# Patient Record
Sex: MALE | Race: WHITE | NOT HISPANIC OR LATINO | Employment: OTHER | URBAN - METROPOLITAN AREA
[De-identification: names, ages, dates, MRNs, and addresses within clinical notes are randomized per-mention and may not be internally consistent; named-entity substitution may affect disease eponyms.]

---

## 2021-05-24 ENCOUNTER — HOSPITAL ENCOUNTER (EMERGENCY)
Facility: HOSPITAL | Age: 76
Discharge: ANOTHER HOSPITAL | End: 2021-05-25
Attending: EMERGENCY MEDICINE
Payer: MEDICARE

## 2021-05-24 DIAGNOSIS — K59.00 CONSTIPATION: ICD-10-CM

## 2021-05-24 DIAGNOSIS — R33.9 URINARY RETENTION: Primary | ICD-10-CM

## 2021-05-24 DIAGNOSIS — R31.0 GROSS HEMATURIA: ICD-10-CM

## 2021-05-24 DIAGNOSIS — N17.9 AKI (ACUTE KIDNEY INJURY): ICD-10-CM

## 2021-05-24 LAB
ALBUMIN SERPL BCP-MCNC: 3 G/DL (ref 3.5–5.2)
ALP SERPL-CCNC: 81 U/L (ref 55–135)
ALT SERPL W/O P-5'-P-CCNC: 13 U/L (ref 10–44)
ANION GAP SERPL CALC-SCNC: 13 MMOL/L (ref 8–16)
AST SERPL-CCNC: 18 U/L (ref 10–40)
BACTERIA #/AREA URNS HPF: ABNORMAL /HPF
BASOPHILS # BLD AUTO: 0.1 K/UL (ref 0–0.2)
BASOPHILS NFR BLD: 1.2 % (ref 0–1.9)
BILIRUB SERPL-MCNC: 0.5 MG/DL (ref 0.1–1)
BILIRUB UR QL STRIP: NEGATIVE
BUN SERPL-MCNC: 47 MG/DL (ref 8–23)
CALCIUM SERPL-MCNC: 9.2 MG/DL (ref 8.7–10.5)
CAOX CRY URNS QL MICRO: ABNORMAL
CHLORIDE SERPL-SCNC: 111 MMOL/L (ref 95–110)
CLARITY UR: CLEAR
CO2 SERPL-SCNC: 21 MMOL/L (ref 23–29)
COLOR UR: YELLOW
CREAT SERPL-MCNC: 2.8 MG/DL (ref 0.5–1.4)
DIFFERENTIAL METHOD: ABNORMAL
EOSINOPHIL # BLD AUTO: 0.3 K/UL (ref 0–0.5)
EOSINOPHIL NFR BLD: 3 % (ref 0–8)
ERYTHROCYTE [DISTWIDTH] IN BLOOD BY AUTOMATED COUNT: 12.9 % (ref 11.5–14.5)
EST. GFR  (AFRICAN AMERICAN): 25 ML/MIN/1.73 M^2
EST. GFR  (NON AFRICAN AMERICAN): 21 ML/MIN/1.73 M^2
GLUCOSE SERPL-MCNC: 77 MG/DL (ref 70–110)
GLUCOSE UR QL STRIP: NEGATIVE
HCT VFR BLD AUTO: 42.8 % (ref 40–54)
HGB BLD-MCNC: 14 G/DL (ref 14–18)
HGB UR QL STRIP: ABNORMAL
IMM GRANULOCYTES # BLD AUTO: 0.03 K/UL (ref 0–0.04)
IMM GRANULOCYTES NFR BLD AUTO: 0.3 % (ref 0–0.5)
KETONES UR QL STRIP: NEGATIVE
LEUKOCYTE ESTERASE UR QL STRIP: NEGATIVE
LIPASE SERPL-CCNC: 44 U/L (ref 4–60)
LYMPHOCYTES # BLD AUTO: 1.4 K/UL (ref 1–4.8)
LYMPHOCYTES NFR BLD: 16.4 % (ref 18–48)
MCH RBC QN AUTO: 30.4 PG (ref 27–31)
MCHC RBC AUTO-ENTMCNC: 32.7 G/DL (ref 32–36)
MCV RBC AUTO: 93 FL (ref 82–98)
MICROSCOPIC COMMENT: ABNORMAL
MONOCYTES # BLD AUTO: 0.6 K/UL (ref 0.3–1)
MONOCYTES NFR BLD: 7.1 % (ref 4–15)
NEUTROPHILS # BLD AUTO: 6.2 K/UL (ref 1.8–7.7)
NEUTROPHILS NFR BLD: 72 % (ref 38–73)
NITRITE UR QL STRIP: NEGATIVE
NRBC BLD-RTO: 0 /100 WBC
PH UR STRIP: 5 [PH] (ref 5–8)
PLATELET # BLD AUTO: 207 K/UL (ref 150–450)
PMV BLD AUTO: 10.3 FL (ref 9.2–12.9)
POTASSIUM SERPL-SCNC: 4.6 MMOL/L (ref 3.5–5.1)
PROT SERPL-MCNC: 7 G/DL (ref 6–8.4)
PROT UR QL STRIP: NEGATIVE
RBC # BLD AUTO: 4.61 M/UL (ref 4.6–6.2)
RBC #/AREA URNS HPF: 18 /HPF (ref 0–4)
SODIUM SERPL-SCNC: 145 MMOL/L (ref 136–145)
SP GR UR STRIP: 1.01 (ref 1–1.03)
URN SPEC COLLECT METH UR: ABNORMAL
UROBILINOGEN UR STRIP-ACNC: NEGATIVE EU/DL
WBC # BLD AUTO: 8.59 K/UL (ref 3.9–12.7)
WBC #/AREA URNS HPF: 4 /HPF (ref 0–5)

## 2021-05-24 PROCEDURE — 80053 COMPREHEN METABOLIC PANEL: CPT | Performed by: EMERGENCY MEDICINE

## 2021-05-24 PROCEDURE — 81000 URINALYSIS NONAUTO W/SCOPE: CPT | Performed by: EMERGENCY MEDICINE

## 2021-05-24 PROCEDURE — U0002 COVID-19 LAB TEST NON-CDC: HCPCS | Performed by: EMERGENCY MEDICINE

## 2021-05-24 PROCEDURE — 85025 COMPLETE CBC W/AUTO DIFF WBC: CPT | Performed by: EMERGENCY MEDICINE

## 2021-05-24 PROCEDURE — 36000 PLACE NEEDLE IN VEIN: CPT

## 2021-05-24 PROCEDURE — 51702 INSERT TEMP BLADDER CATH: CPT

## 2021-05-24 PROCEDURE — 83690 ASSAY OF LIPASE: CPT | Performed by: EMERGENCY MEDICINE

## 2021-05-24 PROCEDURE — 99285 EMERGENCY DEPT VISIT HI MDM: CPT | Mod: 25

## 2021-05-24 PROCEDURE — 25000003 PHARM REV CODE 250: Performed by: EMERGENCY MEDICINE

## 2021-05-24 RX ORDER — PSEUDOEPHEDRINE/ACETAMINOPHEN 30MG-500MG
100 TABLET ORAL
Status: COMPLETED | OUTPATIENT
Start: 2021-05-24 | End: 2021-05-24

## 2021-05-24 RX ORDER — SYRING-NEEDL,DISP,INSUL,0.3 ML 29 G X1/2"
296 SYRINGE, EMPTY DISPOSABLE MISCELLANEOUS
Status: COMPLETED | OUTPATIENT
Start: 2021-05-24 | End: 2021-05-24

## 2021-05-24 RX ADMIN — SODIUM CHLORIDE 500 ML: 0.9 INJECTION, SOLUTION INTRAVENOUS at 03:05

## 2021-05-24 RX ADMIN — Medication 100 ML: at 03:05

## 2021-05-24 RX ADMIN — MAGNESIUM CITRATE 296 ML: 1.75 LIQUID ORAL at 03:05

## 2021-05-25 ENCOUNTER — ANESTHESIA EVENT (OUTPATIENT)
Dept: SURGERY | Facility: HOSPITAL | Age: 76
DRG: 661 | End: 2021-05-25
Payer: MEDICARE

## 2021-05-25 ENCOUNTER — HOSPITAL ENCOUNTER (INPATIENT)
Facility: HOSPITAL | Age: 76
LOS: 1 days | Discharge: HOME | DRG: 661 | End: 2021-05-27
Attending: EMERGENCY MEDICINE | Admitting: UROLOGY
Payer: MEDICARE

## 2021-05-25 ENCOUNTER — ANESTHESIA (OUTPATIENT)
Dept: SURGERY | Facility: HOSPITAL | Age: 76
DRG: 661 | End: 2021-05-25
Payer: MEDICARE

## 2021-05-25 VITALS
HEIGHT: 68 IN | HEART RATE: 91 BPM | BODY MASS INDEX: 26.52 KG/M2 | RESPIRATION RATE: 19 BRPM | TEMPERATURE: 98 F | OXYGEN SATURATION: 97 % | DIASTOLIC BLOOD PRESSURE: 74 MMHG | SYSTOLIC BLOOD PRESSURE: 119 MMHG | WEIGHT: 175 LBS

## 2021-05-25 DIAGNOSIS — N20.1 LEFT URETERAL STONE: Primary | ICD-10-CM

## 2021-05-25 DIAGNOSIS — N20.1 BILATERAL URETERAL CALCULI: ICD-10-CM

## 2021-05-25 DIAGNOSIS — N20.1 RIGHT URETERAL STONE: ICD-10-CM

## 2021-05-25 DIAGNOSIS — N20.1 URETERAL STONE: ICD-10-CM

## 2021-05-25 DIAGNOSIS — N13.30 BILATERAL HYDRONEPHROSIS: ICD-10-CM

## 2021-05-25 DIAGNOSIS — N17.9 AKI (ACUTE KIDNEY INJURY): ICD-10-CM

## 2021-05-25 PROBLEM — Z87.442 HISTORY OF KIDNEY STONES: Status: ACTIVE | Noted: 2021-05-25

## 2021-05-25 PROBLEM — Z86.73 HISTORY OF STROKE: Status: ACTIVE | Noted: 2021-05-25

## 2021-05-25 PROBLEM — Z74.01 BEDBOUND: Status: ACTIVE | Noted: 2021-05-25

## 2021-05-25 PROBLEM — R31.0 GROSS HEMATURIA: Status: ACTIVE | Noted: 2021-05-25

## 2021-05-25 PROBLEM — R33.9 URINARY RETENTION: Status: ACTIVE | Noted: 2021-05-25

## 2021-05-25 PROBLEM — E11.9 DIABETES: Status: ACTIVE | Noted: 2021-05-25

## 2021-05-25 PROBLEM — N13.2 URETERAL STONE WITH HYDRONEPHROSIS: Status: ACTIVE | Noted: 2021-05-25

## 2021-05-25 PROBLEM — I69.30 HISTORY OF STROKE WITH RESIDUAL DEFICIT: Status: ACTIVE | Noted: 2021-05-25

## 2021-05-25 PROBLEM — Z79.82 LONG TERM (CURRENT) USE OF ASPIRIN: Status: ACTIVE | Noted: 2021-05-25

## 2021-05-25 LAB
BASOPHILS # BLD AUTO: 0.1 K/UL (ref 0–0.2)
BASOPHILS NFR BLD: 0.7 % (ref 0–1.9)
BUN SERPL-MCNC: 49 MG/DL (ref 6–30)
CHLORIDE SERPL-SCNC: 113 MMOL/L (ref 95–110)
CREAT SERPL-MCNC: 2.9 MG/DL (ref 0.5–1.4)
CTP QC/QA: YES
DIFFERENTIAL METHOD: ABNORMAL
EOSINOPHIL # BLD AUTO: 0.1 K/UL (ref 0–0.5)
EOSINOPHIL NFR BLD: 0.4 % (ref 0–8)
ERYTHROCYTE [DISTWIDTH] IN BLOOD BY AUTOMATED COUNT: 13 % (ref 11.5–14.5)
GLUCOSE SERPL-MCNC: 86 MG/DL (ref 70–110)
HCT VFR BLD AUTO: 43.7 % (ref 40–54)
HCT VFR BLD CALC: 42 %PCV (ref 36–54)
HCV AB SERPL QL IA: NEGATIVE
HGB BLD-MCNC: 13.5 G/DL (ref 14–18)
IMM GRANULOCYTES # BLD AUTO: 0.07 K/UL (ref 0–0.04)
IMM GRANULOCYTES NFR BLD AUTO: 0.5 % (ref 0–0.5)
LYMPHOCYTES # BLD AUTO: 0.9 K/UL (ref 1–4.8)
LYMPHOCYTES NFR BLD: 6.2 % (ref 18–48)
MCH RBC QN AUTO: 29.3 PG (ref 27–31)
MCHC RBC AUTO-ENTMCNC: 30.9 G/DL (ref 32–36)
MCV RBC AUTO: 95 FL (ref 82–98)
MONOCYTES # BLD AUTO: 0.8 K/UL (ref 0.3–1)
MONOCYTES NFR BLD: 5 % (ref 4–15)
NEUTROPHILS # BLD AUTO: 13.3 K/UL (ref 1.8–7.7)
NEUTROPHILS NFR BLD: 87.2 % (ref 38–73)
NRBC BLD-RTO: 0 /100 WBC
PLATELET # BLD AUTO: 180 K/UL (ref 150–450)
PMV BLD AUTO: 10.7 FL (ref 9.2–12.9)
POC IONIZED CALCIUM: 1.17 MMOL/L (ref 1.06–1.42)
POC TCO2 (MEASURED): 22 MMOL/L (ref 23–29)
POCT GLUCOSE: 110 MG/DL (ref 70–110)
POCT GLUCOSE: 121 MG/DL (ref 70–110)
POCT GLUCOSE: 92 MG/DL (ref 70–110)
POTASSIUM BLD-SCNC: 4.8 MMOL/L (ref 3.5–5.1)
RBC # BLD AUTO: 4.61 M/UL (ref 4.6–6.2)
SAMPLE: ABNORMAL
SARS-COV-2 RDRP RESP QL NAA+PROBE: NEGATIVE
SODIUM BLD-SCNC: 144 MMOL/L (ref 136–145)
WBC # BLD AUTO: 15.24 K/UL (ref 3.9–12.7)

## 2021-05-25 PROCEDURE — 99285 EMERGENCY DEPT VISIT HI MDM: CPT | Mod: 25

## 2021-05-25 PROCEDURE — 37000008 HC ANESTHESIA 1ST 15 MINUTES: Performed by: UROLOGY

## 2021-05-25 PROCEDURE — C2617 STENT, NON-COR, TEM W/O DEL: HCPCS | Performed by: UROLOGY

## 2021-05-25 PROCEDURE — D9220A PRA ANESTHESIA: Mod: CRNA,,, | Performed by: NURSE ANESTHETIST, CERTIFIED REGISTERED

## 2021-05-25 PROCEDURE — D9220A PRA ANESTHESIA: Mod: ANES,,, | Performed by: ANESTHESIOLOGY

## 2021-05-25 PROCEDURE — 52318 REMOVE BLADDER STONE: CPT | Mod: ,,, | Performed by: UROLOGY

## 2021-05-25 PROCEDURE — 36000706: Performed by: UROLOGY

## 2021-05-25 PROCEDURE — 36000707: Performed by: UROLOGY

## 2021-05-25 PROCEDURE — 63600175 PHARM REV CODE 636 W HCPCS: Performed by: STUDENT IN AN ORGANIZED HEALTH CARE EDUCATION/TRAINING PROGRAM

## 2021-05-25 PROCEDURE — D9220A PRA ANESTHESIA: ICD-10-PCS | Mod: ANES,,, | Performed by: ANESTHESIOLOGY

## 2021-05-25 PROCEDURE — 96361 HYDRATE IV INFUSION ADD-ON: CPT

## 2021-05-25 PROCEDURE — 99284 EMERGENCY DEPT VISIT MOD MDM: CPT | Mod: ,,, | Performed by: EMERGENCY MEDICINE

## 2021-05-25 PROCEDURE — G0378 HOSPITAL OBSERVATION PER HR: HCPCS

## 2021-05-25 PROCEDURE — 25000003 PHARM REV CODE 250: Performed by: NURSE ANESTHETIST, CERTIFIED REGISTERED

## 2021-05-25 PROCEDURE — 71000016 HC POSTOP RECOV ADDL HR: Performed by: UROLOGY

## 2021-05-25 PROCEDURE — 82962 GLUCOSE BLOOD TEST: CPT | Performed by: UROLOGY

## 2021-05-25 PROCEDURE — 99284 PR EMERGENCY DEPT VISIT,LEVEL IV: ICD-10-PCS | Mod: ,,, | Performed by: EMERGENCY MEDICINE

## 2021-05-25 PROCEDURE — 37000009 HC ANESTHESIA EA ADD 15 MINS: Performed by: UROLOGY

## 2021-05-25 PROCEDURE — D9220A PRA ANESTHESIA: ICD-10-PCS | Mod: CRNA,,, | Performed by: NURSE ANESTHETIST, CERTIFIED REGISTERED

## 2021-05-25 PROCEDURE — 71000015 HC POSTOP RECOV 1ST HR: Performed by: UROLOGY

## 2021-05-25 PROCEDURE — 52332 PR CYSTOSCOPY,INSERT URETERAL STENT: ICD-10-PCS | Mod: 50,51,, | Performed by: UROLOGY

## 2021-05-25 PROCEDURE — 25000003 PHARM REV CODE 250: Performed by: STUDENT IN AN ORGANIZED HEALTH CARE EDUCATION/TRAINING PROGRAM

## 2021-05-25 PROCEDURE — 96360 HYDRATION IV INFUSION INIT: CPT

## 2021-05-25 PROCEDURE — 80047 BASIC METABLC PNL IONIZED CA: CPT

## 2021-05-25 PROCEDURE — 71000044 HC DOSC ROUTINE RECOVERY FIRST HOUR: Performed by: UROLOGY

## 2021-05-25 PROCEDURE — 85025 COMPLETE CBC W/AUTO DIFF WBC: CPT | Performed by: EMERGENCY MEDICINE

## 2021-05-25 PROCEDURE — 52318 PR LITHOLOPAXY; BY ANY MEANS, COMPLICATED/LARGE >2.5CM: ICD-10-PCS | Mod: ,,, | Performed by: UROLOGY

## 2021-05-25 PROCEDURE — 86803 HEPATITIS C AB TEST: CPT | Performed by: EMERGENCY MEDICINE

## 2021-05-25 PROCEDURE — 52332 CYSTOSCOPY AND TREATMENT: CPT | Mod: 50,51,, | Performed by: UROLOGY

## 2021-05-25 PROCEDURE — 63600175 PHARM REV CODE 636 W HCPCS: Performed by: NURSE ANESTHETIST, CERTIFIED REGISTERED

## 2021-05-25 PROCEDURE — C1758 CATHETER, URETERAL: HCPCS | Performed by: UROLOGY

## 2021-05-25 DEVICE — STENT URETERAL UNIV 6FR 24CM
Type: IMPLANTABLE DEVICE | Site: URETER | Status: NON-FUNCTIONAL
Removed: 2021-08-26

## 2021-05-25 RX ORDER — PROPOFOL 10 MG/ML
VIAL (ML) INTRAVENOUS CONTINUOUS PRN
Status: DISCONTINUED | OUTPATIENT
Start: 2021-05-25 | End: 2021-05-25

## 2021-05-25 RX ORDER — SODIUM CHLORIDE 0.9 % (FLUSH) 0.9 %
10 SYRINGE (ML) INJECTION
Status: DISCONTINUED | OUTPATIENT
Start: 2021-05-25 | End: 2021-05-27 | Stop reason: HOSPADM

## 2021-05-25 RX ORDER — SODIUM CHLORIDE, SODIUM LACTATE, POTASSIUM CHLORIDE, CALCIUM CHLORIDE 600; 310; 30; 20 MG/100ML; MG/100ML; MG/100ML; MG/100ML
INJECTION, SOLUTION INTRAVENOUS CONTINUOUS
Status: DISCONTINUED | OUTPATIENT
Start: 2021-05-25 | End: 2021-05-27

## 2021-05-25 RX ORDER — IBUPROFEN 200 MG
24 TABLET ORAL
Status: DISCONTINUED | OUTPATIENT
Start: 2021-05-25 | End: 2021-05-27 | Stop reason: HOSPADM

## 2021-05-25 RX ORDER — LIDOCAINE HYDROCHLORIDE 20 MG/ML
INJECTION, SOLUTION EPIDURAL; INFILTRATION; INTRACAUDAL; PERINEURAL
Status: DISCONTINUED | OUTPATIENT
Start: 2021-05-25 | End: 2021-05-25

## 2021-05-25 RX ORDER — FOLIC ACID 1 MG/1
1 TABLET ORAL DAILY
Status: DISCONTINUED | OUTPATIENT
Start: 2021-05-25 | End: 2021-05-27 | Stop reason: HOSPADM

## 2021-05-25 RX ORDER — PHENYLEPHRINE HCL IN 0.9% NACL 1 MG/10 ML
SYRINGE (ML) INTRAVENOUS
Status: DISCONTINUED | OUTPATIENT
Start: 2021-05-25 | End: 2021-05-25

## 2021-05-25 RX ORDER — PROPOFOL 10 MG/ML
VIAL (ML) INTRAVENOUS
Status: DISCONTINUED | OUTPATIENT
Start: 2021-05-25 | End: 2021-05-25

## 2021-05-25 RX ORDER — LOSARTAN POTASSIUM AND HYDROCHLOROTHIAZIDE 12.5; 1 MG/1; MG/1
1 TABLET ORAL DAILY
Status: DISCONTINUED | OUTPATIENT
Start: 2021-05-26 | End: 2021-05-27 | Stop reason: HOSPADM

## 2021-05-25 RX ORDER — GABAPENTIN 100 MG/1
100 CAPSULE ORAL 3 TIMES DAILY
Status: DISCONTINUED | OUTPATIENT
Start: 2021-05-25 | End: 2021-05-27 | Stop reason: HOSPADM

## 2021-05-25 RX ORDER — INSULIN ASPART 100 [IU]/ML
1-10 INJECTION, SOLUTION INTRAVENOUS; SUBCUTANEOUS
Status: DISCONTINUED | OUTPATIENT
Start: 2021-05-25 | End: 2021-05-27 | Stop reason: HOSPADM

## 2021-05-25 RX ORDER — ESCITALOPRAM OXALATE 10 MG/1
10 TABLET ORAL DAILY
Status: DISCONTINUED | OUTPATIENT
Start: 2021-05-25 | End: 2021-05-27 | Stop reason: HOSPADM

## 2021-05-25 RX ORDER — ONDANSETRON 2 MG/ML
4 INJECTION INTRAMUSCULAR; INTRAVENOUS ONCE AS NEEDED
Status: DISCONTINUED | OUTPATIENT
Start: 2021-05-25 | End: 2021-05-25

## 2021-05-25 RX ORDER — ATORVASTATIN CALCIUM 20 MG/1
40 TABLET, FILM COATED ORAL NIGHTLY
Status: DISCONTINUED | OUTPATIENT
Start: 2021-05-25 | End: 2021-05-27 | Stop reason: HOSPADM

## 2021-05-25 RX ORDER — FENTANYL CITRATE 50 UG/ML
25 INJECTION, SOLUTION INTRAMUSCULAR; INTRAVENOUS EVERY 5 MIN PRN
Status: DISCONTINUED | OUTPATIENT
Start: 2021-05-25 | End: 2021-05-25

## 2021-05-25 RX ORDER — GLUCAGON 1 MG
1 KIT INJECTION
Status: DISCONTINUED | OUTPATIENT
Start: 2021-05-25 | End: 2021-05-27 | Stop reason: HOSPADM

## 2021-05-25 RX ORDER — CEFAZOLIN SODIUM 1 G/3ML
INJECTION, POWDER, FOR SOLUTION INTRAMUSCULAR; INTRAVENOUS
Status: DISCONTINUED | OUTPATIENT
Start: 2021-05-25 | End: 2021-05-25

## 2021-05-25 RX ORDER — IBUPROFEN 200 MG
16 TABLET ORAL
Status: DISCONTINUED | OUTPATIENT
Start: 2021-05-25 | End: 2021-05-27 | Stop reason: HOSPADM

## 2021-05-25 RX ORDER — ZOLPIDEM TARTRATE 5 MG/1
5 TABLET ORAL NIGHTLY PRN
Status: DISCONTINUED | OUTPATIENT
Start: 2021-05-25 | End: 2021-05-27 | Stop reason: HOSPADM

## 2021-05-25 RX ADMIN — ESCITALOPRAM OXALATE 10 MG: 10 TABLET ORAL at 03:05

## 2021-05-25 RX ADMIN — Medication 150 MCG/KG/MIN: at 08:05

## 2021-05-25 RX ADMIN — LIDOCAINE HYDROCHLORIDE 100 MG: 20 INJECTION, SOLUTION EPIDURAL; INFILTRATION; INTRACAUDAL at 08:05

## 2021-05-25 RX ADMIN — SODIUM CHLORIDE, SODIUM LACTATE, POTASSIUM CHLORIDE, AND CALCIUM CHLORIDE: .6; .31; .03; .02 INJECTION, SOLUTION INTRAVENOUS at 02:05

## 2021-05-25 RX ADMIN — Medication 100 MCG: at 09:05

## 2021-05-25 RX ADMIN — SODIUM CHLORIDE, SODIUM LACTATE, POTASSIUM CHLORIDE, AND CALCIUM CHLORIDE: .6; .31; .03; .02 INJECTION, SOLUTION INTRAVENOUS at 10:05

## 2021-05-25 RX ADMIN — ATORVASTATIN CALCIUM 40 MG: 20 TABLET, FILM COATED ORAL at 08:05

## 2021-05-25 RX ADMIN — FOLIC ACID 1 MG: 1 TABLET ORAL at 03:05

## 2021-05-25 RX ADMIN — GABAPENTIN 100 MG: 100 CAPSULE ORAL at 03:05

## 2021-05-25 RX ADMIN — GABAPENTIN 100 MG: 100 CAPSULE ORAL at 08:05

## 2021-05-25 RX ADMIN — SODIUM CHLORIDE, SODIUM LACTATE, POTASSIUM CHLORIDE, AND CALCIUM CHLORIDE: .6; .31; .03; .02 INJECTION, SOLUTION INTRAVENOUS at 01:05

## 2021-05-25 RX ADMIN — CEFAZOLIN 2 G: 330 INJECTION, POWDER, FOR SOLUTION INTRAMUSCULAR; INTRAVENOUS at 08:05

## 2021-05-25 RX ADMIN — PROPOFOL 30 MG: 10 INJECTION, EMULSION INTRAVENOUS at 08:05

## 2021-05-26 ENCOUNTER — TELEPHONE (OUTPATIENT)
Dept: UROLOGY | Facility: CLINIC | Age: 76
End: 2021-05-26

## 2021-05-26 PROBLEM — N13.30 BILATERAL HYDRONEPHROSIS: Status: ACTIVE | Noted: 2021-05-26

## 2021-05-26 LAB
ANION GAP SERPL CALC-SCNC: 9 MMOL/L (ref 8–16)
BASOPHILS # BLD AUTO: 0.03 K/UL (ref 0–0.2)
BASOPHILS # BLD AUTO: 0.04 K/UL (ref 0–0.2)
BASOPHILS # BLD AUTO: 0.08 K/UL (ref 0–0.2)
BASOPHILS NFR BLD: 0.4 % (ref 0–1.9)
BASOPHILS NFR BLD: 0.4 % (ref 0–1.9)
BASOPHILS NFR BLD: 0.8 % (ref 0–1.9)
BUN SERPL-MCNC: 32 MG/DL (ref 8–23)
CALCIUM SERPL-MCNC: 8.3 MG/DL (ref 8.7–10.5)
CHLORIDE SERPL-SCNC: 108 MMOL/L (ref 95–110)
CO2 SERPL-SCNC: 21 MMOL/L (ref 23–29)
CREAT SERPL-MCNC: 1.3 MG/DL (ref 0.5–1.4)
DIFFERENTIAL METHOD: ABNORMAL
EOSINOPHIL # BLD AUTO: 0.1 K/UL (ref 0–0.5)
EOSINOPHIL # BLD AUTO: 0.2 K/UL (ref 0–0.5)
EOSINOPHIL # BLD AUTO: 0.4 K/UL (ref 0–0.5)
EOSINOPHIL NFR BLD: 1.4 % (ref 0–8)
EOSINOPHIL NFR BLD: 2 % (ref 0–8)
EOSINOPHIL NFR BLD: 4.2 % (ref 0–8)
ERYTHROCYTE [DISTWIDTH] IN BLOOD BY AUTOMATED COUNT: 12.8 % (ref 11.5–14.5)
ERYTHROCYTE [DISTWIDTH] IN BLOOD BY AUTOMATED COUNT: 12.8 % (ref 11.5–14.5)
ERYTHROCYTE [DISTWIDTH] IN BLOOD BY AUTOMATED COUNT: 12.9 % (ref 11.5–14.5)
EST. GFR  (AFRICAN AMERICAN): >60 ML/MIN/1.73 M^2
EST. GFR  (NON AFRICAN AMERICAN): 54.1 ML/MIN/1.73 M^2
ESTIMATED AVG GLUCOSE: 91 MG/DL (ref 68–131)
GLUCOSE SERPL-MCNC: 99 MG/DL (ref 70–110)
HBA1C MFR BLD: 4.8 % (ref 4–5.6)
HCT VFR BLD AUTO: 23.6 % (ref 40–54)
HCT VFR BLD AUTO: 28.7 % (ref 40–54)
HCT VFR BLD AUTO: 31.1 % (ref 40–54)
HGB BLD-MCNC: 7.3 G/DL (ref 14–18)
HGB BLD-MCNC: 9.1 G/DL (ref 14–18)
HGB BLD-MCNC: 9.7 G/DL (ref 14–18)
IMM GRANULOCYTES # BLD AUTO: 0.03 K/UL (ref 0–0.04)
IMM GRANULOCYTES # BLD AUTO: 0.06 K/UL (ref 0–0.04)
IMM GRANULOCYTES # BLD AUTO: 0.09 K/UL (ref 0–0.04)
IMM GRANULOCYTES NFR BLD AUTO: 0.4 % (ref 0–0.5)
IMM GRANULOCYTES NFR BLD AUTO: 0.6 % (ref 0–0.5)
IMM GRANULOCYTES NFR BLD AUTO: 0.9 % (ref 0–0.5)
LYMPHOCYTES # BLD AUTO: 1 K/UL (ref 1–4.8)
LYMPHOCYTES # BLD AUTO: 1.4 K/UL (ref 1–4.8)
LYMPHOCYTES # BLD AUTO: 1.8 K/UL (ref 1–4.8)
LYMPHOCYTES NFR BLD: 13.3 % (ref 18–48)
LYMPHOCYTES NFR BLD: 13.9 % (ref 18–48)
LYMPHOCYTES NFR BLD: 18.4 % (ref 18–48)
MCH RBC QN AUTO: 29.3 PG (ref 27–31)
MCH RBC QN AUTO: 30 PG (ref 27–31)
MCH RBC QN AUTO: 30.1 PG (ref 27–31)
MCHC RBC AUTO-ENTMCNC: 30.9 G/DL (ref 32–36)
MCHC RBC AUTO-ENTMCNC: 31.2 G/DL (ref 32–36)
MCHC RBC AUTO-ENTMCNC: 31.7 G/DL (ref 32–36)
MCV RBC AUTO: 95 FL (ref 82–98)
MCV RBC AUTO: 95 FL (ref 82–98)
MCV RBC AUTO: 97 FL (ref 82–98)
MONOCYTES # BLD AUTO: 0.5 K/UL (ref 0.3–1)
MONOCYTES # BLD AUTO: 0.5 K/UL (ref 0.3–1)
MONOCYTES # BLD AUTO: 0.6 K/UL (ref 0.3–1)
MONOCYTES NFR BLD: 5.3 % (ref 4–15)
MONOCYTES NFR BLD: 6.4 % (ref 4–15)
MONOCYTES NFR BLD: 6.4 % (ref 4–15)
NEUTROPHILS # BLD AUTO: 6 K/UL (ref 1.8–7.7)
NEUTROPHILS # BLD AUTO: 6.7 K/UL (ref 1.8–7.7)
NEUTROPHILS # BLD AUTO: 7.5 K/UL (ref 1.8–7.7)
NEUTROPHILS NFR BLD: 70.7 % (ref 38–73)
NEUTROPHILS NFR BLD: 76.4 % (ref 38–73)
NEUTROPHILS NFR BLD: 78.1 % (ref 38–73)
NRBC BLD-RTO: 0 /100 WBC
PLATELET # BLD AUTO: 147 K/UL (ref 150–450)
PLATELET # BLD AUTO: 191 K/UL (ref 150–450)
PLATELET # BLD AUTO: 210 K/UL (ref 150–450)
PMV BLD AUTO: 10.4 FL (ref 9.2–12.9)
PMV BLD AUTO: 10.5 FL (ref 9.2–12.9)
PMV BLD AUTO: 10.5 FL (ref 9.2–12.9)
POCT GLUCOSE: 104 MG/DL (ref 70–110)
POCT GLUCOSE: 113 MG/DL (ref 70–110)
POCT GLUCOSE: 116 MG/DL (ref 70–110)
POCT GLUCOSE: 96 MG/DL (ref 70–110)
POTASSIUM SERPL-SCNC: 4.7 MMOL/L (ref 3.5–5.1)
RBC # BLD AUTO: 2.49 M/UL (ref 4.6–6.2)
RBC # BLD AUTO: 3.03 M/UL (ref 4.6–6.2)
RBC # BLD AUTO: 3.22 M/UL (ref 4.6–6.2)
SODIUM SERPL-SCNC: 138 MMOL/L (ref 136–145)
WBC # BLD AUTO: 7.68 K/UL (ref 3.9–12.7)
WBC # BLD AUTO: 9.54 K/UL (ref 3.9–12.7)
WBC # BLD AUTO: 9.81 K/UL (ref 3.9–12.7)

## 2021-05-26 PROCEDURE — 96361 HYDRATE IV INFUSION ADD-ON: CPT

## 2021-05-26 PROCEDURE — 83036 HEMOGLOBIN GLYCOSYLATED A1C: CPT | Performed by: UROLOGY

## 2021-05-26 PROCEDURE — 85025 COMPLETE CBC W/AUTO DIFF WBC: CPT | Mod: 91 | Performed by: STUDENT IN AN ORGANIZED HEALTH CARE EDUCATION/TRAINING PROGRAM

## 2021-05-26 PROCEDURE — 25000003 PHARM REV CODE 250: Performed by: STUDENT IN AN ORGANIZED HEALTH CARE EDUCATION/TRAINING PROGRAM

## 2021-05-26 PROCEDURE — 63600175 PHARM REV CODE 636 W HCPCS: Performed by: STUDENT IN AN ORGANIZED HEALTH CARE EDUCATION/TRAINING PROGRAM

## 2021-05-26 PROCEDURE — 20600001 HC STEP DOWN PRIVATE ROOM

## 2021-05-26 PROCEDURE — 80048 BASIC METABOLIC PNL TOTAL CA: CPT | Performed by: STUDENT IN AN ORGANIZED HEALTH CARE EDUCATION/TRAINING PROGRAM

## 2021-05-26 PROCEDURE — 36415 COLL VENOUS BLD VENIPUNCTURE: CPT | Performed by: STUDENT IN AN ORGANIZED HEALTH CARE EDUCATION/TRAINING PROGRAM

## 2021-05-26 RX ORDER — TAMSULOSIN HYDROCHLORIDE 0.4 MG/1
0.4 CAPSULE ORAL DAILY
Qty: 30 CAPSULE | Refills: 11 | Status: SHIPPED | OUTPATIENT
Start: 2021-05-26 | End: 2021-06-02 | Stop reason: SDUPTHER

## 2021-05-26 RX ADMIN — FOLIC ACID 1 MG: 1 TABLET ORAL at 08:05

## 2021-05-26 RX ADMIN — ESCITALOPRAM OXALATE 10 MG: 10 TABLET ORAL at 08:05

## 2021-05-26 RX ADMIN — GABAPENTIN 100 MG: 100 CAPSULE ORAL at 08:05

## 2021-05-26 RX ADMIN — ATORVASTATIN CALCIUM 40 MG: 20 TABLET, FILM COATED ORAL at 08:05

## 2021-05-26 RX ADMIN — SODIUM CHLORIDE, SODIUM LACTATE, POTASSIUM CHLORIDE, AND CALCIUM CHLORIDE: .6; .31; .03; .02 INJECTION, SOLUTION INTRAVENOUS at 07:05

## 2021-05-26 RX ADMIN — LOSARTAN POTASSIUM AND HYDROCHLOROTHIAZIDE 1 TABLET: 12.5; 1 TABLET ORAL at 08:05

## 2021-05-26 RX ADMIN — SODIUM CHLORIDE, SODIUM LACTATE, POTASSIUM CHLORIDE, AND CALCIUM CHLORIDE: .6; .31; .03; .02 INJECTION, SOLUTION INTRAVENOUS at 03:05

## 2021-05-26 RX ADMIN — ZOLPIDEM TARTRATE 5 MG: 5 TABLET ORAL at 08:05

## 2021-05-26 RX ADMIN — GABAPENTIN 100 MG: 100 CAPSULE ORAL at 03:05

## 2021-05-27 VITALS
SYSTOLIC BLOOD PRESSURE: 119 MMHG | RESPIRATION RATE: 16 BRPM | DIASTOLIC BLOOD PRESSURE: 56 MMHG | TEMPERATURE: 99 F | OXYGEN SATURATION: 97 % | HEART RATE: 54 BPM

## 2021-05-27 LAB
ANION GAP SERPL CALC-SCNC: 8 MMOL/L (ref 8–16)
BASOPHILS # BLD AUTO: 0.08 K/UL (ref 0–0.2)
BASOPHILS NFR BLD: 1 % (ref 0–1.9)
BUN SERPL-MCNC: 26 MG/DL (ref 8–23)
CALCIUM SERPL-MCNC: 8.3 MG/DL (ref 8.7–10.5)
CHLORIDE SERPL-SCNC: 107 MMOL/L (ref 95–110)
CO2 SERPL-SCNC: 24 MMOL/L (ref 23–29)
CREAT SERPL-MCNC: 1.3 MG/DL (ref 0.5–1.4)
DIFFERENTIAL METHOD: ABNORMAL
EOSINOPHIL # BLD AUTO: 0.5 K/UL (ref 0–0.5)
EOSINOPHIL NFR BLD: 6.8 % (ref 0–8)
ERYTHROCYTE [DISTWIDTH] IN BLOOD BY AUTOMATED COUNT: 12.8 % (ref 11.5–14.5)
EST. GFR  (AFRICAN AMERICAN): >60 ML/MIN/1.73 M^2
EST. GFR  (NON AFRICAN AMERICAN): 54.1 ML/MIN/1.73 M^2
GLUCOSE SERPL-MCNC: 87 MG/DL (ref 70–110)
HCT VFR BLD AUTO: 26.1 % (ref 40–54)
HGB BLD-MCNC: 8.3 G/DL (ref 14–18)
IMM GRANULOCYTES # BLD AUTO: 0.04 K/UL (ref 0–0.04)
IMM GRANULOCYTES NFR BLD AUTO: 0.5 % (ref 0–0.5)
LYMPHOCYTES # BLD AUTO: 2.2 K/UL (ref 1–4.8)
LYMPHOCYTES NFR BLD: 27.8 % (ref 18–48)
MCH RBC QN AUTO: 29.5 PG (ref 27–31)
MCHC RBC AUTO-ENTMCNC: 31.8 G/DL (ref 32–36)
MCV RBC AUTO: 93 FL (ref 82–98)
MONOCYTES # BLD AUTO: 0.5 K/UL (ref 0.3–1)
MONOCYTES NFR BLD: 6.2 % (ref 4–15)
NEUTROPHILS # BLD AUTO: 4.6 K/UL (ref 1.8–7.7)
NEUTROPHILS NFR BLD: 57.7 % (ref 38–73)
NRBC BLD-RTO: 0 /100 WBC
PLATELET # BLD AUTO: 190 K/UL (ref 150–450)
PMV BLD AUTO: 10.5 FL (ref 9.2–12.9)
POTASSIUM SERPL-SCNC: 4.1 MMOL/L (ref 3.5–5.1)
RBC # BLD AUTO: 2.81 M/UL (ref 4.6–6.2)
SODIUM SERPL-SCNC: 139 MMOL/L (ref 136–145)
WBC # BLD AUTO: 7.91 K/UL (ref 3.9–12.7)

## 2021-05-27 PROCEDURE — 25000003 PHARM REV CODE 250: Performed by: STUDENT IN AN ORGANIZED HEALTH CARE EDUCATION/TRAINING PROGRAM

## 2021-05-27 PROCEDURE — 63600175 PHARM REV CODE 636 W HCPCS: Performed by: STUDENT IN AN ORGANIZED HEALTH CARE EDUCATION/TRAINING PROGRAM

## 2021-05-27 PROCEDURE — 36415 COLL VENOUS BLD VENIPUNCTURE: CPT | Performed by: STUDENT IN AN ORGANIZED HEALTH CARE EDUCATION/TRAINING PROGRAM

## 2021-05-27 PROCEDURE — 96361 HYDRATE IV INFUSION ADD-ON: CPT

## 2021-05-27 PROCEDURE — 85025 COMPLETE CBC W/AUTO DIFF WBC: CPT | Performed by: STUDENT IN AN ORGANIZED HEALTH CARE EDUCATION/TRAINING PROGRAM

## 2021-05-27 PROCEDURE — 80048 BASIC METABOLIC PNL TOTAL CA: CPT | Performed by: STUDENT IN AN ORGANIZED HEALTH CARE EDUCATION/TRAINING PROGRAM

## 2021-05-27 RX ORDER — TAMSULOSIN HYDROCHLORIDE 0.4 MG/1
0.4 CAPSULE ORAL DAILY
Status: DISCONTINUED | OUTPATIENT
Start: 2021-05-27 | End: 2021-05-27 | Stop reason: HOSPADM

## 2021-05-27 RX ADMIN — FOLIC ACID 1 MG: 1 TABLET ORAL at 09:05

## 2021-05-27 RX ADMIN — SODIUM CHLORIDE, SODIUM LACTATE, POTASSIUM CHLORIDE, AND CALCIUM CHLORIDE: .6; .31; .03; .02 INJECTION, SOLUTION INTRAVENOUS at 01:05

## 2021-05-27 RX ADMIN — TAMSULOSIN HYDROCHLORIDE 0.4 MG: 0.4 CAPSULE ORAL at 09:05

## 2021-05-27 RX ADMIN — GABAPENTIN 100 MG: 100 CAPSULE ORAL at 09:05

## 2021-05-27 RX ADMIN — ESCITALOPRAM OXALATE 10 MG: 10 TABLET ORAL at 09:05

## 2021-05-27 RX ADMIN — LOSARTAN POTASSIUM AND HYDROCHLOROTHIAZIDE 1 TABLET: 12.5; 1 TABLET ORAL at 09:05

## 2021-06-02 ENCOUNTER — OFFICE VISIT (OUTPATIENT)
Dept: UROLOGY | Facility: CLINIC | Age: 76
End: 2021-06-02
Payer: MEDICARE

## 2021-06-02 VITALS
HEIGHT: 68 IN | DIASTOLIC BLOOD PRESSURE: 68 MMHG | HEART RATE: 104 BPM | BODY MASS INDEX: 26.52 KG/M2 | WEIGHT: 175 LBS | SYSTOLIC BLOOD PRESSURE: 115 MMHG

## 2021-06-02 DIAGNOSIS — R82.998 CELLS AND CASTS IN URINE: ICD-10-CM

## 2021-06-02 DIAGNOSIS — N20.0 NEPHROLITHIASIS: Primary | ICD-10-CM

## 2021-06-02 DIAGNOSIS — N13.8 BPH WITH URINARY OBSTRUCTION: ICD-10-CM

## 2021-06-02 DIAGNOSIS — N21.0 BLADDER STONES: ICD-10-CM

## 2021-06-02 DIAGNOSIS — N40.1 BPH WITH URINARY OBSTRUCTION: ICD-10-CM

## 2021-06-02 PROBLEM — Z87.442 HISTORY OF KIDNEY STONES: Status: RESOLVED | Noted: 2021-05-25 | Resolved: 2021-06-02

## 2021-06-02 PROBLEM — N13.2 URETERAL STONE WITH HYDRONEPHROSIS: Status: RESOLVED | Noted: 2021-05-25 | Resolved: 2021-06-02

## 2021-06-02 PROBLEM — I69.30 HISTORY OF STROKE WITH RESIDUAL DEFICIT: Status: RESOLVED | Noted: 2021-05-25 | Resolved: 2021-06-02

## 2021-06-02 PROBLEM — R31.0 GROSS HEMATURIA: Status: RESOLVED | Noted: 2021-05-25 | Resolved: 2021-06-02

## 2021-06-02 PROBLEM — N20.1 LEFT URETERAL STONE: Status: RESOLVED | Noted: 2021-05-25 | Resolved: 2021-06-02

## 2021-06-02 PROBLEM — Z74.01 BEDBOUND: Status: RESOLVED | Noted: 2021-05-25 | Resolved: 2021-06-02

## 2021-06-02 PROCEDURE — 3008F BODY MASS INDEX DOCD: CPT | Mod: CPTII,S$GLB,, | Performed by: UROLOGY

## 2021-06-02 PROCEDURE — 3288F PR FALLS RISK ASSESSMENT DOCUMENTED: ICD-10-PCS | Mod: CPTII,S$GLB,, | Performed by: UROLOGY

## 2021-06-02 PROCEDURE — 1101F PT FALLS ASSESS-DOCD LE1/YR: CPT | Mod: CPTII,S$GLB,, | Performed by: UROLOGY

## 2021-06-02 PROCEDURE — 1125F PR PAIN SEVERITY QUANTIFIED, PAIN PRESENT: ICD-10-PCS | Mod: S$GLB,,, | Performed by: UROLOGY

## 2021-06-02 PROCEDURE — 3008F PR BODY MASS INDEX (BMI) DOCUMENTED: ICD-10-PCS | Mod: CPTII,S$GLB,, | Performed by: UROLOGY

## 2021-06-02 PROCEDURE — 51798 US URINE CAPACITY MEASURE: CPT | Mod: S$GLB,,, | Performed by: UROLOGY

## 2021-06-02 PROCEDURE — 1111F DSCHRG MED/CURRENT MED MERGE: CPT | Mod: CPTII,S$GLB,, | Performed by: UROLOGY

## 2021-06-02 PROCEDURE — 1101F PR PT FALLS ASSESS DOC 0-1 FALLS W/OUT INJ PAST YR: ICD-10-PCS | Mod: CPTII,S$GLB,, | Performed by: UROLOGY

## 2021-06-02 PROCEDURE — 1159F PR MEDICATION LIST DOCUMENTED IN MEDICAL RECORD: ICD-10-PCS | Mod: S$GLB,,, | Performed by: UROLOGY

## 2021-06-02 PROCEDURE — 99214 OFFICE O/P EST MOD 30 MIN: CPT | Mod: S$GLB,,, | Performed by: UROLOGY

## 2021-06-02 PROCEDURE — 1125F AMNT PAIN NOTED PAIN PRSNT: CPT | Mod: S$GLB,,, | Performed by: UROLOGY

## 2021-06-02 PROCEDURE — 99999 PR PBB SHADOW E&M-EST. PATIENT-LVL III: CPT | Mod: PBBFAC,,, | Performed by: UROLOGY

## 2021-06-02 PROCEDURE — 99999 PR PBB SHADOW E&M-EST. PATIENT-LVL III: ICD-10-PCS | Mod: PBBFAC,,, | Performed by: UROLOGY

## 2021-06-02 PROCEDURE — 1111F PR DISCHARGE MEDS RECONCILED W/ CURRENT OUTPATIENT MED LIST: ICD-10-PCS | Mod: CPTII,S$GLB,, | Performed by: UROLOGY

## 2021-06-02 PROCEDURE — 1159F MED LIST DOCD IN RCRD: CPT | Mod: S$GLB,,, | Performed by: UROLOGY

## 2021-06-02 PROCEDURE — 3288F FALL RISK ASSESSMENT DOCD: CPT | Mod: CPTII,S$GLB,, | Performed by: UROLOGY

## 2021-06-02 PROCEDURE — 99214 PR OFFICE/OUTPT VISIT, EST, LEVL IV, 30-39 MIN: ICD-10-PCS | Mod: S$GLB,,, | Performed by: UROLOGY

## 2021-06-02 PROCEDURE — 51798 PR MEAS,POST-VOID RES,US,NON-IMAGING: ICD-10-PCS | Mod: S$GLB,,, | Performed by: UROLOGY

## 2021-06-02 RX ORDER — TAMSULOSIN HYDROCHLORIDE 0.4 MG/1
0.8 CAPSULE ORAL DAILY
Qty: 60 CAPSULE | Refills: 11 | Status: SHIPPED | OUTPATIENT
Start: 2021-06-02 | End: 2022-04-22

## 2021-06-08 ENCOUNTER — TELEPHONE (OUTPATIENT)
Dept: UROLOGY | Facility: CLINIC | Age: 76
End: 2021-06-08

## 2021-06-08 DIAGNOSIS — N20.0 NEPHROLITHIASIS: Primary | ICD-10-CM

## 2021-06-09 ENCOUNTER — TELEPHONE (OUTPATIENT)
Dept: UROLOGY | Facility: CLINIC | Age: 76
End: 2021-06-09

## 2021-06-09 DIAGNOSIS — N20.0 NEPHROLITHIASIS: Primary | ICD-10-CM

## 2021-06-28 ENCOUNTER — TELEPHONE (OUTPATIENT)
Dept: UROLOGY | Facility: CLINIC | Age: 76
End: 2021-06-28

## 2021-06-29 ENCOUNTER — TELEPHONE (OUTPATIENT)
Dept: UROLOGY | Facility: CLINIC | Age: 76
End: 2021-06-29

## 2021-06-29 ENCOUNTER — ANESTHESIA EVENT (OUTPATIENT)
Dept: SURGERY | Facility: HOSPITAL | Age: 76
End: 2021-06-29
Payer: MEDICARE

## 2021-06-29 ENCOUNTER — HOSPITAL ENCOUNTER (OUTPATIENT)
Facility: HOSPITAL | Age: 76
Discharge: HOME | End: 2021-06-29
Attending: UROLOGY | Admitting: UROLOGY
Payer: MEDICARE

## 2021-06-29 ENCOUNTER — ANESTHESIA (OUTPATIENT)
Dept: SURGERY | Facility: HOSPITAL | Age: 76
End: 2021-06-29
Payer: MEDICARE

## 2021-06-29 VITALS
RESPIRATION RATE: 18 BRPM | HEART RATE: 95 BPM | SYSTOLIC BLOOD PRESSURE: 127 MMHG | TEMPERATURE: 98 F | DIASTOLIC BLOOD PRESSURE: 74 MMHG

## 2021-06-29 DIAGNOSIS — N20.1 URETERAL STONE: ICD-10-CM

## 2021-06-29 DIAGNOSIS — N20.0 NEPHROLITHIASIS: Primary | ICD-10-CM

## 2021-06-29 LAB
POCT GLUCOSE: 88 MG/DL (ref 70–110)
POCT GLUCOSE: <20 MG/DL (ref 70–110)
SARS-COV-2 RDRP RESP QL NAA+PROBE: NEGATIVE

## 2021-06-29 PROCEDURE — 82962 GLUCOSE BLOOD TEST: CPT | Performed by: UROLOGY

## 2021-06-29 PROCEDURE — 87186 SC STD MICRODIL/AGAR DIL: CPT | Performed by: STUDENT IN AN ORGANIZED HEALTH CARE EDUCATION/TRAINING PROGRAM

## 2021-06-29 PROCEDURE — 87088 URINE BACTERIA CULTURE: CPT | Performed by: STUDENT IN AN ORGANIZED HEALTH CARE EDUCATION/TRAINING PROGRAM

## 2021-06-29 PROCEDURE — 87077 CULTURE AEROBIC IDENTIFY: CPT | Mod: 59 | Performed by: STUDENT IN AN ORGANIZED HEALTH CARE EDUCATION/TRAINING PROGRAM

## 2021-06-29 PROCEDURE — U0002 COVID-19 LAB TEST NON-CDC: HCPCS | Performed by: UROLOGY

## 2021-06-29 PROCEDURE — 87086 URINE CULTURE/COLONY COUNT: CPT | Performed by: STUDENT IN AN ORGANIZED HEALTH CARE EDUCATION/TRAINING PROGRAM

## 2021-06-29 RX ORDER — CEFAZOLIN SODIUM 1 G/3ML
2 INJECTION, POWDER, FOR SOLUTION INTRAMUSCULAR; INTRAVENOUS
Status: DISCONTINUED | OUTPATIENT
Start: 2021-06-29 | End: 2021-06-29 | Stop reason: HOSPADM

## 2021-06-29 RX ORDER — FENTANYL CITRATE 50 UG/ML
25 INJECTION, SOLUTION INTRAMUSCULAR; INTRAVENOUS EVERY 5 MIN PRN
Status: DISCONTINUED | OUTPATIENT
Start: 2021-06-29 | End: 2021-06-29 | Stop reason: HOSPADM

## 2021-06-29 RX ORDER — ONDANSETRON 2 MG/ML
4 INJECTION INTRAMUSCULAR; INTRAVENOUS DAILY PRN
Status: DISCONTINUED | OUTPATIENT
Start: 2021-06-29 | End: 2021-06-29 | Stop reason: HOSPADM

## 2021-07-04 LAB
BACTERIA UR CULT: ABNORMAL
BACTERIA UR CULT: ABNORMAL

## 2021-07-05 ENCOUNTER — TELEPHONE (OUTPATIENT)
Dept: UROLOGY | Facility: HOSPITAL | Age: 76
End: 2021-07-05

## 2021-07-05 DIAGNOSIS — N20.0 NEPHROLITHIASIS: Primary | ICD-10-CM

## 2021-07-05 DIAGNOSIS — N21.0 BLADDER STONES: ICD-10-CM

## 2021-07-05 RX ORDER — SULFAMETHOXAZOLE AND TRIMETHOPRIM 800; 160 MG/1; MG/1
1 TABLET ORAL 2 TIMES DAILY
Qty: 28 TABLET | Refills: 0 | Status: ON HOLD | OUTPATIENT
Start: 2021-07-05 | End: 2021-07-20 | Stop reason: SDUPTHER

## 2021-07-14 ENCOUNTER — OFFICE VISIT (OUTPATIENT)
Dept: UROLOGY | Facility: CLINIC | Age: 76
End: 2021-07-14
Payer: MEDICARE

## 2021-07-14 VITALS — DIASTOLIC BLOOD PRESSURE: 70 MMHG | HEART RATE: 68 BPM | SYSTOLIC BLOOD PRESSURE: 100 MMHG

## 2021-07-14 DIAGNOSIS — N13.8 BPH WITH URINARY OBSTRUCTION: ICD-10-CM

## 2021-07-14 DIAGNOSIS — N20.0 NEPHROLITHIASIS: Primary | ICD-10-CM

## 2021-07-14 DIAGNOSIS — N21.0 BLADDER STONES: ICD-10-CM

## 2021-07-14 DIAGNOSIS — N40.1 BPH WITH URINARY OBSTRUCTION: ICD-10-CM

## 2021-07-14 PROCEDURE — 87086 URINE CULTURE/COLONY COUNT: CPT | Performed by: NURSE PRACTITIONER

## 2021-07-14 PROCEDURE — 3288F PR FALLS RISK ASSESSMENT DOCUMENTED: ICD-10-PCS | Mod: CPTII,S$GLB,, | Performed by: NURSE PRACTITIONER

## 2021-07-14 PROCEDURE — 1157F ADVNC CARE PLAN IN RCRD: CPT | Mod: S$GLB,,, | Performed by: NURSE PRACTITIONER

## 2021-07-14 PROCEDURE — 99999 PR PBB SHADOW E&M-EST. PATIENT-LVL III: ICD-10-PCS | Mod: PBBFAC,,, | Performed by: NURSE PRACTITIONER

## 2021-07-14 PROCEDURE — 51701 PR INSERTION OF NON-INDWELLING BLADDER CATHETERIZATION FOR RESIDUAL UR: ICD-10-PCS | Mod: S$GLB,,, | Performed by: NURSE PRACTITIONER

## 2021-07-14 PROCEDURE — 1101F PT FALLS ASSESS-DOCD LE1/YR: CPT | Mod: CPTII,S$GLB,, | Performed by: NURSE PRACTITIONER

## 2021-07-14 PROCEDURE — 1159F MED LIST DOCD IN RCRD: CPT | Mod: S$GLB,,, | Performed by: NURSE PRACTITIONER

## 2021-07-14 PROCEDURE — 3288F FALL RISK ASSESSMENT DOCD: CPT | Mod: CPTII,S$GLB,, | Performed by: NURSE PRACTITIONER

## 2021-07-14 PROCEDURE — 1126F PR PAIN SEVERITY QUANTIFIED, NO PAIN PRESENT: ICD-10-PCS | Mod: S$GLB,,, | Performed by: NURSE PRACTITIONER

## 2021-07-14 PROCEDURE — 99212 PR OFFICE/OUTPT VISIT, EST, LEVL II, 10-19 MIN: ICD-10-PCS | Mod: 25,S$GLB,, | Performed by: NURSE PRACTITIONER

## 2021-07-14 PROCEDURE — 1126F AMNT PAIN NOTED NONE PRSNT: CPT | Mod: S$GLB,,, | Performed by: NURSE PRACTITIONER

## 2021-07-14 PROCEDURE — 1101F PR PT FALLS ASSESS DOC 0-1 FALLS W/OUT INJ PAST YR: ICD-10-PCS | Mod: CPTII,S$GLB,, | Performed by: NURSE PRACTITIONER

## 2021-07-14 PROCEDURE — 51701 INSERT BLADDER CATHETER: CPT | Mod: S$GLB,,, | Performed by: NURSE PRACTITIONER

## 2021-07-14 PROCEDURE — 99212 OFFICE O/P EST SF 10 MIN: CPT | Mod: 25,S$GLB,, | Performed by: NURSE PRACTITIONER

## 2021-07-14 PROCEDURE — 99999 PR PBB SHADOW E&M-EST. PATIENT-LVL III: CPT | Mod: PBBFAC,,, | Performed by: NURSE PRACTITIONER

## 2021-07-14 PROCEDURE — 1157F PR ADVANCE CARE PLAN OR EQUIV PRESENT IN MEDICAL RECORD: ICD-10-PCS | Mod: S$GLB,,, | Performed by: NURSE PRACTITIONER

## 2021-07-14 PROCEDURE — 1159F PR MEDICATION LIST DOCUMENTED IN MEDICAL RECORD: ICD-10-PCS | Mod: S$GLB,,, | Performed by: NURSE PRACTITIONER

## 2021-07-15 LAB
BACTERIA UR CULT: NORMAL
BACTERIA UR CULT: NORMAL

## 2021-07-16 RX ORDER — MIRTAZAPINE 30 MG/1
30 TABLET, FILM COATED ORAL NIGHTLY
COMMUNITY
Start: 2021-05-27

## 2021-07-16 RX ORDER — GABAPENTIN 300 MG/1
300 CAPSULE ORAL 3 TIMES DAILY
COMMUNITY
Start: 2021-04-07

## 2021-07-16 RX ORDER — CYPROHEPTADINE HYDROCHLORIDE 4 MG/1
4 TABLET ORAL 3 TIMES DAILY
COMMUNITY
Start: 2021-04-07

## 2021-07-16 RX ORDER — ESCITALOPRAM OXALATE 20 MG/1
20 TABLET ORAL DAILY
COMMUNITY
Start: 2021-04-07 | End: 2021-08-24

## 2021-07-16 RX ORDER — HYDROXYZINE PAMOATE 25 MG/1
25 CAPSULE ORAL 3 TIMES DAILY
COMMUNITY
Start: 2021-01-31 | End: 2021-07-16 | Stop reason: CLARIF

## 2021-07-19 ENCOUNTER — TELEPHONE (OUTPATIENT)
Dept: UROLOGY | Facility: CLINIC | Age: 76
End: 2021-07-19

## 2021-07-20 ENCOUNTER — HOSPITAL ENCOUNTER (OUTPATIENT)
Facility: HOSPITAL | Age: 76
Discharge: HOME | End: 2021-07-20
Attending: UROLOGY | Admitting: UROLOGY
Payer: MEDICARE

## 2021-07-20 VITALS
OXYGEN SATURATION: 100 % | DIASTOLIC BLOOD PRESSURE: 64 MMHG | HEART RATE: 85 BPM | WEIGHT: 175 LBS | TEMPERATURE: 98 F | RESPIRATION RATE: 16 BRPM | BODY MASS INDEX: 26.61 KG/M2 | SYSTOLIC BLOOD PRESSURE: 126 MMHG

## 2021-07-20 DIAGNOSIS — N20.0 NEPHROLITHIASIS: ICD-10-CM

## 2021-07-20 DIAGNOSIS — N20.1 URETERAL STONE: Primary | ICD-10-CM

## 2021-07-20 DIAGNOSIS — N20.0 KIDNEY STONE: ICD-10-CM

## 2021-07-20 LAB — SARS-COV-2 RDRP RESP QL NAA+PROBE: NEGATIVE

## 2021-07-20 PROCEDURE — 25000003 PHARM REV CODE 250: Performed by: ANESTHESIOLOGY

## 2021-07-20 PROCEDURE — U0002 COVID-19 LAB TEST NON-CDC: HCPCS | Performed by: UROLOGY

## 2021-07-20 PROCEDURE — 87088 URINE BACTERIA CULTURE: CPT | Performed by: STUDENT IN AN ORGANIZED HEALTH CARE EDUCATION/TRAINING PROGRAM

## 2021-07-20 PROCEDURE — 87077 CULTURE AEROBIC IDENTIFY: CPT | Performed by: STUDENT IN AN ORGANIZED HEALTH CARE EDUCATION/TRAINING PROGRAM

## 2021-07-20 PROCEDURE — 87086 URINE CULTURE/COLONY COUNT: CPT | Performed by: STUDENT IN AN ORGANIZED HEALTH CARE EDUCATION/TRAINING PROGRAM

## 2021-07-20 PROCEDURE — S5010 5% DEXTROSE AND 0.45% SALINE: HCPCS | Performed by: ANESTHESIOLOGY

## 2021-07-20 PROCEDURE — 87186 SC STD MICRODIL/AGAR DIL: CPT | Performed by: STUDENT IN AN ORGANIZED HEALTH CARE EDUCATION/TRAINING PROGRAM

## 2021-07-20 PROCEDURE — 82962 GLUCOSE BLOOD TEST: CPT | Performed by: UROLOGY

## 2021-07-20 PROCEDURE — 25000003 PHARM REV CODE 250: Performed by: STUDENT IN AN ORGANIZED HEALTH CARE EDUCATION/TRAINING PROGRAM

## 2021-07-20 RX ORDER — SODIUM CHLORIDE 9 MG/ML
INJECTION, SOLUTION INTRAVENOUS CONTINUOUS
Status: DISCONTINUED | OUTPATIENT
Start: 2021-07-20 | End: 2021-07-20 | Stop reason: HOSPADM

## 2021-07-20 RX ORDER — LIDOCAINE HYDROCHLORIDE 10 MG/ML
1 INJECTION, SOLUTION EPIDURAL; INFILTRATION; INTRACAUDAL; PERINEURAL ONCE
Status: DISCONTINUED | OUTPATIENT
Start: 2021-07-20 | End: 2021-07-20 | Stop reason: HOSPADM

## 2021-07-20 RX ORDER — SULFAMETHOXAZOLE AND TRIMETHOPRIM 800; 160 MG/1; MG/1
1 TABLET ORAL 2 TIMES DAILY
Qty: 42 TABLET | Refills: 0 | Status: SHIPPED | OUTPATIENT
Start: 2021-07-20 | End: 2021-08-10

## 2021-07-20 RX ORDER — DEXTROSE MONOHYDRATE AND SODIUM CHLORIDE 5; .45 G/100ML; G/100ML
INJECTION, SOLUTION INTRAVENOUS CONTINUOUS
Status: DISCONTINUED | OUTPATIENT
Start: 2021-07-20 | End: 2021-07-20 | Stop reason: HOSPADM

## 2021-07-20 RX ADMIN — DEXTROSE AND SODIUM CHLORIDE: 5; .45 INJECTION, SOLUTION INTRAVENOUS at 12:07

## 2021-07-20 RX ADMIN — SODIUM CHLORIDE: 0.9 INJECTION, SOLUTION INTRAVENOUS at 11:07

## 2021-07-21 LAB — POCT GLUCOSE: 77 MG/DL (ref 70–110)

## 2021-07-23 ENCOUNTER — TELEPHONE (OUTPATIENT)
Dept: UROLOGY | Facility: CLINIC | Age: 76
End: 2021-07-23

## 2021-07-23 LAB — BACTERIA UR CULT: ABNORMAL

## 2021-07-26 ENCOUNTER — OFFICE VISIT (OUTPATIENT)
Dept: UROLOGY | Facility: CLINIC | Age: 76
End: 2021-07-26
Payer: MEDICARE

## 2021-07-26 DIAGNOSIS — N39.0 URINARY TRACT INFECTION WITHOUT HEMATURIA, SITE UNSPECIFIED: Primary | ICD-10-CM

## 2021-07-26 PROCEDURE — 99499 NO LOS: ICD-10-PCS | Mod: S$GLB,,, | Performed by: UROLOGY

## 2021-07-26 PROCEDURE — 87186 SC STD MICRODIL/AGAR DIL: CPT | Performed by: STUDENT IN AN ORGANIZED HEALTH CARE EDUCATION/TRAINING PROGRAM

## 2021-07-26 PROCEDURE — 99499 UNLISTED E&M SERVICE: CPT | Mod: S$GLB,,, | Performed by: UROLOGY

## 2021-07-26 PROCEDURE — 87088 URINE BACTERIA CULTURE: CPT | Performed by: STUDENT IN AN ORGANIZED HEALTH CARE EDUCATION/TRAINING PROGRAM

## 2021-07-26 PROCEDURE — 87077 CULTURE AEROBIC IDENTIFY: CPT | Performed by: STUDENT IN AN ORGANIZED HEALTH CARE EDUCATION/TRAINING PROGRAM

## 2021-07-26 PROCEDURE — 87086 URINE CULTURE/COLONY COUNT: CPT | Performed by: STUDENT IN AN ORGANIZED HEALTH CARE EDUCATION/TRAINING PROGRAM

## 2021-07-28 LAB — BACTERIA UR CULT: ABNORMAL

## 2021-08-05 ENCOUNTER — OFFICE VISIT (OUTPATIENT)
Dept: INFECTIOUS DISEASES | Facility: CLINIC | Age: 76
End: 2021-08-05
Payer: MEDICARE

## 2021-08-05 ENCOUNTER — HOSPITAL ENCOUNTER (OUTPATIENT)
Facility: HOSPITAL | Age: 76
Discharge: HOME | End: 2021-08-06
Attending: EMERGENCY MEDICINE | Admitting: UROLOGY
Payer: MEDICARE

## 2021-08-05 VITALS
TEMPERATURE: 98 F | SYSTOLIC BLOOD PRESSURE: 89 MMHG | HEART RATE: 51 BPM | BODY MASS INDEX: 26.61 KG/M2 | HEIGHT: 68 IN | DIASTOLIC BLOOD PRESSURE: 45 MMHG

## 2021-08-05 DIAGNOSIS — T83.592A INFECTION ASSOCIATED WITH INDWELLING URETERAL STENT, INITIAL ENCOUNTER: ICD-10-CM

## 2021-08-05 DIAGNOSIS — N20.0 NEPHROLITHIASIS: Primary | ICD-10-CM

## 2021-08-05 DIAGNOSIS — N21.0 BLADDER STONES: ICD-10-CM

## 2021-08-05 DIAGNOSIS — N30.01 ACUTE CYSTITIS WITH HEMATURIA: ICD-10-CM

## 2021-08-05 DIAGNOSIS — N39.0 URINARY TRACT INFECTION WITHOUT HEMATURIA, SITE UNSPECIFIED: ICD-10-CM

## 2021-08-05 DIAGNOSIS — A41.9 SEPSIS: ICD-10-CM

## 2021-08-05 DIAGNOSIS — N20.0 KIDNEY STONE: Primary | ICD-10-CM

## 2021-08-05 LAB
ALBUMIN SERPL BCP-MCNC: 2.5 G/DL (ref 3.5–5.2)
ALP SERPL-CCNC: 91 U/L (ref 55–135)
ALT SERPL W/O P-5'-P-CCNC: 5 U/L (ref 10–44)
ANION GAP SERPL CALC-SCNC: 7 MMOL/L (ref 8–16)
AST SERPL-CCNC: 12 U/L (ref 10–40)
BACTERIA #/AREA URNS AUTO: ABNORMAL /HPF
BASOPHILS # BLD AUTO: 0.05 K/UL (ref 0–0.2)
BASOPHILS NFR BLD: 0.9 % (ref 0–1.9)
BILIRUB SERPL-MCNC: 0.3 MG/DL (ref 0.1–1)
BILIRUB UR QL STRIP: NEGATIVE
BUN SERPL-MCNC: 19 MG/DL (ref 8–23)
CALCIUM SERPL-MCNC: 8.8 MG/DL (ref 8.7–10.5)
CHLORIDE SERPL-SCNC: 110 MMOL/L (ref 95–110)
CLARITY UR REFRACT.AUTO: ABNORMAL
CO2 SERPL-SCNC: 23 MMOL/L (ref 23–29)
COLOR UR AUTO: ABNORMAL
CREAT SERPL-MCNC: 1.6 MG/DL (ref 0.5–1.4)
CTP QC/QA: YES
DIFFERENTIAL METHOD: ABNORMAL
EOSINOPHIL # BLD AUTO: 0.1 K/UL (ref 0–0.5)
EOSINOPHIL NFR BLD: 1 % (ref 0–8)
ERYTHROCYTE [DISTWIDTH] IN BLOOD BY AUTOMATED COUNT: 19.3 % (ref 11.5–14.5)
EST. GFR  (AFRICAN AMERICAN): 48.7 ML/MIN/1.73 M^2
EST. GFR  (NON AFRICAN AMERICAN): 42.1 ML/MIN/1.73 M^2
GLUCOSE SERPL-MCNC: 79 MG/DL (ref 70–110)
GLUCOSE UR QL STRIP: NEGATIVE
HCT VFR BLD AUTO: 37.1 % (ref 40–54)
HGB BLD-MCNC: 10.8 G/DL (ref 14–18)
HGB UR QL STRIP: ABNORMAL
HYALINE CASTS UR QL AUTO: 0 /LPF
IMM GRANULOCYTES # BLD AUTO: 0.03 K/UL (ref 0–0.04)
IMM GRANULOCYTES NFR BLD AUTO: 0.5 % (ref 0–0.5)
KETONES UR QL STRIP: NEGATIVE
LACTATE SERPL-SCNC: 3.1 MMOL/L (ref 0.5–2.2)
LACTATE SERPL-SCNC: 3.3 MMOL/L (ref 0.5–2.2)
LEUKOCYTE ESTERASE UR QL STRIP: ABNORMAL
LYMPHOCYTES # BLD AUTO: 1.3 K/UL (ref 1–4.8)
LYMPHOCYTES NFR BLD: 22.3 % (ref 18–48)
MCH RBC QN AUTO: 26.9 PG (ref 27–31)
MCHC RBC AUTO-ENTMCNC: 29.1 G/DL (ref 32–36)
MCV RBC AUTO: 92 FL (ref 82–98)
MICROSCOPIC COMMENT: ABNORMAL
MONOCYTES # BLD AUTO: 0.3 K/UL (ref 0.3–1)
MONOCYTES NFR BLD: 4.6 % (ref 4–15)
NEUTROPHILS # BLD AUTO: 4.1 K/UL (ref 1.8–7.7)
NEUTROPHILS NFR BLD: 70.7 % (ref 38–73)
NITRITE UR QL STRIP: POSITIVE
NRBC BLD-RTO: 0 /100 WBC
PH UR STRIP: 6 [PH] (ref 5–8)
PLATELET # BLD AUTO: 217 K/UL (ref 150–450)
PMV BLD AUTO: 10.5 FL (ref 9.2–12.9)
POTASSIUM SERPL-SCNC: 4.2 MMOL/L (ref 3.5–5.1)
PROT SERPL-MCNC: 5.7 G/DL (ref 6–8.4)
PROT UR QL STRIP: ABNORMAL
RBC # BLD AUTO: 4.02 M/UL (ref 4.6–6.2)
RBC #/AREA URNS AUTO: >100 /HPF (ref 0–4)
SARS-COV-2 RDRP RESP QL NAA+PROBE: NEGATIVE
SODIUM SERPL-SCNC: 140 MMOL/L (ref 136–145)
SP GR UR STRIP: 1.01 (ref 1–1.03)
URN SPEC COLLECT METH UR: ABNORMAL
WBC # BLD AUTO: 5.84 K/UL (ref 3.9–12.7)
WBC #/AREA URNS AUTO: >100 /HPF (ref 0–5)
WBC CLUMPS UR QL AUTO: ABNORMAL

## 2021-08-05 PROCEDURE — 3078F DIAST BP <80 MM HG: CPT | Mod: CPTII,S$GLB,, | Performed by: PHYSICIAN ASSISTANT

## 2021-08-05 PROCEDURE — 83605 ASSAY OF LACTIC ACID: CPT | Mod: 91 | Performed by: PHYSICIAN ASSISTANT

## 2021-08-05 PROCEDURE — 99999 PR PBB SHADOW E&M-EST. PATIENT-LVL IV: ICD-10-PCS | Mod: PBBFAC,,, | Performed by: PHYSICIAN ASSISTANT

## 2021-08-05 PROCEDURE — G0378 HOSPITAL OBSERVATION PER HR: HCPCS

## 2021-08-05 PROCEDURE — 93010 ELECTROCARDIOGRAM REPORT: CPT | Mod: ,,, | Performed by: INTERNAL MEDICINE

## 2021-08-05 PROCEDURE — 76937 US GUIDE VASCULAR ACCESS: CPT

## 2021-08-05 PROCEDURE — 1126F PR PAIN SEVERITY QUANTIFIED, NO PAIN PRESENT: ICD-10-PCS | Mod: CPTII,S$GLB,, | Performed by: PHYSICIAN ASSISTANT

## 2021-08-05 PROCEDURE — 3074F SYST BP LT 130 MM HG: CPT | Mod: CPTII,S$GLB,, | Performed by: PHYSICIAN ASSISTANT

## 2021-08-05 PROCEDURE — 80053 COMPREHEN METABOLIC PANEL: CPT | Performed by: PHYSICIAN ASSISTANT

## 2021-08-05 PROCEDURE — 99285 PR EMERGENCY DEPT VISIT,LEVEL V: ICD-10-PCS | Mod: CS,,, | Performed by: PHYSICIAN ASSISTANT

## 2021-08-05 PROCEDURE — 1101F PT FALLS ASSESS-DOCD LE1/YR: CPT | Mod: CPTII,S$GLB,, | Performed by: PHYSICIAN ASSISTANT

## 2021-08-05 PROCEDURE — 3288F PR FALLS RISK ASSESSMENT DOCUMENTED: ICD-10-PCS | Mod: CPTII,S$GLB,, | Performed by: PHYSICIAN ASSISTANT

## 2021-08-05 PROCEDURE — 93005 ELECTROCARDIOGRAM TRACING: CPT

## 2021-08-05 PROCEDURE — 87040 BLOOD CULTURE FOR BACTERIA: CPT | Mod: 59 | Performed by: PHYSICIAN ASSISTANT

## 2021-08-05 PROCEDURE — 99285 EMERGENCY DEPT VISIT HI MDM: CPT | Mod: CS,,, | Performed by: PHYSICIAN ASSISTANT

## 2021-08-05 PROCEDURE — C1751 CATH, INF, PER/CENT/MIDLINE: HCPCS

## 2021-08-05 PROCEDURE — 3288F FALL RISK ASSESSMENT DOCD: CPT | Mod: CPTII,S$GLB,, | Performed by: PHYSICIAN ASSISTANT

## 2021-08-05 PROCEDURE — 87086 URINE CULTURE/COLONY COUNT: CPT | Performed by: PHYSICIAN ASSISTANT

## 2021-08-05 PROCEDURE — 99999 PR PBB SHADOW E&M-EST. PATIENT-LVL IV: CPT | Mod: PBBFAC,,, | Performed by: PHYSICIAN ASSISTANT

## 2021-08-05 PROCEDURE — 36410 VNPNXR 3YR/> PHY/QHP DX/THER: CPT

## 2021-08-05 PROCEDURE — 96365 THER/PROPH/DIAG IV INF INIT: CPT

## 2021-08-05 PROCEDURE — 3008F PR BODY MASS INDEX (BMI) DOCUMENTED: ICD-10-PCS | Mod: CPTII,S$GLB,, | Performed by: PHYSICIAN ASSISTANT

## 2021-08-05 PROCEDURE — 99204 OFFICE O/P NEW MOD 45 MIN: CPT | Mod: S$GLB,,, | Performed by: PHYSICIAN ASSISTANT

## 2021-08-05 PROCEDURE — 81001 URINALYSIS AUTO W/SCOPE: CPT | Performed by: PHYSICIAN ASSISTANT

## 2021-08-05 PROCEDURE — 1101F PR PT FALLS ASSESS DOC 0-1 FALLS W/OUT INJ PAST YR: ICD-10-PCS | Mod: CPTII,S$GLB,, | Performed by: PHYSICIAN ASSISTANT

## 2021-08-05 PROCEDURE — 1160F RVW MEDS BY RX/DR IN RCRD: CPT | Mod: CPTII,S$GLB,, | Performed by: PHYSICIAN ASSISTANT

## 2021-08-05 PROCEDURE — 3044F HG A1C LEVEL LT 7.0%: CPT | Mod: CPTII,S$GLB,, | Performed by: PHYSICIAN ASSISTANT

## 2021-08-05 PROCEDURE — U0002 COVID-19 LAB TEST NON-CDC: HCPCS | Performed by: STUDENT IN AN ORGANIZED HEALTH CARE EDUCATION/TRAINING PROGRAM

## 2021-08-05 PROCEDURE — 63600175 PHARM REV CODE 636 W HCPCS: Performed by: PHYSICIAN ASSISTANT

## 2021-08-05 PROCEDURE — 96361 HYDRATE IV INFUSION ADD-ON: CPT

## 2021-08-05 PROCEDURE — 3078F PR MOST RECENT DIASTOLIC BLOOD PRESSURE < 80 MM HG: ICD-10-PCS | Mod: CPTII,S$GLB,, | Performed by: PHYSICIAN ASSISTANT

## 2021-08-05 PROCEDURE — 25000003 PHARM REV CODE 250: Performed by: PHYSICIAN ASSISTANT

## 2021-08-05 PROCEDURE — 1157F PR ADVANCE CARE PLAN OR EQUIV PRESENT IN MEDICAL RECORD: ICD-10-PCS | Mod: CPTII,S$GLB,, | Performed by: PHYSICIAN ASSISTANT

## 2021-08-05 PROCEDURE — 3074F PR MOST RECENT SYSTOLIC BLOOD PRESSURE < 130 MM HG: ICD-10-PCS | Mod: CPTII,S$GLB,, | Performed by: PHYSICIAN ASSISTANT

## 2021-08-05 PROCEDURE — 99285 EMERGENCY DEPT VISIT HI MDM: CPT | Mod: 25

## 2021-08-05 PROCEDURE — 93010 EKG 12-LEAD: ICD-10-PCS | Mod: ,,, | Performed by: INTERNAL MEDICINE

## 2021-08-05 PROCEDURE — 3044F PR MOST RECENT HEMOGLOBIN A1C LEVEL <7.0%: ICD-10-PCS | Mod: CPTII,S$GLB,, | Performed by: PHYSICIAN ASSISTANT

## 2021-08-05 PROCEDURE — 85025 COMPLETE CBC W/AUTO DIFF WBC: CPT | Performed by: PHYSICIAN ASSISTANT

## 2021-08-05 PROCEDURE — 99204 PR OFFICE/OUTPT VISIT, NEW, LEVL IV, 45-59 MIN: ICD-10-PCS | Mod: S$GLB,,, | Performed by: PHYSICIAN ASSISTANT

## 2021-08-05 PROCEDURE — 25000003 PHARM REV CODE 250: Performed by: STUDENT IN AN ORGANIZED HEALTH CARE EDUCATION/TRAINING PROGRAM

## 2021-08-05 PROCEDURE — 1159F PR MEDICATION LIST DOCUMENTED IN MEDICAL RECORD: ICD-10-PCS | Mod: CPTII,S$GLB,, | Performed by: PHYSICIAN ASSISTANT

## 2021-08-05 PROCEDURE — 1159F MED LIST DOCD IN RCRD: CPT | Mod: CPTII,S$GLB,, | Performed by: PHYSICIAN ASSISTANT

## 2021-08-05 PROCEDURE — 1160F PR REVIEW ALL MEDS BY PRESCRIBER/CLIN PHARMACIST DOCUMENTED: ICD-10-PCS | Mod: CPTII,S$GLB,, | Performed by: PHYSICIAN ASSISTANT

## 2021-08-05 PROCEDURE — 3008F BODY MASS INDEX DOCD: CPT | Mod: CPTII,S$GLB,, | Performed by: PHYSICIAN ASSISTANT

## 2021-08-05 PROCEDURE — 1157F ADVNC CARE PLAN IN RCRD: CPT | Mod: CPTII,S$GLB,, | Performed by: PHYSICIAN ASSISTANT

## 2021-08-05 PROCEDURE — 1126F AMNT PAIN NOTED NONE PRSNT: CPT | Mod: CPTII,S$GLB,, | Performed by: PHYSICIAN ASSISTANT

## 2021-08-05 RX ORDER — TALC
6 POWDER (GRAM) TOPICAL NIGHTLY PRN
Status: DISCONTINUED | OUTPATIENT
Start: 2021-08-05 | End: 2021-08-06 | Stop reason: HOSPADM

## 2021-08-05 RX ORDER — GABAPENTIN 300 MG/1
300 CAPSULE ORAL 3 TIMES DAILY
Status: DISCONTINUED | OUTPATIENT
Start: 2021-08-05 | End: 2021-08-06 | Stop reason: HOSPADM

## 2021-08-05 RX ORDER — MIRTAZAPINE 15 MG/1
30 TABLET, FILM COATED ORAL NIGHTLY
Status: DISCONTINUED | OUTPATIENT
Start: 2021-08-05 | End: 2021-08-06 | Stop reason: HOSPADM

## 2021-08-05 RX ORDER — ACETAMINOPHEN 325 MG/1
650 TABLET ORAL EVERY 6 HOURS PRN
Status: DISCONTINUED | OUTPATIENT
Start: 2021-08-05 | End: 2021-08-06 | Stop reason: HOSPADM

## 2021-08-05 RX ORDER — SODIUM CHLORIDE 9 MG/ML
INJECTION, SOLUTION INTRAVENOUS CONTINUOUS
Status: DISCONTINUED | OUTPATIENT
Start: 2021-08-05 | End: 2021-08-06 | Stop reason: HOSPADM

## 2021-08-05 RX ORDER — TAMSULOSIN HYDROCHLORIDE 0.4 MG/1
0.4 CAPSULE ORAL NIGHTLY
Status: DISCONTINUED | OUTPATIENT
Start: 2021-08-05 | End: 2021-08-06 | Stop reason: HOSPADM

## 2021-08-05 RX ORDER — DOCUSATE SODIUM 100 MG/1
100 CAPSULE, LIQUID FILLED ORAL DAILY
Status: DISCONTINUED | OUTPATIENT
Start: 2021-08-06 | End: 2021-08-06 | Stop reason: HOSPADM

## 2021-08-05 RX ORDER — SODIUM CHLORIDE 0.9 % (FLUSH) 0.9 %
10 SYRINGE (ML) INJECTION
Status: DISCONTINUED | OUTPATIENT
Start: 2021-08-05 | End: 2021-08-06 | Stop reason: HOSPADM

## 2021-08-05 RX ORDER — ONDANSETRON 2 MG/ML
4 INJECTION INTRAMUSCULAR; INTRAVENOUS EVERY 6 HOURS PRN
Status: DISCONTINUED | OUTPATIENT
Start: 2021-08-05 | End: 2021-08-06 | Stop reason: HOSPADM

## 2021-08-05 RX ADMIN — ERTAPENEM 1 G: 1 INJECTION INTRAMUSCULAR; INTRAVENOUS at 12:08

## 2021-08-05 RX ADMIN — SODIUM CHLORIDE: 0.9 INJECTION, SOLUTION INTRAVENOUS at 08:08

## 2021-08-05 RX ADMIN — GABAPENTIN 300 MG: 300 CAPSULE ORAL at 09:08

## 2021-08-05 RX ADMIN — TAMSULOSIN HYDROCHLORIDE 0.4 MG: 0.4 CAPSULE ORAL at 09:08

## 2021-08-05 RX ADMIN — MIRTAZAPINE 30 MG: 15 TABLET, FILM COATED ORAL at 09:08

## 2021-08-06 VITALS
RESPIRATION RATE: 16 BRPM | WEIGHT: 180 LBS | SYSTOLIC BLOOD PRESSURE: 126 MMHG | HEART RATE: 69 BPM | TEMPERATURE: 97 F | OXYGEN SATURATION: 95 % | DIASTOLIC BLOOD PRESSURE: 58 MMHG | HEIGHT: 68 IN | BODY MASS INDEX: 27.28 KG/M2

## 2021-08-06 LAB
ANION GAP SERPL CALC-SCNC: 10 MMOL/L (ref 8–16)
BACTERIA UR CULT: NORMAL
BASOPHILS # BLD AUTO: 0.06 K/UL (ref 0–0.2)
BASOPHILS NFR BLD: 1 % (ref 0–1.9)
BUN SERPL-MCNC: 21 MG/DL (ref 8–23)
CALCIUM SERPL-MCNC: 9.2 MG/DL (ref 8.7–10.5)
CHLORIDE SERPL-SCNC: 113 MMOL/L (ref 95–110)
CO2 SERPL-SCNC: 19 MMOL/L (ref 23–29)
CREAT SERPL-MCNC: 1.6 MG/DL (ref 0.5–1.4)
DIFFERENTIAL METHOD: ABNORMAL
EOSINOPHIL # BLD AUTO: 0.2 K/UL (ref 0–0.5)
EOSINOPHIL NFR BLD: 3 % (ref 0–8)
ERYTHROCYTE [DISTWIDTH] IN BLOOD BY AUTOMATED COUNT: 18.3 % (ref 11.5–14.5)
EST. GFR  (AFRICAN AMERICAN): 48.7 ML/MIN/1.73 M^2
EST. GFR  (NON AFRICAN AMERICAN): 42.1 ML/MIN/1.73 M^2
GLUCOSE SERPL-MCNC: 61 MG/DL (ref 70–110)
HCT VFR BLD AUTO: 31 % (ref 40–54)
HGB BLD-MCNC: 9.4 G/DL (ref 14–18)
IMM GRANULOCYTES # BLD AUTO: 0.01 K/UL (ref 0–0.04)
IMM GRANULOCYTES NFR BLD AUTO: 0.2 % (ref 0–0.5)
LACTATE SERPL-SCNC: 1.1 MMOL/L (ref 0.5–2.2)
LYMPHOCYTES # BLD AUTO: 2.1 K/UL (ref 1–4.8)
LYMPHOCYTES NFR BLD: 34.3 % (ref 18–48)
MCH RBC QN AUTO: 27.2 PG (ref 27–31)
MCHC RBC AUTO-ENTMCNC: 30.3 G/DL (ref 32–36)
MCV RBC AUTO: 90 FL (ref 82–98)
MONOCYTES # BLD AUTO: 0.6 K/UL (ref 0.3–1)
MONOCYTES NFR BLD: 9.1 % (ref 4–15)
NEUTROPHILS # BLD AUTO: 3.2 K/UL (ref 1.8–7.7)
NEUTROPHILS NFR BLD: 52.4 % (ref 38–73)
NRBC BLD-RTO: 0 /100 WBC
PLATELET # BLD AUTO: 221 K/UL (ref 150–450)
PMV BLD AUTO: 10 FL (ref 9.2–12.9)
POTASSIUM SERPL-SCNC: 4.5 MMOL/L (ref 3.5–5.1)
RBC # BLD AUTO: 3.46 M/UL (ref 4.6–6.2)
SODIUM SERPL-SCNC: 142 MMOL/L (ref 136–145)
WBC # BLD AUTO: 6.03 K/UL (ref 3.9–12.7)

## 2021-08-06 PROCEDURE — 36415 COLL VENOUS BLD VENIPUNCTURE: CPT | Performed by: STUDENT IN AN ORGANIZED HEALTH CARE EDUCATION/TRAINING PROGRAM

## 2021-08-06 PROCEDURE — 63600175 PHARM REV CODE 636 W HCPCS: Performed by: PHYSICIAN ASSISTANT

## 2021-08-06 PROCEDURE — 96361 HYDRATE IV INFUSION ADD-ON: CPT

## 2021-08-06 PROCEDURE — G0378 HOSPITAL OBSERVATION PER HR: HCPCS

## 2021-08-06 PROCEDURE — 99214 PR OFFICE/OUTPT VISIT, EST, LEVL IV, 30-39 MIN: ICD-10-PCS | Mod: ,,, | Performed by: INTERNAL MEDICINE

## 2021-08-06 PROCEDURE — 83605 ASSAY OF LACTIC ACID: CPT | Performed by: STUDENT IN AN ORGANIZED HEALTH CARE EDUCATION/TRAINING PROGRAM

## 2021-08-06 PROCEDURE — 85025 COMPLETE CBC W/AUTO DIFF WBC: CPT | Performed by: STUDENT IN AN ORGANIZED HEALTH CARE EDUCATION/TRAINING PROGRAM

## 2021-08-06 PROCEDURE — 25000003 PHARM REV CODE 250: Performed by: PHYSICIAN ASSISTANT

## 2021-08-06 PROCEDURE — 99214 OFFICE O/P EST MOD 30 MIN: CPT | Mod: ,,, | Performed by: INTERNAL MEDICINE

## 2021-08-06 PROCEDURE — 25000003 PHARM REV CODE 250: Performed by: STUDENT IN AN ORGANIZED HEALTH CARE EDUCATION/TRAINING PROGRAM

## 2021-08-06 PROCEDURE — 96376 TX/PRO/DX INJ SAME DRUG ADON: CPT

## 2021-08-06 PROCEDURE — 80048 BASIC METABOLIC PNL TOTAL CA: CPT | Performed by: STUDENT IN AN ORGANIZED HEALTH CARE EDUCATION/TRAINING PROGRAM

## 2021-08-06 RX ADMIN — GABAPENTIN 300 MG: 300 CAPSULE ORAL at 09:08

## 2021-08-06 RX ADMIN — SODIUM CHLORIDE: 0.9 INJECTION, SOLUTION INTRAVENOUS at 12:08

## 2021-08-06 RX ADMIN — ERTAPENEM 1 G: 1 INJECTION INTRAMUSCULAR; INTRAVENOUS at 12:08

## 2021-08-06 RX ADMIN — DOCUSATE SODIUM 100 MG: 100 CAPSULE, LIQUID FILLED ORAL at 09:08

## 2021-08-06 RX ADMIN — GABAPENTIN 300 MG: 300 CAPSULE ORAL at 02:08

## 2021-08-07 PROCEDURE — G0180 PR HOME HEALTH MD CERTIFICATION: ICD-10-PCS | Mod: ,,, | Performed by: UROLOGY

## 2021-08-07 PROCEDURE — G0180 MD CERTIFICATION HHA PATIENT: HCPCS | Mod: ,,, | Performed by: UROLOGY

## 2021-08-10 ENCOUNTER — TELEPHONE (OUTPATIENT)
Dept: UROLOGY | Facility: CLINIC | Age: 76
End: 2021-08-10

## 2021-08-10 DIAGNOSIS — N20.0 NEPHROLITHIASIS: Primary | ICD-10-CM

## 2021-08-10 LAB
BACTERIA BLD CULT: NORMAL
BACTERIA BLD CULT: NORMAL

## 2021-08-11 ENCOUNTER — TELEPHONE (OUTPATIENT)
Dept: UROLOGY | Facility: CLINIC | Age: 76
End: 2021-08-11

## 2021-08-16 ENCOUNTER — OFFICE VISIT (OUTPATIENT)
Dept: UROLOGY | Facility: CLINIC | Age: 76
End: 2021-08-16
Payer: MEDICARE

## 2021-08-16 DIAGNOSIS — N20.1 URETERAL STONE: ICD-10-CM

## 2021-08-16 PROCEDURE — 99499 UNLISTED E&M SERVICE: CPT | Mod: S$GLB,,, | Performed by: UROLOGY

## 2021-08-16 PROCEDURE — 99499 NO LOS: ICD-10-PCS | Mod: S$GLB,,, | Performed by: UROLOGY

## 2021-08-16 PROCEDURE — 99999 PR PBB SHADOW E&M-EST. PATIENT-LVL II: ICD-10-PCS | Mod: PBBFAC,,,

## 2021-08-16 PROCEDURE — 99999 PR PBB SHADOW E&M-EST. PATIENT-LVL II: CPT | Mod: PBBFAC,,,

## 2021-08-18 ENCOUNTER — TELEPHONE (OUTPATIENT)
Dept: INFECTIOUS DISEASES | Facility: CLINIC | Age: 76
End: 2021-08-18

## 2021-08-19 ENCOUNTER — TELEPHONE (OUTPATIENT)
Dept: INFECTIOUS DISEASES | Facility: CLINIC | Age: 76
End: 2021-08-19

## 2021-08-20 ENCOUNTER — TELEPHONE (OUTPATIENT)
Dept: UROLOGY | Facility: CLINIC | Age: 76
End: 2021-08-20

## 2021-08-23 ENCOUNTER — TELEPHONE (OUTPATIENT)
Dept: UROLOGY | Facility: CLINIC | Age: 76
End: 2021-08-23

## 2021-08-24 ENCOUNTER — HOSPITAL ENCOUNTER (INPATIENT)
Facility: HOSPITAL | Age: 76
LOS: 2 days | Discharge: HOME HEALTH | DRG: 243 | End: 2021-08-26
Attending: EMERGENCY MEDICINE | Admitting: INTERNAL MEDICINE
Payer: MEDICARE

## 2021-08-24 ENCOUNTER — ANESTHESIA (OUTPATIENT)
Dept: SURGERY | Facility: HOSPITAL | Age: 76
DRG: 243 | End: 2021-08-24
Payer: MEDICARE

## 2021-08-24 ENCOUNTER — ANESTHESIA EVENT (OUTPATIENT)
Dept: MEDSURG UNIT | Facility: HOSPITAL | Age: 76
DRG: 243 | End: 2021-08-24
Payer: MEDICARE

## 2021-08-24 ENCOUNTER — ANESTHESIA (OUTPATIENT)
Dept: MEDSURG UNIT | Facility: HOSPITAL | Age: 76
DRG: 243 | End: 2021-08-24
Payer: MEDICARE

## 2021-08-24 ENCOUNTER — ANESTHESIA EVENT (OUTPATIENT)
Dept: SURGERY | Facility: HOSPITAL | Age: 76
DRG: 243 | End: 2021-08-24
Payer: MEDICARE

## 2021-08-24 DIAGNOSIS — R07.9 CHEST PAIN: ICD-10-CM

## 2021-08-24 DIAGNOSIS — N20.0 KIDNEY STONE: ICD-10-CM

## 2021-08-24 DIAGNOSIS — R00.1 BRADYCARDIA: ICD-10-CM

## 2021-08-24 DIAGNOSIS — N39.0 URINARY TRACT INFECTION WITHOUT HEMATURIA, SITE UNSPECIFIED: ICD-10-CM

## 2021-08-24 DIAGNOSIS — N20.1 URETERAL STONE: Primary | ICD-10-CM

## 2021-08-24 DIAGNOSIS — I49.9 ARRHYTHMIA: ICD-10-CM

## 2021-08-24 DIAGNOSIS — I45.9 HEART BLOCK: ICD-10-CM

## 2021-08-24 PROBLEM — E78.5 HLD (HYPERLIPIDEMIA): Status: ACTIVE | Noted: 2021-08-24

## 2021-08-24 PROBLEM — F32.9 MDD (MAJOR DEPRESSIVE DISORDER): Status: ACTIVE | Noted: 2021-08-24

## 2021-08-24 PROBLEM — I44.1 SECOND DEGREE AV BLOCK: Status: ACTIVE | Noted: 2021-08-24

## 2021-08-24 PROBLEM — I10 ESSENTIAL HYPERTENSION: Status: ACTIVE | Noted: 2021-08-24

## 2021-08-24 PROBLEM — N21.0 BLADDER STONES: Status: RESOLVED | Noted: 2021-06-02 | Resolved: 2021-08-24

## 2021-08-24 LAB
ABO + RH BLD: NORMAL
ALBUMIN SERPL BCP-MCNC: 2.5 G/DL (ref 3.5–5.2)
ALP SERPL-CCNC: 104 U/L (ref 55–135)
ALT SERPL W/O P-5'-P-CCNC: 5 U/L (ref 10–44)
ANION GAP SERPL CALC-SCNC: 8 MMOL/L (ref 8–16)
APTT BLDCRRT: 27.1 SEC (ref 21–32)
ASCENDING AORTA: 3.54 CM
AST SERPL-CCNC: 9 U/L (ref 10–40)
AV INDEX (PROSTH): 0.62
AV MEAN GRADIENT: 7 MMHG
AV PEAK GRADIENT: 14 MMHG
AV VALVE AREA: 2.33 CM2
AV VELOCITY RATIO: 0.6
BASOPHILS # BLD AUTO: 0.06 K/UL (ref 0–0.2)
BASOPHILS NFR BLD: 1 % (ref 0–1.9)
BILIRUB SERPL-MCNC: 0.4 MG/DL (ref 0.1–1)
BLD GP AB SCN CELLS X3 SERPL QL: NORMAL
BNP SERPL-MCNC: 51 PG/ML (ref 0–99)
BSA FOR ECHO PROCEDURE: 2.15 M2
BUN SERPL-MCNC: 13 MG/DL (ref 6–30)
BUN SERPL-MCNC: 14 MG/DL (ref 8–23)
CALCIUM SERPL-MCNC: 8.8 MG/DL (ref 8.7–10.5)
CHLORIDE SERPL-SCNC: 114 MMOL/L (ref 95–110)
CHLORIDE SERPL-SCNC: 115 MMOL/L (ref 95–110)
CO2 SERPL-SCNC: 24 MMOL/L (ref 23–29)
CREAT SERPL-MCNC: 1.2 MG/DL (ref 0.5–1.4)
CREAT SERPL-MCNC: 1.2 MG/DL (ref 0.5–1.4)
CV ECHO LV RWT: 0.38 CM
DIFFERENTIAL METHOD: ABNORMAL
DOP CALC AO PEAK VEL: 1.84 M/S
DOP CALC AO VTI: 43.7 CM
DOP CALC LVOT AREA: 3.7 CM2
DOP CALC LVOT DIAMETER: 2.18 CM
DOP CALC LVOT PEAK VEL: 1.1 M/S
DOP CALC LVOT STROKE VOLUME: 101.81 CM3
DOP CALCLVOT PEAK VEL VTI: 27.29 CM
E WAVE DECELERATION TIME: 141.99 MSEC
E/A RATIO: 1.74
E/E' RATIO: 8.15 M/S
ECHO LV POSTERIOR WALL: 0.99 CM (ref 0.6–1.1)
EJECTION FRACTION: 65 %
EOSINOPHIL # BLD AUTO: 0.2 K/UL (ref 0–0.5)
EOSINOPHIL NFR BLD: 3 % (ref 0–8)
ERYTHROCYTE [DISTWIDTH] IN BLOOD BY AUTOMATED COUNT: 18.2 % (ref 11.5–14.5)
EST. GFR  (AFRICAN AMERICAN): >60 ML/MIN/1.73 M^2
EST. GFR  (NON AFRICAN AMERICAN): 59.6 ML/MIN/1.73 M^2
ESTIMATED AVG GLUCOSE: 71 MG/DL (ref 68–131)
FRACTIONAL SHORTENING: 36 % (ref 28–44)
GLUCOSE SERPL-MCNC: 79 MG/DL (ref 70–110)
GLUCOSE SERPL-MCNC: 79 MG/DL (ref 70–110)
HBA1C MFR BLD: 4.1 % (ref 4–5.6)
HCT VFR BLD AUTO: 32.9 % (ref 40–54)
HCT VFR BLD CALC: 29 %PCV (ref 36–54)
HGB BLD-MCNC: 9.8 G/DL (ref 14–18)
IMM GRANULOCYTES # BLD AUTO: 0.02 K/UL (ref 0–0.04)
IMM GRANULOCYTES NFR BLD AUTO: 0.3 % (ref 0–0.5)
INR PPP: 1 (ref 0.8–1.2)
INTERVENTRICULAR SEPTUM: 1.27 CM (ref 0.6–1.1)
IVRT: 142.72 MSEC
LA MAJOR: 5.42 CM
LA MINOR: 5.37 CM
LA WIDTH: 4.23 CM
LEFT ATRIUM SIZE: 3.55 CM
LEFT ATRIUM VOLUME INDEX MOD: 20.8 ML/M2
LEFT ATRIUM VOLUME INDEX: 32.3 ML/M2
LEFT ATRIUM VOLUME MOD: 44.26 CM3
LEFT ATRIUM VOLUME: 68.86 CM3
LEFT INTERNAL DIMENSION IN SYSTOLE: 3.34 CM (ref 2.1–4)
LEFT VENTRICLE DIASTOLIC VOLUME INDEX: 62.11 ML/M2
LEFT VENTRICLE DIASTOLIC VOLUME: 132.29 ML
LEFT VENTRICLE MASS INDEX: 109 G/M2
LEFT VENTRICLE SYSTOLIC VOLUME INDEX: 21.4 ML/M2
LEFT VENTRICLE SYSTOLIC VOLUME: 45.58 ML
LEFT VENTRICULAR INTERNAL DIMENSION IN DIASTOLE: 5.25 CM (ref 3.5–6)
LEFT VENTRICULAR MASS: 232.61 G
LV LATERAL E/E' RATIO: 8.15 M/S
LV SEPTAL E/E' RATIO: 8.15 M/S
LYMPHOCYTES # BLD AUTO: 1.9 K/UL (ref 1–4.8)
LYMPHOCYTES NFR BLD: 30.9 % (ref 18–48)
MAGNESIUM SERPL-MCNC: 1.7 MG/DL (ref 1.6–2.6)
MCH RBC QN AUTO: 28 PG (ref 27–31)
MCHC RBC AUTO-ENTMCNC: 29.8 G/DL (ref 32–36)
MCV RBC AUTO: 94 FL (ref 82–98)
MONOCYTES # BLD AUTO: 0.4 K/UL (ref 0.3–1)
MONOCYTES NFR BLD: 6.4 % (ref 4–15)
MV A" WAVE DURATION": 17.13 MSEC
MV PEAK A VEL: 0.61 M/S
MV PEAK E VEL: 1.06 M/S
MV STENOSIS PRESSURE HALF TIME: 41.18 MS
MV VALVE AREA P 1/2 METHOD: 5.34 CM2
NEUTROPHILS # BLD AUTO: 3.6 K/UL (ref 1.8–7.7)
NEUTROPHILS NFR BLD: 58.4 % (ref 38–73)
NRBC BLD-RTO: 0 /100 WBC
PHOSPHATE SERPL-MCNC: 2.3 MG/DL (ref 2.7–4.5)
PISA TR MAX VEL: 3.1 M/S
PLATELET # BLD AUTO: 199 K/UL (ref 150–450)
PMV BLD AUTO: 10.6 FL (ref 9.2–12.9)
POC IONIZED CALCIUM: 1.24 MMOL/L (ref 1.06–1.42)
POC TCO2 (MEASURED): 25 MMOL/L (ref 23–29)
POCT GLUCOSE: 116 MG/DL (ref 70–110)
POCT GLUCOSE: 136 MG/DL (ref 70–110)
POCT GLUCOSE: 77 MG/DL (ref 70–110)
POTASSIUM BLD-SCNC: 3.5 MMOL/L (ref 3.5–5.1)
POTASSIUM SERPL-SCNC: 3.4 MMOL/L (ref 3.5–5.1)
PROT SERPL-MCNC: 5.9 G/DL (ref 6–8.4)
PROTHROMBIN TIME: 11.3 SEC (ref 9–12.5)
PULM VEIN S/D RATIO: 0.87
PV PEAK D VEL: 0.69 M/S
PV PEAK S VEL: 0.6 M/S
RA MAJOR: 4.76 CM
RA PRESSURE: 3 MMHG
RA WIDTH: 3.32 CM
RBC # BLD AUTO: 3.5 M/UL (ref 4.6–6.2)
RIGHT VENTRICULAR END-DIASTOLIC DIMENSION: 2.73 CM
RV TISSUE DOPPLER FREE WALL SYSTOLIC VELOCITY 1 (APICAL 4 CHAMBER VIEW): 13.17 CM/S
SAMPLE: ABNORMAL
SINUS: 3.12 CM
SODIUM BLD-SCNC: 151 MMOL/L (ref 136–145)
SODIUM SERPL-SCNC: 147 MMOL/L (ref 136–145)
STJ: 3.37 CM
TDI LATERAL: 0.13 M/S
TDI SEPTAL: 0.13 M/S
TDI: 0.13 M/S
TR MAX PG: 38 MMHG
TRICUSPID ANNULAR PLANE SYSTOLIC EXCURSION: 2.9 CM
TROPONIN I SERPL DL<=0.01 NG/ML-MCNC: 0.01 NG/ML (ref 0–0.03)
TSH SERPL DL<=0.005 MIU/L-ACNC: 2.04 UIU/ML (ref 0.4–4)
TV REST PULMONARY ARTERY PRESSURE: 41 MMHG
WBC # BLD AUTO: 6.08 K/UL (ref 3.9–12.7)

## 2021-08-24 PROCEDURE — 86900 BLOOD TYPING SEROLOGIC ABO: CPT | Performed by: EMERGENCY MEDICINE

## 2021-08-24 PROCEDURE — 85610 PROTHROMBIN TIME: CPT | Performed by: EMERGENCY MEDICINE

## 2021-08-24 PROCEDURE — 83880 ASSAY OF NATRIURETIC PEPTIDE: CPT | Performed by: STUDENT IN AN ORGANIZED HEALTH CARE EDUCATION/TRAINING PROGRAM

## 2021-08-24 PROCEDURE — D9220A PRA ANESTHESIA: Mod: CRNA,,, | Performed by: STUDENT IN AN ORGANIZED HEALTH CARE EDUCATION/TRAINING PROGRAM

## 2021-08-24 PROCEDURE — 99285 PR EMERGENCY DEPT VISIT,LEVEL V: ICD-10-PCS | Mod: GC,,, | Performed by: EMERGENCY MEDICINE

## 2021-08-24 PROCEDURE — 63600175 PHARM REV CODE 636 W HCPCS: Performed by: STUDENT IN AN ORGANIZED HEALTH CARE EDUCATION/TRAINING PROGRAM

## 2021-08-24 PROCEDURE — 93005 ELECTROCARDIOGRAM TRACING: CPT

## 2021-08-24 PROCEDURE — 93010 EKG 12-LEAD: ICD-10-PCS | Mod: ,,, | Performed by: INTERNAL MEDICINE

## 2021-08-24 PROCEDURE — 99223 1ST HOSP IP/OBS HIGH 75: CPT | Mod: ,,, | Performed by: INTERNAL MEDICINE

## 2021-08-24 PROCEDURE — 25000003 PHARM REV CODE 250: Performed by: STUDENT IN AN ORGANIZED HEALTH CARE EDUCATION/TRAINING PROGRAM

## 2021-08-24 PROCEDURE — 37000009 HC ANESTHESIA EA ADD 15 MINS: Performed by: INTERNAL MEDICINE

## 2021-08-24 PROCEDURE — 83735 ASSAY OF MAGNESIUM: CPT | Performed by: STUDENT IN AN ORGANIZED HEALTH CARE EDUCATION/TRAINING PROGRAM

## 2021-08-24 PROCEDURE — 93010 ELECTROCARDIOGRAM REPORT: CPT | Mod: ,,, | Performed by: INTERNAL MEDICINE

## 2021-08-24 PROCEDURE — 99285 EMERGENCY DEPT VISIT HI MDM: CPT | Mod: 25

## 2021-08-24 PROCEDURE — 80047 BASIC METABLC PNL IONIZED CA: CPT | Mod: 91

## 2021-08-24 PROCEDURE — 63600175 PHARM REV CODE 636 W HCPCS: Performed by: INTERNAL MEDICINE

## 2021-08-24 PROCEDURE — C1898 LEAD, PMKR, OTHER THAN TRANS: HCPCS | Performed by: INTERNAL MEDICINE

## 2021-08-24 PROCEDURE — D9220A PRA ANESTHESIA: ICD-10-PCS | Mod: ANES,,, | Performed by: ANESTHESIOLOGY

## 2021-08-24 PROCEDURE — 33208 INSRT HEART PM ATRIAL & VENT: CPT | Performed by: INTERNAL MEDICINE

## 2021-08-24 PROCEDURE — 84100 ASSAY OF PHOSPHORUS: CPT | Performed by: STUDENT IN AN ORGANIZED HEALTH CARE EDUCATION/TRAINING PROGRAM

## 2021-08-24 PROCEDURE — 33208 PR INSER HART PACER XVENOUS ATR/VENTR: ICD-10-PCS | Mod: KX,,, | Performed by: INTERNAL MEDICINE

## 2021-08-24 PROCEDURE — D9220A PRA ANESTHESIA: ICD-10-PCS | Mod: CRNA,,, | Performed by: STUDENT IN AN ORGANIZED HEALTH CARE EDUCATION/TRAINING PROGRAM

## 2021-08-24 PROCEDURE — D9220A PRA ANESTHESIA: Mod: ANES,,, | Performed by: ANESTHESIOLOGY

## 2021-08-24 PROCEDURE — 33208 INSRT HEART PM ATRIAL & VENT: CPT | Mod: KX,,, | Performed by: INTERNAL MEDICINE

## 2021-08-24 PROCEDURE — 84443 ASSAY THYROID STIM HORMONE: CPT | Performed by: STUDENT IN AN ORGANIZED HEALTH CARE EDUCATION/TRAINING PROGRAM

## 2021-08-24 PROCEDURE — 80053 COMPREHEN METABOLIC PANEL: CPT | Performed by: STUDENT IN AN ORGANIZED HEALTH CARE EDUCATION/TRAINING PROGRAM

## 2021-08-24 PROCEDURE — 85730 THROMBOPLASTIN TIME PARTIAL: CPT | Performed by: EMERGENCY MEDICINE

## 2021-08-24 PROCEDURE — 99223 PR INITIAL HOSPITAL CARE,LEVL III: ICD-10-PCS | Mod: ,,, | Performed by: INTERNAL MEDICINE

## 2021-08-24 PROCEDURE — 20600001 HC STEP DOWN PRIVATE ROOM

## 2021-08-24 PROCEDURE — 37000008 HC ANESTHESIA 1ST 15 MINUTES: Performed by: INTERNAL MEDICINE

## 2021-08-24 PROCEDURE — 83036 HEMOGLOBIN GLYCOSYLATED A1C: CPT | Performed by: STUDENT IN AN ORGANIZED HEALTH CARE EDUCATION/TRAINING PROGRAM

## 2021-08-24 PROCEDURE — C1894 INTRO/SHEATH, NON-LASER: HCPCS | Performed by: INTERNAL MEDICINE

## 2021-08-24 PROCEDURE — 96360 HYDRATION IV INFUSION INIT: CPT

## 2021-08-24 PROCEDURE — 99285 EMERGENCY DEPT VISIT HI MDM: CPT | Mod: GC,,, | Performed by: EMERGENCY MEDICINE

## 2021-08-24 PROCEDURE — 82962 GLUCOSE BLOOD TEST: CPT | Performed by: INTERNAL MEDICINE

## 2021-08-24 PROCEDURE — 85025 COMPLETE CBC W/AUTO DIFF WBC: CPT | Performed by: STUDENT IN AN ORGANIZED HEALTH CARE EDUCATION/TRAINING PROGRAM

## 2021-08-24 PROCEDURE — 82962 GLUCOSE BLOOD TEST: CPT

## 2021-08-24 PROCEDURE — 84484 ASSAY OF TROPONIN QUANT: CPT | Performed by: STUDENT IN AN ORGANIZED HEALTH CARE EDUCATION/TRAINING PROGRAM

## 2021-08-24 PROCEDURE — 25000003 PHARM REV CODE 250: Performed by: INTERNAL MEDICINE

## 2021-08-24 PROCEDURE — C1785 PMKR, DUAL, RATE-RESP: HCPCS | Performed by: INTERNAL MEDICINE

## 2021-08-24 PROCEDURE — 94761 N-INVAS EAR/PLS OXIMETRY MLT: CPT

## 2021-08-24 DEVICE — IMPLANTABLE DEVICE
Type: IMPLANTABLE DEVICE | Site: HEART | Status: FUNCTIONAL
Brand: SOLIA

## 2021-08-24 DEVICE — IMPLANTABLE DEVICE
Type: IMPLANTABLE DEVICE | Site: CHEST  WALL | Status: FUNCTIONAL
Brand: EDORA 8 DR-T

## 2021-08-24 RX ORDER — HEPARIN SODIUM 5000 [USP'U]/ML
5000 INJECTION, SOLUTION INTRAVENOUS; SUBCUTANEOUS EVERY 8 HOURS
Status: DISCONTINUED | OUTPATIENT
Start: 2021-08-24 | End: 2021-08-24

## 2021-08-24 RX ORDER — VANCOMYCIN HCL IN 5 % DEXTROSE 1G/250ML
1000 PLASTIC BAG, INJECTION (ML) INTRAVENOUS ONCE
Status: DISCONTINUED | OUTPATIENT
Start: 2021-08-24 | End: 2021-08-26 | Stop reason: HOSPADM

## 2021-08-24 RX ORDER — TALC
6 POWDER (GRAM) TOPICAL NIGHTLY PRN
Status: DISCONTINUED | OUTPATIENT
Start: 2021-08-24 | End: 2021-08-26 | Stop reason: HOSPADM

## 2021-08-24 RX ORDER — MIRTAZAPINE 15 MG/1
30 TABLET, FILM COATED ORAL NIGHTLY
Status: DISCONTINUED | OUTPATIENT
Start: 2021-08-24 | End: 2021-08-26 | Stop reason: HOSPADM

## 2021-08-24 RX ORDER — IBUPROFEN 200 MG
24 TABLET ORAL
Status: DISCONTINUED | OUTPATIENT
Start: 2021-08-24 | End: 2021-08-26 | Stop reason: HOSPADM

## 2021-08-24 RX ORDER — TAMSULOSIN HYDROCHLORIDE 0.4 MG/1
0.8 CAPSULE ORAL DAILY
Status: DISCONTINUED | OUTPATIENT
Start: 2021-08-24 | End: 2021-08-26 | Stop reason: HOSPADM

## 2021-08-24 RX ORDER — LIDOCAINE HYDROCHLORIDE 20 MG/ML
INJECTION, SOLUTION INFILTRATION; PERINEURAL
Status: DISCONTINUED | OUTPATIENT
Start: 2021-08-24 | End: 2021-08-26 | Stop reason: HOSPADM

## 2021-08-24 RX ORDER — FENTANYL CITRATE 50 UG/ML
INJECTION, SOLUTION INTRAMUSCULAR; INTRAVENOUS
Status: DISCONTINUED | OUTPATIENT
Start: 2021-08-24 | End: 2021-08-24

## 2021-08-24 RX ORDER — CEFAZOLIN SODIUM 1 G/3ML
2 INJECTION, POWDER, FOR SOLUTION INTRAMUSCULAR; INTRAVENOUS
Status: DISCONTINUED | OUTPATIENT
Start: 2021-08-24 | End: 2021-08-26 | Stop reason: HOSPADM

## 2021-08-24 RX ORDER — GABAPENTIN 300 MG/1
300 CAPSULE ORAL 3 TIMES DAILY
Status: DISCONTINUED | OUTPATIENT
Start: 2021-08-24 | End: 2021-08-26 | Stop reason: HOSPADM

## 2021-08-24 RX ORDER — VANCOMYCIN HCL IN 5 % DEXTROSE 1G/250ML
1000 PLASTIC BAG, INJECTION (ML) INTRAVENOUS
Status: COMPLETED | OUTPATIENT
Start: 2021-08-25 | End: 2021-08-25

## 2021-08-24 RX ORDER — IBUPROFEN 200 MG
16 TABLET ORAL
Status: DISCONTINUED | OUTPATIENT
Start: 2021-08-24 | End: 2021-08-26 | Stop reason: HOSPADM

## 2021-08-24 RX ORDER — ATORVASTATIN CALCIUM 20 MG/1
40 TABLET, FILM COATED ORAL DAILY
Status: DISCONTINUED | OUTPATIENT
Start: 2021-08-24 | End: 2021-08-26 | Stop reason: HOSPADM

## 2021-08-24 RX ORDER — ESCITALOPRAM OXALATE 20 MG/1
20 TABLET ORAL DAILY
Status: DISCONTINUED | OUTPATIENT
Start: 2021-08-24 | End: 2021-08-26 | Stop reason: HOSPADM

## 2021-08-24 RX ORDER — VANCOMYCIN HYDROCHLORIDE 1 G/20ML
INJECTION, POWDER, LYOPHILIZED, FOR SOLUTION INTRAVENOUS
Status: DISCONTINUED | OUTPATIENT
Start: 2021-08-24 | End: 2021-08-26 | Stop reason: HOSPADM

## 2021-08-24 RX ORDER — SODIUM CHLORIDE 0.9 G/100ML
IRRIGANT IRRIGATION
Status: DISCONTINUED | OUTPATIENT
Start: 2021-08-24 | End: 2021-08-26 | Stop reason: HOSPADM

## 2021-08-24 RX ORDER — GLUCAGON 1 MG
1 KIT INJECTION
Status: DISCONTINUED | OUTPATIENT
Start: 2021-08-24 | End: 2021-08-26 | Stop reason: HOSPADM

## 2021-08-24 RX ORDER — PROPOFOL 10 MG/ML
VIAL (ML) INTRAVENOUS CONTINUOUS PRN
Status: DISCONTINUED | OUTPATIENT
Start: 2021-08-24 | End: 2021-08-24

## 2021-08-24 RX ORDER — FENTANYL CITRATE 50 UG/ML
25 INJECTION, SOLUTION INTRAMUSCULAR; INTRAVENOUS EVERY 5 MIN PRN
Status: DISCONTINUED | OUTPATIENT
Start: 2021-08-24 | End: 2021-08-26 | Stop reason: HOSPADM

## 2021-08-24 RX ORDER — MIDAZOLAM HYDROCHLORIDE 1 MG/ML
INJECTION, SOLUTION INTRAMUSCULAR; INTRAVENOUS
Status: DISCONTINUED | OUTPATIENT
Start: 2021-08-24 | End: 2021-08-24

## 2021-08-24 RX ORDER — SODIUM CHLORIDE 0.9 % (FLUSH) 0.9 %
10 SYRINGE (ML) INJECTION
Status: DISCONTINUED | OUTPATIENT
Start: 2021-08-24 | End: 2021-08-26 | Stop reason: HOSPADM

## 2021-08-24 RX ORDER — BUPIVACAINE HYDROCHLORIDE 2.5 MG/ML
INJECTION, SOLUTION EPIDURAL; INFILTRATION; INTRACAUDAL
Status: DISCONTINUED | OUTPATIENT
Start: 2021-08-24 | End: 2021-08-26 | Stop reason: HOSPADM

## 2021-08-24 RX ORDER — SODIUM CHLORIDE 0.9 % (FLUSH) 0.9 %
3 SYRINGE (ML) INJECTION EVERY 4 HOURS PRN
Status: DISCONTINUED | OUTPATIENT
Start: 2021-08-24 | End: 2021-08-26 | Stop reason: HOSPADM

## 2021-08-24 RX ORDER — AMOXICILLIN 250 MG
1 CAPSULE ORAL 2 TIMES DAILY
Status: DISCONTINUED | OUTPATIENT
Start: 2021-08-24 | End: 2021-08-26 | Stop reason: HOSPADM

## 2021-08-24 RX ORDER — INSULIN ASPART 100 [IU]/ML
0-5 INJECTION, SOLUTION INTRAVENOUS; SUBCUTANEOUS
Status: DISCONTINUED | OUTPATIENT
Start: 2021-08-24 | End: 2021-08-26 | Stop reason: HOSPADM

## 2021-08-24 RX ORDER — ACETAMINOPHEN 325 MG/1
650 TABLET ORAL EVERY 4 HOURS PRN
Status: DISCONTINUED | OUTPATIENT
Start: 2021-08-24 | End: 2021-08-26 | Stop reason: HOSPADM

## 2021-08-24 RX ADMIN — GABAPENTIN 300 MG: 300 CAPSULE ORAL at 03:08

## 2021-08-24 RX ADMIN — GABAPENTIN 300 MG: 300 CAPSULE ORAL at 10:08

## 2021-08-24 RX ADMIN — PROPOFOL 30 MCG/KG/MIN: 10 INJECTION, EMULSION INTRAVENOUS at 04:08

## 2021-08-24 RX ADMIN — SODIUM CHLORIDE: 9 INJECTION, SOLUTION INTRAVENOUS at 04:08

## 2021-08-24 RX ADMIN — MIRTAZAPINE 30 MG: 15 TABLET, FILM COATED ORAL at 10:08

## 2021-08-24 RX ADMIN — FENTANYL CITRATE 25 MCG: 50 INJECTION INTRAMUSCULAR; INTRAVENOUS at 04:08

## 2021-08-24 RX ADMIN — TAMSULOSIN HYDROCHLORIDE 0.8 MG: 0.4 CAPSULE ORAL at 03:08

## 2021-08-24 RX ADMIN — SODIUM CHLORIDE, SODIUM LACTATE, POTASSIUM CHLORIDE, AND CALCIUM CHLORIDE 500 ML: .6; .31; .03; .02 INJECTION, SOLUTION INTRAVENOUS at 09:08

## 2021-08-24 RX ADMIN — ESCITALOPRAM 20 MG: 5 TABLET, FILM COATED ORAL at 03:08

## 2021-08-24 RX ADMIN — MIDAZOLAM 1 MG: 1 INJECTION INTRAMUSCULAR; INTRAVENOUS at 04:08

## 2021-08-24 RX ADMIN — VANCOMYCIN HYDROCHLORIDE 1000 MG: 1 INJECTION, POWDER, LYOPHILIZED, FOR SOLUTION INTRAVENOUS at 05:08

## 2021-08-25 ENCOUNTER — ANESTHESIA EVENT (OUTPATIENT)
Dept: SURGERY | Facility: HOSPITAL | Age: 76
DRG: 243 | End: 2021-08-25
Payer: MEDICARE

## 2021-08-25 LAB
ALBUMIN SERPL BCP-MCNC: 2 G/DL (ref 3.5–5.2)
ALP SERPL-CCNC: 92 U/L (ref 55–135)
ALT SERPL W/O P-5'-P-CCNC: <5 U/L (ref 10–44)
ANION GAP SERPL CALC-SCNC: 7 MMOL/L (ref 8–16)
AST SERPL-CCNC: 10 U/L (ref 10–40)
BASOPHILS # BLD AUTO: 0.05 K/UL (ref 0–0.2)
BASOPHILS NFR BLD: 0.9 % (ref 0–1.9)
BILIRUB SERPL-MCNC: 0.3 MG/DL (ref 0.1–1)
BUN SERPL-MCNC: 15 MG/DL (ref 8–23)
CALCIUM SERPL-MCNC: 8.4 MG/DL (ref 8.7–10.5)
CHLORIDE SERPL-SCNC: 116 MMOL/L (ref 95–110)
CO2 SERPL-SCNC: 24 MMOL/L (ref 23–29)
CREAT SERPL-MCNC: 1.1 MG/DL (ref 0.5–1.4)
DIFFERENTIAL METHOD: ABNORMAL
EOSINOPHIL # BLD AUTO: 0.3 K/UL (ref 0–0.5)
EOSINOPHIL NFR BLD: 5 % (ref 0–8)
ERYTHROCYTE [DISTWIDTH] IN BLOOD BY AUTOMATED COUNT: 18.1 % (ref 11.5–14.5)
EST. GFR  (AFRICAN AMERICAN): >60 ML/MIN/1.73 M^2
EST. GFR  (NON AFRICAN AMERICAN): >60 ML/MIN/1.73 M^2
GLUCOSE SERPL-MCNC: 121 MG/DL (ref 70–110)
HCT VFR BLD AUTO: 28.4 % (ref 40–54)
HGB BLD-MCNC: 8.3 G/DL (ref 14–18)
IMM GRANULOCYTES # BLD AUTO: 0.01 K/UL (ref 0–0.04)
IMM GRANULOCYTES NFR BLD AUTO: 0.2 % (ref 0–0.5)
LYMPHOCYTES # BLD AUTO: 1.8 K/UL (ref 1–4.8)
LYMPHOCYTES NFR BLD: 32.2 % (ref 18–48)
MAGNESIUM SERPL-MCNC: 1.7 MG/DL (ref 1.6–2.6)
MCH RBC QN AUTO: 27.6 PG (ref 27–31)
MCHC RBC AUTO-ENTMCNC: 29.2 G/DL (ref 32–36)
MCV RBC AUTO: 94 FL (ref 82–98)
MONOCYTES # BLD AUTO: 0.5 K/UL (ref 0.3–1)
MONOCYTES NFR BLD: 8.3 % (ref 4–15)
NEUTROPHILS # BLD AUTO: 2.9 K/UL (ref 1.8–7.7)
NEUTROPHILS NFR BLD: 53.4 % (ref 38–73)
NRBC BLD-RTO: 0 /100 WBC
PHOSPHATE SERPL-MCNC: 2.7 MG/DL (ref 2.7–4.5)
PLATELET # BLD AUTO: 176 K/UL (ref 150–450)
PMV BLD AUTO: 11.3 FL (ref 9.2–12.9)
POCT GLUCOSE: 102 MG/DL (ref 70–110)
POTASSIUM SERPL-SCNC: 3.5 MMOL/L (ref 3.5–5.1)
PROT SERPL-MCNC: 4.8 G/DL (ref 6–8.4)
RBC # BLD AUTO: 3.01 M/UL (ref 4.6–6.2)
SODIUM SERPL-SCNC: 147 MMOL/L (ref 136–145)
WBC # BLD AUTO: 5.44 K/UL (ref 3.9–12.7)

## 2021-08-25 PROCEDURE — 99222 PR INITIAL HOSPITAL CARE,LEVL II: ICD-10-PCS | Mod: AI,,, | Performed by: INTERNAL MEDICINE

## 2021-08-25 PROCEDURE — 25000003 PHARM REV CODE 250: Performed by: STUDENT IN AN ORGANIZED HEALTH CARE EDUCATION/TRAINING PROGRAM

## 2021-08-25 PROCEDURE — 97530 THERAPEUTIC ACTIVITIES: CPT

## 2021-08-25 PROCEDURE — 85025 COMPLETE CBC W/AUTO DIFF WBC: CPT | Performed by: STUDENT IN AN ORGANIZED HEALTH CARE EDUCATION/TRAINING PROGRAM

## 2021-08-25 PROCEDURE — 20600001 HC STEP DOWN PRIVATE ROOM

## 2021-08-25 PROCEDURE — 99222 1ST HOSP IP/OBS MODERATE 55: CPT | Mod: AI,,, | Performed by: INTERNAL MEDICINE

## 2021-08-25 PROCEDURE — 99223 PR INITIAL HOSPITAL CARE,LEVL III: ICD-10-PCS | Mod: ,,, | Performed by: PHYSICIAN ASSISTANT

## 2021-08-25 PROCEDURE — 25000003 PHARM REV CODE 250: Performed by: INTERNAL MEDICINE

## 2021-08-25 PROCEDURE — 36415 COLL VENOUS BLD VENIPUNCTURE: CPT | Performed by: STUDENT IN AN ORGANIZED HEALTH CARE EDUCATION/TRAINING PROGRAM

## 2021-08-25 PROCEDURE — 80053 COMPREHEN METABOLIC PANEL: CPT | Performed by: STUDENT IN AN ORGANIZED HEALTH CARE EDUCATION/TRAINING PROGRAM

## 2021-08-25 PROCEDURE — 97110 THERAPEUTIC EXERCISES: CPT

## 2021-08-25 PROCEDURE — 63600175 PHARM REV CODE 636 W HCPCS: Performed by: INTERNAL MEDICINE

## 2021-08-25 PROCEDURE — 97166 OT EVAL MOD COMPLEX 45 MIN: CPT

## 2021-08-25 PROCEDURE — 63600175 PHARM REV CODE 636 W HCPCS: Performed by: STUDENT IN AN ORGANIZED HEALTH CARE EDUCATION/TRAINING PROGRAM

## 2021-08-25 PROCEDURE — 97161 PT EVAL LOW COMPLEX 20 MIN: CPT

## 2021-08-25 PROCEDURE — 99223 1ST HOSP IP/OBS HIGH 75: CPT | Mod: ,,, | Performed by: PHYSICIAN ASSISTANT

## 2021-08-25 PROCEDURE — 83735 ASSAY OF MAGNESIUM: CPT | Performed by: STUDENT IN AN ORGANIZED HEALTH CARE EDUCATION/TRAINING PROGRAM

## 2021-08-25 PROCEDURE — 84100 ASSAY OF PHOSPHORUS: CPT | Performed by: STUDENT IN AN ORGANIZED HEALTH CARE EDUCATION/TRAINING PROGRAM

## 2021-08-25 RX ORDER — CEPHALEXIN 500 MG/1
500 CAPSULE ORAL EVERY 8 HOURS
Status: DISCONTINUED | OUTPATIENT
Start: 2021-08-27 | End: 2021-08-26 | Stop reason: HOSPADM

## 2021-08-25 RX ADMIN — MIRTAZAPINE 30 MG: 15 TABLET, FILM COATED ORAL at 09:08

## 2021-08-25 RX ADMIN — GABAPENTIN 300 MG: 300 CAPSULE ORAL at 08:08

## 2021-08-25 RX ADMIN — POTASSIUM BICARBONATE 40 MEQ: 391 TABLET, EFFERVESCENT ORAL at 11:08

## 2021-08-25 RX ADMIN — DOCUSATE SODIUM 50 MG AND SENNOSIDES 8.6 MG 1 TABLET: 8.6; 5 TABLET, FILM COATED ORAL at 09:08

## 2021-08-25 RX ADMIN — VANCOMYCIN HYDROCHLORIDE 1000 MG: 1 INJECTION, POWDER, LYOPHILIZED, FOR SOLUTION INTRAVENOUS at 05:08

## 2021-08-25 RX ADMIN — ERTAPENEM 1 G: 1 INJECTION INTRAMUSCULAR; INTRAVENOUS at 07:08

## 2021-08-25 RX ADMIN — GABAPENTIN 300 MG: 300 CAPSULE ORAL at 10:08

## 2021-08-25 RX ADMIN — ESCITALOPRAM 20 MG: 5 TABLET, FILM COATED ORAL at 08:08

## 2021-08-25 RX ADMIN — GABAPENTIN 300 MG: 300 CAPSULE ORAL at 04:08

## 2021-08-25 RX ADMIN — ATORVASTATIN CALCIUM 40 MG: 20 TABLET, FILM COATED ORAL at 08:08

## 2021-08-25 RX ADMIN — TAMSULOSIN HYDROCHLORIDE 0.8 MG: 0.4 CAPSULE ORAL at 08:08

## 2021-08-26 ENCOUNTER — ANESTHESIA (OUTPATIENT)
Dept: SURGERY | Facility: HOSPITAL | Age: 76
DRG: 243 | End: 2021-08-26
Payer: MEDICARE

## 2021-08-26 ENCOUNTER — TELEPHONE (OUTPATIENT)
Dept: CARDIOLOGY | Facility: HOSPITAL | Age: 76
End: 2021-08-26

## 2021-08-26 VITALS
SYSTOLIC BLOOD PRESSURE: 121 MMHG | OXYGEN SATURATION: 93 % | DIASTOLIC BLOOD PRESSURE: 59 MMHG | HEIGHT: 72 IN | TEMPERATURE: 98 F | BODY MASS INDEX: 27.09 KG/M2 | WEIGHT: 200 LBS | RESPIRATION RATE: 12 BRPM | HEART RATE: 66 BPM

## 2021-08-26 DIAGNOSIS — I45.9 HEART BLOCK: Primary | ICD-10-CM

## 2021-08-26 PROBLEM — R00.1 BRADYCARDIA: Status: ACTIVE | Noted: 2021-08-26

## 2021-08-26 LAB
ALBUMIN SERPL BCP-MCNC: 1.9 G/DL (ref 3.5–5.2)
ALP SERPL-CCNC: 81 U/L (ref 55–135)
ALT SERPL W/O P-5'-P-CCNC: <5 U/L (ref 10–44)
ANION GAP SERPL CALC-SCNC: 8 MMOL/L (ref 8–16)
AST SERPL-CCNC: 15 U/L (ref 10–40)
BASOPHILS # BLD AUTO: 0.05 K/UL (ref 0–0.2)
BASOPHILS NFR BLD: 1 % (ref 0–1.9)
BILIRUB SERPL-MCNC: 0.3 MG/DL (ref 0.1–1)
BUN SERPL-MCNC: 13 MG/DL (ref 8–23)
CALCIUM SERPL-MCNC: 7.9 MG/DL (ref 8.7–10.5)
CHLORIDE SERPL-SCNC: 112 MMOL/L (ref 95–110)
CO2 SERPL-SCNC: 25 MMOL/L (ref 23–29)
CREAT SERPL-MCNC: 1 MG/DL (ref 0.5–1.4)
DIFFERENTIAL METHOD: ABNORMAL
EOSINOPHIL # BLD AUTO: 0.3 K/UL (ref 0–0.5)
EOSINOPHIL NFR BLD: 6 % (ref 0–8)
ERYTHROCYTE [DISTWIDTH] IN BLOOD BY AUTOMATED COUNT: 18.2 % (ref 11.5–14.5)
EST. GFR  (AFRICAN AMERICAN): >60 ML/MIN/1.73 M^2
EST. GFR  (NON AFRICAN AMERICAN): >60 ML/MIN/1.73 M^2
GLUCOSE SERPL-MCNC: 103 MG/DL (ref 70–110)
HCT VFR BLD AUTO: 27.6 % (ref 40–54)
HGB BLD-MCNC: 8.3 G/DL (ref 14–18)
IMM GRANULOCYTES # BLD AUTO: 0.01 K/UL (ref 0–0.04)
IMM GRANULOCYTES NFR BLD AUTO: 0.2 % (ref 0–0.5)
LYMPHOCYTES # BLD AUTO: 1.9 K/UL (ref 1–4.8)
LYMPHOCYTES NFR BLD: 39.1 % (ref 18–48)
MAGNESIUM SERPL-MCNC: 1.7 MG/DL (ref 1.6–2.6)
MCH RBC QN AUTO: 27.9 PG (ref 27–31)
MCHC RBC AUTO-ENTMCNC: 30.1 G/DL (ref 32–36)
MCV RBC AUTO: 93 FL (ref 82–98)
MONOCYTES # BLD AUTO: 0.4 K/UL (ref 0.3–1)
MONOCYTES NFR BLD: 8.3 % (ref 4–15)
NEUTROPHILS # BLD AUTO: 2.3 K/UL (ref 1.8–7.7)
NEUTROPHILS NFR BLD: 45.4 % (ref 38–73)
NRBC BLD-RTO: 0 /100 WBC
PHOSPHATE SERPL-MCNC: 3 MG/DL (ref 2.7–4.5)
PLATELET # BLD AUTO: 173 K/UL (ref 150–450)
PMV BLD AUTO: 11.5 FL (ref 9.2–12.9)
POCT GLUCOSE: 90 MG/DL (ref 70–110)
POTASSIUM SERPL-SCNC: 4.4 MMOL/L (ref 3.5–5.1)
PROT SERPL-MCNC: 4.9 G/DL (ref 6–8.4)
RBC # BLD AUTO: 2.98 M/UL (ref 4.6–6.2)
SODIUM SERPL-SCNC: 145 MMOL/L (ref 136–145)
WBC # BLD AUTO: 4.96 K/UL (ref 3.9–12.7)

## 2021-08-26 PROCEDURE — C1769 GUIDE WIRE: HCPCS | Performed by: UROLOGY

## 2021-08-26 PROCEDURE — 63600175 PHARM REV CODE 636 W HCPCS: Performed by: STUDENT IN AN ORGANIZED HEALTH CARE EDUCATION/TRAINING PROGRAM

## 2021-08-26 PROCEDURE — 27201423 OPTIME MED/SURG SUP & DEVICES STERILE SUPPLY: Performed by: UROLOGY

## 2021-08-26 PROCEDURE — 99238 PR HOSPITAL DISCHARGE DAY,<30 MIN: ICD-10-PCS | Mod: ,,, | Performed by: INTERNAL MEDICINE

## 2021-08-26 PROCEDURE — 52356 CYSTO/URETERO W/LITHOTRIPSY: CPT | Mod: LT,,, | Performed by: UROLOGY

## 2021-08-26 PROCEDURE — 80053 COMPREHEN METABOLIC PANEL: CPT | Performed by: STUDENT IN AN ORGANIZED HEALTH CARE EDUCATION/TRAINING PROGRAM

## 2021-08-26 PROCEDURE — 37000008 HC ANESTHESIA 1ST 15 MINUTES: Performed by: UROLOGY

## 2021-08-26 PROCEDURE — 36000707: Performed by: UROLOGY

## 2021-08-26 PROCEDURE — 71000015 HC POSTOP RECOV 1ST HR: Performed by: UROLOGY

## 2021-08-26 PROCEDURE — D9220A PRA ANESTHESIA: ICD-10-PCS | Mod: CRNA,,, | Performed by: NURSE ANESTHETIST, CERTIFIED REGISTERED

## 2021-08-26 PROCEDURE — C2617 STENT, NON-COR, TEM W/O DEL: HCPCS | Performed by: UROLOGY

## 2021-08-26 PROCEDURE — C1894 INTRO/SHEATH, NON-LASER: HCPCS | Performed by: UROLOGY

## 2021-08-26 PROCEDURE — 52332 CYSTOSCOPY AND TREATMENT: CPT | Mod: 59,RT,, | Performed by: UROLOGY

## 2021-08-26 PROCEDURE — 71000044 HC DOSC ROUTINE RECOVERY FIRST HOUR: Performed by: UROLOGY

## 2021-08-26 PROCEDURE — 63600175 PHARM REV CODE 636 W HCPCS: Performed by: NURSE ANESTHETIST, CERTIFIED REGISTERED

## 2021-08-26 PROCEDURE — 25000003 PHARM REV CODE 250: Performed by: STUDENT IN AN ORGANIZED HEALTH CARE EDUCATION/TRAINING PROGRAM

## 2021-08-26 PROCEDURE — 82962 GLUCOSE BLOOD TEST: CPT | Performed by: UROLOGY

## 2021-08-26 PROCEDURE — 37000009 HC ANESTHESIA EA ADD 15 MINS: Performed by: UROLOGY

## 2021-08-26 PROCEDURE — 99024 POSTOP FOLLOW-UP VISIT: CPT | Mod: ,,, | Performed by: INTERNAL MEDICINE

## 2021-08-26 PROCEDURE — 52332 PR CYSTOSCOPY,INSERT URETERAL STENT: ICD-10-PCS | Mod: 59,RT,, | Performed by: UROLOGY

## 2021-08-26 PROCEDURE — 84100 ASSAY OF PHOSPHORUS: CPT | Performed by: STUDENT IN AN ORGANIZED HEALTH CARE EDUCATION/TRAINING PROGRAM

## 2021-08-26 PROCEDURE — 36415 COLL VENOUS BLD VENIPUNCTURE: CPT | Performed by: STUDENT IN AN ORGANIZED HEALTH CARE EDUCATION/TRAINING PROGRAM

## 2021-08-26 PROCEDURE — 99238 HOSP IP/OBS DSCHRG MGMT 30/<: CPT | Mod: ,,, | Performed by: INTERNAL MEDICINE

## 2021-08-26 PROCEDURE — 52352 CYSTOURETERO W/STONE REMOVE: CPT | Mod: 59,RT,, | Performed by: UROLOGY

## 2021-08-26 PROCEDURE — 36000706: Performed by: UROLOGY

## 2021-08-26 PROCEDURE — 25000003 PHARM REV CODE 250: Performed by: NURSE ANESTHETIST, CERTIFIED REGISTERED

## 2021-08-26 PROCEDURE — 52352 PR CYSTO/URETERO/PYELOSCOPY, CALCULUS TX: ICD-10-PCS | Mod: 59,RT,, | Performed by: UROLOGY

## 2021-08-26 PROCEDURE — D9220A PRA ANESTHESIA: Mod: CRNA,,, | Performed by: NURSE ANESTHETIST, CERTIFIED REGISTERED

## 2021-08-26 PROCEDURE — 52356 PR CYSTO/URETERO W/LITHOTRIPSY: ICD-10-PCS | Mod: LT,,, | Performed by: UROLOGY

## 2021-08-26 PROCEDURE — 83735 ASSAY OF MAGNESIUM: CPT | Performed by: STUDENT IN AN ORGANIZED HEALTH CARE EDUCATION/TRAINING PROGRAM

## 2021-08-26 PROCEDURE — 99024 PR POST-OP FOLLOW-UP VISIT: ICD-10-PCS | Mod: ,,, | Performed by: INTERNAL MEDICINE

## 2021-08-26 PROCEDURE — D9220A PRA ANESTHESIA: ICD-10-PCS | Mod: ANES,,, | Performed by: ANESTHESIOLOGY

## 2021-08-26 PROCEDURE — 25500020 PHARM REV CODE 255: Performed by: UROLOGY

## 2021-08-26 PROCEDURE — D9220A PRA ANESTHESIA: Mod: ANES,,, | Performed by: ANESTHESIOLOGY

## 2021-08-26 PROCEDURE — 82365 CALCULUS SPECTROSCOPY: CPT | Performed by: UROLOGY

## 2021-08-26 PROCEDURE — 85025 COMPLETE CBC W/AUTO DIFF WBC: CPT | Performed by: STUDENT IN AN ORGANIZED HEALTH CARE EDUCATION/TRAINING PROGRAM

## 2021-08-26 DEVICE — STENT URETERAL UNIV 6FR 24CM: Type: IMPLANTABLE DEVICE | Site: URETER | Status: FUNCTIONAL

## 2021-08-26 DEVICE — STENT URETERAL UNIV 6FR 26CM: Type: IMPLANTABLE DEVICE | Site: URETER | Status: FUNCTIONAL

## 2021-08-26 RX ORDER — FENTANYL CITRATE 50 UG/ML
25 INJECTION, SOLUTION INTRAMUSCULAR; INTRAVENOUS EVERY 5 MIN PRN
Status: DISCONTINUED | OUTPATIENT
Start: 2021-08-26 | End: 2021-08-26 | Stop reason: HOSPADM

## 2021-08-26 RX ORDER — MIDAZOLAM HYDROCHLORIDE 1 MG/ML
INJECTION, SOLUTION INTRAMUSCULAR; INTRAVENOUS
Status: DISCONTINUED | OUTPATIENT
Start: 2021-08-26 | End: 2021-08-27

## 2021-08-26 RX ORDER — MAGNESIUM SULFATE HEPTAHYDRATE 40 MG/ML
2 INJECTION, SOLUTION INTRAVENOUS ONCE
Status: COMPLETED | OUTPATIENT
Start: 2021-08-26 | End: 2021-08-26

## 2021-08-26 RX ORDER — SUCCINYLCHOLINE CHLORIDE 20 MG/ML
INJECTION INTRAMUSCULAR; INTRAVENOUS
Status: DISCONTINUED | OUTPATIENT
Start: 2021-08-26 | End: 2021-08-27

## 2021-08-26 RX ORDER — DEXAMETHASONE SODIUM PHOSPHATE 4 MG/ML
INJECTION, SOLUTION INTRA-ARTICULAR; INTRALESIONAL; INTRAMUSCULAR; INTRAVENOUS; SOFT TISSUE
Status: DISCONTINUED | OUTPATIENT
Start: 2021-08-26 | End: 2021-08-27

## 2021-08-26 RX ORDER — VASOPRESSIN 20 [USP'U]/ML
INJECTION, SOLUTION INTRAMUSCULAR; SUBCUTANEOUS
Status: DISCONTINUED | OUTPATIENT
Start: 2021-08-26 | End: 2021-08-27

## 2021-08-26 RX ORDER — ONDANSETRON 2 MG/ML
INJECTION INTRAMUSCULAR; INTRAVENOUS
Status: DISCONTINUED | OUTPATIENT
Start: 2021-08-26 | End: 2021-08-27

## 2021-08-26 RX ORDER — ROCURONIUM BROMIDE 10 MG/ML
INJECTION, SOLUTION INTRAVENOUS
Status: DISCONTINUED | OUTPATIENT
Start: 2021-08-26 | End: 2021-08-27

## 2021-08-26 RX ORDER — CEPHALEXIN 500 MG/1
500 CAPSULE ORAL EVERY 8 HOURS
Qty: 15 CAPSULE | Refills: 0 | Status: SHIPPED | OUTPATIENT
Start: 2021-08-27 | End: 2021-09-01

## 2021-08-26 RX ORDER — PHENYLEPHRINE HCL IN 0.9% NACL 1 MG/10 ML
SYRINGE (ML) INTRAVENOUS
Status: DISCONTINUED | OUTPATIENT
Start: 2021-08-26 | End: 2021-08-27

## 2021-08-26 RX ORDER — LIDOCAINE HYDROCHLORIDE 20 MG/ML
INJECTION, SOLUTION EPIDURAL; INFILTRATION; INTRACAUDAL; PERINEURAL
Status: DISCONTINUED | OUTPATIENT
Start: 2021-08-26 | End: 2021-08-27

## 2021-08-26 RX ORDER — FAMOTIDINE 10 MG/ML
INJECTION INTRAVENOUS
Status: DISCONTINUED | OUTPATIENT
Start: 2021-08-26 | End: 2021-08-27

## 2021-08-26 RX ORDER — ATROPA BELLADONNA AND OPIUM 16.2; 3 MG/1; MG/1
SUPPOSITORY RECTAL
Status: DISCONTINUED
Start: 2021-08-26 | End: 2021-08-26 | Stop reason: HOSPADM

## 2021-08-26 RX ORDER — PROPOFOL 10 MG/ML
VIAL (ML) INTRAVENOUS
Status: DISCONTINUED | OUTPATIENT
Start: 2021-08-26 | End: 2021-08-27

## 2021-08-26 RX ORDER — SODIUM CHLORIDE 0.9 % (FLUSH) 0.9 %
3 SYRINGE (ML) INJECTION
Status: DISCONTINUED | OUTPATIENT
Start: 2021-08-26 | End: 2021-08-26 | Stop reason: HOSPADM

## 2021-08-26 RX ORDER — ONDANSETRON 2 MG/ML
4 INJECTION INTRAMUSCULAR; INTRAVENOUS DAILY PRN
Status: DISCONTINUED | OUTPATIENT
Start: 2021-08-26 | End: 2021-08-26 | Stop reason: HOSPADM

## 2021-08-26 RX ORDER — NEOSTIGMINE METHYLSULFATE 0.5 MG/ML
INJECTION, SOLUTION INTRAVENOUS
Status: DISCONTINUED | OUTPATIENT
Start: 2021-08-26 | End: 2021-08-27

## 2021-08-26 RX ADMIN — LIDOCAINE HYDROCHLORIDE 90 MG: 20 INJECTION, SOLUTION EPIDURAL; INFILTRATION; INTRACAUDAL at 10:08

## 2021-08-26 RX ADMIN — Medication 100 MCG: at 11:08

## 2021-08-26 RX ADMIN — GABAPENTIN 300 MG: 300 CAPSULE ORAL at 08:08

## 2021-08-26 RX ADMIN — Medication 200 MCG: at 11:08

## 2021-08-26 RX ADMIN — GABAPENTIN 300 MG: 300 CAPSULE ORAL at 03:08

## 2021-08-26 RX ADMIN — ATORVASTATIN CALCIUM 40 MG: 20 TABLET, FILM COATED ORAL at 08:08

## 2021-08-26 RX ADMIN — ERTAPENEM 1 G: 1 INJECTION INTRAMUSCULAR; INTRAVENOUS at 06:08

## 2021-08-26 RX ADMIN — VASOPRESSIN 0.5 UNITS: 20 INJECTION INTRAVENOUS at 12:08

## 2021-08-26 RX ADMIN — Medication 400 MCG: at 11:08

## 2021-08-26 RX ADMIN — PROPOFOL 120 MG: 10 INJECTION, EMULSION INTRAVENOUS at 11:08

## 2021-08-26 RX ADMIN — PROPOFOL 30 MG: 10 INJECTION, EMULSION INTRAVENOUS at 11:08

## 2021-08-26 RX ADMIN — MIDAZOLAM 0.5 MG: 1 INJECTION INTRAMUSCULAR; INTRAVENOUS at 10:08

## 2021-08-26 RX ADMIN — NEOSTIGMINE METHYLSULFATE 5 MG: 0.5 INJECTION INTRAVENOUS at 12:08

## 2021-08-26 RX ADMIN — ONDANSETRON 4 MG: 2 INJECTION INTRAMUSCULAR; INTRAVENOUS at 10:08

## 2021-08-26 RX ADMIN — ROCURONIUM BROMIDE 30 MG: 10 INJECTION INTRAVENOUS at 11:08

## 2021-08-26 RX ADMIN — SODIUM CHLORIDE: 0.9 INJECTION, SOLUTION INTRAVENOUS at 10:08

## 2021-08-26 RX ADMIN — ROCURONIUM BROMIDE 5 MG: 10 INJECTION INTRAVENOUS at 10:08

## 2021-08-26 RX ADMIN — FENTANYL CITRATE 25 MCG: 50 INJECTION INTRAMUSCULAR; INTRAVENOUS at 10:08

## 2021-08-26 RX ADMIN — GLYCOPYRROLATE 0.6 MG: 0.2 INJECTION, SOLUTION INTRAMUSCULAR; INTRAVITREAL at 12:08

## 2021-08-26 RX ADMIN — TAMSULOSIN HYDROCHLORIDE 0.8 MG: 0.4 CAPSULE ORAL at 08:08

## 2021-08-26 RX ADMIN — FAMOTIDINE 20 MG: 10 INJECTION, SOLUTION INTRAVENOUS at 10:08

## 2021-08-26 RX ADMIN — MAGNESIUM SULFATE 2 G: 2 INJECTION INTRAVENOUS at 08:08

## 2021-08-26 RX ADMIN — FENTANYL CITRATE 25 MCG: 50 INJECTION INTRAMUSCULAR; INTRAVENOUS at 11:08

## 2021-08-26 RX ADMIN — SUCCINYLCHOLINE CHLORIDE 120 MG: 20 INJECTION, SOLUTION INTRAMUSCULAR; INTRAVENOUS; PARENTERAL at 10:08

## 2021-08-26 RX ADMIN — Medication 200 MCG: at 12:08

## 2021-08-26 RX ADMIN — DEXAMETHASONE SODIUM PHOSPHATE 4 MG: 4 INJECTION, SOLUTION INTRAMUSCULAR; INTRAVENOUS at 11:08

## 2021-08-26 RX ADMIN — DOCUSATE SODIUM 50 MG AND SENNOSIDES 8.6 MG 1 TABLET: 8.6; 5 TABLET, FILM COATED ORAL at 08:08

## 2021-08-26 RX ADMIN — VASOPRESSIN 1 UNITS: 20 INJECTION INTRAVENOUS at 12:08

## 2021-08-26 RX ADMIN — ESCITALOPRAM 20 MG: 5 TABLET, FILM COATED ORAL at 08:08

## 2021-08-27 ENCOUNTER — PATIENT OUTREACH (OUTPATIENT)
Dept: ADMINISTRATIVE | Facility: CLINIC | Age: 76
End: 2021-08-27

## 2021-08-27 LAB — POCT GLUCOSE: 78 MG/DL (ref 70–110)

## 2021-08-30 LAB
COMPN STONE: NORMAL
SPECIMEN SOURCE: NORMAL
STONE ANALYSIS IR-IMP: NORMAL

## 2021-08-31 ENCOUNTER — EXTERNAL HOME HEALTH (OUTPATIENT)
Dept: HOME HEALTH SERVICES | Facility: HOSPITAL | Age: 76
End: 2021-08-31
Payer: MEDICARE

## 2021-09-13 ENCOUNTER — HOSPITAL ENCOUNTER (OUTPATIENT)
Dept: RADIOLOGY | Facility: HOSPITAL | Age: 76
Discharge: HOME | End: 2021-09-13
Attending: UROLOGY
Payer: MEDICARE

## 2021-09-13 ENCOUNTER — CLINICAL SUPPORT (OUTPATIENT)
Dept: CARDIOLOGY | Facility: HOSPITAL | Age: 76
End: 2021-09-13
Attending: INTERNAL MEDICINE
Payer: MEDICARE

## 2021-09-13 ENCOUNTER — OFFICE VISIT (OUTPATIENT)
Dept: UROLOGY | Facility: CLINIC | Age: 76
End: 2021-09-13
Payer: MEDICARE

## 2021-09-13 VITALS
HEART RATE: 86 BPM | HEIGHT: 67 IN | BODY MASS INDEX: 31.15 KG/M2 | WEIGHT: 198.44 LBS | DIASTOLIC BLOOD PRESSURE: 68 MMHG | SYSTOLIC BLOOD PRESSURE: 112 MMHG

## 2021-09-13 DIAGNOSIS — N20.0 NEPHROLITHIASIS: ICD-10-CM

## 2021-09-13 DIAGNOSIS — N20.0 NEPHROLITHIASIS: Primary | ICD-10-CM

## 2021-09-13 DIAGNOSIS — I45.9 HEART BLOCK: ICD-10-CM

## 2021-09-13 PROBLEM — N39.0 UTI (URINARY TRACT INFECTION): Status: RESOLVED | Noted: 2021-08-05 | Resolved: 2021-09-13

## 2021-09-13 PROBLEM — N20.1 URETERAL STONE: Status: RESOLVED | Noted: 2021-06-29 | Resolved: 2021-09-13

## 2021-09-13 PROBLEM — N30.01 ACUTE CYSTITIS WITH HEMATURIA: Status: RESOLVED | Noted: 2021-08-05 | Resolved: 2021-09-13

## 2021-09-13 PROCEDURE — 1159F PR MEDICATION LIST DOCUMENTED IN MEDICAL RECORD: ICD-10-PCS | Mod: CPTII,S$GLB,, | Performed by: UROLOGY

## 2021-09-13 PROCEDURE — 93280 CARDIAC DEVICE CHECK - IN CLINIC & HOSPITAL: ICD-10-PCS | Mod: 26,,, | Performed by: INTERNAL MEDICINE

## 2021-09-13 PROCEDURE — 1160F PR REVIEW ALL MEDS BY PRESCRIBER/CLIN PHARMACIST DOCUMENTED: ICD-10-PCS | Mod: CPTII,S$GLB,, | Performed by: UROLOGY

## 2021-09-13 PROCEDURE — 3008F PR BODY MASS INDEX (BMI) DOCUMENTED: ICD-10-PCS | Mod: CPTII,S$GLB,, | Performed by: UROLOGY

## 2021-09-13 PROCEDURE — 74018 XR ABDOMEN AP 1 VIEW: ICD-10-PCS | Mod: 26,,, | Performed by: RADIOLOGY

## 2021-09-13 PROCEDURE — 99999 PR PBB SHADOW E&M-EST. PATIENT-LVL IV: CPT | Mod: PBBFAC,,, | Performed by: UROLOGY

## 2021-09-13 PROCEDURE — 3078F DIAST BP <80 MM HG: CPT | Mod: CPTII,S$GLB,, | Performed by: UROLOGY

## 2021-09-13 PROCEDURE — 1157F ADVNC CARE PLAN IN RCRD: CPT | Mod: CPTII,S$GLB,, | Performed by: UROLOGY

## 2021-09-13 PROCEDURE — 3044F HG A1C LEVEL LT 7.0%: CPT | Mod: CPTII,S$GLB,, | Performed by: UROLOGY

## 2021-09-13 PROCEDURE — 1126F PR PAIN SEVERITY QUANTIFIED, NO PAIN PRESENT: ICD-10-PCS | Mod: CPTII,S$GLB,, | Performed by: UROLOGY

## 2021-09-13 PROCEDURE — 3078F PR MOST RECENT DIASTOLIC BLOOD PRESSURE < 80 MM HG: ICD-10-PCS | Mod: CPTII,S$GLB,, | Performed by: UROLOGY

## 2021-09-13 PROCEDURE — 3074F PR MOST RECENT SYSTOLIC BLOOD PRESSURE < 130 MM HG: ICD-10-PCS | Mod: CPTII,S$GLB,, | Performed by: UROLOGY

## 2021-09-13 PROCEDURE — 1126F AMNT PAIN NOTED NONE PRSNT: CPT | Mod: CPTII,S$GLB,, | Performed by: UROLOGY

## 2021-09-13 PROCEDURE — 3044F PR MOST RECENT HEMOGLOBIN A1C LEVEL <7.0%: ICD-10-PCS | Mod: CPTII,S$GLB,, | Performed by: UROLOGY

## 2021-09-13 PROCEDURE — 99214 OFFICE O/P EST MOD 30 MIN: CPT | Mod: S$GLB,,, | Performed by: UROLOGY

## 2021-09-13 PROCEDURE — 74018 RADEX ABDOMEN 1 VIEW: CPT | Mod: TC

## 2021-09-13 PROCEDURE — 1157F PR ADVANCE CARE PLAN OR EQUIV PRESENT IN MEDICAL RECORD: ICD-10-PCS | Mod: CPTII,S$GLB,, | Performed by: UROLOGY

## 2021-09-13 PROCEDURE — 99214 PR OFFICE/OUTPT VISIT, EST, LEVL IV, 30-39 MIN: ICD-10-PCS | Mod: S$GLB,,, | Performed by: UROLOGY

## 2021-09-13 PROCEDURE — 1111F PR DISCHARGE MEDS RECONCILED W/ CURRENT OUTPATIENT MED LIST: ICD-10-PCS | Mod: CPTII,S$GLB,, | Performed by: UROLOGY

## 2021-09-13 PROCEDURE — 99999 PR PBB SHADOW E&M-EST. PATIENT-LVL IV: ICD-10-PCS | Mod: PBBFAC,,, | Performed by: UROLOGY

## 2021-09-13 PROCEDURE — 93280 PM DEVICE PROGR EVAL DUAL: CPT

## 2021-09-13 PROCEDURE — 1159F MED LIST DOCD IN RCRD: CPT | Mod: CPTII,S$GLB,, | Performed by: UROLOGY

## 2021-09-13 PROCEDURE — 93280 PM DEVICE PROGR EVAL DUAL: CPT | Mod: 26,,, | Performed by: INTERNAL MEDICINE

## 2021-09-13 PROCEDURE — 3074F SYST BP LT 130 MM HG: CPT | Mod: CPTII,S$GLB,, | Performed by: UROLOGY

## 2021-09-13 PROCEDURE — 3008F BODY MASS INDEX DOCD: CPT | Mod: CPTII,S$GLB,, | Performed by: UROLOGY

## 2021-09-13 PROCEDURE — 1111F DSCHRG MED/CURRENT MED MERGE: CPT | Mod: CPTII,S$GLB,, | Performed by: UROLOGY

## 2021-09-13 PROCEDURE — 74018 RADEX ABDOMEN 1 VIEW: CPT | Mod: 26,,, | Performed by: RADIOLOGY

## 2021-09-13 PROCEDURE — 1160F RVW MEDS BY RX/DR IN RCRD: CPT | Mod: CPTII,S$GLB,, | Performed by: UROLOGY

## 2021-09-17 ENCOUNTER — OFFICE VISIT (OUTPATIENT)
Dept: NEPHROLOGY | Facility: CLINIC | Age: 76
End: 2021-09-17
Payer: MEDICARE

## 2021-09-17 VITALS
SYSTOLIC BLOOD PRESSURE: 112 MMHG | OXYGEN SATURATION: 98 % | HEART RATE: 101 BPM | BODY MASS INDEX: 28.37 KG/M2 | WEIGHT: 180.75 LBS | HEIGHT: 67 IN | DIASTOLIC BLOOD PRESSURE: 62 MMHG

## 2021-09-17 DIAGNOSIS — N20.0 NEPHROLITHIASIS: Primary | ICD-10-CM

## 2021-09-17 DIAGNOSIS — N40.1 BPH WITH URINARY OBSTRUCTION: ICD-10-CM

## 2021-09-17 DIAGNOSIS — E55.9 VITAMIN D DEFICIENCY, UNSPECIFIED: ICD-10-CM

## 2021-09-17 DIAGNOSIS — N13.8 BPH WITH URINARY OBSTRUCTION: ICD-10-CM

## 2021-09-17 DIAGNOSIS — N13.30 BILATERAL HYDRONEPHROSIS: ICD-10-CM

## 2021-09-17 DIAGNOSIS — R33.9 URINARY RETENTION: ICD-10-CM

## 2021-09-17 PROCEDURE — 3288F FALL RISK ASSESSMENT DOCD: CPT | Mod: CPTII,S$GLB,, | Performed by: INTERNAL MEDICINE

## 2021-09-17 PROCEDURE — 1111F DSCHRG MED/CURRENT MED MERGE: CPT | Mod: CPTII,S$GLB,, | Performed by: INTERNAL MEDICINE

## 2021-09-17 PROCEDURE — 3066F NEPHROPATHY DOC TX: CPT | Mod: CPTII,S$GLB,, | Performed by: INTERNAL MEDICINE

## 2021-09-17 PROCEDURE — 3008F PR BODY MASS INDEX (BMI) DOCUMENTED: ICD-10-PCS | Mod: CPTII,S$GLB,, | Performed by: INTERNAL MEDICINE

## 2021-09-17 PROCEDURE — 1160F PR REVIEW ALL MEDS BY PRESCRIBER/CLIN PHARMACIST DOCUMENTED: ICD-10-PCS | Mod: CPTII,S$GLB,, | Performed by: INTERNAL MEDICINE

## 2021-09-17 PROCEDURE — 99204 PR OFFICE/OUTPT VISIT, NEW, LEVL IV, 45-59 MIN: ICD-10-PCS | Mod: S$GLB,,, | Performed by: INTERNAL MEDICINE

## 2021-09-17 PROCEDURE — 1160F RVW MEDS BY RX/DR IN RCRD: CPT | Mod: CPTII,S$GLB,, | Performed by: INTERNAL MEDICINE

## 2021-09-17 PROCEDURE — 3044F HG A1C LEVEL LT 7.0%: CPT | Mod: CPTII,S$GLB,, | Performed by: INTERNAL MEDICINE

## 2021-09-17 PROCEDURE — 3078F PR MOST RECENT DIASTOLIC BLOOD PRESSURE < 80 MM HG: ICD-10-PCS | Mod: CPTII,S$GLB,, | Performed by: INTERNAL MEDICINE

## 2021-09-17 PROCEDURE — 99204 OFFICE O/P NEW MOD 45 MIN: CPT | Mod: S$GLB,,, | Performed by: INTERNAL MEDICINE

## 2021-09-17 PROCEDURE — 1159F MED LIST DOCD IN RCRD: CPT | Mod: CPTII,S$GLB,, | Performed by: INTERNAL MEDICINE

## 2021-09-17 PROCEDURE — 1101F PR PT FALLS ASSESS DOC 0-1 FALLS W/OUT INJ PAST YR: ICD-10-PCS | Mod: CPTII,S$GLB,, | Performed by: INTERNAL MEDICINE

## 2021-09-17 PROCEDURE — 1111F PR DISCHARGE MEDS RECONCILED W/ CURRENT OUTPATIENT MED LIST: ICD-10-PCS | Mod: CPTII,S$GLB,, | Performed by: INTERNAL MEDICINE

## 2021-09-17 PROCEDURE — 3008F BODY MASS INDEX DOCD: CPT | Mod: CPTII,S$GLB,, | Performed by: INTERNAL MEDICINE

## 2021-09-17 PROCEDURE — 99999 PR PBB SHADOW E&M-EST. PATIENT-LVL IV: CPT | Mod: PBBFAC,,, | Performed by: INTERNAL MEDICINE

## 2021-09-17 PROCEDURE — 1159F PR MEDICATION LIST DOCUMENTED IN MEDICAL RECORD: ICD-10-PCS | Mod: CPTII,S$GLB,, | Performed by: INTERNAL MEDICINE

## 2021-09-17 PROCEDURE — 1125F PR PAIN SEVERITY QUANTIFIED, PAIN PRESENT: ICD-10-PCS | Mod: CPTII,S$GLB,, | Performed by: INTERNAL MEDICINE

## 2021-09-17 PROCEDURE — 3074F SYST BP LT 130 MM HG: CPT | Mod: CPTII,S$GLB,, | Performed by: INTERNAL MEDICINE

## 2021-09-17 PROCEDURE — 1157F PR ADVANCE CARE PLAN OR EQUIV PRESENT IN MEDICAL RECORD: ICD-10-PCS | Mod: CPTII,S$GLB,, | Performed by: INTERNAL MEDICINE

## 2021-09-17 PROCEDURE — 3078F DIAST BP <80 MM HG: CPT | Mod: CPTII,S$GLB,, | Performed by: INTERNAL MEDICINE

## 2021-09-17 PROCEDURE — 3288F PR FALLS RISK ASSESSMENT DOCUMENTED: ICD-10-PCS | Mod: CPTII,S$GLB,, | Performed by: INTERNAL MEDICINE

## 2021-09-17 PROCEDURE — 3066F PR DOCUMENTATION OF TREATMENT FOR NEPHROPATHY: ICD-10-PCS | Mod: CPTII,S$GLB,, | Performed by: INTERNAL MEDICINE

## 2021-09-17 PROCEDURE — 99999 PR PBB SHADOW E&M-EST. PATIENT-LVL IV: ICD-10-PCS | Mod: PBBFAC,,, | Performed by: INTERNAL MEDICINE

## 2021-09-17 PROCEDURE — 1101F PT FALLS ASSESS-DOCD LE1/YR: CPT | Mod: CPTII,S$GLB,, | Performed by: INTERNAL MEDICINE

## 2021-09-17 PROCEDURE — 1125F AMNT PAIN NOTED PAIN PRSNT: CPT | Mod: CPTII,S$GLB,, | Performed by: INTERNAL MEDICINE

## 2021-09-17 PROCEDURE — 1157F ADVNC CARE PLAN IN RCRD: CPT | Mod: CPTII,S$GLB,, | Performed by: INTERNAL MEDICINE

## 2021-09-17 PROCEDURE — 3074F PR MOST RECENT SYSTOLIC BLOOD PRESSURE < 130 MM HG: ICD-10-PCS | Mod: CPTII,S$GLB,, | Performed by: INTERNAL MEDICINE

## 2021-09-17 PROCEDURE — 3044F PR MOST RECENT HEMOGLOBIN A1C LEVEL <7.0%: ICD-10-PCS | Mod: CPTII,S$GLB,, | Performed by: INTERNAL MEDICINE

## 2021-09-24 ENCOUNTER — LAB VISIT (OUTPATIENT)
Dept: LAB | Facility: HOSPITAL | Age: 76
End: 2021-09-24
Attending: INTERNAL MEDICINE
Payer: MEDICARE

## 2021-09-24 DIAGNOSIS — N40.1 BPH WITH URINARY OBSTRUCTION: ICD-10-CM

## 2021-09-24 DIAGNOSIS — N20.0 NEPHROLITHIASIS: ICD-10-CM

## 2021-09-24 DIAGNOSIS — R33.9 URINARY RETENTION: ICD-10-CM

## 2021-09-24 DIAGNOSIS — N13.30 BILATERAL HYDRONEPHROSIS: ICD-10-CM

## 2021-09-24 DIAGNOSIS — N13.8 BPH WITH URINARY OBSTRUCTION: ICD-10-CM

## 2021-09-24 PROCEDURE — 84105 ASSAY OF URINE PHOSPHORUS: CPT | Performed by: INTERNAL MEDICINE

## 2021-09-24 PROCEDURE — 84392 ASSAY OF URINE SULFATE: CPT | Performed by: INTERNAL MEDICINE

## 2021-09-28 LAB
AMMONIA 24H UR-SRATE: NORMAL
CA H2 PHOS DIHYD 24H SATFR UR: NORMAL
CALCIUM 24H UR-MRATE: NORMAL MG/(24.H)
CAOX 24H ENGDIFF UR: NORMAL
CHLORIDE 24H UR-SRATE: NORMAL MMOL/(24.H)
CITRATE 24H UR-MRATE: NORMAL MG/(24.H)
CLINICAL BIOCHEMIST REVIEW: NORMAL
COLLECT DURATION TIME UR: NORMAL H
CREAT 24H UR-MRATE: NORMAL G/(24.H)
HYDROXYAPATITE 24H ENGDIFF UR: NORMAL
MAGNESIUM 24H UR-MRATE: NORMAL MG/(24.H)
NA URATE 24H SATFR UR: NORMAL
OSMOLALITY 24H UR: NORMAL MOSM/KG
OXALATE 24H UR-MRATE: NORMAL MG/(24.H)
OXALATE 24H UR-SRATE: NORMAL
PH 24H UR: NORMAL [PH]
PHOSPHATE 24H UR-MRATE: NORMAL MG/(24.H)
POTASSIUM 24H UR-SRATE: NORMAL MMOL/(24.H)
PROTEIN CATABOLIC RATE, URINE: NORMAL
SODIUM 24H UR-SRATE: NORMAL MMOL/(24.H)
SPECIMEN VOL 24H UR: NORMAL L
SULFATE 24H UR-SRATE: NORMAL
URATE 24H SATFR UR: NORMAL
URATE 24H UR-MRATE: NORMAL G/(24.H)
UUN 24H UR-MRATE: NORMAL G/(24.H)

## 2021-09-30 ENCOUNTER — DOCUMENT SCAN (OUTPATIENT)
Dept: HOME HEALTH SERVICES | Facility: HOSPITAL | Age: 76
End: 2021-09-30
Payer: MEDICARE

## 2021-10-06 PROCEDURE — G0179 PR HOME HEALTH MD RECERTIFICATION: ICD-10-PCS | Mod: ,,, | Performed by: UROLOGY

## 2021-10-06 PROCEDURE — G0179 MD RECERTIFICATION HHA PT: HCPCS | Mod: ,,, | Performed by: UROLOGY

## 2021-10-26 ENCOUNTER — EXTERNAL HOME HEALTH (OUTPATIENT)
Dept: HOME HEALTH SERVICES | Facility: HOSPITAL | Age: 76
End: 2021-10-26
Payer: MEDICARE

## 2021-11-19 ENCOUNTER — HOSPITAL ENCOUNTER (OUTPATIENT)
Facility: HOSPITAL | Age: 76
Discharge: HOME HEALTH | End: 2021-11-23
Attending: EMERGENCY MEDICINE | Admitting: INTERNAL MEDICINE
Payer: MEDICARE

## 2021-11-19 DIAGNOSIS — K92.2 GASTROINTESTINAL HEMORRHAGE, UNSPECIFIED GASTROINTESTINAL HEMORRHAGE TYPE: ICD-10-CM

## 2021-11-19 DIAGNOSIS — R07.9 CHEST PAIN: ICD-10-CM

## 2021-11-19 DIAGNOSIS — N13.30 BILATERAL HYDRONEPHROSIS: ICD-10-CM

## 2021-11-19 DIAGNOSIS — K92.2 GIB (GASTROINTESTINAL BLEEDING): ICD-10-CM

## 2021-11-19 DIAGNOSIS — E16.2 HYPOGLYCEMIA: ICD-10-CM

## 2021-11-19 DIAGNOSIS — N12 PYELONEPHRITIS: ICD-10-CM

## 2021-11-19 DIAGNOSIS — K62.5 RECTAL BLEEDING: ICD-10-CM

## 2021-11-19 DIAGNOSIS — K52.9 PROCTOCOLITIS: Primary | ICD-10-CM

## 2021-11-19 LAB
ABO + RH BLD: NORMAL
ALBUMIN SERPL BCP-MCNC: 2.3 G/DL (ref 3.5–5.2)
ALP SERPL-CCNC: 105 U/L (ref 55–135)
ALT SERPL W/O P-5'-P-CCNC: 24 U/L (ref 10–44)
ANION GAP SERPL CALC-SCNC: 10 MMOL/L (ref 8–16)
APTT BLDCRRT: 25.4 SEC (ref 21–32)
AST SERPL-CCNC: 42 U/L (ref 10–40)
BACTERIA #/AREA URNS AUTO: ABNORMAL /HPF
BASOPHILS # BLD AUTO: 0.01 K/UL (ref 0–0.2)
BASOPHILS # BLD AUTO: 0.02 K/UL (ref 0–0.2)
BASOPHILS NFR BLD: 0.1 % (ref 0–1.9)
BASOPHILS NFR BLD: 0.2 % (ref 0–1.9)
BILIRUB SERPL-MCNC: 0.8 MG/DL (ref 0.1–1)
BILIRUB UR QL STRIP: NEGATIVE
BLD GP AB SCN CELLS X3 SERPL QL: NORMAL
BUN SERPL-MCNC: 22 MG/DL (ref 8–23)
BUN SERPL-MCNC: 24 MG/DL (ref 6–30)
CALCIUM SERPL-MCNC: 8.9 MG/DL (ref 8.7–10.5)
CHLORIDE SERPL-SCNC: 105 MMOL/L (ref 95–110)
CHLORIDE SERPL-SCNC: 105 MMOL/L (ref 95–110)
CLARITY UR REFRACT.AUTO: ABNORMAL
CO2 SERPL-SCNC: 23 MMOL/L (ref 23–29)
COLOR UR AUTO: YELLOW
CREAT SERPL-MCNC: 1 MG/DL (ref 0.5–1.4)
CREAT SERPL-MCNC: 1 MG/DL (ref 0.5–1.4)
CTP QC/QA: YES
DIFFERENTIAL METHOD: ABNORMAL
DIFFERENTIAL METHOD: ABNORMAL
EOSINOPHIL # BLD AUTO: 0 K/UL (ref 0–0.5)
EOSINOPHIL # BLD AUTO: 0 K/UL (ref 0–0.5)
EOSINOPHIL NFR BLD: 0 % (ref 0–8)
EOSINOPHIL NFR BLD: 0.3 % (ref 0–8)
ERYTHROCYTE [DISTWIDTH] IN BLOOD BY AUTOMATED COUNT: 14.3 % (ref 11.5–14.5)
ERYTHROCYTE [DISTWIDTH] IN BLOOD BY AUTOMATED COUNT: 14.3 % (ref 11.5–14.5)
EST. GFR  (AFRICAN AMERICAN): >60 ML/MIN/1.73 M^2
EST. GFR  (NON AFRICAN AMERICAN): >60 ML/MIN/1.73 M^2
GLUCOSE SERPL-MCNC: 65 MG/DL (ref 70–110)
GLUCOSE SERPL-MCNC: 69 MG/DL (ref 70–110)
GLUCOSE UR QL STRIP: ABNORMAL
HCT VFR BLD AUTO: 32.5 % (ref 40–54)
HCT VFR BLD AUTO: 33 % (ref 40–54)
HCT VFR BLD CALC: 34 %PCV (ref 36–54)
HGB BLD-MCNC: 10.2 G/DL (ref 14–18)
HGB BLD-MCNC: 10.3 G/DL (ref 14–18)
HGB UR QL STRIP: ABNORMAL
HYALINE CASTS UR QL AUTO: 0 /LPF
IMM GRANULOCYTES # BLD AUTO: 0.03 K/UL (ref 0–0.04)
IMM GRANULOCYTES # BLD AUTO: 0.06 K/UL (ref 0–0.04)
IMM GRANULOCYTES NFR BLD AUTO: 0.3 % (ref 0–0.5)
IMM GRANULOCYTES NFR BLD AUTO: 0.5 % (ref 0–0.5)
INR PPP: 1.1 (ref 0.8–1.2)
KETONES UR QL STRIP: NEGATIVE
LACTATE SERPL-SCNC: 0.9 MMOL/L (ref 0.5–2.2)
LEUKOCYTE ESTERASE UR QL STRIP: ABNORMAL
LYMPHOCYTES # BLD AUTO: 0.9 K/UL (ref 1–4.8)
LYMPHOCYTES # BLD AUTO: 1.5 K/UL (ref 1–4.8)
LYMPHOCYTES NFR BLD: 14.4 % (ref 18–48)
LYMPHOCYTES NFR BLD: 7.1 % (ref 18–48)
MAGNESIUM SERPL-MCNC: 1.7 MG/DL (ref 1.6–2.6)
MCH RBC QN AUTO: 28.2 PG (ref 27–31)
MCH RBC QN AUTO: 28.8 PG (ref 27–31)
MCHC RBC AUTO-ENTMCNC: 30.9 G/DL (ref 32–36)
MCHC RBC AUTO-ENTMCNC: 31.7 G/DL (ref 32–36)
MCV RBC AUTO: 91 FL (ref 82–98)
MCV RBC AUTO: 91 FL (ref 82–98)
MICROSCOPIC COMMENT: ABNORMAL
MONOCYTES # BLD AUTO: 0.6 K/UL (ref 0.3–1)
MONOCYTES # BLD AUTO: 0.6 K/UL (ref 0.3–1)
MONOCYTES NFR BLD: 5.1 % (ref 4–15)
MONOCYTES NFR BLD: 6.1 % (ref 4–15)
NEUTROPHILS # BLD AUTO: 10.9 K/UL (ref 1.8–7.7)
NEUTROPHILS # BLD AUTO: 8 K/UL (ref 1.8–7.7)
NEUTROPHILS NFR BLD: 78.8 % (ref 38–73)
NEUTROPHILS NFR BLD: 87.1 % (ref 38–73)
NITRITE UR QL STRIP: NEGATIVE
NRBC BLD-RTO: 0 /100 WBC
NRBC BLD-RTO: 0 /100 WBC
PH UR STRIP: 5 [PH] (ref 5–8)
PLATELET # BLD AUTO: 208 K/UL (ref 150–450)
PLATELET # BLD AUTO: 225 K/UL (ref 150–450)
PMV BLD AUTO: 9.6 FL (ref 9.2–12.9)
PMV BLD AUTO: 9.8 FL (ref 9.2–12.9)
POC IONIZED CALCIUM: 1.14 MMOL/L (ref 1.06–1.42)
POC TCO2 (MEASURED): 25 MMOL/L (ref 23–29)
POCT GLUCOSE: 118 MG/DL (ref 70–110)
POCT GLUCOSE: 136 MG/DL (ref 70–110)
POCT GLUCOSE: 75 MG/DL (ref 70–110)
POTASSIUM BLD-SCNC: 3.5 MMOL/L (ref 3.5–5.1)
POTASSIUM SERPL-SCNC: 3.7 MMOL/L (ref 3.5–5.1)
PROT SERPL-MCNC: 6.3 G/DL (ref 6–8.4)
PROT UR QL STRIP: ABNORMAL
PROTHROMBIN TIME: 11.9 SEC (ref 9–12.5)
RBC # BLD AUTO: 3.58 M/UL (ref 4.6–6.2)
RBC # BLD AUTO: 3.62 M/UL (ref 4.6–6.2)
RBC #/AREA URNS AUTO: 3 /HPF (ref 0–4)
SAMPLE: ABNORMAL
SARS-COV-2 RDRP RESP QL NAA+PROBE: NEGATIVE
SODIUM BLD-SCNC: 141 MMOL/L (ref 136–145)
SODIUM SERPL-SCNC: 138 MMOL/L (ref 136–145)
SP GR UR STRIP: >=1.03 (ref 1–1.03)
SQUAMOUS #/AREA URNS AUTO: 0 /HPF
URN SPEC COLLECT METH UR: ABNORMAL
WBC # BLD AUTO: 10.16 K/UL (ref 3.9–12.7)
WBC # BLD AUTO: 12.48 K/UL (ref 3.9–12.7)
WBC #/AREA URNS AUTO: >100 /HPF (ref 0–5)

## 2021-11-19 PROCEDURE — 63600175 PHARM REV CODE 636 W HCPCS: Performed by: PHYSICIAN ASSISTANT

## 2021-11-19 PROCEDURE — 96374 THER/PROPH/DIAG INJ IV PUSH: CPT | Mod: 59

## 2021-11-19 PROCEDURE — 87186 SC STD MICRODIL/AGAR DIL: CPT | Performed by: PHYSICIAN ASSISTANT

## 2021-11-19 PROCEDURE — U0002 COVID-19 LAB TEST NON-CDC: HCPCS | Performed by: PHYSICIAN ASSISTANT

## 2021-11-19 PROCEDURE — 85025 COMPLETE CBC W/AUTO DIFF WBC: CPT | Mod: 91 | Performed by: PHYSICIAN ASSISTANT

## 2021-11-19 PROCEDURE — 87077 CULTURE AEROBIC IDENTIFY: CPT | Performed by: PHYSICIAN ASSISTANT

## 2021-11-19 PROCEDURE — 87088 URINE BACTERIA CULTURE: CPT | Performed by: PHYSICIAN ASSISTANT

## 2021-11-19 PROCEDURE — 96375 TX/PRO/DX INJ NEW DRUG ADDON: CPT | Mod: 59

## 2021-11-19 PROCEDURE — 25000003 PHARM REV CODE 250: Performed by: PHYSICIAN ASSISTANT

## 2021-11-19 PROCEDURE — 85610 PROTHROMBIN TIME: CPT | Performed by: PHYSICIAN ASSISTANT

## 2021-11-19 PROCEDURE — 99226 PR SUBSEQUENT OBSERVATION CARE,LEVEL III: ICD-10-PCS | Mod: ,,, | Performed by: PHYSICIAN ASSISTANT

## 2021-11-19 PROCEDURE — 96376 TX/PRO/DX INJ SAME DRUG ADON: CPT | Mod: 59

## 2021-11-19 PROCEDURE — 80053 COMPREHEN METABOLIC PANEL: CPT | Performed by: PHYSICIAN ASSISTANT

## 2021-11-19 PROCEDURE — G0378 HOSPITAL OBSERVATION PER HR: HCPCS

## 2021-11-19 PROCEDURE — 86900 BLOOD TYPING SEROLOGIC ABO: CPT | Performed by: PHYSICIAN ASSISTANT

## 2021-11-19 PROCEDURE — 85025 COMPLETE CBC W/AUTO DIFF WBC: CPT | Performed by: PHYSICIAN ASSISTANT

## 2021-11-19 PROCEDURE — 99285 PR EMERGENCY DEPT VISIT,LEVEL V: ICD-10-PCS | Mod: CS,,, | Performed by: EMERGENCY MEDICINE

## 2021-11-19 PROCEDURE — 36415 COLL VENOUS BLD VENIPUNCTURE: CPT | Performed by: PHYSICIAN ASSISTANT

## 2021-11-19 PROCEDURE — 83735 ASSAY OF MAGNESIUM: CPT | Performed by: PHYSICIAN ASSISTANT

## 2021-11-19 PROCEDURE — 87086 URINE CULTURE/COLONY COUNT: CPT | Performed by: PHYSICIAN ASSISTANT

## 2021-11-19 PROCEDURE — 25500020 PHARM REV CODE 255: Performed by: EMERGENCY MEDICINE

## 2021-11-19 PROCEDURE — 81001 URINALYSIS AUTO W/SCOPE: CPT | Performed by: PHYSICIAN ASSISTANT

## 2021-11-19 PROCEDURE — 87184 SC STD DISK METHOD PER PLATE: CPT | Performed by: PHYSICIAN ASSISTANT

## 2021-11-19 PROCEDURE — 82962 GLUCOSE BLOOD TEST: CPT

## 2021-11-19 PROCEDURE — 99226 PR SUBSEQUENT OBSERVATION CARE,LEVEL III: CPT | Mod: ,,, | Performed by: PHYSICIAN ASSISTANT

## 2021-11-19 PROCEDURE — 96361 HYDRATE IV INFUSION ADD-ON: CPT

## 2021-11-19 PROCEDURE — 80047 BASIC METABLC PNL IONIZED CA: CPT

## 2021-11-19 PROCEDURE — C9113 INJ PANTOPRAZOLE SODIUM, VIA: HCPCS | Performed by: PHYSICIAN ASSISTANT

## 2021-11-19 PROCEDURE — 83605 ASSAY OF LACTIC ACID: CPT | Performed by: PHYSICIAN ASSISTANT

## 2021-11-19 PROCEDURE — 99285 EMERGENCY DEPT VISIT HI MDM: CPT | Mod: CS,,, | Performed by: EMERGENCY MEDICINE

## 2021-11-19 PROCEDURE — 85730 THROMBOPLASTIN TIME PARTIAL: CPT | Performed by: PHYSICIAN ASSISTANT

## 2021-11-19 PROCEDURE — 99285 EMERGENCY DEPT VISIT HI MDM: CPT | Mod: 25

## 2021-11-19 PROCEDURE — 96375 TX/PRO/DX INJ NEW DRUG ADDON: CPT

## 2021-11-19 RX ORDER — GLUCAGON 1 MG
1 KIT INJECTION
Status: DISCONTINUED | OUTPATIENT
Start: 2021-11-19 | End: 2021-11-23 | Stop reason: HOSPADM

## 2021-11-19 RX ORDER — ACETAMINOPHEN 325 MG/1
650 TABLET ORAL EVERY 4 HOURS PRN
Status: DISCONTINUED | OUTPATIENT
Start: 2021-11-19 | End: 2021-11-23 | Stop reason: HOSPADM

## 2021-11-19 RX ORDER — IBUPROFEN 200 MG
24 TABLET ORAL
Status: DISCONTINUED | OUTPATIENT
Start: 2021-11-19 | End: 2021-11-23 | Stop reason: HOSPADM

## 2021-11-19 RX ORDER — NALOXONE HCL 0.4 MG/ML
0.02 VIAL (ML) INJECTION
Status: DISCONTINUED | OUTPATIENT
Start: 2021-11-19 | End: 2021-11-23 | Stop reason: HOSPADM

## 2021-11-19 RX ORDER — DEXTROSE 50 % IN WATER (D50W) INTRAVENOUS SYRINGE
25
Status: COMPLETED | OUTPATIENT
Start: 2021-11-19 | End: 2021-11-19

## 2021-11-19 RX ORDER — TAMSULOSIN HYDROCHLORIDE 0.4 MG/1
0.8 CAPSULE ORAL DAILY
Status: DISCONTINUED | OUTPATIENT
Start: 2021-11-20 | End: 2021-11-23 | Stop reason: HOSPADM

## 2021-11-19 RX ORDER — CYPROHEPTADINE HYDROCHLORIDE 4 MG/1
4 TABLET ORAL 3 TIMES DAILY
Status: DISCONTINUED | OUTPATIENT
Start: 2021-11-19 | End: 2021-11-23 | Stop reason: HOSPADM

## 2021-11-19 RX ORDER — CIPROFLOXACIN 2 MG/ML
400 INJECTION, SOLUTION INTRAVENOUS
Status: DISCONTINUED | OUTPATIENT
Start: 2021-11-19 | End: 2021-11-19

## 2021-11-19 RX ORDER — IBUPROFEN 200 MG
16 TABLET ORAL
Status: DISCONTINUED | OUTPATIENT
Start: 2021-11-19 | End: 2021-11-23 | Stop reason: HOSPADM

## 2021-11-19 RX ORDER — ACETAMINOPHEN 500 MG
1000 TABLET ORAL EVERY 8 HOURS PRN
Status: DISCONTINUED | OUTPATIENT
Start: 2021-11-19 | End: 2021-11-23 | Stop reason: HOSPADM

## 2021-11-19 RX ORDER — ESCITALOPRAM OXALATE 20 MG/1
20 TABLET ORAL DAILY
Status: DISCONTINUED | OUTPATIENT
Start: 2021-11-20 | End: 2021-11-23 | Stop reason: HOSPADM

## 2021-11-19 RX ORDER — IPRATROPIUM BROMIDE AND ALBUTEROL SULFATE 2.5; .5 MG/3ML; MG/3ML
3 SOLUTION RESPIRATORY (INHALATION) EVERY 4 HOURS PRN
Status: DISCONTINUED | OUTPATIENT
Start: 2021-11-19 | End: 2021-11-23 | Stop reason: HOSPADM

## 2021-11-19 RX ORDER — METRONIDAZOLE 500 MG/100ML
500 INJECTION, SOLUTION INTRAVENOUS
Status: DISCONTINUED | OUTPATIENT
Start: 2021-11-19 | End: 2021-11-19

## 2021-11-19 RX ORDER — FOLIC ACID 1 MG/1
1 TABLET ORAL DAILY
Status: DISCONTINUED | OUTPATIENT
Start: 2021-11-20 | End: 2021-11-23 | Stop reason: HOSPADM

## 2021-11-19 RX ORDER — MAG HYDROX/ALUMINUM HYD/SIMETH 200-200-20
30 SUSPENSION, ORAL (FINAL DOSE FORM) ORAL 4 TIMES DAILY PRN
Status: DISCONTINUED | OUTPATIENT
Start: 2021-11-19 | End: 2021-11-23 | Stop reason: HOSPADM

## 2021-11-19 RX ORDER — PROCHLORPERAZINE EDISYLATE 5 MG/ML
5 INJECTION INTRAMUSCULAR; INTRAVENOUS EVERY 6 HOURS PRN
Status: DISCONTINUED | OUTPATIENT
Start: 2021-11-19 | End: 2021-11-23 | Stop reason: HOSPADM

## 2021-11-19 RX ORDER — TALC
6 POWDER (GRAM) TOPICAL NIGHTLY PRN
Status: DISCONTINUED | OUTPATIENT
Start: 2021-11-19 | End: 2021-11-23 | Stop reason: HOSPADM

## 2021-11-19 RX ORDER — SIMETHICONE 80 MG
1 TABLET,CHEWABLE ORAL 4 TIMES DAILY PRN
Status: DISCONTINUED | OUTPATIENT
Start: 2021-11-19 | End: 2021-11-23 | Stop reason: HOSPADM

## 2021-11-19 RX ORDER — GABAPENTIN 300 MG/1
300 CAPSULE ORAL 3 TIMES DAILY
Status: DISCONTINUED | OUTPATIENT
Start: 2021-11-19 | End: 2021-11-23 | Stop reason: HOSPADM

## 2021-11-19 RX ORDER — INSULIN ASPART 100 [IU]/ML
0-5 INJECTION, SOLUTION INTRAVENOUS; SUBCUTANEOUS
Status: DISCONTINUED | OUTPATIENT
Start: 2021-11-19 | End: 2021-11-23 | Stop reason: HOSPADM

## 2021-11-19 RX ORDER — PANTOPRAZOLE SODIUM 40 MG/10ML
40 INJECTION, POWDER, LYOPHILIZED, FOR SOLUTION INTRAVENOUS 2 TIMES DAILY
Status: DISCONTINUED | OUTPATIENT
Start: 2021-11-19 | End: 2021-11-23 | Stop reason: HOSPADM

## 2021-11-19 RX ORDER — SODIUM CHLORIDE 0.9 % (FLUSH) 0.9 %
5 SYRINGE (ML) INJECTION
Status: DISCONTINUED | OUTPATIENT
Start: 2021-11-19 | End: 2021-11-23 | Stop reason: HOSPADM

## 2021-11-19 RX ORDER — AMOXICILLIN 250 MG
1 CAPSULE ORAL DAILY PRN
Status: DISCONTINUED | OUTPATIENT
Start: 2021-11-19 | End: 2021-11-23 | Stop reason: HOSPADM

## 2021-11-19 RX ORDER — ATORVASTATIN CALCIUM 40 MG/1
40 TABLET, FILM COATED ORAL DAILY
Status: DISCONTINUED | OUTPATIENT
Start: 2021-11-20 | End: 2021-11-23 | Stop reason: HOSPADM

## 2021-11-19 RX ORDER — BISACODYL 10 MG
10 SUPPOSITORY, RECTAL RECTAL DAILY PRN
Status: DISCONTINUED | OUTPATIENT
Start: 2021-11-19 | End: 2021-11-19

## 2021-11-19 RX ORDER — MIRTAZAPINE 30 MG/1
30 TABLET, FILM COATED ORAL NIGHTLY
Status: DISCONTINUED | OUTPATIENT
Start: 2021-11-19 | End: 2021-11-23 | Stop reason: HOSPADM

## 2021-11-19 RX ORDER — ONDANSETRON 8 MG/1
8 TABLET, ORALLY DISINTEGRATING ORAL EVERY 8 HOURS PRN
Status: DISCONTINUED | OUTPATIENT
Start: 2021-11-19 | End: 2021-11-23 | Stop reason: HOSPADM

## 2021-11-19 RX ADMIN — PIPERACILLIN SODIUM AND TAZOBACTAM SODIUM 4.5 G: 4; .5 INJECTION, POWDER, FOR SOLUTION INTRAVENOUS at 11:11

## 2021-11-19 RX ADMIN — IOHEXOL 100 ML: 350 INJECTION, SOLUTION INTRAVENOUS at 01:11

## 2021-11-19 RX ADMIN — CYPROHEPTADINE HYDROCHLORIDE 4 MG: 4 TABLET ORAL at 08:11

## 2021-11-19 RX ADMIN — SODIUM CHLORIDE 500 ML: 0.9 INJECTION, SOLUTION INTRAVENOUS at 12:11

## 2021-11-19 RX ADMIN — MIRTAZAPINE 30 MG: 30 TABLET, FILM COATED ORAL at 08:11

## 2021-11-19 RX ADMIN — PIPERACILLIN SODIUM AND TAZOBACTAM SODIUM 4.5 G: 4; .5 INJECTION, POWDER, FOR SOLUTION INTRAVENOUS at 04:11

## 2021-11-19 RX ADMIN — GABAPENTIN 300 MG: 300 CAPSULE ORAL at 08:11

## 2021-11-19 RX ADMIN — DEXTROSE MONOHYDRATE 25 G: 25 INJECTION, SOLUTION INTRAVENOUS at 12:11

## 2021-11-19 RX ADMIN — PANTOPRAZOLE SODIUM 40 MG: 40 INJECTION, POWDER, FOR SOLUTION INTRAVENOUS at 08:11

## 2021-11-20 PROBLEM — K51.311: Status: ACTIVE | Noted: 2021-11-20

## 2021-11-20 LAB
ANION GAP SERPL CALC-SCNC: 7 MMOL/L (ref 8–16)
BASOPHILS # BLD AUTO: 0.02 K/UL (ref 0–0.2)
BASOPHILS # BLD AUTO: 0.02 K/UL (ref 0–0.2)
BASOPHILS NFR BLD: 0.2 % (ref 0–1.9)
BASOPHILS NFR BLD: 0.2 % (ref 0–1.9)
BUN SERPL-MCNC: 15 MG/DL (ref 8–23)
CALCIUM SERPL-MCNC: 8.2 MG/DL (ref 8.7–10.5)
CHLORIDE SERPL-SCNC: 108 MMOL/L (ref 95–110)
CO2 SERPL-SCNC: 26 MMOL/L (ref 23–29)
CREAT SERPL-MCNC: 0.9 MG/DL (ref 0.5–1.4)
DIFFERENTIAL METHOD: ABNORMAL
DIFFERENTIAL METHOD: ABNORMAL
EOSINOPHIL # BLD AUTO: 0.1 K/UL (ref 0–0.5)
EOSINOPHIL # BLD AUTO: 0.1 K/UL (ref 0–0.5)
EOSINOPHIL NFR BLD: 0.8 % (ref 0–8)
EOSINOPHIL NFR BLD: 1.2 % (ref 0–8)
ERYTHROCYTE [DISTWIDTH] IN BLOOD BY AUTOMATED COUNT: 14.1 % (ref 11.5–14.5)
ERYTHROCYTE [DISTWIDTH] IN BLOOD BY AUTOMATED COUNT: 14.3 % (ref 11.5–14.5)
EST. GFR  (AFRICAN AMERICAN): >60 ML/MIN/1.73 M^2
EST. GFR  (NON AFRICAN AMERICAN): >60 ML/MIN/1.73 M^2
ESTIMATED AVG GLUCOSE: 88 MG/DL (ref 68–131)
GLUCOSE SERPL-MCNC: 67 MG/DL (ref 70–110)
HBA1C MFR BLD: 4.7 % (ref 4–5.6)
HCT VFR BLD AUTO: 30.8 % (ref 40–54)
HCT VFR BLD AUTO: 31 % (ref 40–54)
HGB BLD-MCNC: 9.2 G/DL (ref 14–18)
HGB BLD-MCNC: 9.4 G/DL (ref 14–18)
IMM GRANULOCYTES # BLD AUTO: 0.03 K/UL (ref 0–0.04)
IMM GRANULOCYTES # BLD AUTO: 0.03 K/UL (ref 0–0.04)
IMM GRANULOCYTES NFR BLD AUTO: 0.3 % (ref 0–0.5)
IMM GRANULOCYTES NFR BLD AUTO: 0.4 % (ref 0–0.5)
LYMPHOCYTES # BLD AUTO: 0.9 K/UL (ref 1–4.8)
LYMPHOCYTES # BLD AUTO: 1.4 K/UL (ref 1–4.8)
LYMPHOCYTES NFR BLD: 10.5 % (ref 18–48)
LYMPHOCYTES NFR BLD: 16.2 % (ref 18–48)
MAGNESIUM SERPL-MCNC: 1.6 MG/DL (ref 1.6–2.6)
MCH RBC QN AUTO: 27.5 PG (ref 27–31)
MCH RBC QN AUTO: 28.1 PG (ref 27–31)
MCHC RBC AUTO-ENTMCNC: 29.9 G/DL (ref 32–36)
MCHC RBC AUTO-ENTMCNC: 30.3 G/DL (ref 32–36)
MCV RBC AUTO: 92 FL (ref 82–98)
MCV RBC AUTO: 93 FL (ref 82–98)
MONOCYTES # BLD AUTO: 0.6 K/UL (ref 0.3–1)
MONOCYTES # BLD AUTO: 0.7 K/UL (ref 0.3–1)
MONOCYTES NFR BLD: 6.5 % (ref 4–15)
MONOCYTES NFR BLD: 8.2 % (ref 4–15)
NEUTROPHILS # BLD AUTO: 6.2 K/UL (ref 1.8–7.7)
NEUTROPHILS # BLD AUTO: 7.3 K/UL (ref 1.8–7.7)
NEUTROPHILS NFR BLD: 74.2 % (ref 38–73)
NEUTROPHILS NFR BLD: 81.3 % (ref 38–73)
NRBC BLD-RTO: 0 /100 WBC
NRBC BLD-RTO: 0 /100 WBC
PHOSPHATE SERPL-MCNC: 3.2 MG/DL (ref 2.7–4.5)
PLATELET # BLD AUTO: 189 K/UL (ref 150–450)
PLATELET # BLD AUTO: 205 K/UL (ref 150–450)
PMV BLD AUTO: 9.8 FL (ref 9.2–12.9)
PMV BLD AUTO: 9.9 FL (ref 9.2–12.9)
POCT GLUCOSE: 78 MG/DL (ref 70–110)
POCT GLUCOSE: 80 MG/DL (ref 70–110)
POCT GLUCOSE: 99 MG/DL (ref 70–110)
POTASSIUM SERPL-SCNC: 2.9 MMOL/L (ref 3.5–5.1)
RBC # BLD AUTO: 3.34 M/UL (ref 4.6–6.2)
RBC # BLD AUTO: 3.35 M/UL (ref 4.6–6.2)
SODIUM SERPL-SCNC: 141 MMOL/L (ref 136–145)
WBC # BLD AUTO: 8.31 K/UL (ref 3.9–12.7)
WBC # BLD AUTO: 8.97 K/UL (ref 3.9–12.7)

## 2021-11-20 PROCEDURE — 87425 ROTAVIRUS AG IA: CPT | Performed by: PHYSICIAN ASSISTANT

## 2021-11-20 PROCEDURE — 63600175 PHARM REV CODE 636 W HCPCS: Performed by: PHYSICIAN ASSISTANT

## 2021-11-20 PROCEDURE — 96374 THER/PROPH/DIAG INJ IV PUSH: CPT | Mod: 59

## 2021-11-20 PROCEDURE — 87329 GIARDIA AG IA: CPT | Performed by: PHYSICIAN ASSISTANT

## 2021-11-20 PROCEDURE — 96375 TX/PRO/DX INJ NEW DRUG ADDON: CPT

## 2021-11-20 PROCEDURE — 94761 N-INVAS EAR/PLS OXIMETRY MLT: CPT

## 2021-11-20 PROCEDURE — 99205 PR OFFICE/OUTPT VISIT, NEW, LEVL V, 60-74 MIN: ICD-10-PCS | Mod: GC,,, | Performed by: STUDENT IN AN ORGANIZED HEALTH CARE EDUCATION/TRAINING PROGRAM

## 2021-11-20 PROCEDURE — 80048 BASIC METABOLIC PNL TOTAL CA: CPT | Performed by: PHYSICIAN ASSISTANT

## 2021-11-20 PROCEDURE — 99900035 HC TECH TIME PER 15 MIN (STAT)

## 2021-11-20 PROCEDURE — G0378 HOSPITAL OBSERVATION PER HR: HCPCS

## 2021-11-20 PROCEDURE — 85025 COMPLETE CBC W/AUTO DIFF WBC: CPT | Mod: 91 | Performed by: PHYSICIAN ASSISTANT

## 2021-11-20 PROCEDURE — 83735 ASSAY OF MAGNESIUM: CPT | Performed by: PHYSICIAN ASSISTANT

## 2021-11-20 PROCEDURE — 87338 HPYLORI STOOL AG IA: CPT | Performed by: PHYSICIAN ASSISTANT

## 2021-11-20 PROCEDURE — 84100 ASSAY OF PHOSPHORUS: CPT | Performed by: PHYSICIAN ASSISTANT

## 2021-11-20 PROCEDURE — 99226 PR SUBSEQUENT OBSERVATION CARE,LEVEL III: CPT | Mod: ,,, | Performed by: PHYSICIAN ASSISTANT

## 2021-11-20 PROCEDURE — 82705 FATS/LIPIDS FECES QUAL: CPT | Performed by: PHYSICIAN ASSISTANT

## 2021-11-20 PROCEDURE — 82272 OCCULT BLD FECES 1-3 TESTS: CPT | Performed by: PHYSICIAN ASSISTANT

## 2021-11-20 PROCEDURE — 87209 SMEAR COMPLEX STAIN: CPT | Performed by: PHYSICIAN ASSISTANT

## 2021-11-20 PROCEDURE — 25000003 PHARM REV CODE 250: Performed by: PHYSICIAN ASSISTANT

## 2021-11-20 PROCEDURE — 82656 EL-1 FECAL QUAL/SEMIQ: CPT | Performed by: PHYSICIAN ASSISTANT

## 2021-11-20 PROCEDURE — 89055 LEUKOCYTE ASSESSMENT FECAL: CPT | Performed by: PHYSICIAN ASSISTANT

## 2021-11-20 PROCEDURE — 99205 OFFICE O/P NEW HI 60 MIN: CPT | Mod: GC,,, | Performed by: STUDENT IN AN ORGANIZED HEALTH CARE EDUCATION/TRAINING PROGRAM

## 2021-11-20 PROCEDURE — 87177 OVA AND PARASITES SMEARS: CPT | Performed by: PHYSICIAN ASSISTANT

## 2021-11-20 PROCEDURE — 83036 HEMOGLOBIN GLYCOSYLATED A1C: CPT | Performed by: PHYSICIAN ASSISTANT

## 2021-11-20 PROCEDURE — C9113 INJ PANTOPRAZOLE SODIUM, VIA: HCPCS | Performed by: PHYSICIAN ASSISTANT

## 2021-11-20 PROCEDURE — 83993 ASSAY FOR CALPROTECTIN FECAL: CPT | Performed by: PHYSICIAN ASSISTANT

## 2021-11-20 PROCEDURE — 99226 PR SUBSEQUENT OBSERVATION CARE,LEVEL III: ICD-10-PCS | Mod: ,,, | Performed by: PHYSICIAN ASSISTANT

## 2021-11-20 PROCEDURE — 36415 COLL VENOUS BLD VENIPUNCTURE: CPT | Performed by: PHYSICIAN ASSISTANT

## 2021-11-20 RX ORDER — POTASSIUM CHLORIDE 20 MEQ/1
40 TABLET, EXTENDED RELEASE ORAL EVERY 4 HOURS
Status: COMPLETED | OUTPATIENT
Start: 2021-11-20 | End: 2021-11-20

## 2021-11-20 RX ADMIN — GABAPENTIN 300 MG: 300 CAPSULE ORAL at 09:11

## 2021-11-20 RX ADMIN — GABAPENTIN 300 MG: 300 CAPSULE ORAL at 08:11

## 2021-11-20 RX ADMIN — POTASSIUM CHLORIDE 40 MEQ: 1500 TABLET, EXTENDED RELEASE ORAL at 03:11

## 2021-11-20 RX ADMIN — ATORVASTATIN CALCIUM 40 MG: 40 TABLET, FILM COATED ORAL at 09:11

## 2021-11-20 RX ADMIN — PANTOPRAZOLE SODIUM 40 MG: 40 INJECTION, POWDER, FOR SOLUTION INTRAVENOUS at 09:11

## 2021-11-20 RX ADMIN — ESCITALOPRAM OXALATE 20 MG: 20 TABLET ORAL at 09:11

## 2021-11-20 RX ADMIN — PIPERACILLIN SODIUM AND TAZOBACTAM SODIUM 4.5 G: 4; .5 INJECTION, POWDER, FOR SOLUTION INTRAVENOUS at 06:11

## 2021-11-20 RX ADMIN — CYPROHEPTADINE HYDROCHLORIDE 4 MG: 4 TABLET ORAL at 03:11

## 2021-11-20 RX ADMIN — POTASSIUM CHLORIDE 40 MEQ: 1500 TABLET, EXTENDED RELEASE ORAL at 06:11

## 2021-11-20 RX ADMIN — FOLIC ACID 1 MG: 1 TABLET ORAL at 09:11

## 2021-11-20 RX ADMIN — MIRTAZAPINE 30 MG: 30 TABLET, FILM COATED ORAL at 08:11

## 2021-11-20 RX ADMIN — CYPROHEPTADINE HYDROCHLORIDE 4 MG: 4 TABLET ORAL at 08:11

## 2021-11-20 RX ADMIN — PIPERACILLIN SODIUM AND TAZOBACTAM SODIUM 4.5 G: 4; .5 INJECTION, POWDER, FOR SOLUTION INTRAVENOUS at 03:11

## 2021-11-20 RX ADMIN — TAMSULOSIN HYDROCHLORIDE 0.8 MG: 0.4 CAPSULE ORAL at 09:11

## 2021-11-20 RX ADMIN — CYPROHEPTADINE HYDROCHLORIDE 4 MG: 4 TABLET ORAL at 09:11

## 2021-11-20 RX ADMIN — GABAPENTIN 300 MG: 300 CAPSULE ORAL at 03:11

## 2021-11-20 RX ADMIN — PANTOPRAZOLE SODIUM 40 MG: 40 INJECTION, POWDER, FOR SOLUTION INTRAVENOUS at 08:11

## 2021-11-21 LAB
ANION GAP SERPL CALC-SCNC: 7 MMOL/L (ref 8–16)
BASOPHILS # BLD AUTO: 0.03 K/UL (ref 0–0.2)
BASOPHILS # BLD AUTO: 0.04 K/UL (ref 0–0.2)
BASOPHILS NFR BLD: 0.4 % (ref 0–1.9)
BASOPHILS NFR BLD: 0.6 % (ref 0–1.9)
BUN SERPL-MCNC: 10 MG/DL (ref 8–23)
C DIFF GDH STL QL: NEGATIVE
C DIFF TOX A+B STL QL IA: NEGATIVE
CALCIUM SERPL-MCNC: 8.1 MG/DL (ref 8.7–10.5)
CHLORIDE SERPL-SCNC: 114 MMOL/L (ref 95–110)
CO2 SERPL-SCNC: 23 MMOL/L (ref 23–29)
CREAT SERPL-MCNC: 0.8 MG/DL (ref 0.5–1.4)
DIFFERENTIAL METHOD: ABNORMAL
DIFFERENTIAL METHOD: ABNORMAL
EOSINOPHIL # BLD AUTO: 0.1 K/UL (ref 0–0.5)
EOSINOPHIL # BLD AUTO: 0.1 K/UL (ref 0–0.5)
EOSINOPHIL NFR BLD: 1.6 % (ref 0–8)
EOSINOPHIL NFR BLD: 1.8 % (ref 0–8)
ERYTHROCYTE [DISTWIDTH] IN BLOOD BY AUTOMATED COUNT: 14.2 % (ref 11.5–14.5)
ERYTHROCYTE [DISTWIDTH] IN BLOOD BY AUTOMATED COUNT: 14.3 % (ref 11.5–14.5)
EST. GFR  (AFRICAN AMERICAN): >60 ML/MIN/1.73 M^2
EST. GFR  (NON AFRICAN AMERICAN): >60 ML/MIN/1.73 M^2
GLUCOSE SERPL-MCNC: 76 MG/DL (ref 70–110)
HCT VFR BLD AUTO: 33.1 % (ref 40–54)
HCT VFR BLD AUTO: 37.3 % (ref 40–54)
HGB BLD-MCNC: 10 G/DL (ref 14–18)
HGB BLD-MCNC: 10.7 G/DL (ref 14–18)
IMM GRANULOCYTES # BLD AUTO: 0.02 K/UL (ref 0–0.04)
IMM GRANULOCYTES # BLD AUTO: 0.03 K/UL (ref 0–0.04)
IMM GRANULOCYTES NFR BLD AUTO: 0.3 % (ref 0–0.5)
IMM GRANULOCYTES NFR BLD AUTO: 0.4 % (ref 0–0.5)
LYMPHOCYTES # BLD AUTO: 1.3 K/UL (ref 1–4.8)
LYMPHOCYTES # BLD AUTO: 1.7 K/UL (ref 1–4.8)
LYMPHOCYTES NFR BLD: 16.7 % (ref 18–48)
LYMPHOCYTES NFR BLD: 23 % (ref 18–48)
MAGNESIUM SERPL-MCNC: 1.6 MG/DL (ref 1.6–2.6)
MCH RBC QN AUTO: 27.7 PG (ref 27–31)
MCH RBC QN AUTO: 28.6 PG (ref 27–31)
MCHC RBC AUTO-ENTMCNC: 28.7 G/DL (ref 32–36)
MCHC RBC AUTO-ENTMCNC: 30.2 G/DL (ref 32–36)
MCV RBC AUTO: 100 FL (ref 82–98)
MCV RBC AUTO: 92 FL (ref 82–98)
MONOCYTES # BLD AUTO: 0.5 K/UL (ref 0.3–1)
MONOCYTES # BLD AUTO: 0.6 K/UL (ref 0.3–1)
MONOCYTES NFR BLD: 6 % (ref 4–15)
MONOCYTES NFR BLD: 7.8 % (ref 4–15)
NEUTROPHILS # BLD AUTO: 4.8 K/UL (ref 1.8–7.7)
NEUTROPHILS # BLD AUTO: 5.8 K/UL (ref 1.8–7.7)
NEUTROPHILS NFR BLD: 66.5 % (ref 38–73)
NEUTROPHILS NFR BLD: 74.9 % (ref 38–73)
NRBC BLD-RTO: 0 /100 WBC
NRBC BLD-RTO: 0 /100 WBC
OB PNL STL: POSITIVE
PHOSPHATE SERPL-MCNC: 2.8 MG/DL (ref 2.7–4.5)
PLATELET # BLD AUTO: 196 K/UL (ref 150–450)
PLATELET # BLD AUTO: 198 K/UL (ref 150–450)
PMV BLD AUTO: 9.7 FL (ref 9.2–12.9)
PMV BLD AUTO: 9.8 FL (ref 9.2–12.9)
POCT GLUCOSE: 103 MG/DL (ref 70–110)
POCT GLUCOSE: 115 MG/DL (ref 70–110)
POCT GLUCOSE: 122 MG/DL (ref 70–110)
POCT GLUCOSE: 71 MG/DL (ref 70–110)
POTASSIUM SERPL-SCNC: 3.5 MMOL/L (ref 3.5–5.1)
RBC # BLD AUTO: 3.61 M/UL (ref 4.6–6.2)
RBC # BLD AUTO: 3.74 M/UL (ref 4.6–6.2)
SODIUM SERPL-SCNC: 144 MMOL/L (ref 136–145)
WBC # BLD AUTO: 7.17 K/UL (ref 3.9–12.7)
WBC # BLD AUTO: 7.67 K/UL (ref 3.9–12.7)
WBC #/AREA STL HPF: ABNORMAL /[HPF]

## 2021-11-21 PROCEDURE — 84100 ASSAY OF PHOSPHORUS: CPT | Performed by: PHYSICIAN ASSISTANT

## 2021-11-21 PROCEDURE — 96375 TX/PRO/DX INJ NEW DRUG ADDON: CPT

## 2021-11-21 PROCEDURE — 25000003 PHARM REV CODE 250: Performed by: STUDENT IN AN ORGANIZED HEALTH CARE EDUCATION/TRAINING PROGRAM

## 2021-11-21 PROCEDURE — 99226 PR SUBSEQUENT OBSERVATION CARE,LEVEL III: CPT | Mod: ,,, | Performed by: PHYSICIAN ASSISTANT

## 2021-11-21 PROCEDURE — 87427 SHIGA-LIKE TOXIN AG IA: CPT | Performed by: PHYSICIAN ASSISTANT

## 2021-11-21 PROCEDURE — 87045 FECES CULTURE AEROBIC BACT: CPT | Performed by: PHYSICIAN ASSISTANT

## 2021-11-21 PROCEDURE — 83735 ASSAY OF MAGNESIUM: CPT | Performed by: PHYSICIAN ASSISTANT

## 2021-11-21 PROCEDURE — G0378 HOSPITAL OBSERVATION PER HR: HCPCS

## 2021-11-21 PROCEDURE — 80048 BASIC METABOLIC PNL TOTAL CA: CPT | Performed by: PHYSICIAN ASSISTANT

## 2021-11-21 PROCEDURE — 96376 TX/PRO/DX INJ SAME DRUG ADON: CPT

## 2021-11-21 PROCEDURE — 25000003 PHARM REV CODE 250: Performed by: PHYSICIAN ASSISTANT

## 2021-11-21 PROCEDURE — 63600175 PHARM REV CODE 636 W HCPCS: Performed by: PHYSICIAN ASSISTANT

## 2021-11-21 PROCEDURE — 99226 PR SUBSEQUENT OBSERVATION CARE,LEVEL III: ICD-10-PCS | Mod: ,,, | Performed by: PHYSICIAN ASSISTANT

## 2021-11-21 PROCEDURE — 36415 COLL VENOUS BLD VENIPUNCTURE: CPT | Performed by: PHYSICIAN ASSISTANT

## 2021-11-21 PROCEDURE — 87324 CLOSTRIDIUM AG IA: CPT | Performed by: PHYSICIAN ASSISTANT

## 2021-11-21 PROCEDURE — 87449 NOS EACH ORGANISM AG IA: CPT | Performed by: PHYSICIAN ASSISTANT

## 2021-11-21 PROCEDURE — 99214 PR OFFICE/OUTPT VISIT, EST, LEVL IV, 30-39 MIN: ICD-10-PCS | Mod: ,,, | Performed by: PHYSICIAN ASSISTANT

## 2021-11-21 PROCEDURE — C9113 INJ PANTOPRAZOLE SODIUM, VIA: HCPCS | Performed by: PHYSICIAN ASSISTANT

## 2021-11-21 PROCEDURE — 87046 STOOL CULTR AEROBIC BACT EA: CPT | Performed by: PHYSICIAN ASSISTANT

## 2021-11-21 PROCEDURE — 85025 COMPLETE CBC W/AUTO DIFF WBC: CPT | Mod: 91 | Performed by: PHYSICIAN ASSISTANT

## 2021-11-21 PROCEDURE — 99214 OFFICE O/P EST MOD 30 MIN: CPT | Mod: ,,, | Performed by: PHYSICIAN ASSISTANT

## 2021-11-21 RX ORDER — POLYETHYLENE GLYCOL 3350, SODIUM SULFATE ANHYDROUS, SODIUM BICARBONATE, SODIUM CHLORIDE, POTASSIUM CHLORIDE 236; 22.74; 6.74; 5.86; 2.97 G/4L; G/4L; G/4L; G/4L; G/4L
4000 POWDER, FOR SOLUTION ORAL ONCE
Status: COMPLETED | OUTPATIENT
Start: 2021-11-21 | End: 2021-11-21

## 2021-11-21 RX ADMIN — PIPERACILLIN SODIUM AND TAZOBACTAM SODIUM 4.5 G: 4; .5 INJECTION, POWDER, FOR SOLUTION INTRAVENOUS at 12:11

## 2021-11-21 RX ADMIN — TAMSULOSIN HYDROCHLORIDE 0.8 MG: 0.4 CAPSULE ORAL at 09:11

## 2021-11-21 RX ADMIN — CYPROHEPTADINE HYDROCHLORIDE 4 MG: 4 TABLET ORAL at 09:11

## 2021-11-21 RX ADMIN — GABAPENTIN 300 MG: 300 CAPSULE ORAL at 09:11

## 2021-11-21 RX ADMIN — MIRTAZAPINE 30 MG: 30 TABLET, FILM COATED ORAL at 09:11

## 2021-11-21 RX ADMIN — ATORVASTATIN CALCIUM 40 MG: 40 TABLET, FILM COATED ORAL at 09:11

## 2021-11-21 RX ADMIN — FOLIC ACID 1 MG: 1 TABLET ORAL at 09:11

## 2021-11-21 RX ADMIN — ERTAPENEM 1 G: 1 INJECTION INTRAMUSCULAR; INTRAVENOUS at 03:11

## 2021-11-21 RX ADMIN — PANTOPRAZOLE SODIUM 40 MG: 40 INJECTION, POWDER, FOR SOLUTION INTRAVENOUS at 09:11

## 2021-11-21 RX ADMIN — PIPERACILLIN SODIUM AND TAZOBACTAM SODIUM 4.5 G: 4; .5 INJECTION, POWDER, FOR SOLUTION INTRAVENOUS at 06:11

## 2021-11-21 RX ADMIN — ESCITALOPRAM OXALATE 20 MG: 20 TABLET ORAL at 09:11

## 2021-11-21 RX ADMIN — POLYETHYLENE GLYCOL 3350, SODIUM SULFATE ANHYDROUS, SODIUM BICARBONATE, SODIUM CHLORIDE, POTASSIUM CHLORIDE 4000 ML: 236; 22.74; 6.74; 5.86; 2.97 POWDER, FOR SOLUTION ORAL at 06:11

## 2021-11-21 RX ADMIN — CYPROHEPTADINE HYDROCHLORIDE 4 MG: 4 TABLET ORAL at 04:11

## 2021-11-21 RX ADMIN — GABAPENTIN 300 MG: 300 CAPSULE ORAL at 03:11

## 2021-11-22 ENCOUNTER — ANESTHESIA (OUTPATIENT)
Dept: ENDOSCOPY | Facility: HOSPITAL | Age: 76
End: 2021-11-22
Payer: MEDICARE

## 2021-11-22 ENCOUNTER — ANESTHESIA EVENT (OUTPATIENT)
Dept: ENDOSCOPY | Facility: HOSPITAL | Age: 76
End: 2021-11-22
Payer: MEDICARE

## 2021-11-22 LAB
ANION GAP SERPL CALC-SCNC: 9 MMOL/L (ref 8–16)
BASOPHILS # BLD AUTO: 0.03 K/UL (ref 0–0.2)
BASOPHILS # BLD AUTO: 0.04 K/UL (ref 0–0.2)
BASOPHILS NFR BLD: 0.5 % (ref 0–1.9)
BASOPHILS NFR BLD: 0.6 % (ref 0–1.9)
BUN SERPL-MCNC: 7 MG/DL (ref 8–23)
CALCIUM SERPL-MCNC: 7.5 MG/DL (ref 8.7–10.5)
CHLORIDE SERPL-SCNC: 111 MMOL/L (ref 95–110)
CO2 SERPL-SCNC: 24 MMOL/L (ref 23–29)
CREAT SERPL-MCNC: 0.8 MG/DL (ref 0.5–1.4)
CRYPTOSP AG STL QL IA: NEGATIVE
DIFFERENTIAL METHOD: ABNORMAL
DIFFERENTIAL METHOD: ABNORMAL
E COLI SXT1 STL QL IA: NEGATIVE
E COLI SXT2 STL QL IA: NEGATIVE
EOSINOPHIL # BLD AUTO: 0.1 K/UL (ref 0–0.5)
EOSINOPHIL # BLD AUTO: 0.1 K/UL (ref 0–0.5)
EOSINOPHIL NFR BLD: 1.4 % (ref 0–8)
EOSINOPHIL NFR BLD: 1.5 % (ref 0–8)
ERYTHROCYTE [DISTWIDTH] IN BLOOD BY AUTOMATED COUNT: 14.2 % (ref 11.5–14.5)
ERYTHROCYTE [DISTWIDTH] IN BLOOD BY AUTOMATED COUNT: 14.4 % (ref 11.5–14.5)
EST. GFR  (AFRICAN AMERICAN): >60 ML/MIN/1.73 M^2
EST. GFR  (NON AFRICAN AMERICAN): >60 ML/MIN/1.73 M^2
G LAMBLIA AG STL QL IA: NEGATIVE
GLUCOSE SERPL-MCNC: 70 MG/DL (ref 70–110)
HCT VFR BLD AUTO: 30.7 % (ref 40–54)
HCT VFR BLD AUTO: 34.4 % (ref 40–54)
HGB BLD-MCNC: 10.4 G/DL (ref 14–18)
HGB BLD-MCNC: 9.6 G/DL (ref 14–18)
IMM GRANULOCYTES # BLD AUTO: 0.02 K/UL (ref 0–0.04)
IMM GRANULOCYTES # BLD AUTO: 0.03 K/UL (ref 0–0.04)
IMM GRANULOCYTES NFR BLD AUTO: 0.3 % (ref 0–0.5)
IMM GRANULOCYTES NFR BLD AUTO: 0.5 % (ref 0–0.5)
LYMPHOCYTES # BLD AUTO: 1.2 K/UL (ref 1–4.8)
LYMPHOCYTES # BLD AUTO: 1.4 K/UL (ref 1–4.8)
LYMPHOCYTES NFR BLD: 19.3 % (ref 18–48)
LYMPHOCYTES NFR BLD: 19.3 % (ref 18–48)
MAGNESIUM SERPL-MCNC: 1.5 MG/DL (ref 1.6–2.6)
MCH RBC QN AUTO: 27.6 PG (ref 27–31)
MCH RBC QN AUTO: 27.7 PG (ref 27–31)
MCHC RBC AUTO-ENTMCNC: 30.2 G/DL (ref 32–36)
MCHC RBC AUTO-ENTMCNC: 31.3 G/DL (ref 32–36)
MCV RBC AUTO: 88 FL (ref 82–98)
MCV RBC AUTO: 92 FL (ref 82–98)
MONOCYTES # BLD AUTO: 0.5 K/UL (ref 0.3–1)
MONOCYTES # BLD AUTO: 0.5 K/UL (ref 0.3–1)
MONOCYTES NFR BLD: 6.4 % (ref 4–15)
MONOCYTES NFR BLD: 7.6 % (ref 4–15)
NEUTROPHILS # BLD AUTO: 4.4 K/UL (ref 1.8–7.7)
NEUTROPHILS # BLD AUTO: 5 K/UL (ref 1.8–7.7)
NEUTROPHILS NFR BLD: 70.6 % (ref 38–73)
NEUTROPHILS NFR BLD: 72 % (ref 38–73)
NRBC BLD-RTO: 0 /100 WBC
NRBC BLD-RTO: 0 /100 WBC
PHOSPHATE SERPL-MCNC: 2.5 MG/DL (ref 2.7–4.5)
PLATELET # BLD AUTO: 193 K/UL (ref 150–450)
PLATELET # BLD AUTO: 197 K/UL (ref 150–450)
PMV BLD AUTO: 9.4 FL (ref 9.2–12.9)
PMV BLD AUTO: 9.6 FL (ref 9.2–12.9)
POCT GLUCOSE: 108 MG/DL (ref 70–110)
POCT GLUCOSE: 79 MG/DL (ref 70–110)
POCT GLUCOSE: 98 MG/DL (ref 70–110)
POTASSIUM SERPL-SCNC: 3.1 MMOL/L (ref 3.5–5.1)
RBC # BLD AUTO: 3.48 M/UL (ref 4.6–6.2)
RBC # BLD AUTO: 3.75 M/UL (ref 4.6–6.2)
RV AG STL QL IA.RAPID: NEGATIVE
SODIUM SERPL-SCNC: 144 MMOL/L (ref 136–145)
WBC # BLD AUTO: 6.18 K/UL (ref 3.9–12.7)
WBC # BLD AUTO: 6.99 K/UL (ref 3.9–12.7)

## 2021-11-22 PROCEDURE — 25000003 PHARM REV CODE 250: Performed by: PHYSICIAN ASSISTANT

## 2021-11-22 PROCEDURE — 45378 DIAGNOSTIC COLONOSCOPY: CPT | Performed by: STUDENT IN AN ORGANIZED HEALTH CARE EDUCATION/TRAINING PROGRAM

## 2021-11-22 PROCEDURE — D9220A PRA ANESTHESIA: Mod: CRNA,,, | Performed by: STUDENT IN AN ORGANIZED HEALTH CARE EDUCATION/TRAINING PROGRAM

## 2021-11-22 PROCEDURE — 76937 US GUIDE VASCULAR ACCESS: CPT

## 2021-11-22 PROCEDURE — D9220A PRA ANESTHESIA: ICD-10-PCS | Mod: ANES,,, | Performed by: ANESTHESIOLOGY

## 2021-11-22 PROCEDURE — 63600175 PHARM REV CODE 636 W HCPCS: Performed by: PHYSICIAN ASSISTANT

## 2021-11-22 PROCEDURE — 96375 TX/PRO/DX INJ NEW DRUG ADDON: CPT | Mod: 59

## 2021-11-22 PROCEDURE — G0378 HOSPITAL OBSERVATION PER HR: HCPCS

## 2021-11-22 PROCEDURE — 37000008 HC ANESTHESIA 1ST 15 MINUTES: Performed by: STUDENT IN AN ORGANIZED HEALTH CARE EDUCATION/TRAINING PROGRAM

## 2021-11-22 PROCEDURE — 45378 DIAGNOSTIC COLONOSCOPY: CPT | Mod: ,,, | Performed by: STUDENT IN AN ORGANIZED HEALTH CARE EDUCATION/TRAINING PROGRAM

## 2021-11-22 PROCEDURE — 85025 COMPLETE CBC W/AUTO DIFF WBC: CPT | Performed by: PHYSICIAN ASSISTANT

## 2021-11-22 PROCEDURE — 99226 PR SUBSEQUENT OBSERVATION CARE,LEVEL III: ICD-10-PCS | Mod: ,,, | Performed by: PHYSICIAN ASSISTANT

## 2021-11-22 PROCEDURE — 84100 ASSAY OF PHOSPHORUS: CPT | Performed by: PHYSICIAN ASSISTANT

## 2021-11-22 PROCEDURE — 45378 PR COLONOSCOPY,DIAGNOSTIC: ICD-10-PCS | Mod: ,,, | Performed by: STUDENT IN AN ORGANIZED HEALTH CARE EDUCATION/TRAINING PROGRAM

## 2021-11-22 PROCEDURE — 63600175 PHARM REV CODE 636 W HCPCS: Performed by: STUDENT IN AN ORGANIZED HEALTH CARE EDUCATION/TRAINING PROGRAM

## 2021-11-22 PROCEDURE — 94761 N-INVAS EAR/PLS OXIMETRY MLT: CPT

## 2021-11-22 PROCEDURE — 37000009 HC ANESTHESIA EA ADD 15 MINS: Performed by: STUDENT IN AN ORGANIZED HEALTH CARE EDUCATION/TRAINING PROGRAM

## 2021-11-22 PROCEDURE — C9113 INJ PANTOPRAZOLE SODIUM, VIA: HCPCS | Performed by: PHYSICIAN ASSISTANT

## 2021-11-22 PROCEDURE — 25000003 PHARM REV CODE 250: Performed by: STUDENT IN AN ORGANIZED HEALTH CARE EDUCATION/TRAINING PROGRAM

## 2021-11-22 PROCEDURE — 80048 BASIC METABOLIC PNL TOTAL CA: CPT | Performed by: PHYSICIAN ASSISTANT

## 2021-11-22 PROCEDURE — C1751 CATH, INF, PER/CENT/MIDLINE: HCPCS

## 2021-11-22 PROCEDURE — 83735 ASSAY OF MAGNESIUM: CPT | Performed by: PHYSICIAN ASSISTANT

## 2021-11-22 PROCEDURE — D9220A PRA ANESTHESIA: ICD-10-PCS | Mod: CRNA,,, | Performed by: STUDENT IN AN ORGANIZED HEALTH CARE EDUCATION/TRAINING PROGRAM

## 2021-11-22 PROCEDURE — 36415 COLL VENOUS BLD VENIPUNCTURE: CPT | Performed by: PHYSICIAN ASSISTANT

## 2021-11-22 PROCEDURE — 99900035 HC TECH TIME PER 15 MIN (STAT)

## 2021-11-22 PROCEDURE — 36410 VNPNXR 3YR/> PHY/QHP DX/THER: CPT

## 2021-11-22 PROCEDURE — D9220A PRA ANESTHESIA: Mod: ANES,,, | Performed by: ANESTHESIOLOGY

## 2021-11-22 PROCEDURE — 99226 PR SUBSEQUENT OBSERVATION CARE,LEVEL III: CPT | Mod: ,,, | Performed by: PHYSICIAN ASSISTANT

## 2021-11-22 PROCEDURE — 96376 TX/PRO/DX INJ SAME DRUG ADON: CPT | Mod: 59

## 2021-11-22 RX ORDER — LIDOCAINE HYDROCHLORIDE 20 MG/ML
INJECTION INTRAVENOUS
Status: DISCONTINUED | OUTPATIENT
Start: 2021-11-22 | End: 2021-11-22

## 2021-11-22 RX ORDER — PROPOFOL 10 MG/ML
VIAL (ML) INTRAVENOUS
Status: DISCONTINUED | OUTPATIENT
Start: 2021-11-22 | End: 2021-11-22

## 2021-11-22 RX ORDER — POTASSIUM CHLORIDE 20 MEQ/1
40 TABLET, EXTENDED RELEASE ORAL ONCE
Status: COMPLETED | OUTPATIENT
Start: 2021-11-22 | End: 2021-11-22

## 2021-11-22 RX ORDER — ONDANSETRON 2 MG/ML
4 INJECTION INTRAMUSCULAR; INTRAVENOUS DAILY PRN
Status: DISCONTINUED | OUTPATIENT
Start: 2021-11-22 | End: 2021-11-22 | Stop reason: HOSPADM

## 2021-11-22 RX ORDER — PROPOFOL 10 MG/ML
VIAL (ML) INTRAVENOUS CONTINUOUS PRN
Status: DISCONTINUED | OUTPATIENT
Start: 2021-11-22 | End: 2021-11-22

## 2021-11-22 RX ORDER — PHENYLEPHRINE HYDROCHLORIDE 10 MG/ML
INJECTION INTRAVENOUS
Status: DISCONTINUED | OUTPATIENT
Start: 2021-11-22 | End: 2021-11-22

## 2021-11-22 RX ORDER — MAGNESIUM SULFATE HEPTAHYDRATE 40 MG/ML
2 INJECTION, SOLUTION INTRAVENOUS ONCE
Status: COMPLETED | OUTPATIENT
Start: 2021-11-22 | End: 2021-11-22

## 2021-11-22 RX ORDER — FENTANYL CITRATE 50 UG/ML
25 INJECTION, SOLUTION INTRAMUSCULAR; INTRAVENOUS EVERY 5 MIN PRN
Status: DISCONTINUED | OUTPATIENT
Start: 2021-11-22 | End: 2021-11-22 | Stop reason: HOSPADM

## 2021-11-22 RX ADMIN — CYPROHEPTADINE HYDROCHLORIDE 4 MG: 4 TABLET ORAL at 10:11

## 2021-11-22 RX ADMIN — FOLIC ACID 1 MG: 1 TABLET ORAL at 10:11

## 2021-11-22 RX ADMIN — POTASSIUM CHLORIDE 40 MEQ: 1500 TABLET, EXTENDED RELEASE ORAL at 10:11

## 2021-11-22 RX ADMIN — GABAPENTIN 300 MG: 300 CAPSULE ORAL at 08:11

## 2021-11-22 RX ADMIN — GABAPENTIN 300 MG: 300 CAPSULE ORAL at 03:11

## 2021-11-22 RX ADMIN — CYPROHEPTADINE HYDROCHLORIDE 4 MG: 4 TABLET ORAL at 08:11

## 2021-11-22 RX ADMIN — ESCITALOPRAM OXALATE 20 MG: 20 TABLET ORAL at 10:11

## 2021-11-22 RX ADMIN — PHENYLEPHRINE HYDROCHLORIDE 200 MCG: 10 INJECTION INTRAVENOUS at 01:11

## 2021-11-22 RX ADMIN — GABAPENTIN 300 MG: 300 CAPSULE ORAL at 10:11

## 2021-11-22 RX ADMIN — PROPOFOL 100 MG: 10 INJECTION, EMULSION INTRAVENOUS at 12:11

## 2021-11-22 RX ADMIN — ATORVASTATIN CALCIUM 40 MG: 40 TABLET, FILM COATED ORAL at 10:11

## 2021-11-22 RX ADMIN — PHENYLEPHRINE HYDROCHLORIDE 200 MCG: 10 INJECTION INTRAVENOUS at 12:11

## 2021-11-22 RX ADMIN — PANTOPRAZOLE SODIUM 40 MG: 40 INJECTION, POWDER, FOR SOLUTION INTRAVENOUS at 08:11

## 2021-11-22 RX ADMIN — ERTAPENEM 1 G: 1 INJECTION INTRAMUSCULAR; INTRAVENOUS at 03:11

## 2021-11-22 RX ADMIN — TAMSULOSIN HYDROCHLORIDE 0.8 MG: 0.4 CAPSULE ORAL at 10:11

## 2021-11-22 RX ADMIN — SODIUM CHLORIDE: 0.9 INJECTION, SOLUTION INTRAVENOUS at 12:11

## 2021-11-22 RX ADMIN — MAGNESIUM SULFATE 2 G: 2 INJECTION INTRAVENOUS at 10:11

## 2021-11-22 RX ADMIN — CYPROHEPTADINE HYDROCHLORIDE 4 MG: 4 TABLET ORAL at 03:11

## 2021-11-22 RX ADMIN — LIDOCAINE HYDROCHLORIDE 100 MG: 20 INJECTION, SOLUTION INTRAVENOUS at 12:11

## 2021-11-22 RX ADMIN — Medication 125 MCG/KG/MIN: at 12:11

## 2021-11-22 RX ADMIN — MIRTAZAPINE 30 MG: 30 TABLET, FILM COATED ORAL at 08:11

## 2021-11-22 RX ADMIN — PANTOPRAZOLE SODIUM 40 MG: 40 INJECTION, POWDER, FOR SOLUTION INTRAVENOUS at 09:11

## 2021-11-23 VITALS
DIASTOLIC BLOOD PRESSURE: 56 MMHG | SYSTOLIC BLOOD PRESSURE: 117 MMHG | HEIGHT: 67 IN | OXYGEN SATURATION: 98 % | TEMPERATURE: 98 F | BODY MASS INDEX: 28.25 KG/M2 | HEART RATE: 76 BPM | RESPIRATION RATE: 29 BRPM | WEIGHT: 180 LBS

## 2021-11-23 PROBLEM — N12 PYELONEPHRITIS: Status: RESOLVED | Noted: 2021-11-19 | Resolved: 2021-11-23

## 2021-11-23 LAB
ANION GAP SERPL CALC-SCNC: 5 MMOL/L (ref 8–16)
BACTERIA UR CULT: ABNORMAL
BASOPHILS # BLD AUTO: 0.02 K/UL (ref 0–0.2)
BASOPHILS # BLD AUTO: 0.04 K/UL (ref 0–0.2)
BASOPHILS NFR BLD: 0.3 % (ref 0–1.9)
BASOPHILS NFR BLD: 0.5 % (ref 0–1.9)
BUN SERPL-MCNC: 8 MG/DL (ref 8–23)
CALCIUM SERPL-MCNC: 7.5 MG/DL (ref 8.7–10.5)
CHLORIDE SERPL-SCNC: 112 MMOL/L (ref 95–110)
CO2 SERPL-SCNC: 25 MMOL/L (ref 23–29)
CREAT SERPL-MCNC: 0.8 MG/DL (ref 0.5–1.4)
DIFFERENTIAL METHOD: ABNORMAL
DIFFERENTIAL METHOD: ABNORMAL
ELASTASE 1, FECAL: <40 MCG/G
EOSINOPHIL # BLD AUTO: 0.1 K/UL (ref 0–0.5)
EOSINOPHIL # BLD AUTO: 0.1 K/UL (ref 0–0.5)
EOSINOPHIL NFR BLD: 1.3 % (ref 0–8)
EOSINOPHIL NFR BLD: 1.4 % (ref 0–8)
ERYTHROCYTE [DISTWIDTH] IN BLOOD BY AUTOMATED COUNT: 14.3 % (ref 11.5–14.5)
ERYTHROCYTE [DISTWIDTH] IN BLOOD BY AUTOMATED COUNT: 14.3 % (ref 11.5–14.5)
EST. GFR  (AFRICAN AMERICAN): >60 ML/MIN/1.73 M^2
EST. GFR  (NON AFRICAN AMERICAN): >60 ML/MIN/1.73 M^2
GLUCOSE SERPL-MCNC: 109 MG/DL (ref 70–110)
HCT VFR BLD AUTO: 28.9 % (ref 40–54)
HCT VFR BLD AUTO: 30.1 % (ref 40–54)
HGB BLD-MCNC: 8.9 G/DL (ref 14–18)
HGB BLD-MCNC: 9.4 G/DL (ref 14–18)
IMM GRANULOCYTES # BLD AUTO: 0.03 K/UL (ref 0–0.04)
IMM GRANULOCYTES # BLD AUTO: 0.04 K/UL (ref 0–0.04)
IMM GRANULOCYTES NFR BLD AUTO: 0.4 % (ref 0–0.5)
IMM GRANULOCYTES NFR BLD AUTO: 0.5 % (ref 0–0.5)
LYMPHOCYTES # BLD AUTO: 1.5 K/UL (ref 1–4.8)
LYMPHOCYTES # BLD AUTO: 1.6 K/UL (ref 1–4.8)
LYMPHOCYTES NFR BLD: 18.1 % (ref 18–48)
LYMPHOCYTES NFR BLD: 18.5 % (ref 18–48)
MAGNESIUM SERPL-MCNC: 1.8 MG/DL (ref 1.6–2.6)
MCH RBC QN AUTO: 27.4 PG (ref 27–31)
MCH RBC QN AUTO: 28.3 PG (ref 27–31)
MCHC RBC AUTO-ENTMCNC: 30.8 G/DL (ref 32–36)
MCHC RBC AUTO-ENTMCNC: 31.2 G/DL (ref 32–36)
MCV RBC AUTO: 89 FL (ref 82–98)
MCV RBC AUTO: 91 FL (ref 82–98)
MONOCYTES # BLD AUTO: 0.5 K/UL (ref 0.3–1)
MONOCYTES # BLD AUTO: 0.5 K/UL (ref 0.3–1)
MONOCYTES NFR BLD: 6.1 % (ref 4–15)
MONOCYTES NFR BLD: 6.1 % (ref 4–15)
NEUTROPHILS # BLD AUTO: 5.8 K/UL (ref 1.8–7.7)
NEUTROPHILS # BLD AUTO: 6.5 K/UL (ref 1.8–7.7)
NEUTROPHILS NFR BLD: 73.3 % (ref 38–73)
NEUTROPHILS NFR BLD: 73.5 % (ref 38–73)
NRBC BLD-RTO: 0 /100 WBC
NRBC BLD-RTO: 0 /100 WBC
PHOSPHATE SERPL-MCNC: 2.1 MG/DL (ref 2.7–4.5)
PLATELET # BLD AUTO: 177 K/UL (ref 150–450)
PLATELET # BLD AUTO: 183 K/UL (ref 150–450)
PMV BLD AUTO: 9.7 FL (ref 9.2–12.9)
PMV BLD AUTO: 9.7 FL (ref 9.2–12.9)
POCT GLUCOSE: 104 MG/DL (ref 70–110)
POCT GLUCOSE: 113 MG/DL (ref 70–110)
POCT GLUCOSE: 128 MG/DL (ref 70–110)
POCT GLUCOSE: 74 MG/DL (ref 70–110)
POTASSIUM SERPL-SCNC: 3.4 MMOL/L (ref 3.5–5.1)
RBC # BLD AUTO: 3.25 M/UL (ref 4.6–6.2)
RBC # BLD AUTO: 3.32 M/UL (ref 4.6–6.2)
SODIUM SERPL-SCNC: 142 MMOL/L (ref 136–145)
WBC # BLD AUTO: 7.85 K/UL (ref 3.9–12.7)
WBC # BLD AUTO: 8.8 K/UL (ref 3.9–12.7)

## 2021-11-23 PROCEDURE — 36415 COLL VENOUS BLD VENIPUNCTURE: CPT | Performed by: PHYSICIAN ASSISTANT

## 2021-11-23 PROCEDURE — 96376 TX/PRO/DX INJ SAME DRUG ADON: CPT

## 2021-11-23 PROCEDURE — 25000003 PHARM REV CODE 250: Performed by: PHYSICIAN ASSISTANT

## 2021-11-23 PROCEDURE — C9113 INJ PANTOPRAZOLE SODIUM, VIA: HCPCS | Performed by: PHYSICIAN ASSISTANT

## 2021-11-23 PROCEDURE — 99217 PR OBSERVATION CARE DISCHARGE: ICD-10-PCS | Mod: ,,, | Performed by: PHYSICIAN ASSISTANT

## 2021-11-23 PROCEDURE — G0378 HOSPITAL OBSERVATION PER HR: HCPCS

## 2021-11-23 PROCEDURE — 99214 PR OFFICE/OUTPT VISIT, EST, LEVL IV, 30-39 MIN: ICD-10-PCS | Mod: ,,, | Performed by: INTERNAL MEDICINE

## 2021-11-23 PROCEDURE — 83735 ASSAY OF MAGNESIUM: CPT | Performed by: PHYSICIAN ASSISTANT

## 2021-11-23 PROCEDURE — 84100 ASSAY OF PHOSPHORUS: CPT | Performed by: PHYSICIAN ASSISTANT

## 2021-11-23 PROCEDURE — 99217 PR OBSERVATION CARE DISCHARGE: CPT | Mod: ,,, | Performed by: PHYSICIAN ASSISTANT

## 2021-11-23 PROCEDURE — 63600175 PHARM REV CODE 636 W HCPCS: Performed by: PHYSICIAN ASSISTANT

## 2021-11-23 PROCEDURE — 85025 COMPLETE CBC W/AUTO DIFF WBC: CPT | Performed by: PHYSICIAN ASSISTANT

## 2021-11-23 PROCEDURE — 99214 OFFICE O/P EST MOD 30 MIN: CPT | Mod: ,,, | Performed by: INTERNAL MEDICINE

## 2021-11-23 PROCEDURE — 80048 BASIC METABOLIC PNL TOTAL CA: CPT | Performed by: PHYSICIAN ASSISTANT

## 2021-11-23 RX ORDER — POTASSIUM CHLORIDE 20 MEQ/1
40 TABLET, EXTENDED RELEASE ORAL ONCE
Status: COMPLETED | OUTPATIENT
Start: 2021-11-23 | End: 2021-11-23

## 2021-11-23 RX ORDER — SODIUM,POTASSIUM PHOSPHATES 280-250MG
1 POWDER IN PACKET (EA) ORAL
Status: DISCONTINUED | OUTPATIENT
Start: 2021-11-23 | End: 2021-11-23 | Stop reason: HOSPADM

## 2021-11-23 RX ADMIN — ATORVASTATIN CALCIUM 40 MG: 40 TABLET, FILM COATED ORAL at 08:11

## 2021-11-23 RX ADMIN — PANTOPRAZOLE SODIUM 40 MG: 40 INJECTION, POWDER, FOR SOLUTION INTRAVENOUS at 09:11

## 2021-11-23 RX ADMIN — POTASSIUM CHLORIDE 40 MEQ: 1500 TABLET, EXTENDED RELEASE ORAL at 11:11

## 2021-11-23 RX ADMIN — GABAPENTIN 300 MG: 300 CAPSULE ORAL at 08:11

## 2021-11-23 RX ADMIN — PANTOPRAZOLE SODIUM 40 MG: 40 INJECTION, POWDER, FOR SOLUTION INTRAVENOUS at 08:11

## 2021-11-23 RX ADMIN — GABAPENTIN 300 MG: 300 CAPSULE ORAL at 04:11

## 2021-11-23 RX ADMIN — MIRTAZAPINE 30 MG: 30 TABLET, FILM COATED ORAL at 08:11

## 2021-11-23 RX ADMIN — ESCITALOPRAM OXALATE 20 MG: 20 TABLET ORAL at 08:11

## 2021-11-23 RX ADMIN — POTASSIUM & SODIUM PHOSPHATES POWDER PACK 280-160-250 MG 1 PACKET: 280-160-250 PACK at 08:11

## 2021-11-23 RX ADMIN — CYPROHEPTADINE HYDROCHLORIDE 4 MG: 4 TABLET ORAL at 08:11

## 2021-11-23 RX ADMIN — ERTAPENEM 1 G: 1 INJECTION INTRAMUSCULAR; INTRAVENOUS at 01:11

## 2021-11-23 RX ADMIN — FOLIC ACID 1 MG: 1 TABLET ORAL at 08:11

## 2021-11-23 RX ADMIN — POTASSIUM & SODIUM PHOSPHATES POWDER PACK 280-160-250 MG 1 PACKET: 280-160-250 PACK at 04:11

## 2021-11-23 RX ADMIN — TAMSULOSIN HYDROCHLORIDE 0.8 MG: 0.4 CAPSULE ORAL at 08:11

## 2021-11-23 RX ADMIN — POTASSIUM & SODIUM PHOSPHATES POWDER PACK 280-160-250 MG 1 PACKET: 280-160-250 PACK at 11:11

## 2021-11-24 ENCOUNTER — TELEPHONE (OUTPATIENT)
Dept: INFECTIOUS DISEASES | Facility: CLINIC | Age: 76
End: 2021-11-24
Payer: MEDICARE

## 2021-11-24 LAB
BACTERIA STL CULT: NORMAL
FAT STL QL: NORMAL
NEUTRAL FAT STL QL: NORMAL
O+P STL MICRO: NORMAL

## 2021-11-25 LAB — POCT GLUCOSE: 163 MG/DL (ref 70–110)

## 2021-11-29 LAB
H PYLORI AG STL QL IA: NORMAL
SPECIMEN SOURCE: NORMAL

## 2021-11-30 LAB — CALPROTECTIN STL-MCNT: 292.1 MCG/G

## 2021-12-01 ENCOUNTER — OFFICE VISIT (OUTPATIENT)
Dept: ELECTROPHYSIOLOGY | Facility: CLINIC | Age: 76
End: 2021-12-01
Payer: MEDICARE

## 2021-12-01 ENCOUNTER — HOSPITAL ENCOUNTER (OUTPATIENT)
Dept: CARDIOLOGY | Facility: CLINIC | Age: 76
Discharge: HOME | End: 2021-12-01
Payer: MEDICARE

## 2021-12-01 ENCOUNTER — CLINICAL SUPPORT (OUTPATIENT)
Dept: CARDIOLOGY | Facility: HOSPITAL | Age: 76
End: 2021-12-01
Attending: INTERNAL MEDICINE
Payer: MEDICARE

## 2021-12-01 VITALS
HEART RATE: 79 BPM | SYSTOLIC BLOOD PRESSURE: 114 MMHG | BODY MASS INDEX: 28.02 KG/M2 | DIASTOLIC BLOOD PRESSURE: 68 MMHG | HEIGHT: 67 IN | WEIGHT: 178.56 LBS

## 2021-12-01 DIAGNOSIS — R00.1 BRADYCARDIA: ICD-10-CM

## 2021-12-01 DIAGNOSIS — I45.9 HEART BLOCK: Primary | ICD-10-CM

## 2021-12-01 DIAGNOSIS — I45.9 HEART BLOCK: ICD-10-CM

## 2021-12-01 DIAGNOSIS — I10 ESSENTIAL HYPERTENSION: ICD-10-CM

## 2021-12-01 PROCEDURE — 3066F PR DOCUMENTATION OF TREATMENT FOR NEPHROPATHY: ICD-10-PCS | Mod: CPTII,S$GLB,, | Performed by: INTERNAL MEDICINE

## 2021-12-01 PROCEDURE — 99999 PR PBB SHADOW E&M-EST. PATIENT-LVL III: CPT | Mod: PBBFAC,,, | Performed by: INTERNAL MEDICINE

## 2021-12-01 PROCEDURE — 93010 ELECTROCARDIOGRAM REPORT: CPT | Mod: S$GLB,,, | Performed by: INTERNAL MEDICINE

## 2021-12-01 PROCEDURE — 1157F ADVNC CARE PLAN IN RCRD: CPT | Mod: CPTII,S$GLB,, | Performed by: INTERNAL MEDICINE

## 2021-12-01 PROCEDURE — 1157F PR ADVANCE CARE PLAN OR EQUIV PRESENT IN MEDICAL RECORD: ICD-10-PCS | Mod: CPTII,S$GLB,, | Performed by: INTERNAL MEDICINE

## 2021-12-01 PROCEDURE — 3066F NEPHROPATHY DOC TX: CPT | Mod: CPTII,S$GLB,, | Performed by: INTERNAL MEDICINE

## 2021-12-01 PROCEDURE — 93280 PM DEVICE PROGR EVAL DUAL: CPT

## 2021-12-01 PROCEDURE — 99215 PR OFFICE/OUTPT VISIT, EST, LEVL V, 40-54 MIN: ICD-10-PCS | Mod: S$GLB,,, | Performed by: INTERNAL MEDICINE

## 2021-12-01 PROCEDURE — 93005 ELECTROCARDIOGRAM TRACING: CPT | Mod: S$GLB,,, | Performed by: INTERNAL MEDICINE

## 2021-12-01 PROCEDURE — 93010 RHYTHM STRIP: ICD-10-PCS | Mod: S$GLB,,, | Performed by: INTERNAL MEDICINE

## 2021-12-01 PROCEDURE — 99215 OFFICE O/P EST HI 40 MIN: CPT | Mod: S$GLB,,, | Performed by: INTERNAL MEDICINE

## 2021-12-01 PROCEDURE — 99999 PR PBB SHADOW E&M-EST. PATIENT-LVL III: ICD-10-PCS | Mod: PBBFAC,,, | Performed by: INTERNAL MEDICINE

## 2021-12-01 PROCEDURE — 93280 PM DEVICE PROGR EVAL DUAL: CPT | Mod: 26,,, | Performed by: INTERNAL MEDICINE

## 2021-12-01 PROCEDURE — 93280 CARDIAC DEVICE CHECK - IN CLINIC & HOSPITAL: ICD-10-PCS | Mod: 26,,, | Performed by: INTERNAL MEDICINE

## 2021-12-01 PROCEDURE — 93005 RHYTHM STRIP: ICD-10-PCS | Mod: S$GLB,,, | Performed by: INTERNAL MEDICINE

## 2021-12-01 RX ORDER — HYDROXYZINE PAMOATE 25 MG/1
25 CAPSULE ORAL 3 TIMES DAILY
COMMUNITY
Start: 2021-10-30 | End: 2023-03-19

## 2021-12-06 ENCOUNTER — HOSPITAL ENCOUNTER (OUTPATIENT)
Dept: RADIOLOGY | Facility: HOSPITAL | Age: 76
Discharge: HOME | End: 2021-12-06
Attending: UROLOGY
Payer: MEDICARE

## 2021-12-06 DIAGNOSIS — N20.0 NEPHROLITHIASIS: ICD-10-CM

## 2021-12-06 PROCEDURE — 76770 US KIDNEY: ICD-10-PCS | Mod: 26,,, | Performed by: RADIOLOGY

## 2021-12-06 PROCEDURE — 76770 US EXAM ABDO BACK WALL COMP: CPT | Mod: 26,,, | Performed by: RADIOLOGY

## 2021-12-06 PROCEDURE — 76770 US EXAM ABDO BACK WALL COMP: CPT | Mod: TC

## 2021-12-07 ENCOUNTER — CLINICAL SUPPORT (OUTPATIENT)
Dept: CARDIOLOGY | Facility: HOSPITAL | Age: 76
End: 2021-12-07
Payer: MEDICARE

## 2021-12-07 DIAGNOSIS — Z95.0 PRESENCE OF CARDIAC PACEMAKER: ICD-10-CM

## 2021-12-07 PROCEDURE — 93294 REM INTERROG EVL PM/LDLS PM: CPT | Mod: ,,, | Performed by: INTERNAL MEDICINE

## 2021-12-07 PROCEDURE — 93296 REM INTERROG EVL PM/IDS: CPT | Performed by: INTERNAL MEDICINE

## 2021-12-07 PROCEDURE — 93294 CARDIAC DEVICE CHECK - REMOTE: ICD-10-PCS | Mod: ,,, | Performed by: INTERNAL MEDICINE

## 2021-12-17 ENCOUNTER — OFFICE VISIT (OUTPATIENT)
Dept: UROLOGY | Facility: CLINIC | Age: 76
End: 2021-12-17
Payer: MEDICARE

## 2021-12-17 VITALS
BODY MASS INDEX: 28.02 KG/M2 | HEART RATE: 81 BPM | HEIGHT: 67 IN | DIASTOLIC BLOOD PRESSURE: 62 MMHG | WEIGHT: 178.56 LBS | SYSTOLIC BLOOD PRESSURE: 97 MMHG

## 2021-12-17 DIAGNOSIS — N20.0 NEPHROLITHIASIS: Primary | ICD-10-CM

## 2021-12-17 DIAGNOSIS — N13.8 BPH WITH URINARY OBSTRUCTION: ICD-10-CM

## 2021-12-17 DIAGNOSIS — N40.1 BPH WITH URINARY OBSTRUCTION: ICD-10-CM

## 2021-12-17 PROBLEM — N12 PYELONEPHRITIS: Status: RESOLVED | Noted: 2021-11-19 | Resolved: 2021-12-17

## 2021-12-17 PROBLEM — R33.9 URINARY RETENTION: Status: RESOLVED | Noted: 2021-05-25 | Resolved: 2021-12-17

## 2021-12-17 PROCEDURE — 99999 PR PBB SHADOW E&M-EST. PATIENT-LVL III: CPT | Mod: PBBFAC,,,

## 2021-12-17 PROCEDURE — 99214 OFFICE O/P EST MOD 30 MIN: CPT | Mod: S$GLB,,, | Performed by: UROLOGY

## 2021-12-17 PROCEDURE — 99214 PR OFFICE/OUTPT VISIT, EST, LEVL IV, 30-39 MIN: ICD-10-PCS | Mod: S$GLB,,, | Performed by: UROLOGY

## 2021-12-17 PROCEDURE — 99999 PR PBB SHADOW E&M-EST. PATIENT-LVL III: ICD-10-PCS | Mod: PBBFAC,,,

## 2021-12-20 ENCOUNTER — EXTERNAL HOME HEALTH (OUTPATIENT)
Dept: HOME HEALTH SERVICES | Facility: HOSPITAL | Age: 76
End: 2021-12-20
Payer: MEDICARE

## 2022-01-27 ENCOUNTER — TELEPHONE (OUTPATIENT)
Dept: NEUROLOGY | Facility: CLINIC | Age: 77
End: 2022-01-27
Payer: MEDICARE

## 2022-01-27 NOTE — TELEPHONE ENCOUNTER
----- Message from Jenny Kwan MA sent at 1/27/2022  9:52 AM CST -----  Good morning,    We received a referral from  for this patient to be seen in neurology. The referring diagnosis is TBI/Dementia. I have scanned the referral and records into  for review. Please review and contact to schedule.    Thank you   [facility]  Ext [phone]

## 2022-02-10 ENCOUNTER — TELEPHONE (OUTPATIENT)
Dept: NEUROLOGY | Facility: CLINIC | Age: 77
End: 2022-02-10
Payer: MEDICARE

## 2022-02-10 NOTE — TELEPHONE ENCOUNTER
----- Message from Camilla Gutierrez sent at 2/10/2022 10:43 AM CST -----  Regarding: Rescheduling appointment  Contact: Wen [phone]  Calling to reschedule appointment. Please call

## 2022-03-07 ENCOUNTER — CLINICAL SUPPORT (OUTPATIENT)
Dept: CARDIOLOGY | Facility: HOSPITAL | Age: 77
End: 2022-03-07
Attending: PSYCHIATRY & NEUROLOGY
Payer: MEDICARE

## 2022-03-07 DIAGNOSIS — Z95.0 PRESENCE OF CARDIAC PACEMAKER: ICD-10-CM

## 2022-03-07 PROCEDURE — 93296 REM INTERROG EVL PM/IDS: CPT | Performed by: INTERNAL MEDICINE

## 2022-03-14 ENCOUNTER — OFFICE VISIT (OUTPATIENT)
Dept: NEUROLOGY | Facility: CLINIC | Age: 77
End: 2022-03-14
Payer: MEDICARE

## 2022-03-14 ENCOUNTER — LAB VISIT (OUTPATIENT)
Dept: LAB | Facility: HOSPITAL | Age: 77
End: 2022-03-14
Attending: PSYCHIATRY & NEUROLOGY
Payer: MEDICARE

## 2022-03-14 VITALS — HEART RATE: 99 BPM | SYSTOLIC BLOOD PRESSURE: 115 MMHG | DIASTOLIC BLOOD PRESSURE: 73 MMHG

## 2022-03-14 DIAGNOSIS — R41.89 COGNITIVE IMPAIRMENT: Primary | ICD-10-CM

## 2022-03-14 DIAGNOSIS — R41.89 COGNITIVE IMPAIRMENT: ICD-10-CM

## 2022-03-14 PROBLEM — J06.9 ACUTE UPPER RESPIRATORY INFECTION: Status: ACTIVE | Noted: 2022-03-14

## 2022-03-14 PROBLEM — R03.0 ELEVATED BLOOD PRESSURE READING WITHOUT DIAGNOSIS OF HYPERTENSION: Status: ACTIVE | Noted: 2022-03-14

## 2022-03-14 PROBLEM — I45.9 HEART BLOCK: Status: RESOLVED | Noted: 2021-08-24 | Resolved: 2022-03-14

## 2022-03-14 LAB
ALBUMIN SERPL BCP-MCNC: 3.1 G/DL (ref 3.5–5.2)
ALP SERPL-CCNC: 89 U/L (ref 55–135)
ALT SERPL W/O P-5'-P-CCNC: 7 U/L (ref 10–44)
ANION GAP SERPL CALC-SCNC: 12 MMOL/L (ref 8–16)
AST SERPL-CCNC: 13 U/L (ref 10–40)
BASOPHILS # BLD AUTO: 0.05 K/UL (ref 0–0.2)
BASOPHILS NFR BLD: 0.7 % (ref 0–1.9)
BILIRUB SERPL-MCNC: 0.5 MG/DL (ref 0.1–1)
BUN SERPL-MCNC: 23 MG/DL (ref 8–23)
CALCIUM SERPL-MCNC: 10.1 MG/DL (ref 8.7–10.5)
CHLORIDE SERPL-SCNC: 102 MMOL/L (ref 95–110)
CO2 SERPL-SCNC: 27 MMOL/L (ref 23–29)
CREAT SERPL-MCNC: 1.1 MG/DL (ref 0.5–1.4)
DIFFERENTIAL METHOD: ABNORMAL
EOSINOPHIL # BLD AUTO: 0.1 K/UL (ref 0–0.5)
EOSINOPHIL NFR BLD: 0.9 % (ref 0–8)
ERYTHROCYTE [DISTWIDTH] IN BLOOD BY AUTOMATED COUNT: 15.9 % (ref 11.5–14.5)
EST. GFR  (AFRICAN AMERICAN): >60 ML/MIN/1.73 M^2
EST. GFR  (NON AFRICAN AMERICAN): >60 ML/MIN/1.73 M^2
FOLATE SERPL-MCNC: >40 NG/ML (ref 4–24)
GLUCOSE SERPL-MCNC: 102 MG/DL (ref 70–110)
HCT VFR BLD AUTO: 43.3 % (ref 40–54)
HCYS SERPL-SCNC: 14 UMOL/L (ref 4–16.5)
HGB BLD-MCNC: 13.4 G/DL (ref 14–18)
IMM GRANULOCYTES # BLD AUTO: 0.02 K/UL (ref 0–0.04)
IMM GRANULOCYTES NFR BLD AUTO: 0.3 % (ref 0–0.5)
LYMPHOCYTES # BLD AUTO: 1.7 K/UL (ref 1–4.8)
LYMPHOCYTES NFR BLD: 22.5 % (ref 18–48)
MCH RBC QN AUTO: 28.7 PG (ref 27–31)
MCHC RBC AUTO-ENTMCNC: 30.9 G/DL (ref 32–36)
MCV RBC AUTO: 93 FL (ref 82–98)
MONOCYTES # BLD AUTO: 0.5 K/UL (ref 0.3–1)
MONOCYTES NFR BLD: 7.1 % (ref 4–15)
NEUTROPHILS # BLD AUTO: 5.2 K/UL (ref 1.8–7.7)
NEUTROPHILS NFR BLD: 68.5 % (ref 38–73)
NRBC BLD-RTO: 0 /100 WBC
PLATELET # BLD AUTO: 185 K/UL (ref 150–450)
PMV BLD AUTO: 11.6 FL (ref 9.2–12.9)
POTASSIUM SERPL-SCNC: 4.2 MMOL/L (ref 3.5–5.1)
PROT SERPL-MCNC: 6.9 G/DL (ref 6–8.4)
RBC # BLD AUTO: 4.67 M/UL (ref 4.6–6.2)
SODIUM SERPL-SCNC: 141 MMOL/L (ref 136–145)
T4 FREE SERPL-MCNC: 0.98 NG/DL (ref 0.71–1.51)
TSH SERPL DL<=0.005 MIU/L-ACNC: 2.4 UIU/ML (ref 0.4–4)
VIT B12 SERPL-MCNC: 619 PG/ML (ref 210–950)
WBC # BLD AUTO: 7.57 K/UL (ref 3.9–12.7)

## 2022-03-14 PROCEDURE — 1160F PR REVIEW ALL MEDS BY PRESCRIBER/CLIN PHARMACIST DOCUMENTED: ICD-10-PCS | Mod: CPTII,S$GLB,, | Performed by: PSYCHIATRY & NEUROLOGY

## 2022-03-14 PROCEDURE — 80053 COMPREHEN METABOLIC PANEL: CPT | Performed by: PSYCHIATRY & NEUROLOGY

## 2022-03-14 PROCEDURE — 87389 HIV-1 AG W/HIV-1&-2 AB AG IA: CPT | Performed by: PSYCHIATRY & NEUROLOGY

## 2022-03-14 PROCEDURE — 85025 COMPLETE CBC W/AUTO DIFF WBC: CPT | Performed by: PSYCHIATRY & NEUROLOGY

## 2022-03-14 PROCEDURE — 83090 ASSAY OF HOMOCYSTEINE: CPT | Performed by: PSYCHIATRY & NEUROLOGY

## 2022-03-14 PROCEDURE — 1126F AMNT PAIN NOTED NONE PRSNT: CPT | Mod: CPTII,S$GLB,, | Performed by: PSYCHIATRY & NEUROLOGY

## 2022-03-14 PROCEDURE — 83921 ORGANIC ACID SINGLE QUANT: CPT | Performed by: PSYCHIATRY & NEUROLOGY

## 2022-03-14 PROCEDURE — 1126F PR PAIN SEVERITY QUANTIFIED, NO PAIN PRESENT: ICD-10-PCS | Mod: CPTII,S$GLB,, | Performed by: PSYCHIATRY & NEUROLOGY

## 2022-03-14 PROCEDURE — 82746 ASSAY OF FOLIC ACID SERUM: CPT | Performed by: PSYCHIATRY & NEUROLOGY

## 2022-03-14 PROCEDURE — 1159F PR MEDICATION LIST DOCUMENTED IN MEDICAL RECORD: ICD-10-PCS | Mod: CPTII,S$GLB,, | Performed by: PSYCHIATRY & NEUROLOGY

## 2022-03-14 PROCEDURE — 84443 ASSAY THYROID STIM HORMONE: CPT | Performed by: PSYCHIATRY & NEUROLOGY

## 2022-03-14 PROCEDURE — 99205 OFFICE O/P NEW HI 60 MIN: CPT | Mod: S$GLB,,, | Performed by: PSYCHIATRY & NEUROLOGY

## 2022-03-14 PROCEDURE — 84425 ASSAY OF VITAMIN B-1: CPT | Performed by: PSYCHIATRY & NEUROLOGY

## 2022-03-14 PROCEDURE — 3074F SYST BP LT 130 MM HG: CPT | Mod: CPTII,S$GLB,, | Performed by: PSYCHIATRY & NEUROLOGY

## 2022-03-14 PROCEDURE — 1157F PR ADVANCE CARE PLAN OR EQUIV PRESENT IN MEDICAL RECORD: ICD-10-PCS | Mod: CPTII,S$GLB,, | Performed by: PSYCHIATRY & NEUROLOGY

## 2022-03-14 PROCEDURE — 3078F DIAST BP <80 MM HG: CPT | Mod: CPTII,S$GLB,, | Performed by: PSYCHIATRY & NEUROLOGY

## 2022-03-14 PROCEDURE — 86780 TREPONEMA PALLIDUM: CPT | Performed by: PSYCHIATRY & NEUROLOGY

## 2022-03-14 PROCEDURE — 1160F RVW MEDS BY RX/DR IN RCRD: CPT | Mod: CPTII,S$GLB,, | Performed by: PSYCHIATRY & NEUROLOGY

## 2022-03-14 PROCEDURE — 99417 PROLNG OP E/M EACH 15 MIN: CPT | Mod: S$GLB,,, | Performed by: PSYCHIATRY & NEUROLOGY

## 2022-03-14 PROCEDURE — 3078F PR MOST RECENT DIASTOLIC BLOOD PRESSURE < 80 MM HG: ICD-10-PCS | Mod: CPTII,S$GLB,, | Performed by: PSYCHIATRY & NEUROLOGY

## 2022-03-14 PROCEDURE — 99417 PR PROLONGED SVC, OUTPT, W/WO DIRECT PT CONTACT,  EA ADDTL 15 MIN: ICD-10-PCS | Mod: S$GLB,,, | Performed by: PSYCHIATRY & NEUROLOGY

## 2022-03-14 PROCEDURE — 82607 VITAMIN B-12: CPT | Performed by: PSYCHIATRY & NEUROLOGY

## 2022-03-14 PROCEDURE — 3288F PR FALLS RISK ASSESSMENT DOCUMENTED: ICD-10-PCS | Mod: CPTII,S$GLB,, | Performed by: PSYCHIATRY & NEUROLOGY

## 2022-03-14 PROCEDURE — 1101F PR PT FALLS ASSESS DOC 0-1 FALLS W/OUT INJ PAST YR: ICD-10-PCS | Mod: CPTII,S$GLB,, | Performed by: PSYCHIATRY & NEUROLOGY

## 2022-03-14 PROCEDURE — 3074F PR MOST RECENT SYSTOLIC BLOOD PRESSURE < 130 MM HG: ICD-10-PCS | Mod: CPTII,S$GLB,, | Performed by: PSYCHIATRY & NEUROLOGY

## 2022-03-14 PROCEDURE — 36415 COLL VENOUS BLD VENIPUNCTURE: CPT | Performed by: PSYCHIATRY & NEUROLOGY

## 2022-03-14 PROCEDURE — 99999 PR PBB SHADOW E&M-EST. PATIENT-LVL III: ICD-10-PCS | Mod: PBBFAC,,, | Performed by: PSYCHIATRY & NEUROLOGY

## 2022-03-14 PROCEDURE — 99213 OFFICE O/P EST LOW 20 MIN: CPT | Mod: PBBFAC | Performed by: PSYCHIATRY & NEUROLOGY

## 2022-03-14 PROCEDURE — 1159F MED LIST DOCD IN RCRD: CPT | Mod: CPTII,S$GLB,, | Performed by: PSYCHIATRY & NEUROLOGY

## 2022-03-14 PROCEDURE — 84439 ASSAY OF FREE THYROXINE: CPT | Performed by: PSYCHIATRY & NEUROLOGY

## 2022-03-14 PROCEDURE — 3288F FALL RISK ASSESSMENT DOCD: CPT | Mod: CPTII,S$GLB,, | Performed by: PSYCHIATRY & NEUROLOGY

## 2022-03-14 PROCEDURE — 1157F ADVNC CARE PLAN IN RCRD: CPT | Mod: CPTII,S$GLB,, | Performed by: PSYCHIATRY & NEUROLOGY

## 2022-03-14 PROCEDURE — 1101F PT FALLS ASSESS-DOCD LE1/YR: CPT | Mod: CPTII,S$GLB,, | Performed by: PSYCHIATRY & NEUROLOGY

## 2022-03-14 PROCEDURE — 99999 PR PBB SHADOW E&M-EST. PATIENT-LVL III: CPT | Mod: PBBFAC,,, | Performed by: PSYCHIATRY & NEUROLOGY

## 2022-03-14 PROCEDURE — 99205 PR OFFICE/OUTPT VISIT, NEW, LEVL V, 60-74 MIN: ICD-10-PCS | Mod: S$GLB,,, | Performed by: PSYCHIATRY & NEUROLOGY

## 2022-03-14 NOTE — PROGRESS NOTES
[facility] [place]    Name: Praneeth Jewell  : 1945  MRN: [id number]    Date: 3/14/2022      Initial Evaluation    Assessment:     Mr. Jewell is a 76 y.o. right-handed male with a history of R PARDEEP stroke, HTN, HLD, DM2, heart block s/p pacemaker, recurrent nephrolithiasis with hydronephrosis s/p b/l ureteral stents and lithotripsy, MDD, folate deficiency who presents to the [facility] [place] due to cognitive deficits.  Patient was initially seen at the [facility] [place] on 2022.  Patient had onset of cognitive symptoms in  after having a right PARDEEP stroke.  Since stroke, patient has experienced a number of cognitive and behavioral changes including trouble with memory, executive functioning, apathy, and depression.  Additionally, he has been unable to move his LLE since his stroke and requires a wheelchair.  Around May 2021 the patient reportedly experienced a greater degree of cognitive impairment that has worsened over time and waxes and wanes in severity.  He has also exhibited increased cognitive slowing, executive dysfunction, irritability, combativeness, and apathy.  Additionally, he has had worsening of urge incontinence.  On evaluation today, MMSE was 27/30.  Laboratory studies over the past 6 months or unremarkable for potential causes of persistent cognitive impairment.   CT head from 2016 was notable for volume loss involving the frontal lobes and an infarct in the right PARDEEP distribution involving the superior frontal gyrus, medial frontal gyrus, and cingulate extending from the anterior aspect of the right frontal lobe to the SMA.  The most likely cause of cognitive impairment is from the patient's stroke in .  The stroke was in the right PARDEEP distribution and impacted the superior frontal gyrus, medial frontal gyrus, and cingulate extending from the anterior aspect of the frontal lobe to the central sulcus.  The involvement of the aPFC and dmPFC would  be expected to impact a number of cognitive functions, including executive functioning, planning, problem solving, judgment, attention, and working memory, all of which were described by family as being impaired.   Unfortunately, the effectiveness of medications to help with behavior will likely be limited in this case.    Recommendations:     Workup   MRI brain without contrast, dementia protocol.  Pacemaker in place.  CXR and cardiac device check ordered.   Labs: CBC, CMP, TSH, FT4, B1, B9, B12, MMA, HCYS, treponemal ab, HIV - ordered    Treatment  · Recommend avoiding cholinesterase inhibitors and NMDA antagonist.  Evidence supporting the use of these medications in vascular cognitive impairment is minimal.  · Recommend continuing Lexapro 20 mg daily and Remeron 30 mg qHS. These medications can be helpful with behavioral symptoms that occur with dementia.   · Strongly recommend discontinuing Hydroxyzine and Cyproheptidine.  Anticholinergics often contribute to cognitive impairment, particularly in elderly patients.  · Strongly recommend PCP wean and stop Restoril.  Benzodiazepines often contribute to cognitive impairment.  Recommend replacing Restoril with Suvorexant 10 mg qHS for insomnia (or other orexin antagonist).  If necessary, can increase Suvorexant by 5 mg every few days to a max dose of 20 mg qHS.  Suvorexant is extremely effective, well-tolerated in those over 65 with minimal adverse reactions in clinical trials, and non addictive.  In clinical trials it was shown to improve total sleep time, sleep latency, waking after sleep onset, and quality of sleep, and the only adverse reaction that clearly  from placebo was morning grogginess (Suvorexant 7%, placebo 3%).  Per the American Academy of Sleep Medicine (AASM) guidelines (Luciano et al, J Clin Sleep Med, 2017;13(2):307-349), Suvorexant is the best choice for the treatment of sleep maintenance insomnia in those over 65, particularly in the  setting of cognitive impairment.  Benzodiazepines (e.g., Triazolam, Temazepam), Z drugs (e.g., Zolpidem, Eszopiclone), and anticholinergic medications (e.g., Doxepin) to treat insomnia are listed on the AGS Beers Criteria (AGS Beers Criteria Update Expert Panel, J Am Geriatr Soc, 2019;67(4):674-694) and not recommended in those over 65, particularly in those with cognitive impairment.  These medications increase the risk for falls and worsening cognition.  Zaleplon and Ramelteon are only indicated for sleep-onset insomnia, which is not the problem in Mr. Jewell's case.  · In general, recommend avoiding benzodiazepines, anticholinergics, and opioids as these may worsen cognition.    Safety-related   We recommend that a medication management system be put in place to avoid errors in taking medication. Helpful services include [employer] (pillpack.com; free service) and [employer] ([employer]; paid subscription service).   We recommend that Mr. Jewell not drive.    Patient requires 24 hour care due to cognitive and physical impairment.    Health maintenance    Continue to follow-up with primary care doctor to monitor and treat vascular risk factors.   Recommend performing moderate-intensity cardiovascular exercise as able, ideally 30 minutes per day, 5 days per week.  Given physical limitations, recommend exercise under supervision.   Recommend staying socially and cognitively active.   Recommend eating a healthy and balanced diet (Mediterranean or DASH diet).    Follow-up: Follow up in about 4 weeks (around 4/11/2022). I provided Mr. Jewell and his family with our contact information should they have any questions or concerns.      Hao Borden MD   Behavioral Neurology & Neuropsychiatry  [facility]    Subjective:      Chief complaint:  Memory Deficits    Consultation requested by: Dr. Richard Galloway    History of present illness:  Mr. Jewell is a 76 y.o. right handed male with a  history of R PARDEEP stroke, HTN, HLD, DM2, heart block s/p pacemaker, recurrent nephrolithiasis with hydronephrosis s/p b/l ureteral stents and lithotripsy, MDD, folate deficiency who presents today to the [facility] due to concerns regarding cognitive deficits and behavioral changes. Mr. Jewell is accompanied by his family who participates in providing history. Additional information is obtained by reviewing available medical records.  Patient began experiencing trouble with cognition in 2015 after a right PARDEEP infarct.  Since that time, he has experienced trouble with cognition and been unable walk due to LLE weakness.  Since around May 2021, the patient has had progressively worsening cognitive deficits and behavioral changes.  His family says that he has trouble recalling recent events and conversations, and often repeats questions and statements.  He requires reminders to remember to take medication and often loses items around his home.  His cognitive processing speed has slowed substantially and he has more difficulty with attention, concentration, and problem solving.  He has experienced a number of behavioral changes since May 2021 including increased irritability, combativeness, apathy, substantially decreased interest in engaging with others, and decreased appetite.  His family says that the patient will throw pillows at them, has thrown the TV remote control, and has broke the railing on his bed.  Additionally, the patient has had more urinary urgency and incontinence over the past year.  Of note, the patient was hospitalized in May 2021 due to bilateral nephrolithiasis with hydronephrosis and underwent bilateral ureteral stent placement and cystolitholapaxy.  Patient had recurrent nephrolithiasis and plan was for patient undergo uteroscopy and lithotripsy in June 2021, however, the patient had recurrent UTIs and was not able to undergo procedure until August 2021.  Patient presented for  uteroscopy and lithotripsy on 08/24/21 but was found to have second-degree AV block.  Patient was admitted to Cardiology and pacemaker was placed.  Patient was finally able to undergo lithotripsy and replacement of bilateral ureteral stents on 08/26/2021.    Review of systems for cognition, behavior, motor, sensory, and sleep:  Memory   Difficulty recalling recent events: Yes    Difficulty recalling recent conversations: Yes    Repeats questions and statements: Yes    Forgets medication: Yes - requires reminders   Loses items: Yes - e.g., loses hat, TV remote control    Executive function   Slowed cognitive processing speed: Yes   Difficulty with attention and concentration: Yes    Difficulty with judgment and/or problem solving: Yes    Difficulty with planning and/or organization: Yes    Language   Difficulty with fluency and aggie: No   Word-finding difficulties: Yes - occasional   Word substitutions: No    Visuospatial ability   Difficulty navigating: No   Becomes lost in familiar places: No   Difficulty recognizing objects or faces: No    Behavior   Disinhibition: No   Irritability: No   Apathy: Yes - spends much of his time in bed, less social at baseline   Hygiene: No   Appetite: Yes - low, losing weight around May 2021, eats very small portions, supplements with boost   Depression: Yes   Anxiety: No   Hallucinations: No   Delusions: No    Motor   Difficulty walking: Yes   Imbalance: Yes   Falls: No   Weakness: Yes   Trouble with fine motor movements: Yes   Tremor: Yes - intermittent tremors   Involuntary muscle movement: No   Difficulty swallowing: No    Sensory   Paresthesia or pain: Yes - left foot   Trouble with vision: No   Trouble with hearing: No    Sleep   Insomnia: No   Snoring: No   Apnea: No   Dream-enactment: No    Functional status:   iADLs:   Household chores: dependent   Meal preparation: dependent   Medication management: dependent   Shopping:  dependent   Managing money: dependent   Transportation: dependent   Telephoning/communication: dependent    ADLs:   ADLs predominantly limited by physical impairment, not cognitive impairment   Transferring: dependent   Feeding: independent   Hygiene: dependent   Toileting: dependent   Bathing: dependent   Dressing: dependent    Other functional status and safety issues:   He does not drive.     Past Medical History:   Diagnosis Date    Arthritis     Cerebral infarction     Diabetes mellitus     type 2    Folate deficiency anemia     Heart block     History of kidney stones 5/25/2021    History of stroke with residual deficit 5/25/2021    Hyperlipidemia     Hyperlipidemia     Hypertension     Major depressive disorder     Pyelonephritis 11/19/2021    TIA (transient ischemic attack)     Urinary retention 5/25/2021     Past Surgical History:   Procedure Laterality Date    A-V CARDIAC PACEMAKER INSERTION N/A 8/24/2021    Procedure: INSERTION, CARDIAC PACEMAKER, DUAL CHAMBER;  Surgeon: Neo Winn MD;  Location: [facility] EP LAB;  Service: Cardiology;  Laterality: N/A;  CHB, DUAL PPM, ANES, BIO, DM, ED 2    COLONOSCOPY N/A 11/22/2021    Procedure: COLONOSCOPY;  Surgeon: Cesar Dorado MD;  Location: [facility] ENDO (2ND FLR);  Service: Endoscopy;  Laterality: N/A;    CYSTOSCOPIC LITHOLAPAXY  5/25/2021    Procedure: CYSTOLITHOLAPAXY;  Surgeon: Chris Schilling MD;  Location: [facility] OR [address] [place] [address];  Service: Urology;;    CYSTOSCOPY  8/26/2021    Procedure: CYSTOSCOPY;  Surgeon: Camilo Kelley Jr., MD;  Location: [facility] OR [address] [place] [address];  Service: Urology;;    CYSTOSCOPY W/ URETERAL STENT PLACEMENT Bilateral 5/25/2021    Procedure: CYSTOSCOPY, WITH BILATERAL URETERAL STENT INSERTION;  Surgeon: Chris Schilling MD;  [facility];  Service: Urology;  Laterality: Bilateral;    ELBOW ARTHROPLASTY Right     FLUOROSCOPY  5/25/2021    Procedure: FLUOROSCOPY;  Surgeon: Chris Schilling MD;  Location:  [facility] OR 1ST FLR;  Service: Urology;;    LASER LITHOTRIPSY Left 8/26/2021    Procedure: LITHOTRIPSY, USING LASER;  Surgeon: Camilo Kelley Jr., MD;  [facility] OR [facility] FLR;  Service: Urology;  Laterality: Left;    PYELOSCOPY Bilateral 8/26/2021    Procedure: PYELOSCOPY;  Surgeon: Camilo Kelley Jr., MD;  [facility];  Service: Urology;  Laterality: Bilateral;    REMOVAL OF BLOOD CLOT  5/25/2021    Procedure: REMOVAL, BLOOD CLOT;  Surgeon: Chris Schilling MD;  [facility];  Service: Urology;;    REPLACEMENT OF STENT Bilateral 8/26/2021    Procedure: REPLACEMENT, STENT;  Surgeon: Camilo Kelley Jr., MD;  [facility];  Service: Urology;  Laterality: Bilateral;    URETEROSCOPIC REMOVAL OF URETERIC CALCULUS Bilateral 8/26/2021    Procedure: REMOVAL, CALCULUS, URETER, URETEROSCOPIC;  Surgeon: Camilo Kelley Jr., MD;  [facility];  Service: Urology;  Laterality: Bilateral;    URETEROSCOPY Bilateral 8/26/2021    Procedure: URETEROSCOPY;  Surgeon: Camilo Kelley Jr., MD;  Location: [facility] OR [address] [place] [address];  Service: Urology;  Laterality: Bilateral;     Family History   Problem Relation Age of Onset    Stroke Mother     Mental illness Father      Social History     Socioeconomic History    Marital status: Single   Tobacco Use    Smoking status: Former Smoker    Smokeless tobacco: Never Used   Substance and Sexual Activity    Alcohol use: Yes     Comment: 1/ pint maey daily    Drug use: No     Current Outpatient Medications:     atorvastatin (LIPITOR) 40 MG tablet, Take 40 mg by mouth once daily., Disp: , Rfl:     cyproheptadine (PERIACTIN) 4 mg tablet, Take 4 mg by mouth 3 (three) times daily., Disp: , Rfl:     docusate sodium (COLACE ORAL), Take by mouth., Disp: , Rfl:     escitalopram oxalate (LEXAPRO) 10 MG tablet, Take 20 mg by mouth once daily. , Disp: , Rfl:     folic acid (FOLVITE) 1 MG tablet, Take 1 mg by mouth once daily., Disp: , Rfl:      "gabapentin (NEURONTIN) 300 MG capsule, Take 300 mg by mouth 3 (three) times daily., Disp: , Rfl:     hydrOXYzine pamoate (VISTARIL) 25 MG Cap, Take 25 mg by mouth 3 (three) times daily., Disp: , Rfl:     metformin (GLUCOPHAGE) 500 MG tablet, Take 500 mg by mouth daily with breakfast., Disp: , Rfl:     mirtazapine (REMERON) 30 MG tablet, Take 30 mg by mouth nightly., Disp: , Rfl:     tamsulosin (FLOMAX) 0.4 mg Cap, Take 2 capsules (0.8 mg total) by mouth once daily., Disp: 60 capsule, Rfl: 11    temazepam (RESTORIL) 22.5 MG capsule, Take 30 mg by mouth nightly as needed for Insomnia., Disp: , Rfl:     Allergies:  Patient has no known allergies.    Objective:     Vital signs:  Blood pressure 115/73, pulse 99.    Neurological examination:  Mental status: Mr. Jewell was noted to be awake, alert, and oriented to person, place, and time. Attention and concentration were intact. Knowledge of current events was limited.  He spoke minimally during the evaluation.  Language: Mr. Jewell's speech was nonspontaneous but was intact to sentences of varying length and complexity. No agrammatism, paraphasic errors, or neologisms were noted. Comprehension and repetition were intact. He named 6/6 objects on a confrontation naming task. He was able to identify fragmented letters ("E N K") without issue.  Cranial nerves: Pupils were equal, round, and reactive to light bilaterally. Ocular pursuit was choppy. Extraocular movements were full with slow saccade initiation but normal velocity and amplitude horizontally and vertically. Square-wave jerks were not present. There was no eyelid dysfunction. Facial sensations were intact to light touch bilaterally. Facial strength was full bilaterally.  Facial expression was blunted. Hearing was grossly intact. The tongue protruded in the midline with intact movement bilaterally. There were no tongue fasciculations. The soft palate elevated symmetrically. Trapezius muscle strength was full " "bilaterally. Speech was normal with no slurring.  Motor examination: Bulk was decreased in the lower extremities. There was no pronator drift noted. Strength was 5/5 in the BUE and RLE, 0/5 in LLE (did not attempt to move LLE). No fasciculation or myoclonus was noted. No tremor, chorea, or alien limb was observed.  Sensory examination: Sensations were intact to light touch throughout, vibratory sense was decreased below ankle.   Reflexes: Reflexes are unremarkable except for left patellar and Achilles which were 3+. There was no Katzs or grasp reflex noted.  Coordination: Finger to nose was normal with no dysmetria bilaterally.  Station/Gait: Unable to stand 2/2 LLE weakness. In wheelchair.     Mini Mental State Examination (MMSE):  Task Response Score   Orientation to Time        Year Correct       Season Correct       Month Correct       Day Correct       Date Correct Total: 5/5   Orientation to Place        Location Correct       State Correct       Parish Correct       City Correct       Floor Correct Total: 5/5   Immediate Recall        Sabianism Correct       Keren Correct       Red Correct Total: 3/3   Attention        D Correct       L Correct       R Correct       O Correct       W Correct Total: 5/5   Naming        Watch Correct       Pencil Correct Total: 2/2   Repetition        "No ifs, ands, or buts." Correct Total: 1/1   Delayed Recall        Sabianism Incorrect       Keren Correct       Red Incorrect Total: 1/3   3-Step Command        Takes paper in right hand Correct       Folds paper in half Correct       Puts paper on floor Correct Total: 3/3   Reading        Closes eyes Correct Total: 1/1   Writing        Writes sentence Correct Total: 1/1   Copying        Draws polygons Incorrect Total: 0/1          Total Score: 27/30     Neuroimaging:  Results for orders placed or performed during the hospital encounter of 09/01/16   CT Head Without Contrast    Narrative    CT head without.    Findings: The brain " is normally formed and significant for periventricular and subcortical hypoattenuation consistent with microvascular ischemic change.  There is a remote right frontal infarct.  There are remote infarcts involving the right lentiform nucleus. The ventricular system is enlarged consistent with involutional change.  The visualized extracranial structures are unremarkable.    Impression     Advanced involutional change given patient's age.  ______________________________________     Electronically signed by: JOSE CRAFT MD  Date:     09/01/16  Time:    16:30      Laboratory studies:  No visits with results within 6 Week(s) from this visit.   Latest known visit with results is:   No results displayed because visit has over 200 results.        I performed a total of 143 minutes on Mr. Jewell's care on the day of their visit excluding time spent related to any billed procedures. This time includes time spent with the patient as well as time spent documenting in the medical record, reviewing patient's records and tests, obtaining history, placing orders, communicating with other healthcare professionals, counseling the patient, family, or caregiver, and/or care coordination for the diagnoses above.    This note was dictated using [employer] Fluency speech recognition software. Please excuse any spelling or grammatical errors. Word recognition mistakes are occasionally missed on review.

## 2022-03-15 LAB — HIV 1+2 AB+HIV1 P24 AG SERPL QL IA: NEGATIVE

## 2022-03-16 LAB — TREPONEMA PALLIDUM IGG+IGM AB [PRESENCE] IN SERUM OR PLASMA BY IMMUNOASSAY: NONREACTIVE

## 2022-03-18 LAB
METHYLMALONATE SERPL-SCNC: 1.15 UMOL/L
VIT B1 BLD-MCNC: 50 UG/L (ref 38–122)

## 2022-03-21 ENCOUNTER — TELEPHONE (OUTPATIENT)
Dept: NEUROLOGY | Facility: CLINIC | Age: 77
End: 2022-03-21
Payer: MEDICARE

## 2022-03-21 NOTE — TELEPHONE ENCOUNTER
----- Message from Sarah Roth sent at 3/21/2022 10:27 AM CDT -----  Contact: Sil (caregiver) @ [phone]  Pt was seen on 3-14-22.  Pts caregiver says she would like to bring pt to [facility] for his MRI.  Pls call with an appt.

## 2022-03-21 NOTE — TELEPHONE ENCOUNTER
Call returned to Sil. States pt's pacer is MRI compatible and all info is indicated in Epic, as pacer was placed by [facility] provider. Record review confirms this. Appt scheduled at [facility] on 3/23 @ 8am. Call placed to MRI , Veronique, states MRI is scheduled correctly even though pt has pacer.

## 2022-03-23 ENCOUNTER — HOSPITAL ENCOUNTER (OUTPATIENT)
Dept: RADIOLOGY | Facility: HOSPITAL | Age: 77
Discharge: HOME | End: 2022-03-23
Attending: PSYCHIATRY & NEUROLOGY
Payer: MEDICARE

## 2022-03-23 DIAGNOSIS — R41.89 COGNITIVE IMPAIRMENT: ICD-10-CM

## 2022-04-06 ENCOUNTER — DOCUMENTATION ONLY (OUTPATIENT)
Dept: CARDIOLOGY | Facility: HOSPITAL | Age: 77
End: 2022-04-06
Payer: MEDICARE

## 2022-04-06 NOTE — PROGRESS NOTES
[employer] ProMRI Cardiology checklist for device programming pre MRI:    The patient's medical record was reviewed for the following:    A ProMRI® pacemaker or ICD system has been implanted pectorally    Leads have been implanted for at least six weeks    No additional active or abandoned cardiac implants like leads or wires, lead extenders or adapters  are present    Other active or passive implants are permitted if MR-conditional; metal implantable devices larger than 5 cm must be 4 cm or greater distance from the ProMRI® lead    Device threshold does not exceed 2.0 V at 0.4 ms pulse width    Device is functioning normally      The battery status is neither ARIANE nor EOS    The patients ProMRI® system will be programmed by the industry represeentative to a mode suitable for MRI.    Pacing parameters should be programmed as such: (select one and delete those not applicable)    DOO: (for dual chamber and CRT devices) program base rate + 10 bpm above the patients intrinsic sinus rate or +10 bpm above programmed base rate if dual pacing is noted

## 2022-04-06 NOTE — PROGRESS NOTES
Received a call/message from Juju in the MRI Department in relation to this patient needing to be scheduled for a MRI and has a Biotron[employer] Pacemaker.  Patient's Device is MRI compatible/conditional.   Okay to have MRI.    To meet protocol the Pacemaker/ICD must have been implanted no less than 6 weeks prior to scheduled date of Scan.  ICD/PPM and leads must be from same .  MRI informed ordering MD must input a Cardiac Device Check in clinic and hospital order, patient must have an xray within 6 months or less prior to MRI reprogramming.  Chest xray to be reviewed by Radiologist.

## 2022-04-22 ENCOUNTER — HOSPITAL ENCOUNTER (OUTPATIENT)
Dept: RADIOLOGY | Facility: HOSPITAL | Age: 77
Discharge: HOME | End: 2022-04-22
Attending: PSYCHIATRY & NEUROLOGY
Payer: MEDICARE

## 2022-04-22 ENCOUNTER — DOCUMENTATION ONLY (OUTPATIENT)
Dept: CARDIOLOGY | Facility: HOSPITAL | Age: 77
End: 2022-04-22

## 2022-04-22 ENCOUNTER — DOCUMENTATION ONLY (OUTPATIENT)
Dept: CARDIOLOGY | Facility: HOSPITAL | Age: 77
End: 2022-04-22
Payer: MEDICARE

## 2022-04-22 VITALS — HEART RATE: 100 BPM | RESPIRATION RATE: 16 BRPM

## 2022-04-22 DIAGNOSIS — R41.89 COGNITIVE IMPAIRMENT: ICD-10-CM

## 2022-04-22 PROCEDURE — 70551 MRI BRAIN WITHOUT CONTRAST: ICD-10-PCS | Mod: 26,,, | Performed by: RADIOLOGY

## 2022-04-22 PROCEDURE — 70551 MRI BRAIN STEM W/O DYE: CPT | Mod: 26,,, | Performed by: RADIOLOGY

## 2022-04-22 PROCEDURE — 70551 MRI BRAIN STEM W/O DYE: CPT | Mod: TC

## 2022-04-22 PROCEDURE — 71046 X-RAY EXAM CHEST 2 VIEWS: CPT | Mod: 26,,, | Performed by: RADIOLOGY

## 2022-04-22 PROCEDURE — 71046 XR CHEST PA AND LATERAL: ICD-10-PCS | Mod: 26,,, | Performed by: RADIOLOGY

## 2022-04-22 PROCEDURE — 71046 X-RAY EXAM CHEST 2 VIEWS: CPT | Mod: TC,FY

## 2022-04-22 RX ORDER — TAMSULOSIN HYDROCHLORIDE 0.4 MG/1
CAPSULE ORAL
Qty: 60 CAPSULE | Refills: 11 | Status: SHIPPED | OUTPATIENT
Start: 2022-04-22 | End: 2023-05-18

## 2022-04-22 NOTE — TELEPHONE ENCOUNTER
Please see the attached refill request.    You saw him in residents' clinic and didn't refill his flomax.

## 2022-04-22 NOTE — NURSING
Patient has arrived for MRI pacemaker.  Pacemaker rep. At patients bedside programming patient for scan.

## 2022-05-02 ENCOUNTER — TELEPHONE (OUTPATIENT)
Dept: NEUROLOGY | Facility: CLINIC | Age: 77
End: 2022-05-02
Payer: MEDICARE

## 2022-05-06 ENCOUNTER — OFFICE VISIT (OUTPATIENT)
Dept: NEUROLOGY | Facility: CLINIC | Age: 77
End: 2022-05-06
Payer: MEDICARE

## 2022-05-06 DIAGNOSIS — F01.518 VASCULAR DEMENTIA WITH BEHAVIOR DISTURBANCE: Primary | ICD-10-CM

## 2022-05-06 DIAGNOSIS — I69.314 FRONTAL LOBE AND EXECUTIVE FUNCTION DEFICIT FOLLOWING CEREBRAL INFARCTION: ICD-10-CM

## 2022-05-06 PROCEDURE — 1157F PR ADVANCE CARE PLAN OR EQUIV PRESENT IN MEDICAL RECORD: ICD-10-PCS | Mod: CPTII,95,, | Performed by: PSYCHIATRY & NEUROLOGY

## 2022-05-06 PROCEDURE — 1157F ADVNC CARE PLAN IN RCRD: CPT | Mod: CPTII,95,, | Performed by: PSYCHIATRY & NEUROLOGY

## 2022-05-06 PROCEDURE — 99442 PR PHYSICIAN TELEPHONE EVALUATION 11-20 MIN: CPT | Mod: 95,,, | Performed by: PSYCHIATRY & NEUROLOGY

## 2022-05-06 PROCEDURE — 99442 PR PHYSICIAN TELEPHONE EVALUATION 11-20 MIN: ICD-10-PCS | Mod: 95,,, | Performed by: PSYCHIATRY & NEUROLOGY

## 2022-05-08 NOTE — PROGRESS NOTES
[facility]    Name: Praneeth Jewell  : 1945  MRN: [id number]    Date: 2022      Established Patient - Telehealth Audio-only Visit    Assessment:     Mr. Jewell is a 76 y.o. right-handed male with a history of R PARDEEP stroke, HTN, HLD, DM2, heart block s/p pacemaker, recurrent nephrolithiasis with hydronephrosis s/p b/l ureteral stents and lithotripsy, MDD, folate deficiency who presents to the [facility] due to cognitive deficits.  Patient was initially seen at the [facility] on 2022.  Patient had onset of cognitive symptoms in  after having a right PARDEEP stroke.  Since stroke, patient has experienced a number of cognitive and behavioral changes including trouble with memory, executive functioning, apathy, and depression.  Additionally, he has been unable to move his LLE since his stroke and requires a wheelchair.  Around May 2021 the patient reportedly experienced a greater degree of cognitive impairment that has worsened over time and waxes and wanes in severity.  He has also exhibited increased cognitive slowing, executive dysfunction, irritability, combativeness, and apathy.  Additionally, he has had worsening of urge incontinence.    On initial evaluation (2022), MMSE was 27/30.  Laboratory studies over the past 6 months or unremarkable for potential causes of persistent cognitive impairment.  MRI brain was performed on 22 and was notable for diffuse volume loss predominantly involving the frontal lobes.  There is a superimposed chronic infarc in a right PARDEEP distribution involving the superior frontal gyrus, medial frontal gyrus, and cingulate extending from the anterior aspect of the frontal lobe to the central sulcus.  There are T2/FLAIR hyperintensities involving the periventricular and deep white matter likely secondary to chronic microvascular disease.  Additionally, there are some findings consistent with NPH, including  ventriculomegaly with an increased Taylor index, acute callosal angle, focal bulging of the roof of the lateral ventricles, dilatation of the third ventricle, and dilatation of the temporal horns.  That being said, findings are confounded by stroke.  Acute callosal angle is likely attributable to ex vacuo dilatation of the right lateral ventricle due to stroke.    The most likely cause of cognitive impairment is from the patient's stroke in 2015. The stroke was in the right PARDEEP distribution and impacted the superior frontal gyrus, medial frontal gyrus, and cingulate extending from the anterior aspect of the frontal lobe to the central sulcus.  The involvement of the aPFC and dmPFC would be expected to impact a number of cognitive functions including executive functioning, planning, problem solving, judgment, attention, and working memory, among others.  Unfortunately, the effectiveness of medications to help with behavior will likely be limited in this case.    Recommendations:     Workup   Will discuss possibility of NPH with neurosurgery.    Treatment  · Recommend avoiding cholinesterase inhibitors and NMDA antagonist.  Evidence supporting the use of these medications in vascular dementia is minimal.  · Recommend continuing Lexapro 20 mg daily and Remeron 30 mg qHS. These medications can be helpful with behavioral symptoms that occur with dementia.   Strongly recommend discontinuing Hydroxyzine and Cyproheptidine.  Anticholinergics often contribute to cognitive impairment, particularly in elderly patients.  Strongly recommend PCP discontinue Restoril.  Benzodiazepines often contribute to cognitive impairment.  Recommend PCP consider Suvorexant 10 mg qHS for insomnia.  If necessary, can increase by 5 mg every few days to a max dose of 20 mg qHS.  Suvorexant is extremely effective, well-tolerated in those over 65 with minimal adverse reactions in clinical trials, and non addictive.  In clinical trials it was shown  to improve total sleep time, sleep latency, waking after sleep onset, and quality of sleep, and the only adverse reaction that clearly  from placebo was morning grogginess (Suvorexant 7%, placebo 3%).  Per the American Academy of Sleep Medicine (AASM) guidelines (Luciano et al, J Clin Sleep Med, 2017;13(2):307-349), Suvorexant is the only reasonable choice for the treatment of sleep maintenance insomnia in those over 65, particularly in the setting of cognitive impairment.  Benzodiazepines (e.g., Triazolam, Temazepam), Z drugs (e.g., Zolpidem, Eszopiclone), and anticholinergic medications (e.g., Doxepin) to treat insomnia are listed on the AGS Beers Criteria (AGS Beers Criteria Update Expert Panel, J Am Geriatr Soc, 2019;67(4):674-694) and not recommended in those over 65, particularly in those with cognitive impairment.  These medications increase the risk for falls and worsening cognition.  Zaleplon and Ramelteon are only indicated for sleep-onset insomnia, which is not the problem in Mr. Jewell's case.   Will message PCP regarding aspirin.   In general, recommend avoiding benzodiazepines, anticholinergics, and opioids as these may worsen cognition.    Referrals   Social work    Safety-related   Patient requires 24 hour supervision due to cognitive and physical impairment.    Health maintenance    Continue to follow-up with primary care doctor to monitor and treat vascular risk factors.   Recommend performing moderate-intensity cardiovascular exercise as able, ideally 30 minutes per day, 5 days per week.   Recommend staying socially and cognitively active.   Recommend eating a healthy and balanced diet (Mediterranean or DASH diet).    Follow-up: Follow up in about 6 months (around 11/6/2022). I provided Mr. Jewell and his family with our contact information should they have any questions or concerns.      Hao Borden MD   Behavioral Neurology & Neuropsychiatry  [facility]    Subjective:      Chief complaint:  Memory Deficits and Aggressive Behavior    History of present illness:  Mr. Jewell is a 76 y.o. male with a history of R PARDEEP stroke, HTN, HLD, DM2, heart block s/p pacemaker, recurrent nephrolithiasis with hydronephrosis s/p b/l ureteral stents and lithotripsy, MDD, folate deficiency who presents today to the [facility] for follow up regarding cognitive impairment and behavioral symptoms.  Mr. Jewell is accompanied by his family who participates in providing history.  Additional information is obtained by reviewing available medical records.     HPI from initial evaluation on 03/14/2022:  Patient began experiencing trouble with cognition in 2015 after a right PARDEEP infarct.  Since that time, he has experienced trouble with cognition and been unable walk due to LLE weakness.  Since around May 2021, the patient has had progressively worsening cognitive deficits and behavioral changes.  His family says that he has trouble recalling recent events and conversations, and often repeats questions and statements.  He requires reminders to remember to take medication and often loses items around his home.  His cognitive processing speed has slowed substantially and he has more difficulty with attention, concentration, and problem solving.  He has experienced a number of behavioral changes since May 2021 including increased irritability, combativeness, apathy, substantially decreased interest in engaging with others, and decreased appetite.  His family says that the patient will throw pillows at them, has thrown the TV remote control, and has broke the railing on his bed.  Additionally, the patient has had more urinary urgency and incontinence over the past year.  Of note, the patient was hospitalized in May 2021 due to bilateral nephrolithiasis with hydronephrosis and underwent bilateral ureteral stent placement and cystolitholapaxy.  Patient had recurrent  nephrolithiasis and plan was for patient undergo uteroscopy and lithotripsy in June 2021, however, the patient had recurrent UTIs and was not able to undergo procedure until August 2021.  Patient presented for uteroscopy and lithotripsy on 08/24/21 but was found to have second-degree AV block.  Patient was admitted to Cardiology and pacemaker was placed.  Patient was finally able to undergo lithotripsy and replacement of bilateral ureteral stents on 08/26/2021.    Interval history (05/06/2022):  No significant changes since initial evaluation in March 2022.  Patient reportedly continues to experience cognitive impairment and behavioral changes.  Discussed MRI with patient and family.  MRI brain was performed on 04/22/22 and was notable for diffuse volume loss predominantly involving the frontal lobes.  There is a superimposed chronic infarc in a right PARDEEP distribution involving the superior frontal gyrus, medial frontal gyrus, and cingulate extending from the anterior aspect of the frontal lobe to the central sulcus.  There are T2/FLAIR hyperintensities involving the periventricular and deep white matter likely secondary to chronic microvascular disease.  Additionally, there are some findings consistent with NPH, including ventriculomegaly with an increased Taylor index, acute callosal angle, focal bulging of the roof of the lateral ventricles, dilatation of the third ventricle, and dilatation of the temporal horns.  That being said, findings are confounded by stroke.  Acute callosal angle is likely due to ex vacuo dilatation of the right lateral ventricle.    Review of systems:  Negative except as noted in the HPI.    Past Medical History:   Diagnosis Date    Arthritis     Diabetes mellitus     type 2    Folate deficiency anemia     Heart block     History of kidney stones 05/25/2021    History of stroke with residual deficit 05/25/2021    Hyperlipidemia     Hypertension     Major depressive disorder      Pacemaker     Biotronic [place] 8 DR-T (S/n: [id number])    Pyelonephritis 11/19/2021    TIA (transient ischemic attack)     Urinary retention 05/25/2021     Past Surgical History:   Procedure Laterality Date    A-V CARDIAC PACEMAKER INSERTION N/A 8/24/2021    Procedure: INSERTION, CARDIAC PACEMAKER, DUAL CHAMBER;  Surgeon: Neo Winn MD;  Location: [facility] EP LAB;  Service: Cardiology;  Laterality: N/A;  CHB, DUAL PPM, ANES, BIO, DM, ED 2    COLONOSCOPY N/A 11/22/2021    Procedure: COLONOSCOPY;  Surgeon: Cesar Dorado MD;  Location: [facility] ENDO (2ND FLR);  Service: Endoscopy;  Laterality: N/A;    CYSTOSCOPIC LITHOLAPAXY  5/25/2021    Procedure: CYSTOLITHOLAPAXY;  Surgeon: Chris Schilling MD;  [facility];  Service: Urology;;    CYSTOSCOPY  8/26/2021    Procedure: CYSTOSCOPY;  Surgeon: Camilo Kelley Jr., MD;  [facility];  Service: Urology;;    CYSTOSCOPY W/ URETERAL STENT PLACEMENT Bilateral 5/25/2021    Procedure: CYSTOSCOPY, WITH BILATERAL URETERAL STENT INSERTION;  Surgeon: Chris Schilling MD;  [facility];  Service: Urology;  Laterality: Bilateral;    ELBOW ARTHROPLASTY Right     FLUOROSCOPY  5/25/2021    Procedure: FLUOROSCOPY;  Surgeon: Chris Schilling MD;  [facility];  Service: Urology;;    LASER LITHOTRIPSY Left 8/26/2021    Procedure: LITHOTRIPSY, USING LASER;  Surgeon: Camilo Kelley Jr., MD;  [facility];  Service: Urology;  Laterality: Left;    PYELOSCOPY Bilateral 8/26/2021    Procedure: PYELOSCOPY;  Surgeon: Camilo Kelley Jr., MD;  [facility];  Service: Urology;  Laterality: Bilateral;    REMOVAL OF BLOOD CLOT  5/25/2021    Procedure: REMOVAL, BLOOD CLOT;  Surgeon: Chris Schilling MD;  [facility];  Service: Urology;;    REPLACEMENT OF STENT Bilateral 8/26/2021    Procedure: REPLACEMENT, STENT;  Surgeon: Camilo Kelley Jr., MD;  Location: [facility] OR [address] [place] [address];  Service: Urology;   "Laterality: Bilateral;    URETEROSCOPIC REMOVAL OF URETERIC CALCULUS Bilateral 8/26/2021    Procedure: REMOVAL, CALCULUS, URETER, URETEROSCOPIC;  Surgeon: Camilo Kelley Jr., MD;  Location: [facility] OR [address] [place] [address];  Service: Urology;  Laterality: Bilateral;    URETEROSCOPY Bilateral 8/26/2021    Procedure: URETEROSCOPY;  Surgeon: Camilo Kelley Jr., MD;  Location: [facility] OR [address] [place] [address];  Service: Urology;  Laterality: Bilateral;     Family History   Problem Relation Age of Onset    Stroke Mother     Hyperlipidemia Mother     Mental illness Father     Parkinsonism Father     No Known Problems Brother      Social History     Socioeconomic History    Marital status: Single    Number of children: 0    Years of education: 15    Highest education level: Some college, no degree   Occupational History     Employer: [employer]    Occupation: Construction     Employer: [employer]     Comment: Retired in past 5-10 years   Tobacco Use    Smoking status: Former Smoker     Types: Cigarettes    Smokeless tobacco: Never Used   Substance and Sexual Activity    Alcohol use: Yes     Comment: 1/ pint whisky daily    Drug use: Yes     Types: "Crack" cocaine     Current Outpatient Medications:     atorvastatin (LIPITOR) 40 MG tablet, Take 40 mg by mouth once daily., Disp: , Rfl:     cyproheptadine (PERIACTIN) 4 mg tablet, Take 4 mg by mouth 3 (three) times daily., Disp: , Rfl:     docusate sodium (COLACE ORAL), Take by mouth., Disp: , Rfl:     escitalopram oxalate (LEXAPRO) 10 MG tablet, Take 20 mg by mouth once daily. , Disp: , Rfl:     folic acid (FOLVITE) 1 MG tablet, Take 1 mg by mouth once daily., Disp: , Rfl:     gabapentin (NEURONTIN) 300 MG capsule, Take 300 mg by mouth 3 (three) times daily., Disp: , Rfl:     hydrOXYzine pamoate (VISTARIL) 25 MG Cap, Take 25 mg by mouth 3 (three) times daily., Disp: , Rfl:     metformin (GLUCOPHAGE) 500 MG tablet, Take 500 mg by mouth daily with breakfast., Disp: , Rfl:     " mirtazapine (REMERON) 30 MG tablet, Take 30 mg by mouth nightly., Disp: , Rfl:     tamsulosin (FLOMAX) 0.4 mg Cap, TAKE 2 CAPSULES(0.8 MG) BY MOUTH EVERY DAY, Disp: 60 capsule, Rfl: 11    temazepam (RESTORIL) 22.5 MG capsule, Take 30 mg by mouth nightly as needed for Insomnia., Disp: , Rfl:     Allergies:  Patient has no known allergies.    Objective:     Vital signs:  Visit conducted via telephone. No vitals were taken.     Neurological exam:  Visit conducted via telephone. Exam was not performed.    Neuroimaging:  Results for orders placed or performed during the hospital encounter of 03/23/22   MRI Brain Without Contrast    Narrative    EXAMINATION:  MRI BRAIN WITHOUT CONTRAST    CLINICAL HISTORY:  Memory loss; Other symptoms and signs involving cognitive functions and awareness    TECHNIQUE:  Multiplanar multisequence MR imaging of the brain was performed without contrast.    COMPARISON:  Head CT 09/01/2016    FINDINGS:  There is diffuse volume loss that is more prominent involving the frontal greater than temporal lobes.  A superimposed chronic right medial frontal PARDEEP infarct is also noted.  Ventricular widening is presumably due to volume loss unless there is clinical suspicion of an element of normal pressure hydrocephalus.  No midline shift herniation or mass effect is identified.    Increased signal periventricular white matter is nonspecific but may reflect moderate chronic small vessel ischemic changes.    Normal arterial flow voids are preserved at the skull base with ectasia and tortuosity of the vasculature which can be seen with underlying hypertension if clinically consistent.    The visualized sinuses and mastoid air cells are essentially clear.      Impression    No acute intracranial process.    Volume loss most notable in the for frontal greater than temporal regions.  Widening of the ventricles may reflect volume loss unless there is clinical suspicion for normal pressure hydrocephalus.   Ventricular widening has mildly increased from 2016    Chronic right frontal infarct with presumed small vessel ischemic changes in the periventricular white matter similar to 2016.      Electronically signed by: Aneesh Muñoz  Date:    04/22/2022  Time:    12:58   Results for orders placed or performed during the hospital encounter of 09/01/16   CT Head Without Contrast    Narrative    CT head without.    Findings: The brain is normally formed and significant for periventricular and subcortical hypoattenuation consistent with microvascular ischemic change.  There is a remote right frontal infarct.  There are remote infarcts involving the right lentiform nucleus. The ventricular system is enlarged consistent with involutional change.  The visualized extracranial structures are unremarkable.    Impression     Advanced involutional change given patient's age.  ______________________________________     Electronically signed by: JOSE CRAFT MD  Date:     09/01/16  Time:    16:30      Laboratory studies:  No visits with results within 6 Week(s) from this visit.   Latest known visit with results is:   Lab Visit on 03/14/2022   Component Date Value Ref Range Status    WBC 03/14/2022 7.57  3.90 - 12.70 K/uL Final    RBC 03/14/2022 4.67  4.60 - 6.20 M/uL Final    Hemoglobin 03/14/2022 13.4 (A) 14.0 - 18.0 g/dL Final    Hematocrit 03/14/2022 43.3  40.0 - 54.0 % Final    MCV 03/14/2022 93  82 - 98 fL Final    MCH 03/14/2022 28.7  27.0 - 31.0 pg Final    MCHC 03/14/2022 30.9 (A) 32.0 - 36.0 g/dL Final    RDW 03/14/2022 15.9 (A) 11.5 - 14.5 % Final    Platelets 03/14/2022 185  150 - 450 K/uL Final    MPV 03/14/2022 11.6  9.2 - 12.9 fL Final    Immature Granulocytes 03/14/2022 0.3  0.0 - 0.5 % Final    Gran # (ANC) 03/14/2022 5.2  1.8 - 7.7 K/uL Final    Immature Grans (Abs) 03/14/2022 0.02  0.00 - 0.04 K/uL Final    Comment: Mild elevation in immature granulocytes is non specific and   can be seen in a variety of  conditions including stress response,   acute inflammation, trauma and pregnancy. Correlation with other   laboratory and clinical findings is essential.      Lymph # 03/14/2022 1.7  1.0 - 4.8 K/uL Final    Mono # 03/14/2022 0.5  0.3 - 1.0 K/uL Final    Eos # 03/14/2022 0.1  0.0 - 0.5 K/uL Final    Baso # 03/14/2022 0.05  0.00 - 0.20 K/uL Final    nRBC 03/14/2022 0  0 /100 WBC Final    Gran % 03/14/2022 68.5  38.0 - 73.0 % Final    Lymph % 03/14/2022 22.5  18.0 - 48.0 % Final    Mono % 03/14/2022 7.1  4.0 - 15.0 % Final    Eosinophil % 03/14/2022 0.9  0.0 - 8.0 % Final    Basophil % 03/14/2022 0.7  0.0 - 1.9 % Final    Differential Method 03/14/2022 Automated   Final    Sodium 03/14/2022 141  136 - 145 mmol/L Final    Potassium 03/14/2022 4.2  3.5 - 5.1 mmol/L Final    Chloride 03/14/2022 102  95 - 110 mmol/L Final    CO2 03/14/2022 27  23 - 29 mmol/L Final    Glucose 03/14/2022 102  70 - 110 mg/dL Final    BUN 03/14/2022 23  8 - 23 mg/dL Final    Creatinine 03/14/2022 1.1  0.5 - 1.4 mg/dL Final    Calcium 03/14/2022 10.1  8.7 - 10.5 mg/dL Final    Total Protein 03/14/2022 6.9  6.0 - 8.4 g/dL Final    Albumin 03/14/2022 3.1 (A) 3.5 - 5.2 g/dL Final    Total Bilirubin 03/14/2022 0.5  0.1 - 1.0 mg/dL Final    Comment: For infants and newborns, interpretation of results should be based  on gestational age, weight and in agreement with clinical  observations.    Premature Infant recommended reference ranges:  Up to 24 hours.............<8.0 mg/dL  Up to 48 hours............<12.0 mg/dL  3-5 days..................<15.0 mg/dL  6-29 days.................<15.0 mg/dL      Alkaline Phosphatase 03/14/2022 89  55 - 135 U/L Final    AST 03/14/2022 13  10 - 40 U/L Final    ALT 03/14/2022 7 (A) 10 - 44 U/L Final    Anion Gap 03/14/2022 12  8 - 16 mmol/L Final    eGFR if African American 03/14/2022 >60.0  >60 mL/min/1.73 m^2 Final    eGFR if non African American 03/14/2022 >60.0  >60 mL/min/1.73 m^2 Final     Comment: Calculation used to obtain the estimated glomerular filtration  rate (eGFR) is the CKD-EPI equation.       HIV 1/2 Ag/Ab 03/14/2022 Negative  Negative Final    Folate 03/14/2022 >40.0 (A) 4.0 - 24.0 ng/mL Final    Homocysteine 03/14/2022 14.0  4.0 - 16.5 umol/L Final    Methlymalonic Acid 03/14/2022 1.15 (A) <0.40 umol/L Final    Comment: If applicable, any drug confirmation testing reported  here was developed and the performance characteristics  determined by [facility]. This   confirmation testing has not been cleared or approved  by the FDA. The laboratory is regulated under CLIA as  qualified to perform high-complexity testing. This test  is used for patient testing purposes. It should not be  regarded as investigational or for research.    Test performed at [facility],  [facility] [place]  [address]     [phone]  Connor Beaulieu MD  - Medical Director      Free T4 03/14/2022 0.98  0.71 - 1.51 ng/dL Final    Syphilis Treponemal Ab 03/14/2022 Nonreactive  Nonreactive Final    Comment: No serologic evidence of infection with T. pallidum  (syphilis).  Repeat testing may be considered in patients  with suspected acute or primary syphilis in 2-4 weeks.    For additional information on interpretation of   the syphilis reverse algorithm and results, see:   [email or web address]    Test Performed by:  [facility]  [address] [place] [address]  : Connor Galeano M.D. Ph.D.; CLIA# [id number]      TSH 03/14/2022 2.398  0.400 - 4.000 uIU/mL Final    Thiamine 03/14/2022 50  38 - 122 ug/L Final    Comment: This test was developed and the performance   characteristics determined by [facility].   It has not been cleared or approved by the FDA.   The laboratory is regulated under CLIA as qualified to   perform high-complexity  testing. This test is used for   patient testing purposes. It should not be regarded   as investigational or for research.    Test performed at [facility],  [address], [place]  [address]     [phone]  Connor Beaulieu MD  - Medical Director      Vitamin B-12 03/14/2022 619  210 - 950 pg/mL Final       I performed a total of 16 minutes on Mr. Jewell's care on the day of their visit excluding time spent related to any billed procedures. This time includes time spent with the patient as well as time spent documenting in the medical record, reviewing patient's records and tests, obtaining history, placing orders, communicating with other healthcare professionals, counseling the patient, family, or caregiver, and/or care coordination for the diagnoses above.    Patient location:  [address], [facility] [address]    I performed this consultation via voice-only telephone connection between my location and the patient's location. Each patient to whom I provide medical services by telemedicine is informed of the relationship between the physician and patient and the respective role of any other health care provider with respect to management of the patient and notified that they may decline to receive medical services by telemedicine and may withdraw from such care at any time. Patient verbally consented to receive this service via voice-only telephone call. The reason for the audio only service rather than synchronous audio and video virtual visit was related to technical difficulties or patient preference/necessity. This service was not originating from a related E/M service provided within the previous 7 days nor will  to an E/M service or procedure within the next 24 hours or my soonest available appointment. Prevailing standard of care was able to be met in this audio-only visit.    This note was dictated using M*Modal Fluency speech recognition software. Please excuse any spelling or  grammatical errors. Word recognition mistakes are occasionally missed on review.

## 2022-06-07 ENCOUNTER — CLINICAL SUPPORT (OUTPATIENT)
Dept: CARDIOLOGY | Facility: HOSPITAL | Age: 77
End: 2022-06-07
Payer: MEDICARE

## 2022-06-07 DIAGNOSIS — Z95.0 PRESENCE OF CARDIAC PACEMAKER: ICD-10-CM

## 2022-06-07 PROCEDURE — 93296 REM INTERROG EVL PM/IDS: CPT | Performed by: INTERNAL MEDICINE

## 2022-06-07 PROCEDURE — 93294 REM INTERROG EVL PM/LDLS PM: CPT | Mod: ,,, | Performed by: INTERNAL MEDICINE

## 2022-06-07 PROCEDURE — 93294 CARDIAC DEVICE CHECK - REMOTE: ICD-10-PCS | Mod: ,,, | Performed by: INTERNAL MEDICINE

## 2022-08-30 ENCOUNTER — TELEPHONE (OUTPATIENT)
Dept: NEPHROLOGY | Facility: CLINIC | Age: 77
End: 2022-08-30
Payer: MEDICARE

## 2022-08-30 DIAGNOSIS — N20.0 NEPHROLITHIASIS: Primary | ICD-10-CM

## 2022-09-05 ENCOUNTER — CLINICAL SUPPORT (OUTPATIENT)
Dept: CARDIOLOGY | Facility: HOSPITAL | Age: 77
End: 2022-09-05
Payer: MEDICARE

## 2022-09-05 DIAGNOSIS — Z95.0 PRESENCE OF CARDIAC PACEMAKER: ICD-10-CM

## 2022-09-05 PROCEDURE — 93296 REM INTERROG EVL PM/IDS: CPT | Performed by: INTERNAL MEDICINE

## 2022-09-14 ENCOUNTER — LAB VISIT (OUTPATIENT)
Dept: LAB | Facility: HOSPITAL | Age: 77
End: 2022-09-14
Attending: NURSE PRACTITIONER
Payer: MEDICARE

## 2022-09-14 DIAGNOSIS — N20.0 NEPHROLITHIASIS: ICD-10-CM

## 2022-09-14 LAB
ALBUMIN SERPL BCP-MCNC: 4 G/DL (ref 3.5–5.2)
ANION GAP SERPL CALC-SCNC: 11 MMOL/L (ref 8–16)
BASOPHILS # BLD AUTO: 0.04 K/UL (ref 0–0.2)
BASOPHILS NFR BLD: 0.7 % (ref 0–1.9)
CALCIUM SERPL-MCNC: 9.7 MG/DL (ref 8.7–10.5)
CHLORIDE SERPL-SCNC: 108 MMOL/L (ref 95–110)
CO2 SERPL-SCNC: 26 MMOL/L (ref 23–29)
CREAT SERPL-MCNC: 0.74 MG/DL (ref 0.5–1.4)
DIFFERENTIAL METHOD: ABNORMAL
EOSINOPHIL # BLD AUTO: 0.1 K/UL (ref 0–0.5)
EOSINOPHIL NFR BLD: 1.1 % (ref 0–8)
ERYTHROCYTE [DISTWIDTH] IN BLOOD BY AUTOMATED COUNT: 13.2 % (ref 11.5–14.5)
EST. GFR  (NO RACE VARIABLE): >60 ML/MIN/1.73 M^2
GLUCOSE SERPL-MCNC: 85 MG/DL (ref 70–110)
HCT VFR BLD AUTO: 48.7 % (ref 40–54)
HGB BLD-MCNC: 15.4 G/DL (ref 14–18)
IMM GRANULOCYTES # BLD AUTO: 0.01 K/UL (ref 0–0.04)
IMM GRANULOCYTES NFR BLD AUTO: 0.2 % (ref 0–0.5)
LYMPHOCYTES # BLD AUTO: 2.4 K/UL (ref 1–4.8)
LYMPHOCYTES NFR BLD: 41.7 % (ref 18–48)
MCH RBC QN AUTO: 31.8 PG (ref 27–31)
MCHC RBC AUTO-ENTMCNC: 31.6 G/DL (ref 32–36)
MCV RBC AUTO: 101 FL (ref 82–98)
MONOCYTES # BLD AUTO: 0.3 K/UL (ref 0.3–1)
MONOCYTES NFR BLD: 4.6 % (ref 4–15)
NEUTROPHILS # BLD AUTO: 2.9 K/UL (ref 1.8–7.7)
NEUTROPHILS NFR BLD: 51.7 % (ref 38–73)
NRBC BLD-RTO: 0 /100 WBC
PHOSPHATE SERPL-MCNC: 3.5 MG/DL (ref 2.7–4.5)
PLATELET # BLD AUTO: 112 K/UL (ref 150–450)
PMV BLD AUTO: 12 FL (ref 9.2–12.9)
POTASSIUM SERPL-SCNC: 4.6 MMOL/L (ref 3.5–5.1)
RBC # BLD AUTO: 4.84 M/UL (ref 4.6–6.2)
SODIUM SERPL-SCNC: 145 MMOL/L (ref 136–145)
UUN UR-MCNC: 23 MG/DL (ref 2–20)
WBC # BLD AUTO: 5.64 K/UL (ref 3.9–12.7)

## 2022-09-14 PROCEDURE — 80069 RENAL FUNCTION PANEL: CPT | Mod: PO | Performed by: NURSE PRACTITIONER

## 2022-09-14 PROCEDURE — 36415 COLL VENOUS BLD VENIPUNCTURE: CPT | Mod: PO | Performed by: NURSE PRACTITIONER

## 2022-09-14 PROCEDURE — 85025 COMPLETE CBC W/AUTO DIFF WBC: CPT | Mod: PO | Performed by: NURSE PRACTITIONER

## 2022-09-23 ENCOUNTER — TELEPHONE (OUTPATIENT)
Dept: NEPHROLOGY | Facility: CLINIC | Age: 77
End: 2022-09-23

## 2022-09-23 ENCOUNTER — OFFICE VISIT (OUTPATIENT)
Dept: NEPHROLOGY | Facility: CLINIC | Age: 77
End: 2022-09-23
Payer: MEDICARE

## 2022-09-23 VITALS
SYSTOLIC BLOOD PRESSURE: 100 MMHG | BODY MASS INDEX: 26.51 KG/M2 | HEIGHT: 67 IN | DIASTOLIC BLOOD PRESSURE: 60 MMHG | HEART RATE: 103 BPM | WEIGHT: 168.88 LBS | OXYGEN SATURATION: 97 %

## 2022-09-23 DIAGNOSIS — N20.0 NEPHROLITHIASIS: Primary | ICD-10-CM

## 2022-09-23 DIAGNOSIS — I95.9 HYPOTENSION, UNSPECIFIED HYPOTENSION TYPE: ICD-10-CM

## 2022-09-23 PROCEDURE — 99999 PR PBB SHADOW E&M-EST. PATIENT-LVL IV: CPT | Mod: PBBFAC,,, | Performed by: NURSE PRACTITIONER

## 2022-09-23 PROCEDURE — 3008F PR BODY MASS INDEX (BMI) DOCUMENTED: ICD-10-PCS | Mod: CPTII,S$GLB,, | Performed by: NURSE PRACTITIONER

## 2022-09-23 PROCEDURE — 1160F RVW MEDS BY RX/DR IN RCRD: CPT | Mod: CPTII,S$GLB,, | Performed by: NURSE PRACTITIONER

## 2022-09-23 PROCEDURE — 3074F SYST BP LT 130 MM HG: CPT | Mod: CPTII,S$GLB,, | Performed by: NURSE PRACTITIONER

## 2022-09-23 PROCEDURE — 1101F PR PT FALLS ASSESS DOC 0-1 FALLS W/OUT INJ PAST YR: ICD-10-PCS | Mod: CPTII,S$GLB,, | Performed by: NURSE PRACTITIONER

## 2022-09-23 PROCEDURE — 3008F BODY MASS INDEX DOCD: CPT | Mod: CPTII,S$GLB,, | Performed by: NURSE PRACTITIONER

## 2022-09-23 PROCEDURE — 3288F PR FALLS RISK ASSESSMENT DOCUMENTED: ICD-10-PCS | Mod: CPTII,S$GLB,, | Performed by: NURSE PRACTITIONER

## 2022-09-23 PROCEDURE — 99999 PR PBB SHADOW E&M-EST. PATIENT-LVL IV: ICD-10-PCS | Mod: PBBFAC,,, | Performed by: NURSE PRACTITIONER

## 2022-09-23 PROCEDURE — 3074F PR MOST RECENT SYSTOLIC BLOOD PRESSURE < 130 MM HG: ICD-10-PCS | Mod: CPTII,S$GLB,, | Performed by: NURSE PRACTITIONER

## 2022-09-23 PROCEDURE — 3066F PR DOCUMENTATION OF TREATMENT FOR NEPHROPATHY: ICD-10-PCS | Mod: CPTII,S$GLB,, | Performed by: NURSE PRACTITIONER

## 2022-09-23 PROCEDURE — 1126F PR PAIN SEVERITY QUANTIFIED, NO PAIN PRESENT: ICD-10-PCS | Mod: CPTII,S$GLB,, | Performed by: NURSE PRACTITIONER

## 2022-09-23 PROCEDURE — 3078F DIAST BP <80 MM HG: CPT | Mod: CPTII,S$GLB,, | Performed by: NURSE PRACTITIONER

## 2022-09-23 PROCEDURE — 1101F PT FALLS ASSESS-DOCD LE1/YR: CPT | Mod: CPTII,S$GLB,, | Performed by: NURSE PRACTITIONER

## 2022-09-23 PROCEDURE — 99215 OFFICE O/P EST HI 40 MIN: CPT | Mod: S$GLB,,, | Performed by: NURSE PRACTITIONER

## 2022-09-23 PROCEDURE — 99215 PR OFFICE/OUTPT VISIT, EST, LEVL V, 40-54 MIN: ICD-10-PCS | Mod: S$GLB,,, | Performed by: NURSE PRACTITIONER

## 2022-09-23 PROCEDURE — 1157F ADVNC CARE PLAN IN RCRD: CPT | Mod: CPTII,S$GLB,, | Performed by: NURSE PRACTITIONER

## 2022-09-23 PROCEDURE — 1159F PR MEDICATION LIST DOCUMENTED IN MEDICAL RECORD: ICD-10-PCS | Mod: CPTII,S$GLB,, | Performed by: NURSE PRACTITIONER

## 2022-09-23 PROCEDURE — 3288F FALL RISK ASSESSMENT DOCD: CPT | Mod: CPTII,S$GLB,, | Performed by: NURSE PRACTITIONER

## 2022-09-23 PROCEDURE — 3078F PR MOST RECENT DIASTOLIC BLOOD PRESSURE < 80 MM HG: ICD-10-PCS | Mod: CPTII,S$GLB,, | Performed by: NURSE PRACTITIONER

## 2022-09-23 PROCEDURE — 1160F PR REVIEW ALL MEDS BY PRESCRIBER/CLIN PHARMACIST DOCUMENTED: ICD-10-PCS | Mod: CPTII,S$GLB,, | Performed by: NURSE PRACTITIONER

## 2022-09-23 PROCEDURE — 1157F PR ADVANCE CARE PLAN OR EQUIV PRESENT IN MEDICAL RECORD: ICD-10-PCS | Mod: CPTII,S$GLB,, | Performed by: NURSE PRACTITIONER

## 2022-09-23 PROCEDURE — 3066F NEPHROPATHY DOC TX: CPT | Mod: CPTII,S$GLB,, | Performed by: NURSE PRACTITIONER

## 2022-09-23 PROCEDURE — 1126F AMNT PAIN NOTED NONE PRSNT: CPT | Mod: CPTII,S$GLB,, | Performed by: NURSE PRACTITIONER

## 2022-09-23 PROCEDURE — 1159F MED LIST DOCD IN RCRD: CPT | Mod: CPTII,S$GLB,, | Performed by: NURSE PRACTITIONER

## 2022-09-23 NOTE — PROGRESS NOTES
Subjective:   Patient ID: Praneeth Jewell is a 76 y.o. White male who presents for f/u of nephrolithiasis      HPI   was seen in clinic today for new patient evaluation of nephrolithiasis. He was referred by urologist, Dr. Schilling. Since this was his first visit with me, I reviewed his prior pertinent medical chart. He arrived in the clinic accompanied by his care taker who was also providing information. Pt arrived in a wheelchair.    He has hypertension, diabetes, CVA with residual deficits, heart block, s/p pacemaker and other medical problems.     He has nephrolithiasis and has had episodes of UTI. He was treated with Ertapenem for MDR proteus UTI. In august 2021 he was admitted with bradycardia, diagnosed with complete heart block. He underwent PPM placement. He had bilateral hydronephrosis due to bilateral nephrolithiasis. He is s/p bilateral ureteral stent placement. He has had multiple prior UTI due to providencia, ESBL proteus mirabilis, MDR proteus. He has established follow up with urology for bladder stones, bilateral nephrolithiasis, urinary retention, he is s/p JJ stent placement, clot evac and cystolitholapaxy. During hospitalization, patient had a large amount of urinary retention for which mark catheter ws placed.     Per urology note, all of his stone burden was removed. Stone analysis report  Comment: 80% Ammonium urate   20% Calcium oxalate monohydrate         He denies a recent passage of kidney stones. He recollects having an episode of kidney stones several years ago and believes he did not require any specific treatment or intervention that time.     Currently not having any problems with urine flow/ bladder emptying and does not report of any symptoms to suggest UTI. He is known to have diabetes. He has had severe urinary retention in the recent past. He appears to have prior episodes of GURVINDER, suspect those were in the context of obstructive uropathy. He has prior intake of NSAID but  "none recently. He does not have a recent exposure to IV iodine contrast.    His caregiver reports him being incontinent and requests we have home health team place a mark catheter for 24 hour urine collection.    Update 9/23/22:  New to me. Previously followed by Dr. Lockwood.  Was supposed to f/u in 2 mos but did not.  Presents today for f/u of nephrolithiasis. Presents with caregiver, Sil, who has been taking care of him since 2016.  Baseline sCr 0.7-0.8 mg/dL.  Reports that he stays in bed at home.  Home BPs: SBP in 110s.  No longer has home health.    Recent stone episode: Patient denies  No recent UA.  Previous supersaturation profile was not completed due to pH > 8.0.      Review of Systems   Respiratory:  Negative for shortness of breath.    Cardiovascular:  Negative for leg swelling.   Gastrointestinal:  Negative for diarrhea, nausea and vomiting.   Endocrine: Negative for polydipsia and polyuria.   Genitourinary:  Negative for dysuria, flank pain, frequency and hematuria.        Incontinent of urine   Musculoskeletal:  Positive for gait problem (unable to walk).   Neurological:  Negative for dizziness, weakness and light-headedness.       Objective:   /60 Comment: left arm  Pulse 103   Ht 5' 7" (1.702 m)   Wt 76.6 kg (168 lb 14 oz) Comment: unable to weigh patient today  SpO2 97%   BMI 26.45 kg/m²       Physical Exam  Vitals reviewed.   Constitutional:       General: He is not in acute distress.     Appearance: He is well-developed. He is not diaphoretic.      Comments: Appears underweight and malnourished   HENT:      Head: Normocephalic.   Neck:      Thyroid: No thyromegaly.      Vascular: No JVD.   Cardiovascular:      Rate and Rhythm: Normal rate and regular rhythm.      Heart sounds: Normal heart sounds.   Pulmonary:      Effort: Pulmonary effort is normal. No respiratory distress.      Breath sounds: Normal breath sounds. No wheezing or rales.   Abdominal:      Tenderness: There is no " right CVA tenderness or left CVA tenderness.   Musculoskeletal:      Cervical back: Normal range of motion and neck supple.      Right lower leg: No edema.      Left lower leg: No edema.   Skin:     General: Skin is warm and dry.   Neurological:      Mental Status: He is alert and oriented to person, place, and time.      Comments: Sitting in a wheelchair    Psychiatric:         Behavior: Behavior normal.     Lab Results   Component Value Date    CREATININE 0.74 09/14/2022    URICACID Test Not Performed 09/24/2021     No results found for: UTPCR  Lab Results   Component Value Date     09/14/2022    K 4.6 09/14/2022    CO2 26 09/14/2022     09/14/2022     Lab Results   Component Value Date    CALCIUM 9.7 09/14/2022    PHOS 3.5 09/14/2022     Lab Results   Component Value Date    HGB 15.4 09/14/2022    WBC 5.64 09/14/2022    HCT 48.7 09/14/2022      Lab Results   Component Value Date    HGBA1C 4.7 11/20/2021     (L) 09/14/2022    BUN 23 (H) 09/14/2022     No results found for: LDLCALC      Assessment:     1. Hypotension, unspecified hypotension type    2. Nephrolithiasis          Plan:       Problem List Items Addressed This Visit          Renal/    Nephrolithiasis    Relevant Orders    CBC Auto Differential    Renal Function Panel    Urinalysis     Other Visit Diagnoses       Hypotension, unspecified hypotension type    -  Primary          Mr. Jewell has nephrolithiasis causing obstructive uropathy, multiple drug resistant UTI episodes. He has had GURVINDER in the past.    He has ammonium urate and calcium oxalate found in stone analysis. Per chart review, it appears that he had uric acid stones in the past. Most recently he had urine pH of 5 to 6. But of note he has had multiple UTI with various bacteria including proteus which usually happens with alkaline pH and can happen in the present of proteus UTI.     No recent UA; will attempt to obtain with condom cath. Pt would like to avoid invasive  catheter. Urine pH has historically been alkalotic, so unlikely will need pharmacologic management with K citrate. No recent issues with kidney stones.    Encouraged to drink 64 oz of water daily at least. Will also encourage dairy product consumption.  No recent issues with stones, so will treat conservatively.     Due back to see urology in December 2022.    Hypotension - Better on repeat     All questions patient had were answered.  Asked if further questions. None. F/u in clinic in 12 mos with labs and urine prior to next visit or sooner if needed.  ER for emergency concerns.    Summary of Plan:  UA  Increase PO fluid intake  Increase dietary calcium  RTC in 12 mos    Pt discussed with but not seen by Dr. Gonsalez.    45 minutes of total time spent on the encounter, which includes face to face time and non-face to face time preparing to see the patient (eg, review of tests), Obtaining and/or reviewing separately obtained history, documenting clinical information in the electronic or other health record, independently interpreting results (not separately reported) and communicating results to the patient/family/caregiver, or Care coordination (not separately reported).

## 2022-09-23 NOTE — PATIENT INSTRUCTIONS
- Sufficient fluid intake distributed throughout the day to produce at least 2 liters of urine per day, including drinking at night   - Avoiding excessive animal protein in the diet.   - Limiting dietary sodium to 2 grams per day   - Increasing dietary potassium intake   - Limiting dietary sucrose and fructose.  - Limiting dietary oxalate (limit iced tea and malachi as well as foods high in oxalate)  - limit vitamin C supplements.

## 2022-09-23 NOTE — TELEPHONE ENCOUNTER
Message  Received: Today  Jess Da Silva, ARLEN Baumann MA  Pls call pt and notify them that I discussed with Dr. Gonsalez, and she also suggests eating plenty of dairy because the extra calcium will help prevent stones.     Were we able to get a condom cath?     Thanks,

## 2022-09-23 NOTE — Clinical Note
Pls call pt and notify them that I discussed with Dr. Gonsalez, and she also suggests eating plenty of dairy because the extra calcium will help prevent stones.  Were we able to get a condom cath?  Thanks, Jess

## 2022-12-04 ENCOUNTER — CLINICAL SUPPORT (OUTPATIENT)
Dept: CARDIOLOGY | Facility: HOSPITAL | Age: 77
End: 2022-12-04
Payer: MEDICARE

## 2022-12-04 DIAGNOSIS — Z95.0 PRESENCE OF CARDIAC PACEMAKER: ICD-10-CM

## 2022-12-04 PROCEDURE — 93296 REM INTERROG EVL PM/IDS: CPT | Performed by: INTERNAL MEDICINE

## 2022-12-04 PROCEDURE — 93294 REM INTERROG EVL PM/LDLS PM: CPT | Mod: ,,, | Performed by: INTERNAL MEDICINE

## 2022-12-04 PROCEDURE — 93294 CARDIAC DEVICE CHECK - REMOTE: ICD-10-PCS | Mod: ,,, | Performed by: INTERNAL MEDICINE

## 2022-12-21 ENCOUNTER — HOSPITAL ENCOUNTER (OUTPATIENT)
Dept: RADIOLOGY | Facility: HOSPITAL | Age: 77
Discharge: HOME | End: 2022-12-21
Attending: FAMILY MEDICINE
Payer: MEDICARE

## 2022-12-21 DIAGNOSIS — R05.1 ACUTE COUGH: ICD-10-CM

## 2022-12-21 PROCEDURE — 71046 X-RAY EXAM CHEST 2 VIEWS: CPT | Mod: TC,FY,PO

## 2023-03-06 ENCOUNTER — CLINICAL SUPPORT (OUTPATIENT)
Dept: CARDIOLOGY | Facility: HOSPITAL | Age: 78
End: 2023-03-06
Payer: MEDICARE

## 2023-03-06 DIAGNOSIS — Z95.0 PRESENCE OF CARDIAC PACEMAKER: ICD-10-CM

## 2023-03-06 PROCEDURE — 93294 CARDIAC DEVICE CHECK - REMOTE: ICD-10-PCS | Mod: ,,, | Performed by: INTERNAL MEDICINE

## 2023-03-06 PROCEDURE — 93294 REM INTERROG EVL PM/LDLS PM: CPT | Mod: ,,, | Performed by: INTERNAL MEDICINE

## 2023-03-06 PROCEDURE — 93296 REM INTERROG EVL PM/IDS: CPT | Performed by: INTERNAL MEDICINE

## 2023-03-19 ENCOUNTER — HOSPITAL ENCOUNTER (INPATIENT)
Facility: HOSPITAL | Age: 78
LOS: 3 days | Discharge: HOME | DRG: 871 | End: 2023-03-23
Attending: STUDENT IN AN ORGANIZED HEALTH CARE EDUCATION/TRAINING PROGRAM | Admitting: INTERNAL MEDICINE
Payer: MEDICARE

## 2023-03-19 DIAGNOSIS — N30.00 ACUTE CYSTITIS WITHOUT HEMATURIA: ICD-10-CM

## 2023-03-19 DIAGNOSIS — I95.9 HYPOTENSION: ICD-10-CM

## 2023-03-19 DIAGNOSIS — N13.2 URETERAL STONE WITH HYDRONEPHROSIS: ICD-10-CM

## 2023-03-19 DIAGNOSIS — J06.9 ACUTE UPPER RESPIRATORY INFECTION: ICD-10-CM

## 2023-03-19 DIAGNOSIS — R74.01 TRANSAMINITIS: ICD-10-CM

## 2023-03-19 DIAGNOSIS — E13.69 OTHER SPECIFIED DIABETES MELLITUS WITH OTHER SPECIFIED COMPLICATION, UNSPECIFIED WHETHER LONG TERM INSULIN USE: ICD-10-CM

## 2023-03-19 DIAGNOSIS — U07.1 COVID: Primary | ICD-10-CM

## 2023-03-19 DIAGNOSIS — A41.9 SEPSIS, DUE TO UNSPECIFIED ORGANISM, UNSPECIFIED WHETHER ACUTE ORGAN DYSFUNCTION PRESENT: ICD-10-CM

## 2023-03-19 DIAGNOSIS — N13.30 BILATERAL HYDRONEPHROSIS: ICD-10-CM

## 2023-03-19 DIAGNOSIS — R79.89 ELEVATED TROPONIN: ICD-10-CM

## 2023-03-19 DIAGNOSIS — E87.20 LACTIC ACIDEMIA: ICD-10-CM

## 2023-03-19 DIAGNOSIS — N20.0 NEPHROLITHIASIS: ICD-10-CM

## 2023-03-19 DIAGNOSIS — K56.41 FECAL IMPACTION: ICD-10-CM

## 2023-03-19 DIAGNOSIS — D53.9 MACROCYTIC ANEMIA: ICD-10-CM

## 2023-03-19 LAB
ALBUMIN SERPL BCP-MCNC: 2.5 G/DL (ref 3.5–5.2)
ALP SERPL-CCNC: 95 U/L (ref 55–135)
ALT SERPL W/O P-5'-P-CCNC: 108 U/L (ref 10–44)
ANION GAP SERPL CALC-SCNC: 9 MMOL/L (ref 8–16)
AST SERPL-CCNC: 192 U/L (ref 10–40)
BACTERIA #/AREA URNS HPF: ABNORMAL /HPF
BASOPHILS # BLD AUTO: 0.04 K/UL (ref 0–0.2)
BASOPHILS NFR BLD: 0.2 % (ref 0–1.9)
BILIRUB SERPL-MCNC: 0.4 MG/DL (ref 0.1–1)
BILIRUB UR QL STRIP: NEGATIVE
BNP SERPL-MCNC: 54 PG/ML (ref 0–99)
BUN SERPL-MCNC: 23 MG/DL (ref 8–23)
CALCIUM SERPL-MCNC: 9.5 MG/DL (ref 8.7–10.5)
CHLORIDE SERPL-SCNC: 106 MMOL/L (ref 95–110)
CLARITY UR: ABNORMAL
CO2 SERPL-SCNC: 26 MMOL/L (ref 23–29)
COLOR UR: YELLOW
CREAT SERPL-MCNC: 1 MG/DL (ref 0.5–1.4)
CTP QC/QA: YES
CTP QC/QA: YES
DIFFERENTIAL METHOD: ABNORMAL
EOSINOPHIL # BLD AUTO: 0 K/UL (ref 0–0.5)
EOSINOPHIL NFR BLD: 0 % (ref 0–8)
ERYTHROCYTE [DISTWIDTH] IN BLOOD BY AUTOMATED COUNT: 13.2 % (ref 11.5–14.5)
EST. GFR  (NO RACE VARIABLE): >60 ML/MIN/1.73 M^2
GLUCOSE SERPL-MCNC: 99 MG/DL (ref 70–110)
GLUCOSE UR QL STRIP: NEGATIVE
HCT VFR BLD AUTO: 32.4 % (ref 40–54)
HGB BLD-MCNC: 10.4 G/DL (ref 14–18)
HGB UR QL STRIP: ABNORMAL
HYALINE CASTS #/AREA URNS LPF: 0 /LPF
IMM GRANULOCYTES # BLD AUTO: 0.15 K/UL (ref 0–0.04)
IMM GRANULOCYTES NFR BLD AUTO: 0.7 % (ref 0–0.5)
KETONES UR QL STRIP: NEGATIVE
LACTATE SERPL-SCNC: 2.6 MMOL/L (ref 0.5–2.2)
LEUKOCYTE ESTERASE UR QL STRIP: ABNORMAL
LYMPHOCYTES # BLD AUTO: 0.5 K/UL (ref 1–4.8)
LYMPHOCYTES NFR BLD: 2.3 % (ref 18–48)
MCH RBC QN AUTO: 32.3 PG (ref 27–31)
MCHC RBC AUTO-ENTMCNC: 32.1 G/DL (ref 32–36)
MCV RBC AUTO: 101 FL (ref 82–98)
MICROSCOPIC COMMENT: ABNORMAL
MONOCYTES # BLD AUTO: 1.1 K/UL (ref 0.3–1)
MONOCYTES NFR BLD: 5.2 % (ref 4–15)
NEUTROPHILS # BLD AUTO: 20 K/UL (ref 1.8–7.7)
NEUTROPHILS NFR BLD: 91.6 % (ref 38–73)
NITRITE UR QL STRIP: POSITIVE
NRBC BLD-RTO: 0 /100 WBC
PH UR STRIP: 8 [PH] (ref 5–8)
PLATELET # BLD AUTO: 171 K/UL (ref 150–450)
PMV BLD AUTO: 9.5 FL (ref 9.2–12.9)
POC MOLECULAR INFLUENZA A AGN: NEGATIVE
POC MOLECULAR INFLUENZA B AGN: NEGATIVE
POTASSIUM SERPL-SCNC: 3.8 MMOL/L (ref 3.5–5.1)
PROCALCITONIN SERPL IA-MCNC: 22.13 NG/ML
PROT SERPL-MCNC: 6.2 G/DL (ref 6–8.4)
PROT UR QL STRIP: ABNORMAL
RBC # BLD AUTO: 3.22 M/UL (ref 4.6–6.2)
RBC #/AREA URNS HPF: 12 /HPF (ref 0–4)
SARS-COV-2 RDRP RESP QL NAA+PROBE: POSITIVE
SODIUM SERPL-SCNC: 141 MMOL/L (ref 136–145)
SP GR UR STRIP: 1.02 (ref 1–1.03)
TROPONIN I SERPL DL<=0.01 NG/ML-MCNC: 0.06 NG/ML (ref 0–0.03)
URN SPEC COLLECT METH UR: ABNORMAL
UROBILINOGEN UR STRIP-ACNC: ABNORMAL EU/DL
WBC # BLD AUTO: 21.84 K/UL (ref 3.9–12.7)
WBC #/AREA URNS HPF: >100 /HPF (ref 0–5)
WBC CLUMPS URNS QL MICRO: ABNORMAL

## 2023-03-19 PROCEDURE — 80053 COMPREHEN METABOLIC PANEL: CPT | Performed by: STUDENT IN AN ORGANIZED HEALTH CARE EDUCATION/TRAINING PROGRAM

## 2023-03-19 PROCEDURE — 87086 URINE CULTURE/COLONY COUNT: CPT | Performed by: STUDENT IN AN ORGANIZED HEALTH CARE EDUCATION/TRAINING PROGRAM

## 2023-03-19 PROCEDURE — 85025 COMPLETE CBC W/AUTO DIFF WBC: CPT | Performed by: STUDENT IN AN ORGANIZED HEALTH CARE EDUCATION/TRAINING PROGRAM

## 2023-03-19 PROCEDURE — 99900035 HC TECH TIME PER 15 MIN (STAT)

## 2023-03-19 PROCEDURE — 87186 SC STD MICRODIL/AGAR DIL: CPT | Mod: 59 | Performed by: STUDENT IN AN ORGANIZED HEALTH CARE EDUCATION/TRAINING PROGRAM

## 2023-03-19 PROCEDURE — G0378 HOSPITAL OBSERVATION PER HR: HCPCS

## 2023-03-19 PROCEDURE — 93010 ELECTROCARDIOGRAM REPORT: CPT | Mod: ,,, | Performed by: INTERNAL MEDICINE

## 2023-03-19 PROCEDURE — 94761 N-INVAS EAR/PLS OXIMETRY MLT: CPT

## 2023-03-19 PROCEDURE — 93010 ELECTROCARDIOGRAM REPORT: CPT | Mod: 76,,, | Performed by: INTERNAL MEDICINE

## 2023-03-19 PROCEDURE — 84145 PROCALCITONIN (PCT): CPT | Performed by: STUDENT IN AN ORGANIZED HEALTH CARE EDUCATION/TRAINING PROGRAM

## 2023-03-19 PROCEDURE — 87077 CULTURE AEROBIC IDENTIFY: CPT | Performed by: STUDENT IN AN ORGANIZED HEALTH CARE EDUCATION/TRAINING PROGRAM

## 2023-03-19 PROCEDURE — 27000221 HC OXYGEN, UP TO 24 HOURS

## 2023-03-19 PROCEDURE — 87040 BLOOD CULTURE FOR BACTERIA: CPT | Performed by: STUDENT IN AN ORGANIZED HEALTH CARE EDUCATION/TRAINING PROGRAM

## 2023-03-19 PROCEDURE — 63600175 PHARM REV CODE 636 W HCPCS: Performed by: STUDENT IN AN ORGANIZED HEALTH CARE EDUCATION/TRAINING PROGRAM

## 2023-03-19 PROCEDURE — 84484 ASSAY OF TROPONIN QUANT: CPT | Performed by: STUDENT IN AN ORGANIZED HEALTH CARE EDUCATION/TRAINING PROGRAM

## 2023-03-19 PROCEDURE — 93005 ELECTROCARDIOGRAM TRACING: CPT

## 2023-03-19 PROCEDURE — 87088 URINE BACTERIA CULTURE: CPT | Performed by: STUDENT IN AN ORGANIZED HEALTH CARE EDUCATION/TRAINING PROGRAM

## 2023-03-19 PROCEDURE — 93010 EKG 12-LEAD: ICD-10-PCS | Mod: ,,, | Performed by: INTERNAL MEDICINE

## 2023-03-19 PROCEDURE — 99285 EMERGENCY DEPT VISIT HI MDM: CPT | Mod: 25

## 2023-03-19 PROCEDURE — 93010 EKG 12-LEAD: ICD-10-PCS | Mod: 76,,, | Performed by: INTERNAL MEDICINE

## 2023-03-19 PROCEDURE — 25000003 PHARM REV CODE 250: Performed by: STUDENT IN AN ORGANIZED HEALTH CARE EDUCATION/TRAINING PROGRAM

## 2023-03-19 PROCEDURE — 83605 ASSAY OF LACTIC ACID: CPT | Performed by: STUDENT IN AN ORGANIZED HEALTH CARE EDUCATION/TRAINING PROGRAM

## 2023-03-19 PROCEDURE — 81000 URINALYSIS NONAUTO W/SCOPE: CPT | Performed by: STUDENT IN AN ORGANIZED HEALTH CARE EDUCATION/TRAINING PROGRAM

## 2023-03-19 PROCEDURE — 83880 ASSAY OF NATRIURETIC PEPTIDE: CPT | Performed by: STUDENT IN AN ORGANIZED HEALTH CARE EDUCATION/TRAINING PROGRAM

## 2023-03-19 PROCEDURE — 25500020 PHARM REV CODE 255: Performed by: STUDENT IN AN ORGANIZED HEALTH CARE EDUCATION/TRAINING PROGRAM

## 2023-03-19 RX ORDER — FOLIC ACID 1 MG/1
1 TABLET ORAL DAILY
Status: DISCONTINUED | OUTPATIENT
Start: 2023-03-20 | End: 2023-03-23 | Stop reason: HOSPADM

## 2023-03-19 RX ORDER — SODIUM CHLORIDE 0.9 % (FLUSH) 0.9 %
10 SYRINGE (ML) INJECTION
Status: DISCONTINUED | OUTPATIENT
Start: 2023-03-19 | End: 2023-03-23 | Stop reason: HOSPADM

## 2023-03-19 RX ORDER — GABAPENTIN 300 MG/1
300 CAPSULE ORAL 3 TIMES DAILY
Status: DISCONTINUED | OUTPATIENT
Start: 2023-03-20 | End: 2023-03-23 | Stop reason: HOSPADM

## 2023-03-19 RX ORDER — MIRTAZAPINE 15 MG/1
30 TABLET, ORALLY DISINTEGRATING ORAL NIGHTLY
Status: DISCONTINUED | OUTPATIENT
Start: 2023-03-20 | End: 2023-03-23 | Stop reason: HOSPADM

## 2023-03-19 RX ORDER — TALC
6 POWDER (GRAM) TOPICAL NIGHTLY PRN
Status: DISCONTINUED | OUTPATIENT
Start: 2023-03-19 | End: 2023-03-23 | Stop reason: HOSPADM

## 2023-03-19 RX ORDER — ATORVASTATIN CALCIUM 40 MG/1
40 TABLET, FILM COATED ORAL DAILY
Status: DISCONTINUED | OUTPATIENT
Start: 2023-03-20 | End: 2023-03-23 | Stop reason: HOSPADM

## 2023-03-19 RX ORDER — DEXAMETHASONE SODIUM PHOSPHATE 4 MG/ML
6 INJECTION, SOLUTION INTRA-ARTICULAR; INTRALESIONAL; INTRAMUSCULAR; INTRAVENOUS; SOFT TISSUE EVERY 24 HOURS
Status: DISCONTINUED | OUTPATIENT
Start: 2023-03-20 | End: 2023-03-23 | Stop reason: HOSPADM

## 2023-03-19 RX ORDER — DEXAMETHASONE SODIUM PHOSPHATE 4 MG/ML
6 INJECTION, SOLUTION INTRA-ARTICULAR; INTRALESIONAL; INTRAMUSCULAR; INTRAVENOUS; SOFT TISSUE
Status: COMPLETED | OUTPATIENT
Start: 2023-03-19 | End: 2023-03-19

## 2023-03-19 RX ADMIN — CEFTRIAXONE SODIUM 2 G: 2 INJECTION, POWDER, FOR SOLUTION INTRAMUSCULAR; INTRAVENOUS at 06:03

## 2023-03-19 RX ADMIN — SODIUM CHLORIDE, SODIUM LACTATE, POTASSIUM CHLORIDE, AND CALCIUM CHLORIDE 2286 ML: .6; .31; .03; .02 INJECTION, SOLUTION INTRAVENOUS at 04:03

## 2023-03-19 RX ADMIN — DEXAMETHASONE SODIUM PHOSPHATE 6 MG: 4 INJECTION, SOLUTION INTRA-ARTICULAR; INTRALESIONAL; INTRAMUSCULAR; INTRAVENOUS; SOFT TISSUE at 06:03

## 2023-03-19 RX ADMIN — IOHEXOL 75 ML: 350 INJECTION, SOLUTION INTRAVENOUS at 06:03

## 2023-03-19 NOTE — ED PROVIDER NOTES
NAME:  Praneeth Jewell  CSN:     [id number]  MRN:    [id number]  ADMIT DATE: 3/19/2023        eMERGENCY dEPARTMENT eNCOUnter    CHIEF COMPLAINT    Chief Complaint   Patient presents with    Altered Mental Status     Nursing home reports fever of 104, hypotension, and chest congestion. On arrival, awake, answers simple questions appropriately. Skin w/d, resp unlabored. Denies pain but states he doesn't feel well.        HPI    Praneeth Jewell is a 77 y.o. male with a past medical history of  has a past medical history of Arthritis, Diabetes mellitus, Folate deficiency anemia, Heart block, History of kidney stones (05/25/2021), History of stroke with residual deficit (05/25/2021), Hyperlipidemia, Hypertension, Major depressive disorder, Pacemaker, Pyelonephritis (11/19/2021), TIA (transient ischemic attack), and Urinary retention (05/25/2021).     he presents to the ED due to concern for infection at his care facility with fever, hypotension and chest congestion noted.  He only c/o of coughing while eating.  He notes there are multiple sick contacts care facility.  Denies any bed sores.  States he has been at his care facility since 2015 when he became nonambulatory secondary to a stroke.  He denies any other symptoms initially, however, does complain of some abdominal pain on exam.    HPI       PAST MEDICAL HISTORY  Past Medical History:   Diagnosis Date    Arthritis     Diabetes mellitus     type 2    Folate deficiency anemia     Heart block     History of kidney stones 05/25/2021    History of stroke with residual deficit 05/25/2021    Hyperlipidemia     Hypertension     Major depressive disorder     Pacemaker     [employer] [place] 8 DR-T (S/n: [id number])    Pyelonephritis 11/19/2021    TIA (transient ischemic attack)     Urinary retention 05/25/2021       SURGICAL HISTORY    Past Surgical History:   Procedure Laterality Date    A-V CARDIAC PACEMAKER INSERTION N/A 8/24/2021    Procedure: INSERTION, CARDIAC PACEMAKER, DUAL  CHAMBER;  Surgeon: Neo Winn MD;  Location: [facility] EP LAB;  Service: Cardiology;  Laterality: N/A;  CHB, DUAL PPM, ANES, BIO, DM, ED 2    COLONOSCOPY N/A 11/22/2021    Procedure: COLONOSCOPY;  Surgeon: Cesar Dorado MD;  Location: [facility] ENDO (2ND FLR);  Service: Endoscopy;  Laterality: N/A;    CYSTOSCOPIC LITHOLAPAXY  5/25/2021    Procedure: CYSTOLITHOLAPAXY;  Surgeon: Chris Schilling MD;  [facility];  Service: Urology;;    CYSTOSCOPY  8/26/2021    Procedure: CYSTOSCOPY;  Surgeon: Camilo Kelley Jr., MD;  [facility];  Service: Urology;;    CYSTOSCOPY W/ URETERAL STENT PLACEMENT Bilateral 5/25/2021    Procedure: CYSTOSCOPY, WITH BILATERAL URETERAL STENT INSERTION;  Surgeon: Chris Schilling MD;  [facility];  Service: Urology;  Laterality: Bilateral;    ELBOW ARTHROPLASTY Right     FLUOROSCOPY  5/25/2021    Procedure: FLUOROSCOPY;  Surgeon: Chris Schilling MD;  [facility];  Service: Urology;;    LASER LITHOTRIPSY Left 8/26/2021    Procedure: LITHOTRIPSY, USING LASER;  Surgeon: Camilo Kelley Jr., MD;  [facility];  Service: Urology;  Laterality: Left;    PYELOSCOPY Bilateral 8/26/2021    Procedure: PYELOSCOPY;  Surgeon: Camilo Kelley Jr., MD;  [facility];  Service: Urology;  Laterality: Bilateral;    REMOVAL OF BLOOD CLOT  5/25/2021    Procedure: REMOVAL, BLOOD CLOT;  Surgeon: Chris Schilling MD;  [facility];  Service: Urology;;    REPLACEMENT OF STENT Bilateral 8/26/2021    Procedure: REPLACEMENT, STENT;  Surgeon: Camilo Kelley Jr., MD;  [facility];  Service: Urology;  Laterality: Bilateral;    URETEROSCOPIC REMOVAL OF URETERIC CALCULUS Bilateral 8/26/2021    Procedure: REMOVAL, CALCULUS, URETER, URETEROSCOPIC;  Surgeon: Camilo Kelley Jr., MD;  Location: [facility] OR 1ST FLR;  Service: Urology;  Laterality: Bilateral;    URETEROSCOPY Bilateral 8/26/2021    Procedure: URETEROSCOPY;   "Surgeon: Camilo Kelley Jr., MD;  Location: [facility] OR [address] [place] [address];  Service: Urology;  Laterality: Bilateral;       FAMILY HISTORY    Family History   Problem Relation Age of Onset    Stroke Mother     Hyperlipidemia Mother     Mental illness Father     Parkinsonism Father     No Known Problems Brother        SOCIAL HISTORY    Social History     Socioeconomic History    Marital status: Single    Number of children: 0    Years of education: 15    Highest education level: Some college, no degree   Occupational History     Employer: Disabled    Occupation: Construction     Employer: [employer]     Comment: Retired in past 5-10 years   Tobacco Use    Smoking status: Former     Types: Cigarettes    Smokeless tobacco: Never   Substance and Sexual Activity    Alcohol use: Yes     Comment: 1/ pint whisky daily    Drug use: Yes     Types: "Crack" cocaine       MEDICATIONS  Current Outpatient Medications   Medication Instructions    atorvastatin (LIPITOR) 40 mg, Oral, Daily    cyproheptadine (PERIACTIN) 4 mg, Oral, 3 times daily, Itching    folic acid (FOLVITE) 1 mg, Oral, Daily    gabapentin (NEURONTIN) 300 mg, Oral, 3 times daily    mirtazapine (REMERON) 30 mg, Oral, Nightly    tamsulosin (FLOMAX) 0.4 mg Cap TAKE 2 CAPSULES(0.8 MG) BY MOUTH EVERY DAY       ALLERGIES    Review of patient's allergies indicates:  No Known Allergies      REVIEW OF SYSTEMS   Review of Systems       PHYSICAL EXAM    Reviewed Triage Note    VITAL SIGNS:   ED Triage Vitals [03/19/23 1604]   Enc Vitals Group      BP (!) 79/47      Pulse 86      Resp 18      Temp 98 °F (36.7 °C)      Temp Source Oral      SpO2       Weight 168 lb      Height 5' 8"      Head Circumference       Peak Flow       Pain Score       Pain Loc       Pain Edu?       Excl. in ?        Patient Vitals for the past 24 hrs:   BP Temp Temp src Pulse Resp SpO2 Height Weight   03/19/23 2020 -- -- -- 65 11 100 % -- --   03/19/23 1937 (!) 98/56 -- -- 71 19 100 % -- --   03/19/23 1840 -- " "-- -- 72 12 100 % -- --   03/19/23 1839 -- -- -- 72 11 100 % -- --   03/19/23 1748 (!) 100/58 -- -- 79 12 100 % -- --   03/19/23 1650 (!) 83/54 -- -- 80 17 (!) 94 % -- --   03/19/23 1604 (!) 79/47 98 °F (36.7 °C) Oral 86 18 -- 5' 8" (1.727 m) 76.2 kg (168 lb)       Physical Exam    Nursing note and vitals reviewed.  Constitutional: He appears well-developed.   Cachectic, ill-appearing.   HENT:   Head: Normocephalic and atraumatic.   Eyes: EOM are normal. Pupils are equal, round, and reactive to light.   Neck: Neck supple.   Normal range of motion.  Cardiovascular:  Normal rate, regular rhythm and normal heart sounds.           Pulmonary/Chest: Breath sounds normal. Tachypnea noted. No respiratory distress.   Abdominal: Abdomen is soft. There is abdominal tenderness.   LLQ tender to palpation   Musculoskeletal:         General: Normal range of motion.      Cervical back: Normal range of motion and neck supple.      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  Lower extremities held in contracture with pressure boots in place.  No pressure ulcerations noted.        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     Neurological: He is alert.   Oriented to self and place.   Skin: Skin is warm and dry.   Healing abrasion to the left knee          EKG     Interpreted by EM physician if performed:   Atrial sensed ventricularly paced rhythm at a rate of 86.  Unchanged from previous.              LABS  Pertinent labs reviewed. (See chart for details)   Labs Reviewed   CBC W/ AUTO DIFFERENTIAL - Abnormal; Notable for the following components:       Result Value    WBC 21.84 (*)     RBC 3.22 (*)     Hemoglobin 10.4 (*)     Hematocrit 32.4 (*)      (*)     MCH 32.3 (*)     Immature Granulocytes 0.7 (*)     Gran # (ANC) 20.0 (*)     Immature Grans (Abs) 0.15 (*)     Lymph # 0.5 (*)     Mono # 1.1 (*)     Gran % 91.6 (*)     Lymph % 2.3 (*)     All other components within normal limits   COMPREHENSIVE METABOLIC " PANEL - Abnormal; Notable for the following components:    Albumin 2.5 (*)      (*)      (*)     All other components within normal limits   LACTIC ACID, PLASMA - Abnormal; Notable for the following components:    Lactate (Lactic Acid) 2.6 (*)     All other components within normal limits   URINALYSIS, REFLEX TO URINE CULTURE - Abnormal; Notable for the following components:    Appearance, UA Hazy (*)     Protein, UA 2+ (*)     Occult Blood UA 1+ (*)     Nitrite, UA Positive (*)     Urobilinogen, UA 2.0-3.0 (*)     Leukocytes, UA 3+ (*)     All other components within normal limits    Narrative:     Specimen Source->Urine   TROPONIN I - Abnormal; Notable for the following components:    Troponin I 0.056 (*)     All other components within normal limits   PROCALCITONIN - Abnormal; Notable for the following components:    Procalcitonin 22.13 (*)     All other components within normal limits   URINALYSIS MICROSCOPIC - Abnormal; Notable for the following components:    RBC, UA 12 (*)     WBC, UA >100 (*)     WBC Clumps, UA Few (*)     Bacteria Many (*)     All other components within normal limits    Narrative:     Specimen Source->Urine   SARS-COV-2 RDRP GENE - Abnormal; Notable for the following components:    POC Rapid COVID Positive (*)     All other components within normal limits   CULTURE, BLOOD   CULTURE, BLOOD   INFLUENZA A & B BY MOLECULAR   CULTURE, URINE   B-TYPE NATRIURETIC PEPTIDE   LACTIC ACID, PLASMA   SARS-COV-2 RNA AMPLIFICATION, QUAL   TROPONIN I   POCT INFLUENZA A/B MOLECULAR         RADIOLOGY          Imaging Results               CT Abdomen Pelvis With Contrast (Final result)  Result time 03/19/23 20:14:08      Final result by Malou Rios MD (03/19/23 20:14:08)                   Impression:      Large distal colon fecal impaction with surrounding mucosal thickening and inflammatory changes suggesting stercoral colitis.  There is severe stool burden constipation throughout the  colon as well as gaseous distension of the colon.  No significant small bowel distension, convincing extraluminal air, abscess or significant free fluid.    Severe left hydronephrosis and proximal ureterectasis secondary to a 14 mm in maximal dimension calculus in the mid left ureter as well as an adjacent smaller more proximal calculus.  There are also multiple calcifications largest measuring 9 mm in diameter in the chain along the expected course of the more distal left ureter.  These may represent gonadal vein calcifications/vascular calcifications however more distal calculi are not reliably excluded within the ureter in this patient with punctate innumerable calculi in the left kidney.  Severe fecal impaction distorts the anatomy in the pelvis displacing the bladder and ureters.  CT urogram can further evaluate this finding.    There is mild hydronephrosis in the right kidney as well as proximal ureterectasis which may be secondary to the pelvic impaction.  No convincing calculi are seen in the right ureter and nonobstructing calculi are seen in the right kidney.    Bilateral lung base atelectasis.  Some mild pneumonitis is difficult to exclude reliably on the left.  No lobar consolidation or significant pleural effusion seen.    Interval loss of subcutaneous and intra-abdominal fat since prior CT and increased stranding in the fat compatible with anasarca.    Please see above for additional incidental nonacute findings.    This report was flagged in Epic as abnormal.      Electronically signed by: Malou Rios  Date:    03/19/2023  Time:    20:14               Narrative:    EXAMINATION:  CT ABDOMEN PELVIS WITH CONTRAST    CLINICAL HISTORY:  LLQ abdominal pain;    TECHNIQUE:  Low dose axial images, sagittal and coronal reformations were obtained from the lung bases to the pubic symphysis before and following the IV administration of 75 mL of Omnipaque 350 .  Oral contrast was not  administered..    COMPARISON:  CTA abdomen pelvis 11/19/2021    FINDINGS:  Abdomen:    - Lung bases: There is dependent atelectasis bilaterally in the lungs.  Ground-glass opacities also noted raising question of an underlying component of mild pneumonitis or edema.  No pleural effusion is seen.  The heart is stable and prominent with pacemaker leads in the right atrium and right ventricle.    Bowel/Mesentery:    There is severe stool burden constipation throughout the colon as well as gaseous prominence.  There is distal fecal impaction with the rectum measuring 8.5 cm in maximal diameter.  There is mucosal thickening and note of perirectal edema as seen with stercoral colitis.  The appendix is not definitely isolated as a separate structure.  There is no evidence of pericecal inflammatory change.    The patient has had significant loss of intra-abdominal fat since prior exam.    -Liver: Liver appears homogeneous and not significantly enlarged.    - Gallbladder: The there is no CT evidence of cholecystitis.    - Bile Ducts: No evidence of intra or extra hepatic biliary ductal dilation.    - Spleen: Negative.    - Kidneys: There is mild right hydronephrosis and proximal ureterectasis multiple punctate nonobstructing calculi in the right kidney..  The right ureter is not reliably followed all the way down to the bladder due to the large fecal impaction and a ureteral calculus distally is not reliably excluded given the multiple calcifications in the pelvis with the majority likely vascular.    There is severe hydronephrosis of the left kidney as well as severe proximal ureterectasis secondary to a 13 x 11 x 14 mm calculus in the mid ureter at the level of the L4 transverse process on the left.  Smaller calculus more proximally also noted abutting the larger calculus measuring 6 mm coronal image 88 series 601.  There is a long chain of multiple calcifications distal to the large obstructing mid left ureter calculus  which overlap the expected course of the more distal left ureter in the pelvis coronal images 68 through 84 series 601.  Largest is seen most distally measuring 9 mm in diameter.  Because of the fecal impaction and mass effect on adjacent structures, I cannot reliably exclude these calcifications from the more distal left ureter.  These calcifications may represent vascular/gonadal vein calcifications.  CT urogram would be needed to reliably assess the.  There are also innumerable layering punctate calculi within this distended renal pelvis.  Renal cortices enhance asymmetrically with the left late secondary to the obstruction.  There is a cyst in the right renal cortex pole.    - Adrenals: Unremarkable.    - Pancreas: No mass or peripancreatic fat stranding.    - Retroperitoneum:  No significant adenopathy.    - Vascular: There is no abdominal aortic aneurysm.  Moderate atherosclerotic calcification throughout the aorta without significant stenosis.    - Abdominal wall:  Strandy changes in the subcutaneous fat noted compatible with mild anasarca/edema..    Pelvis:    The bladder is displaced cephalad secondary to the large fecal impaction but otherwise is unremarkable as imaged.  Prostate is not convincingly enlarged and displaced anteriorly.  Pelvic calcifications are likely vascular..  No pelvic mass, adenopathy, or free fluid.    Bones:  No acute osseous abnormality and no suspicious lytic or blastic lesion. There is severe spondylosis without acute subluxation and degenerative changes of the hips bilaterally.                                       X-Ray Chest AP Portable (Final result)  Result time 03/19/23 17:19:31      Final result by Melo Lovell MD (03/19/23 17:19:31)                   Impression:      No airspace disease.    Distention of the bowel loops in the upper abdomen.      Electronically signed by: Melo Lovell MD  Date:    03/19/2023  Time:    17:19               Narrative:    EXAMINATION:  XR  CHEST AP PORTABLE    CLINICAL HISTORY:  Sepsis;    TECHNIQUE:  Single frontal view of the chest was performed.    COMPARISON:  12/21/2022.    FINDINGS:  There is unchanged appearance of the left-sided AICD.  Monitoring EKG leads are present.    The trachea is unremarkable.  There are calcifications of the aortic knob.  The cardiomediastinal silhouette is within normal limits.  There is no evidence of free air beneath the hemidiaphragms.  There are no pleural effusions.  There is no evidence of a pneumothorax.  There is no evidence of pneumomediastinum.  No airspace disease present.  There are degenerative changes in the osseous structures.    There is distention of the bowel loops in the upper abdomen.                                        PROCEDURES    Critical Care    Date/Time: 3/19/2023 8:09 PM  Performed by: Libertad Heller DO  Authorized by: Libertad Heller DO   Direct patient critical care time: 10 minutes  Additional history critical care time: 10 minutes  Ordering / reviewing critical care time: 10 minutes  Documentation critical care time: 10 minutes  Consulting other physicians critical care time: 7 minutes  Total critical care time (exclusive of procedural time) : 47 minutes  Critical care was necessary to treat or prevent imminent or life-threatening deterioration of the following conditions: sepsis.          ED COURSE & MEDICAL DECISION MAKING    Pertinent Labs & Imaging studies reviewed. (See chart for details and specific orders.)        Summary of review of records:     MDM:  Praneeth Jewell is a 77 y.o. male presents from care facility with concern for hypotension and fever.  Sepsis workup initiated.  Patient did have signed DNR paperwork in his packet of papers and I did reconfirm that this is what he would want should his heart stop and he did again indicate that he would not want any additional intervention at that time.  He did have a brother and niece on file but he asked that I not contact  them.    CBC, CMP ordered as well as COVID, influenza, urinalysis, blood cultures, chest x-ray, troponin, abdominal pelvis CT, pro count, lactate, EKG.          Medications   lactated ringers bolus 2,286 mL (0 mLs Intravenous Stopped 3/19/23 1745)   cefTRIAXone (ROCEPHIN) 2 g in dextrose 5 % in water (D5W) 5 % 50 mL IVPB (MB+) (0 g Intravenous Stopped 3/19/23 1836)   dexAMETHasone injection 6 mg (6 mg Intravenous Given 3/19/23 1808)   iohexoL (OMNIPAQUE 350) injection 75 mL (75 mLs Intravenous Given 3/19/23 1856)       ED Course as of 03/19/23 2114   Sun Mar 19, 2023   1717 Procalcitonin(!): 22.13 [HL]   1718 Urinalysis, Reflex to Urine Culture Urine, Clean Catch(!)  C/w UTI [HL]   1718 Lactate, Cornell(!): 2.6  Receiving IVF [HL]   1718 Troponin I(!): 0.056 [HL]   1718 Comprehensive metabolic panel(!)  New mild transaminitis noted [HL]   1728 SARS-CoV-2 RNA, Amplification, Qual(!): Positive [HL]   2028 Discussed patient with urology who will review the images.  He may need IR consultation for percutaneous neph tube placement. [HL]   2035 Pt admitted to [facility] IM in fair condition. Aware of plan for likely IR consultation.  [HL]   2050 , uro, recommending stat neph tube should pt want this.  [HL]   2107 D/w pt's brother, Tiffany, who states pt typically makes his own healthcare decisions, has not had to step in as POA previously.  [HL]      ED Course User Index  [HL] Libertad Heller DO         FINAL IMPRESSION    Final diagnoses:  [I95.9] Hypotension  [U07.1] COVID (Primary)  [N30.00] Acute cystitis without hematuria  [A41.9] Sepsis, due to unspecified organism, unspecified whether acute organ dysfunction present  [E87.20] Lactic acidemia  [R77.8] Elevated troponin  [R74.01] Transaminitis  [K56.41] Fecal impaction  [N13.2] Ureteral stone with hydronephrosis       DISPOSITION  Patient admitted in fair condition             DISCLAIMER: This note was prepared with [employer] voice recognition transcription software.  Garbled syntax, mangled pronouns, and other bizarre constructions may be attributed to that software system.      Libertad Heller, DO       Libertad Heller,   03/19/23 2037       Libertad Heller,   03/19/23 2114

## 2023-03-19 NOTE — PHARMACY MED REC
"    Admission Medication History     The home medication history was taken by Sari Paula CPhT.    Medication history obtained from, [facility] Verified    You may go to "Admission" then "Reconcile Home Medications" tabs to review and/or act upon these items.     The home medication list has been updated by the Pharmacy department.   Please read ALL comments highlighted in yellow.   Please address this information as you see fit.    Feel free to contact us if you have any questions or require assistance.      The medications listed below were removed from the home medication list.  Please reorder if appropriate:  Patient reports no longer taking the following medication(s):  Colace 100 mg  Lexapro 10 mg  Hydroxyzine 25 mg  Metformin 500 mg  Temazepam 22.5 mg      Sari Paula CPhT.  Ext [phone]           .        "

## 2023-03-20 PROBLEM — D53.9 MACROCYTIC ANEMIA: Status: ACTIVE | Noted: 2023-03-20

## 2023-03-20 LAB
ALBUMIN SERPL BCP-MCNC: 2.2 G/DL (ref 3.5–5.2)
ALP SERPL-CCNC: 86 U/L (ref 55–135)
ALT SERPL W/O P-5'-P-CCNC: 87 U/L (ref 10–44)
ANION GAP SERPL CALC-SCNC: 9 MMOL/L (ref 8–16)
AST SERPL-CCNC: 101 U/L (ref 10–40)
BACTERIA #/AREA URNS HPF: ABNORMAL /HPF
BASOPHILS # BLD AUTO: 0.04 K/UL (ref 0–0.2)
BASOPHILS NFR BLD: 0.2 % (ref 0–1.9)
BILIRUB SERPL-MCNC: 0.2 MG/DL (ref 0.1–1)
BILIRUB UR QL STRIP: NEGATIVE
BUN SERPL-MCNC: 23 MG/DL (ref 8–23)
CALCIUM SERPL-MCNC: 8.9 MG/DL (ref 8.7–10.5)
CHLORIDE SERPL-SCNC: 111 MMOL/L (ref 95–110)
CLARITY UR: ABNORMAL
CO2 SERPL-SCNC: 21 MMOL/L (ref 23–29)
COLOR UR: YELLOW
CREAT SERPL-MCNC: 0.8 MG/DL (ref 0.5–1.4)
DIFFERENTIAL METHOD: ABNORMAL
EOSINOPHIL # BLD AUTO: 0 K/UL (ref 0–0.5)
EOSINOPHIL NFR BLD: 0 % (ref 0–8)
ERYTHROCYTE [DISTWIDTH] IN BLOOD BY AUTOMATED COUNT: 13.4 % (ref 11.5–14.5)
EST. GFR  (NO RACE VARIABLE): >60 ML/MIN/1.73 M^2
FERRITIN SERPL-MCNC: 595 NG/ML (ref 20–300)
FOLATE SERPL-MCNC: 16.8 NG/ML (ref 4–24)
GLUCOSE SERPL-MCNC: 98 MG/DL (ref 70–110)
GLUCOSE UR QL STRIP: NEGATIVE
HCT VFR BLD AUTO: 34.7 % (ref 40–54)
HGB BLD-MCNC: 10.8 G/DL (ref 14–18)
HGB UR QL STRIP: NEGATIVE
HYALINE CASTS #/AREA URNS LPF: 0 /LPF
IMM GRANULOCYTES # BLD AUTO: 0.1 K/UL (ref 0–0.04)
IMM GRANULOCYTES NFR BLD AUTO: 0.6 % (ref 0–0.5)
INR PPP: 1.3 (ref 0.8–1.2)
IRON SERPL-MCNC: 10 UG/DL (ref 45–160)
KETONES UR QL STRIP: ABNORMAL
LACTATE SERPL-SCNC: 1.6 MMOL/L (ref 0.5–2.2)
LEUKOCYTE ESTERASE UR QL STRIP: ABNORMAL
LYMPHOCYTES # BLD AUTO: 0.4 K/UL (ref 1–4.8)
LYMPHOCYTES NFR BLD: 2.5 % (ref 18–48)
MAGNESIUM SERPL-MCNC: 1.7 MG/DL (ref 1.6–2.6)
MCH RBC QN AUTO: 31.7 PG (ref 27–31)
MCHC RBC AUTO-ENTMCNC: 31.1 G/DL (ref 32–36)
MCV RBC AUTO: 102 FL (ref 82–98)
MICROSCOPIC COMMENT: ABNORMAL
MONOCYTES # BLD AUTO: 0.4 K/UL (ref 0.3–1)
MONOCYTES NFR BLD: 2.3 % (ref 4–15)
NEUTROPHILS # BLD AUTO: 16.2 K/UL (ref 1.8–7.7)
NEUTROPHILS NFR BLD: 94.4 % (ref 38–73)
NITRITE UR QL STRIP: NEGATIVE
NRBC BLD-RTO: 0 /100 WBC
PH UR STRIP: >8 [PH] (ref 5–8)
PHOSPHATE SERPL-MCNC: 2.6 MG/DL (ref 2.7–4.5)
PLATELET # BLD AUTO: 167 K/UL (ref 150–450)
PMV BLD AUTO: 10.1 FL (ref 9.2–12.9)
POTASSIUM SERPL-SCNC: 4.1 MMOL/L (ref 3.5–5.1)
PROT SERPL-MCNC: 5.6 G/DL (ref 6–8.4)
PROT UR QL STRIP: ABNORMAL
PROTHROMBIN TIME: 13 SEC (ref 9–12.5)
RBC # BLD AUTO: 3.41 M/UL (ref 4.6–6.2)
RBC #/AREA URNS HPF: 3 /HPF (ref 0–4)
SATURATED IRON: 6 % (ref 20–50)
SODIUM SERPL-SCNC: 141 MMOL/L (ref 136–145)
SP GR UR STRIP: 1.03 (ref 1–1.03)
SQUAMOUS #/AREA URNS HPF: 0 /HPF
TOTAL IRON BINDING CAPACITY: 161 UG/DL (ref 250–450)
TRANSFERRIN SERPL-MCNC: 109 MG/DL (ref 200–375)
TROPONIN I SERPL DL<=0.01 NG/ML-MCNC: 0.02 NG/ML (ref 0–0.03)
URN SPEC COLLECT METH UR: ABNORMAL
UROBILINOGEN UR STRIP-ACNC: NEGATIVE EU/DL
VIT B12 SERPL-MCNC: 578 PG/ML (ref 210–950)
WBC # BLD AUTO: 17.18 K/UL (ref 3.9–12.7)
WBC #/AREA URNS HPF: >100 /HPF (ref 0–5)
WBC CLUMPS URNS QL MICRO: ABNORMAL
YEAST URNS QL MICRO: ABNORMAL

## 2023-03-20 PROCEDURE — 85610 PROTHROMBIN TIME: CPT | Performed by: INTERNAL MEDICINE

## 2023-03-20 PROCEDURE — 99223 PR INITIAL HOSPITAL CARE,LEVL III: ICD-10-PCS | Mod: ,,, | Performed by: UROLOGY

## 2023-03-20 PROCEDURE — 80053 COMPREHEN METABOLIC PANEL: CPT | Performed by: STUDENT IN AN ORGANIZED HEALTH CARE EDUCATION/TRAINING PROGRAM

## 2023-03-20 PROCEDURE — 99223 1ST HOSP IP/OBS HIGH 75: CPT | Mod: 25,,, | Performed by: PHYSICIAN ASSISTANT

## 2023-03-20 PROCEDURE — 27000221 HC OXYGEN, UP TO 24 HOURS

## 2023-03-20 PROCEDURE — 82746 ASSAY OF FOLIC ACID SERUM: CPT | Performed by: STUDENT IN AN ORGANIZED HEALTH CARE EDUCATION/TRAINING PROGRAM

## 2023-03-20 PROCEDURE — 63600175 PHARM REV CODE 636 W HCPCS: Performed by: RADIOLOGY

## 2023-03-20 PROCEDURE — 87040 BLOOD CULTURE FOR BACTERIA: CPT | Performed by: STUDENT IN AN ORGANIZED HEALTH CARE EDUCATION/TRAINING PROGRAM

## 2023-03-20 PROCEDURE — 96361 HYDRATE IV INFUSION ADD-ON: CPT

## 2023-03-20 PROCEDURE — 25000003 PHARM REV CODE 250: Performed by: STUDENT IN AN ORGANIZED HEALTH CARE EDUCATION/TRAINING PROGRAM

## 2023-03-20 PROCEDURE — 85025 COMPLETE CBC W/AUTO DIFF WBC: CPT | Performed by: STUDENT IN AN ORGANIZED HEALTH CARE EDUCATION/TRAINING PROGRAM

## 2023-03-20 PROCEDURE — 63600175 PHARM REV CODE 636 W HCPCS: Performed by: STUDENT IN AN ORGANIZED HEALTH CARE EDUCATION/TRAINING PROGRAM

## 2023-03-20 PROCEDURE — 63600175 PHARM REV CODE 636 W HCPCS

## 2023-03-20 PROCEDURE — 87086 URINE CULTURE/COLONY COUNT: CPT | Performed by: INTERNAL MEDICINE

## 2023-03-20 PROCEDURE — 27000207 HC ISOLATION

## 2023-03-20 PROCEDURE — 81000 URINALYSIS NONAUTO W/SCOPE: CPT | Performed by: INTERNAL MEDICINE

## 2023-03-20 PROCEDURE — 96365 THER/PROPH/DIAG IV INF INIT: CPT

## 2023-03-20 PROCEDURE — 87077 CULTURE AEROBIC IDENTIFY: CPT | Performed by: INTERNAL MEDICINE

## 2023-03-20 PROCEDURE — 36415 COLL VENOUS BLD VENIPUNCTURE: CPT | Performed by: INTERNAL MEDICINE

## 2023-03-20 PROCEDURE — 96375 TX/PRO/DX INJ NEW DRUG ADDON: CPT

## 2023-03-20 PROCEDURE — 96366 THER/PROPH/DIAG IV INF ADDON: CPT

## 2023-03-20 PROCEDURE — G0378 HOSPITAL OBSERVATION PER HR: HCPCS

## 2023-03-20 PROCEDURE — 82607 VITAMIN B-12: CPT | Performed by: STUDENT IN AN ORGANIZED HEALTH CARE EDUCATION/TRAINING PROGRAM

## 2023-03-20 PROCEDURE — 25000003 PHARM REV CODE 250: Performed by: RADIOLOGY

## 2023-03-20 PROCEDURE — 99223 PR INITIAL HOSPITAL CARE,LEVL III: ICD-10-PCS | Mod: 25,,, | Performed by: PHYSICIAN ASSISTANT

## 2023-03-20 PROCEDURE — 11000001 HC ACUTE MED/SURG PRIVATE ROOM

## 2023-03-20 PROCEDURE — 99223 1ST HOSP IP/OBS HIGH 75: CPT | Mod: ,,, | Performed by: UROLOGY

## 2023-03-20 PROCEDURE — 63600175 PHARM REV CODE 636 W HCPCS: Mod: TB,JG | Performed by: INTERNAL MEDICINE

## 2023-03-20 PROCEDURE — 83605 ASSAY OF LACTIC ACID: CPT | Performed by: STUDENT IN AN ORGANIZED HEALTH CARE EDUCATION/TRAINING PROGRAM

## 2023-03-20 PROCEDURE — 99900035 HC TECH TIME PER 15 MIN (STAT)

## 2023-03-20 PROCEDURE — 83735 ASSAY OF MAGNESIUM: CPT | Performed by: STUDENT IN AN ORGANIZED HEALTH CARE EDUCATION/TRAINING PROGRAM

## 2023-03-20 PROCEDURE — 84466 ASSAY OF TRANSFERRIN: CPT | Performed by: STUDENT IN AN ORGANIZED HEALTH CARE EDUCATION/TRAINING PROGRAM

## 2023-03-20 PROCEDURE — 82728 ASSAY OF FERRITIN: CPT | Performed by: STUDENT IN AN ORGANIZED HEALTH CARE EDUCATION/TRAINING PROGRAM

## 2023-03-20 PROCEDURE — 94761 N-INVAS EAR/PLS OXIMETRY MLT: CPT

## 2023-03-20 PROCEDURE — 87186 SC STD MICRODIL/AGAR DIL: CPT | Performed by: INTERNAL MEDICINE

## 2023-03-20 PROCEDURE — 96367 TX/PROPH/DG ADDL SEQ IV INF: CPT

## 2023-03-20 PROCEDURE — 84100 ASSAY OF PHOSPHORUS: CPT | Performed by: STUDENT IN AN ORGANIZED HEALTH CARE EDUCATION/TRAINING PROGRAM

## 2023-03-20 PROCEDURE — 25500020 PHARM REV CODE 255: Performed by: RADIOLOGY

## 2023-03-20 PROCEDURE — 87088 URINE BACTERIA CULTURE: CPT | Performed by: INTERNAL MEDICINE

## 2023-03-20 PROCEDURE — 84484 ASSAY OF TROPONIN QUANT: CPT | Performed by: STUDENT IN AN ORGANIZED HEALTH CARE EDUCATION/TRAINING PROGRAM

## 2023-03-20 RX ORDER — CIPROFLOXACIN 2 MG/ML
INJECTION, SOLUTION INTRAVENOUS
Status: COMPLETED | OUTPATIENT
Start: 2023-03-20 | End: 2023-03-20

## 2023-03-20 RX ORDER — HEPARIN SODIUM 200 [USP'U]/100ML
INJECTION, SOLUTION INTRAVENOUS
Status: COMPLETED | OUTPATIENT
Start: 2023-03-20 | End: 2023-03-20

## 2023-03-20 RX ORDER — MAGNESIUM SULFATE HEPTAHYDRATE 40 MG/ML
2 INJECTION, SOLUTION INTRAVENOUS ONCE
Status: COMPLETED | OUTPATIENT
Start: 2023-03-20 | End: 2023-03-20

## 2023-03-20 RX ORDER — LIDOCAINE HYDROCHLORIDE 10 MG/ML
INJECTION INFILTRATION; PERINEURAL
Status: COMPLETED | OUTPATIENT
Start: 2023-03-20 | End: 2023-03-20

## 2023-03-20 RX ORDER — FENTANYL CITRATE 50 UG/ML
INJECTION, SOLUTION INTRAMUSCULAR; INTRAVENOUS
Status: COMPLETED | OUTPATIENT
Start: 2023-03-20 | End: 2023-03-20

## 2023-03-20 RX ORDER — MEROPENEM AND SODIUM CHLORIDE 1 G/50ML
1 INJECTION, SOLUTION INTRAVENOUS
Status: DISCONTINUED | OUTPATIENT
Start: 2023-03-20 | End: 2023-03-22

## 2023-03-20 RX ORDER — MIDAZOLAM HYDROCHLORIDE 1 MG/ML
INJECTION INTRAMUSCULAR; INTRAVENOUS
Status: COMPLETED | OUTPATIENT
Start: 2023-03-20 | End: 2023-03-20

## 2023-03-20 RX ADMIN — DEXAMETHASONE SODIUM PHOSPHATE 6 MG: 4 INJECTION, SOLUTION INTRA-ARTICULAR; INTRALESIONAL; INTRAMUSCULAR; INTRAVENOUS; SOFT TISSUE at 06:03

## 2023-03-20 RX ADMIN — ATORVASTATIN CALCIUM 40 MG: 40 TABLET, FILM COATED ORAL at 09:03

## 2023-03-20 RX ADMIN — MIDAZOLAM HYDROCHLORIDE 0.5 MG: 1 INJECTION, SOLUTION INTRAMUSCULAR; INTRAVENOUS at 02:03

## 2023-03-20 RX ADMIN — GABAPENTIN 300 MG: 300 CAPSULE ORAL at 03:03

## 2023-03-20 RX ADMIN — GABAPENTIN 300 MG: 300 CAPSULE ORAL at 09:03

## 2023-03-20 RX ADMIN — MEROPENEM 1 G: 1 INJECTION, POWDER, FOR SOLUTION INTRAVENOUS at 12:03

## 2023-03-20 RX ADMIN — FENTANYL CITRATE 50 MCG: 50 INJECTION INTRAMUSCULAR; INTRAVENOUS at 02:03

## 2023-03-20 RX ADMIN — MIRTAZAPINE 30 MG: 15 TABLET, ORALLY DISINTEGRATING ORAL at 10:03

## 2023-03-20 RX ADMIN — HEPARIN SODIUM 1000 UNITS/HR: 200 INJECTION, SOLUTION INTRAVENOUS at 02:03

## 2023-03-20 RX ADMIN — MEROPENEM AND SODIUM CHLORIDE 1 G: 1 INJECTION, SOLUTION INTRAVENOUS at 03:03

## 2023-03-20 RX ADMIN — FOLIC ACID 1 MG: 1 TABLET ORAL at 09:03

## 2023-03-20 RX ADMIN — IOHEXOL 10 ML: 350 INJECTION, SOLUTION INTRAVENOUS at 02:03

## 2023-03-20 RX ADMIN — GABAPENTIN 300 MG: 300 CAPSULE ORAL at 10:03

## 2023-03-20 RX ADMIN — CIPROFLOXACIN 400 MG: 2 INJECTION, SOLUTION INTRAVENOUS at 02:03

## 2023-03-20 RX ADMIN — MEROPENEM AND SODIUM CHLORIDE 1 G: 1 INJECTION, SOLUTION INTRAVENOUS at 09:03

## 2023-03-20 RX ADMIN — LIDOCAINE HYDROCHLORIDE 4 ML: 10 INJECTION, SOLUTION INFILTRATION; PERINEURAL at 02:03

## 2023-03-20 RX ADMIN — MAGNESIUM SULFATE 2 G: 2 INJECTION INTRAVENOUS at 09:03

## 2023-03-20 NOTE — H&P
U Internal Medicine History and Physical - Resident Note    Admitting Team: [facility] Internal Medicine Team B  Attending Physician: Dr. Federico Ahmadi  Resident: Dr. Wilcox  Interns: Dr. Rodriguez    Date of Admit: 3/19/2023    Chief Complaint     Altered Mental Status (Nursing home reports fever of 104, hypotension, and chest congestion. On arrival, awake, answers simple questions appropriately. Skin w/d, resp unlabored. Denies pain but states he doesn't feel well. )   for 1 day    Subjective:      History of Present Illness:  Praneeth Jewell is a 77 y.o. 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    Patient asleep on initial evaluation and agitated with interview, but answering most questions. Reports fatigue, dry cough, congestion, and malaise. Has had several sick contacts at his care facility. Denies sputum production, headache, chest pain, shortness of breath. Unable to state when his last bowel movement was. Denies dysuria, increased frequency, or hematuria. Does endorse some abdominal discomfort but no back or flank pain. Remainder of history obtained from chart review. Patient was brought in from nursing facility due to reported fever of 104, hypotension, and concern for infection. He has been living at the current facility since he has been wheelchair-bound after a stroke in 2015.    Past Medical History:  Past Medical History:   Diagnosis Date    Arthritis     Diabetes mellitus     type 2    Folate deficiency anemia     Heart block     History of kidney stones 05/25/2021    History of stroke with residual deficit 05/25/2021    Hyperlipidemia     Hypertension     Major depressive  disorder     Pacemaker     [employer] [place] 8 JONATHON (S/n: [id number])    Pyelonephritis 11/19/2021    TIA (transient ischemic attack)     Urinary retention 05/25/2021       Past Surgical History:  Past Surgical History:   Procedure Laterality Date    A-V CARDIAC PACEMAKER INSERTION N/A 8/24/2021    Procedure: INSERTION, CARDIAC PACEMAKER, DUAL CHAMBER;  Surgeon: Neo Winn MD;  Location: [facility] EP LAB;  Service: Cardiology;  Laterality: N/A;  CHB, DUAL PPM, ANES, BIO, DM, ED 2    COLONOSCOPY N/A 11/22/2021    Procedure: COLONOSCOPY;  Surgeon: Cesar Dorado MD;  Location: [facility] ENDO (2ND FLR);  Service: Endoscopy;  Laterality: N/A;    CYSTOSCOPIC LITHOLAPAXY  5/25/2021    Procedure: CYSTOLITHOLAPAXY;  Surgeon: Chris Schilling MD;  [facility];  Service: Urology;;    CYSTOSCOPY  8/26/2021    Procedure: CYSTOSCOPY;  Surgeon: Camilo Kelley Jr., MD;  [facility];  Service: Urology;;    CYSTOSCOPY W/ URETERAL STENT PLACEMENT Bilateral 5/25/2021    Procedure: CYSTOSCOPY, WITH BILATERAL URETERAL STENT INSERTION;  Surgeon: Chris Schilling MD;  [facility];  Service: Urology;  Laterality: Bilateral;    ELBOW ARTHROPLASTY Right     FLUOROSCOPY  5/25/2021    Procedure: FLUOROSCOPY;  Surgeon: Chris Schilling MD;  [facility];  Service: Urology;;    LASER LITHOTRIPSY Left 8/26/2021    Procedure: LITHOTRIPSY, USING LASER;  Surgeon: Camilo Kelley Jr., MD;  [facility];  Service: Urology;  Laterality: Left;    PYELOSCOPY Bilateral 8/26/2021    Procedure: PYELOSCOPY;  Surgeon: Camilo Kelley Jr., MD;  [facility];  Service: Urology;  Laterality: Bilateral;    REMOVAL OF BLOOD CLOT  5/25/2021    Procedure: REMOVAL, BLOOD CLOT;  Surgeon: Chris Schilling MD;  [facility];  Service: Urology;;    REPLACEMENT OF STENT Bilateral 8/26/2021    Procedure: REPLACEMENT, STENT;  Surgeon: Camilo Kelley Jr., MD;  Location: [address]  "[facility]R;  Service: Urology;  Laterality: Bilateral;    URETEROSCOPIC REMOVAL OF URETERIC CALCULUS Bilateral 2021    Procedure: REMOVAL, CALCULUS, URETER, URETEROSCOPIC;  Surgeon: Camilo Kelley Jr., MD;  [facility];  Service: Urology;  Laterality: Bilateral;    URETEROSCOPY Bilateral 2021    Procedure: URETEROSCOPY;  Surgeon: Camilo Kelley Jr., MD;  Location: [facility] OR [address] [place] [address];  Service: Urology;  Laterality: Bilateral;       Allergies:  Review of patient's allergies indicates:  No Known Allergies    Home Medications:  Atorvastatin 40mg daily  Cyproheptadine 4mg TID  Folic Acid 1mg daily  Gabapentin 300mg TID  Mirtazapine 30mg nightly  Tamsulosin 0.8mg daily    Family History:  Family History   Problem Relation Age of Onset    Stroke Mother     Hyperlipidemia Mother     Mental illness Father     Parkinsonism Father     No Known Problems Brother        Social History:  Social History     Tobacco Use    Smoking status: Former     Types: Cigarettes    Smokeless tobacco: Never   Substance Use Topics    Alcohol use: Yes     Comment: 1/ pint whisky daily    Drug use: Yes     Types: "Crack" cocaine       Review of Systems:  Pertinent positives and negatives listed in HPI. All other systems are reviewed and are negative.    Health Maintaince :   Primary Care Physician: Richard Galloway  Immunizations:   Immunization History   Administered Date(s) Administered    Influenza 2010    PPD Test 2015    Zoster 2018      TDap  unknown if  up to date.  Influenza is up to date.  Pneumovax  unknown if  up to date.  Patient reports getting COVID vaccination, would not specify # doses  Cancer Screening:  Colonoscopy: is up to date.      Objective:   Last 24 Hour Vital Signs:  BP  Min: 79/47  Max: 118/66  Temp  Av °F (36.7 °C)  Min: 98 °F (36.7 °C)  Max: 98 °F (36.7 °C)  Pulse  Av.1  Min: 60  Max: 86  Resp  Av.6  Min: 9  Max: 19  SpO2  Av.1 %  Min: 94 %  Max: 100 %  Height  " "Av' 8" (172.7 cm)  Min: 5' 8" (172.7 cm)  Max: 5' 8" (172.7 cm)  Weight  Av.2 kg (168 lb)  Min: 76.2 kg (168 lb)  Max: 76.2 kg (168 lb)  Body mass index is 25.54 kg/m².  I/O last 3 completed shifts:  In: 2336 [IV Piggyback:2336]  Out: 200 [Urine:200]    Physical Examination:  General: Cachectic; resting comfortably on 2L NC, awakens to loud voice agitated and limited cooperation with exam  Head:  Normocephalic and atraumatic  Eyes:  PERRL; EOMI with anicteric sclera and clear conjunctivae  Mouth:  Oropharynx clear and without exudate  Cardio:  Regular rate and rhythm with normal S1 and S2; no murmurs or rubs  Resp:  CTAB; tachypneic upon awakening but comfortable at rest on 2L NC; no wheezes, crackles or rhonchi  Abdom: Soft, non-distended; LLQ tenderness with deep palpation  Extrem: L toe amputation, Legs contracted; warm and well-perfused with no cyanosis or edema  Skin:  Pale; No rashes or ulcers noted  Pulses: 2+ and symmetric distally  Neuro:  Limited 2/2 patient cooperation; CNs grossly intact, moving upper extremities spontaneously, no focal deficits noted    Laboratory:  Most Recent Data:  CBC:   Lab Results   Component Value Date    WBC 21.84 (H) 2023    HGB 10.4 (L) 2023    HCT 32.4 (L) 2023     2023     (H) 2023    RDW 13.2 2023     BMP:   Lab Results   Component Value Date     2023    K 3.8 2023     2023    CO2 26 2023    BUN 23 2023    CREATININE 1.0 2023    GLU 99 2023    CALCIUM 9.5 2023    MG 1.8 2021    PHOS 3.5 2022     LFTs:   Lab Results   Component Value Date    PROT 6.2 2023    ALBUMIN 2.5 (L) 2023    BILITOT 0.4 2023     (H) 2023    ALKPHOS 95 2023     (H) 2023     Coags:   Lab Results   Component Value Date    INR 1.1 2021     FLP: No results found for: CHOL, HDL, LDLCALC, TRIG, CHOLHDL  DM:   Lab Results "   Component Value Date    HGBA1C 4.7 11/20/2021    HGBA1C 4.1 08/24/2021    HGBA1C 4.8 05/26/2021    CREATININE 1.0 03/19/2023     Thyroid:   Lab Results   Component Value Date    TSH 2.398 03/14/2022    FREET4 0.98 03/14/2022     Anemia:   Lab Results   Component Value Date    BBCFJGOS56 619 03/14/2022    FOLATE >40.0 (H) 03/14/2022     Cardiac:   Lab Results   Component Value Date    TROPONINI 0.056 (H) 03/19/2023    BNP 54 03/19/2023     Urinalysis:   Lab Results   Component Value Date    LABURIN PROTEUS MIRABILIS  50,000 - 99,999 cfu/ml   (A) 11/19/2021    COLORU Yellow 03/19/2023    SPECGRAV 1.020 03/19/2023    NITRITE Positive (A) 03/19/2023    KETONESU Negative 03/19/2023    UROBILINOGEN 2.0-3.0 (A) 03/19/2023    WBCUA >100 (H) 03/19/2023       Trended Cardiac Data:  Recent Labs   Lab 03/19/23  1625   TROPONINI 0.056*   BNP 54       Microbiology Data:  BCx and UCx pending    Other Laboratory Data:  Lactate 2.6  Procalcitonin 22.13    Other Results:  EKG (my interpretation): Atrial sensed, ventricular paced rhythm    Radiology:  Imaging Results               CT Abdomen Pelvis With Contrast (Final result)  Result time 03/19/23 20:14:08      Final result by Malou Rios MD (03/19/23 20:14:08)                   Impression:      Large distal colon fecal impaction with surrounding mucosal thickening and inflammatory changes suggesting stercoral colitis.  There is severe stool burden constipation throughout the colon as well as gaseous distension of the colon.  No significant small bowel distension, convincing extraluminal air, abscess or significant free fluid.    Severe left hydronephrosis and proximal ureterectasis secondary to a 14 mm in maximal dimension calculus in the mid left ureter as well as an adjacent smaller more proximal calculus.  There are also multiple calcifications largest measuring 9 mm in diameter in the chain along the expected course of the more distal left ureter.  These may represent  gonadal vein calcifications/vascular calcifications however more distal calculi are not reliably excluded within the ureter in this patient with punctate innumerable calculi in the left kidney.  Severe fecal impaction distorts the anatomy in the pelvis displacing the bladder and ureters.  CT urogram can further evaluate this finding.    There is mild hydronephrosis in the right kidney as well as proximal ureterectasis which may be secondary to the pelvic impaction.  No convincing calculi are seen in the right ureter and nonobstructing calculi are seen in the right kidney.    Bilateral lung base atelectasis.  Some mild pneumonitis is difficult to exclude reliably on the left.  No lobar consolidation or significant pleural effusion seen.    Interval loss of subcutaneous and intra-abdominal fat since prior CT and increased stranding in the fat compatible with anasarca.    Please see above for additional incidental nonacute findings.    This report was flagged in Epic as abnormal.      Electronically signed by: Malou Rios  Date:    03/19/2023  Time:    20:14               Narrative:    EXAMINATION:  CT ABDOMEN PELVIS WITH CONTRAST    CLINICAL HISTORY:  LLQ abdominal pain;    TECHNIQUE:  Low dose axial images, sagittal and coronal reformations were obtained from the lung bases to the pubic symphysis before and following the IV administration of 75 mL of Omnipaque 350 .  Oral contrast was not administered..    COMPARISON:  CTA abdomen pelvis 11/19/2021    FINDINGS:  Abdomen:    - Lung bases: There is dependent atelectasis bilaterally in the lungs.  Ground-glass opacities also noted raising question of an underlying component of mild pneumonitis or edema.  No pleural effusion is seen.  The heart is stable and prominent with pacemaker leads in the right atrium and right ventricle.    Bowel/Mesentery:    There is severe stool burden constipation throughout the colon as well as gaseous prominence.  There is distal fecal  impaction with the rectum measuring 8.5 cm in maximal diameter.  There is mucosal thickening and note of perirectal edema as seen with stercoral colitis.  The appendix is not definitely isolated as a separate structure.  There is no evidence of pericecal inflammatory change.    The patient has had significant loss of intra-abdominal fat since prior exam.    -Liver: Liver appears homogeneous and not significantly enlarged.    - Gallbladder: The there is no CT evidence of cholecystitis.    - Bile Ducts: No evidence of intra or extra hepatic biliary ductal dilation.    - Spleen: Negative.    - Kidneys: There is mild right hydronephrosis and proximal ureterectasis multiple punctate nonobstructing calculi in the right kidney..  The right ureter is not reliably followed all the way down to the bladder due to the large fecal impaction and a ureteral calculus distally is not reliably excluded given the multiple calcifications in the pelvis with the majority likely vascular.    There is severe hydronephrosis of the left kidney as well as severe proximal ureterectasis secondary to a 13 x 11 x 14 mm calculus in the mid ureter at the level of the L4 transverse process on the left.  Smaller calculus more proximally also noted abutting the larger calculus measuring 6 mm coronal image 88 series 601.  There is a long chain of multiple calcifications distal to the large obstructing mid left ureter calculus which overlap the expected course of the more distal left ureter in the pelvis coronal images 68 through 84 series 601.  Largest is seen most distally measuring 9 mm in diameter.  Because of the fecal impaction and mass effect on adjacent structures, I cannot reliably exclude these calcifications from the more distal left ureter.  These calcifications may represent vascular/gonadal vein calcifications.  CT urogram would be needed to reliably assess the.  There are also innumerable layering punctate calculi within this distended  renal pelvis.  Renal cortices enhance asymmetrically with the left late secondary to the obstruction.  There is a cyst in the right renal cortex pole.    - Adrenals: Unremarkable.    - Pancreas: No mass or peripancreatic fat stranding.    - Retroperitoneum:  No significant adenopathy.    - Vascular: There is no abdominal aortic aneurysm.  Moderate atherosclerotic calcification throughout the aorta without significant stenosis.    - Abdominal wall:  Strandy changes in the subcutaneous fat noted compatible with mild anasarca/edema..    Pelvis:    The bladder is displaced cephalad secondary to the large fecal impaction but otherwise is unremarkable as imaged.  Prostate is not convincingly enlarged and displaced anteriorly.  Pelvic calcifications are likely vascular..  No pelvic mass, adenopathy, or free fluid.    Bones:  No acute osseous abnormality and no suspicious lytic or blastic lesion. There is severe spondylosis without acute subluxation and degenerative changes of the hips bilaterally.                                       X-Ray Chest AP Portable (Final result)  Result time 03/19/23 17:19:31      Final result by Melo Lovell MD (03/19/23 17:19:31)                   Impression:      No airspace disease.    Distention of the bowel loops in the upper abdomen.      Electronically signed by: Melo Lovell MD  Date:    03/19/2023  Time:    17:19               Narrative:    EXAMINATION:  XR CHEST AP PORTABLE    CLINICAL HISTORY:  Sepsis;    TECHNIQUE:  Single frontal view of the chest was performed.    COMPARISON:  12/21/2022.    FINDINGS:  There is unchanged appearance of the left-sided AICD.  Monitoring EKG leads are present.    The trachea is unremarkable.  There are calcifications of the aortic knob.  The cardiomediastinal silhouette is within normal limits.  There is no evidence of free air beneath the hemidiaphragms.  There are no pleural effusions.  There is no evidence of a pneumothorax.  There is no evidence  of pneumomediastinum.  No airspace disease present.  There are degenerative changes in the osseous structures.    There is distention of the bowel loops in the upper abdomen.                                         Assessment:     Praneeth Jewell is a 77 y.o. 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 Imaging with significant nephrolithiasis and fecal impaction with subsequent bilateral hydronephrosis (L>R). UA consistent with UTI 2/2 obstruction. Hypoxic on arrival to the ED requiring supplemental O2, found to be COVID positive.       Plan:     Severe Sepsis 2/2 Urinary Tract Infection  Nephrolithiasis  Obstructive Uropathy  Hydronephrosis  Severe Constipation/Fecal Impaction  - history of bilateral nephrolithiasis and obstructive uropathy s/p bilateral J-J ureteral stent and cystolitholapaxy; recurrent nephrolithiasis and multiple UTIs (Providencia, ESBL Proteus mirabilis, MDR Proteus) in the past  - reported fever, hypotension, congestion at care facility  - Afebrile, HR 80, BP 79/47 in ED (improved with 30cc/kg LR bolus in ED); WBC 21.84; UA with pyuria, some RBCs, many bacteria, nitrite positive  - Lactate 2.6, continue to trend; procalcitonin 22.13  - CT A/P showed mild hydronephrosis of R kidney and proximal ureterectasis with multiple nonobstructing calculi in R kidney, severe hydronephrosis of the L kidney with many punctate calculi in the distended renal pelvis and severe proximal ureterectasis secondary to a 13 x 11 x 14 mm calculus in the mid-ureter and smaller calculus (6mm) more proximally with a long chain of multiple calcifications distal to the large obstructing calculus; difficult to visualize distal ureters due to fecal impaction and  mass effect on adjacent structures. Pelvic calcifications are likely vascular. The bladder is displaced cephalad secondary to the large fecal impaction but otherwise is unremarkable   - urology consulted in ED, no surgical planned by urology due to necessary procedure and active infection; recommend urgent IR consult for L nephrostomy tube placement; IR consulted, NPO pending IR evaluation  - Enema ordered for fecal impaction  - blood and urine cultures ordered  -  s/p 1 dose ceftriaxone in ED; in the setting of history of recurrent UTI including MDR Proteus, started meropenem    Acute Hypoxia 2/2 COVID-19 Infection  Elevated Transaminases  - Tachypneic with SpO2 in low 90s at presentation, improved with 2L NC  - COVID +, received 1 dose dexamethasone in ED  - imaging with some ground glass opacities at lung bases  - will continue dexamethasone 6mg daily due to patient's new O2 requirement and risk factors for severe infection  - will defer Remdesivir in the setting of newly elevated transaminases (AST//108), likely 2/2 COVID infection    Elevated Troponin  - troponin 0.056 at admission, repeat ordered and pending  - EKG with atrial sensed and ventricular paced rhythm; no new ischemic changes  - likely due to sepsis +/- active COVID infection    Macrocytic Anemia  - H/H 10.4/32.4 consistent with prior levels,   - history of folate deficiency on home supplement, last folate level March 2022 >40, B12 level and thiamine wnl at that time  - no signs of acute bleeding, ordered iron studies  - continue to monitor    Complete Heart Block s/p Pacemaker Placement (2021)  - EKG consistent with atrial sensed and ventricular paced rhythm; no new ischemic changes  - device last evaluated 3/6/2023    R PARDEEP Stroke with Residual Deficits (2015)  Vascular Dementia  - residual LE weakness, wheelchair bound  - no new focal deficit noted  - per neurology note, patient has been having progressive cognitive and behavioral  "impairment since the stroke with 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 The stroke was in the right PARDEEP distribution and impacted the superior frontal gyrus, medial frontal gyrus, and cingulate extending from the anterior aspect of the frontal lobe to the central sulcus. The involvement of the aPFC and dmPFC would be expected to impact a number of cognitive functions including executive functioning, planning, problem solving, judgment, attention, and working memory, among others.  Unfortunately, the effectiveness of medications to help with behavior will likely be limited in this case."  - avoiding anticholinergics, opioids, and benzodiazepines; cyproheptadine on patient's home medications, will not order at this time  - not taking any anti-platelet medications, continue home Lipitor    Type 2 Diabetes Mellitus  - not on any medications (has been on metformin in the past)  - all prior A1c in EHR <5, will monitor daily CMP    Code Status:     DNR    Mitzy Robles MD  U Internal Medicine HO-2  [facility] Internal Medicine Service   "

## 2023-03-20 NOTE — CONSULTS
"  [facility] - Telemetry  Adult Nutrition  Consult Note    SUMMARY     Recommendations    Recommendation:  1. Initiate diet when medically acceptable.   2. Monitor weight/labs.   3. Monitor need for supplements.   4. RD to follow to monitor po intake    Goals:  Diet will be started by RD follow up  Nutrition Goal Status: new  Communication of RD Recs: reviewed with RN    Assessment and Plan  Renal/  Bilateral hydronephrosis  Contributing Nutrition Diagnosis  Inadequate energy intake    Related to (etiology):   dx    Signs and Symptoms (as evidenced by):   NPO     Interventions:  Collaboration with other providers    Nutrition Diagnosis Status:   New      Malnutrition Assessment  Unable to assess NFPE 2/ 2pt on isolation for COVID and ESBL     Reason for Assessment  Reason For Assessment: consult (altered skin integrity)  Diagnosis:  (AMS)  Relevant Medical History: HTN, DM, HLD, TIA, arthritis, stroke, kidney stones, heart block, pacemaker  General Information Comments: Pt NPO. Noted possible nephrostomy tube placment. Holland 12-sacral spine wound. Weight stable. Pt on isolation for COVID and ESBL-unable to assess NFPE.    Nutrition Risk Screen  Nutrition Risk Screen: large or nonhealing wound, burn or pressure injury    Nutrition/Diet History  Food Preferences: unable to assess  Factors Affecting Nutritional Intake: NPO    Anthropometrics  Temp: 98.8 °F (37.1 °C)  Height Method: Stated  Height: 5' 8" (172.7 cm)  Height (inches): 68 in  Weight Method: Estimated  Weight: 76.2 kg (167 lb 15.9 oz)  Weight (lb): 167.99 lb  Ideal Body Weight (IBW), Male: 154 lb  % Ideal Body Weight, Male (lb): 109.08 %  BMI (Calculated): 25.5  BMI Grade: 25 - 29.9 - overweight     Lab/Procedures/Meds  Pertinent Labs Reviewed: reviewed  Pertinent Labs Comments: Phos 2.6L, Alb 2.2L  Pertinent Medications Reviewed: reviewed  Pertinent Medications Comments: folic acid, gabapentin, meropenem    Estimated/Assessed Needs  Weight Used For Calorie " Calculations: 76.2 kg (167 lb 15.9 oz)  Energy Calorie Requirements (kcal): 1912  Energy Need Method: Calipatria-St Jeor (x1.3)  Protein Requirements: 76g (1.0g/kg)  Weight Used For Protein Calculations: 76.2 kg (167 lb 15.9 oz)  Estimated Fluid Requirement Method: RDA Method  RDA Method (mL): 1912  CHO Requirement: 200g    Nutrition Prescription Ordered  Current Diet Order: NPO    Evaluation of Received Nutrient/Fluid Intake  I/O: 2336/350  Energy Calories Required: not meeting needs  Protein Required: not meeting needs  Fluid Required: not meeting needs  Comments: LBM 3/20  % Intake of Estimated Energy Needs: Other: NPO  % Meal Intake: NPO    Nutrition Risk  Level of Risk/Frequency of Follow-up:  (2xweekly)     Monitor and Evaluation  Food and Nutrient Intake: food and beverage intake  Food and Nutrient Adminstration: diet order  Physical Activity and Function: nutrition-related ADLs and IADLs  Anthropometric Measurements: weight  Biochemical Data, Medical Tests and Procedures: electrolyte and renal panel  Nutrition-Focused Physical Findings: overall appearance     Nutrition Follow-Up  RD Follow-up?: Yes

## 2023-03-20 NOTE — PLAN OF CARE
Recommendation:  1. Initiate diet when medically acceptable.   2. Monitor weight/labs.   3. Monitor need for supplements.   4. RD to follow to monitor po intake    Goals:  Diet will be started by RD follow up  Nutrition Goal Status: new

## 2023-03-20 NOTE — PROGRESS NOTES
"[facility] Medicine Progress Note      Primary Team: [facility] Hospitalist Team B  Attending Physician: Federico Ahmadi MD  Resident: Jeri  Intern: Michael    Subjective:      NAEON. Afebrile with few episodes of  hypotension.  Pt seen by writer this morning. Pt is awake and orient to place and year. Pt reports mild dysuria before presentation.  Pt denies fevers, chills, N/V, SOB, chest pain, abd pain, or urinary symptoms. Pt agree for IR evaluation and possible nephrostomy.      Objective:     Last 24 Hour Vital Signs:  BP  Min: 79/47  Max: 119/60  Temp  Av.6 °F (36.4 °C)  Min: 97 °F (36.1 °C)  Max: 98 °F (36.7 °C)  Pulse  Av.1  Min: 60  Max: 86  Resp  Av.4  Min: 9  Max: 20  SpO2  Av.8 %  Min: 94 %  Max: 100 %  Height  Av' 8" (172.7 cm)  Min: 5' 8" (172.7 cm)  Max: 5' 8" (172.7 cm)  Weight  Av.2 kg (168 lb)  Min: 76.2 kg (168 lb)  Max: 76.2 kg (168 lb)  I/O last 3 completed shifts:  In: 2336 [IV Piggyback:2336]  Out: 350 [Urine:350]    Physical Examination:  General:          Cachectic; resting comfortably on 2L NC, awakens to loud voice agitated and limited cooperation with exam  Head:               Normocephalic and atraumatic  Eyes:               PERRL; EOMI with anicteric sclera and clear conjunctivae  Mouth:             Oropharynx clear and without exudate  Cardio:             Regular rate and rhythm with normal S1 and S2; no murmurs or rubs  Resp:               CTAB; tachypneic upon awakening but comfortable at rest on 2L NC; no wheezes, crackles or rhonchi  Abdom:            Soft, non-distended; LLQ tenderness with deep palpation  Extrem:            L toe amputation, Legs contracted; warm and well-perfused with no cyanosis or edema  Skin:                Pale; No rashes or ulcers noted  Pulses:            2+ and symmetric distally  Neuro:             Limited 2/2 patient cooperation; CNs grossly intact, moving upper extremities spontaneously, no focal deficits noted   "   Laboratory:  Recent Labs   Lab 03/19/23  1625 03/20/23  0437 03/20/23  0438   WBC 21.84* 17.18*  --    HGB 10.4* 10.8*  --    HCT 32.4* 34.7*  --     167  --    * 102*  --    RDW 13.2 13.4  --      --  141   K 3.8  --  4.1     --  111*   CO2 26  --  21*   BUN 23  --  23   CREATININE 1.0  --  0.8   GLU 99  --  98   PROT 6.2  --  5.6*   ALBUMIN 2.5*  --  2.2*   BILITOT 0.4  --  0.2   *  --  101*   ALKPHOS 95  --  86   *  --  87*       Microbiology Data Reviewed:  Pertinent Findings:  Blood culture: pending  COVID: positive  Urine culture: pending    Other Results:  EKG (my interpretation): Atrial sensed, ventricular paced rhythm     Radiology:  Imaging Results                   CT Abdomen Pelvis With Contrast (Final result)  Result time 03/19/23 20:14:08            Final result by Malou Rios MD (03/19/23 20:14:08)                           Impression:        Large distal colon fecal impaction with surrounding mucosal thickening and inflammatory changes suggesting stercoral colitis.  There is severe stool burden constipation throughout the colon as well as gaseous distension of the colon.  No significant small bowel distension, convincing extraluminal air, abscess or significant free fluid.     Severe left hydronephrosis and proximal ureterectasis secondary to a 14 mm in maximal dimension calculus in the mid left ureter as well as an adjacent smaller more proximal calculus.  There are also multiple calcifications largest measuring 9 mm in diameter in the chain along the expected course of the more distal left ureter.  These may represent gonadal vein calcifications/vascular calcifications however more distal calculi are not reliably excluded within the ureter in this patient with punctate innumerable calculi in the left kidney.  Severe fecal impaction distorts the anatomy in the pelvis displacing the bladder and ureters.  CT urogram can further evaluate this finding.      There is mild hydronephrosis in the right kidney as well as proximal ureterectasis which may be secondary to the pelvic impaction.  No convincing calculi are seen in the right ureter and nonobstructing calculi are seen in the right kidney.     Bilateral lung base atelectasis.  Some mild pneumonitis is difficult to exclude reliably on the left.  No lobar consolidation or significant pleural effusion seen.     Interval loss of subcutaneous and intra-abdominal fat since prior CT and increased stranding in the fat compatible with anasarca.     Please see above for additional incidental nonacute findings.     This report was flagged in Epic as abnormal.        Electronically signed by:     Malou Rios  Date:                                            03/19/2023  Time:                                            20:14                     Narrative:     EXAMINATION:  CT ABDOMEN PELVIS WITH CONTRAST     CLINICAL HISTORY:  LLQ abdominal pain;     TECHNIQUE:  Low dose axial images, sagittal and coronal reformations were obtained from the lung bases to the pubic symphysis before and following the IV administration of 75 mL of Omnipaque 350 .  Oral contrast was not administered..     COMPARISON:  CTA abdomen pelvis 11/19/2021     FINDINGS:  Abdomen:     - Lung bases: There is dependent atelectasis bilaterally in the lungs.  Ground-glass opacities also noted raising question of an underlying component of mild pneumonitis or edema.  No pleural effusion is seen.  The heart is stable and prominent with pacemaker leads in the right atrium and right ventricle.     Bowel/Mesentery:     There is severe stool burden constipation throughout the colon as well as gaseous prominence.  There is distal fecal impaction with the rectum measuring 8.5 cm in maximal diameter.  There is mucosal thickening and note of perirectal edema as seen with stercoral colitis.  The appendix is not definitely isolated as a separate structure.  There is no  evidence of pericecal inflammatory change.     The patient has had significant loss of intra-abdominal fat since prior exam.     -Liver: Liver appears homogeneous and not significantly enlarged.     - Gallbladder: The there is no CT evidence of cholecystitis.     - Bile Ducts: No evidence of intra or extra hepatic biliary ductal dilation.     - Spleen: Negative.     - Kidneys: There is mild right hydronephrosis and proximal ureterectasis multiple punctate nonobstructing calculi in the right kidney..  The right ureter is not reliably followed all the way down to the bladder due to the large fecal impaction and a ureteral calculus distally is not reliably excluded given the multiple calcifications in the pelvis with the majority likely vascular.     There is severe hydronephrosis of the left kidney as well as severe proximal ureterectasis secondary to a 13 x 11 x 14 mm calculus in the mid ureter at the level of the L4 transverse process on the left.  Smaller calculus more proximally also noted abutting the larger calculus measuring 6 mm coronal image 88 series 601.  There is a long chain of multiple calcifications distal to the large obstructing mid left ureter calculus which overlap the expected course of the more distal left ureter in the pelvis coronal images 68 through 84 series 601.  Largest is seen most distally measuring 9 mm in diameter.  Because of the fecal impaction and mass effect on adjacent structures, I cannot reliably exclude these calcifications from the more distal left ureter.  These calcifications may represent vascular/gonadal vein calcifications.  CT urogram would be needed to reliably assess the.  There are also innumerable layering punctate calculi within this distended renal pelvis.  Renal cortices enhance asymmetrically with the left late secondary to the obstruction.  There is a cyst in the right renal cortex pole.     - Adrenals: Unremarkable.     - Pancreas: No mass or peripancreatic fat  stranding.     - Retroperitoneum:  No significant adenopathy.     - Vascular: There is no abdominal aortic aneurysm.  Moderate atherosclerotic calcification throughout the aorta without significant stenosis.     - Abdominal wall:  Strandy changes in the subcutaneous fat noted compatible with mild anasarca/edema..     Pelvis:     The bladder is displaced cephalad secondary to the large fecal impaction but otherwise is unremarkable as imaged.  Prostate is not convincingly enlarged and displaced anteriorly.  Pelvic calcifications are likely vascular..  No pelvic mass, adenopathy, or free fluid.     Bones:  No acute osseous abnormality and no suspicious lytic or blastic lesion. There is severe spondylosis without acute subluxation and degenerative changes of the hips bilaterally.                                                X-Ray Chest AP Portable (Final result)  Result time 03/19/23 17:19:31            Final result by Melo Lovell MD (03/19/23 17:19:31)                           Impression:        No airspace disease.     Distention of the bowel loops in the upper abdomen.        Electronically signed by:     Melo Lovell MD  Date:                                            03/19/2023  Time:                                            17:19                     Narrative:     EXAMINATION:  XR CHEST AP PORTABLE     CLINICAL HISTORY:  Sepsis;     TECHNIQUE:  Single frontal view of the chest was performed.     COMPARISON:  12/21/2022.     FINDINGS:  There is unchanged appearance of the left-sided AICD.  Monitoring EKG leads are present.     The trachea is unremarkable.  There are calcifications of the aortic knob.  The cardiomediastinal silhouette is within normal limits.  There is no evidence of free air beneath the hemidiaphragms.  There are no pleural effusions.  There is no evidence of a pneumothorax.  There is no evidence of pneumomediastinum.  No airspace disease present.  There are degenerative changes in the  osseous structures.     There is distention of the bowel loops in the upper abdomen.                             Current Medications:     Infusions:       Scheduled:   atorvastatin  40 mg Oral Daily    dexAMETHasone  6 mg Intravenous Daily    folic acid  1 mg Oral Daily    gabapentin  300 mg Oral TID    magnesium sulfate IVPB  2 g Intravenous Once    meropenem (MERREM) IVPB  1 g Intravenous Q8H    mirtazapine  30 mg Oral Nightly        PRN:  melatonin, sodium chloride 0.9%    Antibiotics and Day Number of Therapy:  Ceftriaxone single dose on 3/19  Meropenem :3/19-        Assessment:     Praneeth Jewell is a 77 y.o. 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 Imaging with significant nephrolithiasis and fecal impaction with subsequent bilateral hydronephrosis (L>R). UA consistent with UTI 2/2 obstruction. Hypoxic on arrival to the ED requiring supplemental O2, found to be COVID positive.        Plan:      Severe Sepsis 2/2 Urinary Tract Infection  Nephrolithiasis  Obstructive Uropathy  Hydronephrosis  Severe Constipation/Fecal Impaction  - history of bilateral nephrolithiasis and obstructive uropathy s/p bilateral J-J ureteral stent and cystolitholapaxy; recurrent nephrolithiasis and multiple UTIs (Providencia, ESBL Proteus mirabilis, MDR Proteus) in the past  - reported fever, hypotension, congestion at care facility  - Afebrile, HR 80, BP 79/47 in ED (improved with 30cc/kg LR bolus in ED); WBC 21.84; UA with pyuria, some RBCs, many bacteria, nitrite positive  - Lactate 2.6, continue to trend; procalcitonin 22.13  - CT A/P showed mild hydronephrosis of R kidney and proximal ureterectasis with multiple nonobstructing calculi in R kidney, severe  hydronephrosis of the L kidney with many punctate calculi in the distended renal pelvis and severe proximal ureterectasis secondary to a 13 x 11 x 14 mm calculus in the mid-ureter and smaller calculus (6mm) more proximally with a long chain of multiple calcifications distal to the large obstructing calculus; difficult to visualize distal ureters due to fecal impaction and mass effect on adjacent structures. Pelvic calcifications are likely vascular. The bladder is displaced cephalad secondary to the large fecal impaction but otherwise is unremarkable   - urology consulted in ED, no surgical planned by urology due to necessary procedure and active infection; recommend urgent IR consult for L nephrostomy tube placement; IR consulted, NPO pending IR evaluation  - Enema ordered for fecal impaction  - blood and urine cultures ordered  -  s/p 1 dose ceftriaxone in ED; in the setting of history of recurrent UTI including MDR Proteus, started meropenem  -IR consult; appreciate recs     Acute Hypoxia 2/2 COVID-19 Infection  Elevated Transaminases  - Tachypneic with SpO2 in low 90s at presentation, improved with 2L NC  - COVID +, received 1 dose dexamethasone in ED  - imaging with some ground glass opacities at lung bases  - will continue dexamethasone 6mg daily due to patient's new O2 requirement and risk factors for severe infection  - will defer Remdesivir in the setting of newly elevated transaminases (AST//108), likely 2/2 COVID infection     Elevated Troponin  - troponin 0.056 at admission, repeat ordered and pending  - EKG with atrial sensed and ventricular paced rhythm; no new ischemic changes  - likely due to sepsis +/- active COVID infection     Macrocytic Anemia  - H/H 10.4/32.4 consistent with prior levels,   - history of folate deficiency on home supplement, last folate level March 2022 >40, B12 level and thiamine wnl at that time  - no signs of acute bleeding, ordered iron studies  - continue to  "monitor     Complete Heart Block s/p Pacemaker Placement (2021)  - EKG consistent with atrial sensed and ventricular paced rhythm; no new ischemic changes  - device last evaluated 3/6/2023     R PARDEEP Stroke with Residual Deficits (2015)  Vascular Dementia  - residual LE weakness, wheelchair bound  - no new focal deficit noted  - per neurology note, patient has been having progressive cognitive and behavioral impairment since the stroke with 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 The stroke was in the right PARDEEP distribution and impacted the superior frontal gyrus, medial frontal gyrus, and cingulate extending from the anterior aspect of the frontal lobe to the central sulcus. The involvement of the aPFC and dmPFC would be expected to impact a number of cognitive functions including executive functioning, planning, problem solving, judgment, attention, and working memory, among others.  Unfortunately, the effectiveness of medications to help with behavior will likely be limited in this case."  - avoiding anticholinergics, opioids, and benzodiazepines; cyproheptadine on patient's home medications, will not order at this time  - not taking any anti-platelet medications, continue home Lipitor     Type 2 Diabetes Mellitus  - not on any medications (has been on metformin in the past)  - all prior A1c in EHR <5, will monitor daily CMP    is a       Diet: NPO  DVT Prophylaxis: SCD  Code: DNR    Dispo: Pending Clinical improvement and IR consult recommendations    Scarlett Rodriguez MD  [facility] Neurology HO-I    [facility] Medicine Hospitalist Pager numbers:   [facility] Hospitalist Medicine Team A (Kalpesh/Richard): [phone]  [facility] Hospitalist Medicine Team B (Champ/Daiana):  [phone]        "

## 2023-03-20 NOTE — PLAN OF CARE
SW met with pt via [employer] to complete assessment. Pt is alert and able to verbally answer assessment questions. Pt confirmed , home address, and current location being hospital. Pt was not able to confirm nursing home however pt could not hear SW. SW received approval to call pt brother Tiffany. Pt confirmed friend Sil to not be a good contact anymore.    SW placed call to Pat who confirmed pt is from [facility] in [place], [place]. At time of discharge pt to need transportation assistance back to facility. While at facility pt wheelchair bound. Pt need assistance with all ADL's.        [id number]   Discharge Planning   Assessment Type Discharge Planning Brief Assessment   Resource/Environmental Concerns none   Support Systems Family members   Equipment Currently Used at Home wheelchair   Current Living Arrangements residential facility   Patient/Family Anticipates Transition to long-term care facility   Patient/Family Anticipated Services at Transition none   DME Needed Upon Discharge  none   Discharge Plan A Return to nursing home       Future Appointments   Date Time Provider Department Center   3/20/2023  1:15 PM [facility] IR1 [facility] RAD IR [facility]   2023 10:00 AM HOME MONITOR DEVICE CHECK, BELIA BELIA VIKKI [facility]  [phone]

## 2023-03-20 NOTE — CONSULTS
"Percutaneous Nephrostomy Tube Placement Consult Note  Interventional Radiology    Consult Requested By: Scarlett Rodriguez MD  Reason for Consult: nephrostomy tube placement; obstructive uropathy/UTI/hydronephrosis; urology stated urologic procedure too high risk, suggested nephrostomy tube first    SUBJECTIVE:     Chief Complaint:  sepsis secondary to UTI    History of Present Illness:  Praneeth Jewell is a 77 y.o. 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 Hospital course notable for positive COVID. Interventional Radiology has been consulted for left sidedPCN placement for management of severe left hydronephrosis with a significant stone burden within the entire left ureter.  Pt has had recent imaging including a CT a/p on 3/19 which revealed "mild hydronephrosis of R kidney and proximal ureterectasis with multiple nonobstructing calculi in R kidney, severe hydronephrosis of the L kidney with many punctate calculi in the distended renal pelvis and severe proximal ureterectasis secondary to a 13 x 11 x 14 mm calculus in the mid-ureter and smaller calculus (6mm) more proximally with a long chain of multiple calcifications distal to the large obstructing calculus; difficult to visualize distal ureters due to fecal impaction and mass effect on adjacent structures. Pelvic calcifications are likely vascular. The bladder is displaced cephalad secondary to the large fecal impaction but otherwise is unremarkable." Ureteral stent placement has not been attempted. The pt has not undergone left sided PCN placement in the past. The pt's Cr is currently 0.8 at baseline.  The pt's WBC is 17.18 and is trending down. The pt is hemodynamically stable. "     ROS    Scheduled Meds:   atorvastatin  40 mg Oral Daily    dexAMETHasone  6 mg Intravenous Daily    folic acid  1 mg Oral Daily    gabapentin  300 mg Oral TID    magnesium sulfate IVPB  2 g Intravenous Once    meropenem (MERREM) IVPB  1 g Intravenous Q8H    mirtazapine  30 mg Oral Nightly     Continuous Infusions:  PRN Meds:melatonin, sodium chloride 0.9%    Review of patient's allergies indicates:  No Known Allergies    Past Medical History:   Diagnosis Date    Arthritis     Diabetes mellitus     type 2    Folate deficiency anemia     Heart block     History of kidney stones 05/25/2021    History of stroke with residual deficit 05/25/2021    Hyperlipidemia     Hypertension     Major depressive disorder     Pacemaker     Biotronic Edora 8 DRERROL (S/n: [id number])    Pyelonephritis 11/19/2021    TIA (transient ischemic attack)     Urinary retention 05/25/2021     Past Surgical History:   Procedure Laterality Date    A-V CARDIAC PACEMAKER INSERTION N/A 8/24/2021    Procedure: INSERTION, CARDIAC PACEMAKER, DUAL CHAMBER;  Surgeon: Neo Winn MD;  Location: [facility] EP LAB;  Service: Cardiology;  Laterality: N/A;  CHB, DUAL PPM, ANES, BIO, DM, ED 2    COLONOSCOPY N/A 11/22/2021    Procedure: COLONOSCOPY;  Surgeon: Cesar Dorado MD;  Location: [facility] ENDO (2ND FLR);  Service: Endoscopy;  Laterality: N/A;    CYSTOSCOPIC LITHOLAPAXY  5/25/2021    Procedure: CYSTOLITHOLAPAXY;  Surgeon: Chris Schilling MD;  [facility];  Service: Urology;;    CYSTOSCOPY  8/26/2021    Procedure: CYSTOSCOPY;  Surgeon: Camilo Kelley Jr., MD;  [facility];  Service: Urology;;    CYSTOSCOPY W/ URETERAL STENT PLACEMENT Bilateral 5/25/2021    Procedure: CYSTOSCOPY, WITH BILATERAL URETERAL STENT INSERTION;  Surgeon: Chris Schilling MD;  [facility];  Service: Urology;  Laterality: Bilateral;    ELBOW ARTHROPLASTY Right     FLUOROSCOPY  5/25/2021    Procedure: FLUOROSCOPY;  Surgeon: Chris Schilling  "MD;  [facility];  Service: Urology;;    LASER LITHOTRIPSY Left 8/26/2021    Procedure: LITHOTRIPSY, USING LASER;  Surgeon: Camilo Kelley Jr., MD;  [facility];  Service: Urology;  Laterality: Left;    PYELOSCOPY Bilateral 8/26/2021    Procedure: PYELOSCOPY;  Surgeon: Camilo Kelley Jr., MD;  Location: [facility] OR [address] [place] [address];  Service: Urology;  Laterality: Bilateral;    REMOVAL OF BLOOD CLOT  5/25/2021    Procedure: REMOVAL, BLOOD CLOT;  Surgeon: Chris Schilling MD;  Location: [facility] OR [address] [place] [address];  Service: Urology;;    REPLACEMENT OF STENT Bilateral 8/26/2021    Procedure: REPLACEMENT, STENT;  Surgeon: Camilo Kelley Jr., MD;  Location: [facility] OR [address] [place] [address];  Service: Urology;  Laterality: Bilateral;    URETEROSCOPIC REMOVAL OF URETERIC CALCULUS Bilateral 8/26/2021    Procedure: REMOVAL, CALCULUS, URETER, URETEROSCOPIC;  Surgeon: Camilo Kelley Jr., MD;  Location: [facility] OR [address] [place] [address];  Service: Urology;  Laterality: Bilateral;    URETEROSCOPY Bilateral 8/26/2021    Procedure: URETEROSCOPY;  Surgeon: Camilo Kelley Jr., MD;  Location: [facility] OR [address] [place] [address];  Service: Urology;  Laterality: Bilateral;     Family History   Problem Relation Age of Onset    Stroke Mother     Hyperlipidemia Mother     Mental illness Father     Parkinsonism Father     No Known Problems Brother      Social History     Tobacco Use    Smoking status: Former     Types: Cigarettes    Smokeless tobacco: Never   Substance Use Topics    Alcohol use: Yes     Comment: 1/ pint whisky daily    Drug use: Yes     Types: "Crack" cocaine       OBJECTIVE:     Vital Signs (Most Recent)  Temp: 96.7 °F (35.9 °C) (03/20/23 0813)  Pulse: 69 (03/20/23 0813)  Resp: 18 (03/20/23 0813)  BP: (!) 98/58 (03/20/23 0813)  SpO2: 100 % (03/20/23 0813)    Physical Exam:  Physical Exam    Laboratory  I have reviewed all pertinent lab results within the past 24 hours.  CBC:   Recent Labs   Lab 03/20/23  0437   WBC 17.18*   RBC 3.41*   HGB 10.8*   HCT 34.7*   PLT " 167   *   MCH 31.7*   MCHC 31.1*     CMP:   Recent Labs   Lab 03/20/23  0438   GLU 98   CALCIUM 8.9   ALBUMIN 2.2*   PROT 5.6*      K 4.1   CO2 21*   *   BUN 23   CREATININE 0.8   ALKPHOS 86   ALT 87*   *   BILITOT 0.2     Coagulation:   Recent Labs   Lab 03/20/23  0949   LABPROT 13.0*   INR 1.3*       ASA/Mallampati  ASA: 3  Mallampati: n/a    Imaging:  Recent imaging studies including CT a/p on 3/19 which was independently reviewed by Tabby Murrell PA-C and Hardeep Carter MD.     ASSESSMENT/PLAN:     Assessment:  77 y.o. 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 Risks include hematuria, pain, sepsis, injury to ureter or kidney, injury to adjacent organs, catheter dislodgement and inability to remove PCN due to cystallization. The patient is a candidate for left sidedPCN placement under moderate sedation. Plan discussed with ordering physician.The pt verbalized understanding of the plan and would like to proceed.    Plan:  Will proceed with left sidedPCN under moderate sedation on 3/20/23.   Please keep pt NPO (NPO as of 3/19 2204.  Anticoagulation history reviewed (none)  Coagulation labs reviewed.  Thank you for the consult. Please contact with questions via [employer] secure [employer] or spectra.    Tabby Murrell PA-C  Interventional Radiology

## 2023-03-20 NOTE — ED NOTES
Dr wayne at bedside to inform pt of plan of care. Pt states he wants to be left alone. Pt comfort measures offered and declined.

## 2023-03-20 NOTE — PROGRESS NOTES
VN cued into room.  Pt resting comfortably in bed with call light and personal belongings within reach.  NADN.  No family at bedside.  Pt reports no pain.  No other needs or complaints at this time.  Reminded pt to use call light as needed.  VN will continue to follow and be available as needed.       03/20/23 1027   Admission   Initial VN Admission Questions Complete   Communication Issues? None   Shift   Virtual Nurse - Rounding Complete   Virtual Nurse - Patient Verbalized Approval Of Camera Use;VN Rounding   Type of Frequent Check   Type Other (see comments)  (admission)   Safety/Activity   Patient Rounds bed in low position;placement of personal items at bedside;bed wheels locked;call light in patient/parent reach;visualized patient;clutter free environment maintained   Safety Promotion/Fall Prevention assistive device/personal item within reach   Positioning   Body Position supine   Head of Bed (HOB) Positioning HOB elevated

## 2023-03-20 NOTE — PROCEDURES
Interventional Radiology Immediate Post-Procedure Note    Pre-Op Diagnosis: Hydronephrosis  Post-Op Diagnosis:     Procedure: US-guided nephrostomy tube placement    Procedure performed by: Hardeep Carter MD  Assistants: None    Estimated Blood Loss: Minimal  Specimen Removed: Yes    Findings/description of procedure:  Severe left hydronephrosis. 10-Fr neph tube placed via posterolateral midpole calyx. 70 mL foul-smelling purulent urine removed. Connected to gravity bag.    No immediate complications. Patient tolerated procedure well. Please see full dictated procedure report for additional details and recommendations.    Recs:  Pt will follow-up with Urology. Recommend routine neph tube exchange in 10 wks.      Hardeep Carter MD  [facility] IR  Pager [phone]

## 2023-03-20 NOTE — CONSULTS
"[facility] - Telemetry  Wound Care    Patient Name:  Praneeth Jewell   MRN:  [id number]  Date: 3/20/2023  Diagnosis: <principal problem not specified>    History:     Past Medical History:   Diagnosis Date    Arthritis     Diabetes mellitus     type 2    Folate deficiency anemia     Heart block     History of kidney stones 05/25/2021    History of stroke with residual deficit 05/25/2021    Hyperlipidemia     Hypertension     Major depressive disorder     Pacemaker     Biotronic Edora 8 JONATHON (S/n: [id number])    Pyelonephritis 11/19/2021    TIA (transient ischemic attack)     Urinary retention 05/25/2021       Social History     Socioeconomic History    Marital status: Single    Number of children: 0    Years of education: 15    Highest education level: Some college, no degree   Occupational History     Employer: Disabled    Occupation: Construction     Employer: [employer]     Comment: Retired in past 5-10 years   Tobacco Use    Smoking status: Former     Types: Cigarettes    Smokeless tobacco: Never   Substance and Sexual Activity    Alcohol use: Yes     Comment: 1/ pint whisky daily    Drug use: Yes     Types: "Crack" cocaine       Precautions:     Allergies as of 03/19/2023    (No Known Allergies)       [facility] Assessment Details/Treatment     Sacrum- dark red/purple/maroon discolored skin with full thickness ulceration developing- present on admit- Triad ointment in use  Scrotum- scattered partial to shallow full thickness ulcerations related to moisture - pt incontinent of urine and stool- QiVi catheter in use      Bilateral heels intact.    Recommendations discussed with pt, nurse, and Dr. Ahmadi:  - Nursing to apply waffle overlay to bed surface and heel boots to BLE to maintain pressure injury prevention interventions  - Triad ointment to sacrum/scrotum BID and prn incontinent episode    03/20/2023    "

## 2023-03-20 NOTE — PLAN OF CARE
MEGAN placed call to pt nursing home and send hard fax with medical update. MEGAN informed of no return today possible tomorrow. Assigned SW to update.        03/20/23 1531   Post-Acute Status   Post-Acute Authorization Placement   Post-Acute Placement Status Referrals Sent   Discharge Delays None known at this time   Discharge Plan   Discharge Plan A Return to nursing home       Future Appointments   Date Time Provider Department Center   6/4/2023 10:00 AM HOME MONITOR DEVICE CHECK, [facility]KKI [facility] Case Management  [phone]

## 2023-03-20 NOTE — CONSULTS
"Percutaneous Nephrostomy Tube Placement Consult Note  Interventional Radiology    Consult Requested By: Scarlett Rodriguez MD  Reason for Consult: nephrostomy tube placement, obstructive uropathy/UTI/hydronephrosis; urology stated urologic procedure too high risk; suggested nephrostomy tube first    SUBJECTIVE:     Chief Complaint:  sepsis secondary to UTI    History of Present Illness:  Praneeth Jewell is a 77 y.o. 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 Hospital course notable for positive COVID. Interventional Radiology has been consulted for left sidedPCN placement for management of severe left hydronephrosis with a significant stone burden within the entire left ureter.  Pt has had recent imaging including a CT a/p on 3/19 which revealed "mild hydronephrosis of R kidney and proximal ureterectasis with multiple nonobstructing calculi in R kidney, severe hydronephrosis of the L kidney with many punctate calculi in the distended renal pelvis and severe proximal ureterectasis secondary to a 13 x 11 x 14 mm calculus in the mid-ureter and smaller calculus (6mm) more proximally with a long chain of multiple calcifications distal to the large obstructing calculus; difficult to visualize distal ureters due to fecal impaction and mass effect on adjacent structures. Pelvic calcifications are likely vascular. The bladder is displaced cephalad secondary to the large fecal impaction but otherwise is unremarkable." Ureteral stent placement has not been attempted. The pt has not undergone left sided PCN placement in the past. The pt's Cr is currently 0.8 at baseline.  The pt's WBC is 17.18 and is trending down. The pt is hemodynamically stable.    "     Review of Systems   Constitutional:  Positive for malaise/fatigue. Negative for chills, fever and weight loss.   Respiratory:  Positive for cough. Negative for shortness of breath.    Cardiovascular:  Negative for chest pain, palpitations and leg swelling.   Gastrointestinal:  Negative for abdominal pain, diarrhea, nausea and vomiting.   Genitourinary:  Positive for dysuria. Negative for flank pain and hematuria.   Musculoskeletal:  Negative for back pain and myalgias.   Neurological:  Negative for weakness and headaches.   Psychiatric/Behavioral:  Negative for hallucinations.      Scheduled Meds:   atorvastatin  40 mg Oral Daily    dexAMETHasone  6 mg Intravenous Daily    folic acid  1 mg Oral Daily    gabapentin  300 mg Oral TID    magnesium sulfate IVPB  2 g Intravenous Once    meropenem (MERREM) IVPB  1 g Intravenous Q8H    mirtazapine  30 mg Oral Nightly     Continuous Infusions:  PRN Meds:melatonin, sodium chloride 0.9%    Review of patient's allergies indicates:  No Known Allergies    Past Medical History:   Diagnosis Date    Arthritis     Diabetes mellitus     type 2    Folate deficiency anemia     Heart block     History of kidney stones 05/25/2021    History of stroke with residual deficit 05/25/2021    Hyperlipidemia     Hypertension     Major depressive disorder     Pacemaker     Biotronic Edora 8 DR-T (S/n: [id number])    Pyelonephritis 11/19/2021    TIA (transient ischemic attack)     Urinary retention 05/25/2021     Past Surgical History:   Procedure Laterality Date    A-V CARDIAC PACEMAKER INSERTION N/A 8/24/2021    Procedure: INSERTION, CARDIAC PACEMAKER, DUAL CHAMBER;  Surgeon: Neo Winn MD;  Location: [facility] EP LAB;  Service: Cardiology;  Laterality: N/A;  CHB, DUAL PPM, ANES, BIO, DM, ED 2    COLONOSCOPY N/A 11/22/2021    Procedure: COLONOSCOPY;  Surgeon: Cesar Dorado MD;  Location: [facility] ENDO ([address] [place] [address]);  Service: Endoscopy;  Laterality: N/A;    CYSTOSCOPIC LITHOLAPAXY  5/25/2021     Procedure: CYSTOLITHOLAPAXY;  Surgeon: Chris Schilling MD;  [facility];  Service: Urology;;    CYSTOSCOPY  8/26/2021    Procedure: CYSTOSCOPY;  Surgeon: Camilo Kelley Jr., MD;  Location: [facility] OR 1ST FLR;  Service: Urology;;    CYSTOSCOPY W/ URETERAL STENT PLACEMENT Bilateral 5/25/2021    Procedure: CYSTOSCOPY, WITH BILATERAL URETERAL STENT INSERTION;  Surgeon: Chris Schilling MD;  [facility];  Service: Urology;  Laterality: Bilateral;    ELBOW ARTHROPLASTY Right     FLUOROSCOPY  5/25/2021    Procedure: FLUOROSCOPY;  Surgeon: Chris Schilling MD;  [facility];  Service: Urology;;    LASER LITHOTRIPSY Left 8/26/2021    Procedure: LITHOTRIPSY, USING LASER;  Surgeon: Camilo Kelley Jr., MD;  [facility];  Service: Urology;  Laterality: Left;    PYELOSCOPY Bilateral 8/26/2021    Procedure: PYELOSCOPY;  Surgeon: Camilo Kelley Jr., MD;  [facility];  Service: Urology;  Laterality: Bilateral;    REMOVAL OF BLOOD CLOT  5/25/2021    Procedure: REMOVAL, BLOOD CLOT;  Surgeon: Chris Schilling MD;  [facility];  Service: Urology;;    REPLACEMENT OF STENT Bilateral 8/26/2021    Procedure: REPLACEMENT, STENT;  Surgeon: Camilo Kelley Jr., MD;  [facility];  Service: Urology;  Laterality: Bilateral;    URETEROSCOPIC REMOVAL OF URETERIC CALCULUS Bilateral 8/26/2021    Procedure: REMOVAL, CALCULUS, URETER, URETEROSCOPIC;  Surgeon: Camilo Kelley Jr., MD;  [facility];  Service: Urology;  Laterality: Bilateral;    URETEROSCOPY Bilateral 8/26/2021    Procedure: URETEROSCOPY;  Surgeon: Camilo Kelley Jr., MD;  Location: [facility] OR 1ST FLR;  Service: Urology;  Laterality: Bilateral;     Family History   Problem Relation Age of Onset    Stroke Mother     Hyperlipidemia Mother     Mental illness Father     Parkinsonism Father     No Known Problems Brother      Social History     Tobacco Use    Smoking status: Former      "Types: Cigarettes    Smokeless tobacco: Never   Substance Use Topics    Alcohol use: Yes     Comment: 1/ pint deborah daily    Drug use: Yes     Types: "Crack" cocaine       OBJECTIVE:     Vital Signs (Most Recent)  Temp: 96.7 °F (35.9 °C) (03/20/23 0813)  Pulse: 69 (03/20/23 0813)  Resp: 18 (03/20/23 0813)  BP: (!) 98/58 (03/20/23 0813)  SpO2: 100 % (03/20/23 0813)    Physical Exam:  Physical Exam  Vitals and nursing note reviewed.   Constitutional:       Appearance: He is well-developed.      Comments: Frail elderly gentleman   HENT:      Head: Normocephalic and atraumatic.   Eyes:      Extraocular Movements: Extraocular movements intact.      Pupils: Pupils are equal, round, and reactive to light.   Cardiovascular:      Rate and Rhythm: Normal rate and regular rhythm.      Heart sounds: Normal heart sounds.   Pulmonary:      Effort: Pulmonary effort is normal.      Breath sounds: Normal breath sounds.      Comments: Speaking in complete sentences on RA  Abdominal:      General: Bowel sounds are normal.      Palpations: Abdomen is soft.   Musculoskeletal:         General: Normal range of motion.      Cervical back: Normal range of motion and neck supple.   Skin:     General: Skin is warm and dry.   Neurological:      General: No focal deficit present.      Mental Status: He is alert.   Psychiatric:         Mood and Affect: Mood normal.       Laboratory  I have reviewed all pertinent lab results within the past 24 hours.  CBC:   Recent Labs   Lab 03/20/23  0437   WBC 17.18*   RBC 3.41*   HGB 10.8*   HCT 34.7*      *   MCH 31.7*   MCHC 31.1*     CMP:   Recent Labs   Lab 03/20/23  0438   GLU 98   CALCIUM 8.9   ALBUMIN 2.2*   PROT 5.6*      K 4.1   CO2 21*   *   BUN 23   CREATININE 0.8   ALKPHOS 86   ALT 87*   *   BILITOT 0.2     Coagulation:   Recent Labs   Lab 03/20/23  0949   LABPROT 13.0*   INR 1.3*       ASA/Mallampati  ASA: 3  Mallampati: n/a    Imaging:  Recent imaging studies " including CT a/p on 3/19 which was independently reviewed by Tabby Murrell PA-C and Hardeep Carter MD.    ASSESSMENT/PLAN:     Assessment:  77 y.o. 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 Risks include hematuria, pain, sepsis, injury to ureter or kidney, injury to adjacent organs, catheter dislodgement and inability to remove PCN due to cystallization. The patient is a candidate for left sidedPCN placement under moderate sedation. Plan discussed with ordering physician.The pt verbalized understanding of the plan and would like to proceed.     Plan:  Will proceed with left sidedPCN under moderate sedation on 3/20/23.   Please keep pt NPO (NPO as of 3/19 2204.  Anticoagulation history reviewed (none)  Coagulation labs reviewed.  Thank you for the consult. Please contact with questions via [employer] secure chat or spectra.     Tabby Murrell PA-C  Interventional Radiology

## 2023-03-20 NOTE — NURSING
NURSE PROACTIVE ROUNDING NOTE       Admit Date: 3/19/2023  LOS: 0  Code Status: DNR   Date of Visit: 2023  : 1945  Age: 77 y.o.  Sex: male  Race: White  Bed: K478/K478 A:   MRN: [id number]  Was the patient discharged from an ICU this admission? No   Was the patient discharged from a PACU within last 24 hours? No   Did the patient receive conscious sedation/general anesthesia in last 24 hours? No   Was the patient in the ED within the past 24 hours? Yes   Was the patient on NIPPV within the past 24 hours? No   Attending Physician: Federico Ahmadi MD  Primary Service: Internal Medicine     SITUATION        Diagnosis: <principal problem not specified>   has a past medical history of Arthritis, Diabetes mellitus, Folate deficiency anemia, Heart block, History of kidney stones, History of stroke with residual deficit, Hyperlipidemia, Hypertension, Major depressive disorder, Pacemaker, Pyelonephritis, TIA (transient ischemic attack), and Urinary retention.    Last Vitals:  Temp: 97 °F (36.1 °C) (415)  Pulse: 66 ( 041)  Resp: 20 (415)  BP: 94/56 (415)  SpO2: 100 % (415)    24 Hour Vitals Range:  Temp:  [97 °F (36.1 °C)-98 °F (36.7 °C)]   Pulse:  [60-86]   Resp:  [9-20]   BP: ()/(47-66)   SpO2:  [94 %-100 %]       Chart reviewed.      Call back the Rapid Response NurseAsad at  for additional questions or concerns.

## 2023-03-20 NOTE — CARE UPDATE
Urology Note    Consult received for left ureteral stone with severe left hydronephrosis, sepsis, nitrite positive urine, patient is covid positive.    Discussed case with ED attending. Patient is DNR but is alert and oriented, and is consentable.    Imaging reviewed: Patient with large, impacted appearing left ureteral stone with severe hydronephrosis and evidence of cortical atrophy of the left kidney.     Plan:  - No surgical intervention planned by urology at this time. It will be near impossible to place a wire around that stone to place a stent in retrograde fashion. Additionally with his sepsis, prolonged manipulation from below would have a high morbidity rate.    - Recommend an urgent IR consult to for left nephrostomy tube placement to decompress left renal unit as this is safer and will have a much higher success rate than attempting to place a stent in retrograde fashion, if life saving intervention is in line with patient's goals of care.    - Full consult note to follow in AM, can evaluate sooner if needed.    Terence Azar MD  Urology  [facility]

## 2023-03-20 NOTE — CONSULTS
[facility]  Urology  Consult Note    Patient Name: Praneeth Jewell  MRN: [id number]  Admission Date: 3/19/2023  Attending Provider: Federico Ahmadi MD   Consulting Provider: Terence Azar MD  Principal Problem:<principal problem not specified>    Inpatient consult to Urology  Consult performed by: Terence Azar MD  Consult ordered by: Mitzy Robles MD        Subjective:     HPI:   Praneeth Jewell is a 77 y.o. male here with left flank pain and urosepsis, urology consulted for left ureteral stones.      Praneeth Jewell is a 77 y.o. 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     Patient asleep on initial evaluation and agitated with interview, but answering most questions. Reports fatigue, dry cough, congestion, and malaise. Has had several sick contacts at his care facility. Denies sputum production, headache, chest pain, shortness of breath. Unable to state when his last bowel movement was. Denies dysuria, increased frequency, or hematuria. Does endorse some abdominal discomfort but no back or flank pain. Remainder of history obtained from chart review. Patient was brought in from nursing facility due to reported fever of 104, hypotension, and concern for infection. He has been living at the current facility since he has been wheelchair-bound after a stroke in 2015.    03/20/2023  Urology consulted for left ureteral stone burden.  Patient is currently COVID positive.  He presented with reported fever hypotension and chest congestion.  His white blood count is elevated.  A CT scan revealed severe left hydronephrosis with a significant stone burden within the entire left ureter and nitrite  positive urine.    Past Medical History:   Diagnosis Date    Arthritis     Diabetes mellitus     type 2    Folate deficiency anemia     Heart block     History of kidney stones 05/25/2021    History of stroke with residual deficit 05/25/2021    Hyperlipidemia     Hypertension     Major depressive disorder     Pacemaker     B[employer] [place] 8 DR-T (S/n: [id number])    Pyelonephritis 11/19/2021    TIA (transient ischemic attack)     Urinary retention 05/25/2021       Past Surgical History:   Procedure Laterality Date    A-V CARDIAC PACEMAKER INSERTION N/A 8/24/2021    Procedure: INSERTION, CARDIAC PACEMAKER, DUAL CHAMBER;  Surgeon: Neo Winn MD;  Location: [facility] EP LAB;  Service: Cardiology;  Laterality: N/A;  CHB, DUAL PPM, ANES, BIO, DM, ED 2    COLONOSCOPY N/A 11/22/2021    Procedure: COLONOSCOPY;  Surgeon: Cesar Dorado MD;  Location: [facility] ENDO (2ND FLR);  Service: Endoscopy;  Laterality: N/A;    CYSTOSCOPIC LITHOLAPAXY  5/25/2021    Procedure: CYSTOLITHOLAPAXY;  Surgeon: Chris Schilling MD;  [facility];  Service: Urology;;    CYSTOSCOPY  8/26/2021    Procedure: CYSTOSCOPY;  Surgeon: Camilo Kelley Jr., MD;  Location: [facility] OR [address] [place] [address];  Service: Urology;;    CYSTOSCOPY W/ URETERAL STENT PLACEMENT Bilateral 5/25/2021    Procedure: CYSTOSCOPY, WITH BILATERAL URETERAL STENT INSERTION;  Surgeon: Chris Schilling MD;  [facility];  Service: Urology;  Laterality: Bilateral;    ELBOW ARTHROPLASTY Right     FLUOROSCOPY  5/25/2021    Procedure: FLUOROSCOPY;  Surgeon: Chris Schilling MD;  [facility];  Service: Urology;;    LASER LITHOTRIPSY Left 8/26/2021    Procedure: LITHOTRIPSY, USING LASER;  Surgeon: Camilo Kelley Jr., MD;  [facility];  Service: Urology;  Laterality: Left;    PYELOSCOPY Bilateral 8/26/2021    Procedure: PYELOSCOPY;  Surgeon: Camilo Kelley Jr., MD;  [facility];  Service: Urology;  Laterality: Bilateral;    REMOVAL OF  "BLOOD CLOT  5/25/2021    Procedure: REMOVAL, BLOOD CLOT;  Surgeon: Chris Schilling MD;  Location: [facility] OR [address] [place] [address];  Service: Urology;;    REPLACEMENT OF STENT Bilateral 8/26/2021    Procedure: REPLACEMENT, STENT;  Surgeon: Camilo Kelley Jr., MD;  Location: [facility] OR [address] [place] [address];  Service: Urology;  Laterality: Bilateral;    URETEROSCOPIC REMOVAL OF URETERIC CALCULUS Bilateral 8/26/2021    Procedure: REMOVAL, CALCULUS, URETER, URETEROSCOPIC;  Surgeon: Camilo Kelley Jr., MD;  Location: [facility] OR [address] [place] [address];  Service: Urology;  Laterality: Bilateral;    URETEROSCOPY Bilateral 8/26/2021    Procedure: URETEROSCOPY;  Surgeon: Camilo Kleley Jr., MD;  Location: [facility] OR [address] [place] [address];  Service: Urology;  Laterality: Bilateral;       Family History   Problem Relation Age of Onset    Stroke Mother     Hyperlipidemia Mother     Mental illness Father     Parkinsonism Father     No Known Problems Brother        Social History     Tobacco Use    Smoking status: Former     Types: Cigarettes    Smokeless tobacco: Never   Substance Use Topics    Alcohol use: Yes     Comment: 1/ pint deborah daily    Drug use: Yes     Types: "Crack" cocaine       No current facility-administered medications on file prior to encounter.     Current Outpatient Medications on File Prior to Encounter   Medication Sig Dispense Refill    atorvastatin (LIPITOR) 40 MG tablet Take 40 mg by mouth once daily.      cyproheptadine (PERIACTIN) 4 mg tablet Take 4 mg by mouth 3 (three) times daily. Itching      folic acid (FOLVITE) 1 MG tablet Take 1 mg by mouth once daily.      gabapentin (NEURONTIN) 300 MG capsule Take 300 mg by mouth 3 (three) times daily.      mirtazapine (REMERON) 30 MG tablet Take 30 mg by mouth nightly.      tamsulosin (FLOMAX) 0.4 mg Cap TAKE 2 CAPSULES(0.8 MG) BY MOUTH EVERY DAY 60 capsule 11       Review of patient's allergies indicates:  No Known Allergies    Review of Systems:  A review of 10+ systems was conducted with pertinent positive and " negative findings documented in HPI with all other systems reviewed and negative.    Objective:     Vitals:   Temp:  [96.7 °F (35.9 °C)-98 °F (36.7 °C)] 96.7 °F (35.9 °C)  Pulse:  [60-86] 69  Resp:  [9-20] 18  SpO2:  [94 %-100 %] 100 %  BP: ()/(47-66) 98/58       I/O:   Intake/Output Summary (Last 24 hours) at 3/20/2023 0958  Last data filed at 3/20/2023 0434  Gross per 24 hour   Intake 2336 ml   Output 350 ml   Net 1986 ml       Physical Exam:  GENERAL: patient sitting comfortably  HEENT: normocephalic  NECK: supple, no JVD  PULM: normal chest rise, no increased WOB  HEART: non-diaphoretic  ABDO: soft, non-distended, non-tender  BACK: no CVA tenderness bilaterally  SKIN: warm, dry, well perfused  EXT: no bruising or edema  NEURO: grossly normal with no focal deficits  PSYCH: appropriate mood and affect    Significant Labs:  CBC:  Recent Labs   Lab 03/19/23  1625 03/20/23  0437   WBC 21.84* 17.18*   HGB 10.4* 10.8*   HCT 32.4* 34.7*    167       BMP:  Recent Labs   Lab 03/19/23  1625 03/20/23  0438    141   K 3.8 4.1    111*   CO2 26 21*   BUN 23 23   CREATININE 1.0 0.8   CALCIUM 9.5 8.9       Urinalysis  Recent Labs   Lab 03/19/23  1634   COLORU Yellow   SPECGRAV 1.020   PHUR 8.0   PROTEINUA 2+*   BACTERIA Many*   NITRITE Positive*   LEUKOCYTESUR 3+*   UROBILINOGEN 2.0-3.0*   HYALINECASTS 0       Imaging:  All pertinent imaging results/findings from the past 24 hours have been reviewed.    Results for orders placed or performed during the hospital encounter of 03/19/23 (from the past 2160 hour(s))   CT Abdomen Pelvis With Contrast    Narrative    EXAMINATION:  CT ABDOMEN PELVIS WITH CONTRAST    CLINICAL HISTORY:  LLQ abdominal pain;    TECHNIQUE:  Low dose axial images, sagittal and coronal reformations were obtained from the lung bases to the pubic symphysis before and following the IV administration of 75 mL of Omnipaque 350 .  Oral contrast was not administered..    COMPARISON:  CTA  abdomen pelvis 11/19/2021    FINDINGS:  Abdomen:    - Lung bases: There is dependent atelectasis bilaterally in the lungs.  Ground-glass opacities also noted raising question of an underlying component of mild pneumonitis or edema.  No pleural effusion is seen.  The heart is stable and prominent with pacemaker leads in the right atrium and right ventricle.    Bowel/Mesentery:    There is severe stool burden constipation throughout the colon as well as gaseous prominence.  There is distal fecal impaction with the rectum measuring 8.5 cm in maximal diameter.  There is mucosal thickening and note of perirectal edema as seen with stercoral colitis.  The appendix is not definitely isolated as a separate structure.  There is no evidence of pericecal inflammatory change.    The patient has had significant loss of intra-abdominal fat since prior exam.    -Liver: Liver appears homogeneous and not significantly enlarged.    - Gallbladder: The there is no CT evidence of cholecystitis.    - Bile Ducts: No evidence of intra or extra hepatic biliary ductal dilation.    - Spleen: Negative.    - Kidneys: There is mild right hydronephrosis and proximal ureterectasis multiple punctate nonobstructing calculi in the right kidney..  The right ureter is not reliably followed all the way down to the bladder due to the large fecal impaction and a ureteral calculus distally is not reliably excluded given the multiple calcifications in the pelvis with the majority likely vascular.    There is severe hydronephrosis of the left kidney as well as severe proximal ureterectasis secondary to a 13 x 11 x 14 mm calculus in the mid ureter at the level of the L4 transverse process on the left.  Smaller calculus more proximally also noted abutting the larger calculus measuring 6 mm coronal image 88 series 601.  There is a long chain of multiple calcifications distal to the large obstructing mid left ureter calculus which overlap the expected course of  the more distal left ureter in the pelvis coronal images 68 through 84 series 601.  Largest is seen most distally measuring 9 mm in diameter.  Because of the fecal impaction and mass effect on adjacent structures, I cannot reliably exclude these calcifications from the more distal left ureter.  These calcifications may represent vascular/gonadal vein calcifications.  CT urogram would be needed to reliably assess the.  There are also innumerable layering punctate calculi within this distended renal pelvis.  Renal cortices enhance asymmetrically with the left late secondary to the obstruction.  There is a cyst in the right renal cortex pole.    - Adrenals: Unremarkable.    - Pancreas: No mass or peripancreatic fat stranding.    - Retroperitoneum:  No significant adenopathy.    - Vascular: There is no abdominal aortic aneurysm.  Moderate atherosclerotic calcification throughout the aorta without significant stenosis.    - Abdominal wall:  Strandy changes in the subcutaneous fat noted compatible with mild anasarca/edema..    Pelvis:    The bladder is displaced cephalad secondary to the large fecal impaction but otherwise is unremarkable as imaged.  Prostate is not convincingly enlarged and displaced anteriorly.  Pelvic calcifications are likely vascular..  No pelvic mass, adenopathy, or free fluid.    Bones:  No acute osseous abnormality and no suspicious lytic or blastic lesion. There is severe spondylosis without acute subluxation and degenerative changes of the hips bilaterally.      Impression    Large distal colon fecal impaction with surrounding mucosal thickening and inflammatory changes suggesting stercoral colitis.  There is severe stool burden constipation throughout the colon as well as gaseous distension of the colon.  No significant small bowel distension, convincing extraluminal air, abscess or significant free fluid.    Severe left hydronephrosis and proximal ureterectasis secondary to a 14 mm in maximal  dimension calculus in the mid left ureter as well as an adjacent smaller more proximal calculus.  There are also multiple calcifications largest measuring 9 mm in diameter in the chain along the expected course of the more distal left ureter.  These may represent gonadal vein calcifications/vascular calcifications however more distal calculi are not reliably excluded within the ureter in this patient with punctate innumerable calculi in the left kidney.  Severe fecal impaction distorts the anatomy in the pelvis displacing the bladder and ureters.  CT urogram can further evaluate this finding.    There is mild hydronephrosis in the right kidney as well as proximal ureterectasis which may be secondary to the pelvic impaction.  No convincing calculi are seen in the right ureter and nonobstructing calculi are seen in the right kidney.    Bilateral lung base atelectasis.  Some mild pneumonitis is difficult to exclude reliably on the left.  No lobar consolidation or significant pleural effusion seen.    Interval loss of subcutaneous and intra-abdominal fat since prior CT and increased stranding in the fat compatible with anasarca.    Please see above for additional incidental nonacute findings.    This report was flagged in Epic as abnormal.      Electronically signed by: Malou Rios  Date:    03/19/2023  Time:    20:14     No results found for this or any previous visit (from the past 2160 hour(s)).    Urology Specific Assessment:     Left ureteral stones with severe hydronephrosis and sepsis from urinary source  Covid Positive Patient    Plan:      Patient needs urgent nephrostomy tube placement as he is septic and stent would be difficult to place in retrograde fashion. IR consult placed. Case discussed with Dr. Carter who will place the neph tube today  Continue IV abx, tailor regimen to culture results  After discharge, patient will need follow up with [facility], as he is established there and was  previously operated on by Dr. Kelley. The discharge navigators can help arrange for him to follow up with Dr. Kelley as outpatient. Due to high acuity, patient should have definitive stone surgery at [facility] for higher level of care.  Rest of care per primary team    Thank you for your consult. Urology is available as needed.    Terence Azar MD  Urology  [facility]  [facility]    Disclaimer: This note has been generated using voice-recognition software. There may be typographical errors that have been missed during proof-reading.

## 2023-03-20 NOTE — SEDATION DOCUMENTATION
IR left nephrostomy tube placed, sent urine to lab for culture, purulent thick opaque brown output, 10Fr nephrostomy tube placed,suture in place, denies pain, remains on 2 L/min, cipro IVPB administered, called and gave report to LIZZ Wong pt waiting for transport back to room via bed, IR care complete

## 2023-03-20 NOTE — PROGRESS NOTES
"[facility]           Pharmacy  Admission Medication Reconciliation     Based on information gathered for medication list, you may go to "Admission" then "Reconcile Home Medications" tabs to review and/or act upon those items.     The home medication list has been updated by the Pharmacy department.   Please read ALL comments highlighted in red.   Please address this information as you see fit.    Feel free to contact us if you have any questions or require assistance.    Home medication list has been compared to current inpatient medications. Please review the following discrepancies noted below:    Patient reports STILL TAKING the following medication(s) which was not ordered upon admit  Tamsulosin 0.4mg 2 capsules daily    Feel free to contact us if you have any questions or require assistance.    Sebastien Love, PharmD  [phone]    "

## 2023-03-20 NOTE — NURSING
Soap suds enema performed. Moderate amount of liquid stool evacuated. 2 BM this far. Will continue to monitor.

## 2023-03-20 NOTE — PROGRESS NOTES
"[facility] Medicine Progress Note      Primary Team: [facility] Hospitalist Team B  Attending Physician: Federico Ahmadi MD  Resident: Jeri  Intern: Michael    Subjective:      Yesterday pt tolerated the left sided percutaneous nephrostomy procedure well, with no immediate complication. Overnight Afebrile with sat 86%. On 3 L NC  Pt seen by writer this morning. Pt is sitting comfort on RA. Pt reports he is doing fine. Denies fevers, chills, N/V, SOB, chest pain, abd pain, or urinary symptoms.    Objective:     Last 24 Hour Vital Signs:  BP  Min: 79/47  Max: 119/60  Temp  Av.7 °F (36.5 °C)  Min: 96.7 °F (35.9 °C)  Max: 98.8 °F (37.1 °C)  Pulse  Av.4  Min: 60  Max: 86  Resp  Av.9  Min: 9  Max: 20  SpO2  Av.7 %  Min: 94 %  Max: 100 %  Height  Av' 8" (172.7 cm)  Min: 5' 8" (172.7 cm)  Max: 5' 8" (172.7 cm)  Weight  Av.2 kg (168 lb)  Min: 76.2 kg (168 lb)  Max: 76.2 kg (168 lb)  I/O last 3 completed shifts:  In: 2336 [IV Piggyback:2336]  Out: 350 [Urine:350]    Physical Examination:  General:          Cachectic; resting comfortably on 2L NC, awakens to loud voice agitated and limited cooperation with exam  Head:               Normocephalic and atraumatic  Eyes:               PERRL; EOMI with anicteric sclera and clear conjunctivae  Mouth:             Oropharynx clear and without exudate  Cardio:             Regular rate and rhythm with normal S1 and S2; no murmurs or rubs  Resp:               CTAB; tachypneic upon awakening but comfortable at rest on 2L NC; no wheezes, crackles or rhonchi  Abdom:            Soft, non-distended; LLQ tenderness with deep palpation  Extrem:            L toe amputation, Legs contracted; warm and well-perfused with no cyanosis or edema  Skin:                Pale; No rashes or ulcers noted  Pulses:            2+ and symmetric distally  Neuro:             Limited 2/2 patient cooperation; CNs grossly intact, moving upper extremities spontaneously, no focal deficits " noted     Laboratory:  Recent Labs   Lab 03/19/23  1625 03/20/23  0437 03/20/23  0438   WBC 21.84* 17.18*  --    HGB 10.4* 10.8*  --    HCT 32.4* 34.7*  --     167  --    * 102*  --    RDW 13.2 13.4  --      --  141   K 3.8  --  4.1     --  111*   CO2 26  --  21*   BUN 23  --  23   CREATININE 1.0  --  0.8   GLU 99  --  98   PROT 6.2  --  5.6*   ALBUMIN 2.5*  --  2.2*   BILITOT 0.4  --  0.2   *  --  101*   ALKPHOS 95  --  86   *  --  87*       Microbiology Data Reviewed:  Pertinent Findings:  Blood culture: pending  COVID: positive  Urine culture: pending    Other Results:  EKG (my interpretation): Atrial sensed, ventricular paced rhythm     Radiology:  Imaging Results                   CT Abdomen Pelvis With Contrast (Final result)  Result time 03/19/23 20:14:08            Final result by Malou Rios MD (03/19/23 20:14:08)                           Impression:        Large distal colon fecal impaction with surrounding mucosal thickening and inflammatory changes suggesting stercoral colitis.  There is severe stool burden constipation throughout the colon as well as gaseous distension of the colon.  No significant small bowel distension, convincing extraluminal air, abscess or significant free fluid.     Severe left hydronephrosis and proximal ureterectasis secondary to a 14 mm in maximal dimension calculus in the mid left ureter as well as an adjacent smaller more proximal calculus.  There are also multiple calcifications largest measuring 9 mm in diameter in the chain along the expected course of the more distal left ureter.  These may represent gonadal vein calcifications/vascular calcifications however more distal calculi are not reliably excluded within the ureter in this patient with punctate innumerable calculi in the left kidney.  Severe fecal impaction distorts the anatomy in the pelvis displacing the bladder and ureters.  CT urogram can further evaluate this  finding.     There is mild hydronephrosis in the right kidney as well as proximal ureterectasis which may be secondary to the pelvic impaction.  No convincing calculi are seen in the right ureter and nonobstructing calculi are seen in the right kidney.     Bilateral lung base atelectasis.  Some mild pneumonitis is difficult to exclude reliably on the left.  No lobar consolidation or significant pleural effusion seen.     Interval loss of subcutaneous and intra-abdominal fat since prior CT and increased stranding in the fat compatible with anasarca.     Please see above for additional incidental nonacute findings.     This report was flagged in Epic as abnormal.        Electronically signed by:     Malou Rios  Date:                                            03/19/2023  Time:                                            20:14                     Narrative:     EXAMINATION:  CT ABDOMEN PELVIS WITH CONTRAST     CLINICAL HISTORY:  LLQ abdominal pain;     TECHNIQUE:  Low dose axial images, sagittal and coronal reformations were obtained from the lung bases to the pubic symphysis before and following the IV administration of 75 mL of Omnipaque 350 .  Oral contrast was not administered..     COMPARISON:  CTA abdomen pelvis 11/19/2021     FINDINGS:  Abdomen:     - Lung bases: There is dependent atelectasis bilaterally in the lungs.  Ground-glass opacities also noted raising question of an underlying component of mild pneumonitis or edema.  No pleural effusion is seen.  The heart is stable and prominent with pacemaker leads in the right atrium and right ventricle.     Bowel/Mesentery:     There is severe stool burden constipation throughout the colon as well as gaseous prominence.  There is distal fecal impaction with the rectum measuring 8.5 cm in maximal diameter.  There is mucosal thickening and note of perirectal edema as seen with stercoral colitis.  The appendix is not definitely isolated as a separate structure.   There is no evidence of pericecal inflammatory change.     The patient has had significant loss of intra-abdominal fat since prior exam.     -Liver: Liver appears homogeneous and not significantly enlarged.     - Gallbladder: The there is no CT evidence of cholecystitis.     - Bile Ducts: No evidence of intra or extra hepatic biliary ductal dilation.     - Spleen: Negative.     - Kidneys: There is mild right hydronephrosis and proximal ureterectasis multiple punctate nonobstructing calculi in the right kidney..  The right ureter is not reliably followed all the way down to the bladder due to the large fecal impaction and a ureteral calculus distally is not reliably excluded given the multiple calcifications in the pelvis with the majority likely vascular.     There is severe hydronephrosis of the left kidney as well as severe proximal ureterectasis secondary to a 13 x 11 x 14 mm calculus in the mid ureter at the level of the L4 transverse process on the left.  Smaller calculus more proximally also noted abutting the larger calculus measuring 6 mm coronal image 88 series 601.  There is a long chain of multiple calcifications distal to the large obstructing mid left ureter calculus which overlap the expected course of the more distal left ureter in the pelvis coronal images 68 through 84 series 601.  Largest is seen most distally measuring 9 mm in diameter.  Because of the fecal impaction and mass effect on adjacent structures, I cannot reliably exclude these calcifications from the more distal left ureter.  These calcifications may represent vascular/gonadal vein calcifications.  CT urogram would be needed to reliably assess the.  There are also innumerable layering punctate calculi within this distended renal pelvis.  Renal cortices enhance asymmetrically with the left late secondary to the obstruction.  There is a cyst in the right renal cortex pole.     - Adrenals: Unremarkable.     - Pancreas: No mass or  peripancreatic fat stranding.     - Retroperitoneum:  No significant adenopathy.     - Vascular: There is no abdominal aortic aneurysm.  Moderate atherosclerotic calcification throughout the aorta without significant stenosis.     - Abdominal wall:  Strandy changes in the subcutaneous fat noted compatible with mild anasarca/edema..     Pelvis:     The bladder is displaced cephalad secondary to the large fecal impaction but otherwise is unremarkable as imaged.  Prostate is not convincingly enlarged and displaced anteriorly.  Pelvic calcifications are likely vascular..  No pelvic mass, adenopathy, or free fluid.     Bones:  No acute osseous abnormality and no suspicious lytic or blastic lesion. There is severe spondylosis without acute subluxation and degenerative changes of the hips bilaterally.                                                X-Ray Chest AP Portable (Final result)  Result time 03/19/23 17:19:31            Final result by Melo Lovell MD (03/19/23 17:19:31)                           Impression:        No airspace disease.     Distention of the bowel loops in the upper abdomen.        Electronically signed by:     Melo Lovell MD  Date:                                            03/19/2023  Time:                                            17:19                     Narrative:     EXAMINATION:  XR CHEST AP PORTABLE     CLINICAL HISTORY:  Sepsis;     TECHNIQUE:  Single frontal view of the chest was performed.     COMPARISON:  12/21/2022.     FINDINGS:  There is unchanged appearance of the left-sided AICD.  Monitoring EKG leads are present.     The trachea is unremarkable.  There are calcifications of the aortic knob.  The cardiomediastinal silhouette is within normal limits.  There is no evidence of free air beneath the hemidiaphragms.  There are no pleural effusions.  There is no evidence of a pneumothorax.  There is no evidence of pneumomediastinum.  No airspace disease present.  There are degenerative  changes in the osseous structures.     There is distention of the bowel loops in the upper abdomen.                             Current Medications:     Infusions:       Scheduled:   atorvastatin  40 mg Oral Daily    dexAMETHasone  6 mg Intravenous Daily    folic acid  1 mg Oral Daily    gabapentin  300 mg Oral TID    meropenem (MERREM) IVPB  1 g Intravenous Q8H    mirtazapine  30 mg Oral Nightly        PRN:  melatonin, sodium chloride 0.9%    Antibiotics and Day Number of Therapy:  Ceftriaxone single dose on 3/19  Meropenem :3/19-        Assessment:     Praneeth Jewell is a 77 y.o. 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 Imaging with significant nephrolithiasis and fecal impaction with subsequent bilateral hydronephrosis (L>R). UA consistent with UTI 2/2 obstruction. Hypoxic on arrival to the ED requiring supplemental O2, found to be COVID positive. IR consulted and pt had a Left sided PCN placed on 3/20. Severe left hydronephrosis. 10-Fr neph tube placed via posterolateral midpole calyx. 70 mL foul-smelling purulent urine removed. Connected to gravity bag. No immediate complications. Patient tolerated procedure well. Pt will follow-up with Urology. Recommend routine neph tube exchange in 10 wks.        Plan:      Severe Sepsis 2/2 Urinary Tract Infection  Nephrolithiasis  Obstructive Uropathy  Hydronephrosis  Severe Constipation/Fecal Impaction  - history of bilateral nephrolithiasis and obstructive uropathy s/p bilateral J-J ureteral stent and cystolitholapaxy; recurrent nephrolithiasis and multiple UTIs (Providencia, ESBL Proteus mirabilis, MDR Proteus) in the past  - reported fever, hypotension, congestion at care facility  - Afebrile, HR 80, BP  79/47 in ED (improved with 30cc/kg LR bolus in ED); WBC 21.84; UA with pyuria, some RBCs, many bacteria, nitrite positive  - Lactate 2.6, continue to trend; procalcitonin 22.13  - CT A/P showed mild hydronephrosis of R kidney and proximal ureterectasis with multiple nonobstructing calculi in R kidney, severe hydronephrosis of the L kidney with many punctate calculi in the distended renal pelvis and severe proximal ureterectasis secondary to a 13 x 11 x 14 mm calculus in the mid-ureter and smaller calculus (6mm) more proximally with a long chain of multiple calcifications distal to the large obstructing calculus; difficult to visualize distal ureters due to fecal impaction and mass effect on adjacent structures. Pelvic calcifications are likely vascular. The bladder is displaced cephalad secondary to the large fecal impaction but otherwise is unremarkable   - urology consulted in ED, no surgical planned by urology due to necessary procedure and active infection; recommend urgent IR consult for L nephrostomy tube placement; IR consulted, NPO pending IR evaluation  - Enema ordered for fecal impaction  - blood culture pending  -  s/p 1 dose ceftriaxone in ED; in the setting of history of recurrent UTI including MDR Proteus, started meropenem  -IR consult; appreciate recs  -3/20 Pt underwent left sided PCN placement,10-Fr neph tube placed via posterolateral midpole calyx. 70 mL foul-smelling purulent urine removed. Connected to gravity bag. Patient tolerated procedure well. No immediate complications.  -Pt will follow-up with Urology. Recommend routine neph tube exchange in 10 wks  -Urine culture growing gram negative rods     Acute Hypoxia 2/2 COVID-19 Infection  Elevated Transaminases  - Tachypneic with SpO2 in low 90s at presentation, improved with 2L NC  - COVID +, received 1 dose dexamethasone in ED  - imaging with some ground glass opacities at lung bases  - will continue dexamethasone 6mg daily due to patient's  "new O2 requirement and risk factors for severe infection  - will defer Remdesivir in the setting of newly elevated transaminases (AST//108), likely 2/2 COVID infection     Elevated Troponin  - troponin 0.056 at admission, repeat ordered and pending  - EKG with atrial sensed and ventricular paced rhythm; no new ischemic changes  - likely due to sepsis +/- active COVID infection     Macrocytic Anemia  - H/H 10.4/32.4 consistent with prior levels,   - history of folate deficiency on home supplement, last folate level March 2022 >40, B12 level and thiamine wnl at that time  - no signs of acute bleeding, ordered iron studies  - continue to monitor     Complete Heart Block s/p Pacemaker Placement (2021)  - EKG consistent with atrial sensed and ventricular paced rhythm; no new ischemic changes  - device last evaluated 3/6/2023     R PARDEEP Stroke with Residual Deficits (2015)  Vascular Dementia  - residual LE weakness, wheelchair bound  - no new focal deficit noted  - per neurology note, patient has been having progressive cognitive and behavioral impairment since the stroke with 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 The stroke was in the right PARDEEP distribution and impacted the superior frontal gyrus, medial frontal gyrus, and cingulate extending from the anterior aspect of the frontal lobe to the central sulcus. The involvement of the aPFC and dmPFC would be expected to impact a number of cognitive functions including executive functioning, planning, problem solving, judgment, attention, and working memory, among others.  Unfortunately, the effectiveness of medications to help with behavior will likely be limited in this case."  - avoiding anticholinergics, opioids, and benzodiazepines; cyproheptadine on patient's home medications, will not order at this time  - not taking any anti-platelet " medications, continue home Lipitor     Type 2 Diabetes Mellitus  - not on any medications (has been on metformin in the past)  - all prior A1c in EHR <5, will monitor daily CMP        Diet: Diabetic  DVT Prophylaxis: SCD  Code: DNR    Dispo: Pending Clinical improvement post PCN placement    Scarlett Rodriguez MD  [facility] Neurology HO-I    [facility] Medicine Hospitalist Pager numbers:   [facility] Hospitalist Medicine Team A (Kalpesh/Richard): [phone]  [facility] Hospitalist Medicine Team B (Champ/Daiana):  [phone]

## 2023-03-20 NOTE — ASSESSMENT & PLAN NOTE
Contributing Nutrition Diagnosis  Inadequate energy intake    Related to (etiology):   dx    Signs and Symptoms (as evidenced by):   NPO     Interventions:  Collaboration with other providers    Nutrition Diagnosis Status:   New

## 2023-03-20 NOTE — ED NOTES
Attempted to call report to the floor, unsuccessful   Pt declined meal tray at this time. He states that he doesn't feel hungry. Continuing to wait on transport.

## 2023-03-21 LAB
ALBUMIN SERPL BCP-MCNC: 2.1 G/DL (ref 3.5–5.2)
ALP SERPL-CCNC: 74 U/L (ref 55–135)
ALT SERPL W/O P-5'-P-CCNC: 59 U/L (ref 10–44)
ANION GAP SERPL CALC-SCNC: 9 MMOL/L (ref 8–16)
AST SERPL-CCNC: 44 U/L (ref 10–40)
BASOPHILS # BLD AUTO: 0.01 K/UL (ref 0–0.2)
BASOPHILS NFR BLD: 0.1 % (ref 0–1.9)
BILIRUB SERPL-MCNC: 0.2 MG/DL (ref 0.1–1)
BUN SERPL-MCNC: 25 MG/DL (ref 8–23)
CALCIUM SERPL-MCNC: 9.2 MG/DL (ref 8.7–10.5)
CHLORIDE SERPL-SCNC: 107 MMOL/L (ref 95–110)
CO2 SERPL-SCNC: 24 MMOL/L (ref 23–29)
CREAT SERPL-MCNC: 0.8 MG/DL (ref 0.5–1.4)
DIFFERENTIAL METHOD: ABNORMAL
EOSINOPHIL # BLD AUTO: 0 K/UL (ref 0–0.5)
EOSINOPHIL NFR BLD: 0 % (ref 0–8)
ERYTHROCYTE [DISTWIDTH] IN BLOOD BY AUTOMATED COUNT: 13.3 % (ref 11.5–14.5)
EST. GFR  (NO RACE VARIABLE): >60 ML/MIN/1.73 M^2
GLUCOSE SERPL-MCNC: 112 MG/DL (ref 70–110)
HCT VFR BLD AUTO: 32.4 % (ref 40–54)
HGB BLD-MCNC: 10.5 G/DL (ref 14–18)
IMM GRANULOCYTES # BLD AUTO: 0.26 K/UL (ref 0–0.04)
IMM GRANULOCYTES NFR BLD AUTO: 2 % (ref 0–0.5)
LYMPHOCYTES # BLD AUTO: 0.4 K/UL (ref 1–4.8)
LYMPHOCYTES NFR BLD: 3.2 % (ref 18–48)
MCH RBC QN AUTO: 31.5 PG (ref 27–31)
MCHC RBC AUTO-ENTMCNC: 32.4 G/DL (ref 32–36)
MCV RBC AUTO: 97 FL (ref 82–98)
MONOCYTES # BLD AUTO: 0.4 K/UL (ref 0.3–1)
MONOCYTES NFR BLD: 2.9 % (ref 4–15)
NEUTROPHILS # BLD AUTO: 11.9 K/UL (ref 1.8–7.7)
NEUTROPHILS NFR BLD: 91.8 % (ref 38–73)
NRBC BLD-RTO: 0 /100 WBC
PLATELET # BLD AUTO: 169 K/UL (ref 150–450)
PMV BLD AUTO: 10 FL (ref 9.2–12.9)
POTASSIUM SERPL-SCNC: 3.8 MMOL/L (ref 3.5–5.1)
PROT SERPL-MCNC: 5.3 G/DL (ref 6–8.4)
RBC # BLD AUTO: 3.33 M/UL (ref 4.6–6.2)
SODIUM SERPL-SCNC: 140 MMOL/L (ref 136–145)
WBC # BLD AUTO: 12.98 K/UL (ref 3.9–12.7)

## 2023-03-21 PROCEDURE — 97165 OT EVAL LOW COMPLEX 30 MIN: CPT

## 2023-03-21 PROCEDURE — 80053 COMPREHEN METABOLIC PANEL: CPT | Performed by: STUDENT IN AN ORGANIZED HEALTH CARE EDUCATION/TRAINING PROGRAM

## 2023-03-21 PROCEDURE — 11000001 HC ACUTE MED/SURG PRIVATE ROOM

## 2023-03-21 PROCEDURE — 97161 PT EVAL LOW COMPLEX 20 MIN: CPT

## 2023-03-21 PROCEDURE — 36415 COLL VENOUS BLD VENIPUNCTURE: CPT | Performed by: STUDENT IN AN ORGANIZED HEALTH CARE EDUCATION/TRAINING PROGRAM

## 2023-03-21 PROCEDURE — 63600175 PHARM REV CODE 636 W HCPCS: Performed by: STUDENT IN AN ORGANIZED HEALTH CARE EDUCATION/TRAINING PROGRAM

## 2023-03-21 PROCEDURE — 25000003 PHARM REV CODE 250: Performed by: STUDENT IN AN ORGANIZED HEALTH CARE EDUCATION/TRAINING PROGRAM

## 2023-03-21 PROCEDURE — 99900035 HC TECH TIME PER 15 MIN (STAT)

## 2023-03-21 PROCEDURE — 94761 N-INVAS EAR/PLS OXIMETRY MLT: CPT

## 2023-03-21 PROCEDURE — 27000207 HC ISOLATION

## 2023-03-21 PROCEDURE — 85025 COMPLETE CBC W/AUTO DIFF WBC: CPT | Performed by: STUDENT IN AN ORGANIZED HEALTH CARE EDUCATION/TRAINING PROGRAM

## 2023-03-21 PROCEDURE — 97530 THERAPEUTIC ACTIVITIES: CPT

## 2023-03-21 PROCEDURE — 63600175 PHARM REV CODE 636 W HCPCS: Mod: TB,JG | Performed by: INTERNAL MEDICINE

## 2023-03-21 RX ADMIN — FOLIC ACID 1 MG: 1 TABLET ORAL at 08:03

## 2023-03-21 RX ADMIN — MEROPENEM AND SODIUM CHLORIDE 1 G: 1 INJECTION, SOLUTION INTRAVENOUS at 03:03

## 2023-03-21 RX ADMIN — MIRTAZAPINE 30 MG: 15 TABLET, ORALLY DISINTEGRATING ORAL at 08:03

## 2023-03-21 RX ADMIN — ATORVASTATIN CALCIUM 40 MG: 40 TABLET, FILM COATED ORAL at 08:03

## 2023-03-21 RX ADMIN — GABAPENTIN 300 MG: 300 CAPSULE ORAL at 08:03

## 2023-03-21 RX ADMIN — DEXAMETHASONE SODIUM PHOSPHATE 6 MG: 4 INJECTION, SOLUTION INTRA-ARTICULAR; INTRALESIONAL; INTRAMUSCULAR; INTRAVENOUS; SOFT TISSUE at 05:03

## 2023-03-21 RX ADMIN — MEROPENEM AND SODIUM CHLORIDE 1 G: 1 INJECTION, SOLUTION INTRAVENOUS at 12:03

## 2023-03-21 RX ADMIN — GABAPENTIN 300 MG: 300 CAPSULE ORAL at 03:03

## 2023-03-21 RX ADMIN — MEROPENEM AND SODIUM CHLORIDE 1 G: 1 INJECTION, SOLUTION INTRAVENOUS at 08:03

## 2023-03-21 NOTE — PT/OT/SLP EVAL
Occupational Therapy   Evaluation and Discharge Note    Name: Praneeth Jewell  MRN: [id number]  Admitting Diagnosis: <principal problem not specified>  Recent Surgery: * No surgery found *      Recommendations:     Discharge Recommendations: nursing facility, basic  Discharge Equipment Recommendations: none  Barriers to discharge:  None    Assessment:     Praneeth Jewell is a 77 y.o. male with a medical diagnosis of <principal problem not specified>. At this time, patient is functioning at their prior level of function and does not require further acute OT services.     Pt performing at baseline- pt is  bedbound (only t/fs out of w/c for MD appts); requires assist for all bed mobility, t/fs, and ADLs excluding feeding. Pt recently moved into the [facility]. No further OT services required. D/c OT     Plan:     During this hospitalization, patient does not require further acute OT services.  Please re-consult if situation changes.    Plan of Care Reviewed with: patient    Subjective     Chief Complaint: Pt without complaints   Patient/Family Comments/goals: none stated     Occupational Profile:  Living Environment: recently moved into the [facility]   Previous level of function: pt reports he requires total/24/7 care; bed bound and only t/fs to w/c with total a if going to MD appt; able to feed self   Equipment Used at home: wheelchair  Assistance upon Discharge: form NH staff     Pain/Comfort:  Pain Rating 1:  (L hip pain with mobility)  Location - Side 1: Left  Location - Orientation 1: generalized  Location 1: hip  Pain Addressed 1: Distraction, Cessation of Activity, Nurse notified, Reposition    Patients cultural, spiritual, [employer] conflicts given the current situation:      Objective:     Communicated with: nsg prior to session.  Patient found supine with   upon OT entry to room.    General Precautions: Standard, airborne, contact, droplet  Orthopedic Precautions: N/A  Braces: N/A  Respiratory  Status: Room air     Occupational Performance:    Bed Mobility:    Patient completed Rolling/Turning to Left with  maximal assistance  Patient completed Rolling/Turning to Right with maximal assistance  Patient completed Scooting/Bridging with maximal assistance    Functional Mobility/Transfers:  N/A     Activities of Daily Living:  N/A     Cognitive/Visual Perceptual:  WFL cognition   Appears removed/withdrawn     Physical Exam:  BUE ROM diminished at end shoulder range, WFL distally   3/5 L shoulder R 3+/5; 3+5 distally B   L UE swollen upper arm     AMPAC 6 Click ADL:  AMPAC Total Score: 12    Treatment & Education:  Pt repositioned in bed   Turned to L side   Discussed impt of turning schedule to prevent skin breakdown     Patient left left sidelying with all lines intact, call button in reach, bed alarm on, and nsg notified    GOALS:   Multidisciplinary Problems       Occupational Therapy Goals          Problem: Occupational Therapy    Goal Priority Disciplines Outcome Interventions   Occupational Therapy Goal     OT, PT/OT Adequate for Care Transition                        History:     Past Medical History:   Diagnosis Date    Arthritis     Diabetes mellitus     type 2    Folate deficiency anemia     Heart block     History of kidney stones 05/25/2021    History of stroke with residual deficit 05/25/2021    Hyperlipidemia     Hypertension     Major depressive disorder     Pacemaker     Biotronic [place] 8 DR-T (S/n: [id number])    Pyelonephritis 11/19/2021    TIA (transient ischemic attack)     Urinary retention 05/25/2021         Past Surgical History:   Procedure Laterality Date    A-V CARDIAC PACEMAKER INSERTION N/A 8/24/2021    Procedure: INSERTION, CARDIAC PACEMAKER, DUAL CHAMBER;  Surgeon: Neo Winn MD;  Location: [facility];  Service: Cardiology;  Laterality: N/A;  CHB, DUAL PPM, ANES, BIO, DM, ED 2    COLONOSCOPY N/A 11/22/2021    Procedure: COLONOSCOPY;  Surgeon: Cesar Dorado MD;   Location: [facility] ENDO (2ND FLR);  Service: Endoscopy;  Laterality: N/A;    CYSTOSCOPIC LITHOLAPAXY  5/25/2021    Procedure: CYSTOLITHOLAPAXY;  Surgeon: Chris Schilling MD;  Location: [facility] OR 1ST FLR;  Service: Urology;;    CYSTOSCOPY  8/26/2021    Procedure: CYSTOSCOPY;  Surgeon: Camilo Kelley Jr., MD;  Location: [facility] OR 1ST FLR;  Service: Urology;;    CYSTOSCOPY W/ URETERAL STENT PLACEMENT Bilateral 5/25/2021    Procedure: CYSTOSCOPY, WITH BILATERAL URETERAL STENT INSERTION;  Surgeon: Chris Schilling MD;  Location: [facility] OR 1ST FLR;  Service: Urology;  Laterality: Bilateral;    ELBOW ARTHROPLASTY Right     FLUOROSCOPY  5/25/2021    Procedure: FLUOROSCOPY;  Surgeon: Chris Schilling MD;  Location: [facility] OR 1ST FLR;  Service: Urology;;    LASER LITHOTRIPSY Left 8/26/2021    Procedure: LITHOTRIPSY, USING LASER;  Surgeon: Camilo Kelley Jr., MD;  Location: [facility] OR 1ST FLR;  Service: Urology;  Laterality: Left;    PYELOSCOPY Bilateral 8/26/2021    Procedure: PYELOSCOPY;  Surgeon: Camilo Kelley Jr., MD;  Location: [facility] OR 1ST FLR;  Service: Urology;  Laterality: Bilateral;    REMOVAL OF BLOOD CLOT  5/25/2021    Procedure: REMOVAL, BLOOD CLOT;  Surgeon: Chris Schilling MD;  Location: [facility] OR 1ST FLR;  Service: Urology;;    REPLACEMENT OF STENT Bilateral 8/26/2021    Procedure: REPLACEMENT, STENT;  Surgeon: Camilo Kelley Jr., MD;  Location: [facility] OR 1ST FLR;  Service: Urology;  Laterality: Bilateral;    URETEROSCOPIC REMOVAL OF URETERIC CALCULUS Bilateral 8/26/2021    Procedure: REMOVAL, CALCULUS, URETER, URETEROSCOPIC;  Surgeon: Camilo Kelley Jr., MD;  Location: [facility] OR 1ST FLR;  Service: Urology;  Laterality: Bilateral;    URETEROSCOPY Bilateral 8/26/2021    Procedure: URETEROSCOPY;  Surgeon: Camilo Kelley Jr., MD;  [facility];  Service: Urology;  Laterality: Bilateral;       Time Tracking:     OT Date of Treatment: 03/21/23  OT Start Time: 1430  OT Stop Time: 1447  OT Total Time (min): 17  min    Billable Minutes: Evaluation 8   Therapeutic Activity 9    3/21/2023

## 2023-03-21 NOTE — PT/OT/SLP EVAL
Physical Therapy Evaluation and Discharge Note    Patient Name:  Praneeth Jewell   MRN:  [id number]    Recommendations:     Discharge Recommendations:  (return to NH)  Discharge Equipment Recommendations: none   Barriers to Discharge: None to return to NH    Assessment:     Praneeth Jewell is a 77 y.o. male admitted with a medical diagnosis of The primary encounter diagnosis was COVID. Diagnoses of Acute upper respiratory infection, Hypotension, Acute cystitis without hematuria, Sepsis, due to unspecified organism, unspecified whether acute organ dysfunction present, Lactic acidemia, Elevated troponin, Transaminitis, Fecal impaction, Ureteral stone with hydronephrosis, Nephrolithiasis, Bilateral hydronephrosis, Macrocytic anemia, and Other specified diabetes mellitus with other specified complication, unspecified whether long term insulin use were also pertinent to this visit.    Pt functioning at his baseline in which he is bed bound, dependent for ADLs and rarely transfers with total A to w/c for appointments. Pt is a recent NH resident at [facility], recommending return to NH upon d/c. No further IP PT needs, d/c PT.    Recent Surgery: * No surgery found *      Plan:     During this hospitalization, patient does not require further acute PT services.  Please re-consult if situation changes.      Subjective     Chief Complaint: none  Patient/Family Comments/goals: pt agreeable to bed level activity  Pain/Comfort:  Pain Rating 1:  (reporting pain in L hip with mobility but not rated)  Location - Side 1: Left  Location 1: hip    Patients cultural, spiritual, Oriental orthodox conflicts given the current situation: no    Living Environment:  Pt was recently transitioned to [facility].  Prior to admission, patients level of function was bed bound - requires total A for ADLs except reporting able to feed himself. Does not sit EOB and only rarely completes transfers to w/c for appointments and requires total A to complete.   Equipment used at home: wheelchair (NH DME).  DME owned (not currently used): none.  Upon discharge, patient will have assistance from NH staff.    Objective:     Communicated with nurse prior to session.  Patient found supine with bed alarm, nephrostomy upon PT entry to room.    General Precautions: Standard, airborne, contact, droplet    Orthopedic Precautions:N/A   Braces: N/A  Respiratory Status: Room air    Exams:  Pt pleasant and appropriate  Postural Exam:  Patient presented with the following abnormalities:    -       BLEs in frog leg position  BLE ROM: BLE contractions - R knee lacking ~40 deg from neutral, L knee lacking 50-60 deg from neutral    Functional Mobility:  Bed Mobility:     Rolling Left:  maximal assistance  Rolling Right: maximal assistance  Scooting: dependence and of 2 persons for scooting up in bed    AM-PAC 6 CLICK MOBILITY  Total Score:7       Treatment and Education:  Educated pt on role of PT and pt reporting he is bed bound but was agreeable to bed level activity.  Completed rolling R/L then scooted up in bed.  Pressure relief boots donned. Positioned in slight L side lying.    AM-PAC 6 CLICK MOBILITY  Total Score:7     Patient left L sidelying with all lines intact, call button in reach, bed alarm on, and nurse notified.    GOALS:   Multidisciplinary Problems       Physical Therapy Goals       Not on file              Multidisciplinary Problems (Resolved)          Problem: Physical Therapy    Goal Priority Disciplines Outcome Goal Variances Interventions   Physical Therapy Goal   (Resolved)     PT, PT/OT Met                         History:     Past Medical History:   Diagnosis Date    Arthritis     Diabetes mellitus     type 2    Folate deficiency anemia     Heart block     History of kidney stones 05/25/2021    History of stroke with residual deficit 05/25/2021    Hyperlipidemia     Hypertension     Major depressive disorder     Pacemaker     Biotronic [place] 8 JONATHON (S/n: [id number])     Pyelonephritis 11/19/2021    TIA (transient ischemic attack)     Urinary retention 05/25/2021       Past Surgical History:   Procedure Laterality Date    A-V CARDIAC PACEMAKER INSERTION N/A 8/24/2021    Procedure: INSERTION, CARDIAC PACEMAKER, DUAL CHAMBER;  Surgeon: Neo Winn MD;  Location: [facility] EP LAB;  Service: Cardiology;  Laterality: N/A;  CHB, DUAL PPM, ANES, BIO, DM, ED 2    COLONOSCOPY N/A 11/22/2021    Procedure: COLONOSCOPY;  Surgeon: Cesar Dorado MD;  Location: [facility] ENDO (2ND FLR);  Service: Endoscopy;  Laterality: N/A;    CYSTOSCOPIC LITHOLAPAXY  5/25/2021    Procedure: CYSTOLITHOLAPAXY;  Surgeon: Chris Schilling MD;  [facility];  Service: Urology;;    CYSTOSCOPY  8/26/2021    Procedure: CYSTOSCOPY;  Surgeon: Camilo Kelley Jr., MD;  [facility];  Service: Urology;;    CYSTOSCOPY W/ URETERAL STENT PLACEMENT Bilateral 5/25/2021    Procedure: CYSTOSCOPY, WITH BILATERAL URETERAL STENT INSERTION;  Surgeon: Chris Schilling MD;  [facility];  Service: Urology;  Laterality: Bilateral;    ELBOW ARTHROPLASTY Right     FLUOROSCOPY  5/25/2021    Procedure: FLUOROSCOPY;  Surgeon: Chris Schilling MD;  [facility];  Service: Urology;;    LASER LITHOTRIPSY Left 8/26/2021    Procedure: LITHOTRIPSY, USING LASER;  Surgeon: Camilo Kelley Jr., MD;  [facility];  Service: Urology;  Laterality: Left;    PYELOSCOPY Bilateral 8/26/2021    Procedure: PYELOSCOPY;  Surgeon: Camilo Kelley Jr., MD;  [facility];  Service: Urology;  Laterality: Bilateral;    REMOVAL OF BLOOD CLOT  5/25/2021    Procedure: REMOVAL, BLOOD CLOT;  Surgeon: Chris Schilling MD;  [facility];  Service: Urology;;    REPLACEMENT OF STENT Bilateral 8/26/2021    Procedure: REPLACEMENT, STENT;  Surgeon: Camilo Kelley Jr., MD;  Location: [facility] OR 1ST FLR;  Service: Urology;  Laterality: Bilateral;    URETEROSCOPIC REMOVAL OF URETERIC  CALCULUS Bilateral 8/26/2021    Procedure: REMOVAL, CALCULUS, URETER, URETEROSCOPIC;  Surgeon: Camilo Kelley Jr., MD;  Location: [facility] OR [address] [place] [address];  Service: Urology;  Laterality: Bilateral;    URETEROSCOPY Bilateral 8/26/2021    Procedure: URETEROSCOPY;  Surgeon: Camilo Kelley Jr., MD;  Location: [facility] OR [address] [place] [address];  Service: Urology;  Laterality: Bilateral;       Time Tracking:     PT Received On: 03/21/23  PT Start Time: 1430     PT Stop Time: 1447  PT Total Time (min): 17 min     Billable Minutes: Evaluation 17      03/21/2023

## 2023-03-21 NOTE — PLAN OF CARE
Problem: Physical Therapy  Goal: Physical Therapy Goal  Outcome: Met     Pt functioning at his baseline in which he is bed bound, dependent for ADLs and rarely transfers with total A to w/c for appointments. Pt is a recent NH resident at [facility], recommending return to NH upon d/c. No further IP PT needs, d/c PT.

## 2023-03-21 NOTE — NURSING
NURSE PROACTIVE ROUNDING NOTE           Admit Date: 3/19/2023  LOS: 1  Code Status: DNR   Date of Visit: 2023  : 1945  Age: 77 y.o.  Sex: male  Race: White  Bed: K478/K478 A:   MRN: [id number]  Was the patient discharged from an ICU this admission? No   Was the patient discharged from a PACU within last 24 hours? No   Did the patient receive conscious sedation/general anesthesia in last 24 hours? No   Was the patient in the ED within the past 24 hours? Yes   Was the patient on NIPPV within the past 24 hours? No   Attending Physician: Federico Ahmadi MD  Primary Service: Internal Medicine       SITUATION        Diagnosis: <principal problem not specified>   has a past medical history of Arthritis, Diabetes mellitus, Folate deficiency anemia, Heart block, History of kidney stones, History of stroke with residual deficit, Hyperlipidemia, Hypertension, Major depressive disorder, Pacemaker, Pyelonephritis, TIA (transient ischemic attack), and Urinary retention.    Last Vitals:  Temp: 96.3 °F (35.7 °C) (2347)  Pulse: 104 ( 0021)  Resp: 18 (2347)  BP: 116/62 (2347)  SpO2: 86 % (2347)    24 Hour Vitals Range:  Temp:  [96.3 °F (35.7 °C)-98.8 °F (37.1 °C)]   Pulse:  []   Resp:  [10-20]   BP: ()/(47-62)   SpO2:  [86 %-100 %]     Chart reviewed.    Call back the Rapid Response NurseAsad at  for additional questions or concerns.

## 2023-03-21 NOTE — PLAN OF CARE
Problem: Occupational Therapy  Goal: Occupational Therapy Goal  Outcome: Adequate for Care Transition     Pt performing at baseline- pt is  bedbound (only t/fs out of w/c for MD appts); requires assist for all bed mobility, t/fs, and ADLs excluding feeding. Pt recently moved into the [facility]. No further OT services required. D/c OT

## 2023-03-21 NOTE — PROGRESS NOTES
"[facility] Medicine Progress Note      Primary Team: [facility] [place] Hospitalist Team B  Attending Physician: Federico Ahmadi MD  Resident: Jeri  Intern: Michael    Subjective:      No acute events reported overnight. Afebrile, on RA with normal WBCS. Pt seen by writer this morning. Pt reports he is doing fine and asking for food. Denies fevers, chills, N/V, SOB, chest pain, abd pain, or urinary symptoms.      Objective:     Last 24 Hour Vital Signs:  BP  Min: 78/47  Max: 120/58  Temp  Av.6 °F (36.4 °C)  Min: 96.3 °F (35.7 °C)  Max: 98.9 °F (37.2 °C)  Pulse  Av.2  Min: 60  Max: 108  Resp  Av.2  Min: 10  Max: 19  SpO2  Av.4 %  Min: 86 %  Max: 100 %  Height  Av' 8" (172.7 cm)  Min: 5' 8" (172.7 cm)  Max: 5' 8" (172.7 cm)  Weight  Av.2 kg (167 lb 15.9 oz)  Min: 76.2 kg (167 lb 15.9 oz)  Max: 76.2 kg (167 lb 15.9 oz)  I/O last 3 completed shifts:  In: -   Out: 200 [Urine:200]    Physical Examination:  General:          Cachectic; resting comfortably on 2L NC, awakens to loud voice agitated and limited cooperation with exam  Head:               Normocephalic and atraumatic  Eyes:               PERRL; EOMI with anicteric sclera and clear conjunctivae  Mouth:             Oropharynx clear and without exudate  Cardio:             Regular rate and rhythm with normal S1 and S2; no murmurs or rubs  Resp:               CTAB; tachypneic upon awakening but comfortable on RA; crackles over left      sided. no wheezes,   Abdom:            Soft, non-distended; LLQ tenderness with deep palpation  Extrem:            L toe amputation, Legs contracted; warm and well-perfused with no cyanosis or edema  Skin:                Pale; No rashes or ulcers noted  Pulses:            2+ and symmetric distally  Neuro:             Limited 2/2 patient cooperation; CNs grossly intact, moving 4 extremities spontaneously, no focal deficits noted     Laboratory:  Recent Labs   Lab 23  1625 23  0437 23  0438 " 03/21/23  0401   WBC 21.84* 17.18*  --  12.98*   HGB 10.4* 10.8*  --  10.5*   HCT 32.4* 34.7*  --  32.4*    167  --  169   * 102*  --  97   RDW 13.2 13.4  --  13.3     --  141 140   K 3.8  --  4.1 3.8     --  111* 107   CO2 26  --  21* 24   BUN 23  --  23 25*   CREATININE 1.0  --  0.8 0.8   GLU 99  --  98 112*   PROT 6.2  --  5.6* 5.3*   ALBUMIN 2.5*  --  2.2* 2.1*   BILITOT 0.4  --  0.2 0.2   *  --  101* 44*   ALKPHOS 95  --  86 74   *  --  87* 59*       Microbiology Data Reviewed:  Pertinent Findings:  Blood culture: pending  COVID: positive  Urine culture: pending    Other Results:  EKG (my interpretation): Atrial sensed, ventricular paced rhythm     Radiology:  Imaging Results                   CT Abdomen Pelvis With Contrast (Final result)  Result time 03/19/23 20:14:08            Final result by Malou Rios MD (03/19/23 20:14:08)                           Impression:        Large distal colon fecal impaction with surrounding mucosal thickening and inflammatory changes suggesting stercoral colitis.  There is severe stool burden constipation throughout the colon as well as gaseous distension of the colon.  No significant small bowel distension, convincing extraluminal air, abscess or significant free fluid.     Severe left hydronephrosis and proximal ureterectasis secondary to a 14 mm in maximal dimension calculus in the mid left ureter as well as an adjacent smaller more proximal calculus.  There are also multiple calcifications largest measuring 9 mm in diameter in the chain along the expected course of the more distal left ureter.  These may represent gonadal vein calcifications/vascular calcifications however more distal calculi are not reliably excluded within the ureter in this patient with punctate innumerable calculi in the left kidney.  Severe fecal impaction distorts the anatomy in the pelvis displacing the bladder and ureters.  CT urogram can further  evaluate this finding.     There is mild hydronephrosis in the right kidney as well as proximal ureterectasis which may be secondary to the pelvic impaction.  No convincing calculi are seen in the right ureter and nonobstructing calculi are seen in the right kidney.     Bilateral lung base atelectasis.  Some mild pneumonitis is difficult to exclude reliably on the left.  No lobar consolidation or significant pleural effusion seen.     Interval loss of subcutaneous and intra-abdominal fat since prior CT and increased stranding in the fat compatible with anasarca.     Please see above for additional incidental nonacute findings.     This report was flagged in Epic as abnormal.        Electronically signed by:     Malou Riso  Date:                                            03/19/2023  Time:                                            20:14                     Narrative:     EXAMINATION:  CT ABDOMEN PELVIS WITH CONTRAST     CLINICAL HISTORY:  LLQ abdominal pain;     TECHNIQUE:  Low dose axial images, sagittal and coronal reformations were obtained from the lung bases to the pubic symphysis before and following the IV administration of 75 mL of Omnipaque 350 .  Oral contrast was not administered..     COMPARISON:  CTA abdomen pelvis 11/19/2021     FINDINGS:  Abdomen:     - Lung bases: There is dependent atelectasis bilaterally in the lungs.  Ground-glass opacities also noted raising question of an underlying component of mild pneumonitis or edema.  No pleural effusion is seen.  The heart is stable and prominent with pacemaker leads in the right atrium and right ventricle.     Bowel/Mesentery:     There is severe stool burden constipation throughout the colon as well as gaseous prominence.  There is distal fecal impaction with the rectum measuring 8.5 cm in maximal diameter.  There is mucosal thickening and note of perirectal edema as seen with stercoral colitis.  The appendix is not definitely isolated as a separate  structure.  There is no evidence of pericecal inflammatory change.     The patient has had significant loss of intra-abdominal fat since prior exam.     -Liver: Liver appears homogeneous and not significantly enlarged.     - Gallbladder: The there is no CT evidence of cholecystitis.     - Bile Ducts: No evidence of intra or extra hepatic biliary ductal dilation.     - Spleen: Negative.     - Kidneys: There is mild right hydronephrosis and proximal ureterectasis multiple punctate nonobstructing calculi in the right kidney..  The right ureter is not reliably followed all the way down to the bladder due to the large fecal impaction and a ureteral calculus distally is not reliably excluded given the multiple calcifications in the pelvis with the majority likely vascular.     There is severe hydronephrosis of the left kidney as well as severe proximal ureterectasis secondary to a 13 x 11 x 14 mm calculus in the mid ureter at the level of the L4 transverse process on the left.  Smaller calculus more proximally also noted abutting the larger calculus measuring 6 mm coronal image 88 series 601.  There is a long chain of multiple calcifications distal to the large obstructing mid left ureter calculus which overlap the expected course of the more distal left ureter in the pelvis coronal images 68 through 84 series 601.  Largest is seen most distally measuring 9 mm in diameter.  Because of the fecal impaction and mass effect on adjacent structures, I cannot reliably exclude these calcifications from the more distal left ureter.  These calcifications may represent vascular/gonadal vein calcifications.  CT urogram would be needed to reliably assess the.  There are also innumerable layering punctate calculi within this distended renal pelvis.  Renal cortices enhance asymmetrically with the left late secondary to the obstruction.  There is a cyst in the right renal cortex pole.     - Adrenals: Unremarkable.     - Pancreas: No mass  or peripancreatic fat stranding.     - Retroperitoneum:  No significant adenopathy.     - Vascular: There is no abdominal aortic aneurysm.  Moderate atherosclerotic calcification throughout the aorta without significant stenosis.     - Abdominal wall:  Strandy changes in the subcutaneous fat noted compatible with mild anasarca/edema..     Pelvis:     The bladder is displaced cephalad secondary to the large fecal impaction but otherwise is unremarkable as imaged.  Prostate is not convincingly enlarged and displaced anteriorly.  Pelvic calcifications are likely vascular..  No pelvic mass, adenopathy, or free fluid.     Bones:  No acute osseous abnormality and no suspicious lytic or blastic lesion. There is severe spondylosis without acute subluxation and degenerative changes of the hips bilaterally.                                                X-Ray Chest AP Portable (Final result)  Result time 03/19/23 17:19:31            Final result by Melo Lovell MD (03/19/23 17:19:31)                           Impression:        No airspace disease.     Distention of the bowel loops in the upper abdomen.        Electronically signed by:     Melo Lovell MD  Date:                                            03/19/2023  Time:                                            17:19                     Narrative:     EXAMINATION:  XR CHEST AP PORTABLE     CLINICAL HISTORY:  Sepsis;     TECHNIQUE:  Single frontal view of the chest was performed.     COMPARISON:  12/21/2022.     FINDINGS:  There is unchanged appearance of the left-sided AICD.  Monitoring EKG leads are present.     The trachea is unremarkable.  There are calcifications of the aortic knob.  The cardiomediastinal silhouette is within normal limits.  There is no evidence of free air beneath the hemidiaphragms.  There are no pleural effusions.  There is no evidence of a pneumothorax.  There is no evidence of pneumomediastinum.  No airspace disease present.  There are  degenerative changes in the osseous structures.     There is distention of the bowel loops in the upper abdomen.                             Current Medications:     Infusions:       Scheduled:   atorvastatin  40 mg Oral Daily    dexAMETHasone  6 mg Intravenous Daily    folic acid  1 mg Oral Daily    gabapentin  300 mg Oral TID    meropenem (MERREM) IVPB  1 g Intravenous Q8H    mirtazapine  30 mg Oral Nightly        PRN:  melatonin, sodium chloride 0.9%    Antibiotics and Day Number of Therapy:  Ceftriaxone single dose on 3/19  Meropenem :3/19-    Assessment:     Praneeth Jewell is a 77 y.o. 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 Imaging with significant nephrolithiasis and fecal impaction with subsequent bilateral hydronephrosis (L>R). UA consistent with UTI 2/2 obstruction. Hypoxic on arrival to the ED requiring supplemental O2, found to be COVID positive. IR consulted and pt had a Left sided PCN placed on 3/20. Severe left hydronephrosis. 10-Fr neph tube placed via posterolateral midpole calyx. 70 mL foul-smelling purulent urine removed. Connected to gravity bag. No immediate complications. Patient tolerated procedure well. Pt will follow-up with Urology. Recommend routine neph tube exchange in 10 wks.Blood culture growing proteus mirabilis.        Plan:      Severe Sepsis 2/2 complicated Urinary Tract Infection  Nephrolithiasis  Obstructive Uropathy  Hydronephrosis  Severe Constipation/Fecal Impaction  - history of bilateral nephrolithiasis and obstructive uropathy s/p bilateral J-J ureteral stent and cystolitholapaxy; recurrent nephrolithiasis and multiple UTIs (Providencia, ESBL Proteus mirabilis, MDR Proteus) in the past  - reported fever,  hypotension, congestion at care facility  - Afebrile, HR 80, BP 79/47 in ED (improved with 30cc/kg LR bolus in ED); WBC 21.84; UA with pyuria, some RBCs, many bacteria, nitrite positive  - Lactate 2.6, continue to trend; procalcitonin 22.13  - CT A/P showed mild hydronephrosis of R kidney and proximal ureterectasis with multiple nonobstructing calculi in R kidney, severe hydronephrosis of the L kidney with many punctate calculi in the distended renal pelvis and severe proximal ureterectasis secondary to a 13 x 11 x 14 mm calculus in the mid-ureter and smaller calculus (6mm) more proximally with a long chain of multiple calcifications distal to the large obstructing calculus; difficult to visualize distal ureters due to fecal impaction and mass effect on adjacent structures. Pelvic calcifications are likely vascular. The bladder is displaced cephalad secondary to the large fecal impaction but otherwise is unremarkable   - urology consulted in ED, no surgical planned by urology due to necessary procedure and active infection; recommend urgent IR consult for L nephrostomy tube placement; IR consulted, NPO pending IR evaluation  - Enema ordered for fecal impaction  -  s/p 1 dose ceftriaxone in ED; in the setting of history of recurrent UTI including MDR Proteus, started meropenem  -IR consult; appreciate recs  -3/20 Pt underwent left sided PCN placement,10-Fr neph tube placed via posterolateral midpole calyx. 70 mL foul-smelling purulent urine removed. Connected to gravity bag. Patient tolerated procedure well. No immediate complications.  -Pt will follow-up with Urology. Recommend routine neph tube exchange in 10 wks  -Urine and blood cultures growing gram negative rods, pending sensitivity  -Blood culture growing proteus mirabilis     Acute Hypoxia 2/2 COVID-19 Infection  Elevated Transaminases  - Tachypneic with SpO2 in low 90s at presentation, improved with 2L NC  - COVID +, received 1 dose dexamethasone in ED  -  "imaging with some ground glass opacities at lung bases  - will continue dexamethasone 6mg daily due to patient's new O2 requirement and risk factors for severe infection  - will defer Remdesivir in the setting of newly elevated transaminases (AST//108), likely 2/2 COVID infection     Elevated Troponin  - troponin 0.056 at admission, repeat ordered and pending  - EKG with atrial sensed and ventricular paced rhythm; no new ischemic changes  - likely due to sepsis +/- active COVID infection     Macrocytic Anemia  - H/H 10.4/32.4 consistent with prior levels,   - history of folate deficiency on home supplement, last folate level March 2022 >40, B12 level and thiamine wnl at that time  - no signs of acute bleeding, ordered iron studies  - continue to monitor     Complete Heart Block s/p Pacemaker Placement (2021)  - EKG consistent with atrial sensed and ventricular paced rhythm; no new ischemic changes  - device last evaluated 3/6/2023     R PARDEEP Stroke with Residual Deficits (2015)  Vascular Dementia  - residual LE weakness, wheelchair bound  - no new focal deficit noted  - per neurology note, patient has been having progressive cognitive and behavioral impairment since the stroke with 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 The stroke was in the right PARDEEP distribution and impacted the superior frontal gyrus, medial frontal gyrus, and cingulate extending from the anterior aspect of the frontal lobe to the central sulcus. The involvement of the aPFC and dmPFC would be expected to impact a number of cognitive functions including executive functioning, planning, problem solving, judgment, attention, and working memory, among others.  Unfortunately, the effectiveness of medications to help with behavior will likely be limited in this case."  - avoiding anticholinergics, opioids, and benzodiazepines; " cyproheptadine on patient's home medications, will not order at this time  - not taking any anti-platelet medications, continue home Lipitor     Type 2 Diabetes Mellitus  - not on any medications (has been on metformin in the past)  - all prior A1c in EHR <5, will monitor daily CMP        Diet: Diabetic  DVT Prophylaxis: SCD  Code: DNR    Dispo: Pending cultures sensitivity  and susceptibility result.     Scarlett Rodriguez MD  hospitals Neurology [facility]    hospitals Medicine Hospitalist Pager numbers:   hospitals Hospitalist Medicine Team A (Kalpesh/Richard): [phone]  hospitals Hospitalist Medicine Team B (Champ/Daiana):  [phone]

## 2023-03-21 NOTE — PROGRESS NOTES
Urology Note    Urine cultures growing GNRs. Left nephrostomy tube placed by IR yesterday, draining.    Plan:  - Leave nephrostomy tube to gravity. Nursing should flush with 10cc of NS BID while inpatient to ensure patency    - Any issues with nephrostomy tube, contact interventional radiology to troubleshoot    - Patient should follow up with main [facility] urology, as he is an established [facility] patient and has seen Dr. Kelley and residents before. Discharge navigators can help arrange. Will need definitive stone management, with co-morbidities is better suited for higher level of care.    Urology service is available as needed, contact on call urologist with any questions or to re-evaluate. Otherwise will follow peripherally.    Terence Azar MD  Urology  [facility]

## 2023-03-21 NOTE — DISCHARGE SUMMARY
[facility] Hospital Medicine Discharge Summary    Primary Team: [facility] Hospitalist Team B  Attending Physician: Tess att. providers found  Resident:Jeri  Intern: Michael    Date of Admit: 3/19/2023  Date of Discharge: 3/23/2023    [facility] to: [facility]  Condition: Stable     Discharge Diagnoses     Patient Active Problem List   Diagnosis    Diabetes    Long term (current) use of aspirin    Nephrolithiasis    BPH with urinary obstruction    Second degree AV block    Essential hypertension    HLD (hyperlipidemia)    MDD (major depressive disorder)    Bradycardia    GIB (gastrointestinal bleeding)    Idiopathic proctocolitis with rectal bleeding    Acute upper respiratory infection    Elevated blood pressure reading without diagnosis of hypertension    Macrocytic anemia    GURVINDER (acute kidney injury)       Consultants and Procedures     Consultants:  Urology  IR  Nutrition  Wound care  PT  OT    Procedures:   Left Nephrostomy tube 3/20/23    Brief History of Present Illness      HPI:   Praneeth Jewell is a 77 y.o. 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     Patient asleep on initial evaluation and agitated with interview, but answering most questions. Reports fatigue, dry cough, congestion, and malaise. Has had several sick contacts at his care facility. Denies sputum production, headache, chest pain, shortness of breath. Unable to state when his last bowel movement was. Denies dysuria, increased frequency, or hematuria. Does endorse some abdominal discomfort but no back or flank pain. Remainder of history obtained from chart review. Patient was brought in from nursing facility due to  reported fever of 104, hypotension, and concern for infection. He has been living at the current facility since he has been wheelchair-bound after a stroke in 2015.    Hospital Course:   On ED Afebrile, HR 80, BP 79/47 in ED (improved with 30cc/kg LR bolus in ED); WBC 21.84; UA with pyuria, some RBCs, many bacteria, nitrite positive. Lactate 2.6, continue to trend; procalcitonin 22.13. Hypoxic on arrival to the ED requiring supplemental O2, found to be COVID positive. s/p 1 dose ceftriaxone in ED; in the setting of history of recurrent UTI including MDR Proteus, started meropenem. CT A/P showed mild hydronephrosis of R kidney and proximal ureterectasis with multiple nonobstructing calculi in R kidney, severe hydronephrosis of the L kidney with many punctate calculi in the distended renal pelvis and severe proximal ureterectasis secondary to a 13 x 11 x 14 mm calculus in the mid-ureter and smaller calculus (6mm) more proximally with a long chain of multiple calcifications distal to the large obstructing calculus; difficult to visualize distal ureters due to fecal impaction and mass effect on adjacent structures. Pelvic calcifications are likely vascular. The bladder is displaced cephalad secondary to the large fecal impaction but otherwise is unremarkable. Urology consulted in ED, no surgical planned by urology due to necessary procedure and active infection. IR consulted and pt had a Left sided PCN placed on 3/20. Severe left hydronephrosis. 10-Fr neph tube placed via posterolateral midpole calyx. 70 mL foul-smelling purulent urine removed. Connected to gravity bag. No immediate complications. Patient tolerated procedure well. Pt will follow-up with Urology. Recommend routine neph tube exchange in 10 wks. Blood culture growing proteus mirabilis and urine culture growing GNR. Initially Meropenem was given until sensitivity cleared and switched to Augmentin. Continue the antibiotics for 2 weeks until April 3, 2023.        On discharge day pt is hemodynamic stable and on RA. There is no new/acute complication during this hospitalization. Pt discharge to follow up with main [facility], he is an established [facility] patient and has seen Dr. Kelley and residents for further nephrolithiasis workup and management.     For the full HPI please refer to the History & Physical from this admission.    Hospital Course By Problem with Pertinent Findings     Severe Sepsis 2/2 Urinary Tract Infection  Nephrolithiasis  Obstructive Uropathy  Hydronephrosis  Severe Constipation/Fecal Impaction  - history of bilateral nephrolithiasis and obstructive uropathy s/p bilateral J-J ureteral stent and cystolitholapaxy; recurrent nephrolithiasis and multiple UTIs (Providencia, ESBL Proteus mirabilis, MDR Proteus) in the past  - reported fever, hypotension, congestion at care facility  - Afebrile, HR 80, BP 79/47 in ED (improved with 30cc/kg LR bolus in ED); WBC 21.84; UA with pyuria, some RBCs, many bacteria, nitrite positive  - Lactate 2.6, continue to trend; procalcitonin 22.13  - CT A/P showed mild hydronephrosis of R kidney and proximal ureterectasis with multiple nonobstructing calculi in R kidney, severe hydronephrosis of the L kidney with many punctate calculi in the distended renal pelvis and severe proximal ureterectasis secondary to a 13 x 11 x 14 mm calculus in the mid-ureter and smaller calculus (6mm) more proximally with a long chain of multiple calcifications distal to the large obstructing calculus; difficult to visualize distal ureters due to fecal impaction and mass effect on adjacent structures. Pelvic calcifications are likely vascular. The bladder is displaced cephalad secondary to the large fecal impaction but otherwise is unremarkable   - urology consulted in ED, no surgical planned by urology due to necessary procedure and active infection; recommend urgent IR consult for L nephrostomy tube placement; IR consulted, NPO pending  IR evaluation  - Enema ordered for fecal impaction  - blood culture pending  -  s/p 1 dose ceftriaxone in ED; in the setting of history of recurrent UTI including MDR Proteus, started meropenem  -IR consult; appreciate recs  -3/20 Pt underwent left sided PCN placement,10-Fr neph tube placed via posterolateral midpole calyx. 70 mL foul-smelling purulent urine removed. Connected to gravity bag. Patient tolerated procedure well. No immediate complications.  -Pt will follow-up with Urology. Recommend routine neph tube exchange in 10 wks  -Urine culture growing gram negative rods  -Switch meropenem to Augmentin for total 2 weeks     Acute Hypoxia 2/2 COVID-19 Infection  Elevated Transaminases  - Tachypneic with SpO2 in low 90s at presentation, improved with 2L NC  - COVID +, received 1 dose dexamethasone in ED  - imaging with some ground glass opacities at lung bases  - will continue dexamethasone 6mg daily due to patient's new O2 requirement and risk factors for severe infection  - will defer Remdesivir in the setting of newly elevated transaminases (AST//108), likely 2/2 COVID infection     Elevated Troponin  - troponin 0.056 at admission, repeat ordered and pending  - EKG with atrial sensed and ventricular paced rhythm; no new ischemic changes  - likely due to sepsis +/- active COVID infection     Macrocytic Anemia  - H/H 10.4/32.4 consistent with prior levels,   - history of folate deficiency on home supplement, last folate level March 2022 >40, B12 level and thiamine wnl at that time  - no signs of acute bleeding, ordered iron studies  - continue to monitor     Complete Heart Block s/p Pacemaker Placement (2021)  - EKG consistent with atrial sensed and ventricular paced rhythm; no new ischemic changes  - device last evaluated 3/6/2023     R PARDEEP Stroke with Residual Deficits (2015)  Vascular Dementia  - residual LE weakness, wheelchair bound  - no new focal deficit noted  - per neurology note, patient  "has been having progressive cognitive and behavioral impairment since the stroke with 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 The stroke was in the right PARDEEP distribution and impacted the superior frontal gyrus, medial frontal gyrus, and cingulate extending from the anterior aspect of the frontal lobe to the central sulcus. The involvement of the aPFC and dmPFC would be expected to impact a number of cognitive functions including executive functioning, planning, problem solving, judgment, attention, and working memory, among others.  Unfortunately, the effectiveness of medications to help with behavior will likely be limited in this case."  - avoiding anticholinergics, opioids, and benzodiazepines; cyproheptadine on patient's home medications, will not order at this time  - not taking any anti-platelet medications, continue home Lipitor     Type 2 Diabetes Mellitus  - not on any medications (has been on metformin in the past)  - all prior A1c in EHR <5, will monitor daily CMP    Discharge Medications        Medication List        START taking these medications      amoxicillin-clavulanate 875-125mg 875-125 mg per tablet  Commonly known as: AUGMENTIN  Take 1 tablet by mouth every 12 (twelve) hours. for 11 days            CONTINUE taking these medications      atorvastatin 40 MG tablet  Commonly known as: LIPITOR     cyproheptadine 4 mg tablet  Commonly known as: PERIACTIN     folic acid 1 MG tablet  Commonly known as: FOLVITE     gabapentin 300 MG capsule  Commonly known as: NEURONTIN     mirtazapine 30 MG tablet  Commonly known as: REMERON     tamsulosin 0.4 mg Cap  Commonly known as: FLOMAX  TAKE 2 CAPSULES(0.8 MG) BY MOUTH EVERY DAY               Where to Get Your Medications        These medications were sent to [facility]  [address], [place] [address]      Hours: Mon-Fri, " 8a-5:30p Phone: [phone]   amoxicillin-clavulanate 875-125mg 875-125 mg per tablet         Discharge Information:   Diet:  Diabetic    Physical Activity:  As tolerated by patient.              Instructions:  1. Take all medications as prescribed  2. Keep all follow-up appointments  3. Return to the hospital or call your primary care physicians if any worsening symptoms such as fever, chest pain, shortness of breath, return of symptoms, or any other concerns.    Follow-Up Appointments:  -Follow up with Urology outpatient for routine neph tube exchange and renal stones managment  -PCP F/u       Scarlett Rodriguez MD  [facility] HO-I

## 2023-03-22 PROBLEM — A41.9 SEPSIS: Status: RESOLVED | Noted: 2023-03-22 | Resolved: 2023-03-22

## 2023-03-22 PROBLEM — N17.9 AKI (ACUTE KIDNEY INJURY): Status: ACTIVE | Noted: 2023-03-22

## 2023-03-22 PROBLEM — A41.9 SEPSIS: Status: ACTIVE | Noted: 2023-03-22

## 2023-03-22 PROBLEM — N13.30 BILATERAL HYDRONEPHROSIS: Status: RESOLVED | Noted: 2021-05-26 | Resolved: 2023-03-22

## 2023-03-22 PROBLEM — N20.0 NEPHROLITHIASIS: Status: RESOLVED | Noted: 2021-06-02 | Resolved: 2023-03-22

## 2023-03-22 LAB
ALBUMIN SERPL BCP-MCNC: 2 G/DL (ref 3.5–5.2)
ALP SERPL-CCNC: 78 U/L (ref 55–135)
ALT SERPL W/O P-5'-P-CCNC: 69 U/L (ref 10–44)
ANION GAP SERPL CALC-SCNC: 8 MMOL/L (ref 8–16)
AST SERPL-CCNC: 67 U/L (ref 10–40)
BACTERIA BLD CULT: ABNORMAL
BACTERIA UR CULT: ABNORMAL
BACTERIA UR CULT: ABNORMAL
BASOPHILS # BLD AUTO: 0 K/UL (ref 0–0.2)
BASOPHILS NFR BLD: 0 % (ref 0–1.9)
BILIRUB SERPL-MCNC: 0.2 MG/DL (ref 0.1–1)
BUN SERPL-MCNC: 29 MG/DL (ref 8–23)
CALCIUM SERPL-MCNC: 8.5 MG/DL (ref 8.7–10.5)
CHLORIDE SERPL-SCNC: 105 MMOL/L (ref 95–110)
CO2 SERPL-SCNC: 25 MMOL/L (ref 23–29)
CREAT SERPL-MCNC: 0.9 MG/DL (ref 0.5–1.4)
DIFFERENTIAL METHOD: ABNORMAL
EOSINOPHIL # BLD AUTO: 0 K/UL (ref 0–0.5)
EOSINOPHIL NFR BLD: 0 % (ref 0–8)
ERYTHROCYTE [DISTWIDTH] IN BLOOD BY AUTOMATED COUNT: 13.2 % (ref 11.5–14.5)
EST. GFR  (NO RACE VARIABLE): >60 ML/MIN/1.73 M^2
GLUCOSE SERPL-MCNC: 136 MG/DL (ref 70–110)
HCT VFR BLD AUTO: 31.5 % (ref 40–54)
HGB BLD-MCNC: 10.1 G/DL (ref 14–18)
IMM GRANULOCYTES # BLD AUTO: 0.04 K/UL (ref 0–0.04)
IMM GRANULOCYTES NFR BLD AUTO: 0.5 % (ref 0–0.5)
LYMPHOCYTES # BLD AUTO: 0.5 K/UL (ref 1–4.8)
LYMPHOCYTES NFR BLD: 5.5 % (ref 18–48)
MCH RBC QN AUTO: 31.2 PG (ref 27–31)
MCHC RBC AUTO-ENTMCNC: 32.1 G/DL (ref 32–36)
MCV RBC AUTO: 97 FL (ref 82–98)
MONOCYTES # BLD AUTO: 0.4 K/UL (ref 0.3–1)
MONOCYTES NFR BLD: 4.2 % (ref 4–15)
NEUTROPHILS # BLD AUTO: 7.6 K/UL (ref 1.8–7.7)
NEUTROPHILS NFR BLD: 89.8 % (ref 38–73)
NRBC BLD-RTO: 0 /100 WBC
PLATELET # BLD AUTO: 143 K/UL (ref 150–450)
PMV BLD AUTO: 10.4 FL (ref 9.2–12.9)
POTASSIUM SERPL-SCNC: 3.7 MMOL/L (ref 3.5–5.1)
PROT SERPL-MCNC: 5 G/DL (ref 6–8.4)
RBC # BLD AUTO: 3.24 M/UL (ref 4.6–6.2)
SODIUM SERPL-SCNC: 138 MMOL/L (ref 136–145)
WBC # BLD AUTO: 8.4 K/UL (ref 3.9–12.7)

## 2023-03-22 PROCEDURE — 25000003 PHARM REV CODE 250: Performed by: STUDENT IN AN ORGANIZED HEALTH CARE EDUCATION/TRAINING PROGRAM

## 2023-03-22 PROCEDURE — 80053 COMPREHEN METABOLIC PANEL: CPT | Performed by: STUDENT IN AN ORGANIZED HEALTH CARE EDUCATION/TRAINING PROGRAM

## 2023-03-22 PROCEDURE — 94761 N-INVAS EAR/PLS OXIMETRY MLT: CPT

## 2023-03-22 PROCEDURE — 63600175 PHARM REV CODE 636 W HCPCS: Performed by: STUDENT IN AN ORGANIZED HEALTH CARE EDUCATION/TRAINING PROGRAM

## 2023-03-22 PROCEDURE — 85025 COMPLETE CBC W/AUTO DIFF WBC: CPT | Performed by: STUDENT IN AN ORGANIZED HEALTH CARE EDUCATION/TRAINING PROGRAM

## 2023-03-22 PROCEDURE — 63600175 PHARM REV CODE 636 W HCPCS: Mod: TB,JG | Performed by: INTERNAL MEDICINE

## 2023-03-22 PROCEDURE — 11000001 HC ACUTE MED/SURG PRIVATE ROOM

## 2023-03-22 PROCEDURE — 27000207 HC ISOLATION

## 2023-03-22 PROCEDURE — 99900035 HC TECH TIME PER 15 MIN (STAT)

## 2023-03-22 PROCEDURE — 36415 COLL VENOUS BLD VENIPUNCTURE: CPT | Performed by: STUDENT IN AN ORGANIZED HEALTH CARE EDUCATION/TRAINING PROGRAM

## 2023-03-22 RX ORDER — AMOXICILLIN AND CLAVULANATE POTASSIUM 875; 125 MG/1; MG/1
1 TABLET, FILM COATED ORAL 2 TIMES DAILY
Qty: 24 TABLET | Refills: 0
Start: 2023-03-22 | End: 2023-03-23 | Stop reason: HOSPADM

## 2023-03-22 RX ORDER — MEROPENEM AND SODIUM CHLORIDE 1 G/50ML
1 INJECTION, SOLUTION INTRAVENOUS EVERY 8 HOURS
Qty: 3150 ML | Refills: 0 | OUTPATIENT
Start: 2023-03-22 | End: 2023-04-12

## 2023-03-22 RX ORDER — AMOXICILLIN AND CLAVULANATE POTASSIUM 875; 125 MG/1; MG/1
1 TABLET, FILM COATED ORAL 2 TIMES DAILY
Qty: 24 TABLET | Refills: 0 | Status: SHIPPED | OUTPATIENT
Start: 2023-03-22 | End: 2023-03-22 | Stop reason: SDUPTHER

## 2023-03-22 RX ORDER — AMOXICILLIN AND CLAVULANATE POTASSIUM 875; 125 MG/1; MG/1
1 TABLET, FILM COATED ORAL EVERY 12 HOURS
Status: DISCONTINUED | OUTPATIENT
Start: 2023-03-22 | End: 2023-03-23 | Stop reason: HOSPADM

## 2023-03-22 RX ORDER — AMOXICILLIN AND CLAVULANATE POTASSIUM 875; 125 MG/1; MG/1
1 TABLET, FILM COATED ORAL 2 TIMES DAILY
Qty: 24 TABLET | Refills: 0 | Status: SHIPPED | OUTPATIENT
Start: 2023-03-22 | End: 2023-04-03

## 2023-03-22 RX ADMIN — AMOXICILLIN AND CLAVULANATE POTASSIUM 1 TABLET: 875; 125 TABLET, FILM COATED ORAL at 11:03

## 2023-03-22 RX ADMIN — ATORVASTATIN CALCIUM 40 MG: 40 TABLET, FILM COATED ORAL at 09:03

## 2023-03-22 RX ADMIN — MIRTAZAPINE 30 MG: 15 TABLET, ORALLY DISINTEGRATING ORAL at 09:03

## 2023-03-22 RX ADMIN — MEROPENEM AND SODIUM CHLORIDE 1 G: 1 INJECTION, SOLUTION INTRAVENOUS at 09:03

## 2023-03-22 RX ADMIN — DEXAMETHASONE SODIUM PHOSPHATE 6 MG: 4 INJECTION, SOLUTION INTRA-ARTICULAR; INTRALESIONAL; INTRAMUSCULAR; INTRAVENOUS; SOFT TISSUE at 05:03

## 2023-03-22 RX ADMIN — GABAPENTIN 300 MG: 300 CAPSULE ORAL at 09:03

## 2023-03-22 RX ADMIN — AMOXICILLIN AND CLAVULANATE POTASSIUM 1 TABLET: 875; 125 TABLET, FILM COATED ORAL at 09:03

## 2023-03-22 RX ADMIN — FOLIC ACID 1 MG: 1 TABLET ORAL at 09:03

## 2023-03-22 NOTE — PLAN OF CARE
MEGAN sent facility transfer orders via careport and and fax. SW awaiting report and room number from facility. MEGAN contacted pts brother, Pat [phone] that pt will dc today and return to NH. Pts brother is agreeable for pt to return to [facility] today.     MEGAN spoke with Anam with [facility]. Anam reported that pt will not be able to dc to NH until 3/29/23 due to positive covid status. MEGAN notified supervisor Nate.     MEGAN will continue to follow pt throughout her transitions of care and assist with any dc needs.     Cleared from CM . Bedside Nurse and VN notified.    Future Appointments   Date Time Provider Department Center   6/4/2023 10:00 AM HOME MONITOR DEVICE CHECK, [facility]        03/22/23 3898   Final Note   Assessment Type Final Discharge Note   Anticipated Discharge Disposition [facility] Resources/Appts/Education Provided Appointments scheduled by Navigator/Coordinator   Post-Acute Status   Discharge Delays None known at this time

## 2023-03-22 NOTE — PLAN OF CARE
I spoke with Anam at the [facility] [phone].  He states they have a new Covid policy and they will not be able to accept pt back until 3.29.23.  I asked to speak with his administration and he says they are out of the office today.  I sent an email to post acute but we are not integrated with [facility] so nothing they can do to intervene.  I will call [facility] back tomorrow and speak with their director of nursing when she is back in the office.       03/22/23 1410   Post-Acute Status   Post-Acute Authorization Placement   Discharge Plan   Discharge Plan A Return to nursing home       Nate Linn, RN   Supervisor Case Management[facility]  [phone]

## 2023-03-22 NOTE — PLAN OF CARE
[facility]    FACILITY TRANSFER ORDERS      Patient Name: Praneeth Jewell  YOB: 1945    PCP: Richard Galloway MD   PCP Address: [address] / [place] [address]  PCP Phone Number: [phone]  PCP Fax: [phone]    Encounter Date: 03/22/2023    [facility]mit to: [facility].    Vital Signs:  Routine    Diagnoses:   Active Hospital Problems    Diagnosis  POA    *GURVINDER (acute kidney injury) [N17.9]  Yes    Macrocytic anemia [D53.9]  Yes    MDD (major depressive disorder) [F32.9]  Yes    Essential hypertension [I10]  Yes    HLD (hyperlipidemia) [E78.5]  Yes    BPH with urinary obstruction [N40.1, N13.8]  Yes    Diabetes [E11.9]  Yes      Resolved Hospital Problems    Diagnosis Date Resolved POA    Sepsis [A41.9] 03/22/2023 Unknown    Nephrolithiasis [N20.0] 03/22/2023 Yes    Bilateral hydronephrosis [N13.30] 03/22/2023 Yes       Allergies:Review of patient's allergies indicates:  No Known Allergies    Diet: diabetic diet: 2200 calorie    Activities: Activity as tolerated    Goals of Care Treatment Preferences:  Code Status: DNR    Nursing:     Labs: CBC and CMP Once     CONSULTS:    Physical Therapy to evaluate and treat. , Occupational Therapy to evaluate and treat., and  to evaluate for community resources/long-range planning.    MISCELLANEOUS CARE:  Left Nephrostomy tube    WOUND CARE ORDERS  Yes: Pressure Ulcer(s) Stage I :   Location: sacrum  Apply Miconzazole:  Barrier ointment                       Frequency:  Twice Daily                             If incontinent of stool or urine, apply thin layer Barrier cream                   twice daily and PRN to wound         Pressure relief measure:  for pressure redistribution      Medications: Review discharge medications with patient and family and provide education.      Augmentin 875-125 BID with end date 4/3/23    Current Discharge Medication List        START taking these medications    Details    amoxicillin-clavulanate 875-125mg (AUGMENTIN) 875-125 mg per tablet Take 1 tablet by mouth 2 (two) times daily. for 12 days  Qty: 24 tablet, Refills 0. End date 4/3/23           CONTINUE these medications which have NOT CHANGED    Details   atorvastatin (LIPITOR) 40 MG tablet Take 40 mg by mouth once daily.      cyproheptadine (PERIACTIN) 4 mg tablet Take 4 mg by mouth 3 (three) times daily. Itching      folic acid (FOLVITE) 1 MG tablet Take 1 mg by mouth once daily.      gabapentin (NEURONTIN) 300 MG capsule Take 300 mg by mouth 3 (three) times daily.      mirtazapine (REMERON) 30 MG tablet Take 30 mg by mouth nightly.      tamsulosin (FLOMAX) 0.4 mg Cap TAKE 2 CAPSULES(0.8 MG) BY MOUTH EVERY DAY  Qty: 60 capsule, Refills: 11                Immunizations Administered as of 3/22/2023       No immunizations on file.          Patient reports getting COVID vaccination, would not specify # doses    Some patients may experience side effects after vaccination.  These may include fever, headache, muscle or joint aches.  Most symptoms resolve with 24-48 hours and do not require urgent medical evaluation unless they persist for more than 72 hours or symptoms are concerning for an unrelated medical condition.        Scarlett Rodriguez MD  [facility] HO-1  [facility] Hospitalist Team B    [facility] Medicine Hospitalist Pager numbers:   [facility] Hospitalist Medicine Team A (Kalpesh/Richard): [phone]  [facility] Hospitalist Medicine Team B (Champ/Daiana):  [phone]

## 2023-03-22 NOTE — PLAN OF CARE
Future Appointments   Date Time Provider Department Center   6/4/2023 10:00 AM HOME MONITOR DEVICE CHECK, [facility]        03/22/23 1228   Final Note   Assessment Type Final Discharge Note   Hospital Resources/Appts/Education Provided Appointments scheduled by Navigator/Coordinator   Post-Acute Status   Discharge Delays None known at this time

## 2023-03-23 ENCOUNTER — TELEPHONE (OUTPATIENT)
Dept: UROLOGY | Facility: CLINIC | Age: 78
End: 2023-03-23
Payer: MEDICARE

## 2023-03-23 VITALS
OXYGEN SATURATION: 94 % | BODY MASS INDEX: 20.45 KG/M2 | RESPIRATION RATE: 17 BRPM | DIASTOLIC BLOOD PRESSURE: 64 MMHG | WEIGHT: 134.94 LBS | TEMPERATURE: 98 F | SYSTOLIC BLOOD PRESSURE: 119 MMHG | HEART RATE: 70 BPM | HEIGHT: 68 IN

## 2023-03-23 DIAGNOSIS — U07.1 COVID-19 VIRUS DETECTED: ICD-10-CM

## 2023-03-23 PROBLEM — N13.30 BILATERAL HYDRONEPHROSIS: Status: RESOLVED | Noted: 2021-05-26 | Resolved: 2023-03-23

## 2023-03-23 PROBLEM — N13.2 URETERAL STONE WITH HYDRONEPHROSIS: Status: RESOLVED | Noted: 2021-05-25 | Resolved: 2023-03-23

## 2023-03-23 LAB
ALBUMIN SERPL BCP-MCNC: 2.1 G/DL (ref 3.5–5.2)
ALBUMIN SERPL BCP-MCNC: 2.1 G/DL (ref 3.5–5.2)
ALP SERPL-CCNC: 70 U/L (ref 55–135)
ALP SERPL-CCNC: 70 U/L (ref 55–135)
ALT SERPL W/O P-5'-P-CCNC: 82 U/L (ref 10–44)
ALT SERPL W/O P-5'-P-CCNC: 84 U/L (ref 10–44)
ANION GAP SERPL CALC-SCNC: 6 MMOL/L (ref 8–16)
ANION GAP SERPL CALC-SCNC: 8 MMOL/L (ref 8–16)
AST SERPL-CCNC: 77 U/L (ref 10–40)
AST SERPL-CCNC: 82 U/L (ref 10–40)
BASOPHILS # BLD AUTO: 0 K/UL (ref 0–0.2)
BASOPHILS # BLD AUTO: 0.01 K/UL (ref 0–0.2)
BASOPHILS NFR BLD: 0 % (ref 0–1.9)
BASOPHILS NFR BLD: 0.2 % (ref 0–1.9)
BILIRUB SERPL-MCNC: 0.2 MG/DL (ref 0.1–1)
BILIRUB SERPL-MCNC: 0.3 MG/DL (ref 0.1–1)
BUN SERPL-MCNC: 28 MG/DL (ref 8–23)
BUN SERPL-MCNC: 29 MG/DL (ref 8–23)
CALCIUM SERPL-MCNC: 8.4 MG/DL (ref 8.7–10.5)
CALCIUM SERPL-MCNC: 8.5 MG/DL (ref 8.7–10.5)
CHLORIDE SERPL-SCNC: 105 MMOL/L (ref 95–110)
CHLORIDE SERPL-SCNC: 106 MMOL/L (ref 95–110)
CO2 SERPL-SCNC: 25 MMOL/L (ref 23–29)
CO2 SERPL-SCNC: 27 MMOL/L (ref 23–29)
CREAT SERPL-MCNC: 0.8 MG/DL (ref 0.5–1.4)
CREAT SERPL-MCNC: 0.8 MG/DL (ref 0.5–1.4)
DIFFERENTIAL METHOD: ABNORMAL
DIFFERENTIAL METHOD: ABNORMAL
EOSINOPHIL # BLD AUTO: 0 K/UL (ref 0–0.5)
EOSINOPHIL # BLD AUTO: 0 K/UL (ref 0–0.5)
EOSINOPHIL NFR BLD: 0 % (ref 0–8)
EOSINOPHIL NFR BLD: 0 % (ref 0–8)
ERYTHROCYTE [DISTWIDTH] IN BLOOD BY AUTOMATED COUNT: 13.2 % (ref 11.5–14.5)
ERYTHROCYTE [DISTWIDTH] IN BLOOD BY AUTOMATED COUNT: 13.3 % (ref 11.5–14.5)
EST. GFR  (NO RACE VARIABLE): >60 ML/MIN/1.73 M^2
EST. GFR  (NO RACE VARIABLE): >60 ML/MIN/1.73 M^2
GLUCOSE SERPL-MCNC: 78 MG/DL (ref 70–110)
GLUCOSE SERPL-MCNC: 89 MG/DL (ref 70–110)
HCT VFR BLD AUTO: 33.4 % (ref 40–54)
HCT VFR BLD AUTO: 35.7 % (ref 40–54)
HGB BLD-MCNC: 10.9 G/DL (ref 14–18)
HGB BLD-MCNC: 11.6 G/DL (ref 14–18)
IMM GRANULOCYTES # BLD AUTO: 0.03 K/UL (ref 0–0.04)
IMM GRANULOCYTES # BLD AUTO: 0.04 K/UL (ref 0–0.04)
IMM GRANULOCYTES NFR BLD AUTO: 0.6 % (ref 0–0.5)
IMM GRANULOCYTES NFR BLD AUTO: 0.7 % (ref 0–0.5)
LYMPHOCYTES # BLD AUTO: 0.5 K/UL (ref 1–4.8)
LYMPHOCYTES # BLD AUTO: 0.8 K/UL (ref 1–4.8)
LYMPHOCYTES NFR BLD: 15.7 % (ref 18–48)
LYMPHOCYTES NFR BLD: 9.4 % (ref 18–48)
MAGNESIUM SERPL-MCNC: 1.7 MG/DL (ref 1.6–2.6)
MCH RBC QN AUTO: 31.1 PG (ref 27–31)
MCH RBC QN AUTO: 31.5 PG (ref 27–31)
MCHC RBC AUTO-ENTMCNC: 32.5 G/DL (ref 32–36)
MCHC RBC AUTO-ENTMCNC: 32.6 G/DL (ref 32–36)
MCV RBC AUTO: 95 FL (ref 82–98)
MCV RBC AUTO: 97 FL (ref 82–98)
MONOCYTES # BLD AUTO: 0.4 K/UL (ref 0.3–1)
MONOCYTES # BLD AUTO: 0.4 K/UL (ref 0.3–1)
MONOCYTES NFR BLD: 6.6 % (ref 4–15)
MONOCYTES NFR BLD: 7.6 % (ref 4–15)
NEUTROPHILS # BLD AUTO: 3.7 K/UL (ref 1.8–7.7)
NEUTROPHILS # BLD AUTO: 4.8 K/UL (ref 1.8–7.7)
NEUTROPHILS NFR BLD: 75.9 % (ref 38–73)
NEUTROPHILS NFR BLD: 83.3 % (ref 38–73)
NRBC BLD-RTO: 0 /100 WBC
NRBC BLD-RTO: 0 /100 WBC
PHOSPHATE SERPL-MCNC: 2.4 MG/DL (ref 2.7–4.5)
PLATELET # BLD AUTO: 128 K/UL (ref 150–450)
PLATELET # BLD AUTO: 143 K/UL (ref 150–450)
PMV BLD AUTO: 10.3 FL (ref 9.2–12.9)
PMV BLD AUTO: 10.4 FL (ref 9.2–12.9)
POTASSIUM SERPL-SCNC: 4.4 MMOL/L (ref 3.5–5.1)
POTASSIUM SERPL-SCNC: 4.5 MMOL/L (ref 3.5–5.1)
PROT SERPL-MCNC: 5.2 G/DL (ref 6–8.4)
PROT SERPL-MCNC: 5.2 G/DL (ref 6–8.4)
RBC # BLD AUTO: 3.5 M/UL (ref 4.6–6.2)
RBC # BLD AUTO: 3.68 M/UL (ref 4.6–6.2)
SODIUM SERPL-SCNC: 138 MMOL/L (ref 136–145)
SODIUM SERPL-SCNC: 139 MMOL/L (ref 136–145)
WBC # BLD AUTO: 4.85 K/UL (ref 3.9–12.7)
WBC # BLD AUTO: 5.76 K/UL (ref 3.9–12.7)

## 2023-03-23 PROCEDURE — 36415 COLL VENOUS BLD VENIPUNCTURE: CPT

## 2023-03-23 PROCEDURE — 85025 COMPLETE CBC W/AUTO DIFF WBC: CPT | Mod: 91

## 2023-03-23 PROCEDURE — 83735 ASSAY OF MAGNESIUM: CPT

## 2023-03-23 PROCEDURE — 99900035 HC TECH TIME PER 15 MIN (STAT)

## 2023-03-23 PROCEDURE — 94761 N-INVAS EAR/PLS OXIMETRY MLT: CPT

## 2023-03-23 PROCEDURE — 25000003 PHARM REV CODE 250: Performed by: STUDENT IN AN ORGANIZED HEALTH CARE EDUCATION/TRAINING PROGRAM

## 2023-03-23 PROCEDURE — 80053 COMPREHEN METABOLIC PANEL: CPT | Mod: 91

## 2023-03-23 PROCEDURE — 85025 COMPLETE CBC W/AUTO DIFF WBC: CPT | Performed by: STUDENT IN AN ORGANIZED HEALTH CARE EDUCATION/TRAINING PROGRAM

## 2023-03-23 PROCEDURE — 80053 COMPREHEN METABOLIC PANEL: CPT | Performed by: STUDENT IN AN ORGANIZED HEALTH CARE EDUCATION/TRAINING PROGRAM

## 2023-03-23 PROCEDURE — 84100 ASSAY OF PHOSPHORUS: CPT

## 2023-03-23 PROCEDURE — 36415 COLL VENOUS BLD VENIPUNCTURE: CPT | Performed by: STUDENT IN AN ORGANIZED HEALTH CARE EDUCATION/TRAINING PROGRAM

## 2023-03-23 RX ORDER — ENOXAPARIN SODIUM 100 MG/ML
40 INJECTION SUBCUTANEOUS EVERY 24 HOURS
Status: DISCONTINUED | OUTPATIENT
Start: 2023-03-23 | End: 2023-03-23 | Stop reason: HOSPADM

## 2023-03-23 RX ORDER — AMOXICILLIN AND CLAVULANATE POTASSIUM 875; 125 MG/1; MG/1
1 TABLET, FILM COATED ORAL EVERY 12 HOURS
Qty: 22 TABLET | Refills: 0 | Status: SHIPPED | OUTPATIENT
Start: 2023-03-23 | End: 2023-04-03

## 2023-03-23 RX ADMIN — ATORVASTATIN CALCIUM 40 MG: 40 TABLET, FILM COATED ORAL at 09:03

## 2023-03-23 RX ADMIN — GABAPENTIN 300 MG: 300 CAPSULE ORAL at 09:03

## 2023-03-23 RX ADMIN — FOLIC ACID 1 MG: 1 TABLET ORAL at 09:03

## 2023-03-23 RX ADMIN — AMOXICILLIN AND CLAVULANATE POTASSIUM 1 TABLET: 875; 125 TABLET, FILM COATED ORAL at 09:03

## 2023-03-23 NOTE — TELEPHONE ENCOUNTER
Call placed to patient, spoke with Sil. Patient name and date of birth verified. Patient informed of scheduled appointment noted in epic. Sil provided appointment details and verbalized understanding.

## 2023-03-23 NOTE — PLAN OF CARE
Call placed to [facility] to discuss pt's plan for return to facility.  Confirmed with assisted director of nursing pt is accepted back today and apologizes for any miscommunication.  Nurse to call report to [phone].  [facility] arranged via [facility] for 11AM d/c.  Orders uploaded to [facility].     Sita Monk RN [facility]  Supervisor Case Management[facility]  [phone]

## 2023-03-23 NOTE — NURSING
[phone], report given to Erik FELDMAN, patient known to her, reviewed new med Augmentin and continuing home meds and nephro tube placement to Lt on 3/20, verbalized understanding awaiting transport.

## 2023-03-23 NOTE — TELEPHONE ENCOUNTER
"----- Message from Pamela ROSS Eric sent at 3/23/2023 11:04 AM CDT -----  Regarding: HFU  Patient discharged from [facility] and will need a HFU w/Urology.  Patient was seen in past by Dr Schilling but had a surgery by Dr Kelley.  Please advise who patient should be seen by for HFU and message me back with schedule. See Dr Azar's /URO note below from inpatient consult.    "After discharge, patient will need follow up with [facility], as he is established there and was previously operated on by Dr. Kelley. The discharge navigators can help arrange for him to follow up with Dr. Kelley as outpatient. Due to high acuity, patient should have definitive stone surgery at [facility] for higher level of care."    Thanks, Angela  Physician Referral Specialist/Discharge    "

## 2023-03-23 NOTE — PHYSICIAN QUERY
PT Name: Praneeth Jewell  MR #: [id number]     DOCUMENTATION CLARIFICATION     CDS/: Lindsey Loredo RN              Contact information: [email or web address]  This form is a permanent document in the medical record.    Query Date: March 23, 2023    By submitting this query, we are merely seeking further clarification of documentation.  Please utilize your independent clinical judgment when addressing the question(s) below.  The Medical Record contains the following:       Indicators   Supporting Clinical Findings   Location in Medical Record    Pneumonia documented       x Chest X-Ray/CT Scan Lung bases: There is dependent atelectasis bilaterally in the lungs.  Ground-glass opacities also noted raising question of an underlying component of mild pneumonitis or edema.  No pleural effusion is seen.  The heart is stable and prominent with pacemaker leads in the right atrium and right ventricle.    Bilateral lung base atelectasis.  Some mild pneumonitis is difficult to exclude reliably on the left.  No lobar consolidation or significant pleural effusion seen.   3/19 CT abdomen   x PaO2    PaCO2     O2 sat Tachypneic with SpO2 in low 90s at presentation, improved with 2L NC 3/22 Hosp Med MD PN     x WBC WBC= 21.84, 17.18, 12.98 3/18-22 Lab     x Vital Signs HR= 105, 108   Temp= 96.7, 96.3   BP= 79/47  RR= 20   3/18 Flowsheet   x Cultures  COVID positive 3/19 Lab     x Treatment  Ceftriaxone IVPB    Indication of use:  UTI  Meropenem IVPB   Indication of use;  UTI  Amoxicillin PO    Indication of use:  Bacteremia  Dexamethasone 6mg IVP x 3 doses      3/19 MAR  3/20-22 MAR  3/22 MAR  3/19-21 MAR   x Supplemental O2 O2 2-3L NC 3/19-22 Flowsheet      Dysphagia/Swallow study     x Other Acute Hypoxia 2/2 COVID-19 Infection    - COVID +, received 1 dose dexamethasone in ED  - imaging with some ground glass opacities at lung bases    - will continue dexamethasone 6mg daily due to patient's new O2 requirement and risk  factors for severe infection    - will defer Remdesivir in the setting of newly elevated transaminases (AST//108), likely 2/2 COVID infection   3/22 Hosp Med MD PN     Provider, please provide the diagnosis related to the above clinical indicators:    [place] all that apply    [  X ] COVID Pneumonia     [   ] Bacterial Pneumonia     [   ] Unspecified Pneumonia     [   ] Other type of pneumonia (please specify): ________     [   ] Other respiratory diagnosis (please specify): _________     [  ] Clinically undetermined       Present on admission (POA) status:  [ X  ] Yes (Y)   [   ] No (N)   [   ] Documentation insufficient to determine if condition is POA (U)   [  ] Clinically Undetermined (W)     Please document in your progress notes daily for the duration of treatment, until resolved, and include in your discharge summary.     Form No. [id number]

## 2023-03-23 NOTE — PROGRESS NOTES
[facility] Medicine Progress Note      Primary Team: [facility] Hospitalist Team B  Attending Physician: Federico Ahmadi MD  Resident: Jeri  Intern: Michael    Subjective:      No acute events reported overnight. Pt seen by writer this morning. Pt reports he is doing fine. Denies fevers, chills, N/V, SOB, chest pain, abd pain, or urinary symptoms.  Awake and alret to place, year, self  and situation.  Afebrile, on RA with normal WBCS. P    Objective:     Last 24 Hour Vital Signs:  BP  Min: 105/65  Max: 128/62  Temp  Av.6 °F (37 °C)  Min: 97.5 °F (36.4 °C)  Max: 99.1 °F (37.3 °C)  Pulse  Av.2  Min: 68  Max: 86  Resp  Av.6  Min: 16  Max: 20  SpO2  Av.7 %  Min: 93 %  Max: 100 %  Weight  Av.5 kg (115 lb 11.9 oz)  Min: 52.5 kg (115 lb 11.9 oz)  Max: 52.5 kg (115 lb 11.9 oz)  I/O last 3 completed shifts:  In: 750 [P.O.:750]  Out: 1950 [Urine:1950]    Physical Examination:  General:          Cachectic; resting comfortably on 2L NC, awakens to loud voice agitated and limited cooperation with exam  Head:               Normocephalic and atraumatic  Eyes:               PERRL; EOMI with anicteric sclera and clear conjunctivae  Mouth:             Oropharynx clear and without exudate  Cardio:             Regular rate and rhythm with normal S1 and S2; no murmurs or rubs  Resp:               CTAB; tachypneic upon awakening but comfortable on RA; No  crackles or wheezes   Abdom:            Soft, non-distended; LLQ tenderness with deep palpation  Extrem:            L toe amputation, Legs contracted; warm and well-perfused with no cyanosis or edema  Skin:                Pale; No rashes or ulcers noted  Pulses:            2+ and symmetric distally  Neuro:             Limited 2/2 patient cooperation; CNs grossly intact, moving 4 extremities spontaneously, no focal deficits noted     Laboratory:  Recent Labs   Lab 23  0401 23  0324 23  0517   WBC 12.98* 8.40 5.76   HGB 10.5* 10.1* 10.9*   HCT  32.4* 31.5* 33.4*    143* 143*   MCV 97 97 95   RDW 13.3 13.2 13.3    138 138   K 3.8 3.7 4.4    105 105   CO2 24 25 27   BUN 25* 29* 28*   CREATININE 0.8 0.9 0.8   * 136* 89   PROT 5.3* 5.0* 5.2*   ALBUMIN 2.1* 2.0* 2.1*   BILITOT 0.2 0.2 0.2   AST 44* 67* 82*   ALKPHOS 74 78 70   ALT 59* 69* 84*       Microbiology Data Reviewed:  Pertinent Findings:  Blood culture: pending  COVID: positive  Urine culture: pending    Other Results:  EKG (my interpretation): Atrial sensed, ventricular paced rhythm     Radiology:  Imaging Results                   CT Abdomen Pelvis With Contrast (Final result)  Result time 03/19/23 20:14:08            Final result by Malou Rios MD (03/19/23 20:14:08)                           Impression:        Large distal colon fecal impaction with surrounding mucosal thickening and inflammatory changes suggesting stercoral colitis.  There is severe stool burden constipation throughout the colon as well as gaseous distension of the colon.  No significant small bowel distension, convincing extraluminal air, abscess or significant free fluid.     Severe left hydronephrosis and proximal ureterectasis secondary to a 14 mm in maximal dimension calculus in the mid left ureter as well as an adjacent smaller more proximal calculus.  There are also multiple calcifications largest measuring 9 mm in diameter in the chain along the expected course of the more distal left ureter.  These may represent gonadal vein calcifications/vascular calcifications however more distal calculi are not reliably excluded within the ureter in this patient with punctate innumerable calculi in the left kidney.  Severe fecal impaction distorts the anatomy in the pelvis displacing the bladder and ureters.  CT urogram can further evaluate this finding.     There is mild hydronephrosis in the right kidney as well as proximal ureterectasis which may be secondary to the pelvic impaction.  No convincing  calculi are seen in the right ureter and nonobstructing calculi are seen in the right kidney.     Bilateral lung base atelectasis.  Some mild pneumonitis is difficult to exclude reliably on the left.  No lobar consolidation or significant pleural effusion seen.     Interval loss of subcutaneous and intra-abdominal fat since prior CT and increased stranding in the fat compatible with anasarca.     Please see above for additional incidental nonacute findings.     This report was flagged in Epic as abnormal.        Electronically signed by:     Malou Rios  Date:                                            03/19/2023  Time:                                            20:14                     Narrative:     EXAMINATION:  CT ABDOMEN PELVIS WITH CONTRAST     CLINICAL HISTORY:  LLQ abdominal pain;     TECHNIQUE:  Low dose axial images, sagittal and coronal reformations were obtained from the lung bases to the pubic symphysis before and following the IV administration of 75 mL of Omnipaque 350 .  Oral contrast was not administered..     COMPARISON:  CTA abdomen pelvis 11/19/2021     FINDINGS:  Abdomen:     - Lung bases: There is dependent atelectasis bilaterally in the lungs.  Ground-glass opacities also noted raising question of an underlying component of mild pneumonitis or edema.  No pleural effusion is seen.  The heart is stable and prominent with pacemaker leads in the right atrium and right ventricle.     Bowel/Mesentery:     There is severe stool burden constipation throughout the colon as well as gaseous prominence.  There is distal fecal impaction with the rectum measuring 8.5 cm in maximal diameter.  There is mucosal thickening and note of perirectal edema as seen with stercoral colitis.  The appendix is not definitely isolated as a separate structure.  There is no evidence of pericecal inflammatory change.     The patient has had significant loss of intra-abdominal fat since prior exam.     -Liver: Liver  appears homogeneous and not significantly enlarged.     - Gallbladder: The there is no CT evidence of cholecystitis.     - Bile Ducts: No evidence of intra or extra hepatic biliary ductal dilation.     - Spleen: Negative.     - Kidneys: There is mild right hydronephrosis and proximal ureterectasis multiple punctate nonobstructing calculi in the right kidney..  The right ureter is not reliably followed all the way down to the bladder due to the large fecal impaction and a ureteral calculus distally is not reliably excluded given the multiple calcifications in the pelvis with the majority likely vascular.     There is severe hydronephrosis of the left kidney as well as severe proximal ureterectasis secondary to a 13 x 11 x 14 mm calculus in the mid ureter at the level of the L4 transverse process on the left.  Smaller calculus more proximally also noted abutting the larger calculus measuring 6 mm coronal image 88 series 601.  There is a long chain of multiple calcifications distal to the large obstructing mid left ureter calculus which overlap the expected course of the more distal left ureter in the pelvis coronal images 68 through 84 series 601.  Largest is seen most distally measuring 9 mm in diameter.  Because of the fecal impaction and mass effect on adjacent structures, I cannot reliably exclude these calcifications from the more distal left ureter.  These calcifications may represent vascular/gonadal vein calcifications.  CT urogram would be needed to reliably assess the.  There are also innumerable layering punctate calculi within this distended renal pelvis.  Renal cortices enhance asymmetrically with the left late secondary to the obstruction.  There is a cyst in the right renal cortex pole.     - Adrenals: Unremarkable.     - Pancreas: No mass or peripancreatic fat stranding.     - Retroperitoneum:  No significant adenopathy.     - Vascular: There is no abdominal aortic aneurysm.  Moderate atherosclerotic  calcification throughout the aorta without significant stenosis.     - Abdominal wall:  Strandy changes in the subcutaneous fat noted compatible with mild anasarca/edema..     Pelvis:     The bladder is displaced cephalad secondary to the large fecal impaction but otherwise is unremarkable as imaged.  Prostate is not convincingly enlarged and displaced anteriorly.  Pelvic calcifications are likely vascular..  No pelvic mass, adenopathy, or free fluid.     Bones:  No acute osseous abnormality and no suspicious lytic or blastic lesion. There is severe spondylosis without acute subluxation and degenerative changes of the hips bilaterally.                                                X-Ray Chest AP Portable (Final result)  Result time 03/19/23 17:19:31            Final result by Melo Lovell MD (03/19/23 17:19:31)                           Impression:        No airspace disease.     Distention of the bowel loops in the upper abdomen.        Electronically signed by:     Melo Lovell MD  Date:                                            03/19/2023  Time:                                            17:19                     Narrative:     EXAMINATION:  XR CHEST AP PORTABLE     CLINICAL HISTORY:  Sepsis;     TECHNIQUE:  Single frontal view of the chest was performed.     COMPARISON:  12/21/2022.     FINDINGS:  There is unchanged appearance of the left-sided AICD.  Monitoring EKG leads are present.     The trachea is unremarkable.  There are calcifications of the aortic knob.  The cardiomediastinal silhouette is within normal limits.  There is no evidence of free air beneath the hemidiaphragms.  There are no pleural effusions.  There is no evidence of a pneumothorax.  There is no evidence of pneumomediastinum.  No airspace disease present.  There are degenerative changes in the osseous structures.     There is distention of the bowel loops in the upper abdomen.                             Current Medications:      Infusions:       Scheduled:   amoxicillin-clavulanate 875-125mg  1 tablet Oral Q12H    atorvastatin  40 mg Oral Daily    dexAMETHasone  6 mg Intravenous Daily    folic acid  1 mg Oral Daily    gabapentin  300 mg Oral TID    mirtazapine  30 mg Oral Nightly        PRN:  melatonin, sodium chloride 0.9%    Antibiotics and Day Number of Therapy:  Ceftriaxone single dose on 3/19  Meropenem :3/19- 3/22  Augmentin: 3/22-4/3    Assessment:     Praneeth Jewell is a 77 y.o. 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 Imaging with significant nephrolithiasis and fecal impaction with subsequent bilateral hydronephrosis (L>R). UA consistent with UTI 2/2 obstruction. Hypoxic on arrival to the ED requiring supplemental O2, found to be COVID positive. IR consulted and pt had a Left sided PCN placed on 3/20. Severe left hydronephrosis. 10-Fr neph tube placed via posterolateral midpole calyx. 70 mL foul-smelling purulent urine removed. Connected to gravity bag. No immediate complications. Patient tolerated procedure well. Pt will follow-up with Urology. Recommend routine neph tube exchange in 10 wks.Blood culture growing proteus mirabilis.        Plan:      Severe Sepsis 2/2 complicated Urinary Tract Infection  Nephrolithiasis  Obstructive Uropathy  Hydronephrosis  Severe Constipation/Fecal Impaction  - history of bilateral nephrolithiasis and obstructive uropathy s/p bilateral J-J ureteral stent and cystolitholapaxy; recurrent nephrolithiasis and multiple UTIs (Providencia, ESBL Proteus mirabilis, MDR Proteus) in the past  - reported fever, hypotension, congestion at care facility  - Afebrile, HR 80, BP 79/47 in ED (improved with 30cc/kg LR bolus in ED); WBC 21.84; UA with  pyuria, some RBCs, many bacteria, nitrite positive  - Lactate 2.6, continue to trend; procalcitonin 22.13  - CT A/P showed mild hydronephrosis of R kidney and proximal ureterectasis with multiple nonobstructing calculi in R kidney, severe hydronephrosis of the L kidney with many punctate calculi in the distended renal pelvis and severe proximal ureterectasis secondary to a 13 x 11 x 14 mm calculus in the mid-ureter and smaller calculus (6mm) more proximally with a long chain of multiple calcifications distal to the large obstructing calculus; difficult to visualize distal ureters due to fecal impaction and mass effect on adjacent structures. Pelvic calcifications are likely vascular. The bladder is displaced cephalad secondary to the large fecal impaction but otherwise is unremarkable   - urology consulted in ED, no surgical planned by urology due to necessary procedure and active infection; recommend urgent IR consult for L nephrostomy tube placement; IR consulted, NPO pending IR evaluation  - Enema ordered for fecal impaction  -  s/p 1 dose ceftriaxone in ED; in the setting of history of recurrent UTI including MDR Proteus, started meropenem  -IR consult; appreciate recs  -3/20 Pt underwent left sided PCN placement,10-Fr neph tube placed via posterolateral midpole calyx. 70 mL foul-smelling purulent urine removed. Connected to gravity bag. Patient tolerated procedure well. No immediate complications.  -Pt will follow-up with Urology. Recommend routine neph tube exchange in 10 wks  -Urine and blood cultures growing gram negative rods, pending sensitivity  -Blood culture growing proteus mirabilis  -3/22 Switch meropenem to Augmentin (due date 4/3)     Acute Hypoxia 2/2 COVID-19 Infection  Elevated Transaminases  - Tachypneic with SpO2 in low 90s at presentation, improved with 2L NC  - COVID +, received 1 dose dexamethasone in ED  - imaging with some ground glass opacities at lung bases  - will continue dexamethasone  "6mg daily due to patient's new O2 requirement and risk factors for severe infection  - will defer Remdesivir in the setting of newly elevated transaminases (AST//108), likely 2/2 COVID infection     Elevated Troponin  - troponin 0.056 at admission, repeat ordered and pending  - EKG with atrial sensed and ventricular paced rhythm; no new ischemic changes  - likely due to sepsis +/- active COVID infection     Macrocytic Anemia  - H/H 10.4/32.4 consistent with prior levels,   - history of folate deficiency on home supplement, last folate level March 2022 >40, B12 level and thiamine wnl at that time  - no signs of acute bleeding, ordered iron studies  - continue to monitor     Complete Heart Block s/p Pacemaker Placement (2021)  - EKG consistent with atrial sensed and ventricular paced rhythm; no new ischemic changes  - device last evaluated 3/6/2023     R PARDEEP Stroke with Residual Deficits (2015)  Vascular Dementia  - residual LE weakness, wheelchair bound  - no new focal deficit noted  - per neurology note, patient has been having progressive cognitive and behavioral impairment since the stroke with 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 The stroke was in the right PARDEEP distribution and impacted the superior frontal gyrus, medial frontal gyrus, and cingulate extending from the anterior aspect of the frontal lobe to the central sulcus. The involvement of the aPFC and dmPFC would be expected to impact a number of cognitive functions including executive functioning, planning, problem solving, judgment, attention, and working memory, among others.  Unfortunately, the effectiveness of medications to help with behavior will likely be limited in this case."  - avoiding anticholinergics, opioids, and benzodiazepines; cyproheptadine on patient's home medications, will not order at this time  - not taking " any anti-platelet medications, continue home Lipitor     Type 2 Diabetes Mellitus  - not on any medications (has been on metformin in the past)  - all prior A1c in EHR <5, will monitor daily CMP    Diet: Diabetic  DVT Prophylaxis: Lovenox  Code: DNR    Dispo: Clinically stable and pending placement     Scarlett Rodriguez MD  [facility] Neurology HO-I    [facility] Medicine Hospitalist Pager numbers:   [facility] Hospitalist Medicine Team A (Kalpesh/Richard): [phone]  [facility] Hospitalist Medicine Team B (Champ/Daiana):  [phone]

## 2023-03-23 NOTE — PLAN OF CARE
LUIS M spoke with pt's brother Tiffany [phone] to discuss d/c planning. Pat did not have any additional questions or concerns at this time. Pt transportation is set up, luis m faxed [facility] ambulance auth to [facility]. LUIS M requested f/u appts. Rounds completed on pt. All questions addressed. Bedside nurse to discuss d/c medications. Discussed importance to attend all f/u appts and take medications as prescribed. Verbalized understanding. Case Management to sign off.     [facility] AUTH [facility] #   [id number]    Vic Mtz, JOCELYN  [phone]    Future Appointments   Date Time Provider Department Center   6/4/2023 10:00 AM HOME MONITOR DEVICE CHECK, [facility]        03/23/23 1035   Final Note   Assessment Type Final Discharge Note   Anticipated Discharge Disposition [facility]   What phone number can be called within the next 1-3 days to see how you are doing after discharge? [phone]   Hospital Resources/Appts/Education Provided Appointments scheduled and added to AVS   Post-Acute Status   Post-Acute Placement Status Set-up Complete/Auth obtained   Discharge Delays None known at this time

## 2023-03-24 ENCOUNTER — PATIENT OUTREACH (OUTPATIENT)
Dept: ADMINISTRATIVE | Facility: CLINIC | Age: 78
End: 2023-03-24
Payer: MEDICARE

## 2023-03-24 NOTE — PHYSICIAN QUERY
PT Name: Praneeth Jewell  MR #: [id number]     DOCUMENTATION CLARIFICATION     CDS/: Lindsey Loredo RN              Contact information: [email or web address]  This form is a permanent document in the medical record.     Query Date: March 24, 2023    By submitting this query, we are merely seeking further clarification of documentation.  Please utilize your independent clinical judgment when addressing the question(s) below.    The Medical Record contains the following:   Indicators   Supporting Clinical Findings Location in Medical Record    Non-blanchable erythema/redness     x Ulcer/Injury/Skin Breakdown Scrotum  Partial thickness tissue loss.   Shallow open ulcer with a red or pink wound bed, without slough.   Intact or Open/Ruptured Serum-filled blister  POA:  yes 3/20 RN Flowsheet                Deep Tissue Injury     x Wound care consult   Scrotum- scattered partial to shallow full thickness ulcerations related to moisture -   pt incontinent of urine and stool- QiVi catheter in use       3/20 Wound care consult        Acute/Chronic Illness     x Medication/Treatment Triad ointment to sacrum/scrotum BID and prn incontinent episode    Wound care consult  Turn patient every 2 hours     3/20 Wound care      3/20 NSG orders      Other       The clinical guidelines noted are only a system guideline. It does not replace the providers clinical judgment.    Per the National Pressure Injury Advisory Panel:   A pressure injury is localized damage to the skin and underlying soft tissue usually over a bony prominence or related to a medical or other device. The injury can present as intact skin or an open ulcer and may be painful. The injury occurs as a result of intense and/or prolonged pressure or pressure in combination with shear. The tolerance of soft tissue for pressure and shear may also be affected by microclimate, nutrition, perfusion, co-morbidities and condition of the soft tissue.       Stage 1  Pressure Injury:  Intact skin with a localized area of non-blanchable erythema, which may appear differently in darkly pigmented skin. Color changes do not include purple or maroon discoloration; these may indicate deep tissue pressure injury.    Stage 2 Pressure Injury:  Partial-thickness loss of skin with exposed dermis. The wound bed is viable, pink or red, moist, and may also present as an intact or ruptured serum-filled blister.    Stage 3 Pressure Injury:  Full-thickness loss of skin, in which adipose (fat) is visible in the ulcer and granulation tissue and epibole (rolled wound edges) are often present. Slough and/or eschar may be visible. Undermining and tunneling may occur.    Stage 4 Pressure Injury:  Full-thickness skin and tissue loss with exposed or directly palpable fascia, muscle, tendon, ligament, cartilage or bone in the ulcer. Slough and/or eschar may be visible. Epibole (rolled edges), undermining and/or tunneling often occur.    Unstageable Pressure Injury:  Full-thickness skin and tissue loss in which the extent of tissue damage within the ulcer cannot be confirmed because it is obscured by slough or eschar. If slough or eschar is removed, a Stage 3 or Stage 4 pressure injury will be revealed.        Provider, please provide the integumentary diagnosis related to the documentation of Scrotum:     [  X ] Pressure Injury/Decubitus Ulcer, Stage 2   [   ] Other Integumentary Diagnosis (please specify):______________   [  ] Clinically Undetermined       Present on admission (POA) status:    [   X] Yes (Y)   [   ] No (N)   [   ] Documentation insufficient to determine if condition is POA (U)   [  ] Clinically Undetermined (W)       Please document in your progress notes daily for the duration of treatment until resolved and include in your discharge summary.    Reference:    BOLIVAR Temple., ZACHERY Chang., Goldberg, M., NATALIE Silva., BOLIVAR Madrigal, & REGINA Coulter (2016). Revised National Pressure Ulcer  Advisory Panel Pressure Injury Staging System: Revised Pressure Injury Staging System. J Wound Ostomy Continence Nurs, 43(6), 585-597. doi:10.1097/won.3659588002010438    Form No.[id number]

## 2023-03-24 NOTE — PHYSICIAN QUERY
PT Name: Praneeth Jewell  MR #: [id number]     DOCUMENTATION CLARIFICATION     CDS/: Lindsey Loredo RN              Contact information: [email or web address]  This form is a permanent document in the medical record.     Query Date: March 24, 2023    By submitting this query, we are merely seeking further clarification of documentation.  Please utilize your independent clinical judgment when addressing the question(s) below.    The Medical Record contains the following:   Indicators   Supporting Clinical Findings Location in Medical Record    Non-blanchable erythema/redness     x Ulcer/Injury/Skin Breakdown  Sacral spine  Full thickness tissue loss.   Subcutaneous fat may be visible but bone, tendon or muscle are not exposed  POA:  Yes        WOUND CARE ORDERS  Yes: Pressure Ulcer(s)   Stage I :   Location: sacrum   3/20 RN Flowsheet              3/20 Care plan PN- Internal medicine            Deep Tissue Injury     x Wound care consult Sacrum- dark red/purple/maroon discolored skin with full thickness ulceration developing- present on admit- Triad ointment in use             3/20 Wound care consult        Acute/Chronic Illness     x Medication/Treatment Triad ointment to sacrum/scrotum BID and prn incontinent episode    Wound care consult  Turn patient every 2 hours    Apply Miconzazole:  Barrier ointment   Frequency:  Twice Daily                            If incontinent of stool or urine, apply thin layer Barrier cream                   twice daily and PRN to wound         Pressure relief measure:  for pressure redistribution   3/20 Wound care      3/20 NSG orders      3/20 Care plan- Internal Med PN    Other       The clinical guidelines noted are only a system guideline. It does not replace the providers clinical judgment.    Per the National Pressure Injury Advisory Panel:   A pressure injury is localized damage to the skin and underlying soft tissue usually over a bony prominence or related  to a medical or other device. The injury can present as intact skin or an open ulcer and may be painful. The injury occurs as a result of intense and/or prolonged pressure or pressure in combination with shear. The tolerance of soft tissue for pressure and shear may also be affected by microclimate, nutrition, perfusion, co-morbidities and condition of the soft tissue.       Stage 1 Pressure Injury:  Intact skin with a localized area of non-blanchable erythema, which may appear differently in darkly pigmented skin. Color changes do not include purple or maroon discoloration; these may indicate deep tissue pressure injury.    Stage 2 Pressure Injury:  Partial-thickness loss of skin with exposed dermis. The wound bed is viable, pink or red, moist, and may also present as an intact or ruptured serum-filled blister.    Stage 3 Pressure Injury:  Full-thickness loss of skin, in which adipose (fat) is visible in the ulcer and granulation tissue and epibole (rolled wound edges) are often present. Slough and/or eschar may be visible. Undermining and tunneling may occur.    Stage 4 Pressure Injury:  Full-thickness skin and tissue loss with exposed or directly palpable fascia, muscle, tendon, ligament, cartilage or bone in the ulcer. Slough and/or eschar may be visible. Epibole (rolled edges), undermining and/or tunneling often occur.    Unstageable Pressure Injury:  Full-thickness skin and tissue loss in which the extent of tissue damage within the ulcer cannot be confirmed because it is obscured by slough or eschar. If slough or eschar is removed, a Stage 3 or Stage 4 pressure injury will be revealed.        Provider, please provide the integumentary diagnosis related to the documentation of Sacrum:     [   X] Pressure Injury/Decubitus Ulcer, Stage 1   [   ] Pressure Injury/Decubitus Ulcer, Stage 2   [   ] Pressure Injury/Decubitus Ulcer, Stage 3   [   ] Other Integumentary Diagnosis (please specify):______________   [  ]  Clinically Undetermined       Present on admission (POA) status:    [  x ] Yes (Y)   [   ] No (N)   [   ] Documentation insufficient to determine if condition is POA (U)   [  ] Clinically Undetermined (W)       Please document in your progress notes daily for the duration of treatment until resolved and include in your discharge summary.    Reference:    BOLIVAR Temple., ZACHERY Chang., Goldberg, M., BOLIVAR Silva, BOLIVAR Madrigal, & ANABEL Coulter. (2016). Revised National Pressure Ulcer Advisory Panel Pressure Injury Staging System: Revised Pressure Injury Staging System. J Wound Ostomy Continence Nurs, 43(6), 585-597. doi:10.1097/won.7212901414066937    Form No.[id number]

## 2023-03-25 LAB — BACTERIA BLD CULT: NORMAL

## 2023-03-30 ENCOUNTER — TELEPHONE (OUTPATIENT)
Dept: UROLOGY | Facility: CLINIC | Age: 78
End: 2023-03-30

## 2023-03-30 NOTE — TELEPHONE ENCOUNTER
I spoke with patient's family who stated patient lives in the the [facility] in [place], I rescheduled patient to see the urology residents on 4-14-23 per Veronique Conteh NP. I confirmed transportation with the [facility].

## 2023-03-31 DIAGNOSIS — R41.82 ALTERED MENTAL STATUS, UNSPECIFIED ALTERED MENTAL STATUS TYPE: Primary | ICD-10-CM

## 2023-04-14 ENCOUNTER — HOSPITAL ENCOUNTER (INPATIENT)
Facility: HOSPITAL | Age: 78
LOS: 1 days | Discharge: HOME | DRG: 690 | End: 2023-04-17
Attending: EMERGENCY MEDICINE | Admitting: EMERGENCY MEDICINE
Payer: MEDICARE

## 2023-04-14 ENCOUNTER — OFFICE VISIT (OUTPATIENT)
Dept: UROLOGY | Facility: CLINIC | Age: 78
DRG: 690 | End: 2023-04-14
Payer: MEDICARE

## 2023-04-14 VITALS
HEIGHT: 67 IN | WEIGHT: 141.13 LBS | HEART RATE: 95 BPM | SYSTOLIC BLOOD PRESSURE: 98 MMHG | BODY MASS INDEX: 22.15 KG/M2 | DIASTOLIC BLOOD PRESSURE: 68 MMHG

## 2023-04-14 DIAGNOSIS — N13.8 BPH WITH URINARY OBSTRUCTION: ICD-10-CM

## 2023-04-14 DIAGNOSIS — N20.0 NEPHROLITHIASIS: Primary | ICD-10-CM

## 2023-04-14 DIAGNOSIS — N40.1 BPH WITH URINARY OBSTRUCTION: ICD-10-CM

## 2023-04-14 DIAGNOSIS — R33.9 URINARY RETENTION: ICD-10-CM

## 2023-04-14 DIAGNOSIS — N17.9 AKI (ACUTE KIDNEY INJURY): Primary | ICD-10-CM

## 2023-04-14 DIAGNOSIS — K56.41 FECAL IMPACTION: ICD-10-CM

## 2023-04-14 PROBLEM — Z86.79 HISTORY OF COMPLETE HEART BLOCK: Status: ACTIVE | Noted: 2023-04-14

## 2023-04-14 PROBLEM — N13.9 OBSTRUCTIVE UROPATHY: Status: ACTIVE | Noted: 2023-04-14

## 2023-04-14 LAB
ALBUMIN SERPL BCP-MCNC: 2.8 G/DL (ref 3.5–5.2)
ALP SERPL-CCNC: 80 U/L (ref 55–135)
ALT SERPL W/O P-5'-P-CCNC: 22 U/L (ref 10–44)
ANION GAP SERPL CALC-SCNC: 13 MMOL/L (ref 8–16)
AST SERPL-CCNC: 29 U/L (ref 10–40)
BACTERIA #/AREA URNS AUTO: ABNORMAL /HPF
BASOPHILS # BLD AUTO: 0.06 K/UL (ref 0–0.2)
BASOPHILS NFR BLD: 0.4 % (ref 0–1.9)
BILIRUB SERPL-MCNC: 0.6 MG/DL (ref 0.1–1)
BILIRUB UR QL STRIP: NEGATIVE
BUN SERPL-MCNC: 48 MG/DL (ref 8–23)
CALCIUM SERPL-MCNC: 10 MG/DL (ref 8.7–10.5)
CHLORIDE SERPL-SCNC: 104 MMOL/L (ref 95–110)
CLARITY UR REFRACT.AUTO: ABNORMAL
CO2 SERPL-SCNC: 22 MMOL/L (ref 23–29)
COLOR UR AUTO: ABNORMAL
CREAT SERPL-MCNC: 2 MG/DL (ref 0.5–1.4)
DIFFERENTIAL METHOD: ABNORMAL
EOSINOPHIL # BLD AUTO: 0 K/UL (ref 0–0.5)
EOSINOPHIL NFR BLD: 0.2 % (ref 0–8)
ERYTHROCYTE [DISTWIDTH] IN BLOOD BY AUTOMATED COUNT: 15 % (ref 11.5–14.5)
EST. GFR  (NO RACE VARIABLE): 34.2 ML/MIN/1.73 M^2
GLUCOSE SERPL-MCNC: 106 MG/DL (ref 70–110)
GLUCOSE UR QL STRIP: NEGATIVE
HCT VFR BLD AUTO: 38 % (ref 40–54)
HGB BLD-MCNC: 12 G/DL (ref 14–18)
HGB UR QL STRIP: ABNORMAL
HYALINE CASTS UR QL AUTO: 0 /LPF
IMM GRANULOCYTES # BLD AUTO: 0.1 K/UL (ref 0–0.04)
IMM GRANULOCYTES NFR BLD AUTO: 0.7 % (ref 0–0.5)
KETONES UR QL STRIP: NEGATIVE
LACTATE SERPL-SCNC: 1.8 MMOL/L (ref 0.5–2.2)
LEUKOCYTE ESTERASE UR QL STRIP: ABNORMAL
LYMPHOCYTES # BLD AUTO: 1 K/UL (ref 1–4.8)
LYMPHOCYTES NFR BLD: 7 % (ref 18–48)
MCH RBC QN AUTO: 31.2 PG (ref 27–31)
MCHC RBC AUTO-ENTMCNC: 31.6 G/DL (ref 32–36)
MCV RBC AUTO: 99 FL (ref 82–98)
MICROSCOPIC COMMENT: ABNORMAL
MONOCYTES # BLD AUTO: 0.7 K/UL (ref 0.3–1)
MONOCYTES NFR BLD: 5.1 % (ref 4–15)
NEUTROPHILS # BLD AUTO: 12.3 K/UL (ref 1.8–7.7)
NEUTROPHILS NFR BLD: 86.6 % (ref 38–73)
NITRITE UR QL STRIP: POSITIVE
NRBC BLD-RTO: 0 /100 WBC
PH UR STRIP: >8 [PH] (ref 5–8)
PLATELET # BLD AUTO: 213 K/UL (ref 150–450)
PMV BLD AUTO: 11.2 FL (ref 9.2–12.9)
POTASSIUM SERPL-SCNC: 5.6 MMOL/L (ref 3.5–5.1)
PROT SERPL-MCNC: 7.3 G/DL (ref 6–8.4)
PROT UR QL STRIP: ABNORMAL
RBC # BLD AUTO: 3.85 M/UL (ref 4.6–6.2)
RBC #/AREA URNS AUTO: >100 /HPF (ref 0–4)
SODIUM SERPL-SCNC: 139 MMOL/L (ref 136–145)
SP GR UR STRIP: 1.01 (ref 1–1.03)
TRI-PHOS CRY UR QL COMP ASSIST: ABNORMAL
URN SPEC COLLECT METH UR: ABNORMAL
WBC # BLD AUTO: 14.2 K/UL (ref 3.9–12.7)
WBC #/AREA URNS AUTO: >100 /HPF (ref 0–5)

## 2023-04-14 PROCEDURE — 1157F PR ADVANCE CARE PLAN OR EQUIV PRESENT IN MEDICAL RECORD: ICD-10-PCS | Mod: CPTII,S$GLB,, | Performed by: UROLOGY

## 2023-04-14 PROCEDURE — 96361 HYDRATE IV INFUSION ADD-ON: CPT

## 2023-04-14 PROCEDURE — 87077 CULTURE AEROBIC IDENTIFY: CPT | Mod: 59 | Performed by: HOSPITALIST

## 2023-04-14 PROCEDURE — 36415 COLL VENOUS BLD VENIPUNCTURE: CPT | Performed by: HOSPITALIST

## 2023-04-14 PROCEDURE — 83605 ASSAY OF LACTIC ACID: CPT | Performed by: EMERGENCY MEDICINE

## 2023-04-14 PROCEDURE — 3078F PR MOST RECENT DIASTOLIC BLOOD PRESSURE < 80 MM HG: ICD-10-PCS | Mod: CPTII,S$GLB,, | Performed by: UROLOGY

## 2023-04-14 PROCEDURE — 99215 PR OFFICE/OUTPT VISIT, EST, LEVL V, 40-54 MIN: ICD-10-PCS | Mod: S$GLB,,, | Performed by: UROLOGY

## 2023-04-14 PROCEDURE — 99215 OFFICE O/P EST HI 40 MIN: CPT | Mod: S$GLB,,, | Performed by: UROLOGY

## 2023-04-14 PROCEDURE — 25000003 PHARM REV CODE 250: Performed by: EMERGENCY MEDICINE

## 2023-04-14 PROCEDURE — 1160F PR REVIEW ALL MEDS BY PRESCRIBER/CLIN PHARMACIST DOCUMENTED: ICD-10-PCS | Mod: CPTII,S$GLB,, | Performed by: UROLOGY

## 2023-04-14 PROCEDURE — 87088 URINE BACTERIA CULTURE: CPT | Mod: 59 | Performed by: HOSPITALIST

## 2023-04-14 PROCEDURE — G0378 HOSPITAL OBSERVATION PER HR: HCPCS

## 2023-04-14 PROCEDURE — 87186 SC STD MICRODIL/AGAR DIL: CPT | Mod: 59 | Performed by: HOSPITALIST

## 2023-04-14 PROCEDURE — 80048 BASIC METABOLIC PNL TOTAL CA: CPT | Mod: XB | Performed by: HOSPITALIST

## 2023-04-14 PROCEDURE — 81001 URINALYSIS AUTO W/SCOPE: CPT | Performed by: HOSPITALIST

## 2023-04-14 PROCEDURE — 1160F RVW MEDS BY RX/DR IN RCRD: CPT | Mod: CPTII,S$GLB,, | Performed by: UROLOGY

## 2023-04-14 PROCEDURE — 99222 PR INITIAL HOSPITAL CARE,LEVL II: ICD-10-PCS | Mod: ,,, | Performed by: HOSPITALIST

## 2023-04-14 PROCEDURE — 99999 PR PBB SHADOW E&M-EST. PATIENT-LVL III: CPT | Mod: PBBFAC,,,

## 2023-04-14 PROCEDURE — 87086 URINE CULTURE/COLONY COUNT: CPT | Mod: 59 | Performed by: STUDENT IN AN ORGANIZED HEALTH CARE EDUCATION/TRAINING PROGRAM

## 2023-04-14 PROCEDURE — 3074F SYST BP LT 130 MM HG: CPT | Mod: CPTII,S$GLB,, | Performed by: UROLOGY

## 2023-04-14 PROCEDURE — 51702 INSERT TEMP BLADDER CATH: CPT

## 2023-04-14 PROCEDURE — 3078F DIAST BP <80 MM HG: CPT | Mod: CPTII,S$GLB,, | Performed by: UROLOGY

## 2023-04-14 PROCEDURE — 99222 1ST HOSP IP/OBS MODERATE 55: CPT | Mod: ,,, | Performed by: HOSPITALIST

## 2023-04-14 PROCEDURE — 80053 COMPREHEN METABOLIC PANEL: CPT | Performed by: EMERGENCY MEDICINE

## 2023-04-14 PROCEDURE — 63600175 PHARM REV CODE 636 W HCPCS: Performed by: EMERGENCY MEDICINE

## 2023-04-14 PROCEDURE — 1111F DSCHRG MED/CURRENT MED MERGE: CPT | Mod: CPTII,S$GLB,, | Performed by: UROLOGY

## 2023-04-14 PROCEDURE — 99222 1ST HOSP IP/OBS MODERATE 55: CPT | Mod: GC,,, | Performed by: UROLOGY

## 2023-04-14 PROCEDURE — 3074F PR MOST RECENT SYSTOLIC BLOOD PRESSURE < 130 MM HG: ICD-10-PCS | Mod: CPTII,S$GLB,, | Performed by: UROLOGY

## 2023-04-14 PROCEDURE — 1159F PR MEDICATION LIST DOCUMENTED IN MEDICAL RECORD: ICD-10-PCS | Mod: CPTII,S$GLB,, | Performed by: UROLOGY

## 2023-04-14 PROCEDURE — 1159F MED LIST DOCD IN RCRD: CPT | Mod: CPTII,S$GLB,, | Performed by: UROLOGY

## 2023-04-14 PROCEDURE — 99222 PR INITIAL HOSPITAL CARE,LEVL II: ICD-10-PCS | Mod: GC,,, | Performed by: UROLOGY

## 2023-04-14 PROCEDURE — 87088 URINE BACTERIA CULTURE: CPT | Performed by: STUDENT IN AN ORGANIZED HEALTH CARE EDUCATION/TRAINING PROGRAM

## 2023-04-14 PROCEDURE — 87040 BLOOD CULTURE FOR BACTERIA: CPT | Performed by: EMERGENCY MEDICINE

## 2023-04-14 PROCEDURE — 25000003 PHARM REV CODE 250: Performed by: HOSPITALIST

## 2023-04-14 PROCEDURE — 1111F PR DISCHARGE MEDS RECONCILED W/ CURRENT OUTPATIENT MED LIST: ICD-10-PCS | Mod: CPTII,S$GLB,, | Performed by: UROLOGY

## 2023-04-14 PROCEDURE — 87186 SC STD MICRODIL/AGAR DIL: CPT | Performed by: STUDENT IN AN ORGANIZED HEALTH CARE EDUCATION/TRAINING PROGRAM

## 2023-04-14 PROCEDURE — 96365 THER/PROPH/DIAG IV INF INIT: CPT | Mod: 59

## 2023-04-14 PROCEDURE — 99285 EMERGENCY DEPT VISIT HI MDM: CPT | Mod: ,,, | Performed by: EMERGENCY MEDICINE

## 2023-04-14 PROCEDURE — 87086 URINE CULTURE/COLONY COUNT: CPT | Performed by: HOSPITALIST

## 2023-04-14 PROCEDURE — 87077 CULTURE AEROBIC IDENTIFY: CPT | Performed by: STUDENT IN AN ORGANIZED HEALTH CARE EDUCATION/TRAINING PROGRAM

## 2023-04-14 PROCEDURE — 99285 PR EMERGENCY DEPT VISIT,LEVEL V: ICD-10-PCS | Mod: ,,, | Performed by: EMERGENCY MEDICINE

## 2023-04-14 PROCEDURE — 85025 COMPLETE CBC W/AUTO DIFF WBC: CPT | Performed by: EMERGENCY MEDICINE

## 2023-04-14 PROCEDURE — 99285 EMERGENCY DEPT VISIT HI MDM: CPT

## 2023-04-14 PROCEDURE — 99999 PR PBB SHADOW E&M-EST. PATIENT-LVL III: ICD-10-PCS | Mod: PBBFAC,,,

## 2023-04-14 PROCEDURE — 1157F ADVNC CARE PLAN IN RCRD: CPT | Mod: CPTII,S$GLB,, | Performed by: UROLOGY

## 2023-04-14 RX ORDER — DEXTROSE 40 %
15 GEL (GRAM) ORAL
Status: DISCONTINUED | OUTPATIENT
Start: 2023-04-14 | End: 2023-04-17 | Stop reason: HOSPADM

## 2023-04-14 RX ORDER — NALOXONE HCL 0.4 MG/ML
0.02 VIAL (ML) INJECTION
Status: DISCONTINUED | OUTPATIENT
Start: 2023-04-14 | End: 2023-04-17 | Stop reason: HOSPADM

## 2023-04-14 RX ORDER — ATORVASTATIN CALCIUM 40 MG/1
40 TABLET, FILM COATED ORAL DAILY
Status: DISCONTINUED | OUTPATIENT
Start: 2023-04-15 | End: 2023-04-17 | Stop reason: HOSPADM

## 2023-04-14 RX ORDER — LIDOCAINE HYDROCHLORIDE 20 MG/ML
5 SOLUTION OROPHARYNGEAL
Status: COMPLETED | OUTPATIENT
Start: 2023-04-14 | End: 2023-04-14

## 2023-04-14 RX ORDER — TALC
6 POWDER (GRAM) TOPICAL NIGHTLY PRN
Status: DISCONTINUED | OUTPATIENT
Start: 2023-04-14 | End: 2023-04-17 | Stop reason: HOSPADM

## 2023-04-14 RX ORDER — GABAPENTIN 300 MG/1
300 CAPSULE ORAL 3 TIMES DAILY
Status: DISCONTINUED | OUTPATIENT
Start: 2023-04-15 | End: 2023-04-17 | Stop reason: HOSPADM

## 2023-04-14 RX ORDER — TAMSULOSIN HYDROCHLORIDE 0.4 MG/1
0.8 CAPSULE ORAL DAILY
Status: DISCONTINUED | OUTPATIENT
Start: 2023-04-15 | End: 2023-04-17 | Stop reason: HOSPADM

## 2023-04-14 RX ORDER — SODIUM CHLORIDE 0.9 % (FLUSH) 0.9 %
10 SYRINGE (ML) INJECTION
Status: DISCONTINUED | OUTPATIENT
Start: 2023-04-14 | End: 2023-04-17 | Stop reason: HOSPADM

## 2023-04-14 RX ORDER — MIRTAZAPINE 15 MG/1
30 TABLET, FILM COATED ORAL NIGHTLY
Status: DISCONTINUED | OUTPATIENT
Start: 2023-04-14 | End: 2023-04-17 | Stop reason: HOSPADM

## 2023-04-14 RX ORDER — ONDANSETRON 2 MG/ML
4 INJECTION INTRAMUSCULAR; INTRAVENOUS EVERY 8 HOURS PRN
Status: DISCONTINUED | OUTPATIENT
Start: 2023-04-14 | End: 2023-04-17 | Stop reason: HOSPADM

## 2023-04-14 RX ORDER — HEPARIN SODIUM 5000 [USP'U]/ML
5000 INJECTION, SOLUTION INTRAVENOUS; SUBCUTANEOUS EVERY 8 HOURS
Status: DISCONTINUED | OUTPATIENT
Start: 2023-04-14 | End: 2023-04-17 | Stop reason: HOSPADM

## 2023-04-14 RX ORDER — CYPROHEPTADINE HYDROCHLORIDE 4 MG/1
4 TABLET ORAL 3 TIMES DAILY
Status: DISCONTINUED | OUTPATIENT
Start: 2023-04-15 | End: 2023-04-17 | Stop reason: HOSPADM

## 2023-04-14 RX ORDER — PROCHLORPERAZINE EDISYLATE 5 MG/ML
5 INJECTION INTRAMUSCULAR; INTRAVENOUS EVERY 6 HOURS PRN
Status: DISCONTINUED | OUTPATIENT
Start: 2023-04-14 | End: 2023-04-17 | Stop reason: HOSPADM

## 2023-04-14 RX ORDER — DEXTROSE 40 %
30 GEL (GRAM) ORAL
Status: DISCONTINUED | OUTPATIENT
Start: 2023-04-14 | End: 2023-04-17 | Stop reason: HOSPADM

## 2023-04-14 RX ORDER — PSEUDOEPHEDRINE/ACETAMINOPHEN 30MG-500MG
100 TABLET ORAL
Status: COMPLETED | OUTPATIENT
Start: 2023-04-14 | End: 2023-04-14

## 2023-04-14 RX ORDER — SODIUM CHLORIDE 9 MG/ML
INJECTION, SOLUTION INTRAVENOUS CONTINUOUS
Status: ACTIVE | OUTPATIENT
Start: 2023-04-14 | End: 2023-04-15

## 2023-04-14 RX ORDER — ACETAMINOPHEN 325 MG/1
650 TABLET ORAL EVERY 4 HOURS PRN
Status: DISCONTINUED | OUTPATIENT
Start: 2023-04-14 | End: 2023-04-17 | Stop reason: HOSPADM

## 2023-04-14 RX ORDER — POLYETHYLENE GLYCOL 3350 17 G/17G
17 POWDER, FOR SOLUTION ORAL DAILY
Status: DISCONTINUED | OUTPATIENT
Start: 2023-04-14 | End: 2023-04-17 | Stop reason: HOSPADM

## 2023-04-14 RX ORDER — AMOXICILLIN 250 MG
1 CAPSULE ORAL 2 TIMES DAILY
Status: DISCONTINUED | OUTPATIENT
Start: 2023-04-14 | End: 2023-04-16

## 2023-04-14 RX ADMIN — SODIUM CHLORIDE 500 ML: 0.9 INJECTION, SOLUTION INTRAVENOUS at 08:04

## 2023-04-14 RX ADMIN — CEFTRIAXONE 2 G: 2 INJECTION, POWDER, FOR SOLUTION INTRAMUSCULAR; INTRAVENOUS at 07:04

## 2023-04-14 RX ADMIN — LIDOCAINE HYDROCHLORIDE 5 ML: 20 SOLUTION ORAL; TOPICAL at 06:04

## 2023-04-14 RX ADMIN — SODIUM CHLORIDE, POTASSIUM CHLORIDE, SODIUM LACTATE AND CALCIUM CHLORIDE 1000 ML: 600; 310; 30; 20 INJECTION, SOLUTION INTRAVENOUS at 06:04

## 2023-04-14 RX ADMIN — Medication 1 ENEMA: at 07:04

## 2023-04-14 RX ADMIN — Medication 100 ML: at 07:04

## 2023-04-14 RX ADMIN — SODIUM CHLORIDE: 9 INJECTION, SOLUTION INTRAVENOUS at 11:04

## 2023-04-14 NOTE — ED PROVIDER NOTES
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  Bladder scan performed in the clinic demonstrated over 1 L of urine in the bladder.  Patient is a poor historian and is unable to provide much history.  He denies any pain anywhere any new, active symptoms.      The history is provided by the patient and medical records.   Review of patient's allergies indicates:  No Known Allergies  Past Medical History:   Diagnosis Date    Arthritis     Diabetes mellitus     type 2    Folate deficiency anemia     Heart block     History of kidney stones 05/25/2021    History of stroke with residual deficit 05/25/2021    Hyperlipidemia     Hypertension     Major depressive disorder     Pacemaker     Biotronic [place] 8 DR-T (S/n: [id number])    Pyelonephritis 11/19/2021    TIA (transient ischemic attack)     Urinary retention 05/25/2021     Past Surgical History:   Procedure Laterality Date    A-V CARDIAC PACEMAKER INSERTION N/A 8/24/2021    Procedure: INSERTION, CARDIAC PACEMAKER, DUAL CHAMBER;  Surgeon: Neo Winn MD;  [facility];  Service: Cardiology;  Laterality: N/A;  CHB, DUAL PPM, ANES, BIO, DM, ED 2    COLONOSCOPY N/A 11/22/2021    Procedure: COLONOSCOPY;  Surgeon: Cesar Dorado MD;  [facility]  Service: Endoscopy;  Laterality: N/A;     CYSTOSCOPIC LITHOLAPAXY  5/25/2021    Procedure: CYSTOLITHOLAPAXY;  Surgeon: Chris Schilling MD;  [facility];  Service: Urology;;    CYSTOSCOPY  8/26/2021    Procedure: CYSTOSCOPY;  Surgeon: Camilo Kelley Jr., MD;  Location: [facility] OR 1ST FLR;  Service: Urology;;    CYSTOSCOPY W/ URETERAL STENT PLACEMENT Bilateral 5/25/2021    Procedure: CYSTOSCOPY, WITH BILATERAL URETERAL STENT INSERTION;  Surgeon: Chris Schilling MD;  Location: [facility] OR 1ST FLR;  Service: Urology;  Laterality: Bilateral;    ELBOW ARTHROPLASTY Right     FLUOROSCOPY  5/25/2021    Procedure: FLUOROSCOPY;  Surgeon: Chris Schilling MD;  [facility];  Service: Urology;;    LASER LITHOTRIPSY Left 8/26/2021    Procedure: LITHOTRIPSY, USING LASER;  Surgeon: Camilo Kelley Jr., MD;  [facility];  Service: Urology;  Laterality: Left;    PYELOSCOPY Bilateral 8/26/2021    Procedure: PYELOSCOPY;  Surgeon: Camilo Kelley Jr., MD;  [facility];  Service: Urology;  Laterality: Bilateral;    REMOVAL OF BLOOD CLOT  5/25/2021    Procedure: REMOVAL, BLOOD CLOT;  Surgeon: Chris Schilling MD;  [facility];  Service: Urology;;    REPLACEMENT OF STENT Bilateral 8/26/2021    Procedure: REPLACEMENT, STENT;  Surgeon: Camilo Kelley Jr., MD;  [facility];  Service: Urology;  Laterality: Bilateral;    URETEROSCOPIC REMOVAL OF URETERIC CALCULUS Bilateral 8/26/2021    Procedure: REMOVAL, CALCULUS, URETER, URETEROSCOPIC;  Surgeon: Camilo Kelley Jr., MD;  [facility];  Service: Urology;  Laterality: Bilateral;    URETEROSCOPY Bilateral 8/26/2021    Procedure: URETEROSCOPY;  Surgeon: Camilo Kelley Jr., MD;  Location: [facility] OR 1ST FLR;  Service: Urology;  Laterality: Bilateral;     Family History   Problem Relation Age of Onset    Stroke Mother     Hyperlipidemia Mother     Mental illness Father     Parkinsonism Father     No Known Problems Brother      Social History  "    Tobacco Use    Smoking status: Former     Types: Cigarettes    Smokeless tobacco: Never   Substance Use Topics    Alcohol use: Yes     Comment: 1/ pint whisky daily    Drug use: Yes     Types: "Crack" cocaine     Review of Systems    Physical Exam     Initial Vitals [04/14/23 1457]   BP Pulse Resp Temp SpO2   (!) 95/53 95 18 98.2 °F (36.8 °C) 98 %      MAP       --         Physical Exam    Nursing note and vitals reviewed.  Constitutional: He is not diaphoretic. No distress.   Patient has cachectic and chronically ill-appearing   HENT:   Head: Normocephalic and atraumatic.   Mouth/Throat: Oropharynx is clear and moist.   Eyes: Conjunctivae are normal.   Neck: Neck supple.   Pulmonary/Chest: Breath sounds normal. No respiratory distress.   Abdominal: Abdomen is soft. He exhibits distension. There is no abdominal tenderness. There is no rebound and no guarding.   Genitourinary: Rectum:      Rectal mass present.      Genitourinary Comments: Hard fecal mass on rectal exam     Musculoskeletal:         General: No edema.      Cervical back: Neck supple.     Neurological: He is alert.   Able to answer simple questions appropriately and follows commands well   Skin: Skin is dry. There is pallor.       ED Course   Procedures  Labs Reviewed   CBC W/ AUTO DIFFERENTIAL - Abnormal; Notable for the following components:       Result Value    WBC 14.20 (*)     RBC 3.85 (*)     Hemoglobin 12.0 (*)     Hematocrit 38.0 (*)     MCV 99 (*)     MCH 31.2 (*)     MCHC 31.6 (*)     RDW 15.0 (*)     Immature Granulocytes 0.7 (*)     Gran # (ANC) 12.3 (*)     Immature Grans (Abs) 0.10 (*)     Gran % 86.6 (*)     Lymph % 7.0 (*)     All other components within normal limits   COMPREHENSIVE METABOLIC PANEL - Abnormal; Notable for the following components:    Potassium 5.6 (*)     CO2 22 (*)     BUN 48 (*)     Creatinine 2.0 (*)     Albumin 2.8 (*)     eGFR 34.2 (*)     All other components within normal limits   CULTURE, BLOOD   CULTURE, " BLOOD   LACTIC ACID, PLASMA   HIV 1 / 2 ANTIBODY   HEPATITIS C ANTIBODY   BASIC METABOLIC PANEL   URINALYSIS, REFLEX TO URINE CULTURE   ISTAT CHEM8          Imaging Results    None          Medications   glycerin 99.5% topical solution 100 mL (100 mLs Rectal Given 4/14/23 1910)     And   sodium phosphates 19-7 gram/118 mL enema 1 enema (1 enema Rectal Given 4/14/23 1910)     And   sodium chloride 0.9% bolus 500 mL 500 mL (500 mLs Rectal New Bag 4/14/23 2000)   polyethylene glycol packet 17 g (has no administration in time range)   senna-docusate 8.6-50 mg per tablet 1 tablet (has no administration in time range)   lactated ringers bolus 1,000 mL (0 mLs Intravenous Stopped 4/14/23 1950)   LIDOcaine HCl 2% oral solution 5 mL (5 mLs Oral Given by Provider 4/14/23 1800)   cefTRIAXone (ROCEPHIN) 2 g in dextrose 5 % in water (D5W) 5 % 50 mL IVPB (MB+) (2 g Intravenous New Bag 4/14/23 1949)     Medical Decision Making:   History:   Old Medical Records: I decided to obtain old medical records.  Old Records Summarized: records from clinic visits and records from previous admission(s).  Initial Assessment:   1. Acute on chronic urinary retention, seems chronic fecal impaction could be contributing to urinary retention.  Pt had impaction on CT 1 month ago but doeesn't seem it was ever addressed?  Still has impaction on rectal exam today  -Shen catheter placement  -will likely need disimpaction in the ED    2. Differential diagnosis for hypotension would include hypovolemia 2/2 dehydration or blood loss, distributive process like sepsis or anaphylaxis, endocrine crisis like adrenal shock or hypothyroidism, cardiopulmonary process like cardiogenic shock, PE or tamponade, or polypharmacy.    At this point in time, I think the most likely etiology of this patient's hypotension is dehydration.    The following tests and interventions will be performed to determine the source of this patient's hypotension:  -Labs: CBC, CMP,  lactate, UA, cultures  -Imaging: not today  -Close monitoring with frequent VS checks  -Intravenous fluid boluses: yes\  -will perform rectal exam once patient is placed in a bed to rule out persistent fecal impaction  -Medication review    -Disposition: likely admit  Clinical Tests:   Lab Tests: Ordered and Reviewed  ED Management:  6:52 PM  Fecal disimpaction performed.  Large amount of stool removed from the rectum.  Will administer brown bomb enema  Other:   I have discussed this case with another health care provider.       <> Summary of the Discussion: hospitalist    PATIENT NEEDS PALLIATIVE CARE CONSULT           ED Course as of 04/14/23 2023 Fri Apr 14, 2023 1854 BUN(!): 48 [AS]   1854 Creatinine(!): 2.0 [AS]   1854 WBC(!): 14.20 [AS]      ED Course User Index  [AS] Justa Beck MD                 Clinical Impression:   Final diagnoses:  [N17.9] GURVINDER (acute kidney injury) (Primary)  [K56.41] Fecal impaction        ED Disposition Condition    Observation Stable                Justa Beck MD  04/14/23 2023

## 2023-04-14 NOTE — PROGRESS NOTES
[facility] - Urology [facility]   Clinic Note    SUBJECTIVE:     Chief Complaint: left ureteral stone    History of Present Illness:  Praneeth Jewell is a 77 y.o. male with PMH of HTN, HLD, CVA (stroke July 1025, wheelchair bound ever since), MDD, DM-II, BPH, complete heart block s/p pacemaker placement (2021), who presents to clinic for left ureteral stone. He is established to our clinic and was last seen 2021 . Referral from No ref. provider found.     5/25/21 - Initially seen as consult in the hospital for urinary retention, gross hematuria, bilateral nephrolithiasis, and bladder stones. He went to the OR on 5/25/21 and underwent bilateral JJ stent placement, clot evac, and cystolitholapaxy.   8/26/21 - Ultimately underwent bilateral URS after receiving perioperative IV ertapenem. All of his stone burden was removed.  Stones returned 80% Ammonium urate and 20% Ca oxalate monohydrate.  12/6/21 - Renal ultrasound - very mild hydronephrosis on the right - radiology read says mild right pelvocaliectasis. Had a CTA in November that showed hydroureter down to the level of the bladder and bilateral non obstructing renal stones. Normal renal function. He was then lost to follow up.   12/17/21 - He also was previously in urinary retention but has been taking flomax 0.8 mg. He reports that his LUTS are improved since the flomax was started.   3/20/23 - [facility] with large, impacted left ureteral stone ([id number] distal left ureter with ~4cm of steinstrasse behind the stone) with severe hydronephrosis and evidence of cortical atrophy of the left kidney. CT also showed significant fecal impaction. He was septic with UTI and underwent Left PCN placement by IR. Of note, patient is DNR. Urine culture and blood culture grew Proteus Mirabilis.     He was discharged with 2 weeks of Augmentin, although this is not on his paperwork from his nursing home.   Fecal impaction was not addressed at his most recent hospital  "admission. Patient reports BM's ("diarrhea probably" per patient) daily.     Patient has been living at the [facility] since 3/15/23. Prior to that he was living at home with a caregiver.   Patient voids ~3-4 times per day. He voids into a diaper.    Anticoagulation:  No    OBJECTIVE:     Estimated body mass index is 22.1 kg/m² as calculated from the following:    Height as of this encounter: 5' 7" (1.702 m).    Weight as of this encounter: 64 kg (141 lb 1.5 oz).    Vital Signs (Most Recent)  Pulse: 95 (04/14/23 1425)  BP: 98/68 (04/14/23 1425)    Physical Exam  Vitals reviewed.   Constitutional:       General: He is not in acute distress.     Appearance: He is ill-appearing. He is not diaphoretic.      Comments: Cachectic. Wheelchair bound   HENT:      Head: Normocephalic and atraumatic.      Nose: Nose normal.   Eyes:      Conjunctiva/sclera: Conjunctivae normal.   Cardiovascular:      Rate and Rhythm: Tachycardia present.   Pulmonary:      Effort: Pulmonary effort is normal. No respiratory distress.   Abdominal:      General: There is distension.      Tenderness: There is abdominal tenderness (mild tenderness to palpation in lower quadrants). There is no right CVA tenderness or left CVA tenderness.      Comments: Lower abdominal distension.    Genitourinary:     Comments: Left PCN in place draining cloudy yellow urine with sediment.  Musculoskeletal:      Cervical back: Normal range of motion.   Skin:     General: Skin is dry.   Neurological:      Mental Status: He is alert.   Psychiatric:         Behavior: Behavior normal.         Thought Content: Thought content normal.       Lab Results   Component Value Date    BUN 29 (H) 03/23/2023    CREATININE 0.8 03/23/2023    WBC 4.85 03/23/2023    HGB 11.6 (L) 03/23/2023    HCT 35.7 (L) 03/23/2023     (L) 03/23/2023    AST 77 (H) 03/23/2023    ALT 82 (H) 03/23/2023    ALKPHOS 70 03/23/2023    ALBUMIN 2.1 (L) 03/23/2023    HGBA1C 4.7 " 11/20/2021      Bladder scan >1,000mL    ASSESSMENT     1. Nephrolithiasis    2. BPH with urinary obstruction    3. Urinary retention      PLAN:   1. Nephrolithiasis  -     CULTURE, URINE    2. BPH with urinary obstruction    3. Urinary retention       - Patient hypotensive and tachycardic in clinic (83/52, HR 99).   - Abdomen palpably distended and tender to palpation. Bladder scan >1,000mL  - Urine from neph tube sent for culture.   - Concern for urinary retention and fecal impaction. Due to patient's vitals we are concerned that that patient may have vagal response after decompression of urinary bladder. Shen placement will need to be done in the Emergency Department with appropriate monitoring. Patient may also need fecal disimpaction.  - Patient wheeled to the ED from clinic.    - Also discussed ureteroscopy with laser lithotripsy for his ureteral stone. Due to patient's large volume ureteral stone this case would take an extended period of time and may require staged surgeries. Will need pre-op clearance. If he is not fit for surgery, then he will need serial PCN exchanges.     Dl Gao MD     Letter to No ref. provider found

## 2023-04-14 NOTE — Clinical Note
Diagnosis: GURVINDER (acute kidney injury) [[id number]]   Future Attending Provider: WALTER MIDDLETON [[id number]]   Admitting Provider:: MATTY GALLEGOS [[id number]]

## 2023-04-15 LAB
ALBUMIN SERPL BCP-MCNC: 2.4 G/DL (ref 3.5–5.2)
ALP SERPL-CCNC: 73 U/L (ref 55–135)
ALT SERPL W/O P-5'-P-CCNC: 16 U/L (ref 10–44)
ANION GAP SERPL CALC-SCNC: 16 MMOL/L (ref 8–16)
ANION GAP SERPL CALC-SCNC: 9 MMOL/L (ref 8–16)
AST SERPL-CCNC: 16 U/L (ref 10–40)
BASOPHILS # BLD AUTO: 0.04 K/UL (ref 0–0.2)
BASOPHILS NFR BLD: 0.4 % (ref 0–1.9)
BILIRUB SERPL-MCNC: 0.5 MG/DL (ref 0.1–1)
BUN SERPL-MCNC: 46 MG/DL (ref 8–23)
BUN SERPL-MCNC: 48 MG/DL (ref 8–23)
CALCIUM SERPL-MCNC: 9.2 MG/DL (ref 8.7–10.5)
CALCIUM SERPL-MCNC: 9.5 MG/DL (ref 8.7–10.5)
CHLORIDE SERPL-SCNC: 104 MMOL/L (ref 95–110)
CHLORIDE SERPL-SCNC: 106 MMOL/L (ref 95–110)
CO2 SERPL-SCNC: 20 MMOL/L (ref 23–29)
CO2 SERPL-SCNC: 24 MMOL/L (ref 23–29)
CREAT SERPL-MCNC: 1.4 MG/DL (ref 0.5–1.4)
CREAT SERPL-MCNC: 1.7 MG/DL (ref 0.5–1.4)
DIFFERENTIAL METHOD: ABNORMAL
EOSINOPHIL # BLD AUTO: 0.1 K/UL (ref 0–0.5)
EOSINOPHIL NFR BLD: 1.1 % (ref 0–8)
ERYTHROCYTE [DISTWIDTH] IN BLOOD BY AUTOMATED COUNT: 15.1 % (ref 11.5–14.5)
EST. GFR  (NO RACE VARIABLE): 41.5 ML/MIN/1.73 M^2
EST. GFR  (NO RACE VARIABLE): 52.4 ML/MIN/1.73 M^2
GLUCOSE SERPL-MCNC: 63 MG/DL (ref 70–110)
GLUCOSE SERPL-MCNC: 75 MG/DL (ref 70–110)
HCT VFR BLD AUTO: 30.1 % (ref 40–54)
HGB BLD-MCNC: 9.1 G/DL (ref 14–18)
IMM GRANULOCYTES # BLD AUTO: 0.02 K/UL (ref 0–0.04)
IMM GRANULOCYTES NFR BLD AUTO: 0.2 % (ref 0–0.5)
LYMPHOCYTES # BLD AUTO: 1.2 K/UL (ref 1–4.8)
LYMPHOCYTES NFR BLD: 12.8 % (ref 18–48)
MCH RBC QN AUTO: 30.5 PG (ref 27–31)
MCHC RBC AUTO-ENTMCNC: 30.2 G/DL (ref 32–36)
MCV RBC AUTO: 101 FL (ref 82–98)
MONOCYTES # BLD AUTO: 0.7 K/UL (ref 0.3–1)
MONOCYTES NFR BLD: 7.2 % (ref 4–15)
NEUTROPHILS # BLD AUTO: 7.4 K/UL (ref 1.8–7.7)
NEUTROPHILS NFR BLD: 78.3 % (ref 38–73)
NRBC BLD-RTO: 0 /100 WBC
PLATELET # BLD AUTO: 177 K/UL (ref 150–450)
PMV BLD AUTO: 10.6 FL (ref 9.2–12.9)
POTASSIUM SERPL-SCNC: 4 MMOL/L (ref 3.5–5.1)
POTASSIUM SERPL-SCNC: 4.6 MMOL/L (ref 3.5–5.1)
PROT SERPL-MCNC: 5.7 G/DL (ref 6–8.4)
RBC # BLD AUTO: 2.98 M/UL (ref 4.6–6.2)
SODIUM SERPL-SCNC: 139 MMOL/L (ref 136–145)
SODIUM SERPL-SCNC: 140 MMOL/L (ref 136–145)
WBC # BLD AUTO: 9.47 K/UL (ref 3.9–12.7)

## 2023-04-15 PROCEDURE — 25000003 PHARM REV CODE 250: Performed by: EMERGENCY MEDICINE

## 2023-04-15 PROCEDURE — G0378 HOSPITAL OBSERVATION PER HR: HCPCS

## 2023-04-15 PROCEDURE — 96366 THER/PROPH/DIAG IV INF ADDON: CPT

## 2023-04-15 PROCEDURE — 80053 COMPREHEN METABOLIC PANEL: CPT | Performed by: HOSPITALIST

## 2023-04-15 PROCEDURE — 99232 PR SUBSEQUENT HOSPITAL CARE,LEVL II: ICD-10-PCS | Mod: ,,, | Performed by: STUDENT IN AN ORGANIZED HEALTH CARE EDUCATION/TRAINING PROGRAM

## 2023-04-15 PROCEDURE — 96361 HYDRATE IV INFUSION ADD-ON: CPT

## 2023-04-15 PROCEDURE — 25000003 PHARM REV CODE 250: Performed by: HOSPITALIST

## 2023-04-15 PROCEDURE — 63600175 PHARM REV CODE 636 W HCPCS: Performed by: HOSPITALIST

## 2023-04-15 PROCEDURE — 99232 SBSQ HOSP IP/OBS MODERATE 35: CPT | Mod: ,,, | Performed by: STUDENT IN AN ORGANIZED HEALTH CARE EDUCATION/TRAINING PROGRAM

## 2023-04-15 PROCEDURE — 36415 COLL VENOUS BLD VENIPUNCTURE: CPT | Performed by: HOSPITALIST

## 2023-04-15 PROCEDURE — 25000003 PHARM REV CODE 250: Performed by: STUDENT IN AN ORGANIZED HEALTH CARE EDUCATION/TRAINING PROGRAM

## 2023-04-15 PROCEDURE — 97167 OT EVAL HIGH COMPLEX 60 MIN: CPT

## 2023-04-15 PROCEDURE — 96372 THER/PROPH/DIAG INJ SC/IM: CPT | Performed by: HOSPITALIST

## 2023-04-15 PROCEDURE — 85025 COMPLETE CBC W/AUTO DIFF WBC: CPT | Performed by: HOSPITALIST

## 2023-04-15 PROCEDURE — 99900035 HC TECH TIME PER 15 MIN (STAT)

## 2023-04-15 RX ORDER — ADHESIVE BANDAGE
30 BANDAGE TOPICAL ONCE
Status: COMPLETED | OUTPATIENT
Start: 2023-04-15 | End: 2023-04-15

## 2023-04-15 RX ORDER — LANOLIN ALCOHOL/MO/W.PET/CERES
400 CREAM (GRAM) TOPICAL ONCE
Status: DISCONTINUED | OUTPATIENT
Start: 2023-04-15 | End: 2023-04-15

## 2023-04-15 RX ADMIN — HEPARIN SODIUM 5000 UNITS: 5000 INJECTION INTRAVENOUS; SUBCUTANEOUS at 12:04

## 2023-04-15 RX ADMIN — MAGNESIUM HYDROXIDE 2400 MG: 400 SUSPENSION ORAL at 02:04

## 2023-04-15 RX ADMIN — HEPARIN SODIUM 5000 UNITS: 5000 INJECTION INTRAVENOUS; SUBCUTANEOUS at 06:04

## 2023-04-15 RX ADMIN — GABAPENTIN 300 MG: 300 CAPSULE ORAL at 08:04

## 2023-04-15 RX ADMIN — TAMSULOSIN HYDROCHLORIDE 0.8 MG: 0.4 CAPSULE ORAL at 08:04

## 2023-04-15 RX ADMIN — POLYETHYLENE GLYCOL 3350 17 G: 17 POWDER, FOR SOLUTION ORAL at 08:04

## 2023-04-15 RX ADMIN — CYPROHEPTADINE HYDROCHLORIDE 4 MG: 4 TABLET ORAL at 09:04

## 2023-04-15 RX ADMIN — CEFTRIAXONE 1 G: 1 INJECTION, POWDER, FOR SOLUTION INTRAMUSCULAR; INTRAVENOUS at 09:04

## 2023-04-15 RX ADMIN — SODIUM CHLORIDE: 9 INJECTION, SOLUTION INTRAVENOUS at 01:04

## 2023-04-15 RX ADMIN — SENNOSIDES AND DOCUSATE SODIUM 1 TABLET: 50; 8.6 TABLET ORAL at 08:04

## 2023-04-15 RX ADMIN — ATORVASTATIN CALCIUM 40 MG: 40 TABLET, FILM COATED ORAL at 08:04

## 2023-04-15 RX ADMIN — CYPROHEPTADINE HYDROCHLORIDE 4 MG: 4 TABLET ORAL at 04:04

## 2023-04-15 RX ADMIN — Medication 6 MG: at 09:04

## 2023-04-15 RX ADMIN — SENNOSIDES AND DOCUSATE SODIUM 1 TABLET: 50; 8.6 TABLET ORAL at 09:04

## 2023-04-15 RX ADMIN — HEPARIN SODIUM 5000 UNITS: 5000 INJECTION INTRAVENOUS; SUBCUTANEOUS at 09:04

## 2023-04-15 RX ADMIN — GABAPENTIN 300 MG: 300 CAPSULE ORAL at 09:04

## 2023-04-15 RX ADMIN — CYPROHEPTADINE HYDROCHLORIDE 4 MG: 4 TABLET ORAL at 08:04

## 2023-04-15 RX ADMIN — MIRTAZAPINE 30 MG: 15 TABLET, FILM COATED ORAL at 09:04

## 2023-04-15 RX ADMIN — HEPARIN SODIUM 5000 UNITS: 5000 INJECTION INTRAVENOUS; SUBCUTANEOUS at 02:04

## 2023-04-15 RX ADMIN — GABAPENTIN 300 MG: 300 CAPSULE ORAL at 02:04

## 2023-04-15 NOTE — H&P
[facility] - Emergency Dept  [facility] Medicine  History & Physical    Patient Name: Praneeth Jewell  MRN: [id number]  Patient Class: OP- Observation  Admission Date: 4/14/2023  Attending Physician: Purvi Natarajan MD   Primary Care Provider: Richard Galloway MD         Patient information was obtained from patient, past medical records and ER records.     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 Pt. Was recently admitted to [facility] 3/19-3/23 for sepsis 2/2 impacted L ureteral stone. CT revealed [id number] distal left ureter with ~4cm of steinstrasse behind the stone) with severe hydronephrosis and evidence of cortical atrophy of the left kidney as well as significant fecal impaction. Pt. Was noted to be bacteremic with pansensitive proteus growing in urine and blood cultures. A L sided nephrostomy tube was plaved by IR, and he was discharged on augmentin to complete a 2 week course. Today, the patient presented to urology clinic for f/u and he was noted to be mildly hypotensive (83/52) with suprapubic tenderness on exam and distended abdomen. Bladder scan revealed >1L. A urine sample was obtained from the patient's neph tube, and the patient was referred to the ED. In the ED, a mark was placed with return of about 1,500 cc of urine.      Past Medical History:   Diagnosis Date    Arthritis     Diabetes mellitus     type 2    Folate deficiency anemia     Heart block     History of kidney stones 05/25/2021    History of stroke with residual deficit 05/25/2021     Hyperlipidemia     Hypertension     Major depressive disorder     Pacemaker     B[employer]ronic Edora 8 JONATHON (S/n: [id number])    Pyelonephritis 11/19/2021    TIA (transient ischemic attack)     Urinary retention 05/25/2021       Past Surgical History:   Procedure Laterality Date    A-V CARDIAC PACEMAKER INSERTION N/A 8/24/2021    Procedure: INSERTION, CARDIAC PACEMAKER, DUAL CHAMBER;  Surgeon: Neo Winn MD;  Location: [facility] EP LAB;  Service: Cardiology;  Laterality: N/A;  CHB, DUAL PPM, ANES, BIO, DM, ED 2    COLONOSCOPY N/A 11/22/2021    Procedure: COLONOSCOPY;  Surgeon: Cesar Dorado MD;  Location: [facility] ENDO (2ND FLR);  Service: Endoscopy;  Laterality: N/A;    CYSTOSCOPIC LITHOLAPAXY  5/25/2021    Procedure: CYSTOLITHOLAPAXY;  Surgeon: Chris Schilling MD;  [facility];  Service: Urology;;    CYSTOSCOPY  8/26/2021    Procedure: CYSTOSCOPY;  Surgeon: aCmilo Kelley Jr., MD;  [facility];  Service: Urology;;    CYSTOSCOPY W/ URETERAL STENT PLACEMENT Bilateral 5/25/2021    Procedure: CYSTOSCOPY, WITH BILATERAL URETERAL STENT INSERTION;  Surgeon: Chris Schilling MD;  [facility];  Service: Urology;  Laterality: Bilateral;    ELBOW ARTHROPLASTY Right     FLUOROSCOPY  5/25/2021    Procedure: FLUOROSCOPY;  Surgeon: Chris Schilling MD;  [facility];  Service: Urology;;    LASER LITHOTRIPSY Left 8/26/2021    Procedure: LITHOTRIPSY, USING LASER;  Surgeon: Camilo Kelley Jr., MD;  [facility];  Service: Urology;  Laterality: Left;    PYELOSCOPY Bilateral 8/26/2021    Procedure: PYELOSCOPY;  Surgeon: Camilo Kelley Jr., MD;  [facility];  Service: Urology;  Laterality: Bilateral;    REMOVAL OF BLOOD CLOT  5/25/2021    Procedure: REMOVAL, BLOOD CLOT;  Surgeon: Chris Schilling MD;  [facility];  Service: Urology;;    REPLACEMENT OF STENT Bilateral 8/26/2021    Procedure: REPLACEMENT, STENT;  Surgeon:  "Camilo Kelley Jr., MD;  Location: [address];  Service: Urology;  Laterality: Bilateral;    URETEROSCOPIC REMOVAL OF URETERIC CALCULUS Bilateral 8/26/2021    Procedure: REMOVAL, CALCULUS, URETER, URETEROSCOPIC;  Surgeon: Camilo Kelley Jr., MD;  Location: [facility] OR [address] [place] [address];  Service: Urology;  Laterality: Bilateral;    URETEROSCOPY Bilateral 8/26/2021    Procedure: URETEROSCOPY;  Surgeon: Camilo Kelley Jr., MD;  Location: [address];  Service: Urology;  Laterality: Bilateral;       Review of patient's allergies indicates:  No Known Allergies    No current facility-administered medications on file prior to encounter.     Current Outpatient Medications on File Prior to Encounter   Medication Sig    atorvastatin (LIPITOR) 40 MG tablet Take 40 mg by mouth once daily.    cyproheptadine (PERIACTIN) 4 mg tablet Take 4 mg by mouth 3 (three) times daily. Itching    folic acid (FOLVITE) 1 MG tablet Take 1 mg by mouth once daily.    gabapentin (NEURONTIN) 300 MG capsule Take 300 mg by mouth 3 (three) times daily.    mirtazapine (REMERON) 30 MG tablet Take 30 mg by mouth nightly.    tamsulosin (FLOMAX) 0.4 mg Cap TAKE 2 CAPSULES(0.8 MG) BY MOUTH EVERY DAY     Family History       Problem Relation (Age of Onset)    Hyperlipidemia Mother    Mental illness Father    No Known Problems Brother    Parkinsonism Father    Stroke Mother          Tobacco Use    Smoking status: Former     Types: Cigarettes    Smokeless tobacco: Never   Substance and Sexual Activity    Alcohol use: Yes     Comment: 1/ pint whisky daily    Drug use: Yes     Types: "Crack" cocaine    Sexual activity: Not on file     Review of Systems   Constitutional:  Positive for fatigue. Negative for activity change, chills, fever and unexpected weight change.   HENT:  Negative for congestion and sore throat.    Respiratory:  Negative for cough, shortness of breath and wheezing.    Cardiovascular:  Negative for chest pain, palpitations and leg " swelling.   Gastrointestinal:  Positive for abdominal distention and abdominal pain. Negative for blood in stool, nausea and vomiting.   Genitourinary:  Negative for dysuria and hematuria.   Musculoskeletal:  Negative for arthralgias and neck pain.   Skin:  Negative for color change and rash.   Neurological:  Negative for dizziness, seizures and numbness.   Psychiatric/Behavioral:  Negative for hallucinations and suicidal ideas.    Objective:     Vital Signs (Most Recent):  Temp: 98.2 °F (36.8 °C) (04/14/23 1457)  Pulse: 92 (04/14/23 1834)  Resp: 18 (04/14/23 1834)  BP: (!) 120/58 (04/14/23 1834)  SpO2: 99 % (04/14/23 1834)   Vital Signs (24h Range):  Temp:  [98.2 °F (36.8 °C)] 98.2 °F (36.8 °C)  Pulse:  [92-99] 92  Resp:  [18] 18  SpO2:  [98 %-99 %] 99 %  BP: ()/(52-68) 120/58     Weight: 68 kg (150 lb)  Body mass index is 23.49 kg/m².    Physical Exam  Vitals reviewed.   Constitutional:       General: He is not in acute distress.     Appearance: He is well-developed. He is ill-appearing.   HENT:      Head: Normocephalic and atraumatic.   Eyes:      Extraocular Movements: Extraocular movements intact.      Pupils: Pupils are equal, round, and reactive to light.   Neck:      Vascular: No JVD.      Trachea: No tracheal deviation.   Cardiovascular:      Rate and Rhythm: Normal rate and regular rhythm.      Heart sounds: No murmur heard.    No friction rub. No gallop.   Pulmonary:      Effort: No respiratory distress.      Breath sounds: Normal breath sounds. No wheezing or rales.   Abdominal:      General: Bowel sounds are normal. There is distension.      Palpations: Abdomen is soft. There is no mass.      Tenderness: There is no abdominal tenderness.   Musculoskeletal:         General: No deformity.      Cervical back: Neck supple.   Lymphadenopathy:      Cervical: No cervical adenopathy.   Skin:     General: Skin is warm and dry.      Findings: No rash.   Neurological:      Mental Status: He is alert and  "oriented to person, place, and time.       CRANIAL NERVES     CN III, IV, VI   Pupils are equal, round, and reactive to light.     Significant Labs: All pertinent labs within the past 24 hours have been reviewed.    Significant Imaging: I have reviewed all pertinent imaging results/findings within the past 24 hours.    Assessment/Plan:     * Obstructive uropathy  -Pt. With L ureteral stone s/p nephorstomy tube and now also signs of lower urinary tract obstruction with suprapubic tenderness and >1L noted on bladder scan  -Floey placed with 1500 cc return. Treat impaction as above which I believe is contributing  -F/U urine culture/U/A for signs of infection, pt. ecently treated for proteus UTI w/ bacteremia  -Urology consult for further recommendations, continue home med listed flomax 0.8    Fecal impaction  -CT from 3/2023 showed "bladder displaced cephalad secondary to the large fecal impaction". Per report at [facility] [place], pt. receieved enemas, but on phsycial exam today pt. Noted to have large amount of stool in rectal vault  -Suspect contributing to obstructive uropathy, manually disempacted in ED followed by enema  -Continue aggressive bowel regimen with miralax, senna-colace. Can continue to order enemas PRN    History of complete heart block  -S/p pacemaker 2021, EKG shows paced rhythm, no acute issues      GURVINDER (acute kidney injury)  -Cr 2.0 on presentation, baseline normal  -Suspect 2.2 above obstructive uropathy, s/p mark  -Continue to trend Cr while admitted      UTI (urinary tract infection)  -Pt. Was discharged on augmentin x2 weeks after recent admit for proteus UTI and bacteremia. Unclear if he completed the course  -WBC noted to be elevated on admit, f/u U/A and start IV ceftriaxone if it's consistent with UTI       History of CVA with residual deficit  -Hx of R PARDEEP stroke in 2018 with residual L sided weakness, wheelchair bound  -No acute complaints/findings, continue home lipitor. Pt. Not currently " on antiplatelet meds per history        VTE Risk Mitigation (From admission, onward)         Ordered     heparin (porcine) injection 5,000 Units  Every 8 hours         04/14/23 2126     IP VTE HIGH RISK PATIENT  Once         04/14/23 2126     Place sequential compression device  Until discontinued         04/14/23 2126     IP VTE HIGH RISK PATIENT  Once         04/14/23 2126     Place sequential compression device  Until discontinued         04/14/23 2126                   On 04/14/2023, patient should be placed in hospital observation services under my care.        Suhail Arroyo MD  Department of Hospital Medicine  [facility] - Emergency Dept

## 2023-04-15 NOTE — PROGRESS NOTES
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 Pt. Was recently admitted to [facility] 3/19-3/23 for sepsis 2/2 impacted L ureteral stone. CT revealed [id number] distal left ureter with ~4cm of steinstrasse behind the stone) with severe hydronephrosis and evidence of cortical atrophy of the left kidney as well as significant fecal impaction. Pt. Was noted to be bacteremic with pansensitive proteus growing in urine and blood cultures. A L sided nephrostomy tube was plaved by IR, and he was discharged on augmentin to complete a 2 week course. Today, the patient presented to urology clinic for f/u and he was noted to be mildly hypotensive (83/52) with suprapubic tenderness on exam and distended abdomen. Bladder scan revealed >1L. A urine sample was obtained from the patient's neph tube, and the patient was referred to the ED. In the ED, a mark was placed with return of about 1,500 cc of urine.      Overview/Hospital Course:  Following disimpaction and placement of mark in addition to IVF, Cr w/ improvement. Urology following. Pt to maintain mark for 7-10 days with outpatient voiding trial.      Interval History:   Patient seen and examined at bedside.    No acute events overnight.  Patient has no acute complaints this morning.  That he had minor abdominal discomfort prior to presentation, otherwise felt well.  No abdominal pain, nausea this morning.  Will continue to monitor bowel  output on regimen.  Patient updated regarding care plan.    Review of Systems   Constitutional:  Negative for activity change, appetite change, chills, diaphoresis, fever and unexpected weight change.   HENT:  Negative for congestion and sore throat.    Eyes:  Negative for visual disturbance.   Respiratory:  Negative for cough, shortness of breath and wheezing.    Cardiovascular:  Negative for chest pain, palpitations and leg swelling.   Gastrointestinal:  Positive for constipation. Negative for abdominal distention, abdominal pain, blood in stool, nausea and vomiting.   Genitourinary:  Negative for dysuria and hematuria.   Musculoskeletal:  Negative for arthralgias and neck pain.   Skin:  Negative for color change and rash.   Neurological:  Positive for weakness (residual s/p stroke). Negative for dizziness, seizures, light-headedness, numbness and headaches.   Psychiatric/Behavioral:  Negative for behavioral problems, confusion and decreased concentration.    Objective:     Vital Signs (Most Recent):  Temp: 98.4 °F (36.9 °C) (04/15/23 0807)  Pulse: 96 (04/15/23 0807)  Resp: 16 (04/15/23 0807)  BP: (!) 105/50 (04/15/23 0807)  SpO2: 95 % (04/15/23 0807)   Vital Signs (24h Range):  Temp:  [97.8 °F (36.6 °C)-98.4 °F (36.9 °C)] 98.4 °F (36.9 °C)  Pulse:  [84-99] 96  Resp:  [16-18] 16  SpO2:  [95 %-99 %] 95 %  BP: ()/(50-71) 105/50     Weight: 68 kg (150 lb)  Body mass index is 23.49 kg/m².    Intake/Output Summary (Last 24 hours) at 4/15/2023 1100  Last data filed at 4/14/2023 2111  Gross per 24 hour   Intake 1550 ml   Output 1200 ml   Net 350 ml      Physical Exam  Vitals reviewed.   Constitutional:       General: He is not in acute distress.     Appearance: He is well-developed. He is ill-appearing (Chronically). He is not toxic-appearing or diaphoretic.   HENT:      Head: Normocephalic and atraumatic.      Right Ear: External ear normal.      Left Ear: External ear normal.      Nose: Nose normal.       Mouth/Throat:      Mouth: Mucous membranes are moist.      Pharynx: Oropharynx is clear.   Eyes:      General: No scleral icterus.        Right eye: No discharge.         Left eye: No discharge.   Neck:      Vascular: No JVD.      Trachea: No tracheal deviation.   Cardiovascular:      Rate and Rhythm: Normal rate and regular rhythm.      Heart sounds: No murmur heard.    No friction rub. No gallop.   Pulmonary:      Effort: No respiratory distress.      Breath sounds: Normal breath sounds. No wheezing or rales.   Abdominal:      General: Bowel sounds are normal.      Palpations: Abdomen is soft. There is no mass.      Tenderness: There is no abdominal tenderness.   Musculoskeletal:      Cervical back: Normal range of motion and neck supple.      Right lower leg: No edema.      Left lower leg: No edema.   Lymphadenopathy:      Cervical: No cervical adenopathy.   Skin:     General: Skin is warm and dry.      Comments: Neph tube cdi, draining urine   Neurological:      Mental Status: He is alert and oriented to person, place, and time.   Psychiatric:         Behavior: Behavior normal.       Significant Labs: All pertinent labs within the past 24 hours have been reviewed.    Recent Results (from the past 24 hour(s))   CBC auto differential    Collection Time: 04/14/23  6:11 PM   Result Value Ref Range    WBC 14.20 (H) 3.90 - 12.70 K/uL    RBC 3.85 (L) 4.60 - 6.20 M/uL    Hemoglobin 12.0 (L) 14.0 - 18.0 g/dL    Hematocrit 38.0 (L) 40.0 - 54.0 %    MCV 99 (H) 82 - 98 fL    MCH 31.2 (H) 27.0 - 31.0 pg    MCHC 31.6 (L) 32.0 - 36.0 g/dL    RDW 15.0 (H) 11.5 - 14.5 %    Platelets 213 150 - 450 K/uL    MPV 11.2 9.2 - 12.9 fL    Immature Granulocytes 0.7 (H) 0.0 - 0.5 %    Gran # (ANC) 12.3 (H) 1.8 - 7.7 K/uL    Immature Grans (Abs) 0.10 (H) 0.00 - 0.04 K/uL    Lymph # 1.0 1.0 - 4.8 K/uL    Mono # 0.7 0.3 - 1.0 K/uL    Eos # 0.0 0.0 - 0.5 K/uL    Baso # 0.06 0.00 - 0.20 K/uL    nRBC 0 0 /100 WBC    Gran % 86.6 (H) 38.0 - 73.0 %     Lymph % 7.0 (L) 18.0 - 48.0 %    Mono % 5.1 4.0 - 15.0 %    Eosinophil % 0.2 0.0 - 8.0 %    Basophil % 0.4 0.0 - 1.9 %    Differential Method Automated    Comprehensive metabolic panel    Collection Time: 04/14/23  6:11 PM   Result Value Ref Range    Sodium 139 136 - 145 mmol/L    Potassium 5.6 (H) 3.5 - 5.1 mmol/L    Chloride 104 95 - 110 mmol/L    CO2 22 (L) 23 - 29 mmol/L    Glucose 106 70 - 110 mg/dL    BUN 48 (H) 8 - 23 mg/dL    Creatinine 2.0 (H) 0.5 - 1.4 mg/dL    Calcium 10.0 8.7 - 10.5 mg/dL    Total Protein 7.3 6.0 - 8.4 g/dL    Albumin 2.8 (L) 3.5 - 5.2 g/dL    Total Bilirubin 0.6 0.1 - 1.0 mg/dL    Alkaline Phosphatase 80 55 - 135 U/L    AST 29 10 - 40 U/L    ALT 22 10 - 44 U/L    Anion Gap 13 8 - 16 mmol/L    eGFR 34.2 (A) >60 mL/min/1.73 m^2   Lactic acid, plasma    Collection Time: 04/14/23  6:11 PM   Result Value Ref Range    Lactate (Lactic Acid) 1.8 0.5 - 2.2 mmol/L   Blood culture #1 **CANNOT BE ORDERED STAT**    Collection Time: 04/14/23  6:34 PM    Specimen: Peripheral, Antecubital, Right; Blood   Result Value Ref Range    Blood Culture, Routine No Growth to date    Blood culture #2 **CANNOT BE ORDERED STAT**    Collection Time: 04/14/23  6:34 PM    Specimen: Peripheral, Antecubital, Right; Blood   Result Value Ref Range    Blood Culture, Routine No Growth to date    Urinalysis, Reflex to Urine Culture Urine, Catheterized    Collection Time: 04/14/23  7:56 PM    Specimen: Urine   Result Value Ref Range    Specimen UA Urine, Catheterized     Color, UA Orange (A) Yellow, Straw, Kiana    Appearance, UA Ex.Turbid Clear    pH, UA >8.0 (A) 5.0 - 8.0    Specific Gravity, UA 1.015 1.005 - 1.030    Protein, UA 3+ (A) Negative    Glucose, UA Negative Negative    Ketones, UA Negative Negative    Bilirubin (UA) Negative Negative    Occult Blood UA 1+ (A) Negative    Nitrite, UA Positive (A) Negative    Leukocytes, UA 3+ (A) Negative   Urinalysis Microscopic    Collection Time: 04/14/23  7:56 PM   Result  Value Ref Range    RBC, UA >100 (H) 0 - 4 /hpf    WBC, UA >100 (H) 0 - 5 /hpf    Bacteria Many (A) None-Occ /hpf    Hyaline Casts, UA 0 0-1/lpf /lpf    Triplephos Julieta, UA Many (A) None-Moderate    Microscopic Comment SEE COMMENT    Basic metabolic panel    Collection Time: 04/14/23 11:24 PM   Result Value Ref Range    Sodium 140 136 - 145 mmol/L    Potassium 4.6 3.5 - 5.1 mmol/L    Chloride 104 95 - 110 mmol/L    CO2 20 (L) 23 - 29 mmol/L    Glucose 75 70 - 110 mg/dL    BUN 48 (H) 8 - 23 mg/dL    Creatinine 1.7 (H) 0.5 - 1.4 mg/dL    Calcium 9.5 8.7 - 10.5 mg/dL    Anion Gap 16 8 - 16 mmol/L    eGFR 41.5 (A) >60 mL/min/1.73 m^2   Comprehensive Metabolic Panel (CMP)    Collection Time: 04/15/23  5:51 AM   Result Value Ref Range    Sodium 139 136 - 145 mmol/L    Potassium 4.0 3.5 - 5.1 mmol/L    Chloride 106 95 - 110 mmol/L    CO2 24 23 - 29 mmol/L    Glucose 63 (L) 70 - 110 mg/dL    BUN 46 (H) 8 - 23 mg/dL    Creatinine 1.4 0.5 - 1.4 mg/dL    Calcium 9.2 8.7 - 10.5 mg/dL    Total Protein 5.7 (L) 6.0 - 8.4 g/dL    Albumin 2.4 (L) 3.5 - 5.2 g/dL    Total Bilirubin 0.5 0.1 - 1.0 mg/dL    Alkaline Phosphatase 73 55 - 135 U/L    AST 16 10 - 40 U/L    ALT 16 10 - 44 U/L    Anion Gap 9 8 - 16 mmol/L    eGFR 52.4 (A) >60 mL/min/1.73 m^2   CBC with Automated Differential    Collection Time: 04/15/23  5:51 AM   Result Value Ref Range    WBC 9.47 3.90 - 12.70 K/uL    RBC 2.98 (L) 4.60 - 6.20 M/uL    Hemoglobin 9.1 (L) 14.0 - 18.0 g/dL    Hematocrit 30.1 (L) 40.0 - 54.0 %     (H) 82 - 98 fL    MCH 30.5 27.0 - 31.0 pg    MCHC 30.2 (L) 32.0 - 36.0 g/dL    RDW 15.1 (H) 11.5 - 14.5 %    Platelets 177 150 - 450 K/uL    MPV 10.6 9.2 - 12.9 fL    Immature Granulocytes 0.2 0.0 - 0.5 %    Gran # (ANC) 7.4 1.8 - 7.7 K/uL    Immature Grans (Abs) 0.02 0.00 - 0.04 K/uL    Lymph # 1.2 1.0 - 4.8 K/uL    Mono # 0.7 0.3 - 1.0 K/uL    Eos # 0.1 0.0 - 0.5 K/uL    Baso # 0.04 0.00 - 0.20 K/uL    nRBC 0 0 /100 WBC    Gran % 78.3 (H) 38.0 -  "73.0 %    Lymph % 12.8 (L) 18.0 - 48.0 %    Mono % 7.2 4.0 - 15.0 %    Eosinophil % 1.1 0.0 - 8.0 %    Basophil % 0.4 0.0 - 1.9 %    Differential Method Automated          Significant Imaging: I have reviewed all pertinent imaging results/findings within the past 24 hours.    Imaging Results    None             Assessment/Plan:      * Obstructive uropathy  -Pt. With L ureteral stone s/p nephorstomy tube p/w signs of lower urinary tract obstruction with suprapubic tenderness and >1L noted on bladder scan  -Floey placed in ED with 1500 cc return. Manual fecal disimpaction in ED.  -F/U urine culture as UA concerning for infection, pt. ecently treated for proteus UTI w/ bacteremia  -Urology consult for further recommendations, appreciate recommendations  - continue home med listed flomax 0.8    UTI (urinary tract infection)  -Pt. Was discharged on augmentin x2 weeks after recent admit for proteus UTI and bacteremia. Unclear if he completed the course  -WBC noted to be elevated on admit, and UA dirty   -continue IV Rocephin pending urine culture      Fecal impaction  -CT from 3/2023 showed "bladder displaced cephalad secondary to the large fecal impaction". Per [place] at [facility] [place], pt. receieved enemas, but on physical exam today pt. Noted to have large amount of stool in rectal vault  -Suspect contributing to obstructive uropathy, manually disempacted in ED followed by enema  -Continue aggressive bowel regimen with miralax, senna-colace., magnesium hydroxide. Can continue to order enemas PRN    GURVINDER (acute kidney injury)  -Cr 2.0 on presentation, baseline normal  -Suspect 2.2 above obstructive uropathy w/ possible pre-renal component, s/p mark and fluid bolus  -Continue to trend Cr while admitted  -continue IVF today  -improved to 1.4      History of complete heart block  -S/p pacemaker 2021, EKG shows paced rhythm, no acute issues      Acute upper respiratory infection        History of CVA with residual deficit  -Hx " of R PARDEEP stroke in 2018 with residual L sided weakness, wheelchair bound, at baseline level function  -No acute complaints/findings, continue home lipitor. Pt. Not currently on antiplatelet meds per history        VTE Risk Mitigation (From admission, onward)           Ordered     heparin (porcine) injection 5,000 Units  Every 8 hours         04/14/23 2126     IP VTE HIGH RISK PATIENT  Once         04/14/23 2126     Place sequential compression device  Until discontinued         04/14/23 2126     IP VTE HIGH RISK PATIENT  Once         04/14/23 2126     Place sequential compression device  Until discontinued         04/14/23 2126                    Discharge Planning   FISH: 4/17/2023     Code Status: DNR   Is the patient medically ready for discharge?: No    Reason for patient still in hospital (select all that apply): Patient trending condition, Laboratory test, Treatment, Consult recommendations and Pending disposition                     Purvi Natarajan MD  Department of Hospital Medicine   [facility] - Observation 11H     Bronson Narayanan(Attending)

## 2023-04-15 NOTE — PROGRESS NOTES
I have reviewed the notes, assessments, and/or procedures performed this visit, and I concur with the documentation.    Patient was seen and was taken to the ER due to his BP and heart rate.  Will need to have a catheter and we can check him to see how impacted he is. However, preferred to have him in a controlled environment.

## 2023-04-15 NOTE — ASSESSMENT & PLAN NOTE
77M with multiple comorbidities and hx of L ureteral stone s/p L PCN found to be in urinary retention and hypotensive.    - Amrk placed in ED with 1200 cc of immediate urine return. Maintain mark upon discharge. Patient will follow up outpatient for management.  - manually disimpacted in the ED with large amount of stool removed from rectum. Recommend aggressive bowel regimen  - Maintain nephrostomy tube  - Monitor for post-obstructive diuresis. Replace lytes as necessary  - rest of care per primary

## 2023-04-15 NOTE — ASSESSMENT & PLAN NOTE
"-CT from 3/2023 showed "bladder displaced cephalad secondary to the large fecal impaction". Per report at [facility], pt. receieved enemas, but on physical exam today pt. Noted to have large amount of stool in rectal vault  -Suspect contributing to obstructive uropathy, manually disempacted in ED followed by enema  -Continue aggressive bowel regimen with miralax, senna-colace., magnesium hydroxide. Can continue to order enemas PRN  "

## 2023-04-15 NOTE — PROGRESS NOTES
"[facility]  Urology  Progress Note    Patient Name: Praneeth Jewell  MRN: [id number]  Admission Date: 4/14/2023  Hospital Length of Stay: 0 days  Code Status: Full Code   Attending Provider: Purvi Natarajan MD   Primary Care Physician: Richard Galloway MD    Subjective:     HPI:  Praneeth Jewell is a 77-year-old male, lives in a nursing home, history of obstructive uropathy s/p J-J stent placement, recurrent nephrolithiasis and UTIs, history of MDR UTI, R PARDEEP stroke with residual LE weakness (2015, wheelchair bound), and complete heart block s/p pacemaker placement (2021), recent admission at [place] with urosepsis secondary to obstructive ureteral stone, requiring nephrostomy tube placement    He was seen in clinic today for outpatient workup of left ureteral stones and was found to be hypotensive and tachycardic in clinic (83/52, HR 99). Abdomen was palpably distended and tender to palpation. Bladder scan >1,000mL.  There was concern for urinary retention and fecal impaction. Due to patient's vitals and concern for vagal response after decompression of urinary bladder, he was sent to the ED.  A catheter was placed with 1200 mL of immediate urine return.         Of note, on 3/20/23 he presented to [facility] with large, impacted left ureteral stone ([id number] distal left ureter with ~4cm of steinstrasse behind the stone) with severe hydronephrosis and evidence of cortical atrophy of the left kidney. CT also showed significant fecal impaction. He was septic with UTI and underwent Left PCN placement by IR. Of note, patient is DNR. Urine culture and blood culture grew Proteus Mirabilis.      He was discharged with 2 weeks of Augmentin, although this is not on his paperwork from his nursing home.   Fecal impaction was not addressed at his most recent hospital admission. Patient reports BM's ("diarrhea probably" per patient) daily.      Patient has been living at the [facility] since 3/15/23. " Prior to that he was living at home with a caregiver.   Patient voids ~3-4 times per day. He voids into a diaper.    In the ED, pt is AFVSS.  Cr 2.0 from baseline 0.8. WBC 14.  Manually disimpacted in the ED with large amount of stool removed from the rectum.        Interval History: Patient manually disimpacted in ED.  Catheter placed in ED with 1200 immediate output of clear yellow urine.  Patient sleeping comfortably in bed this AM.  Creatinine 1.4 from 2.0 yesterday.  Nephrostomy tube draining well.    Objective:     Temp:  [97.8 °F (36.6 °C)-98.4 °F (36.9 °C)] 98.4 °F (36.9 °C)  Pulse:  [84-99] 96  Resp:  [16-18] 16  SpO2:  [95 %-99 %] 95 %  BP: ()/(50-71) 105/50     Body mass index is 23.49 kg/m².           Drains       Drain  Duration                  Nephrostomy 03/20/23 1418 Left 10 Fr. 25 days         Urethral Catheter 04/14/23 1630 Double-lumen 16 Fr. <1 day                    Physical Exam  Vitals and nursing note reviewed.   Constitutional:       General: He is not in acute distress.     Appearance: He is well-developed.      Comments: Sleeping. Cachectic   Cardiovascular:      Rate and Rhythm: Normal rate.   Pulmonary:      Effort: Pulmonary effort is normal. No respiratory distress.   Abdominal:      General: There is no distension.      Palpations: Abdomen is soft.      Tenderness: There is no abdominal tenderness.      Comments: Neph tube draining well.   Genitourinary:     Comments: Indwelling mark catheter with clear yellow output.  Musculoskeletal:         General: No tenderness. Normal range of motion.   Skin:     General: Skin is warm and dry.      Findings: No rash.   Neurological:      Mental Status: He is oriented to person, place, and time.   Psychiatric:         Judgment: Judgment normal.       Significant Labs:    BMP:  Recent Labs   Lab 04/14/23  1811 04/14/23  2324 04/15/23  0551    140 139   K 5.6* 4.6 4.0    104 106   CO2 22* 20* 24   BUN 48* 48* 46*   CREATININE  2.0* 1.7* 1.4   CALCIUM 10.0 9.5 9.2       CBC:   Recent Labs   Lab 04/14/23  1811   WBC 14.20*   HGB 12.0*   HCT 38.0*          Urine Culture: No results for input(s): LABURIN in the last 168 hours.  Urine Studies:   Recent Labs   Lab 04/14/23 1956   COLORU Orange*   APPEARANCEUA Ex.Turbid   PHUR >8.0*   SPECGRAV 1.015   PROTEINUA 3+*   GLUCUA Negative   KETONESU Negative   BILIRUBINUA Negative   OCCULTUA 1+*   NITRITE Positive*   LEUKOCYTESUR 3+*   RBCUA >100*   WBCUA >100*   BACTERIA Many*   HYALINECASTS 0       Significant Imaging:  All pertinent imaging results/findings from the past 24 hours have been reviewed.      Assessment/Plan:     * Obstructive uropathy  77M with multiple comorbidities and hx of L ureteral stone s/p L PCN found to be in urinary retention and hypotensive.    - Mark placed in ED with 1200 cc of immediate urine return. Maintain mark upon discharge. Patient will follow up outpatient for management.  - manually disimpacted in the ED with large amount of stool removed from rectum. Recommend aggressive bowel regimen  - Maintain nephrostomy tube  - Monitor for post-obstructive diuresis. Replace lytes as necessary  - rest of care per primary         VTE Risk Mitigation (From admission, onward)         Ordered     heparin (porcine) injection 5,000 Units  Every 8 hours         04/14/23 2126     IP VTE HIGH RISK PATIENT  Once         04/14/23 2126     Place sequential compression device  Until discontinued         04/14/23 2126     IP VTE HIGH RISK PATIENT  Once         04/14/23 2126     Place sequential compression device  Until discontinued         04/14/23 2126                Riky Hopkins MD  Urology  [facility] - Observation 11H

## 2023-04-15 NOTE — PLAN OF CARE
Patient resting in bed, no distress noted. Denies complaints of pain. Will continue to monitor throughout shift for a change in status

## 2023-04-15 NOTE — ASSESSMENT & PLAN NOTE
-Cr 2.0 on presentation, baseline normal  -Suspect 2.2 above obstructive uropathy, s/p mark  -Continue to trend Cr while admitted  -improved to 1.4

## 2023-04-15 NOTE — ASSESSMENT & PLAN NOTE
-Cr 2.0 on presentation, baseline normal  -Suspect 2.2 above obstructive uropathy, s/p mark  -Continue to trend Cr while admitted

## 2023-04-15 NOTE — ASSESSMENT & PLAN NOTE
-Pt. With L ureteral stone s/p nephorstomy tube and now also signs of lower urinary tract obstruction with suprapubic tenderness and >1L noted on bladder scan  -Floey placed with 1500 cc return. Treat impaction as above which I believe is contributing  -F/U urine culture/U/A for signs of infection, pt. ecently treated for proteus UTI w/ bacteremia  -Urology consult for further recommendations, continue home med listed flomax 0.8

## 2023-04-15 NOTE — ASSESSMENT & PLAN NOTE
"-CT from 3/2023 showed "bladder displaced cephalad secondary to the large fecal impaction". Per report at [facility], pt. receieved enemas, but on phsycial exam today pt. Noted to have large amount of stool in rectal vault  -Suspect contributing to obstructive uropathy, manually disempacted in ED followed by enema  -Continue aggressive bowel regimen with miralax, senna-colace. Can continue to order enemas PRN  "

## 2023-04-15 NOTE — ACP (ADVANCE CARE PLANNING)
Advance Care Planning     Date: 04/15/2023    Today a meeting took place: bedside    Patient Participation: Patient is able to participate     Attendees (Name and  Relationship to patient):  n/a    Staff attendees (Name and  Role): ISAURA Natarajan MD    ACP Conversation (General): Other (specify below) code status    Code Status: DNR; status confirmed/order placed in chart     ACP Documents: None    Goals of care: The patient endorses that what is most important right now is to focus on improvement in condition but with limits to invasive therapies    Accordingly, we have decided that the best plan to meet the patient's goals includes continuing with treatment      Recommendations/  Follow-up tasks: Other (specify below) ongoing GOC       Length of ACP   conversation in minutes: 10 minutes

## 2023-04-15 NOTE — CONSULTS
[facility] - Emergency Dept  Urology  Consult Note    Patient Name: Praneeth Jewell  MRN: [id number]  Admission Date: 4/14/2023  Hospital Length of Stay: 0   Code Status: Prior   Attending Provider: Purvi Natarajan MD   Consulting Provider: Sofie Almeida MD  Primary Care Physician: Richard Galloway MD  Principal Problem:Obstructive uropathy    Inpatient consult to Urology  Consult performed by: Sofie Almeida MD  Consult ordered by: Suhail Arroyo MD          Subjective:     HPI:  Praneeth Jewell is a 77-year-old male, lives in a nursing home, history of obstructive uropathy s/p J-J stent placement, recurrent nephrolithiasis and UTIs, history of MDR UTI, R PARDEEP stroke with residual LE weakness (2015, wheelchair bound), and complete heart block s/p pacemaker placement (2021), recent admission at [place] with urosepsis secondary to obstructive ureteral stone, requiring nephrostomy tube placement    He was seen in clinic today for outpatient workup of left ureteral stones and was found to be hypotensive and tachycardic in clinic (83/52, HR 99). Abdomen was palpably distended and tender to palpation. Bladder scan >1,000mL.  There was concern for urinary retention and fecal impaction. Due to patient's vitals and concern for vagal response after decompression of urinary bladder, he was sent to the ED.  A catheter was placed with 1200 mL of immediate urine return.         Of note, on 3/20/23 he presented to [facility] with large, impacted left ureteral stone ([id number] distal left ureter with ~4cm of steinstrasse behind the stone) with severe hydronephrosis and evidence of cortical atrophy of the left kidney. CT also showed significant fecal impaction. He was septic with UTI and underwent Left PCN placement by IR. Of note, patient is DNR. Urine culture and blood culture grew Proteus Mirabilis.      He was discharged with 2 weeks of Augmentin, although this is not on his paperwork from his nursing home.   Fecal impaction was not addressed at his most  "recent hospital admission. Patient reports BM's ("diarrhea probably" per patient) daily.      Patient has been living at the [facility] since 3/15/23. Prior to that he was living at home with a caregiver.   Patient voids ~3-4 times per day. He voids into a diaper.    In the ED, pt is AFVSS.  Cr 2.0 from baseline 0.8. WBC 14.  Manually disimpacted in the ED with large amount of stool removed from the rectum.        Past Medical History:   Diagnosis Date    Arthritis     Diabetes mellitus     type 2    Folate deficiency anemia     Heart block     History of kidney stones 05/25/2021    History of stroke with residual deficit 05/25/2021    Hyperlipidemia     Hypertension     Major depressive disorder     Pacemaker     [employer] [place] 8 DRERROL (S/n: [id number])    Pyelonephritis 11/19/2021    TIA (transient ischemic attack)     Urinary retention 05/25/2021       Past Surgical History:   Procedure Laterality Date    A-V CARDIAC PACEMAKER INSERTION N/A 8/24/2021    Procedure: INSERTION, CARDIAC PACEMAKER, DUAL CHAMBER;  Surgeon: Neo Winn MD;  Location: [facility] EP LAB;  Service: Cardiology;  Laterality: N/A;  CHB, DUAL PPM, ANES, BIO, DM, ED 2    COLONOSCOPY N/A 11/22/2021    Procedure: COLONOSCOPY;  Surgeon: Cesar Dorado MD;  Location: [facility] ENDO (2ND FLR);  Service: Endoscopy;  Laterality: N/A;    CYSTOSCOPIC LITHOLAPAXY  5/25/2021    Procedure: CYSTOLITHOLAPAXY;  Surgeon: Chris Schilling MD;  [facility];  Service: Urology;;    CYSTOSCOPY  8/26/2021    Procedure: CYSTOSCOPY;  Surgeon: Camilo Kelley Jr., MD;  [facility];  Service: Urology;;    CYSTOSCOPY W/ URETERAL STENT PLACEMENT Bilateral 5/25/2021    Procedure: CYSTOSCOPY, WITH BILATERAL URETERAL STENT INSERTION;  Surgeon: Chris Schilling MD;  Location: [facility] OR [address] [place] [address];  Service: Urology;  Laterality: Bilateral;    ELBOW ARTHROPLASTY Right     FLUOROSCOPY  5/25/2021    Procedure: FLUOROSCOPY;  Surgeon: " "Chris Schilling MD;  [facility];  Service: Urology;;    LASER LITHOTRIPSY Left 8/26/2021    Procedure: LITHOTRIPSY, USING LASER;  Surgeon: Camilo Kelley Jr., MD;  [facility];  Service: Urology;  Laterality: Left;    PYELOSCOPY Bilateral 8/26/2021    Procedure: PYELOSCOPY;  Surgeon: Camilo Kelley Jr., MD;  [facility];  Service: Urology;  Laterality: Bilateral;    REMOVAL OF BLOOD CLOT  5/25/2021    Procedure: REMOVAL, BLOOD CLOT;  Surgeon: Chris Schilling MD;  [facility];  Service: Urology;;    REPLACEMENT OF STENT Bilateral 8/26/2021    Procedure: REPLACEMENT, STENT;  Surgeon: Camilo Kelley Jr., MD;  [facility];  Service: Urology;  Laterality: Bilateral;    URETEROSCOPIC REMOVAL OF URETERIC CALCULUS Bilateral 8/26/2021    Procedure: REMOVAL, CALCULUS, URETER, URETEROSCOPIC;  Surgeon: Camilo Kelley Jr., MD;  [facility];  Service: Urology;  Laterality: Bilateral;    URETEROSCOPY Bilateral 8/26/2021    Procedure: URETEROSCOPY;  Surgeon: Camilo Kelley Jr., MD;  Location: [facility] OR [address] [place] [address];  Service: Urology;  Laterality: Bilateral;       Review of patient's allergies indicates:  No Known Allergies    Family History       Problem Relation (Age of Onset)    Hyperlipidemia Mother    Mental illness Father    No Known Problems Brother    Parkinsonism Father    Stroke Mother            Tobacco Use    Smoking status: Former     Types: Cigarettes    Smokeless tobacco: Never   Substance and Sexual Activity    Alcohol use: Yes     Comment: 1/ pint whisky daily    Drug use: Yes     Types: "Crack" cocaine    Sexual activity: Not on file       Review of Systems    Objective:     Temp:  [98.2 °F (36.8 °C)] 98.2 °F (36.8 °C)  Pulse:  [92-99] 92  Resp:  [18] 18  SpO2:  [98 %-99 %] 99 %  BP: ()/(52-68) 120/58     Body mass index is 23.49 kg/m².    Date 04/14/23 0700 - 04/15/23 0659   Shift 2069-3479 1325-8263 9905-6327 24 Hour Total "   INTAKE   IV Piggyback  1000  1000   Shift Total(mL/kg)  1000(14.7)  1000(14.7)   OUTPUT   Urine(mL/kg/hr)  1200  1200   Shift Total(mL/kg)  1200(17.6)  1200(17.6)   Weight (kg) 68 68 68 68          Drains       Drain  Duration                  Nephrostomy 03/20/23 1418 Left 10 Fr. 25 days         Urethral Catheter 04/14/23 1630 Double-lumen 16 Fr. <1 day                    Physical Exam  Constitutional:       General: He is not in acute distress.  HENT:      Head: Normocephalic.   Eyes:      Extraocular Movements: Extraocular movements intact.   Cardiovascular:      Rate and Rhythm: Normal rate.   Pulmonary:      Effort: Pulmonary effort is normal. No respiratory distress.   Abdominal:      Palpations: Abdomen is soft. There is no mass.   Genitourinary:     Comments: Shen draining c/y/u  Musculoskeletal:         General: No swelling or deformity.      Cervical back: Normal range of motion.   Skin:     General: Skin is warm and dry.   Neurological:      General: No focal deficit present.      Mental Status: He is alert.   Psychiatric:         Mood and Affect: Mood normal.         Behavior: Behavior normal.       Significant Labs:    BMP:  Recent Labs   Lab 04/14/23  1811      K 5.6*      CO2 22*   BUN 48*   CREATININE 2.0*   CALCIUM 10.0       CBC:  Recent Labs   Lab 04/14/23  1811   WBC 14.20*   HGB 12.0*   HCT 38.0*          All pertinent labs results from the past 24 hours have been reviewed.  Recent Lab Results         04/14/23  1811        Albumin 2.8       Alkaline Phosphatase 80       ALT 22       Anion Gap 13       AST 29  Comment: *Result may be interfered by visible hemolysis       Baso # 0.06       Basophil % 0.4       BILIRUBIN TOTAL 0.6  Comment: For infants and newborns, interpretation of results should be based  on gestational age, weight and in agreement with clinical  observations.    Premature Infant recommended reference ranges:  Up to 24 hours.............<8.0 mg/dL  Up to 48  hours............<12.0 mg/dL  3-5 days..................<15.0 mg/dL  6-29 days.................<15.0 mg/dL         BUN 48       Calcium 10.0       Chloride 104       CO2 22       Creatinine 2.0       Differential Method Automated       eGFR 34.2       Eos # 0.0       Eosinophil % 0.2       Glucose 106       Gran # (ANC) 12.3       Gran % 86.6       Hematocrit 38.0       Hemoglobin 12.0       Immature Grans (Abs) 0.10  Comment: Mild elevation in immature granulocytes is non specific and   can be seen in a variety of conditions including stress response,   acute inflammation, trauma and pregnancy. Correlation with other   laboratory and clinical findings is essential.         Immature Granulocytes 0.7       Lactate, [place] 1.8  Comment: Falsely low lactic acid results can be found in samples   containing >=13.0 mg/dL total bilirubin and/or >=3.5 mg/dL   direct bilirubin.         Lymph # 1.0       Lymph % 7.0       MCH 31.2       MCHC 31.6       MCV 99       Mono # 0.7       Mono % 5.1       MPV 11.2       nRBC 0       Platelets 213       Potassium 5.6  Comment: *Result may be interfered by visible hemolysis       PROTEIN TOTAL 7.3  Comment: *Result may be interfered by visible hemolysis       RBC 3.85       RDW 15.0       Sodium 139       WBC 14.20               Significant Imaging:  All pertinent imaging results/findings from the past 24 hours have been reviewed.                      Assessment and Plan:     * Obstructive uropathy  77M with multiple comorbidities and hx of L ureteral stone s/p L PCNT found to be in urinary retention.  Sent from clinic to the ED for Shen placement due to concern for possible vagal response.      -Shen placed in ED with 1200 cc of immediate urine return   - manually disimpacted in the ED with large amount of stool removed from rectum   -recommend maintain Shen for 7-10 days with outpatient voiding trial  - agree with admission to hospital medicine for further workup and failure to  thrive   - recommend aggressive bowel regimen    - rest of care per primary         VTE Risk Mitigation (From admission, onward)      None            Thank you for your consult.    Sofie Almeida MD  Urology  [facility] - Emergency Dept    Patient seen on rounds.  He states he feels markedly better.  Has not had another bowel movement since he was disimpacted.  May need oral laxative and enemas as needed given the amount of stool seen on previous imaging (CT scan)

## 2023-04-15 NOTE — PT/OT/SLP EVAL
"Occupational Therapy   Evaluation and Discharge Note    Name: Praneeth Jewell  MRN: [id number]  Admitting Diagnosis: Obstructive uropathy  Recent Surgery: * No surgery found *      Recommendations:     Discharge Recommendations: other (see comments)  Discharge Equipment Recommendations: none  Barriers to discharge:  None    Assessment:     Praneeth Jewell is a 77 y.o. male with a medical diagnosis of Obstructive uropathy. At this time, patient is functioning at their prior level of function and does not require further acute OT services. Pt requires substantial (A) with ADLs / mobility     Plan:     During this hospitalization, patient does not require further acute OT services.  Please re-consult if situation changes.    Plan of Care Reviewed with: patient    Subjective     Chief Complaint: "I need so much help."  Patient/Family Comments/goals: None    Occupational Profile:  Living Environment: Pt lives in [facility] facility with 24/7 care  Previous level of function: Dependent   Roles and Routines: Community dweller  Equipment Used at home: wheelchair  Assistance upon Discharge: Family    Pain/Comfort:  Pain Rating 1: 0/10    Patients cultural, spiritual, Sabianist conflicts given the current situation: no    Objective:     Communicated with: Nurse prior to session.  Patient found HOB elevated with telemetry upon OT entry to room.    General Precautions: Standard, fall  Orthopedic Precautions: N/A  Braces: N/A  Respiratory Status: Room air     Occupational Performance:    Bed Mobility:    Patient completed Rolling/Turning to Left with  total assistance  Patient completed Rolling/Turning to Right with total assistance  Patient completed Supine to Sit with total assistance    Functional Mobility/Transfers:  Pt unable / unsafe    Activities of Daily Living:  Grooming: total assistance oral care    Cognitive/Visual Perceptual:  Cognitive/Psychosocial Skills:     -       Oriented to: Person, Place, Time, and Situation   -       " Follows Commands/attention:Follows multistep  commands  -       Safety awareness/insight to disability: intact   -       Mood/Affect/Coping skills/emotional control: Appropriate to situation    Physical Exam:  Balance:    -       Poor balance   Postural examination/scapula alignment:    -       Rounded shoulders  -       Forward head    AMPAC 6 Click ADL:  AMPAC Total Score: 6    Treatment & Education:  Pt educated on role of OT/POC  Pt. Educated on safety precautions   Pt provided self-care/ADL re-education  Pt reviewed bed mobility/functional mobility    Patient left HOB elevated with all lines intact and call button in reach    GOALS:   Multidisciplinary Problems       Occupational Therapy Goals       Not on file              Multidisciplinary Problems (Resolved)          Problem: Occupational Therapy    Goal Priority Disciplines Outcome Interventions   Occupational Therapy Goal   (Resolved)     OT, PT/OT Met    Description: Pt is at functional baseline with no acute needs. OT to sign off. Please reconsult if status changes.                         History:     Past Medical History:   Diagnosis Date    Arthritis     Diabetes mellitus     type 2    Folate deficiency anemia     Heart block     History of kidney stones 05/25/2021    History of stroke with residual deficit 05/25/2021    Hyperlipidemia     Hypertension     Major depressive disorder     Pacemaker     Biotronic Edora 8 DR-T (S/n: [id number])    Pyelonephritis 11/19/2021    TIA (transient ischemic attack)     Urinary retention 05/25/2021         Past Surgical History:   Procedure Laterality Date    A-V CARDIAC PACEMAKER INSERTION N/A 8/24/2021    Procedure: INSERTION, CARDIAC PACEMAKER, DUAL CHAMBER;  Surgeon: Neo Winn MD;  Location: [facility] EP LAB;  Service: Cardiology;  Laterality: N/A;  CHB, DUAL PPM, ANES, BIO, DM, ED 2    COLONOSCOPY N/A 11/22/2021    Procedure: COLONOSCOPY;  Surgeon: Cesar Dorado MD;  Location: [facility] ENDO (2ND FLR);   Service: Endoscopy;  Laterality: N/A;    CYSTOSCOPIC LITHOLAPAXY  5/25/2021    Procedure: CYSTOLITHOLAPAXY;  Surgeon: Chris Schilling MD;  [facility];  Service: Urology;;    CYSTOSCOPY  8/26/2021    Procedure: CYSTOSCOPY;  Surgeon: Camilo Kelley Jr., MD;  [facility];  Service: Urology;;    CYSTOSCOPY W/ URETERAL STENT PLACEMENT Bilateral 5/25/2021    Procedure: CYSTOSCOPY, WITH BILATERAL URETERAL STENT INSERTION;  Surgeon: Chris Schilling MD;  Location: [facility] OR 1ST FLR;  Service: Urology;  Laterality: Bilateral;    ELBOW ARTHROPLASTY Right     FLUOROSCOPY  5/25/2021    Procedure: FLUOROSCOPY;  Surgeon: Chris Schilling MD;  [facility];  Service: Urology;;    LASER LITHOTRIPSY Left 8/26/2021    Procedure: LITHOTRIPSY, USING LASER;  Surgeon: Camilo Kelley Jr., MD;  [facility];  Service: Urology;  Laterality: Left;    PYELOSCOPY Bilateral 8/26/2021    Procedure: PYELOSCOPY;  Surgeon: Camilo Kelley Jr., MD;  Location: [facility] OR 1ST FLR;  Service: Urology;  Laterality: Bilateral;    REMOVAL OF BLOOD CLOT  5/25/2021    Procedure: REMOVAL, BLOOD CLOT;  Surgeon: Chris Schilling MD;  [facility];  Service: Urology;;    REPLACEMENT OF STENT Bilateral 8/26/2021    Procedure: REPLACEMENT, STENT;  Surgeon: Camilo Kelley Jr., MD;  [facility];  Service: Urology;  Laterality: Bilateral;    URETEROSCOPIC REMOVAL OF URETERIC CALCULUS Bilateral 8/26/2021    Procedure: REMOVAL, CALCULUS, URETER, URETEROSCOPIC;  Surgeon: Camilo Kelley Jr., MD;  Location: [facility] OR 1ST FLR;  Service: Urology;  Laterality: Bilateral;    URETEROSCOPY Bilateral 8/26/2021    Procedure: URETEROSCOPY;  Surgeon: Camilo Kelley Jr., MD;  Location: [facility] OR 1ST FLR;  Service: Urology;  Laterality: Bilateral;       Time Tracking:     OT Date of Treatment: 04/15/23  OT Start Time: 0958  OT Stop Time: 1008  OT Total Time (min): 10 min    Billable Minutes:Evaluation  10    4/15/2023

## 2023-04-15 NOTE — ASSESSMENT & PLAN NOTE
-Pt. With L ureteral stone s/p nephorstomy tube p/w signs of lower urinary tract obstruction with suprapubic tenderness and >1L noted on bladder scan  -Floey placed in ED with 1500 cc return. Manual fecal disimpaction in ED.  -F/U urine culture as UA concerning for infection, pt. ecently treated for proteus UTI w/ bacteremia  -Urology consult for further recommendations, appreciate recommendations  - continue home med listed flomax 0.8

## 2023-04-15 NOTE — HPI
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 Pt. Was recently admitted to [facility] 3/19-3/23 for sepsis 2/2 impacted L ureteral stone. CT revealed [id number] distal left ureter with ~4cm of steinstrasse behind the stone) with severe hydronephrosis and evidence of cortical atrophy of the left kidney as well as significant fecal impaction. Pt. Was noted to be bacteremic with pansensitive proteus growing in urine and blood cultures. A L sided nephrostomy tube was plaved by IR, and he was discharged on augmentin to complete a 2 week course. Today, the patient presented to urology clinic for f/u and he was noted to be mildly hypotensive (83/52) with suprapubic tenderness on exam and distended abdomen. Bladder scan revealed >1L. A urine sample was obtained from the patient's neph tube, and the patient was referred to the ED. In the ED, a mark was placed with return of about 1,500 cc of urine.

## 2023-04-15 NOTE — SUBJECTIVE & OBJECTIVE
Interval History: Patient manually disimpacted in ED.  Catheter placed in ED with 1200 immediate output of clear yellow urine.  Patient sleeping comfortably in bed this AM.  Creatinine 1.4 from 2.0 yesterday.  Nephrostomy tube draining well.    Objective:     Temp:  [97.8 °F (36.6 °C)-98.4 °F (36.9 °C)] 98.4 °F (36.9 °C)  Pulse:  [84-99] 96  Resp:  [16-18] 16  SpO2:  [95 %-99 %] 95 %  BP: ()/(50-71) 105/50     Body mass index is 23.49 kg/m².           Drains       Drain  Duration                  Nephrostomy 03/20/23 1418 Left 10 Fr. 25 days         Urethral Catheter 04/14/23 1630 Double-lumen 16 Fr. <1 day                    Physical Exam  Vitals and nursing note reviewed.   Constitutional:       General: He is not in acute distress.     Appearance: He is well-developed.      Comments: Sleeping. Cachectic   Cardiovascular:      Rate and Rhythm: Normal rate.   Pulmonary:      Effort: Pulmonary effort is normal. No respiratory distress.   Abdominal:      General: There is no distension.      Palpations: Abdomen is soft.      Tenderness: There is no abdominal tenderness.      Comments: Neph tube draining well.   Genitourinary:     Comments: Indwelling mark catheter with clear yellow output.  Musculoskeletal:         General: No tenderness. Normal range of motion.   Skin:     General: Skin is warm and dry.      Findings: No rash.   Neurological:      Mental Status: He is oriented to person, place, and time.   Psychiatric:         Judgment: Judgment normal.       Significant Labs:    BMP:  Recent Labs   Lab 04/14/23  1811 04/14/23  2324 04/15/23  0551    140 139   K 5.6* 4.6 4.0    104 106   CO2 22* 20* 24   BUN 48* 48* 46*   CREATININE 2.0* 1.7* 1.4   CALCIUM 10.0 9.5 9.2       CBC:   Recent Labs   Lab 04/14/23 1811   WBC 14.20*   HGB 12.0*   HCT 38.0*          Urine Culture: No results for input(s): LABURIN in the last 168 hours.  Urine Studies:   Recent Labs   Lab 04/14/23 1956   COLORU  Orange*   APPEARANCEUA Ex.Turbid   PHUR >8.0*   SPECGRAV 1.015   PROTEINUA 3+*   GLUCUA Negative   KETONESU Negative   BILIRUBINUA Negative   OCCULTUA 1+*   NITRITE Positive*   LEUKOCYTESUR 3+*   RBCUA >100*   WBCUA >100*   BACTERIA Many*   HYALINECASTS 0       Significant Imaging:  All pertinent imaging results/findings from the past 24 hours have been reviewed.

## 2023-04-15 NOTE — SUBJECTIVE & OBJECTIVE
Past Medical History:   Diagnosis Date    Arthritis     Diabetes mellitus     type 2    Folate deficiency anemia     Heart block     History of kidney stones 05/25/2021    History of stroke with residual deficit 05/25/2021    Hyperlipidemia     Hypertension     Major depressive disorder     Pacemaker     Biotronic Edora 8 DR-T (S/n: [id number])    Pyelonephritis 11/19/2021    TIA (transient ischemic attack)     Urinary retention 05/25/2021       Past Surgical History:   Procedure Laterality Date    A-V CARDIAC PACEMAKER INSERTION N/A 8/24/2021    Procedure: INSERTION, CARDIAC PACEMAKER, DUAL CHAMBER;  Surgeon: Neo Winn MD;  Location: [facility] EP LAB;  Service: Cardiology;  Laterality: N/A;  CHB, DUAL PPM, ANES, BIO, DM, ED 2    COLONOSCOPY N/A 11/22/2021    Procedure: COLONOSCOPY;  Surgeon: Cesar Dorado MD;  Location: [facility] ENDO (2ND FLR);  Service: Endoscopy;  Laterality: N/A;    CYSTOSCOPIC LITHOLAPAXY  5/25/2021    Procedure: CYSTOLITHOLAPAXY;  Surgeon: Chris Schilling MD;  [facility];  Service: Urology;;    CYSTOSCOPY  8/26/2021    Procedure: CYSTOSCOPY;  Surgeon: Camilo Kelley Jr., MD;  [facility];  Service: Urology;;    CYSTOSCOPY W/ URETERAL STENT PLACEMENT Bilateral 5/25/2021    Procedure: CYSTOSCOPY, WITH BILATERAL URETERAL STENT INSERTION;  Surgeon: Chris Schilling MD;  [facility];  Service: Urology;  Laterality: Bilateral;    ELBOW ARTHROPLASTY Right     FLUOROSCOPY  5/25/2021    Procedure: FLUOROSCOPY;  Surgeon: Chris Schilling MD;  [facility];  Service: Urology;;    LASER LITHOTRIPSY Left 8/26/2021    Procedure: LITHOTRIPSY, USING LASER;  Surgeon: Camilo Kelley Jr., MD;  [facility];  Service: Urology;  Laterality: Left;    PYELOSCOPY Bilateral 8/26/2021    Procedure: PYELOSCOPY;  Surgeon: Camilo Kelley Jr., MD;  [facility];  Service: Urology;  Laterality: Bilateral;    REMOVAL OF BLOOD CLOT   "5/25/2021    Procedure: REMOVAL, BLOOD CLOT;  Surgeon: Chris Schilling MD;  Location: [facility] OR [address] [place] [address];  Service: Urology;;    REPLACEMENT OF STENT Bilateral 8/26/2021    Procedure: REPLACEMENT, STENT;  Surgeon: Camilo Kelley Jr., MD;  Location: [facility] OR [address] [place] [address];  Service: Urology;  Laterality: Bilateral;    URETEROSCOPIC REMOVAL OF URETERIC CALCULUS Bilateral 8/26/2021    Procedure: REMOVAL, CALCULUS, URETER, URETEROSCOPIC;  Surgeon: Camilo Kelley Jr., MD;  [facility];  Service: Urology;  Laterality: Bilateral;    URETEROSCOPY Bilateral 8/26/2021    Procedure: URETEROSCOPY;  Surgeon: Camilo Kelley Jr., MD;  Location: [facility] OR [address] [place] [address];  Service: Urology;  Laterality: Bilateral;       Review of patient's allergies indicates:  No Known Allergies    Family History       Problem Relation (Age of Onset)    Hyperlipidemia Mother    Mental illness Father    No Known Problems Brother    Parkinsonism Father    Stroke Mother            Tobacco Use    Smoking status: Former     Types: Cigarettes    Smokeless tobacco: Never   Substance and Sexual Activity    Alcohol use: Yes     Comment: 1/ pint whisky daily    Drug use: Yes     Types: "Crack" cocaine    Sexual activity: Not on file       Review of Systems    Objective:     Temp:  [98.2 °F (36.8 °C)] 98.2 °F (36.8 °C)  Pulse:  [92-99] 92  Resp:  [18] 18  SpO2:  [98 %-99 %] 99 %  BP: ()/(52-68) 120/58     Body mass index is 23.49 kg/m².    Date 04/14/23 0700 - 04/15/23 0659   Shift 1429-3619 9448-2516 0339-5204 24 Hour Total   INTAKE   IV Piggyback  1000  1000   Shift Total(mL/kg)  1000(14.7)  1000(14.7)   OUTPUT   Urine(mL/kg/hr)  1200  1200   Shift Total(mL/kg)  1200(17.6)  1200(17.6)   Weight (kg) 68 68 68 68          Drains       Drain  Duration                  Nephrostomy 03/20/23 1418 Left 10 Fr. 25 days         Urethral Catheter 04/14/23 1630 Double-lumen 16 Fr. <1 day                    Physical Exam  Constitutional:       General: He is not in " acute distress.  HENT:      Head: Normocephalic.   Eyes:      Extraocular Movements: Extraocular movements intact.   Cardiovascular:      Rate and Rhythm: Normal rate.   Pulmonary:      Effort: Pulmonary effort is normal. No respiratory distress.   Abdominal:      Palpations: Abdomen is soft. There is no mass.   Genitourinary:     Comments: Shen draining c/y/u  Musculoskeletal:         General: No swelling or deformity.      Cervical back: Normal range of motion.   Skin:     General: Skin is warm and dry.   Neurological:      General: No focal deficit present.      Mental Status: He is alert.   Psychiatric:         Mood and Affect: Mood normal.         Behavior: Behavior normal.       Significant Labs:    BMP:  Recent Labs   Lab 04/14/23  1811      K 5.6*      CO2 22*   BUN 48*   CREATININE 2.0*   CALCIUM 10.0       CBC:  Recent Labs   Lab 04/14/23 1811   WBC 14.20*   HGB 12.0*   HCT 38.0*          All pertinent labs results from the past 24 hours have been reviewed.  Recent Lab Results         04/14/23 1811        Albumin 2.8       Alkaline Phosphatase 80       ALT 22       Anion Gap 13       AST 29  Comment: *Result may be interfered by visible hemolysis       Baso # 0.06       Basophil % 0.4       BILIRUBIN TOTAL 0.6  Comment: For infants and newborns, interpretation of results should be based  on gestational age, weight and in agreement with clinical  observations.    Premature Infant recommended reference ranges:  Up to 24 hours.............<8.0 mg/dL  Up to 48 hours............<12.0 mg/dL  3-5 days..................<15.0 mg/dL  6-29 days.................<15.0 mg/dL         BUN 48       Calcium 10.0       Chloride 104       CO2 22       Creatinine 2.0       Differential Method Automated       eGFR 34.2       Eos # 0.0       Eosinophil % 0.2       Glucose 106       Gran # (ANC) 12.3       Gran % 86.6       Hematocrit 38.0       Hemoglobin 12.0       Immature Grans (Abs) 0.10  Comment: Mild  elevation in immature granulocytes is non specific and   can be seen in a variety of conditions including stress response,   acute inflammation, trauma and pregnancy. Correlation with other   laboratory and clinical findings is essential.         Immature Granulocytes 0.7       Lactate, [place] 1.8  Comment: Falsely low lactic acid results can be found in samples   containing >=13.0 mg/dL total bilirubin and/or >=3.5 mg/dL   direct bilirubin.         Lymph # 1.0       Lymph % 7.0       MCH 31.2       MCHC 31.6       MCV 99       Mono # 0.7       Mono % 5.1       MPV 11.2       nRBC 0       Platelets 213       Potassium 5.6  Comment: *Result may be interfered by visible hemolysis       PROTEIN TOTAL 7.3  Comment: *Result may be interfered by visible hemolysis       RBC 3.85       RDW 15.0       Sodium 139       WBC 14.20               Significant Imaging:  All pertinent imaging results/findings from the past 24 hours have been reviewed.

## 2023-04-15 NOTE — SUBJECTIVE & OBJECTIVE
Past Medical History:   Diagnosis Date    Arthritis     Diabetes mellitus     type 2    Folate deficiency anemia     Heart block     History of kidney stones 05/25/2021    History of stroke with residual deficit 05/25/2021    Hyperlipidemia     Hypertension     Major depressive disorder     Pacemaker     [employer] [place] 8 DR-T (S/n: [id number])    Pyelonephritis 11/19/2021    TIA (transient ischemic attack)     Urinary retention 05/25/2021       Past Surgical History:   Procedure Laterality Date    A-V CARDIAC PACEMAKER INSERTION N/A 8/24/2021    Procedure: INSERTION, CARDIAC PACEMAKER, DUAL CHAMBER;  Surgeon: Neo Winn MD;  Location: [facility] EP LAB;  Service: Cardiology;  Laterality: N/A;  CHB, DUAL PPM, ANES, BIO, DM, ED 2    COLONOSCOPY N/A 11/22/2021    Procedure: COLONOSCOPY;  Surgeon: Cesar Dorado MD;  Location: [facility] ENDO (2ND FLR);  Service: Endoscopy;  Laterality: N/A;    CYSTOSCOPIC LITHOLAPAXY  5/25/2021    Procedure: CYSTOLITHOLAPAXY;  Surgeon: Chris Schilling MD;  [facility];  Service: Urology;;    CYSTOSCOPY  8/26/2021    Procedure: CYSTOSCOPY;  Surgeon: Camilo Kelley Jr., MD;  [facility];  Service: Urology;;    CYSTOSCOPY W/ URETERAL STENT PLACEMENT Bilateral 5/25/2021    Procedure: CYSTOSCOPY, WITH BILATERAL URETERAL STENT INSERTION;  Surgeon: Chris Schilling MD;  [facility];  Service: Urology;  Laterality: Bilateral;    ELBOW ARTHROPLASTY Right     FLUOROSCOPY  5/25/2021    Procedure: FLUOROSCOPY;  Surgeon: Chris Schilling MD;  [facility]: [facility];  Service: Urology;;    LASER LITHOTRIPSY Left 8/26/2021    Procedure: LITHOTRIPSY, USING LASER;  Surgeon: Camilo Kelley Jr., MD;  Location: [facility];  Service: Urology;  Laterality: Left;    PYELOSCOPY Bilateral 8/26/2021    Procedure: PYELOSCOPY;  Surgeon: Camilo Kelley Jr., MD;  Location: [facility];  Service: Urology;  Laterality: Bilateral;    REMOVAL OF BLOOD CLOT   "5/25/2021    Procedure: REMOVAL, BLOOD CLOT;  Surgeon: Chris Schilling MD;  Location: [facility] OR [address] [place] [address];  Service: Urology;;    REPLACEMENT OF STENT Bilateral 8/26/2021    Procedure: REPLACEMENT, STENT;  Surgeon: Camilo Kelley Jr., MD;  Location: [facility] OR [address] [place] [address];  Service: Urology;  Laterality: Bilateral;    URETEROSCOPIC REMOVAL OF URETERIC CALCULUS Bilateral 8/26/2021    Procedure: REMOVAL, CALCULUS, URETER, URETEROSCOPIC;  Surgeon: Camilo Kelley Jr., MD;  Location: [facility] OR [address] [place] [address];  Service: Urology;  Laterality: Bilateral;    URETEROSCOPY Bilateral 8/26/2021    Procedure: URETEROSCOPY;  Surgeon: Camilo Kelley Jr., MD;  Location: [facility] OR [address] [place] [address];  Service: Urology;  Laterality: Bilateral;       Review of patient's allergies indicates:  No Known Allergies    No current facility-administered medications on file prior to encounter.     Current Outpatient Medications on File Prior to Encounter   Medication Sig    atorvastatin (LIPITOR) 40 MG tablet Take 40 mg by mouth once daily.    cyproheptadine (PERIACTIN) 4 mg tablet Take 4 mg by mouth 3 (three) times daily. Itching    folic acid (FOLVITE) 1 MG tablet Take 1 mg by mouth once daily.    gabapentin (NEURONTIN) 300 MG capsule Take 300 mg by mouth 3 (three) times daily.    mirtazapine (REMERON) 30 MG tablet Take 30 mg by mouth nightly.    tamsulosin (FLOMAX) 0.4 mg Cap TAKE 2 CAPSULES(0.8 MG) BY MOUTH EVERY DAY     Family History       Problem Relation (Age of Onset)    Hyperlipidemia Mother    Mental illness Father    No Known Problems Brother    Parkinsonism Father    Stroke Mother          Tobacco Use    Smoking status: Former     Types: Cigarettes    Smokeless tobacco: Never   Substance and Sexual Activity    Alcohol use: Yes     Comment: 1/ pint whisky daily    Drug use: Yes     Types: "Crack" cocaine    Sexual activity: Not on file     Review of Systems   Constitutional:  Positive for fatigue. Negative for activity change, chills, fever and unexpected " weight change.   HENT:  Negative for congestion and sore throat.    Respiratory:  Negative for cough, shortness of breath and wheezing.    Cardiovascular:  Negative for chest pain, palpitations and leg swelling.   Gastrointestinal:  Positive for abdominal distention and abdominal pain. Negative for blood in stool, nausea and vomiting.   Genitourinary:  Negative for dysuria and hematuria.   Musculoskeletal:  Negative for arthralgias and neck pain.   Skin:  Negative for color change and rash.   Neurological:  Negative for dizziness, seizures and numbness.   Psychiatric/Behavioral:  Negative for hallucinations and suicidal ideas.    Objective:     Vital Signs (Most Recent):  Temp: 98.2 °F (36.8 °C) (04/14/23 1457)  Pulse: 92 (04/14/23 1834)  Resp: 18 (04/14/23 1834)  BP: (!) 120/58 (04/14/23 1834)  SpO2: 99 % (04/14/23 1834)   Vital Signs (24h Range):  Temp:  [98.2 °F (36.8 °C)] 98.2 °F (36.8 °C)  Pulse:  [92-99] 92  Resp:  [18] 18  SpO2:  [98 %-99 %] 99 %  BP: ()/(52-68) 120/58     Weight: 68 kg (150 lb)  Body mass index is 23.49 kg/m².    Physical Exam  Vitals reviewed.   Constitutional:       General: He is not in acute distress.     Appearance: He is well-developed. He is ill-appearing.   HENT:      Head: Normocephalic and atraumatic.   Eyes:      Extraocular Movements: Extraocular movements intact.      Pupils: Pupils are equal, round, and reactive to light.   Neck:      Vascular: No JVD.      Trachea: No tracheal deviation.   Cardiovascular:      Rate and Rhythm: Normal rate and regular rhythm.      Heart sounds: No murmur heard.    No friction rub. No gallop.   Pulmonary:      Effort: No respiratory distress.      Breath sounds: Normal breath sounds. No wheezing or rales.   Abdominal:      General: Bowel sounds are normal. There is distension.      Palpations: Abdomen is soft. There is no mass.      Tenderness: There is no abdominal tenderness.   Musculoskeletal:         General: No deformity.       Cervical back: Neck supple.   Lymphadenopathy:      Cervical: No cervical adenopathy.   Skin:     General: Skin is warm and dry.      Findings: No rash.   Neurological:      Mental Status: He is alert and oriented to person, place, and time.       CRANIAL NERVES     CN III, IV, VI   Pupils are equal, round, and reactive to light.     Significant Labs: All pertinent labs within the past 24 hours have been reviewed.    Significant Imaging: I have reviewed all pertinent imaging results/findings within the past 24 hours.

## 2023-04-15 NOTE — SUBJECTIVE & OBJECTIVE
Interval History:   Patient seen and examined at bedside.    No acute events overnight.  Patient has no acute complaints this morning.  That he had minor abdominal discomfort prior to presentation, otherwise felt well.  No abdominal pain, nausea this morning.  Will continue to monitor bowel output on regimen.  Patient updated regarding care plan.    Review of Systems   Constitutional:  Negative for activity change, appetite change, chills, diaphoresis, fever and unexpected weight change.   HENT:  Negative for congestion and sore throat.    Eyes:  Negative for visual disturbance.   Respiratory:  Negative for cough, shortness of breath and wheezing.    Cardiovascular:  Negative for chest pain, palpitations and leg swelling.   Gastrointestinal:  Positive for constipation. Negative for abdominal distention, abdominal pain, blood in stool, nausea and vomiting.   Genitourinary:  Negative for dysuria and hematuria.   Musculoskeletal:  Negative for arthralgias and neck pain.   Skin:  Negative for color change and rash.   Neurological:  Positive for weakness (residual s/p stroke). Negative for dizziness, seizures, light-headedness, numbness and headaches.   Psychiatric/Behavioral:  Negative for behavioral problems, confusion and decreased concentration.    Objective:     Vital Signs (Most Recent):  Temp: 98.4 °F (36.9 °C) (04/15/23 0807)  Pulse: 96 (04/15/23 0807)  Resp: 16 (04/15/23 0807)  BP: (!) 105/50 (04/15/23 0807)  SpO2: 95 % (04/15/23 0807)   Vital Signs (24h Range):  Temp:  [97.8 °F (36.6 °C)-98.4 °F (36.9 °C)] 98.4 °F (36.9 °C)  Pulse:  [84-99] 96  Resp:  [16-18] 16  SpO2:  [95 %-99 %] 95 %  BP: ()/(50-71) 105/50     Weight: 68 kg (150 lb)  Body mass index is 23.49 kg/m².    Intake/Output Summary (Last 24 hours) at 4/15/2023 1100  Last data filed at 4/14/2023 2111  Gross per 24 hour   Intake 1550 ml   Output 1200 ml   Net 350 ml      Physical Exam  Vitals reviewed.   Constitutional:       General: He is not  in acute distress.     Appearance: He is well-developed. He is ill-appearing (Chronically). He is not toxic-appearing or diaphoretic.   HENT:      Head: Normocephalic and atraumatic.      Right Ear: External ear normal.      Left Ear: External ear normal.      Nose: Nose normal.      Mouth/Throat:      Mouth: Mucous membranes are moist.      Pharynx: Oropharynx is clear.   Eyes:      General: No scleral icterus.        Right eye: No discharge.         Left eye: No discharge.   Neck:      Vascular: No JVD.      Trachea: No tracheal deviation.   Cardiovascular:      Rate and Rhythm: Normal rate and regular rhythm.      Heart sounds: No murmur heard.    No friction rub. No gallop.   Pulmonary:      Effort: No respiratory distress.      Breath sounds: Normal breath sounds. No wheezing or rales.   Abdominal:      General: Bowel sounds are normal.      Palpations: Abdomen is soft. There is no mass.      Tenderness: There is no abdominal tenderness.   Musculoskeletal:      Cervical back: Normal range of motion and neck supple.      Right lower leg: No edema.      Left lower leg: No edema.   Lymphadenopathy:      Cervical: No cervical adenopathy.   Skin:     General: Skin is warm and dry.      Comments: Neph tube cdi, draining urine   Neurological:      Mental Status: He is alert and oriented to person, place, and time.   Psychiatric:         Behavior: Behavior normal.       Significant Labs: All pertinent labs within the past 24 hours have been reviewed.    Recent Results (from the past 24 hour(s))   CBC auto differential    Collection Time: 04/14/23  6:11 PM   Result Value Ref Range    WBC 14.20 (H) 3.90 - 12.70 K/uL    RBC 3.85 (L) 4.60 - 6.20 M/uL    Hemoglobin 12.0 (L) 14.0 - 18.0 g/dL    Hematocrit 38.0 (L) 40.0 - 54.0 %    MCV 99 (H) 82 - 98 fL    MCH 31.2 (H) 27.0 - 31.0 pg    MCHC 31.6 (L) 32.0 - 36.0 g/dL    RDW 15.0 (H) 11.5 - 14.5 %    Platelets 213 150 - 450 K/uL    MPV 11.2 9.2 - 12.9 fL    Immature  Granulocytes 0.7 (H) 0.0 - 0.5 %    Gran # (ANC) 12.3 (H) 1.8 - 7.7 K/uL    Immature Grans (Abs) 0.10 (H) 0.00 - 0.04 K/uL    Lymph # 1.0 1.0 - 4.8 K/uL    Mono # 0.7 0.3 - 1.0 K/uL    Eos # 0.0 0.0 - 0.5 K/uL    Baso # 0.06 0.00 - 0.20 K/uL    nRBC 0 0 /100 WBC    Gran % 86.6 (H) 38.0 - 73.0 %    Lymph % 7.0 (L) 18.0 - 48.0 %    Mono % 5.1 4.0 - 15.0 %    Eosinophil % 0.2 0.0 - 8.0 %    Basophil % 0.4 0.0 - 1.9 %    Differential Method Automated    Comprehensive metabolic panel    Collection Time: 04/14/23  6:11 PM   Result Value Ref Range    Sodium 139 136 - 145 mmol/L    Potassium 5.6 (H) 3.5 - 5.1 mmol/L    Chloride 104 95 - 110 mmol/L    CO2 22 (L) 23 - 29 mmol/L    Glucose 106 70 - 110 mg/dL    BUN 48 (H) 8 - 23 mg/dL    Creatinine 2.0 (H) 0.5 - 1.4 mg/dL    Calcium 10.0 8.7 - 10.5 mg/dL    Total Protein 7.3 6.0 - 8.4 g/dL    Albumin 2.8 (L) 3.5 - 5.2 g/dL    Total Bilirubin 0.6 0.1 - 1.0 mg/dL    Alkaline Phosphatase 80 55 - 135 U/L    AST 29 10 - 40 U/L    ALT 22 10 - 44 U/L    Anion Gap 13 8 - 16 mmol/L    eGFR 34.2 (A) >60 mL/min/1.73 m^2   Lactic acid, plasma    Collection Time: 04/14/23  6:11 PM   Result Value Ref Range    Lactate (Lactic Acid) 1.8 0.5 - 2.2 mmol/L   Blood culture #1 **CANNOT BE ORDERED STAT**    Collection Time: 04/14/23  6:34 PM    Specimen: Peripheral, Antecubital, Right; Blood   Result Value Ref Range    Blood Culture, Routine No Growth to date    Blood culture #2 **CANNOT BE ORDERED STAT**    Collection Time: 04/14/23  6:34 PM    Specimen: Peripheral, Antecubital, Right; Blood   Result Value Ref Range    Blood Culture, Routine No Growth to date    Urinalysis, Reflex to Urine Culture Urine, Catheterized    Collection Time: 04/14/23  7:56 PM    Specimen: Urine   Result Value Ref Range    Specimen UA Urine, Catheterized     Color, UA Orange (A) Yellow, Straw, Kiana    Appearance, UA Ex.Turbid Clear    pH, UA >8.0 (A) 5.0 - 8.0    Specific Gravity, UA 1.015 1.005 - 1.030    Protein, UA  3+ (A) Negative    Glucose, UA Negative Negative    Ketones, UA Negative Negative    Bilirubin (UA) Negative Negative    Occult Blood UA 1+ (A) Negative    Nitrite, UA Positive (A) Negative    Leukocytes, UA 3+ (A) Negative   Urinalysis Microscopic    Collection Time: 04/14/23  7:56 PM   Result Value Ref Range    RBC, UA >100 (H) 0 - 4 /hpf    WBC, UA >100 (H) 0 - 5 /hpf    Bacteria Many (A) None-Occ /hpf    Hyaline Casts, UA 0 0-1/lpf /lpf    Triplephos Julieta, UA Many (A) None-Moderate    Microscopic Comment SEE COMMENT    Basic metabolic panel    Collection Time: 04/14/23 11:24 PM   Result Value Ref Range    Sodium 140 136 - 145 mmol/L    Potassium 4.6 3.5 - 5.1 mmol/L    Chloride 104 95 - 110 mmol/L    CO2 20 (L) 23 - 29 mmol/L    Glucose 75 70 - 110 mg/dL    BUN 48 (H) 8 - 23 mg/dL    Creatinine 1.7 (H) 0.5 - 1.4 mg/dL    Calcium 9.5 8.7 - 10.5 mg/dL    Anion Gap 16 8 - 16 mmol/L    eGFR 41.5 (A) >60 mL/min/1.73 m^2   Comprehensive Metabolic Panel (CMP)    Collection Time: 04/15/23  5:51 AM   Result Value Ref Range    Sodium 139 136 - 145 mmol/L    Potassium 4.0 3.5 - 5.1 mmol/L    Chloride 106 95 - 110 mmol/L    CO2 24 23 - 29 mmol/L    Glucose 63 (L) 70 - 110 mg/dL    BUN 46 (H) 8 - 23 mg/dL    Creatinine 1.4 0.5 - 1.4 mg/dL    Calcium 9.2 8.7 - 10.5 mg/dL    Total Protein 5.7 (L) 6.0 - 8.4 g/dL    Albumin 2.4 (L) 3.5 - 5.2 g/dL    Total Bilirubin 0.5 0.1 - 1.0 mg/dL    Alkaline Phosphatase 73 55 - 135 U/L    AST 16 10 - 40 U/L    ALT 16 10 - 44 U/L    Anion Gap 9 8 - 16 mmol/L    eGFR 52.4 (A) >60 mL/min/1.73 m^2   CBC with Automated Differential    Collection Time: 04/15/23  5:51 AM   Result Value Ref Range    WBC 9.47 3.90 - 12.70 K/uL    RBC 2.98 (L) 4.60 - 6.20 M/uL    Hemoglobin 9.1 (L) 14.0 - 18.0 g/dL    Hematocrit 30.1 (L) 40.0 - 54.0 %     (H) 82 - 98 fL    MCH 30.5 27.0 - 31.0 pg    MCHC 30.2 (L) 32.0 - 36.0 g/dL    RDW 15.1 (H) 11.5 - 14.5 %    Platelets 177 150 - 450 K/uL    MPV 10.6 9.2 -  12.9 fL    Immature Granulocytes 0.2 0.0 - 0.5 %    Gran # (ANC) 7.4 1.8 - 7.7 K/uL    Immature Grans (Abs) 0.02 0.00 - 0.04 K/uL    Lymph # 1.2 1.0 - 4.8 K/uL    Mono # 0.7 0.3 - 1.0 K/uL    Eos # 0.1 0.0 - 0.5 K/uL    Baso # 0.04 0.00 - 0.20 K/uL    nRBC 0 0 /100 WBC    Gran % 78.3 (H) 38.0 - 73.0 %    Lymph % 12.8 (L) 18.0 - 48.0 %    Mono % 7.2 4.0 - 15.0 %    Eosinophil % 1.1 0.0 - 8.0 %    Basophil % 0.4 0.0 - 1.9 %    Differential Method Automated          Significant Imaging: I have reviewed all pertinent imaging results/findings within the past 24 hours.    Imaging Results    None

## 2023-04-15 NOTE — ASSESSMENT & PLAN NOTE
-Hx of R PARDEEP stroke in 2018 with residual L sided weakness, wheelchair bound, at baseline level function  -No acute complaints/findings, continue home lipitor. Pt. Not currently on antiplatelet meds per history

## 2023-04-15 NOTE — HOSPITAL COURSE
Following disimpaction and placement of mark in addition to IVF, Cr w/ improvement. Urology following. Pt to maintain mark for 7-10 days with outpatient voiding trial.  Urine culture with pansensitive Proteus.  Patient treated with Rocephin while admitted.  He was [facility] to his correction nursing home to complete oral antibiotic therapy.

## 2023-04-15 NOTE — HPI
"Praneeth Jewell is a 77-year-old male, lives in a nursing home, history of obstructive uropathy s/p J-J stent placement, recurrent nephrolithiasis and UTIs, history of MDR UTI, R PARDEEP stroke with residual LE weakness (2015, wheelchair bound), and complete heart block s/p pacemaker placement (2021), recent admission at [facility] with urosepsis secondary to obstructive ureteral stone, requiring nephrostomy tube placement    He was seen in clinic today for outpatient workup of left ureteral stones and was found to be hypotensive and tachycardic in clinic (83/52, HR 99). Abdomen was palpably distended and tender to palpation. Bladder scan >1,000mL.  There was concern for urinary retention and fecal impaction. Due to patient's vitals and concern for vagal response after decompression of urinary bladder, he was sent to the ED.  A catheter was placed with 1200 mL of immediate urine return.         Of note, on 3/20/23 he presented to [facility] with large, impacted left ureteral stone ([id number] distal left ureter with ~4cm of steinstrasse behind the stone) with severe hydronephrosis and evidence of cortical atrophy of the left kidney. CT also showed significant fecal impaction. He was septic with UTI and underwent Left PCN placement by IR. Of note, patient is DNR. Urine culture and blood culture grew Proteus Mirabilis.      He was discharged with 2 weeks of Augmentin, although this is not on his paperwork from his nursing home.   Fecal impaction was not addressed at his most recent hospital admission. Patient reports BM's ("diarrhea probably" per patient) daily.      Patient has been living at the [facility] since 3/15/23. Prior to that he was living at home with a caregiver.   Patient voids ~3-4 times per day. He voids into a diaper.    In the ED, pt is AFVSS.  Cr 2.0 from baseline 0.8. WBC 14.  Manually disimpacted in the ED with large amount of stool removed from the rectum.    "

## 2023-04-15 NOTE — ASSESSMENT & PLAN NOTE
-Pt. Was discharged on augmentin x2 weeks after recent admit for proteus UTI and bacteremia. Unclear if he completed the course  -WBC noted to be elevated on admit, f/u U/A and start IV ceftriaxone if it's consistent with UTI

## 2023-04-15 NOTE — ASSESSMENT & PLAN NOTE
77M with multiple comorbidities and hx of L ureteral stone s/p L PCNT found to be in urinary retention.  Sent from clinic to the ED for Shen placement due to concern for possible vagal response.      -Shen placed in ED with 1200 cc of immediate urine return   - manually disimpacted in the ED with large amount of stool removed from rectum   -recommend maintain Shen for 7-10 days with outpatient voiding trial  - agree with admission to hospital medicine for further workup and failure to thrive   - recommend aggressive bowel regimen    - rest of care per primary

## 2023-04-15 NOTE — ASSESSMENT & PLAN NOTE
-Cr 2.0 on presentation, baseline normal  -Suspect 2.2 above obstructive uropathy w/ possible pre-renal component, s/p mark and fluid bolus  -Continue to trend Cr while admitted  -s/p IVF today  -improved to 0.9  -encourage p.o. fluid intake

## 2023-04-15 NOTE — ASSESSMENT & PLAN NOTE
-Pt. Was discharged on augmentin x2 weeks after recent admit for proteus UTI and bacteremia. Unclear if he completed the course  -WBC noted to be elevated on admit, and UA dirty   -continue IV Rocephin pending urine culture

## 2023-04-15 NOTE — ASSESSMENT & PLAN NOTE
-Hx of R PARDEEP stroke in 2018 with residual L sided weakness, wheelchair bound  -No acute complaints/findings, continue home lipitor. Pt. Not currently on antiplatelet meds per history

## 2023-04-16 LAB
ALBUMIN SERPL BCP-MCNC: 2 G/DL (ref 3.5–5.2)
ANION GAP SERPL CALC-SCNC: 9 MMOL/L (ref 8–16)
BASOPHILS # BLD AUTO: 0.05 K/UL (ref 0–0.2)
BASOPHILS NFR BLD: 0.7 % (ref 0–1.9)
BUN SERPL-MCNC: 36 MG/DL (ref 8–23)
CALCIUM SERPL-MCNC: 8.7 MG/DL (ref 8.7–10.5)
CHLORIDE SERPL-SCNC: 111 MMOL/L (ref 95–110)
CO2 SERPL-SCNC: 23 MMOL/L (ref 23–29)
CREAT SERPL-MCNC: 0.9 MG/DL (ref 0.5–1.4)
DIFFERENTIAL METHOD: ABNORMAL
EOSINOPHIL # BLD AUTO: 0.1 K/UL (ref 0–0.5)
EOSINOPHIL NFR BLD: 1.5 % (ref 0–8)
ERYTHROCYTE [DISTWIDTH] IN BLOOD BY AUTOMATED COUNT: 14.7 % (ref 11.5–14.5)
EST. GFR  (NO RACE VARIABLE): >60 ML/MIN/1.73 M^2
GLUCOSE SERPL-MCNC: 71 MG/DL (ref 70–110)
HCT VFR BLD AUTO: 27.2 % (ref 40–54)
HCV AB SERPL QL IA: NORMAL
HGB BLD-MCNC: 8.4 G/DL (ref 14–18)
HIV 1+2 AB+HIV1 P24 AG SERPL QL IA: NORMAL
IMM GRANULOCYTES # BLD AUTO: 0.02 K/UL (ref 0–0.04)
IMM GRANULOCYTES NFR BLD AUTO: 0.3 % (ref 0–0.5)
LYMPHOCYTES # BLD AUTO: 1.4 K/UL (ref 1–4.8)
LYMPHOCYTES NFR BLD: 18.5 % (ref 18–48)
MCH RBC QN AUTO: 30.9 PG (ref 27–31)
MCHC RBC AUTO-ENTMCNC: 30.9 G/DL (ref 32–36)
MCV RBC AUTO: 100 FL (ref 82–98)
MONOCYTES # BLD AUTO: 0.5 K/UL (ref 0.3–1)
MONOCYTES NFR BLD: 6.5 % (ref 4–15)
NEUTROPHILS # BLD AUTO: 5.4 K/UL (ref 1.8–7.7)
NEUTROPHILS NFR BLD: 72.5 % (ref 38–73)
NRBC BLD-RTO: 0 /100 WBC
PHOSPHATE SERPL-MCNC: 3.3 MG/DL (ref 2.7–4.5)
PLATELET # BLD AUTO: 182 K/UL (ref 150–450)
PMV BLD AUTO: 10 FL (ref 9.2–12.9)
POTASSIUM SERPL-SCNC: 3.6 MMOL/L (ref 3.5–5.1)
RBC # BLD AUTO: 2.72 M/UL (ref 4.6–6.2)
SODIUM SERPL-SCNC: 143 MMOL/L (ref 136–145)
WBC # BLD AUTO: 7.5 K/UL (ref 3.9–12.7)

## 2023-04-16 PROCEDURE — 96372 THER/PROPH/DIAG INJ SC/IM: CPT | Performed by: HOSPITALIST

## 2023-04-16 PROCEDURE — 63600175 PHARM REV CODE 636 W HCPCS: Performed by: HOSPITALIST

## 2023-04-16 PROCEDURE — 36415 COLL VENOUS BLD VENIPUNCTURE: CPT | Performed by: STUDENT IN AN ORGANIZED HEALTH CARE EDUCATION/TRAINING PROGRAM

## 2023-04-16 PROCEDURE — 99231 PR SUBSEQUENT HOSPITAL CARE,LEVL I: ICD-10-PCS | Mod: ,,, | Performed by: STUDENT IN AN ORGANIZED HEALTH CARE EDUCATION/TRAINING PROGRAM

## 2023-04-16 PROCEDURE — 25000003 PHARM REV CODE 250: Performed by: EMERGENCY MEDICINE

## 2023-04-16 PROCEDURE — 80069 RENAL FUNCTION PANEL: CPT | Performed by: STUDENT IN AN ORGANIZED HEALTH CARE EDUCATION/TRAINING PROGRAM

## 2023-04-16 PROCEDURE — 11000001 HC ACUTE MED/SURG PRIVATE ROOM

## 2023-04-16 PROCEDURE — 25000003 PHARM REV CODE 250: Performed by: HOSPITALIST

## 2023-04-16 PROCEDURE — 99231 SBSQ HOSP IP/OBS SF/LOW 25: CPT | Mod: ,,, | Performed by: STUDENT IN AN ORGANIZED HEALTH CARE EDUCATION/TRAINING PROGRAM

## 2023-04-16 PROCEDURE — 86803 HEPATITIS C AB TEST: CPT | Performed by: STUDENT IN AN ORGANIZED HEALTH CARE EDUCATION/TRAINING PROGRAM

## 2023-04-16 PROCEDURE — 85025 COMPLETE CBC W/AUTO DIFF WBC: CPT | Performed by: STUDENT IN AN ORGANIZED HEALTH CARE EDUCATION/TRAINING PROGRAM

## 2023-04-16 PROCEDURE — 87389 HIV-1 AG W/HIV-1&-2 AB AG IA: CPT | Performed by: STUDENT IN AN ORGANIZED HEALTH CARE EDUCATION/TRAINING PROGRAM

## 2023-04-16 RX ADMIN — Medication 6 MG: at 09:04

## 2023-04-16 RX ADMIN — CYPROHEPTADINE HYDROCHLORIDE 4 MG: 4 TABLET ORAL at 08:04

## 2023-04-16 RX ADMIN — GABAPENTIN 300 MG: 300 CAPSULE ORAL at 08:04

## 2023-04-16 RX ADMIN — HEPARIN SODIUM 5000 UNITS: 5000 INJECTION INTRAVENOUS; SUBCUTANEOUS at 03:04

## 2023-04-16 RX ADMIN — TAMSULOSIN HYDROCHLORIDE 0.8 MG: 0.4 CAPSULE ORAL at 08:04

## 2023-04-16 RX ADMIN — GABAPENTIN 300 MG: 300 CAPSULE ORAL at 03:04

## 2023-04-16 RX ADMIN — CYPROHEPTADINE HYDROCHLORIDE 4 MG: 4 TABLET ORAL at 03:04

## 2023-04-16 RX ADMIN — HEPARIN SODIUM 5000 UNITS: 5000 INJECTION INTRAVENOUS; SUBCUTANEOUS at 05:04

## 2023-04-16 RX ADMIN — MIRTAZAPINE 30 MG: 15 TABLET, FILM COATED ORAL at 09:04

## 2023-04-16 RX ADMIN — POLYETHYLENE GLYCOL 3350 17 G: 17 POWDER, FOR SOLUTION ORAL at 08:04

## 2023-04-16 RX ADMIN — GABAPENTIN 300 MG: 300 CAPSULE ORAL at 09:04

## 2023-04-16 RX ADMIN — SENNOSIDES AND DOCUSATE SODIUM 1 TABLET: 50; 8.6 TABLET ORAL at 08:04

## 2023-04-16 RX ADMIN — CYPROHEPTADINE HYDROCHLORIDE 4 MG: 4 TABLET ORAL at 09:04

## 2023-04-16 RX ADMIN — ATORVASTATIN CALCIUM 40 MG: 40 TABLET, FILM COATED ORAL at 08:04

## 2023-04-16 RX ADMIN — CEFTRIAXONE 1 G: 1 INJECTION, POWDER, FOR SOLUTION INTRAMUSCULAR; INTRAVENOUS at 09:04

## 2023-04-16 NOTE — PLAN OF CARE
Problem: Infection  Goal: Absence of Infection Signs and Symptoms  Outcome: Ongoing, Progressing     Problem: Adult Inpatient Plan of Care  Goal: Plan of Care Review  Outcome: Ongoing, Progressing  Goal: Patient-Specific Goal (Individualized)  Outcome: Ongoing, Progressing  Goal: Absence of Hospital-Acquired Illness or Injury  Outcome: Ongoing, Progressing  Goal: Optimal Comfort and Wellbeing  Outcome: Ongoing, Progressing  Goal: Readiness for Transition of Care  Outcome: Ongoing, Progressing     Problem: Diabetes Comorbidity  Goal: Blood Glucose Level Within Targeted Range  Outcome: Ongoing, Progressing     Problem: Fluid and Electrolyte Imbalance (Acute Kidney Injury/Impairment)  Goal: Fluid and Electrolyte Balance  Outcome: Ongoing, Progressing     Problem: Oral Intake Inadequate (Acute Kidney Injury/Impairment)  Goal: Optimal Nutrition Intake  Outcome: Ongoing, Progressing     Problem: Renal Function Impairment (Acute Kidney Injury/Impairment)  Goal: Effective Renal Function  Outcome: Ongoing, Progressing     Problem: Impaired Wound Healing  Goal: Optimal Wound Healing  Outcome: Ongoing, Progressing     Problem: Skin Injury Risk Increased  Goal: Skin Health and Integrity  Outcome: Ongoing, Progressing

## 2023-04-16 NOTE — SUBJECTIVE & OBJECTIVE
Interval History:   Patient seen and examined at bedside.    No acute events overnight.  Patient has no acute complaints this morning.   No abdominal pain, nausea. BM this am. Patient updated regarding care plan.    Review of Systems   Constitutional:  Negative for activity change, appetite change, chills, diaphoresis, fever and unexpected weight change.   HENT:  Negative for congestion and sore throat.    Eyes:  Negative for visual disturbance.   Respiratory:  Negative for cough, shortness of breath and wheezing.    Cardiovascular:  Negative for chest pain, palpitations and leg swelling.   Gastrointestinal:  Negative for abdominal distention, abdominal pain, blood in stool, constipation, nausea and vomiting.   Genitourinary:  Negative for dysuria and hematuria.   Musculoskeletal:  Negative for arthralgias and neck pain.   Skin:  Negative for color change and rash.   Neurological:  Positive for weakness (residual s/p stroke). Negative for dizziness, seizures, light-headedness, numbness and headaches.   Psychiatric/Behavioral:  Negative for behavioral problems, confusion and decreased concentration.    Objective:     Vital Signs (Most Recent):  Temp: 98.3 °F (36.8 °C) (04/16/23 0800)  Pulse: 89 (04/16/23 0800)  Resp: 18 (04/16/23 0800)  BP: (!) 106/56 (04/16/23 0800)  SpO2: 96 % (04/16/23 0800)   Vital Signs (24h Range):  Temp:  [98.3 °F (36.8 °C)-99 °F (37.2 °C)] 98.3 °F (36.8 °C)  Pulse:  [66-89] 89  Resp:  [16-20] 18  SpO2:  [95 %-98 %] 96 %  BP: ()/(44-60) 106/56     Weight: 68 kg (150 lb)  Body mass index is 23.49 kg/m².    Intake/Output Summary (Last 24 hours) at 4/16/2023 1040  Last data filed at 4/16/2023 0539  Gross per 24 hour   Intake --   Output 750 ml   Net -750 ml        Physical Exam  Vitals reviewed.   Constitutional:       General: He is not in acute distress.     Appearance: He is well-developed. He is ill-appearing (Chronically). He is not toxic-appearing or diaphoretic.   HENT:      Head:  Normocephalic and atraumatic.      Right Ear: External ear normal.      Left Ear: External ear normal.      Nose: Nose normal.      Mouth/Throat:      Mouth: Mucous membranes are moist.      Pharynx: Oropharynx is clear.   Eyes:      General: No scleral icterus.        Right eye: No discharge.         Left eye: No discharge.   Neck:      Vascular: No JVD.      Trachea: No tracheal deviation.   Cardiovascular:      Rate and Rhythm: Normal rate and regular rhythm.      Heart sounds: No murmur heard.    No friction rub. No gallop.   Pulmonary:      Effort: No respiratory distress.      Breath sounds: Normal breath sounds. No wheezing or rales.   Abdominal:      General: Bowel sounds are normal.      Palpations: Abdomen is soft. There is no mass.      Tenderness: There is no abdominal tenderness.   Musculoskeletal:      Cervical back: Normal range of motion and neck supple.      Right lower leg: No edema.      Left lower leg: No edema.   Lymphadenopathy:      Cervical: No cervical adenopathy.   Skin:     General: Skin is warm and dry.      Comments: Neph tube cdi, draining urine   Neurological:      Mental Status: He is alert and oriented to person, place, and time.   Psychiatric:         Behavior: Behavior normal.       Significant Labs: All pertinent labs within the past 24 hours have been reviewed.    Recent Results (from the past 24 hour(s))   CBC Auto Differential    Collection Time: 04/16/23  5:56 AM   Result Value Ref Range    WBC 7.50 3.90 - 12.70 K/uL    RBC 2.72 (L) 4.60 - 6.20 M/uL    Hemoglobin 8.4 (L) 14.0 - 18.0 g/dL    Hematocrit 27.2 (L) 40.0 - 54.0 %     (H) 82 - 98 fL    MCH 30.9 27.0 - 31.0 pg    MCHC 30.9 (L) 32.0 - 36.0 g/dL    RDW 14.7 (H) 11.5 - 14.5 %    Platelets 182 150 - 450 K/uL    MPV 10.0 9.2 - 12.9 fL    Immature Granulocytes 0.3 0.0 - 0.5 %    Gran # (ANC) 5.4 1.8 - 7.7 K/uL    Immature Grans (Abs) 0.02 0.00 - 0.04 K/uL    Lymph # 1.4 1.0 - 4.8 K/uL    Mono # 0.5 0.3 - 1.0 K/uL     Eos # 0.1 0.0 - 0.5 K/uL    Baso # 0.05 0.00 - 0.20 K/uL    nRBC 0 0 /100 WBC    Gran % 72.5 38.0 - 73.0 %    Lymph % 18.5 18.0 - 48.0 %    Mono % 6.5 4.0 - 15.0 %    Eosinophil % 1.5 0.0 - 8.0 %    Basophil % 0.7 0.0 - 1.9 %    Differential Method Automated    Hepatitis C Antibody    Collection Time: 04/16/23  5:56 AM   Result Value Ref Range    Hepatitis C Ab Non-reactive Non-reactive   HIV 1/2 Ag/Ab (4th Gen)    Collection Time: 04/16/23  5:56 AM   Result Value Ref Range    HIV 1/2 Ag/Ab Non-reactive Non-reactive   Renal function panel    Collection Time: 04/16/23  5:56 AM   Result Value Ref Range    Glucose 71 70 - 110 mg/dL    Sodium 143 136 - 145 mmol/L    Potassium 3.6 3.5 - 5.1 mmol/L    Chloride 111 (H) 95 - 110 mmol/L    CO2 23 23 - 29 mmol/L    BUN 36 (H) 8 - 23 mg/dL    Calcium 8.7 8.7 - 10.5 mg/dL    Creatinine 0.9 0.5 - 1.4 mg/dL    Albumin 2.0 (L) 3.5 - 5.2 g/dL    Phosphorus 3.3 2.7 - 4.5 mg/dL    eGFR >60.0 >60 mL/min/1.73 m^2    Anion Gap 9 8 - 16 mmol/L         Significant Imaging: I have reviewed all pertinent imaging results/findings within the past 24 hours.    Imaging Results    None

## 2023-04-16 NOTE — PLAN OF CARE
04/15/23 1800   Discharge Planning   Assessment Type Discharge Planning Brief Assessment   Resource/Environmental Concerns none   Support Systems Family members   Equipment Currently Used at Home none   Current Living Arrangements extended care facility   Care Facility Name [facility] [place]   Patient/Family Anticipates Transition to long-term care facility   Patient/Family Anticipated Services at Transition none   DME Needed Upon Discharge  wheelchair   Discharge Plan A Return to nursing home   Discharge Plan B Skilled Nursing Facility   Physical Activity   On average, how many days per week do you engage in moderate to strenuous exercise (like a brisk walk)? 0 days   On average, how many minutes do you engage in exercise at this level? 0 min   Financial Resource Strain   How hard is it for you to pay for the very basics like food, housing, medical care, and heating? Not hard   Housing Stability   In the last 12 months, was there a time when you were not able to pay the mortgage or rent on time? N   In the last 12 months, how many places have you lived? 2   In the last 12 months, was there a time when you did not have a steady place to sleep or slept in a shelter (including now)? N   Transportation Needs   In the past 12 months, has lack of transportation kept you from medical appointments or from getting medications? no   In the past 12 months, has lack of transportation kept you from meetings, work, or from getting things needed for daily living? No   Food Insecurity   Within the past 12 months, you worried that your food would run out before you got the money to buy more. Never true   Within the past 12 months, the food you bought just didn't last and you didn't have money to get more. Never true   Stress   Do you feel stress - tense, restless, nervous, or anxious, or unable to sleep at night because your mind is troubled all the time - these days? Not at all   Social  Connections   In a typical week, how many times do you talk on the phone with family, friends, or neighbors? Three   How often do you get together with friends or relatives? Once   How often do you attend Judaism or Episcopalian services? Never   Do you belong to any clubs or organizations such as Judaism groups, unions, fraternal or athletic groups, or school groups? No   How often do you attend meetings of the clubs or organizations you belong to? Never   Are you , , , , never , or living with a partner? Never marrie   Alcohol Use   Q1: How often do you have a drink containing alcohol? Never   Q2: How many drinks containing alcohol do you have on a typical day when you are drinking? None   Q3: How often do you have six or more drinks on one occasion? Never     SW met with pt. At bedside to complete Brief Discharge Assessment. Pt. States he has a home in [place], [place] where he lived alone, but states he currently lives in the [facility] in [place], [place]. Pt. states he does not walk or stand on his own. States he has no DME. No Dialysis or Coumadin. Pt's family/caregivers are brother, Pat McTopy of [employer] Friend, [place]. Pt. will need transportation upon discharge. DME: Wheelchair?    Noy Hillman LMSW

## 2023-04-16 NOTE — ASSESSMENT & PLAN NOTE
-Pt. With L ureteral stone s/p nephorstomy tube p/w signs of lower urinary tract obstruction with suprapubic tenderness and >1L noted on bladder scan  -Floey placed in ED with 1500 cc return. Manual fecal disimpaction in ED.  -F/U urine culture as UA concerning for infection, pt. recently treated for proteus UTI w/ bacteremia  -Urology consult for further recommendations, appreciate recommendations  - continue home med listed flomax 0.8

## 2023-04-16 NOTE — CONSULTS
"[facility]  Adult Nutrition  Consult Note    SUMMARY     Recommendations    1. Continue Regular diet    2. Add Boost Plus (chocolate) TID      3. RD to monitor and follow    Goals: Meet % EEN, EPN by RD f/u  Nutrition Goal Status: new  Communication of RD Recs:  (POC)    Assessment and Plan    Nutrition Problem:  Inadequate energy intake     Related to (etiology):   Inability to consume sufficient energy      Signs and Symptoms (as evidenced by):   Pt reports poor appetite     Interventions/Recommendations (treatment strategy):  Collaboration with providers    Nutrition Diagnosis Status:   New     Reason for Assessment    Reason For Assessment: consult  Diagnosis:  (Obstructive uropathy)  Relevant Medical History: CVA, GURVINDER, complete heart block, fecal impaction  Interdisciplinary Rounds: did not attend    General Information Comments:   Pt reports poor PO intake this AM. Denies N/V, chewing/swallowing difficulties. Per H&P, pt with significant fecal impaction - on bowel regimen. Pt unsure of UBW. Per wt hx, pt gained 16 lb (12%) since March. NFPE completed today, pt appears nourished. Pt agree to take ONS Boost Plus, prefer chocolate flavor.     Wt Readings from Last 10 Encounters:   04/14/23 68 kg (150 lb)   04/14/23 64 kg (141 lb 1.5 oz)   03/23/23 61.2 kg (134 lb 14.7 oz)   09/23/22 76.6 kg (168 lb 14 oz)   12/17/21 81 kg (178 lb 9.2 oz)   12/01/21 81 kg (178 lb 9.2 oz)   11/19/21 81.6 kg (180 lb)   09/17/21 82 kg (180 lb 12.4 oz)   09/13/21 90 kg (198 lb 6.6 oz)   08/24/21 90.7 kg (200 lb)     Nutrition Discharge Planning: Adequate PO intake    Nutrition Risk Screen    Nutrition Risk Screen: no indicators present    Nutrition/Diet History    Spiritual, Cultural Beliefs, Restorationism Practices, Values that Affect Care: no    Anthropometrics    Temp: 97.8 °F (36.6 °C)  Height Method: Stated  Height: 5' 7" (170.2 cm)  Height (inches): 67 in  Weight: 68 kg (150 lb)  Weight (lb): 150 lb  Ideal " Body Weight (IBW), Male: 148 lb  % Ideal Body Weight, Male (lb): 101.35 %  BMI (Calculated): 23.5     Lab/Procedures/Meds    Pertinent Labs Reviewed: reviewed  Pertinent Labs Comments: BUN 36, albumin 2.0  Pertinent Medications Reviewed: reviewed  Pertinent Medications Comments: atorvastatin, cyphroheptadine, gabapentin, mirtazapine, heparin, polyethylene glycol    Estimated/Assessed Needs    Weight Used For Calorie Calculations: 68 kg (150 lb)  Energy Calorie Requirements (kcal): 1357-2930 kcal  Energy Need Method: Kcal/kg (25-30)  Protein Requirements: 68-81g (1-1.2g/kg)  Weight Used For Protein Calculations: 68 kg (150 lb)  Fluid Requirements (mL): 1ml/kcal or per MD  Estimated Fluid Requirement Method: RDA Method  RDA Method (mL): 1700     Nutrition Prescription Ordered    Current Diet Order: Cardiac    Evaluation of Received Nutrient/Fluid Intake    I/O: - 0.4 L since admit  Energy Calories Required: not meeting needs  Protein Required: not meeting needs  Comments: LBM 4/16  Tolerance: tolerating    Nutrition Risk    Level of Risk/Frequency of Follow-up:  (1x/week)     Monitor and Evaluation    Food and Nutrient Intake: energy intake, food and beverage intake  Food and Nutrient Adminstration: diet order  Knowledge/Beliefs/Attitudes: food and nutrition knowledge/skill  Physical Activity and Function: nutrition-related ADLs and IADLs  Anthropometric Measurements: height/length, weight, weight change, body mass index  Biochemical Data, Medical Tests and Procedures: electrolyte and renal panel, gastrointestinal profile, glucose/endocrine profile, lipid profile, inflammatory profile  Nutrition-Focused Physical Findings: overall appearance     Nutrition Follow-Up    RD Follow-up?: Yes

## 2023-04-16 NOTE — PHARMACY MED REC
"Admission Medication History     The home medication history was taken by David Mares.    You may go to "Admission" then "Reconcile Home Medications" tabs to review and/or act upon these items.     The home medication list has been updated by the Pharmacy department.   Please read ALL comments highlighted in yellow.   Please address this information as you see fit.    Feel free to contact us if you have any questions or require assistance.        Medications listed below were obtained from: Patient/family  PTA Medications   Medication Sig    atorvastatin (LIPITOR) 40 MG tablet   Take 40 mg by mouth once daily.    cyproheptadine (PERIACTIN) 4 mg tablet   Take 4 mg by mouth 3 (three) times daily. Itching    folic acid (FOLVITE) 1 MG tablet   Take 1 mg by mouth once daily.    gabapentin (NEURONTIN) 300 MG capsule   Take 300 mg by mouth 3 (three) times daily.    mirtazapine (REMERON) 30 MG tablet   Take 30 mg by mouth nightly.    tamsulosin (FLOMAX) 0.4 mg Cap   TAKE 2 CAPSULES(0.8 MG) BY MOUTH EVERY DAY (Patient not taking: Reported on 4/16/2023)       Potential issues to be addressed PRIOR TO DISCHARGE  Please discuss with the patient barriers to adherence with medication treatment plans  Patient requires education regarding drug therapies     David Mares  EXT [phone]                  .        "

## 2023-04-16 NOTE — PLAN OF CARE
Problem: Infection  Goal: Absence of Infection Signs and Symptoms  4/16/2023 1753 by Carole Najera RN  Outcome: Ongoing, Progressing  4/16/2023 1752 by Carole Najera RN  Outcome: Ongoing, Progressing     Problem: Adult Inpatient Plan of Care  Goal: Plan of Care Review  4/16/2023 1753 by Carole Najera RN  Outcome: Ongoing, Progressing  4/16/2023 1752 by Carole Najera RN  Outcome: Ongoing, Progressing  Goal: Patient-Specific Goal (Individualized)  4/16/2023 1753 by Carole Najera RN  Outcome: Ongoing, Progressing  4/16/2023 1752 by Carole Najera RN  Outcome: Ongoing, Progressing  Goal: Absence of Hospital-Acquired Illness or Injury  4/16/2023 1753 by Carole Najera RN  Outcome: Ongoing, Progressing  4/16/2023 1752 by Carole Najera RN  Outcome: Ongoing, Progressing  Goal: Optimal Comfort and Wellbeing  4/16/2023 1753 by Carole Najera RN  Outcome: Ongoing, Progressing  4/16/2023 1752 by Carole Najera RN  Outcome: Ongoing, Progressing  Goal: Readiness for Transition of Care  4/16/2023 1753 by Carole Najera RN  Outcome: Ongoing, Progressing  4/16/2023 1752 by Carole Najera RN  Outcome: Ongoing, Progressing     Problem: Diabetes Comorbidity  Goal: Blood Glucose Level Within Targeted Range  4/16/2023 1753 by Carole Najera RN  Outcome: Ongoing, Progressing  4/16/2023 1752 by Carole Najera RN  Outcome: Ongoing, Progressing     Problem: Fluid and Electrolyte Imbalance (Acute Kidney Injury/Impairment)  Goal: Fluid and Electrolyte Balance  4/16/2023 1753 by Carole Najera RN  Outcome: Ongoing, Progressing  4/16/2023 1752 by Carole Najera RN  Outcome: Ongoing, Progressing     Problem: Oral Intake Inadequate (Acute Kidney Injury/Impairment)  Goal: Optimal Nutrition Intake  4/16/2023 1753 by Carole Najera RN  Outcome: Ongoing, Progressing  4/16/2023 1752 by Carole Najera RN  Outcome: Ongoing, Progressing     Problem: Renal Function Impairment (Acute Kidney Injury/Impairment)  Goal: Effective Renal Function  4/16/2023 1753 by Carole  LIZZ Najera  Outcome: Ongoing, Progressing  4/16/2023 1752 by Carole Najera RN  Outcome: Ongoing, Progressing     Problem: Impaired Wound Healing  Goal: Optimal Wound Healing  4/16/2023 1753 by Carole Najera RN  Outcome: Ongoing, Progressing  4/16/2023 1752 by Carole Najera RN  Outcome: Ongoing, Progressing     Problem: Skin Injury Risk Increased  Goal: Skin Health and Integrity  4/16/2023 1753 by Carole Najera RN  Outcome: Ongoing, Progressing  4/16/2023 1752 by Carole Najera RN  Outcome: Ongoing, Progressing

## 2023-04-16 NOTE — PLAN OF CARE
Recommendations    1. Continue Regular diet    2. Add Boost Plus (chocolate) TID      3. RD to monitor and follow    Goals: Meet % EEN, EPN by RD f/u  Nutrition Goal Status: new  Communication of RD Recs:  (POC)

## 2023-04-16 NOTE — NURSING
Patient sitting up with HOB elevated at 45 degrees. Resp even and unlabored. No acute resp distress noted. Patient requesting for food and drink at this time. Patient offered sandwich and fluids at this time. Patient denies complaints/concerns at this time. Bed in low position. Call light within reach. Patient instructed to call for needs/assistance. Patient verbalized understanding.

## 2023-04-17 VITALS
HEART RATE: 69 BPM | SYSTOLIC BLOOD PRESSURE: 101 MMHG | TEMPERATURE: 99 F | OXYGEN SATURATION: 96 % | WEIGHT: 150 LBS | HEIGHT: 67 IN | DIASTOLIC BLOOD PRESSURE: 53 MMHG | RESPIRATION RATE: 18 BRPM | BODY MASS INDEX: 23.54 KG/M2

## 2023-04-17 LAB
ALBUMIN SERPL BCP-MCNC: 2 G/DL (ref 3.5–5.2)
ANION GAP SERPL CALC-SCNC: 10 MMOL/L (ref 8–16)
BACTERIA UR CULT: ABNORMAL
BACTERIA UR CULT: ABNORMAL
BASOPHILS # BLD AUTO: 0.03 K/UL (ref 0–0.2)
BASOPHILS NFR BLD: 0.6 % (ref 0–1.9)
BUN SERPL-MCNC: 28 MG/DL (ref 8–23)
CALCIUM SERPL-MCNC: 8.7 MG/DL (ref 8.7–10.5)
CHLORIDE SERPL-SCNC: 109 MMOL/L (ref 95–110)
CO2 SERPL-SCNC: 23 MMOL/L (ref 23–29)
CREAT SERPL-MCNC: 0.8 MG/DL (ref 0.5–1.4)
DIFFERENTIAL METHOD: ABNORMAL
EOSINOPHIL # BLD AUTO: 0.1 K/UL (ref 0–0.5)
EOSINOPHIL NFR BLD: 2.7 % (ref 0–8)
ERYTHROCYTE [DISTWIDTH] IN BLOOD BY AUTOMATED COUNT: 14.5 % (ref 11.5–14.5)
EST. GFR  (NO RACE VARIABLE): >60 ML/MIN/1.73 M^2
GLUCOSE SERPL-MCNC: 77 MG/DL (ref 70–110)
HCT VFR BLD AUTO: 27.2 % (ref 40–54)
HGB BLD-MCNC: 8.5 G/DL (ref 14–18)
IMM GRANULOCYTES # BLD AUTO: 0.02 K/UL (ref 0–0.04)
IMM GRANULOCYTES NFR BLD AUTO: 0.4 % (ref 0–0.5)
LYMPHOCYTES # BLD AUTO: 1.4 K/UL (ref 1–4.8)
LYMPHOCYTES NFR BLD: 27.4 % (ref 18–48)
MCH RBC QN AUTO: 30.6 PG (ref 27–31)
MCHC RBC AUTO-ENTMCNC: 31.3 G/DL (ref 32–36)
MCV RBC AUTO: 98 FL (ref 82–98)
MONOCYTES # BLD AUTO: 0.4 K/UL (ref 0.3–1)
MONOCYTES NFR BLD: 8.3 % (ref 4–15)
NEUTROPHILS # BLD AUTO: 3.2 K/UL (ref 1.8–7.7)
NEUTROPHILS NFR BLD: 60.6 % (ref 38–73)
NRBC BLD-RTO: 0 /100 WBC
PHOSPHATE SERPL-MCNC: 2.9 MG/DL (ref 2.7–4.5)
PLATELET # BLD AUTO: 200 K/UL (ref 150–450)
PMV BLD AUTO: 10.3 FL (ref 9.2–12.9)
POTASSIUM SERPL-SCNC: 3.7 MMOL/L (ref 3.5–5.1)
RBC # BLD AUTO: 2.78 M/UL (ref 4.6–6.2)
SODIUM SERPL-SCNC: 142 MMOL/L (ref 136–145)
WBC # BLD AUTO: 5.21 K/UL (ref 3.9–12.7)

## 2023-04-17 PROCEDURE — 80069 RENAL FUNCTION PANEL: CPT | Performed by: STUDENT IN AN ORGANIZED HEALTH CARE EDUCATION/TRAINING PROGRAM

## 2023-04-17 PROCEDURE — 99239 PR HOSPITAL DISCHARGE DAY,>30 MIN: ICD-10-PCS | Mod: 95,,, | Performed by: INTERNAL MEDICINE

## 2023-04-17 PROCEDURE — 85025 COMPLETE CBC W/AUTO DIFF WBC: CPT | Performed by: STUDENT IN AN ORGANIZED HEALTH CARE EDUCATION/TRAINING PROGRAM

## 2023-04-17 PROCEDURE — 99239 HOSP IP/OBS DSCHRG MGMT >30: CPT | Mod: 95,,, | Performed by: INTERNAL MEDICINE

## 2023-04-17 PROCEDURE — 25000003 PHARM REV CODE 250: Performed by: HOSPITALIST

## 2023-04-17 PROCEDURE — 36415 COLL VENOUS BLD VENIPUNCTURE: CPT | Performed by: STUDENT IN AN ORGANIZED HEALTH CARE EDUCATION/TRAINING PROGRAM

## 2023-04-17 RX ORDER — DOCUSATE SODIUM 100 MG/1
100 CAPSULE, LIQUID FILLED ORAL DAILY
Refills: 0
Start: 2023-04-17

## 2023-04-17 RX ORDER — BISACODYL 10 MG
10 SUPPOSITORY, RECTAL RECTAL DAILY PRN
Refills: 0
Start: 2023-04-17

## 2023-04-17 RX ORDER — AMOXICILLIN AND CLAVULANATE POTASSIUM 875; 125 MG/1; MG/1
1 TABLET, FILM COATED ORAL EVERY 12 HOURS
Qty: 22 TABLET | Refills: 0 | Status: SHIPPED | OUTPATIENT
Start: 2023-04-17 | End: 2023-04-28

## 2023-04-17 RX ORDER — POLYETHYLENE GLYCOL 3350 17 G/17G
17 POWDER, FOR SOLUTION ORAL 2 TIMES DAILY
Refills: 0
Start: 2023-04-17

## 2023-04-17 RX ADMIN — ATORVASTATIN CALCIUM 40 MG: 40 TABLET, FILM COATED ORAL at 08:04

## 2023-04-17 RX ADMIN — CYPROHEPTADINE HYDROCHLORIDE 4 MG: 4 TABLET ORAL at 08:04

## 2023-04-17 RX ADMIN — GABAPENTIN 300 MG: 300 CAPSULE ORAL at 08:04

## 2023-04-17 RX ADMIN — TAMSULOSIN HYDROCHLORIDE 0.8 MG: 0.4 CAPSULE ORAL at 08:04

## 2023-04-17 RX ADMIN — POLYETHYLENE GLYCOL 3350 17 G: 17 POWDER, FOR SOLUTION ORAL at 08:04

## 2023-04-17 NOTE — PLAN OF CARE
04/17/23 1154   Post-Acute Status   Post-Acute Authorization Placement   Post-Acute Placement Status Referrals Sent     Pt is a resident of [facility], and is expected to return once he is medically stable. MEGAN attempted to speak with someone in admissions regarding pt coming back to the facility today; however, had to leave a message for them to return the call.    MEGAN sent a referral via [employer] and fax for review.    MEGAN will continue to follow up.    Alejandrina Castillo LMSW  [facility]  Ext. [phone]

## 2023-04-17 NOTE — PLAN OF CARE
[facility]  Discharge Final Note    Primary Care Provider: Richard Galloway MD    Expected Discharge Date: 4/17/2023    Patient discharged to [facility] via facility transportation.     Patient's bedside nurse and patient notified of the above.      Final Discharge Note (most recent)       Final Note - 04/17/23 1625          Final Note    Assessment Type Final Discharge Note (P)      Anticipated Discharge Disposition prison Nursing Home (P)                      Important Message from Medicare  Important Message from Medicare regarding Discharge Appeal Rights: Given to patient/caregiver, Explained to patient/caregiver, Signed/date by patient/caregiver     Date IMM was signed: 04/17/23  Time IMM was signed: 1142        Future Appointments   Date Time Provider Department Center   5/5/2023  2:00 PM [facility] UROLOGY PHYSICIAN CLINIC [facility] UROLOG [facility]   6/4/2023 10:00 AM HOME MONITOR DEVICE CHECK, [facility]        scheduled post-discharge follow-up appointment and information added to AVS.     Alejandrina Castillo LMSW  [facility]  Ext. [phone]

## 2023-04-17 NOTE — PLAN OF CARE
04/17/23 1350   Post-Acute Status   Post-Acute Authorization Placement   Post-Acute Placement Status Set-up Complete/Auth obtained     Pt is discharging to [facility] located at [address] [facility], [place] [address]. LIZZ Lion can call report to [phone] and ask for the [address] 3 Nurse.    MEGAN spoke with Kasandra in admissions who reported that they will send someone to pick pt up.    MEGAN will continue to follow up for discharge planning needs.      Alejandrina Castillo LMSW  [facility]  Ext. [phone]

## 2023-04-17 NOTE — PLAN OF CARE
[facility]    FACILITY TRANSFER ORDERS      Patient Name: Praneeth Jewell  YOB: 1945    PCP: Richard Galloway MD   PCP Address: [address] / [place] [address]  PCP Phone Number: [phone]  PCP Fax: [phone]    Encounter Date: 04/17/2023     Admit to: nursing home nursing home    Diagnoses:   Active Hospital Problems    Diagnosis  POA    *Obstructive uropathy [N13.9]  Yes    History of complete heart block [Z86.79]  Not Applicable    Fecal impaction [K56.41]  Yes    GURVINDER (acute kidney injury) [N17.9]  Yes    UTI (urinary tract infection) [N39.0]  Yes    History of CVA with residual deficit [I69.30]  Not Applicable      Resolved Hospital Problems   No resolved problems to display.     Allergies: Review of patient's allergies indicates:  No Known Allergies    Code Status: DNR    Vitals: Routine       Diet: regular diet    Activity: Activity as tolerated    Nursing Precautions: Aspiration , Fall, Seizure, and Pressure ulcer prevention    Bed/Surface: Low Air Loss    Consults: PT to evaluate and treat- 5 times a week, OT to evaluate and treat- 5 times a week, and Nutrition to evaluate and recommend diet    Oxygen: room air    Dialysis: Patient is not on dialysis.     Labs:   CBC, CMP, Mag, Phos twice weekly for 2 weeks    Pending Diagnostic Studies:       None            Miscellaneous Care:   Shen Care: Empty Shen bag every shift.  Voiding trial with outpatient urologist.     Routine Skin for Bedridden Patients: Apply moisture barrier cream to all skin folds and wet areas in perineal area daily and after baths and all bowel movements. and     Nephrostomy tube care: tube to gravit; flush tube with normal saline 10 cc twice daily; monitor for signs of infection at entry site; anticipate replacement in 10 weeks by Interventional radiology    WOUND CARE ORDERS  None       Medications: Discontinue all previous medication orders, if any. See new list below.  Current Discharge  Medication List        START taking these medications    Details   amoxicillin-clavulanate 875-125mg (AUGMENTIN) 875-125 mg per tablet Take 1 tablet by mouth every 12 (twelve) hours. for 11 days  Qty: 22 tablet, Refills: 0      bisacodyL (DULCOLAX, BISACODYL,) 10 mg Supp Place 1 suppository (10 mg total) rectally daily as needed (constipation).  Refills: 0      docusate sodium (COLACE) 100 MG capsule Take 1 capsule (100 mg total) by mouth once daily.  Refills: 0      polyethylene glycol (GLYCOLAX) 17 gram PwPk Take 17 g by mouth 2 (two) times daily.  Refills: 0           CONTINUE these medications which have NOT CHANGED    Details   atorvastatin (LIPITOR) 40 MG tablet Take 40 mg by mouth once daily.      cyproheptadine (PERIACTIN) 4 mg tablet Take 4 mg by mouth 3 (three) times daily. Itching      folic acid (FOLVITE) 1 MG tablet Take 1 mg by mouth once daily.      gabapentin (NEURONTIN) 300 MG capsule Take 300 mg by mouth 3 (three) times daily.      mirtazapine (REMERON) 30 MG tablet Take 30 mg by mouth nightly.      tamsulosin (FLOMAX) 0.4 mg Cap TAKE 2 CAPSULES(0.8 MG) BY MOUTH EVERY DAY  Qty: 60 capsule, Refills: 11           Follow up:       Future Appointments   Date Time Provider Department Center   5/5/2023  2:00 PM [facility] UROLOGY PHYSICIAN CLINIC [facility] UROLOG [facility]   6/4/2023 10:00 AM HOME MONITOR DEVICE CHECK, [facility] BIBI Abebe hernesto               _________________________________  Jenny Smith MD  04/17/2023

## 2023-04-17 NOTE — CONSULTS
Thank you for your consult to [facility]. We have reviewed the patient chart. This patient does meet criteria for [facility] service at this time. Will assume care on 04/17/23 at 7AM.    Jenny Smith MD

## 2023-04-17 NOTE — NURSING
Report given to LIZZ Piper at [facility]. Nurse verbalized understanding of discharge orders.

## 2023-04-17 NOTE — NURSING
Pt AAOX4. No distress noted. Bed in lowest position. Side rails up x2. Call bell and personal belongs within reach. Telemetry maintained. Shen in place. Bed alarm activated. Safety precautions maintained. Pt free of falls or injuries. No further issues or concerns at this time. Plan of care continues.

## 2023-04-19 ENCOUNTER — OUTSIDE PLACE OF SERVICE (OUTPATIENT)
Dept: ADMINISTRATIVE | Facility: OTHER | Age: 78
End: 2023-04-19
Payer: MEDICARE

## 2023-04-19 LAB
BACTERIA BLD CULT: NORMAL
BACTERIA BLD CULT: NORMAL

## 2023-04-19 PROCEDURE — 99308 PR NURSING FAC CARE, SUBSEQ, MINOR COMPLIC: ICD-10-PCS | Mod: ,,, | Performed by: FAMILY MEDICINE

## 2023-04-19 PROCEDURE — 99308 SBSQ NF CARE LOW MDM 20: CPT | Mod: ,,, | Performed by: FAMILY MEDICINE

## 2023-04-27 ENCOUNTER — TELEPHONE (OUTPATIENT)
Dept: UROLOGY | Facility: CLINIC | Age: 78
End: 2023-04-27
Payer: MEDICARE

## 2023-04-27 NOTE — TELEPHONE ENCOUNTER
[facility] spoke with [facility] and gave her patient's new appointment with , due to resident's clinic cancellation.

## 2023-04-29 NOTE — ASSESSMENT & PLAN NOTE
-Pt. Was discharged on augmentin x2 weeks after recent admit for proteus UTI and bacteremia. Unclear if he completed the course  -WBC noted to be elevated on admit, and UA dirty   -continue IV Rocephin in hospital; pansensitive Proteus on culture

## 2023-04-29 NOTE — DISCHARGE SUMMARY
[facility] - Observation [address] [place] [address] Medicine  Discharge Summary      Patient Name: Praneeth Jewell  MRN: [id number]  Patient Class: IP- Inpatient  Admission Date: 4/14/2023  Hospital Length of Stay: 1 days  Discharge Date and Time: 4/17/2023  2:52 PM  Attending Physician: No att. 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 Pt. Was recently admitted to [facility] 3/19-3/23 for sepsis 2/2 impacted L ureteral stone. CT revealed [id number] distal left ureter with ~4cm of steinstrasse behind the stone) with severe hydronephrosis and evidence of cortical atrophy of the left kidney as well as significant fecal impaction. Pt. Was noted to be bacteremic with pansensitive proteus growing in urine and blood cultures. A L sided nephrostomy tube was plaved by IR, and he was discharged on augmentin to complete a 2 week course. Today, the patient presented to urology clinic for f/u and he was noted to be mildly hypotensive (83/52) with suprapubic tenderness on exam and distended abdomen. Bladder scan revealed >1L. A urine sample was obtained from the patient's neph tube, and the patient was referred to the ED. In the ED, a mark was placed with return of about 1,500 cc of urine.      * No surgery found *      Hospital Course:   Following disimpaction and placement of mark in addition to IVF, Cr w/ improvement. Urology following. Pt to maintain mark for 7-10 days with outpatient voiding trial.  Urine culture with pansensitive Proteus.  Patient treated with Rocephin while admitted.  He was discharged to his halfway nursing home to complete oral antibiotic therapy.       Goals of Care Treatment Preferences:  Code Status:  DNR          What is most important right now is to focus on improvement in condition but with limits to invasive therapies.  Accordingly, we have decided that the best plan to meet the patient's goals includes continuing with treatment.      Consults:   Consults (From admission, onward)        Status Ordering Provider     Inpatient virtual consult to Hospital Medicine  Once        Provider:  (Not yet assigned)    Completed WALTER MIDDLETON     Inpatient consult to Registered Dietitian/Nutritionist  Once        Provider:  (Not yet assigned)    Completed MARTHA CALLOWAY     Inpatient consult to Urology  Once        Provider:  (Not yet assigned)    Completed MARTHA CALLOWAY          Neuro  History of CVA with residual deficit  -Hx of R PARDEEP stroke in 2018 with residual L sided weakness, wheelchair bound, at baseline level function  -No acute complaints/findings, continue home lipitor. Pt. Not currently on antiplatelet meds per history      Cardiac/Vascular  History of complete heart block  -S/p pacemaker 2021, EKG shows paced rhythm, no acute issues      Renal/  * Obstructive uropathy  -Pt. With L ureteral stone s/p nephorstomy tube p/w signs of lower urinary tract obstruction with suprapubic tenderness and >1L noted on bladder scan  -Floey placed in ED with 1500 cc return. Manual fecal disimpaction in ED.  -F/U urine culture as UA concerning for infection, pt. recently treated for proteus UTI w/ bacteremia  -Urology consult for further recommendations, appreciate recommendations  - continue home med listed flomax 0.8  -f/u with urology    GURVINDER (acute kidney injury)  -Cr 2.0 on presentation, baseline normal  -Suspect 2.2 above obstructive uropathy w/ possible pre-renal component, s/p mark and fluid bolus  -Continue to trend Cr while admitted  -s/p IVF today  -improved to 0.9  -encourage p.o. fluid intake      UTI (urinary tract infection)  -Pt. Was discharged on augmentin x2 weeks after recent admit for proteus UTI and  "bacteremia. Unclear if he completed the course  -WBC noted to be elevated on admit, and UA dirty   -continue IV Rocephin in hospital; pansensitive Proteus on culture      GI  Fecal impaction  -CT from 3/2023 showed "bladder displaced cephalad secondary to the large fecal impaction". Per report at [facility], pt. receieved enemas, but on physical exam today pt. Noted to have large amount of stool in rectal vault  -Suspect contributing to obstructive uropathy, manually disempacted in ED followed by enema  -Continue aggressive bowel regimen with miralax, senna-colace., magnesium hydroxide. Can continue to order enemas PRN      Final Active Diagnoses:    Diagnosis Date Noted POA    PRINCIPAL PROBLEM:  Obstructive uropathy [N13.9] 04/14/2023 Yes    History of complete heart block [Z86.79] 04/14/2023 Not Applicable    Fecal impaction [K56.41] 04/14/2023 Yes    GURVINDER (acute kidney injury) [N17.9] 03/22/2023 Yes    UTI (urinary tract infection) [N39.0] 08/05/2021 Yes    History of CVA with residual deficit [I69.30] 05/25/2021 Not Applicable      Problems Resolved During this Admission:       Discharged Condition: stable    Disposition: Skilled Nursing Facility    Follow Up:    Patient Instructions:   No discharge procedures on file.    Significant Diagnostic Studies: as above    Pending Diagnostic Studies:     None         Medications:  Reconciled Home Medications:      Medication List      START taking these medications    amoxicillin-clavulanate 875-125mg 875-125 mg per tablet  Commonly known as: AUGMENTIN  Take 1 tablet by mouth every 12 (twelve) hours. for 11 days     bisacodyL 10 mg Supp  Commonly known as: DULCOLAX (BISACODYL)  Place 1 suppository (10 mg total) rectally daily as needed (constipation).     docusate sodium 100 MG capsule  Commonly known as: COLACE  Take 1 capsule (100 mg total) by mouth once daily.     polyethylene glycol 17 gram Pwpk  Commonly known as: GLYCOLAX  Take 17 g by mouth 2 (two) times " daily.     tamsulosin 0.4 mg Cap  Commonly known as: FLOMAX  TAKE 2 CAPSULES(0.8 MG) BY MOUTH EVERY DAY        CONTINUE taking these medications    atorvastatin 40 MG tablet  Commonly known as: LIPITOR  Take 40 mg by mouth once daily.     cyproheptadine 4 mg tablet  Commonly known as: PERIACTIN  Take 4 mg by mouth 3 (three) times daily. Itching     folic acid 1 MG tablet  Commonly known as: FOLVITE  Take 1 mg by mouth once daily.     gabapentin 300 MG capsule  Commonly known as: NEURONTIN  Take 300 mg by mouth 3 (three) times daily.     mirtazapine 30 MG tablet  Commonly known as: REMERON  Take 30 mg by mouth nightly.            Indwelling Lines/Drains at time of discharge:   Lines/Drains/Airways     Drain  Duration                Nephrostomy 03/20/23 1418 Left 10 Fr. 39 days                Time spent on the discharge of patient: 40 minutes         The attending portion of this evaluation, treatment, and documentation was performed per Jenny Smith MD via Telemedicine AudioVisual using the secure "[employer] software platform with 2 way audio/video. The provider was located off-site and the patient is located in the hospital. The aforementioned video software was utilized to document the relevant history and physical exam    Jenny Smith MD  Department of Hospital Medicine  [facility] - Observation 11H

## 2023-04-29 NOTE — ASSESSMENT & PLAN NOTE
"-CT from 3/2023 showed "bladder displaced cephalad secondary to the large fecal impaction". Per report at [facility], pt. receieved enemas, but on physical exam today pt. Noted to have large amount of stool in rectal vault  -Suspect contributing to obstructive uropathy, manually disempacted in ED followed by enema  -Continue aggressive bowel regimen with miralax, senna-colace., magnesium hydroxide. Can continue to order enemas PRN  "

## 2023-04-29 NOTE — ASSESSMENT & PLAN NOTE
-Pt. With L ureteral stone s/p nephorstomy tube p/w signs of lower urinary tract obstruction with suprapubic tenderness and >1L noted on bladder scan  -Floey placed in ED with 1500 cc return. Manual fecal disimpaction in ED.  -F/U urine culture as UA concerning for infection, pt. recently treated for proteus UTI w/ bacteremia  -Urology consult for further recommendations, appreciate recommendations  - continue home med listed flomax 0.8  -f/u with urology

## 2023-05-16 ENCOUNTER — TELEPHONE (OUTPATIENT)
Dept: UROLOGY | Facility: CLINIC | Age: 78
End: 2023-05-16
Payer: MEDICARE

## 2023-05-16 NOTE — TELEPHONE ENCOUNTER
I spoke with the [facility] appt and  will have patient come for appt with  on 5-25-23 at 2pm. Patient is wheelchair bound.

## 2023-05-18 RX ORDER — TAMSULOSIN HYDROCHLORIDE 0.4 MG/1
CAPSULE ORAL
Qty: 60 CAPSULE | Refills: 11 | Status: SHIPPED | OUTPATIENT
Start: 2023-05-18

## 2023-05-25 ENCOUNTER — OFFICE VISIT (OUTPATIENT)
Dept: UROLOGY | Facility: CLINIC | Age: 78
End: 2023-05-25
Payer: MEDICARE

## 2023-05-25 VITALS
DIASTOLIC BLOOD PRESSURE: 61 MMHG | SYSTOLIC BLOOD PRESSURE: 99 MMHG | WEIGHT: 120 LBS | HEIGHT: 67 IN | HEART RATE: 67 BPM | BODY MASS INDEX: 18.83 KG/M2

## 2023-05-25 DIAGNOSIS — U07.1 COVID: ICD-10-CM

## 2023-05-25 DIAGNOSIS — N30.00 ACUTE CYSTITIS WITHOUT HEMATURIA: ICD-10-CM

## 2023-05-25 DIAGNOSIS — N28.89 RENAL CALCIFICATION: Primary | ICD-10-CM

## 2023-05-25 DIAGNOSIS — N22 CALCULUS OF URINARY TRACT IN DISEASES CLASSIFIED ELSEWHERE: ICD-10-CM

## 2023-05-25 PROCEDURE — 99999 PR PBB SHADOW E&M-EST. PATIENT-LVL IV: ICD-10-PCS | Mod: PBBFAC,,, | Performed by: UROLOGY

## 2023-05-25 PROCEDURE — 3074F SYST BP LT 130 MM HG: CPT | Mod: CPTII,S$GLB,, | Performed by: UROLOGY

## 2023-05-25 PROCEDURE — 1159F PR MEDICATION LIST DOCUMENTED IN MEDICAL RECORD: ICD-10-PCS | Mod: CPTII,S$GLB,, | Performed by: UROLOGY

## 2023-05-25 PROCEDURE — 3074F PR MOST RECENT SYSTOLIC BLOOD PRESSURE < 130 MM HG: ICD-10-PCS | Mod: CPTII,S$GLB,, | Performed by: UROLOGY

## 2023-05-25 PROCEDURE — 1101F PT FALLS ASSESS-DOCD LE1/YR: CPT | Mod: CPTII,S$GLB,, | Performed by: UROLOGY

## 2023-05-25 PROCEDURE — 1157F ADVNC CARE PLAN IN RCRD: CPT | Mod: CPTII,S$GLB,, | Performed by: UROLOGY

## 2023-05-25 PROCEDURE — 1126F PR PAIN SEVERITY QUANTIFIED, NO PAIN PRESENT: ICD-10-PCS | Mod: CPTII,S$GLB,, | Performed by: UROLOGY

## 2023-05-25 PROCEDURE — 99213 OFFICE O/P EST LOW 20 MIN: CPT | Mod: S$GLB,,, | Performed by: UROLOGY

## 2023-05-25 PROCEDURE — 1160F PR REVIEW ALL MEDS BY PRESCRIBER/CLIN PHARMACIST DOCUMENTED: ICD-10-PCS | Mod: CPTII,S$GLB,, | Performed by: UROLOGY

## 2023-05-25 PROCEDURE — 87086 URINE CULTURE/COLONY COUNT: CPT | Performed by: UROLOGY

## 2023-05-25 PROCEDURE — 3078F DIAST BP <80 MM HG: CPT | Mod: CPTII,S$GLB,, | Performed by: UROLOGY

## 2023-05-25 PROCEDURE — 87088 URINE BACTERIA CULTURE: CPT | Performed by: UROLOGY

## 2023-05-25 PROCEDURE — 3078F PR MOST RECENT DIASTOLIC BLOOD PRESSURE < 80 MM HG: ICD-10-PCS | Mod: CPTII,S$GLB,, | Performed by: UROLOGY

## 2023-05-25 PROCEDURE — 1101F PR PT FALLS ASSESS DOC 0-1 FALLS W/OUT INJ PAST YR: ICD-10-PCS | Mod: CPTII,S$GLB,, | Performed by: UROLOGY

## 2023-05-25 PROCEDURE — 3288F FALL RISK ASSESSMENT DOCD: CPT | Mod: CPTII,S$GLB,, | Performed by: UROLOGY

## 2023-05-25 PROCEDURE — 1160F RVW MEDS BY RX/DR IN RCRD: CPT | Mod: CPTII,S$GLB,, | Performed by: UROLOGY

## 2023-05-25 PROCEDURE — 87186 SC STD MICRODIL/AGAR DIL: CPT | Performed by: UROLOGY

## 2023-05-25 PROCEDURE — 1126F AMNT PAIN NOTED NONE PRSNT: CPT | Mod: CPTII,S$GLB,, | Performed by: UROLOGY

## 2023-05-25 PROCEDURE — 1159F MED LIST DOCD IN RCRD: CPT | Mod: CPTII,S$GLB,, | Performed by: UROLOGY

## 2023-05-25 PROCEDURE — 87077 CULTURE AEROBIC IDENTIFY: CPT | Performed by: UROLOGY

## 2023-05-25 PROCEDURE — 1157F PR ADVANCE CARE PLAN OR EQUIV PRESENT IN MEDICAL RECORD: ICD-10-PCS | Mod: CPTII,S$GLB,, | Performed by: UROLOGY

## 2023-05-25 PROCEDURE — 99213 PR OFFICE/OUTPT VISIT, EST, LEVL III, 20-29 MIN: ICD-10-PCS | Mod: S$GLB,,, | Performed by: UROLOGY

## 2023-05-25 PROCEDURE — 99999 PR PBB SHADOW E&M-EST. PATIENT-LVL IV: CPT | Mod: PBBFAC,,, | Performed by: UROLOGY

## 2023-05-25 PROCEDURE — 3288F PR FALLS RISK ASSESSMENT DOCUMENTED: ICD-10-PCS | Mod: CPTII,S$GLB,, | Performed by: UROLOGY

## 2023-05-25 NOTE — PROGRESS NOTES
Subjective:       Patient ID: Praneeth Jewell is a 77 y.o. male.    Chief Complaint: No chief complaint on file.    HPI patient is here with left ureteral stone which is large.  He has double-J stents in place.  He also has a nephrostomy to patient will eventually need a ureteroscopic stone removal.  He also has urinary retention and tried to have intermittent catheterization for a while but eventually had an indwelling Shen catheter left in place.  He has had Proteus UTI is feeling well now but he is confined to a wheelchair patient has a left nephrostomy tube and an indwelling Shen catheter    Past Medical History:   Diagnosis Date    Arthritis     Diabetes mellitus     type 2    Folate deficiency anemia     Heart block     History of kidney stones 05/25/2021    History of stroke with residual deficit 05/25/2021    Hyperlipidemia     Hypertension     Major depressive disorder     Pacemaker     Biotronic [place] 8 DR-PRATIK (S/n: [id number])    Pyelonephritis 11/19/2021    TIA (transient ischemic attack)     Urinary retention 05/25/2021       Past Surgical History:   Procedure Laterality Date    A-V CARDIAC PACEMAKER INSERTION N/A 8/24/2021    Procedure: INSERTION, CARDIAC PACEMAKER, DUAL CHAMBER;  Surgeon: Neo Winn MD;  Location: [facility] EP LAB;  Service: Cardiology;  Laterality: N/A;  CHB, DUAL PPM, ANES, BIO, DM, ED 2    COLONOSCOPY N/A 11/22/2021    Procedure: COLONOSCOPY;  Surgeon: Cesar Dorado MD;  Location: [facility] ENDO (2ND FLR);  Service: Endoscopy;  Laterality: N/A;    CYSTOSCOPIC LITHOLAPAXY  5/25/2021    Procedure: CYSTOLITHOLAPAXY;  Surgeon: Chris Schilling MD;  [facility];  Service: Urology;;    CYSTOSCOPY  8/26/2021    Procedure: CYSTOSCOPY;  Surgeon: Camilo Kelley Jr., MD;  [facility];  Service: Urology;;    CYSTOSCOPY W/ URETERAL STENT PLACEMENT Bilateral 5/25/2021    Procedure: CYSTOSCOPY, WITH BILATERAL URETERAL STENT INSERTION;  Surgeon: Chris Schilling MD;   Location: [facility] OR 1ST FLR;  Service: Urology;  Laterality: Bilateral;    ELBOW ARTHROPLASTY Right     FLUOROSCOPY  5/25/2021    Procedure: FLUOROSCOPY;  Surgeon: Chris Schilling MD;  [facility];  Service: Urology;;    LASER LITHOTRIPSY Left 8/26/2021    Procedure: LITHOTRIPSY, USING LASER;  Surgeon: Camilo Kelley Jr., MD;  [facility];  Service: Urology;  Laterality: Left;    PYELOSCOPY Bilateral 8/26/2021    Procedure: PYELOSCOPY;  Surgeon: Camilo Kelley Jr., MD;  [facility];  Service: Urology;  Laterality: Bilateral;    REMOVAL OF BLOOD CLOT  5/25/2021    Procedure: REMOVAL, BLOOD CLOT;  Surgeon: Chris Schilling MD;  [facility];  Service: Urology;;    REPLACEMENT OF STENT Bilateral 8/26/2021    Procedure: REPLACEMENT, STENT;  Surgeon: Camilo Kelley Jr., MD;  [facility];  Service: Urology;  Laterality: Bilateral;    URETEROSCOPIC REMOVAL OF URETERIC CALCULUS Bilateral 8/26/2021    Procedure: REMOVAL, CALCULUS, URETER, URETEROSCOPIC;  Surgeon: Camilo Kelley Jr., MD;  [facility];  Service: Urology;  Laterality: Bilateral;    URETEROSCOPY Bilateral 8/26/2021    Procedure: URETEROSCOPY;  Surgeon: Camilo Kelley Jr., MD;  [facility];  Service: Urology;  Laterality: Bilateral;       Family History   Problem Relation Age of Onset    Stroke Mother     Hyperlipidemia Mother     Mental illness Father     Parkinsonism Father     No Known Problems Brother        Social History     Socioeconomic History    Marital status: Single    Number of children: 0    Years of education: 15    Highest education level: Some college, no degree   Occupational History     Employer: Disabled    Occupation: Construction     Employer: [employer]     Comment: Retired in past 5-10 years   Tobacco Use    Smoking status: Former     Types: Cigarettes    Smokeless tobacco: Never   Substance and Sexual Activity    Alcohol use: Yes      "Comment: 1/ stephani cabrera daily    Drug use: Yes     Types: "Crack" cocaine     Social Determinants of Health     Financial Resource Strain: Low Risk     Difficulty of Paying Living Expenses: Not hard at all   Food Insecurity: No Food Insecurity    Worried About Running Out of Food in the Last Year: Never true    Ran Out of Food in the Last Year: Never true   Transportation Needs: No Transportation Needs    Lack of Transportation (Medical): No    Lack of Transportation (Non-Medical): No   Physical Activity: Inactive    Days of Exercise per Week: 0 days    Minutes of Exercise per Session: 0 min   Stress: No Stress Concern Present    Feeling of Stress : Not at all   Social Connections: Socially Isolated    Frequency of Communication with Friends and Family: Three times a week    Frequency of Social Gatherings with Friends and Family: Once a week    Attends Sabianism Services: Never    Active Member of Clubs or Organizations: No    Attends Club or Organization Meetings: Never    Marital Status: Never    Housing Stability: Low Risk     Unable to Pay for Housing in the Last Year: No    Number of Places Lived in the Last Year: 2    Unstable Housing in the Last Year: No       Allergies:  Patient has no known allergies.    Medications:    Current Outpatient Medications:     atorvastatin (LIPITOR) 40 MG tablet, Take 40 mg by mouth once daily., Disp: , Rfl:     bisacodyL (DULCOLAX, BISACODYL,) 10 mg Supp, Place 1 suppository (10 mg total) rectally daily as needed (constipation)., Disp: , Rfl: 0    cyproheptadine (PERIACTIN) 4 mg tablet, Take 4 mg by mouth 3 (three) times daily. Itching, Disp: , Rfl:     docusate sodium (COLACE) 100 MG capsule, Take 1 capsule (100 mg total) by mouth once daily., Disp: , Rfl: 0    folic acid (FOLVITE) 1 MG tablet, Take 1 mg by mouth once daily., Disp: , Rfl:     gabapentin (NEURONTIN) 300 MG capsule, Take 300 mg by mouth 3 (three) times daily., Disp: , Rfl:     mirtazapine (REMERON) 30 MG " tablet, Take 30 mg by mouth nightly., Disp: , Rfl:     polyethylene glycol (GLYCOLAX) 17 gram PwPk, Take 17 g by mouth 2 (two) times daily., Disp: , Rfl: 0    tamsulosin (FLOMAX) 0.4 mg Cap, TAKE 2 CAPSULES(0.8 MG) BY MOUTH EVERY DAY, Disp: 60 capsule, Rfl: 11    Review of Systems   Constitutional:  Negative for activity change, appetite change, chills, diaphoresis, fatigue, fever and unexpected weight change.   HENT:  Negative for congestion, dental problem, hearing loss, mouth sores, postnasal drip, rhinorrhea, sinus pressure and trouble swallowing.    Eyes:  Negative for pain, discharge and itching.   Respiratory:  Negative for apnea, cough, choking, chest tightness, shortness of breath and wheezing.    Cardiovascular:  Negative for chest pain, palpitations and leg swelling.   Gastrointestinal:  Negative for abdominal distention, abdominal pain, anal bleeding, blood in stool, constipation, diarrhea, nausea, rectal pain and vomiting.   Endocrine: Negative for polydipsia and polyuria.   Genitourinary:  Negative for decreased urine volume, difficulty urinating, dysuria, enuresis, flank pain, frequency, genital sores, hematuria, penile discharge, penile pain, penile swelling, scrotal swelling, testicular pain and urgency.   Musculoskeletal:  Negative for arthralgias, back pain and myalgias.   Skin:  Negative for color change, rash and wound.   Neurological:  Negative for dizziness, syncope, speech difficulty, light-headedness and headaches.   Hematological:  Negative for adenopathy. Does not bruise/bleed easily.   Psychiatric/Behavioral:  Negative for behavioral problems, confusion, hallucinations and sleep disturbance.      Objective:      Physical Exam  Constitutional:       Appearance: He is well-developed.   HENT:      Head: Normocephalic.   Cardiovascular:      Rate and Rhythm: Normal rate.   Pulmonary:      Effort: Pulmonary effort is normal.   Abdominal:      Palpations: Abdomen is soft.   Skin:     General:  Skin is warm.   Neurological:      Mental Status: He is alert.       Assessment:       1. Renal calcification    2. COVID    3. Acute cystitis without hematuria    4. Calculus of urinary tract in diseases classified elsewhere        Plan:       Diagnoses and all orders for this visit:    Renal calcification  -     X-Ray Abdomen AP 1 View; Future    COVID  -     Ambulatory referral/consult to Urology  -     Ambulatory referral/consult to Urology    Acute cystitis without hematuria  -     Ambulatory referral/consult to Urology  -     Urine culture; Future    Calculus of urinary tract in diseases classified elsewhere  -     CT Renal Stone Study ABD Pelvis WO; Future

## 2023-05-28 LAB — BACTERIA UR CULT: ABNORMAL

## 2023-05-29 ENCOUNTER — TELEPHONE (OUTPATIENT)
Dept: UROLOGY | Facility: CLINIC | Age: 78
End: 2023-05-29
Payer: MEDICARE

## 2023-05-29 NOTE — TELEPHONE ENCOUNTER
----- Message from Magdalena White LPN sent at 5/26/2023  4:01 PM CDT -----  Contact: Veronique Villegas    ----- Message -----  From: Vivian Briceño  Sent: 5/26/2023   3:30 PM CDT  To: Warren DELAROSA Jr Staff    Type:  Patient Call          Who Called: [facility] - Veronique Villegas         Does the patient know what this is regarding?: Requesting a call back to discuss the pt visit from yesterday ; she said that she have a bunch of medical questions ; please advise             Best Call Back Number:[phone] or Ext [phone]            Additional Information:She's trying to figure out if the pt needs to go to ER

## 2023-06-04 ENCOUNTER — CLINICAL SUPPORT (OUTPATIENT)
Dept: CARDIOLOGY | Facility: HOSPITAL | Age: 78
End: 2023-06-04
Payer: MEDICARE

## 2023-06-04 DIAGNOSIS — Z95.0 PRESENCE OF CARDIAC PACEMAKER: ICD-10-CM

## 2023-06-04 DIAGNOSIS — I44.2 ATRIOVENTRICULAR BLOCK, COMPLETE: ICD-10-CM

## 2023-06-04 PROCEDURE — 93296 REM INTERROG EVL PM/IDS: CPT | Performed by: INTERNAL MEDICINE

## 2023-06-06 ENCOUNTER — HOSPITAL ENCOUNTER (OUTPATIENT)
Dept: RADIOLOGY | Facility: HOSPITAL | Age: 78
Discharge: HOME | End: 2023-06-06
Attending: UROLOGY
Payer: MEDICARE

## 2023-06-06 ENCOUNTER — TELEPHONE (OUTPATIENT)
Dept: ELECTROPHYSIOLOGY | Facility: CLINIC | Age: 78
End: 2023-06-06
Payer: MEDICARE

## 2023-06-06 DIAGNOSIS — N22 CALCULUS OF URINARY TRACT IN DISEASES CLASSIFIED ELSEWHERE: ICD-10-CM

## 2023-06-06 DIAGNOSIS — N28.89 RENAL CALCIFICATION: ICD-10-CM

## 2023-06-06 PROCEDURE — 74018 XR ABDOMEN AP 1 VIEW: ICD-10-PCS | Mod: 26,,, | Performed by: RADIOLOGY

## 2023-06-06 PROCEDURE — 74018 RADEX ABDOMEN 1 VIEW: CPT | Mod: TC,FY,PO

## 2023-06-06 PROCEDURE — 74176 CT ABD & PELVIS W/O CONTRAST: CPT | Mod: 26,,, | Performed by: RADIOLOGY

## 2023-06-06 PROCEDURE — 74176 CT RENAL STONE STUDY ABD PELVIS WO: ICD-10-PCS | Mod: 26,,, | Performed by: RADIOLOGY

## 2023-06-06 PROCEDURE — 74018 RADEX ABDOMEN 1 VIEW: CPT | Mod: 26,,, | Performed by: RADIOLOGY

## 2023-06-06 PROCEDURE — 74176 CT ABD & PELVIS W/O CONTRAST: CPT | Mod: TC,PO

## 2023-06-06 NOTE — TELEPHONE ENCOUNTER
Called pt and left message to schedule ovd f/u and in clinic device check. There was no answer and second number listed voicemail box was full.

## 2023-07-17 PROBLEM — N39.0 UTI (URINARY TRACT INFECTION): Status: RESOLVED | Noted: 2021-08-05 | Resolved: 2023-07-17

## 2023-07-17 PROBLEM — N17.9 AKI (ACUTE KIDNEY INJURY): Status: RESOLVED | Noted: 2023-03-22 | Resolved: 2023-07-17

## 2023-07-26 ENCOUNTER — OUTSIDE PLACE OF SERVICE (OUTPATIENT)
Dept: ADMINISTRATIVE | Facility: OTHER | Age: 78
End: 2023-07-26
Payer: MEDICARE

## 2023-07-30 ENCOUNTER — HOSPITAL ENCOUNTER (OUTPATIENT)
Facility: HOSPITAL | Age: 78
Discharge: OTHER INSTITUTION | End: 2023-07-31
Attending: EMERGENCY MEDICINE | Admitting: INTERNAL MEDICINE
Payer: MEDICARE

## 2023-07-30 DIAGNOSIS — F33.9 RECURRENT MAJOR DEPRESSIVE DISORDER, REMISSION STATUS UNSPECIFIED: ICD-10-CM

## 2023-07-30 DIAGNOSIS — K51.311: ICD-10-CM

## 2023-07-30 DIAGNOSIS — I69.30 HISTORY OF CVA WITH RESIDUAL DEFICIT: ICD-10-CM

## 2023-07-30 DIAGNOSIS — T83.098A MALFUNCTION OF NEPHROSTOMY TUBE: Primary | ICD-10-CM

## 2023-07-30 DIAGNOSIS — I10 ESSENTIAL HYPERTENSION: ICD-10-CM

## 2023-07-30 DIAGNOSIS — T83.511A URINARY TRACT INFECTION ASSOCIATED WITH INDWELLING URETHRAL CATHETER, INITIAL ENCOUNTER: ICD-10-CM

## 2023-07-30 DIAGNOSIS — T83.022A NEPHROSTOMY TUBE DISPLACED: ICD-10-CM

## 2023-07-30 DIAGNOSIS — N39.0 URINARY TRACT INFECTION ASSOCIATED WITH INDWELLING URETHRAL CATHETER, INITIAL ENCOUNTER: ICD-10-CM

## 2023-07-30 DIAGNOSIS — N20.0 NEPHROLITHIASIS: ICD-10-CM

## 2023-07-30 DIAGNOSIS — N40.1 BPH WITH URINARY OBSTRUCTION: ICD-10-CM

## 2023-07-30 DIAGNOSIS — E11.40 TYPE 2 DIABETES MELLITUS WITH DIABETIC NEUROPATHY, WITHOUT LONG-TERM CURRENT USE OF INSULIN: ICD-10-CM

## 2023-07-30 DIAGNOSIS — N13.8 BPH WITH URINARY OBSTRUCTION: ICD-10-CM

## 2023-07-30 DIAGNOSIS — E78.2 MIXED HYPERLIPIDEMIA: ICD-10-CM

## 2023-07-30 DIAGNOSIS — N13.9 OBSTRUCTIVE UROPATHY: ICD-10-CM

## 2023-07-30 LAB
ALBUMIN SERPL BCP-MCNC: 3.2 G/DL (ref 3.5–5.2)
ALP SERPL-CCNC: 68 U/L (ref 55–135)
ALT SERPL W/O P-5'-P-CCNC: 19 U/L (ref 10–44)
ANION GAP SERPL CALC-SCNC: 10 MMOL/L (ref 8–16)
AST SERPL-CCNC: 16 U/L (ref 10–40)
BACTERIA #/AREA URNS HPF: ABNORMAL /HPF
BASOPHILS # BLD AUTO: 0.06 K/UL (ref 0–0.2)
BASOPHILS NFR BLD: 0.5 % (ref 0–1.9)
BILIRUB SERPL-MCNC: 0.5 MG/DL (ref 0.1–1)
BILIRUB UR QL STRIP: NEGATIVE
BUN SERPL-MCNC: 26 MG/DL (ref 8–23)
CALCIUM SERPL-MCNC: 10.2 MG/DL (ref 8.7–10.5)
CHLORIDE SERPL-SCNC: 106 MMOL/L (ref 95–110)
CLARITY UR: ABNORMAL
CO2 SERPL-SCNC: 24 MMOL/L (ref 23–29)
COLOR UR: YELLOW
CREAT SERPL-MCNC: 0.8 MG/DL (ref 0.5–1.4)
DIFFERENTIAL METHOD: ABNORMAL
EOSINOPHIL # BLD AUTO: 0.1 K/UL (ref 0–0.5)
EOSINOPHIL NFR BLD: 0.8 % (ref 0–8)
ERYTHROCYTE [DISTWIDTH] IN BLOOD BY AUTOMATED COUNT: 13.1 % (ref 11.5–14.5)
EST. GFR  (NO RACE VARIABLE): >60 ML/MIN/1.73 M^2
GLUCOSE SERPL-MCNC: 86 MG/DL (ref 70–110)
GLUCOSE UR QL STRIP: NEGATIVE
HCT VFR BLD AUTO: 40.7 % (ref 40–54)
HGB BLD-MCNC: 13 G/DL (ref 14–18)
HGB UR QL STRIP: ABNORMAL
HYALINE CASTS #/AREA URNS LPF: 0 /LPF
IMM GRANULOCYTES # BLD AUTO: 0.02 K/UL (ref 0–0.04)
IMM GRANULOCYTES NFR BLD AUTO: 0.2 % (ref 0–0.5)
KETONES UR QL STRIP: NEGATIVE
LACTATE SERPL-SCNC: 0.9 MMOL/L (ref 0.5–2.2)
LEUKOCYTE ESTERASE UR QL STRIP: ABNORMAL
LYMPHOCYTES # BLD AUTO: 1.7 K/UL (ref 1–4.8)
LYMPHOCYTES NFR BLD: 14.6 % (ref 18–48)
MCH RBC QN AUTO: 30.4 PG (ref 27–31)
MCHC RBC AUTO-ENTMCNC: 31.9 G/DL (ref 32–36)
MCV RBC AUTO: 95 FL (ref 82–98)
MICROSCOPIC COMMENT: ABNORMAL
MONOCYTES # BLD AUTO: 0.7 K/UL (ref 0.3–1)
MONOCYTES NFR BLD: 5.6 % (ref 4–15)
NEUTROPHILS # BLD AUTO: 9.3 K/UL (ref 1.8–7.7)
NEUTROPHILS NFR BLD: 78.3 % (ref 38–73)
NITRITE UR QL STRIP: NEGATIVE
NON-SQ EPI CELLS #/AREA URNS HPF: 4 /HPF
NRBC BLD-RTO: 0 /100 WBC
PH UR STRIP: 6 [PH] (ref 5–8)
PLATELET # BLD AUTO: 211 K/UL (ref 150–450)
PMV BLD AUTO: 10.2 FL (ref 9.2–12.9)
POCT GLUCOSE: 182 MG/DL (ref 70–110)
POTASSIUM SERPL-SCNC: 4 MMOL/L (ref 3.5–5.1)
PROT SERPL-MCNC: 7.6 G/DL (ref 6–8.4)
PROT UR QL STRIP: ABNORMAL
RBC # BLD AUTO: 4.27 M/UL (ref 4.6–6.2)
RBC #/AREA URNS HPF: >100 /HPF (ref 0–4)
SODIUM SERPL-SCNC: 140 MMOL/L (ref 136–145)
SP GR UR STRIP: 1.03 (ref 1–1.03)
URN SPEC COLLECT METH UR: ABNORMAL
UROBILINOGEN UR STRIP-ACNC: NEGATIVE EU/DL
WBC # BLD AUTO: 11.89 K/UL (ref 3.9–12.7)
WBC #/AREA URNS HPF: >100 /HPF (ref 0–5)
WBC CLUMPS URNS QL MICRO: ABNORMAL

## 2023-07-30 PROCEDURE — 93010 EKG 12-LEAD: ICD-10-PCS | Mod: ,,, | Performed by: INTERNAL MEDICINE

## 2023-07-30 PROCEDURE — 51702 INSERT TEMP BLADDER CATH: CPT

## 2023-07-30 PROCEDURE — 96365 THER/PROPH/DIAG IV INF INIT: CPT

## 2023-07-30 PROCEDURE — 93010 ELECTROCARDIOGRAM REPORT: CPT | Mod: ,,, | Performed by: INTERNAL MEDICINE

## 2023-07-30 PROCEDURE — G0378 HOSPITAL OBSERVATION PER HR: HCPCS

## 2023-07-30 PROCEDURE — 81000 URINALYSIS NONAUTO W/SCOPE: CPT | Performed by: EMERGENCY MEDICINE

## 2023-07-30 PROCEDURE — 63600175 PHARM REV CODE 636 W HCPCS: Performed by: EMERGENCY MEDICINE

## 2023-07-30 PROCEDURE — 99900035 HC TECH TIME PER 15 MIN (STAT)

## 2023-07-30 PROCEDURE — 25000003 PHARM REV CODE 250: Performed by: EMERGENCY MEDICINE

## 2023-07-30 PROCEDURE — 99285 EMERGENCY DEPT VISIT HI MDM: CPT | Mod: 25

## 2023-07-30 PROCEDURE — 87077 CULTURE AEROBIC IDENTIFY: CPT | Performed by: EMERGENCY MEDICINE

## 2023-07-30 PROCEDURE — 87088 URINE BACTERIA CULTURE: CPT | Performed by: EMERGENCY MEDICINE

## 2023-07-30 PROCEDURE — 87186 SC STD MICRODIL/AGAR DIL: CPT | Performed by: EMERGENCY MEDICINE

## 2023-07-30 PROCEDURE — 87040 BLOOD CULTURE FOR BACTERIA: CPT | Performed by: EMERGENCY MEDICINE

## 2023-07-30 PROCEDURE — 80053 COMPREHEN METABOLIC PANEL: CPT | Performed by: EMERGENCY MEDICINE

## 2023-07-30 PROCEDURE — 83605 ASSAY OF LACTIC ACID: CPT | Performed by: EMERGENCY MEDICINE

## 2023-07-30 PROCEDURE — 93005 ELECTROCARDIOGRAM TRACING: CPT

## 2023-07-30 PROCEDURE — 87086 URINE CULTURE/COLONY COUNT: CPT | Performed by: EMERGENCY MEDICINE

## 2023-07-30 PROCEDURE — 25000003 PHARM REV CODE 250

## 2023-07-30 PROCEDURE — 85025 COMPLETE CBC W/AUTO DIFF WBC: CPT | Performed by: EMERGENCY MEDICINE

## 2023-07-30 RX ORDER — GABAPENTIN 300 MG/1
300 CAPSULE ORAL 3 TIMES DAILY
Status: DISCONTINUED | OUTPATIENT
Start: 2023-07-30 | End: 2023-07-31 | Stop reason: HOSPADM

## 2023-07-30 RX ORDER — MIRTAZAPINE 15 MG/1
30 TABLET, FILM COATED ORAL NIGHTLY
Status: DISCONTINUED | OUTPATIENT
Start: 2023-07-30 | End: 2023-07-31 | Stop reason: HOSPADM

## 2023-07-30 RX ORDER — ATORVASTATIN CALCIUM 20 MG/1
40 TABLET, FILM COATED ORAL DAILY
Status: DISCONTINUED | OUTPATIENT
Start: 2023-07-31 | End: 2023-07-31 | Stop reason: HOSPADM

## 2023-07-30 RX ORDER — GLUCAGON 1 MG
1 KIT INJECTION
Status: DISCONTINUED | OUTPATIENT
Start: 2023-07-30 | End: 2023-07-31 | Stop reason: HOSPADM

## 2023-07-30 RX ORDER — ENOXAPARIN SODIUM 100 MG/ML
40 INJECTION SUBCUTANEOUS EVERY 24 HOURS
Status: DISCONTINUED | OUTPATIENT
Start: 2023-07-30 | End: 2023-07-30

## 2023-07-30 RX ORDER — IBUPROFEN 200 MG
16 TABLET ORAL
Status: DISCONTINUED | OUTPATIENT
Start: 2023-07-30 | End: 2023-07-31 | Stop reason: HOSPADM

## 2023-07-30 RX ORDER — DOCUSATE SODIUM 100 MG/1
100 CAPSULE, LIQUID FILLED ORAL DAILY
Status: DISCONTINUED | OUTPATIENT
Start: 2023-07-31 | End: 2023-07-31 | Stop reason: HOSPADM

## 2023-07-30 RX ORDER — SODIUM CHLORIDE 0.9 % (FLUSH) 0.9 %
10 SYRINGE (ML) INJECTION
Status: DISCONTINUED | OUTPATIENT
Start: 2023-07-30 | End: 2023-07-31 | Stop reason: HOSPADM

## 2023-07-30 RX ORDER — POLYETHYLENE GLYCOL 3350 17 G/17G
17 POWDER, FOR SOLUTION ORAL 2 TIMES DAILY
Status: DISCONTINUED | OUTPATIENT
Start: 2023-07-30 | End: 2023-07-31 | Stop reason: HOSPADM

## 2023-07-30 RX ORDER — INSULIN ASPART 100 [IU]/ML
0-5 INJECTION, SOLUTION INTRAVENOUS; SUBCUTANEOUS
Status: DISCONTINUED | OUTPATIENT
Start: 2023-07-30 | End: 2023-07-31 | Stop reason: HOSPADM

## 2023-07-30 RX ORDER — CYPROHEPTADINE HYDROCHLORIDE 4 MG/1
4 TABLET ORAL 3 TIMES DAILY
Status: DISCONTINUED | OUTPATIENT
Start: 2023-07-30 | End: 2023-07-31 | Stop reason: HOSPADM

## 2023-07-30 RX ORDER — BISACODYL 10 MG
10 SUPPOSITORY, RECTAL RECTAL DAILY PRN
Status: DISCONTINUED | OUTPATIENT
Start: 2023-07-30 | End: 2023-07-31 | Stop reason: HOSPADM

## 2023-07-30 RX ORDER — FOLIC ACID 1 MG/1
1 TABLET ORAL DAILY
Status: DISCONTINUED | OUTPATIENT
Start: 2023-07-31 | End: 2023-07-31 | Stop reason: HOSPADM

## 2023-07-30 RX ORDER — TAMSULOSIN HYDROCHLORIDE 0.4 MG/1
0.8 CAPSULE ORAL DAILY
Status: DISCONTINUED | OUTPATIENT
Start: 2023-07-31 | End: 2023-07-31 | Stop reason: HOSPADM

## 2023-07-30 RX ORDER — ACETAMINOPHEN 325 MG/1
650 TABLET ORAL EVERY 8 HOURS PRN
Status: DISCONTINUED | OUTPATIENT
Start: 2023-07-30 | End: 2023-07-31 | Stop reason: HOSPADM

## 2023-07-30 RX ORDER — IBUPROFEN 200 MG
24 TABLET ORAL
Status: DISCONTINUED | OUTPATIENT
Start: 2023-07-30 | End: 2023-07-31 | Stop reason: HOSPADM

## 2023-07-30 RX ORDER — TALC
6 POWDER (GRAM) TOPICAL NIGHTLY PRN
Status: DISCONTINUED | OUTPATIENT
Start: 2023-07-30 | End: 2023-07-31 | Stop reason: HOSPADM

## 2023-07-30 RX ADMIN — POLYETHYLENE GLYCOL 3350 17 G: 17 POWDER, FOR SOLUTION ORAL at 08:07

## 2023-07-30 RX ADMIN — CEFTRIAXONE SODIUM 1 G: 1 INJECTION, POWDER, FOR SOLUTION INTRAMUSCULAR; INTRAVENOUS at 05:07

## 2023-07-30 RX ADMIN — CYPROHEPTADINE HYDROCHLORIDE 4 MG: 4 TABLET ORAL at 08:07

## 2023-07-30 RX ADMIN — MIRTAZAPINE 30 MG: 15 TABLET, FILM COATED ORAL at 08:07

## 2023-07-30 RX ADMIN — GABAPENTIN 300 MG: 300 CAPSULE ORAL at 08:07

## 2023-07-30 NOTE — PHARMACY MED REC
"[facility]           Pharmacy  Admission Medication History     The home medication history was taken by Cynthia Ling.      Medication history obtained from Medications listed below were obtained from: Nursing home    Based on information gathered for medication list, you may go to "Admission" then "Reconcile Home Medications" tabs to review and/or act upon those items.     The home medication list has been updated by the Pharmacy department.   Please read ALL comments highlighted in yellow.   Please address this information as you see fit.    Feel free to contact us if you have any questions or require assistance.          No current facility-administered medications on file prior to encounter.     Current Outpatient Medications on File Prior to Encounter   Medication Sig Dispense Refill    atorvastatin (LIPITOR) 40 MG tablet Take 40 mg by mouth once daily.      bisacodyL (DULCOLAX, BISACODYL,) 10 mg Supp Place 1 suppository (10 mg total) rectally daily as needed (constipation).  0    cyproheptadine (PERIACTIN) 4 mg tablet Take 4 mg by mouth 3 (three) times daily. Itching      docusate sodium (COLACE) 100 MG capsule Take 1 capsule (100 mg total) by mouth once daily.  0    folic acid (FOLVITE) 1 MG tablet Take 1 mg by mouth once daily.      gabapentin (NEURONTIN) 300 MG capsule Take 300 mg by mouth 3 (three) times daily.      mirtazapine (REMERON) 30 MG tablet Take 30 mg by mouth nightly.      polyethylene glycol (GLYCOLAX) 17 gram PwPk Take 17 g by mouth 2 (two) times daily.  0    tamsulosin (FLOMAX) 0.4 mg Cap TAKE 2 CAPSULES(0.8 MG) BY MOUTH EVERY DAY (Patient taking differently: Take 0.8 mg by mouth once daily.) 60 capsule 11       Please address this information as you see fit.  Feel free to contact us if you have any questions or require assistance.    Cynthia Ling  [phone]                  .          "

## 2023-07-30 NOTE — ED NOTES
Incontinence care provided. Patient had 1 loose brown BM.   New diaper placed on patient. Mepilex placed to patient's coccyx. Red, heeled ulcer, blanchable area.   Covered with a blanket, side rails up x 2, call light within reach, bed in low position.    show

## 2023-07-30 NOTE — ED TRIAGE NOTES
Patient brought to ER via EMS with c/o fever of 101 and Nephrostomy bag removed. Patient is bed bound and states that his bag fell out sometime this morning when he was trying to readjust in bed. Fever is down to 98.2 no signs of distress noted. AAO x's 4. States he wouldn't have came in if his nephrostomy didn't come in. No signs of SOB, N/V, abdominal pain, or bodyaches.

## 2023-07-30 NOTE — ED PROVIDER NOTES
Chief Complaint:  Pulled nephrostomy tube out    History of Present Illness:    Praneeth McTopy 77 y.o. with a  has a past medical history of Arthritis, Diabetes mellitus, Folate deficiency anemia, Heart block, History of kidney stones (05/25/2021), History of stroke with residual deficit (05/25/2021), Hyperlipidemia, Hypertension, Major depressive disorder, Pacemaker, Pyelonephritis (11/19/2021), TIA (transient ischemic attack), and Urinary retention (05/25/2021). who presents to the emergency department today with a complaint of nephrostomy tube coming out.  Patient has a left-sided nephrostomy tube secondary to large left ureteral stone.  Patient reports that in a position change today it was pulled out.  Incidentally he also has a indwelling Shen catheter which has been draining well.  He reports it has not been changed in a month the nursing home reports that he had fever of 101 at the home today however on arrival here the patient's temperature is normal and he reports that he had no fever.  He denies any chills or sweats.  He denies any pain.  He denies any other complaints other than his nephrostomy tube came.  Patient was bed-bound or wheelchair-bound.        ROS    Constitutional: No fever, no chills.  ENT: No nasal drainage. No ear ache. No sore throat.  Cardiovascular: No chest pain, no palpitations.  Respiratory: No cough, no shortness of breath.  Gastrointestinal: No abdominal pain, no vomiting. No diarrhea.  Musculoskeletal: No back pain.   Neurological: No headache, no focal weakness.    Otherwise remaining ROS negative     The history is provided by the patient      Reviewed and verified by myself:   PMH/PSH/SOC/FH REVIEWED :    Past Medical History:   Diagnosis Date    Arthritis     Diabetes mellitus     type 2    Folate deficiency anemia     Heart block     History of kidney stones 05/25/2021    History of stroke with residual deficit 05/25/2021    Hyperlipidemia     Hypertension     Major  depressive disorder     Pacemaker     Biot[employer] [place] 8 JONATHON (S/n: [id number])    Pyelonephritis 11/19/2021    TIA (transient ischemic attack)     Urinary retention 05/25/2021       Past Surgical History:   Procedure Laterality Date    A-V CARDIAC PACEMAKER INSERTION N/A 8/24/2021    Procedure: INSERTION, CARDIAC PACEMAKER, DUAL CHAMBER;  Surgeon: Neo Winn MD;  Location: [facility] EP LAB;  Service: Cardiology;  Laterality: N/A;  CHB, DUAL PPM, ANES, BIO, DM, ED 2    COLONOSCOPY N/A 11/22/2021    Procedure: COLONOSCOPY;  Surgeon: Cesar Dorado MD;  Location: [facility] ENDO (2ND FLR);  Service: Endoscopy;  Laterality: N/A;    CYSTOSCOPIC LITHOLAPAXY  5/25/2021    Procedure: CYSTOLITHOLAPAXY;  Surgeon: Chris Schilling MD;  [facility];  Service: Urology;;    CYSTOSCOPY  8/26/2021    Procedure: CYSTOSCOPY;  Surgeon: Camilo Kelley Jr., MD;  [facility];  Service: Urology;;    CYSTOSCOPY W/ URETERAL STENT PLACEMENT Bilateral 5/25/2021    Procedure: CYSTOSCOPY, WITH BILATERAL URETERAL STENT INSERTION;  Surgeon: Chris Schilling MD;  [facility];  Service: Urology;  Laterality: Bilateral;    ELBOW ARTHROPLASTY Right     FLUOROSCOPY  5/25/2021    Procedure: FLUOROSCOPY;  Surgeon: Chris Schilling MD;  [facility];  Service: Urology;;    LASER LITHOTRIPSY Left 8/26/2021    Procedure: LITHOTRIPSY, USING LASER;  Surgeon: Camilo Kelley Jr., MD;  [facility];  Service: Urology;  Laterality: Left;    PYELOSCOPY Bilateral 8/26/2021    Procedure: PYELOSCOPY;  Surgeon: Camilo Kelley Jr., MD;  [facility];  Service: Urology;  Laterality: Bilateral;    REMOVAL OF BLOOD CLOT  5/25/2021    Procedure: REMOVAL, BLOOD CLOT;  Surgeon: Chris Schilling MD;  [facility];  Service: Urology;;    REPLACEMENT OF STENT Bilateral 8/26/2021    Procedure: REPLACEMENT, STENT;  Surgeon: Camilo Kelley Jr., MD;  Location: [facility] OR [address] [place] [address];  Service:  "Urology;  Laterality: Bilateral;    URETEROSCOPIC REMOVAL OF URETERIC CALCULUS Bilateral 8/26/2021    Procedure: REMOVAL, CALCULUS, URETER, URETEROSCOPIC;  Surgeon: Camilo Kelley Jr., MD;  Location: [address];  Service: Urology;  Laterality: Bilateral;    URETEROSCOPY Bilateral 8/26/2021    Procedure: URETEROSCOPY;  Surgeon: Camilo Kelley Jr., MD;  Location: [facility] OR [address] [place] [address];  Service: Urology;  Laterality: Bilateral;       Social History     Socioeconomic History    Marital status: Single    Number of children: 0    Years of education: 15    Highest education level: Some college, no degree   Occupational History     Employer: Disabled    Occupation: Construction     Employer: [employer]     Comment: Retired in past 5-10 years   Tobacco Use    Smoking status: Former     Current packs/day: 0.00     Types: Cigarettes    Smokeless tobacco: Never   Substance and Sexual Activity    Alcohol use: Yes     Comment: 1/ pint whisky daily    Drug use: Yes     Types: "Crack" cocaine     Social Determinants of Health     Financial Resource Strain: Low Risk  (4/15/2023)    Overall Financial Resource Strain (CARDIA)     Difficulty of Paying Living Expenses: Not hard at all   Food Insecurity: No Food Insecurity (4/15/2023)    Hunger Vital Sign     Worried About Running Out of Food in the Last Year: Never true     Ran Out of Food in the Last Year: Never true   Transportation Needs: No Transportation Needs (4/15/2023)    PRAPARE - Transportation     Lack of Transportation (Medical): No     Lack of Transportation (Non-Medical): No   Physical Activity: Inactive (4/15/2023)    Exercise Vital Sign     Days of Exercise per Week: 0 days     Minutes of Exercise per Session: 0 min   Stress: No Stress Concern Present (4/15/2023)    [employer] [place] [employer] - Occupational Stress Questionnaire     Feeling of Stress : Not at all   Social Connections: Socially Isolated (4/15/2023)    Social Connection and Isolation Panel " [NHANES]     Frequency of Communication with Friends and Family: Three times a week     Frequency of Social Gatherings with Friends and Family: Once a week     Attends Synagogue Services: Never     Active Member of Clubs or Organizations: No     Attends Club or Organization Meetings: Never     Marital Status: Never    Housing Stability: Low Risk  (4/15/2023)    Housing Stability Vital Sign     Unable to Pay for Housing in the Last Year: No     Number of Places Lived in the Last Year: 2     Unstable Housing in the Last Year: No       Family History   Problem Relation Age of Onset    Stroke Mother     Hyperlipidemia Mother     Mental illness Father     Parkinsonism Father     No Known Problems Brother                ALLERGIES REVIEWED  Review of patient's allergies indicates:  No Known Allergies    MEDICATIONS REVIEWED  Medication List with Changes/Refills   Current Medications    ATORVASTATIN (LIPITOR) 40 MG TABLET    Take 40 mg by mouth once daily.    BISACODYL (DULCOLAX, BISACODYL,) 10 MG SUPP    Place 1 suppository (10 mg total) rectally daily as needed (constipation).    CYPROHEPTADINE (PERIACTIN) 4 MG TABLET    Take 4 mg by mouth 3 (three) times daily. Itching    DOCUSATE SODIUM (COLACE) 100 MG CAPSULE    Take 1 capsule (100 mg total) by mouth once daily.    FOLIC ACID (FOLVITE) 1 MG TABLET    Take 1 mg by mouth once daily.    GABAPENTIN (NEURONTIN) 300 MG CAPSULE    Take 300 mg by mouth 3 (three) times daily.    MIRTAZAPINE (REMERON) 30 MG TABLET    Take 30 mg by mouth nightly.    POLYETHYLENE GLYCOL (GLYCOLAX) 17 GRAM PWPK    Take 17 g by mouth 2 (two) times daily.    TAMSULOSIN (FLOMAX) 0.4 MG CAP    TAKE 2 CAPSULES(0.8 MG) BY MOUTH EVERY DAY           VS reviewed    Nursing/Ancillary staff note reviewed.       Physical Exam     ED Triage Vitals [07/30/23 1533]   BP (!) 104/47   Pulse 78   Resp 16   Temp 98.4 °F (36.9 °C)   SpO2 98 %       Physical Exam  Constitutional:       Comments: Thin elderly  gentleman   HENT:      Head: Normocephalic and atraumatic.      Mouth/Throat:      Mouth: Mucous membranes are moist.   Cardiovascular:      Rate and Rhythm: Normal rate and regular rhythm.   Pulmonary:      Effort: Pulmonary effort is normal. No respiratory distress.      Breath sounds: Normal breath sounds. No stridor. No wheezing or rales.   Abdominal:      General: Abdomen is flat. Bowel sounds are normal. There is no distension.      Palpations: Abdomen is soft.      Tenderness: There is no abdominal tenderness.   Genitourinary:     Comments: Shen cath in place  Lymphadenopathy:      Cervical: No cervical adenopathy.   Skin:     General: Skin is warm.   Neurological:      Mental Status: He is alert. Mental status is at baseline.   Psychiatric:         Mood and Affect: Mood normal.                 ED Course         ED Course as of 07/30/23 1806   Sun Jul 30, 2023   1725 CBC does not show signs of the leukocytosis.  Unremarkable CBC, CMP unremarkable  Lactic acid normal   [JA]   1803 Leukocytes, UA(!): 3+ [JA]   1803 WBC, UA(!): >100 [JA]   1803 WBC Clumps, UA(!): Many [JA]   1803 Bacteria, UA(!): Many [JA]      ED Course User Index  [JA] Jared Tarango MD          ED Management:    Medical Decision Making  Differential diagnosis: Nephrostomy tube malfunction, UTI, pyelonephritis, sepsis    Initial goal This is a 77-year-old gentleman who presents emergency department today after his nephrostomy tube on the left came out.  It was required due to a large ureteral stone.  Incidentally the nursing home reports that he would a fever 101 today however the patient denies and his temperature here is normal.  However given the fact that they reported fever 101 will obtain blood culture and lactic acid in addition to CBC CMP and UA.      Comorbidity impacting this encounter includes:  Chronic indwelling catheter and nephrostomy tube    Problems Addressed:  Malfunction of nephrostomy tube: complicated acute illness or  injury  Urinary tract infection associated with indwelling urethral catheter, initial encounter: complicated acute illness or injury    Amount and/or Complexity of Data Reviewed  External Data Reviewed: notes.     Details: A review of the patient's past cultures show that his bacteria was sensitive for Rocephin.  Labs: ordered. Decision-making details documented in ED Course.  ECG/medicine tests: ordered and independent interpretation performed.     Details: 70 beats per minute, normal sinus rhythm, left axis deviation, no ST elevations.  Discussion of management or test interpretation with external provider(s): I spoke Dr. Drake on-call for Urology at the request of Sherry with the transfer center in order to see if urology might place. He reports that he does not place these nephrostomy tubes.  IR will need to do so.    Dr. Krause who was on-call at [facility] today reports that he is on here at [place] tomorrow and he will replace his nephrostomy tube tomorrow.  He can be admitted here.    I spoke with Dr Booker on call for [facility], re the pts presentation and workup, need for admission for nephrostomy tube placement.  They accept for admission.    Risk  Prescription drug management.  Decision regarding hospitalization.      Social determinants of health taken into consideration during development of our treatment plan include     Former Smoking/tobacco use -  Smoking was the leading social determinant of health affecting mortality and life expectancy, although income was another strong SDOH predictor, according to researchers from the Center for Population [facility] at [facility] and the Department of Sociology at [facility]. AURE Netw Open. 2022;5(4):p979178. doi:10.1001/jamanetworkopen.2022.6547      Pt received the following in the ED:   Medications   cefTRIAXone (ROCEPHIN) 1 g in dextrose 5 % in water (D5W) 100 mL IVPB (MB+) (1 g Intravenous New Bag 7/30/23 1740)           MDM continued:     Praneeth  Best  presents to the emergency Department today after his nephrostomy tube came out.  The patient relies on this, he will be admitted for further care and management, replacement of nephrostomy tube by Interventional Radiology tomorrow.  Additionally his urinalysis is show signs of UTI, his last culture sensitivity to Rocephin.  He has been received Rocephin already.  He did not have a white blood cell count and lactic acid.  His chemistries are appropriate.  Will be admitted for further care and management.  Patient was comfortable with this plan.    Voice recognition software utilized in this note.          Impression      The primary encounter diagnosis was Malfunction of nephrostomy tube. A diagnosis of Urinary tract infection associated with indwelling urethral catheter, initial encounter was also pertinent to this visit.             Jared Tarango MD  07/30/23 6132

## 2023-07-30 NOTE — ED NOTES
"Patient arrived from EMS from [facility]'s [place].   Per EMS report, facility did not treat fever 101 "with tylenol or motrin because they did not want tylenol or ibuprofen to interfere with blood cultures that the hospital might."   "

## 2023-07-30 NOTE — H&P
[facility] Medicine H&P Note     Admitting Team: [facility] Hospitalist Team A  Attending Physician: Bety Ramos MD  Resident: Stevie/Jhony    Date of Admit: 7/30/2023    Chief Complaint     Nephrostomy Tube fell out 12 hours ago.    Subjective:      History of Present Illness:  Praneeth Jewell is a 77 y.o.  male with Obstructive Uropathy 2/2 Ureterolithiasis s/p L. 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 The patient presented to [facility] on 7/30/2023 with a primary complaint of his L. Nephrostomy Tube falling out 12 hours ago.    The patient was in their usual state of health until 6-7am on 7/30/23 when he had a positional change followed by accidental removal of his L. nephrostomy tube.    Patient denies any other new medical complaints including f/c, pain anywhere, dysuria, dyspnea, nor CP.  Patient lives at [facility] and is bed bound relying on staff for his ADLs.  Last BM was afternoon of admission, and his mark was changed in the ED during this admission.  Patient wishes to remain DNR/DNI.    Past Medical History:  Past Medical History:   Diagnosis Date    Arthritis     Diabetes mellitus     type 2    Folate deficiency anemia     Heart block     History of kidney stones 05/25/2021    History of stroke with residual deficit 05/25/2021    Hyperlipidemia     Hypertension     Major depressive disorder     Pacemaker     Biotronic Edora 8 JONATHON (S/n: [id number])    Pyelonephritis 11/19/2021    TIA (transient ischemic attack)     Urinary retention 05/25/2021       Past Surgical History:  Past Surgical History:   Procedure Laterality Date    A-V CARDIAC PACEMAKER INSERTION N/A 8/24/2021    Procedure: INSERTION, CARDIAC PACEMAKER, DUAL CHAMBER;  Surgeon: Neo Winn MD;  Location: [facility] LAB;  Service: Cardiology;  Laterality: N/A;   CHB, DUAL PPM, ANES, BIO, DM, ED 2    COLONOSCOPY N/A 11/22/2021    Procedure: COLONOSCOPY;  Surgeon: Cesar Dorado MD;  Location: [facility] ENDO (2ND FLR);  Service: Endoscopy;  Laterality: N/A;    CYSTOSCOPIC LITHOLAPAXY  5/25/2021    Procedure: CYSTOLITHOLAPAXY;  Surgeon: Chris Schilling MD;  Location: [facility] OR 1ST FLR;  Service: Urology;;    CYSTOSCOPY  8/26/2021    Procedure: CYSTOSCOPY;  Surgeon: Camilo Kelley Jr., MD;  Location: [facility] OR 1ST FLR;  Service: Urology;;    CYSTOSCOPY W/ URETERAL STENT PLACEMENT Bilateral 5/25/2021    Procedure: CYSTOSCOPY, WITH BILATERAL URETERAL STENT INSERTION;  Surgeon: Chris Schilling MD;  Location: [facility] OR 1ST FLR;  Service: Urology;  Laterality: Bilateral;    ELBOW ARTHROPLASTY Right     FLUOROSCOPY  5/25/2021    Procedure: FLUOROSCOPY;  Surgeon: Chris Schilling MD;  Location: [facility] OR 1ST FLR;  Service: Urology;;    LASER LITHOTRIPSY Left 8/26/2021    Procedure: LITHOTRIPSY, USING LASER;  Surgeon: Camilo Kelley Jr., MD;  Location: [facility] OR 1ST FLR;  Service: Urology;  Laterality: Left;    PYELOSCOPY Bilateral 8/26/2021    Procedure: PYELOSCOPY;  Surgeon: Camilo Kelley Jr., MD;  Location: [facility] OR 1ST FLR;  Service: Urology;  Laterality: Bilateral;    REMOVAL OF BLOOD CLOT  5/25/2021    Procedure: REMOVAL, BLOOD CLOT;  Surgeon: Chris Schilling MD;  Location: [facility] OR 1ST FLR;  Service: Urology;;    REPLACEMENT OF STENT Bilateral 8/26/2021    Procedure: REPLACEMENT, STENT;  Surgeon: Camilo Kelley Jr., MD;  Location: [facility] OR 1ST FLR;  Service: Urology;  Laterality: Bilateral;    URETEROSCOPIC REMOVAL OF URETERIC CALCULUS Bilateral 8/26/2021    Procedure: REMOVAL, CALCULUS, URETER, URETEROSCOPIC;  Surgeon: Camilo Kelley Jr., MD;  Location: [facility] OR 1ST FLR;  Service: Urology;  Laterality: Bilateral;    URETEROSCOPY Bilateral 8/26/2021    Procedure: URETEROSCOPY;  Surgeon: Camilo Kelley Jr., MD;  Location: [facility] OR [address] [place] [address];  Service: Urology;  Laterality: Bilateral;  "      Allergies:  Review of patient's allergies indicates:  No Known Allergies    Home Medications:  Prior to Admission medications    Medication Sig Start Date End Date Taking? Authorizing Provider   atorvastatin (LIPITOR) 40 MG tablet Take 40 mg by mouth once daily.   Yes Historical Provider   cyproheptadine (PERIACTIN) 4 mg tablet Take 4 mg by mouth 3 (three) times daily. Itching 4/7/21  Yes Historical Provider   docusate sodium (COLACE) 100 MG capsule Take 1 capsule (100 mg total) by mouth once daily. 4/17/23  Yes Jenny Smith MD   folic acid (FOLVITE) 1 MG tablet Take 1 mg by mouth once daily.   Yes Historical Provider   gabapentin (NEURONTIN) 300 MG capsule Take 300 mg by mouth 3 (three) times daily. 4/7/21  Yes Historical Provider   mirtazapine (REMERON) 30 MG tablet Take 30 mg by mouth nightly. 5/27/21  Yes Historical Provider   polyethylene glycol (GLYCOLAX) 17 gram PwPk Take 17 g by mouth 2 (two) times daily. 4/17/23  Yes Jenny Smith MD   tamsulosin (FLOMAX) 0.4 mg Cap TAKE 2 CAPSULES(0.8 MG) BY MOUTH EVERY DAY  Patient taking differently: Take 0.8 mg by mouth once daily. 5/18/23  Yes Camilo Kelley Jr., MD   bisacodyL (DULCOLAX, BISACODYL,) 10 mg Supp Place 1 suppository (10 mg total) rectally daily as needed (constipation). 4/17/23   Jenny Smith MD       Family History:  Family History   Problem Relation Age of Onset    Stroke Mother     Hyperlipidemia Mother     Mental illness Father     Parkinsonism Father     No Known Problems Brother        Social History:  Social History     Tobacco Use    Smoking status: Former     Current packs/day: 0.00     Types: Cigarettes    Smokeless tobacco: Never   Substance Use Topics    Alcohol use: Yes     Comment: 1/ pint whisky daily    Drug use: Yes     Types: "Crack" cocaine       Review of Systems:  All other systems are reviewed and are negative.    Health Maintaince :   Primary Care Physician: Richard Galloway MD    Immunizations:   TDap Unknown    Flu " "UTD   Pna Unknown    Cancer Screening:  Colonoscopy: NUTD     Objective:   Last 24 Hour Vital Signs:  BP  Min: 99/58  Max: 126/61  Temp  Av.2 °F (36.8 °C)  Min: 97.5 °F (36.4 °C)  Max: 98.8 °F (37.1 °C)  Pulse  Av.2  Min: 64  Max: 82  Resp  Av.5  Min: 14  Max: 20  SpO2  Av.7 %  Min: 94 %  Max: 98 %  Height  Av' 6" (167.6 cm)  Min: 5' 6" (167.6 cm)  Max: 5' 6" (167.6 cm)  Weight  Av.5 kg (111 lb 3.6 oz)  Min: 46.5 kg (102 lb 8.2 oz)  Max: 54.4 kg (119 lb 14.9 oz)  Body mass index is 16.55 kg/m².  I/O last 3 completed shifts:  In: 98.4 [IV Piggyback:98.4]  Out: 150 [Urine:150]    Physical Examination:  General: A&Ox4, NAD, RASS 0. Cachexic  Head: Sunken in eyes  Eyes: Conjunctivae/corneas clear, anicteric sclera.  PERRLA, EOMI.  Mouth: OP clear, MMM.  Neck: no thyromegaly.  Lungs: Non-labored breathing, Symmetric chest rise, CTAB.  Heart: RRR, S1&S2 normal.  Grade 2/6 mid-systolic murmur best appreciated in aortic region (consistent with AS).  Abdomen: Soft, NT/ND, no masses and/or organomegaly.   : No CVA tenderness nor suprapubic tenderness nor distention.  Extremities: Extremities atraumatic.  No cyanosis, pallor, nor edema.   Skin: Stage I pressure ulcer of sacrum (with bandage overlying).  No signs of active infection at nephrotomy site.  Lymphatics: No cervical LAD.  Neurologic: No facial weakness.  BUE 5/5, RLE 4/5, LLE 3/5 (residuals from prior CVA).  Psychiatric: Appropriate affect.      Laboratory:  Most Recent Data:  CBC:   Lab Results   Component Value Date    WBC 11.89 2023    HGB 13.0 (L) 2023    HCT 40.7 2023     2023    MCV 95 2023    RDW 13.1 2023     WBC Differential: 78.3 % N, 0 % Bands, 14.6 % L, 5.6 % M, 0.8 % Eo, 0.5 % Baso, no additional cells seen  BMP:   Lab Results   Component Value Date     2023    K 4.0 2023     2023    CO2 24 2023    BUN 26 (H) 2023    CREATININE 0.8 " 07/30/2023    GLU 86 07/30/2023    CALCIUM 10.2 07/30/2023    MG 1.7 03/23/2023    PHOS 2.9 04/17/2023     LFTs:   Lab Results   Component Value Date    PROT 7.6 07/30/2023    ALBUMIN 3.2 (L) 07/30/2023    BILITOT 0.5 07/30/2023    AST 16 07/30/2023    ALKPHOS 68 07/30/2023    ALT 19 07/30/2023     Coags:   Lab Results   Component Value Date    INR 1.3 (H) 03/20/2023     DM:   Lab Results   Component Value Date    HGBA1C 4.7 11/20/2021    HGBA1C 4.1 08/24/2021    HGBA1C 4.8 05/26/2021    CREATININE 0.8 07/30/2023     Thyroid:   Lab Results   Component Value Date    TSH 2.398 03/14/2022    FREET4 0.98 03/14/2022     Anemia:   Lab Results   Component Value Date    IRON 10 (L) 03/20/2023    TIBC 161 (L) 03/20/2023    FERRITIN 595 (H) 03/20/2023    [id number] 578 03/20/2023    FOLATE 16.8 03/20/2023     Cardiac:   Lab Results   Component Value Date    TROPONINI 0.024 03/20/2023    BNP 54 03/19/2023     Urinalysis:   Lab Results   Component Value Date    LABURIN KLEBSIELLA OXYTOCA  >100,000 cfu/ml   (A) 05/25/2023    COLORU Yellow 07/30/2023    SPECGRAV 1.030 07/30/2023    NITRITE Negative 07/30/2023    KETONESU Negative 07/30/2023    UROBILINOGEN Negative 07/30/2023    WBCUA >100 (H) 07/30/2023       Trended Lab Data:  Recent Labs   Lab 07/30/23  1614   WBC 11.89   HGB 13.0*   HCT 40.7      MCV 95   RDW 13.1      K 4.0      CO2 24   BUN 26*   CREATININE 0.8   GLU 86   PROT 7.6   ALBUMIN 3.2*   BILITOT 0.5   AST 16   ALKPHOS 68   ALT 19       Microbiology Data:  UA with signs of bacterial growth     Other Results:  EKG (my interpretation): Ventricular paced rhythm.    Radiology:  Imaging Results              X-Ray Chest AP Portable (Final result)  Result time 07/30/23 16:53:18      Final result by Esvin Wise MD (07/30/23 16:53:18)                   Impression:      No acute cardiopulmonary process.      Electronically signed by: Esvin Wise  Date:    07/30/2023  Time:    16:53                Narrative:    EXAMINATION:  XR CHEST AP PORTABLE    CLINICAL HISTORY:  Sepsis;    TECHNIQUE:  Single frontal view of the chest was performed.    COMPARISON:  03/19/2023    FINDINGS:  Left chest wall pacer and leads project in stable position.  Stable cardiomediastinal silhouette.  Aortic calcification.  Bilateral lungs are clear.  No airspace opacity, pleural effusion, or pneumothorax.  Bones appear intact.                                      Reviewed     Assessment:     Praneeth Jewell is a 77 y.o.  male with Obstructive Uropathy 2/2 Ureterolithiasis s/p L. Nephrostomy tube, 2nd degree AVB s/p PPM, CVA w/ residual deficits of 4/5 RLE weakness, 3/5 LLE weakness, and being bedbound, MDD, essential hypertension, Dyslipidemia, T2DM, and chronic constipation. The patient presented to [facility] on 7/30/2023 with a primary complaint of his L. Nephrostomy Tube falling out 12 hours ago.     Plan:     Obstructive Uropathy 2/2 L. Ureterolithiasis s/p L. Nephrostomy tube  # L. Nephrostomy tube malfunction  Patient with Left nephrostomy tube that fell out due to positional change.  - IR consult to Dr. Krause for nephrostomy tube.  - NPO at [facility]   - PT/INR in the morning prior to IR procedure.    BPH  Contributing to the overall Obstructive Uropathy mentioned above.  - continue home tamulosin    ASCVD  # Dyslipidemia  # Ischemic CVA with residual deficits  ## 4/5 RLE weakness  ## 3/5 LLE weakness  ## Bed Bound  ## Deconditioning  No lipid panel on file.    - continue home lipitor 40.  - Patient not on daily ASA due to GIB in 2021, will defer continuation to PCP, although would be appropriate in this patient.  - will consult PT/OT for evaluation and treatment should patient not be appropriate for discharge tomorrow following IR procedure.  - CTM    2nd degree AVB s/p PPM  Ventricularly paced.  - CTM    Anemia of Chronic Inflammatory Disease  Patient with prior iron studies and ferritin consistent with ACID.  -  recommend further workup outpatient.    Stage I Sacral Pressure Ulcer  Noted on day of admission.  - turn patient q2h    Chronic Constipation  Had BM day of admission.  - continue home glycolax BID, colase, and bisacodyl prn.    Asymptomatic Bacteruria  Patient without symptoms nor signs of UTI aside from bacteria and WBC on UA.  - CTM    Essential Hypertension  Bps soft on admit, patient asymptomatic.  Patient not currently on home BP meds.  - CTM    MDD  - continue home Mirtazapine 30.  - continue home cyproheptadine.    H/o GIB (2021)  - was on daily ASA, but was discontinued after the GIB.    H/o Controlled T2DM   Last A1c 4.7.  Appears to have resolved years ago.    Healthcare Maintenance  Colonoscopy NUTD.  Unknown if PNA and Tdap vaccine up to date.  - defer to PCP.      Diet: Cardiac Diet, NPO @ [facility] for IR procedure tomorrow.  Telemetry: none  VTE Ppx: lovenox  Code Status: DNR/DNI    Dispo: Observation with plans for IR to replace nephrostomy tube tomorrow with discharge back to [facility] following.  Estimated LOS: 1 day       Jennifer Booker MD  [facility] HO-II    [facility] Hospitalist Pager numbers:   [facility] Hospitalist Medicine Team A (Kalpesh/Richard): [phone]  [facility] Hospitalist Medicine Team B (Champ/Daiana):  [phone]

## 2023-07-31 VITALS
HEIGHT: 66 IN | RESPIRATION RATE: 16 BRPM | DIASTOLIC BLOOD PRESSURE: 58 MMHG | HEART RATE: 63 BPM | SYSTOLIC BLOOD PRESSURE: 103 MMHG | OXYGEN SATURATION: 100 % | WEIGHT: 102.5 LBS | TEMPERATURE: 97 F | BODY MASS INDEX: 16.47 KG/M2

## 2023-07-31 PROBLEM — K92.2 GIB (GASTROINTESTINAL BLEEDING): Status: RESOLVED | Noted: 2021-11-19 | Resolved: 2023-07-31

## 2023-07-31 PROBLEM — N20.0 NEPHROLITHIASIS: Chronic | Status: ACTIVE | Noted: 2021-06-02

## 2023-07-31 PROBLEM — Z79.82 LONG TERM (CURRENT) USE OF ASPIRIN: Status: RESOLVED | Noted: 2021-05-25 | Resolved: 2023-07-31

## 2023-07-31 PROBLEM — J06.9 ACUTE UPPER RESPIRATORY INFECTION: Status: RESOLVED | Noted: 2022-03-14 | Resolved: 2023-07-31

## 2023-07-31 PROBLEM — E11.9 DIABETES: Status: RESOLVED | Noted: 2021-05-25 | Resolved: 2023-07-31

## 2023-07-31 PROBLEM — R03.0 ELEVATED BLOOD PRESSURE READING WITHOUT DIAGNOSIS OF HYPERTENSION: Status: RESOLVED | Noted: 2022-03-14 | Resolved: 2023-07-31

## 2023-07-31 PROBLEM — T83.022A NEPHROSTOMY TUBE DISPLACED: Status: ACTIVE | Noted: 2023-07-31

## 2023-07-31 PROBLEM — K56.41 FECAL IMPACTION: Status: RESOLVED | Noted: 2023-04-14 | Resolved: 2023-07-31

## 2023-07-31 PROBLEM — R00.1 BRADYCARDIA: Status: RESOLVED | Noted: 2021-08-26 | Resolved: 2023-07-31

## 2023-07-31 PROBLEM — Z87.19 HISTORY OF FECAL IMPACTION: Status: ACTIVE | Noted: 2023-07-31

## 2023-07-31 PROBLEM — T83.022A NEPHROSTOMY TUBE DISPLACED: Status: RESOLVED | Noted: 2023-07-31 | Resolved: 2023-07-31

## 2023-07-31 PROBLEM — N40.1 BPH WITH URINARY OBSTRUCTION: Chronic | Status: ACTIVE | Noted: 2021-06-02

## 2023-07-31 PROBLEM — I10 ESSENTIAL HYPERTENSION: Chronic | Status: ACTIVE | Noted: 2021-08-24

## 2023-07-31 PROBLEM — I69.30 HISTORY OF CVA WITH RESIDUAL DEFICIT: Chronic | Status: ACTIVE | Noted: 2021-05-25

## 2023-07-31 PROBLEM — Z86.39 HISTORY OF DIABETES MELLITUS, TYPE II: Chronic | Status: RESOLVED | Noted: 2023-07-31 | Resolved: 2023-07-31

## 2023-07-31 PROBLEM — F32.9 MDD (MAJOR DEPRESSIVE DISORDER): Chronic | Status: ACTIVE | Noted: 2021-08-24

## 2023-07-31 PROBLEM — Z87.19 HISTORY OF GASTROINTESTINAL BLEEDING: Chronic | Status: ACTIVE | Noted: 2023-07-31

## 2023-07-31 PROBLEM — Z86.39 HISTORY OF DIABETES MELLITUS, TYPE II: Chronic | Status: ACTIVE | Noted: 2023-07-31

## 2023-07-31 PROBLEM — N13.8 BPH WITH URINARY OBSTRUCTION: Chronic | Status: ACTIVE | Noted: 2021-06-02

## 2023-07-31 LAB
ALBUMIN SERPL BCP-MCNC: 2.7 G/DL (ref 3.5–5.2)
ALP SERPL-CCNC: 56 U/L (ref 55–135)
ALT SERPL W/O P-5'-P-CCNC: 14 U/L (ref 10–44)
ANION GAP SERPL CALC-SCNC: 7 MMOL/L (ref 8–16)
AST SERPL-CCNC: 14 U/L (ref 10–40)
BASOPHILS # BLD AUTO: 0.04 K/UL (ref 0–0.2)
BASOPHILS NFR BLD: 0.5 % (ref 0–1.9)
BILIRUB SERPL-MCNC: 0.3 MG/DL (ref 0.1–1)
BUN SERPL-MCNC: 27 MG/DL (ref 8–23)
CALCIUM SERPL-MCNC: 9.7 MG/DL (ref 8.7–10.5)
CHLORIDE SERPL-SCNC: 107 MMOL/L (ref 95–110)
CO2 SERPL-SCNC: 27 MMOL/L (ref 23–29)
CREAT SERPL-MCNC: 0.8 MG/DL (ref 0.5–1.4)
DIFFERENTIAL METHOD: ABNORMAL
EOSINOPHIL # BLD AUTO: 0.2 K/UL (ref 0–0.5)
EOSINOPHIL NFR BLD: 2.8 % (ref 0–8)
ERYTHROCYTE [DISTWIDTH] IN BLOOD BY AUTOMATED COUNT: 13.1 % (ref 11.5–14.5)
EST. GFR  (NO RACE VARIABLE): >60 ML/MIN/1.73 M^2
ESTIMATED AVG GLUCOSE: 85 MG/DL (ref 68–131)
GLUCOSE SERPL-MCNC: 83 MG/DL (ref 70–110)
HBA1C MFR BLD: 4.6 % (ref 4–5.6)
HCT VFR BLD AUTO: 35.9 % (ref 40–54)
HGB BLD-MCNC: 11.5 G/DL (ref 14–18)
IMM GRANULOCYTES # BLD AUTO: 0.02 K/UL (ref 0–0.04)
IMM GRANULOCYTES NFR BLD AUTO: 0.2 % (ref 0–0.5)
INR PPP: 1.1 (ref 0.8–1.2)
INR PPP: 1.1 (ref 0.8–1.2)
LYMPHOCYTES # BLD AUTO: 1.7 K/UL (ref 1–4.8)
LYMPHOCYTES NFR BLD: 20.7 % (ref 18–48)
MCH RBC QN AUTO: 30.4 PG (ref 27–31)
MCHC RBC AUTO-ENTMCNC: 32 G/DL (ref 32–36)
MCV RBC AUTO: 95 FL (ref 82–98)
MONOCYTES # BLD AUTO: 0.5 K/UL (ref 0.3–1)
MONOCYTES NFR BLD: 5.4 % (ref 4–15)
NEUTROPHILS # BLD AUTO: 5.9 K/UL (ref 1.8–7.7)
NEUTROPHILS NFR BLD: 70.4 % (ref 38–73)
NRBC BLD-RTO: 0 /100 WBC
PLATELET # BLD AUTO: 180 K/UL (ref 150–450)
PMV BLD AUTO: 9.4 FL (ref 9.2–12.9)
POCT GLUCOSE: 77 MG/DL (ref 70–110)
POCT GLUCOSE: 92 MG/DL (ref 70–110)
POTASSIUM SERPL-SCNC: 4.2 MMOL/L (ref 3.5–5.1)
PROT SERPL-MCNC: 6.4 G/DL (ref 6–8.4)
PROTHROMBIN TIME: 11.7 SEC (ref 9–12.5)
PROTHROMBIN TIME: 11.9 SEC (ref 9–12.5)
RBC # BLD AUTO: 3.78 M/UL (ref 4.6–6.2)
SODIUM SERPL-SCNC: 141 MMOL/L (ref 136–145)
WBC # BLD AUTO: 8.32 K/UL (ref 3.9–12.7)

## 2023-07-31 PROCEDURE — 63600175 PHARM REV CODE 636 W HCPCS

## 2023-07-31 PROCEDURE — 25500020 PHARM REV CODE 255: Performed by: RADIOLOGY

## 2023-07-31 PROCEDURE — 99223 PR INITIAL HOSPITAL CARE,LEVL III: ICD-10-PCS | Mod: 25,,, | Performed by: PHYSICIAN ASSISTANT

## 2023-07-31 PROCEDURE — 80053 COMPREHEN METABOLIC PANEL: CPT

## 2023-07-31 PROCEDURE — 94761 N-INVAS EAR/PLS OXIMETRY MLT: CPT | Mod: 59

## 2023-07-31 PROCEDURE — 63600175 PHARM REV CODE 636 W HCPCS: Performed by: RADIOLOGY

## 2023-07-31 PROCEDURE — 25000003 PHARM REV CODE 250: Performed by: RADIOLOGY

## 2023-07-31 PROCEDURE — G0378 HOSPITAL OBSERVATION PER HR: HCPCS

## 2023-07-31 PROCEDURE — 85025 COMPLETE CBC W/AUTO DIFF WBC: CPT

## 2023-07-31 PROCEDURE — 99900035 HC TECH TIME PER 15 MIN (STAT)

## 2023-07-31 PROCEDURE — 99223 1ST HOSP IP/OBS HIGH 75: CPT | Mod: 25,,, | Performed by: PHYSICIAN ASSISTANT

## 2023-07-31 PROCEDURE — 25000003 PHARM REV CODE 250

## 2023-07-31 PROCEDURE — 83036 HEMOGLOBIN GLYCOSYLATED A1C: CPT | Performed by: STUDENT IN AN ORGANIZED HEALTH CARE EDUCATION/TRAINING PROGRAM

## 2023-07-31 PROCEDURE — 85610 PROTHROMBIN TIME: CPT | Performed by: INTERNAL MEDICINE

## 2023-07-31 PROCEDURE — 36415 COLL VENOUS BLD VENIPUNCTURE: CPT

## 2023-07-31 PROCEDURE — 85610 PROTHROMBIN TIME: CPT | Mod: 91

## 2023-07-31 RX ORDER — LIDOCAINE HYDROCHLORIDE 10 MG/ML
INJECTION INFILTRATION; PERINEURAL
Status: COMPLETED | OUTPATIENT
Start: 2023-07-31 | End: 2023-07-31

## 2023-07-31 RX ORDER — FENTANYL CITRATE 50 UG/ML
INJECTION, SOLUTION INTRAMUSCULAR; INTRAVENOUS
Status: COMPLETED | OUTPATIENT
Start: 2023-07-31 | End: 2023-07-31

## 2023-07-31 RX ORDER — MIDAZOLAM HYDROCHLORIDE 5 MG/ML
INJECTION INTRAMUSCULAR; INTRAVENOUS
Status: COMPLETED | OUTPATIENT
Start: 2023-07-31 | End: 2023-07-31

## 2023-07-31 RX ADMIN — MIDAZOLAM HYDROCHLORIDE 0.5 MG: 5 INJECTION, SOLUTION INTRAMUSCULAR; INTRAVENOUS at 09:07

## 2023-07-31 RX ADMIN — GABAPENTIN 300 MG: 300 CAPSULE ORAL at 04:07

## 2023-07-31 RX ADMIN — CYPROHEPTADINE HYDROCHLORIDE 4 MG: 4 TABLET ORAL at 08:07

## 2023-07-31 RX ADMIN — CYPROHEPTADINE HYDROCHLORIDE 4 MG: 4 TABLET ORAL at 04:07

## 2023-07-31 RX ADMIN — POLYETHYLENE GLYCOL 3350 17 G: 17 POWDER, FOR SOLUTION ORAL at 08:07

## 2023-07-31 RX ADMIN — IOHEXOL 10 ML: 350 INJECTION, SOLUTION INTRAVENOUS at 10:07

## 2023-07-31 RX ADMIN — FOLIC ACID 1 MG: 1 TABLET ORAL at 08:07

## 2023-07-31 RX ADMIN — FENTANYL CITRATE 25 MCG: 50 INJECTION, SOLUTION INTRAMUSCULAR; INTRAVENOUS at 09:07

## 2023-07-31 RX ADMIN — TAMSULOSIN HYDROCHLORIDE 0.8 MG: 0.4 CAPSULE ORAL at 08:07

## 2023-07-31 RX ADMIN — ATORVASTATIN CALCIUM 40 MG: 20 TABLET, FILM COATED ORAL at 08:07

## 2023-07-31 RX ADMIN — LIDOCAINE HYDROCHLORIDE 5 ML: 10 INJECTION, SOLUTION INFILTRATION; PERINEURAL at 09:07

## 2023-07-31 RX ADMIN — DOCUSATE SODIUM 100 MG: 100 CAPSULE, LIQUID FILLED ORAL at 08:07

## 2023-07-31 RX ADMIN — GABAPENTIN 300 MG: 300 CAPSULE ORAL at 08:07

## 2023-07-31 NOTE — PROGRESS NOTES
"[facility] Hospital Medicine Progress Note    Primary Team: [facility] Hospitalist Team A  Attending Physician: Bety Ramos MD  Resident: Stevie/Jhony    Subjective:      NAEON, patient slept well and has been NPO since midnight.  Patient denies any dysuria, suprapubic pain, nor back pain.     Objective:     Last 24 Hour Vital Signs:  BP  Min: 99/58  Max: 126/61  Temp  Av.2 °F (36.8 °C)  Min: 97.5 °F (36.4 °C)  Max: 98.8 °F (37.1 °C)  Pulse  Av  Min: 64  Max: 82  Resp  Av.4  Min: 14  Max: 20  SpO2  Av.8 %  Min: 94 %  Max: 98 %  Height  Av' 6" (167.6 cm)  Min: 5' 6" (167.6 cm)  Max: 5' 6" (167.6 cm)  Weight  Av.5 kg (111 lb 3.6 oz)  Min: 46.5 kg (102 lb 8.2 oz)  Max: 54.4 kg (119 lb 14.9 oz)  I/O last 3 completed shifts:  In: 98.4 [IV Piggyback:98.4]  Out: -     Physical Examination:  General: A&Ox4, NAD, RASS 0. Cachexic  Head: Sunken in eyes  Eyes: Conjunctivae/corneas clear, anicteric sclera.  PERRLA, EOMI.  Mouth: OP clear, MMM.  Neck: no thyromegaly.  Lungs: Non-labored breathing, Symmetric chest rise, CTAB.  Heart: RRR, S1&S2 normal.  Grade 2/6 mid-systolic murmur best appreciated in aortic region (consistent with AS).  Abdomen: Soft, NT/ND, no masses and/or organomegaly.   : No CVA tenderness nor suprapubic tenderness nor distention.  Extremities: Extremities atraumatic.  No cyanosis, pallor, nor edema.   Skin: Stage I pressure ulcer of sacrum (with bandage overlying).  Nephrostomy on L. Back with granulation tissue, non-tender to palpation, and doesn't express purulent drainage nor serosanguinous drainage.  Lymphatics: No cervical LAD.  Neurologic: No facial weakness.  Muscle weakness as follows: BUE 5/5, RLE 4/5, LLE 3/5 (residuals from prior CVA).  Has sensation in BLE.  Psychiatric: Appropriate affect.      Laboratory:  Laboratory Data Reviewed: yes  Pertinent Findings:  Patient refused morning labs (CBC, CMP, PT/INR).      Microbiology Data Reviewed: yes  Pertinent Findings:  Bcx: in " process  Ucx: in process    Other Results:  EKG (my interpretation): No new.    Radiology Data Reviewed: yes  Pertinent Findings:  No new imaging.    Current Medications:     Infusions:       Scheduled:   atorvastatin  40 mg Oral Daily    cyproheptadine  4 mg Oral TID    docusate sodium  100 mg Oral Daily    folic acid  1 mg Oral Daily    gabapentin  300 mg Oral TID    mirtazapine  30 mg Oral Nightly    polyethylene glycol  17 g Oral BID    tamsulosin  0.8 mg Oral Daily        PRN:  acetaminophen, bisacodyL, glucagon (human recombinant), glucose, glucose, insulin aspart U-100, melatonin, sodium chloride 0.9%    Antibiotics and Day Number of Therapy:  CTX (7/30/2023 in ED)  None on floor.    Lines and Day Number of Therapy:  PIVx1 (7/30/23-TBD)    Assessment:     Praneeth Jewell is a 77 y.o.  male with Obstructive Uropathy 2/2 Ureterolithiasis s/p L. Nephrostomy tube, 2rd degree AVB s/p PPM, CVA w/ residual deficits of 4/5 RLE weakness, 3/5 LLE weakness, and being bedbound, MDD, essential hypertension, Dyslipidemia, T2DM, and chronic constipation. The patient presented to [facility] on 7/30/2023 with a primary complaint of his L. Nephrostomy Tube falling out 12 hours prior to Internal medicine team initial evaluation.  Patient admitted to medicine for L. Nephrostomy tube malfunction.  IR consulted for replacement on 7/1/2023.     Plan:     Obstructive Uropathy 2/2 L. Ureterolithiasis s/p L. Nephrostomy tube  # L. Nephrostomy tube malfunction  Patient with Left nephrostomy tube that fell out due to positional change.  PT/INR was unable to obtain in the morning prior to IR procedure due to patient refusal.  - IR procedure today with Dr. Krause for nephrostomy tube.     BPH  A component also contributing to the overall Obstructive Uropathy mentioned above.  - continue home tamulosin    New Grade 2/6 mid-systolic murmur best appreciated in aortic region  Not noted in prior documentation per brief chart  review.  Ddx: AS vs flow murmur.  Asymptomatic at this time.  - Defer to PCP for further workup with TTE after goals of care discussion.     ASCVD  # Dyslipidemia  # Ischemic CVA with residual deficits  ## 4/5 RLE weakness  ## 3/5 LLE weakness  ## Bed Bound  ## Deconditioning  No lipid panel on file.    - continue home lipitor 40.  - Patient not on daily ASA due to GIB in 2021, will defer continuation to PCP, although would be appropriate in this patient.  - will consult PT/OT for evaluation and treatment should patient not be appropriate for discharge tomorrow following IR procedure.     2nd degree AVB s/p PPM  Ventricularly paced.  - CTM     Anemia of Chronic Inflammatory Disease  Patient with prior iron studies and ferritin consistent with ACID.  - recommend further workup outpatient.     Stage I Sacral Pressure Ulcer  Noted on day of admission.  - turn patient q2h  - will consider wound care consult if patient has prolonged hospital stay, but at this moment in time it is stage 1 and turning the patient q2h should be appropriate.     Chronic Constipation  Had BM day of admission.  - continue home glycolax BID, colase, and bisacodyl prn.     Asymptomatic Bacteruria  Patient without symptoms nor signs of UTI aside from bacteria and WBC on UA.  - CTM     Essential Hypertension  Bps soft on admit, patient asymptomatic.  Patient not currently on home BP meds.  - CTM     MDD  - continue home Mirtazapine 30.  - continue home cyproheptadine.     H/o GIB (2021)  - was on daily ASA, but was discontinued after the GIB.     H/o Controlled T2DM   Last A1c 4.7.  Appears to have resolved years ago.     Healthcare Maintenance  Colonoscopy NUTD.  Unknown if PNA and Tdap vaccine up to date.  - defer to PCP.        Diet: NPO for IR procedure tomorrow, cardiac diet after.  Telemetry: none  VTE Ppx: SCDs, lovenox if patient remains inpatient.  Code Status: DNR/DNI     Dispo: Observation with plans for IR to replace nephrostomy tube  today with [facility] back [facility] following the same day.  Estimated LOS: 1 day     Jennifer Booker MD  [facility] HO-II    [facility] Hospitalist Pager numbers:   [facility] Hospitalist Medicine Team A (Kalpesh/Richard): [phone]  [facility] Hospitalist Medicine Team B (Champ/Daiana):  [phone]

## 2023-07-31 NOTE — NURSING
07/31/23 0611   Patient Request   Patient Requested patient does not have order for accuchecks and has history of DM   Nurse Notification   Bedside Nurse Notified? Yes   Name of Bedside Nurse Karen Batista RN   Nurse Notfication Method Secure Chat   Nurse Notified Of Other   Provider Notification   Provider Notified? Yes   Name of Provider Dr. Jennifer Booker- response:  A1c has been controlled for years and ok with 140-180   Provider Notification Method Secure Chat   Provider Notified Of Other (See Comment)

## 2023-07-31 NOTE — PLAN OF CARE
"  Problem: Adult Inpatient Plan of Care  Goal: Plan of Care Review  Outcome: Ongoing, Progressing     VIRTUAL NURSE:  Cued into patient's room.  Patient refusing admission assessment interview currently; will attempt later.  Instructed to call for assistance.  Will cont to monitor and intervene as needed.    Labs, notes, orders, and careplan reviewed.       07/30/23 2202   Patient Request   Patient Requested patient with eyes closed; respirations even and unlabored. no distress noted. Bhakti, RN at bedside to wake patient up; patient states "leave me alone". notified patient that we will attempt admission assessmwnt interview again at 5am.  no response.   Nurse Notification   Bedside Nurse Notified? Yes   Name of Bedside Nurse Karen Batista RN   Nurse Notfication Method Vidyo   Admission   Initial VN Admission Questions Complete   Communication Issues? None   Shift   Virtual Nurse - Rounding Complete   Pain Management Interventions pain management plan reviewed with patient/caregiver   Virtual Nurse - Patient Verbalized Approval Of Camera Use;VN Rounding;Other (See Comments)  (neither agree or decline)   Type of Frequent Check   Type Flaps   Safety/Activity   Patient Rounds bed in low position;placement of personal items at bedside;bed wheels locked;call light in patient/parent reach;visualized patient;clutter free environment maintained   Safety Promotion/Fall Prevention assistive device/personal item within reach;bed alarm set;diversional activities provided;Fall Risk reviewed with patient/family;high risk medications identified;medications reviewed;nonskid shoes/socks when out of bed;side rails raised x 3;Supervised toileting - stay within arms reach;instructed to call staff for mobility;supervised activity   Safety Precautions emergency equipment at bedside   Positioning   Body Position neutral body alignment;neutral head position   Head of Bed (HOB) Positioning HOB at 60 degrees   Pain/Comfort/Sleep   Preferred " Pain Scale FACES (Tucker-Cid FACES Pain Rating Scale)   Comfort/Acceptable Pain Level 0   FACES Pain Rating: Rest 0-->no hurt   FACES Pain Rating: Activity 0-->no hurt   Sleep/Rest/Relaxation appears asleep

## 2023-07-31 NOTE — DISCHARGE SUMMARY
[facility] Hospital Medicine Discharge Summary    Primary Team: [facility] Hospitalist Team A  Attending Physician: Bety Ramos MD  Resident: Stevie  Intern: Jhony    Date of Admit: 7/30/2023  Date of Discharge: 7/31/2023    Discharge to: [facility]  Condition: fair    Discharge Diagnoses     Dislodged Left Nephrostomy Tube  Nephrolithiasis  History of CVA with residual left sided weakness  Obstructive Uropathy    Consultants and Procedures     Consultants:  Interventional Radiology    Procedures:   Left nephrostomy tube insertion    Imaging:  CXR    Brief History of Present Illness      Praneeth Jewell is a 77 y.o.  male with Obstructive Uropathy 2/2 Ureterolithiasis s/p L. 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 The patient presented to [facility] on 7/30/2023 with a primary complaint of his left nephrostomy tube falling out 12 hours ago.     The patient was in their usual state of health until 6-7am on 7/30/23 when he had a positional change followed by accidental removal of his L. nephrostomy tube.    Patient denies any other new medical complaints including fever, chills, pain, dysuria, dyspnea, CP, SOB.  Patient lives at [facility] and is bed bound relying on staff for his ADLs.  Last BM was afternoon of admission, and his mark was changed in the ED during this admission.  Patient wishes to remain DNR/DNI.     For the full HPI please refer to the History & Physical from this admission.    Pt underwent L nephrostomy tube replacement with IR on 7/31 and tolerated procedure well.  Pt was abele to tolerate PO after the procedure and draining urine from nephrostomy tube.  Discharged back to [facility].     Hospital Course By Problem with Pertinent Findings     Obstructive Uropathy 2/2 L. Ureterolithiasis s/p L. Nephrostomy  tube  Dislodgement of nephrostomy tube  Patient with Left nephrostomy tube that fell out due to positional change on day of admission.    - R ureter patent and pt making urine  - IR procedure performed left nephrostomy tube insertion 7/31/23, positioning confirmed in collecting system using contrast under fluoroscopy; tolerated procedure well     BPH requiring chronic indwelling amrk catheter  - catheter changed out in ED  - continue home tamsulosin 0.8 mg daily    Asymptomatic Bacteruria  - UA with WBCs, bacteria, negative nitrites   - pt denies any dysuria, suprapubic or flank pain; afebrile, normal WBC count  - likely colonized in setting of chronic indwelling mark      Grade 2/6 mid-systolic murmur best appreciated in aortic region  - likely aortic stenosis; pt currently asymptomatic     Ischemic CVA with residual deficits  - 4/5 RLE weakness, 3/5 LLE weakness, and Bed Bound; pt severely deconditioned  - continue home lipitor 40.  - Patient not on daily ASA due to GIB in 2021, will defer continuation to PCP, although would be appropriate in this patient.  - continue PT/OT at NH facility      2nd degree AVB s/p PPM  - Ventricularly paced; no acute issues     Anemia of Chronic Inflammatory Disease  Patient with prior iron studies and ferritin consistent with ACID.  - Hgb stable at ~11     Stage I Sacral Pressure Ulcer, present on admision  - turn patient q2h    Chronic Constipation  - reports BM on day of admission.  - continue home glycolax BID, colase, and bisacodyl prn.     MDD  Severe protein calorie malnutrition  - continue home Mirtazapine 30, home cyproheptadine.  - nutritional supplements while inpatient     H/o GIB (2021)  - was on daily ASA, but was discontinued after the GIB.     Healthcare Maintenance  Colonoscopy NUTD.  Unknown if PNA and Tdap vaccine up to date.  - defer to PCP.       Discharge Medications        Medication List        ASK your doctor about these medications      atorvastatin 40 MG  tablet  Commonly known as: LIPITOR     bisacodyL 10 mg Supp  Commonly known as: DULCOLAX (BISACODYL)  Place 1 suppository (10 mg total) rectally daily as needed (constipation).     cyproheptadine 4 mg tablet  Commonly known as: PERIACTIN     docusate sodium 100 MG capsule  Commonly known as: COLACE  Take 1 capsule (100 mg total) by mouth once daily.     folic acid 1 MG tablet  Commonly known as: FOLVITE     gabapentin 300 MG capsule  Commonly known as: NEURONTIN     mirtazapine 30 MG tablet  Commonly known as: REMERON     polyethylene glycol 17 gram Pwpk  Commonly known as: GLYCOLAX  Take 17 g by mouth 2 (two) times daily.     tamsulosin 0.4 mg Cap  Commonly known as: FLOMAX  TAKE 2 CAPSULES(0.8 MG) BY MOUTH EVERY DAY               Discharge Information:   Diet:  Cardiac    Physical Activity:  As tolerated             Instructions:  1. Take all medications as prescribed  2. Keep all follow-up appointments  3. Return to the hospital or call your primary care physicians if any worsening symptoms such as fever, chest pain, shortness of breath, return of symptoms, or any other concerns.    Follow-Up Appointments:  PCP  Urology      Lindsey Hubbard MD  [facility] Internal Medicine, [facility] Hospitalist Team A

## 2023-07-31 NOTE — NURSING
Report called to Kedar (nurse) at [facility]. All questions answered; no further questions at this time.

## 2023-07-31 NOTE — PLAN OF CARE
[facility]    FACILITY TRANSFER ORDERS      Patient Name: Praneeth Jewell  YOB: 1945    PCP: Richard Galloway MD   PCP Address: [address] / [place] [address]  PCP Phone Number: [phone]  PCP Fax: [phone]    Encounter Date: 07/31/2023    Admit to: [facility]    Vital Signs:  Routine    Diagnoses:   Active Hospital Problems    Diagnosis  POA    History of diabetes mellitus, type II [Z86.39]  Yes     Chronic      Resolved Hospital Problems   No resolved problems to display.       Allergies:Review of patient's allergies indicates:  No Known Allergies    Diet: cardiac diet    Activities: Activity as tolerated    Goals of Care Treatment Preferences:  Code Status: DNR          What is most important right now is to focus on improvement in condition but with limits to invasive therapies.  Accordingly, we have decided that the best plan to meet the patient's goals includes continuing with treatment.      Nursing: Per facility protocol.  Nephrostomy tube care.     Labs: Per facility protocol.     CONSULTS:    Physical Therapy to evaluate and treat.  and Occupational Therapy to evaluate and treat.    MISCELLANEOUS CARE:  Shen Care: Empty Shen bag every shift. Change Shen every month. and Routine Skin for Bedridden Patients: Apply moisture barrier cream to all skin folds and wet areas in perineal area daily and after baths and all bowel movements.    WOUND CARE ORDERS  Yes: Pressure Ulcer(s) Stage I :   Location: Sacrum  Apply Miconzazole:  2% cream                       Frequency:  Twice a day                          If incontinent of stool or urine, apply thin layer Barrier cream                   twice daily and PRN to wound         Pressure relief measure:  for pressure redistribution             Medications: Review discharge medications with patient and family and provide education.      Current Discharge Medication List        CONTINUE these medications which  have NOT CHANGED    Details   atorvastatin (LIPITOR) 40 MG tablet Take 40 mg by mouth once daily.      cyproheptadine (PERIACTIN) 4 mg tablet Take 4 mg by mouth 3 (three) times daily. Itching      docusate sodium (COLACE) 100 MG capsule Take 1 capsule (100 mg total) by mouth once daily.  Refills: 0      folic acid (FOLVITE) 1 MG tablet Take 1 mg by mouth once daily.      gabapentin (NEURONTIN) 300 MG capsule Take 300 mg by mouth 3 (three) times daily.      mirtazapine (REMERON) 30 MG tablet Take 30 mg by mouth nightly.      polyethylene glycol (GLYCOLAX) 17 gram PwPk Take 17 g by mouth 2 (two) times daily.  Refills: 0      tamsulosin (FLOMAX) 0.4 mg Cap TAKE 2 CAPSULES(0.8 MG) BY MOUTH EVERY DAY  Qty: 60 capsule, Refills: 11      bisacodyL (DULCOLAX, BISACODYL,) 10 mg Supp Place 1 suppository (10 mg total) rectally daily as needed (constipation).  Refills: 0                Immunizations Administered as of 7/31/2023       No immunizations on file.            [facility] have 1 dose on file on 12/12/2020.    Some patients may experience side effects after vaccination.  These may include fever, headache, muscle or joint aches.  Most symptoms resolve with 24-48 hours and do not require urgent medical evaluation unless they persist for more than 72 hours or symptoms are concerning for an unrelated medical condition.          _________________________________  Jennifer Booker MD  07/31/2023

## 2023-07-31 NOTE — CONSULTS
"[facility]  Wound Care    Patient Name:  Praneeth Jewell   MRN:  [id number]  Date: 7/31/2023  Diagnosis: Nephrostomy tube displaced    History:     Past Medical History:   Diagnosis Date    Arthritis     Diabetes mellitus     type 2    Folate deficiency anemia     Heart block     History of kidney stones 05/25/2021    History of stroke with residual deficit 05/25/2021    Hyperlipidemia     Hypertension     Major depressive disorder     Pacemaker     Biotronic Edora 8 DR-T (S/n: [id number])    Pyelonephritis 11/19/2021    TIA (transient ischemic attack)     Urinary retention 05/25/2021       Social History     Socioeconomic History    Marital status: Single    Number of children: 0    Years of education: 15    Highest education level: Some college, no degree   Occupational History     Employer: Disabled    Occupation: Construction     Employer: [employer]     Comment: Retired in past 5-10 years   Tobacco Use    Smoking status: Former     Current packs/day: 0.00     Types: Cigarettes    Smokeless tobacco: Never   Substance and Sexual Activity    Alcohol use: Yes     Comment: 1/ pint whisky daily    Drug use: Yes     Types: "Crack" cocaine     Social Determinants of Health     Financial Resource Strain: Low Risk  (4/15/2023)    Overall Financial Resource Strain (CARDIA)     Difficulty of Paying Living Expenses: Not hard at all   Food Insecurity: No Food Insecurity (4/15/2023)    Hunger Vital Sign     Worried About Running Out of Food in the Last Year: Never true     Ran Out of Food in the Last Year: Never true   Transportation Needs: No Transportation Needs (4/15/2023)    PRAPARE - Transportation     Lack of Transportation (Medical): No     Lack of Transportation (Non-Medical): No   Physical Activity: Inactive (4/15/2023)    Exercise Vital Sign     Days of Exercise per Week: 0 days     Minutes of Exercise per Session: 0 min   Stress: No Stress Concern Present (4/15/2023)    [place] [employer] - " Occupational Stress Questionnaire     Feeling of Stress : Not at all   Social Connections: Socially Isolated (4/15/2023)    Social Connection and Isolation Panel [NHANES]     Frequency of Communication with Friends and Family: Three times a week     Frequency of Social Gatherings with Friends and Family: Once a week     Attends Gnosticist Services: Never     Active Member of Clubs or Organizations: No     Attends Club or Organization Meetings: Never     Marital Status: Never    Housing Stability: Low Risk  (4/15/2023)    Housing Stability Vital Sign     Unable to Pay for Housing in the Last Year: No     Number of Places Lived in the Last Year: 2     Unstable Housing in the Last Year: No       Precautions:     Allergies as of 07/30/2023    (No Known Allergies)       WOC Assessment Details/Treatment     Pt plans for transfer to [facility] today.    Sacrum- scattered partial thickness skin breakdown and redness related to moisture- applied triad ointment        07/31/2023

## 2023-07-31 NOTE — PROCEDURES
Radiology Post-Procedure Note    Pre Op Diagnosis: left hydronephrosis, ureteral stone, completely dislodged PCN tube    Post Op Diagnosis: left hydronephrosis    Procedure: left percutaneous nephrostomy tube placement    Procedure performed by: Jorge A Avelar MD    Written Informed Consent Obtained: Yes    Specimen Removed: NO    Estimated Blood Loss: Minimal    Findings: Local anesthesia and moderate sedation were used.      Fluoroscopy were used to advance a guidewire through the previous tract into the left collecting system and a 10 F percutaneous catheter was introduced.  Position of the catheter in the collecting system was confirmed using injection of iodinated contrast.      The patient tolerated the procedure well and there were no complications. Please see Imaging report for further details.    Jorge A Avelar MD  Staff Radiologist  Department of Radiology  Pager: [phone]

## 2023-07-31 NOTE — PLAN OF CARE
SW met with pt at bedside to complete final d/c planning. Pt will d/c today via ambulance back to [facility]. MEGAN spoke with Kasandra who stated that report can be called to [phone] and to ask for unit 3. Per pt sw was asked to not call any family to notify them that pt will d/c back today. SW requested f/u appts. Rounds completed on pt. All questions addressed. Bedside nurse to discuss d/c medications. Discussed importance to attend all f/u appts and take medications as prescribed. Verbalized understanding. Case Management to sign off.     JOCELYN Muir  [phone]    Future Appointments   Date Time Provider Department Center   9/2/2023 10:00 [facility]        07/31/23 1410   Final Note   Assessment Type Final Discharge Note   Anticipated Discharge Disposition   (return to nursing home)   What phone number can be called within the next 1-3 days to see how you are doing after discharge? [phone]   Hospital Resources/Appts/Education Provided Appointments scheduled and added to AVS   Post-Acute Status   Discharge Delays None known at this time

## 2023-07-31 NOTE — NURSING
Pt transported back to [facility] home via [facility] transport services per stretcher. IV removed. Shen in place upon d/c.

## 2023-07-31 NOTE — PLAN OF CARE
AOX4. VS stable. Safety maintained. Meds given per MAR. Denies pain and N/V at this time. Blood glucose monitored.  2K ADA diet maintained. NPO @ midnight. Shen in place draining cloudy yellow urine. Stage 2 to sacrum. Cleansed with body wipes. Mepelex placed to sacrum. Pt tolerated well. Resting quietly. SR up X 2. Call light in reach. Bed alarm set. Will continue to monitor.       Problem: Adult Inpatient Plan of Care  Goal: Plan of Care Review  Outcome: Ongoing, Progressing  Goal: Patient-Specific Goal (Individualized)  Outcome: Ongoing, Progressing     Problem: Diabetes Comorbidity  Goal: Blood Glucose Level Within Targeted Range  Outcome: Ongoing, Progressing     Problem: Impaired Wound Healing  Goal: Optimal Wound Healing  Outcome: Ongoing, Progressing     Problem: Infection (Urinary Diversion)  Goal: Absence of Infection Signs and Symptoms  Outcome: Ongoing, Progressing     Problem: Pain (Urinary Diversion)  Goal: Acceptable Pain Control  Outcome: Ongoing, Progressing

## 2023-07-31 NOTE — CONSULTS
Percutaneous Nephrostomy Tube Placement Consult Note  Interventional Radiology      Reason for Consult: neph tube replacement    SUBJECTIVE:     Chief Complaint:  neph tube dislodged    History of Present Illness:  Praneeth Jewell is a 77 y.o. male with a PMHx of Obstructive Uropathy 2/2 Ureterolithiasis s/p L. Nephrostomy tube, 3rd degree AVB s/p PPM, CVA w/ residual deficits of 4/5 RLE weakness, 3/5 LLE weakness, and being bedbound, MDD, essential hypertension, Dyslipidemia, T2DM, and chronic constipation admitted for neph tube dislodgement.  Pt reports neph tube completely dislodged approximately 6 or 7 AM 7/30/23.  The pt's Cr is currently 0.8 (baseline 0.8).  The pt's WBC is 11.89 uptrending , pt is hemodynamically stable.     Review of Systems   Constitutional:  Negative for chills, fever, malaise/fatigue and weight loss.   Respiratory:  Negative for cough and shortness of breath.    Cardiovascular:  Negative for chest pain, palpitations and leg swelling.   Gastrointestinal:  Negative for abdominal pain, diarrhea, nausea and vomiting.   Genitourinary:  Negative for dysuria and flank pain.   Musculoskeletal:  Negative for back pain and myalgias.   Neurological:  Negative for weakness and headaches.       Scheduled Meds:   atorvastatin  40 mg Oral Daily    cyproheptadine  4 mg Oral TID    docusate sodium  100 mg Oral Daily    folic acid  1 mg Oral Daily    gabapentin  300 mg Oral TID    mirtazapine  30 mg Oral Nightly    polyethylene glycol  17 g Oral BID    tamsulosin  0.8 mg Oral Daily     Continuous Infusions:  PRN Meds:acetaminophen, bisacodyL, glucagon (human recombinant), glucose, glucose, insulin aspart U-100, melatonin, sodium chloride 0.9%    Review of patient's allergies indicates:  No Known Allergies    Past Medical History:   Diagnosis Date    Arthritis     Diabetes mellitus     type 2    Folate deficiency anemia     Heart block     History of kidney stones 05/25/2021    History of stroke with residual  deficit 05/25/2021    Hyperlipidemia     Hypertension     Major depressive disorder     Pacemaker     Biotronic Edora 8 JONATHON (S/n: [id number])    Pyelonephritis 11/19/2021    TIA (transient ischemic attack)     Urinary retention 05/25/2021     Past Surgical History:   Procedure Laterality Date    A-V CARDIAC PACEMAKER INSERTION N/A 8/24/2021    Procedure: INSERTION, CARDIAC PACEMAKER, DUAL CHAMBER;  Surgeon: Neo Winn MD;  Location: [facility] EP LAB;  Service: Cardiology;  Laterality: N/A;  CHB, DUAL PPM, ANES, BIO, DM, ED 2    COLONOSCOPY N/A 11/22/2021    Procedure: COLONOSCOPY;  Surgeon: Cesar Dorado MD;  Location: [facility] ENDO (2ND FLR);  Service: Endoscopy;  Laterality: N/A;    CYSTOSCOPIC LITHOLAPAXY  5/25/2021    Procedure: CYSTOLITHOLAPAXY;  Surgeon: Chris Schilling MD;  [facility];  Service: Urology;;    CYSTOSCOPY  8/26/2021    Procedure: CYSTOSCOPY;  Surgeon: Camilo Kelley Jr., MD;  [facility];  Service: Urology;;    CYSTOSCOPY W/ URETERAL STENT PLACEMENT Bilateral 5/25/2021    Procedure: CYSTOSCOPY, WITH BILATERAL URETERAL STENT INSERTION;  Surgeon: Chris Schilling MD;  [facility];  Service: Urology;  Laterality: Bilateral;    ELBOW ARTHROPLASTY Right     FLUOROSCOPY  5/25/2021    Procedure: FLUOROSCOPY;  Surgeon: Chris Schilling MD;  Location: [facility] OR [address] [place] [address];  Service: Urology;;    LASER LITHOTRIPSY Left 8/26/2021    Procedure: LITHOTRIPSY, USING LASER;  Surgeon: Camilo Kelley Jr., MD;  [facility];  Service: Urology;  Laterality: Left;    PYELOSCOPY Bilateral 8/26/2021    Procedure: PYELOSCOPY;  Surgeon: Camilo Kelley Jr., MD;  [facility];  Service: Urology;  Laterality: Bilateral;    REMOVAL OF BLOOD CLOT  5/25/2021    Procedure: REMOVAL, BLOOD CLOT;  Surgeon: Chris Schilling MD;  Location: [facility] OR [address] [place] [address];  Service: Urology;;    REPLACEMENT OF STENT Bilateral 8/26/2021    Procedure: REPLACEMENT, STENT;  Surgeon:  "Camilo Kelley Jr., MD;  Location: [address];  Service: Urology;  Laterality: Bilateral;    URETEROSCOPIC REMOVAL OF URETERIC CALCULUS Bilateral 8/26/2021    Procedure: REMOVAL, CALCULUS, URETER, URETEROSCOPIC;  Surgeon: Camilo Kelley Jr., MD;  Location: [facility] OR [address] [place] [address];  Service: Urology;  Laterality: Bilateral;    URETEROSCOPY Bilateral 8/26/2021    Procedure: URETEROSCOPY;  Surgeon: Camilo Kelley Jr., MD;  Location: [address];  Service: Urology;  Laterality: Bilateral;     Family History   Problem Relation Age of Onset    Stroke Mother     Hyperlipidemia Mother     Mental illness Father     Parkinsonism Father     No Known Problems Brother      Social History     Tobacco Use    Smoking status: Former     Current packs/day: 0.00     Types: Cigarettes    Smokeless tobacco: Never   Substance Use Topics    Alcohol use: Yes     Comment: 1/ pint whisky daily    Drug use: Yes     Types: "Crack" cocaine       OBJECTIVE:     Vital Signs (Most Recent)  Temp: 98 °F (36.7 °C) (07/31/23 0819)  Pulse: 67 (07/31/23 0819)  Resp: 18 (07/31/23 0819)  BP: (!) 102/55 (07/31/23 0819)  SpO2: 96 % (07/31/23 0819)    Physical Exam:  Physical Exam  Vitals and nursing note reviewed.   Constitutional:       Appearance: He is well-developed.   HENT:      Head: Normocephalic and atraumatic.   Eyes:      Extraocular Movements: Extraocular movements intact.   Cardiovascular:      Rate and Rhythm: Normal rate.   Pulmonary:      Effort: Pulmonary effort is normal.   Abdominal:      Palpations: Abdomen is soft.   Musculoskeletal:         General: Normal range of motion.      Cervical back: Normal range of motion.   Skin:     General: Skin is warm and dry.      Comments: L neph tube completely dislodged, no erythema or drainage from site   Neurological:      Mental Status: He is alert and oriented to person, place, and time.   Psychiatric:         Mood and Affect: Mood normal.         Laboratory  I have reviewed all pertinent lab " "results within the past 24 hours.  CBC:   Recent Labs   Lab 07/30/23  1614   WBC 11.89   RBC 4.27*   HGB 13.0*   HCT 40.7      MCV 95   MCH 30.4   MCHC 31.9*     CMP:   Recent Labs   Lab 07/30/23  1614   GLU 86   CALCIUM 10.2   ALBUMIN 3.2*   PROT 7.6      K 4.0   CO2 24      BUN 26*   CREATININE 0.8   ALKPHOS 68   ALT 19   AST 16   BILITOT 0.5     Coagulation: No results for input(s): "LABPROT", "INR", "APTT" in the last 168 hours.    ASA/Mallampati  ASA: 3  Mallampati: 2    Imaging:  No recent imaging to review    ASSESSMENT/PLAN:     Assessment:  77 y.o. male with a PMHx of Obstructive Uropathy 2/2 Ureterolithiasis s/p L. Nephrostomy tube with complete dislodgement of L neph tube 7/30/23 approximately 6-7 AM.    The procedure was discussed in great detail with the patient including thorough explanations of the potential risks and benefits of PCN placement. Risks include hematuria, pain, sepsis, injury to ureter or kidney, injury to adjacent organs, catheter dislodgement and inability to remove PCN due to cystallization. The patient is a candidate for left sidedPCN placement under moderate sedation. Plan discussed with ordering physician.The pt verbalized understanding of the plan and would like to proceed.    Plan:  Will proceed with left sidedPCN under moderate sedation on 7/31/23.   Please keep pt NPO.  Anticoagulation history reviewed. Need stat INR.    Thank you for the consult. Please contact with questions via [employer] secure chat or spectra.    Tabby Murrell PA-C  Interventional Radiology   "

## 2023-07-31 NOTE — PLAN OF CARE
MEGAN sent the [facility] MEGAN will follow.     11:35 SW called and left VM's for Anam and Ladan with the [facility] requesting a return call. MEGAN will follow.    1:23 SW called and left another VM for Anam [facility]rik[facility]. MEGAN will follow.    JOCELYN Muir  [phone]    Future Appointments   Date Time Provider Department Center   9/2/2023 10:00 AM HOME MONITOR DEVICE CHECK, [facility] GISEL Alfonso        07/31/23 1100   Post-Acute Status   Post-Acute Authorization Placement   Discharge Plan   Discharge Plan A Return to nursing home

## 2023-08-02 ENCOUNTER — OUTSIDE PLACE OF SERVICE (OUTPATIENT)
Dept: ADMINISTRATIVE | Facility: OTHER | Age: 78
End: 2023-08-02
Payer: MEDICARE

## 2023-08-02 PROCEDURE — 99307 PR NURSING FAC CARE, SUBSEQ, IMPROVING: ICD-10-PCS | Mod: ,,, | Performed by: FAMILY MEDICINE

## 2023-08-02 PROCEDURE — 99307 SBSQ NF CARE SF MDM 10: CPT | Mod: ,,, | Performed by: FAMILY MEDICINE

## 2023-08-03 LAB — BACTERIA UR CULT: ABNORMAL

## 2023-08-05 LAB
BACTERIA BLD CULT: NORMAL
BACTERIA BLD CULT: NORMAL

## 2023-08-31 ENCOUNTER — OUTSIDE PLACE OF SERVICE (OUTPATIENT)
Dept: ADMINISTRATIVE | Facility: OTHER | Age: 78
End: 2023-08-31
Payer: MEDICARE

## 2023-08-31 PROCEDURE — 99308 SBSQ NF CARE LOW MDM 20: CPT | Mod: ,,, | Performed by: FAMILY MEDICINE

## 2023-08-31 PROCEDURE — 99308 PR NURSING FAC CARE, SUBSEQ, MINOR COMPLIC: ICD-10-PCS | Mod: ,,, | Performed by: FAMILY MEDICINE

## 2023-09-02 ENCOUNTER — HOSPITAL ENCOUNTER (INPATIENT)
Facility: HOSPITAL | Age: 78
LOS: 1 days | Discharge: HOME | DRG: 698 | End: 2023-09-03
Attending: EMERGENCY MEDICINE | Admitting: INTERNAL MEDICINE
Payer: MEDICARE

## 2023-09-02 DIAGNOSIS — N99.528 NEPHROSTOMY COMPLICATION: Primary | ICD-10-CM

## 2023-09-02 DIAGNOSIS — T83.022A NEPHROSTOMY TUBE DISPLACED: ICD-10-CM

## 2023-09-02 DIAGNOSIS — R07.9 CHEST PAIN: ICD-10-CM

## 2023-09-02 LAB
ALBUMIN SERPL BCP-MCNC: 2.7 G/DL (ref 3.5–5.2)
ALP SERPL-CCNC: 56 U/L (ref 55–135)
ALT SERPL W/O P-5'-P-CCNC: 12 U/L (ref 10–44)
ANION GAP SERPL CALC-SCNC: 6 MMOL/L (ref 8–16)
AST SERPL-CCNC: 16 U/L (ref 10–40)
BACTERIA #/AREA URNS HPF: ABNORMAL /HPF
BASOPHILS # BLD AUTO: 0.04 K/UL (ref 0–0.2)
BASOPHILS NFR BLD: 0.7 % (ref 0–1.9)
BILIRUB SERPL-MCNC: 0.2 MG/DL (ref 0.1–1)
BILIRUB UR QL STRIP: NEGATIVE
BUN SERPL-MCNC: 24 MG/DL (ref 8–23)
CALCIUM SERPL-MCNC: 9.1 MG/DL (ref 8.7–10.5)
CHLORIDE SERPL-SCNC: 108 MMOL/L (ref 95–110)
CLARITY UR: ABNORMAL
CO2 SERPL-SCNC: 26 MMOL/L (ref 23–29)
COLOR UR: YELLOW
CREAT SERPL-MCNC: 0.8 MG/DL (ref 0.5–1.4)
DIFFERENTIAL METHOD: ABNORMAL
EOSINOPHIL # BLD AUTO: 0.2 K/UL (ref 0–0.5)
EOSINOPHIL NFR BLD: 2.7 % (ref 0–8)
ERYTHROCYTE [DISTWIDTH] IN BLOOD BY AUTOMATED COUNT: 14.6 % (ref 11.5–14.5)
EST. GFR  (NO RACE VARIABLE): >60 ML/MIN/1.73 M^2
GLUCOSE SERPL-MCNC: 91 MG/DL (ref 70–110)
GLUCOSE UR QL STRIP: NEGATIVE
HCT VFR BLD AUTO: 33.9 % (ref 40–54)
HGB BLD-MCNC: 10.8 G/DL (ref 14–18)
HGB UR QL STRIP: ABNORMAL
IMM GRANULOCYTES # BLD AUTO: 0.01 K/UL (ref 0–0.04)
IMM GRANULOCYTES NFR BLD AUTO: 0.2 % (ref 0–0.5)
KETONES UR QL STRIP: NEGATIVE
LEUKOCYTE ESTERASE UR QL STRIP: ABNORMAL
LYMPHOCYTES # BLD AUTO: 1.4 K/UL (ref 1–4.8)
LYMPHOCYTES NFR BLD: 22.8 % (ref 18–48)
MCH RBC QN AUTO: 30.3 PG (ref 27–31)
MCHC RBC AUTO-ENTMCNC: 31.9 G/DL (ref 32–36)
MCV RBC AUTO: 95 FL (ref 82–98)
MICROSCOPIC COMMENT: ABNORMAL
MONOCYTES # BLD AUTO: 0.4 K/UL (ref 0.3–1)
MONOCYTES NFR BLD: 6.8 % (ref 4–15)
NEUTROPHILS # BLD AUTO: 4 K/UL (ref 1.8–7.7)
NEUTROPHILS NFR BLD: 66.8 % (ref 38–73)
NITRITE UR QL STRIP: NEGATIVE
NRBC BLD-RTO: 0 /100 WBC
PH UR STRIP: 7 [PH] (ref 5–8)
PLATELET # BLD AUTO: 182 K/UL (ref 150–450)
PMV BLD AUTO: 9.5 FL (ref 9.2–12.9)
POTASSIUM SERPL-SCNC: 4.5 MMOL/L (ref 3.5–5.1)
PROT SERPL-MCNC: 6 G/DL (ref 6–8.4)
PROT UR QL STRIP: ABNORMAL
RBC # BLD AUTO: 3.56 M/UL (ref 4.6–6.2)
RBC #/AREA URNS HPF: 20 /HPF (ref 0–4)
SODIUM SERPL-SCNC: 140 MMOL/L (ref 136–145)
SP GR UR STRIP: 1.01 (ref 1–1.03)
URN SPEC COLLECT METH UR: ABNORMAL
UROBILINOGEN UR STRIP-ACNC: NEGATIVE EU/DL
WBC # BLD AUTO: 6.02 K/UL (ref 3.9–12.7)
WBC #/AREA URNS HPF: >100 /HPF (ref 0–5)
WBC CLUMPS URNS QL MICRO: ABNORMAL

## 2023-09-02 PROCEDURE — 11000001 HC ACUTE MED/SURG PRIVATE ROOM

## 2023-09-02 PROCEDURE — 85025 COMPLETE CBC W/AUTO DIFF WBC: CPT

## 2023-09-02 PROCEDURE — 80053 COMPREHEN METABOLIC PANEL: CPT

## 2023-09-02 PROCEDURE — 25500020 PHARM REV CODE 255: Performed by: INTERNAL MEDICINE

## 2023-09-02 PROCEDURE — 81000 URINALYSIS NONAUTO W/SCOPE: CPT

## 2023-09-02 PROCEDURE — 99285 EMERGENCY DEPT VISIT HI MDM: CPT | Mod: 25

## 2023-09-02 PROCEDURE — 96360 HYDRATION IV INFUSION INIT: CPT

## 2023-09-02 PROCEDURE — 25000003 PHARM REV CODE 250: Performed by: STUDENT IN AN ORGANIZED HEALTH CARE EDUCATION/TRAINING PROGRAM

## 2023-09-02 PROCEDURE — 87086 URINE CULTURE/COLONY COUNT: CPT

## 2023-09-02 PROCEDURE — 25000003 PHARM REV CODE 250

## 2023-09-02 RX ORDER — IBUPROFEN 200 MG
16 TABLET ORAL
Status: DISCONTINUED | OUTPATIENT
Start: 2023-09-02 | End: 2023-09-03 | Stop reason: HOSPADM

## 2023-09-02 RX ORDER — POLYETHYLENE GLYCOL 3350 17 G/17G
17 POWDER, FOR SOLUTION ORAL 2 TIMES DAILY
Status: DISCONTINUED | OUTPATIENT
Start: 2023-09-02 | End: 2023-09-03 | Stop reason: HOSPADM

## 2023-09-02 RX ORDER — FOLIC ACID 1 MG/1
1 TABLET ORAL DAILY
Status: DISCONTINUED | OUTPATIENT
Start: 2023-09-03 | End: 2023-09-03 | Stop reason: HOSPADM

## 2023-09-02 RX ORDER — SODIUM CHLORIDE 0.9 % (FLUSH) 0.9 %
10 SYRINGE (ML) INJECTION EVERY 12 HOURS PRN
Status: DISCONTINUED | OUTPATIENT
Start: 2023-09-02 | End: 2023-09-03 | Stop reason: HOSPADM

## 2023-09-02 RX ORDER — POLYETHYLENE GLYCOL 3350 17 G/17G
POWDER, FOR SOLUTION ORAL
COMMUNITY
Start: 2023-08-29 | End: 2023-09-02 | Stop reason: SDUPTHER

## 2023-09-02 RX ORDER — MIRTAZAPINE 15 MG/1
30 TABLET, FILM COATED ORAL NIGHTLY
Status: DISCONTINUED | OUTPATIENT
Start: 2023-09-02 | End: 2023-09-03 | Stop reason: HOSPADM

## 2023-09-02 RX ORDER — NALOXONE HCL 0.4 MG/ML
0.02 VIAL (ML) INJECTION
Status: DISCONTINUED | OUTPATIENT
Start: 2023-09-02 | End: 2023-09-03 | Stop reason: HOSPADM

## 2023-09-02 RX ORDER — DOXYLAMINE SUCCINATE 25 MG
TABLET ORAL
COMMUNITY
Start: 2023-07-31 | End: 2023-09-02

## 2023-09-02 RX ORDER — ATORVASTATIN CALCIUM 40 MG/1
40 TABLET, FILM COATED ORAL DAILY
Status: DISCONTINUED | OUTPATIENT
Start: 2023-09-03 | End: 2023-09-03 | Stop reason: HOSPADM

## 2023-09-02 RX ORDER — MENTHOL AND ZINC OXIDE .44; 20.625 G/100G; G/100G
OINTMENT TOPICAL DAILY PRN
COMMUNITY

## 2023-09-02 RX ORDER — HEPARIN SODIUM 5000 [USP'U]/ML
5000 INJECTION, SOLUTION INTRAVENOUS; SUBCUTANEOUS EVERY 8 HOURS
Status: DISCONTINUED | OUTPATIENT
Start: 2023-09-02 | End: 2023-09-03 | Stop reason: HOSPADM

## 2023-09-02 RX ORDER — TAMSULOSIN HYDROCHLORIDE 0.4 MG/1
0.8 CAPSULE ORAL DAILY
Status: DISCONTINUED | OUTPATIENT
Start: 2023-09-03 | End: 2023-09-03 | Stop reason: HOSPADM

## 2023-09-02 RX ORDER — GABAPENTIN 300 MG/1
300 CAPSULE ORAL 3 TIMES DAILY
Status: DISCONTINUED | OUTPATIENT
Start: 2023-09-02 | End: 2023-09-03 | Stop reason: HOSPADM

## 2023-09-02 RX ORDER — IBUPROFEN 200 MG
24 TABLET ORAL
Status: DISCONTINUED | OUTPATIENT
Start: 2023-09-02 | End: 2023-09-03 | Stop reason: HOSPADM

## 2023-09-02 RX ORDER — GLUCAGON 1 MG
1 KIT INJECTION
Status: DISCONTINUED | OUTPATIENT
Start: 2023-09-02 | End: 2023-09-03 | Stop reason: HOSPADM

## 2023-09-02 RX ORDER — DOCUSATE SODIUM 100 MG/1
100 CAPSULE, LIQUID FILLED ORAL DAILY
Status: DISCONTINUED | OUTPATIENT
Start: 2023-09-03 | End: 2023-09-03 | Stop reason: HOSPADM

## 2023-09-02 RX ADMIN — MIRTAZAPINE 30 MG: 15 TABLET, FILM COATED ORAL at 10:09

## 2023-09-02 RX ADMIN — SODIUM CHLORIDE 1000 ML: 9 INJECTION, SOLUTION INTRAVENOUS at 04:09

## 2023-09-02 RX ADMIN — GABAPENTIN 300 MG: 300 CAPSULE ORAL at 10:09

## 2023-09-02 RX ADMIN — IOHEXOL 75 ML: 350 INJECTION, SOLUTION INTRAVENOUS at 04:09

## 2023-09-02 NOTE — ED PROVIDER NOTES
Encounter Date: 9/2/2023       History     Chief Complaint   Patient presents with    Male  Problem     Pt arrived from [facility] home via [facility] for problems with his nephrostomy tube and mark catheter. [facility] was told prior to arrival that urology is not available at this facility today but they stated they had to bring him here. Pt arrived awake and alert.      Patient is a 77 y.o. male who presents via EMS with a primary complaint of his left nephrostomy tube falling out sometime over night. Patient has a PMH of Obstructive Uropathy 2/2 Ureterolithiasis s/p L. 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     The patient was in their usual state of health until presentation today when his nephrostomy tube fell out. Patient denies any other new medical complaints including fever, chills, chest pain, shortness of breath, pain, dysuria, dyspnea.  Patient lives at [facility]. Patient is bed bound relying on staff for his ADLs. Patient was recently admitted (7/31/23) for replacement of his nephrostomy tube, which was replaced by IR.     The history is provided by the patient. The history is limited by the condition of the patient.     Review of patient's allergies indicates:  No Known Allergies  Past Medical History:   Diagnosis Date    Arthritis     Diabetes mellitus     type 2    Folate deficiency anemia     Heart block     History of kidney stones 05/25/2021    History of stroke with residual deficit 05/25/2021    Hyperlipidemia     Hypertension     Major depressive disorder     Pacemaker     Biot[employer] Edora 8 JONATHON (S/n: [id number])    Pyelonephritis 11/19/2021    TIA (transient ischemic attack)     Urinary retention 05/25/2021     Past Surgical History:   Procedure Laterality Date    A-V CARDIAC PACEMAKER INSERTION N/A 8/24/2021     Procedure: INSERTION, CARDIAC PACEMAKER, DUAL CHAMBER;  Surgeon: Neo Winn MD;  Location: [facility] EP LAB;  Service: Cardiology;  Laterality: N/A;  CHB, DUAL PPM, ANES, BIO, DM, ED 2    COLONOSCOPY N/A 11/22/2021    Procedure: COLONOSCOPY;  Surgeon: Cesar Dorado MD;  Location: [facility] ENDO (2ND FLR);  Service: Endoscopy;  Laterality: N/A;    CYSTOSCOPIC LITHOLAPAXY  5/25/2021    Procedure: CYSTOLITHOLAPAXY;  Surgeon: Chris Schilling MD;  Location: [facility] OR 1ST FLR;  Service: Urology;;    CYSTOSCOPY  8/26/2021    Procedure: CYSTOSCOPY;  Surgeon: Camilo Kelley Jr., MD;  [facility];  Service: Urology;;    CYSTOSCOPY W/ URETERAL STENT PLACEMENT Bilateral 5/25/2021    Procedure: CYSTOSCOPY, WITH BILATERAL URETERAL STENT INSERTION;  Surgeon: Chris Schilling MD;  [facility];  Service: Urology;  Laterality: Bilateral;    ELBOW ARTHROPLASTY Right     FLUOROSCOPY  5/25/2021    Procedure: FLUOROSCOPY;  Surgeon: Chris Schilling MD;  [facility];  Service: Urology;;    LASER LITHOTRIPSY Left 8/26/2021    Procedure: LITHOTRIPSY, USING LASER;  Surgeon: Camilo Kelley Jr., MD;  [facility];  Service: Urology;  Laterality: Left;    PYELOSCOPY Bilateral 8/26/2021    Procedure: PYELOSCOPY;  Surgeon: Camilo Kelley Jr., MD;  [facility];  Service: Urology;  Laterality: Bilateral;    REMOVAL OF BLOOD CLOT  5/25/2021    Procedure: REMOVAL, BLOOD CLOT;  Surgeon: Chris Schilling MD;  [facility];  Service: Urology;;    REPLACEMENT OF STENT Bilateral 8/26/2021    Procedure: REPLACEMENT, STENT;  Surgeon: Camilo Kelley Jr., MD;  Location: [facility] OR 1ST FLR;  Service: Urology;  Laterality: Bilateral;    URETEROSCOPIC REMOVAL OF URETERIC CALCULUS Bilateral 8/26/2021    Procedure: REMOVAL, CALCULUS, URETER, URETEROSCOPIC;  Surgeon: Camilo Kelley Jr., MD;  Location: [facility] OR [address] [place] [address];  Service: Urology;  Laterality: Bilateral;    URETEROSCOPY  "Bilateral 8/26/2021    Procedure: URETEROSCOPY;  Surgeon: Camilo Kelley Jr., MD;  Location: [facility] OR [address] [place] [address];  Service: Urology;  Laterality: Bilateral;     Family History   Problem Relation Age of Onset    Stroke Mother     Hyperlipidemia Mother     Mental illness Father     Parkinsonism Father     No Known Problems Brother      Social History     Tobacco Use    Smoking status: Former     Types: Cigarettes    Smokeless tobacco: Never   Substance Use Topics    Alcohol use: Yes     Comment: 1/ pint whisky daily    Drug use: Yes     Types: "Crack" cocaine     Review of Systems   Constitutional:  Negative for chills, fatigue and fever.   HENT:  Negative for sore throat.    Respiratory:  Negative for shortness of breath and wheezing.    Cardiovascular:  Negative for chest pain.   Gastrointestinal:  Negative for abdominal distention, abdominal pain, constipation, diarrhea, nausea and vomiting.   Genitourinary:  Negative for dysuria, flank pain, penile discharge, penile pain, penile swelling, scrotal swelling and testicular pain.        Left nephrostomy tube fell out. Has Shen catheter in place.    Musculoskeletal:  Negative for arthralgias, back pain, myalgias, neck pain and neck stiffness.   Skin:  Negative for rash.   Neurological:  Positive for weakness. Negative for dizziness and headaches.   Hematological:  Does not bruise/bleed easily.   All other systems reviewed and are negative.      Physical Exam     Initial Vitals [09/02/23 1505]   BP Pulse Resp Temp SpO2   (!) 106/54 61 18 98.2 °F (36.8 °C) 97 %      MAP       --         Physical Exam    Constitutional: He appears well-developed. He appears ill.   HENT:   Head: Normocephalic and atraumatic.   Right Ear: External ear normal.   Left Ear: External ear normal.   Eyes: EOM are normal. Pupils are equal, round, and reactive to light.   Neck:   Normal range of motion.  Cardiovascular:  Normal rate, regular rhythm and normal heart sounds.           Pulmonary/Chest: " Breath sounds normal. No respiratory distress. He has no wheezes.   Abdominal: Bowel sounds are normal. He exhibits no distension. There is no abdominal tenderness. There is no rebound and no guarding.   Genitourinary: No discharge found.   Musculoskeletal:      Cervical back: Normal range of motion.     Lymphadenopathy:     He has no cervical adenopathy.   Neurological: He is alert and oriented to person, place, and time.   Skin:              ED Course   Procedures  Labs Reviewed   CBC W/ AUTO DIFFERENTIAL - Abnormal; Notable for the following components:       Result Value    RBC 3.56 (*)     Hemoglobin 10.8 (*)     Hematocrit 33.9 (*)     MCHC 31.9 (*)     RDW 14.6 (*)     All other components within normal limits   COMPREHENSIVE METABOLIC PANEL - Abnormal; Notable for the following components:    BUN 24 (*)     Albumin 2.7 (*)     Anion Gap 6 (*)     All other components within normal limits   URINALYSIS, REFLEX TO URINE CULTURE - Abnormal; Notable for the following components:    Appearance, UA Cloudy (*)     Protein, UA Trace (*)     Occult Blood UA 1+ (*)     Leukocytes, UA 3+ (*)     All other components within normal limits    Narrative:     Specimen Source->Urine   URINALYSIS MICROSCOPIC - Abnormal; Notable for the following components:    RBC, UA 20 (*)     WBC, UA >100 (*)     WBC Clumps, UA Many (*)     Bacteria Many (*)     All other components within normal limits    Narrative:     Specimen Source->Urine   CULTURE, URINE          Imaging Results               CT Abdomen Pelvis With Contrast (Final result)  Result time 09/02/23 18:41:54      Final result by Malou Rios MD (09/02/23 18:41:54)                   Impression:      Mild left hydronephrosis and proximal ureterectasis following removal of left percutaneous nephrostomy catheter.  There is tapering of the left ureter along the mid 3rd and an obstructing calculus or other lesion is not identified within the ureter at this point.  The  ureter remains collapsed to the level of the pelvis where there is again noted to be a 8 x 10 mm calculus in the distal ureter.  This calculus is stable back to 03/19/2023 exam with detailed findings discussed above.    Enhancing urothelium in the collecting system the left kidney and proximal ureter may represent inflammation or infectious etiology without the presence of gas.  Correlate for this possibility.  No large urinoma, evidence of significant inflammation or infection or in the left perirenal space along the tract of the nephrostomy catheter.    Severe stool burden constipation throughout the colon as well as distal fecal impaction also seen previously on multiple CTs.  No convincing signs to suggest stercoral colitis.  No free pelvic fluid or other acute process.    Nonobstructing calculi are also noted in the left kidney and stable cyst is seen in the right kidney.  There is no nephrolithiasis on the right or evidence of right renal obstruction.    Please see above for additional incidental nonacute findings.    This report was flagged in Epic as abnormal.      Electronically signed by: Malou Rios  Date:    09/02/2023  Time:    18:41               Narrative:    EXAMINATION:  CT ABDOMEN PELVIS WITH CONTRAST    CLINICAL HISTORY:  Nephrostomy catheter displacement;nephrostomy tube fell out;    TECHNIQUE:  Low dose axial images, sagittal and coronal reformations were obtained from the lung bases to the pubic symphysis before and following the IV administration of 75 mL of Omnipaque 350 and the oral administration of 30 mL of Omnipaque 350..    COMPARISON:  Left nephrostomy tube placement 07/31/2023.  CT abdomen pelvis 06/06/2023    FINDINGS:  Abdomen:    - Lung bases: There is basilar scarring in the left lower lobe and dependent atelectasis bilaterally.  No acute pleural effusion or evidence of cardiomegaly.  Pacer leads are seen in the right atrium right ventricle.    - Liver: Liver enhances  homogeneously and is not significantly enlarged.  Decreased density liver suggests hepatic steatosis.    - Gallbladder: There is no CT evidence of cholelithiasis or cholecystitis    - Bile Ducts: No evidence of intra or extra hepatic biliary ductal dilation.    - Spleen: Negative.    - Kidneys: There is bilateral renal cortical thinning.  Renal cortices enhance symmetrically.  Right renal cyst is again seen and appears stable.  Prior noted left percutaneous nephrostomy catheter is no longer visualized with a tract seen to the skin from the left renal posterolateral cortex.  There is a small high density within the left renal posterior cortex along the tract (axial image 60 series 2) appearing more dense than the enhancing renal cortex raising question of a small calculus .  There is dominant calculus in the left kidney lower pole collecting system measuring 11 x 6 x 13 mm.  Smaller calculi which are nonobstructing are also noted in the mid upper pole of the left kidney collecting system.  There is left sided mild acute hydronephrosis and proximal ureterectasis to the level of the mid 3rd of the ureter which is at the L 3 vertebra level.  An obstructing lesion in the region of ureteral tapering is not visualized at this time and the ureter appears to remain relatively collapsed to the level of pelvis.  Within the pelvis left pelvic ureter there stable is a 8 x 10 mm calculus (axial image 129 series 2, coronal image 65 series 601) this calculus is likely an obstructing lesion although the ureter is not dilated proximally to this level at time of imaging.  There is no visualized large fluid collection/urinoma in the perirenal space on the left though there is a small amount of fluid noted.  This may be related to decompression of the mild hydronephrosis.  There is a haziness in the renal pelvis and along the collecting system and there is enhancement of the urothelium.  It is uncertain whether this represents  urothelial inflammatory or infectious changes therefore correlation is needed for the possibility of infectious etiology.  No gas is seen.    The right kidney is unremarkable for acute hydronephrosis or significant calculi.  The ureters difficult to follow down to the pelvis as it is collapsed.    Pelvis/bladder: The bladder is collapsed with a Shen catheter within it as well as air likely related to instrumentation.  Bladder otherwise appears unremarkable.  There is no free fluid in the pelvis or adenopathy.  Multiple vascular calcifications also noted.  Prostate is not enlarged.    Bowel/Mesentery:    The there is again noted to be severe stool burden constipation throughout the colon as well as distal fecal impaction with fecal ball filling the pelvis and displacing the bladder and normal structures.  No evidence of rectal severe mucosal thickening/stercoral colitis.  This finding stable.  Stomach, small bowel are unremarkable.  There is no evidence of appendicitis.  Appendix is not conspicuous.  There is no associated free fluid in the pelvis.  There is no mesenteric inflammation or adenopathy.    - Adrenals: Unremarkable.    - Pancreas: No mass or peripancreatic fat stranding.    - Retroperitoneum:  No significant adenopathy.    - Vascular: There is moderate to marked atherosclerosis throughout the abdominal aorta and its imaged branches.  No significant splanchnic or renal artery occlusion noting mild to moderate stenosis at the origin right main renal artery of the.  There is no abdominal aortic aneurysm.    - Abdominal wall:  Unremarkable.    Bones:  No acute osseous abnormality and no suspicious lytic or blastic lesion. There is spondylosis of the spine without subluxation or compression fracture.  Degenerative changes of the hips bilaterally noted as well as SI joints bilaterally.                                       Medications   atorvastatin tablet 40 mg (has no administration in time range)   docusate  sodium capsule 100 mg (has no administration in time range)   folic acid tablet 1 mg (has no administration in time range)   gabapentin capsule 300 mg (has no administration in time range)   mirtazapine tablet 30 mg (has no administration in time range)   polyethylene glycol packet 17 g (has no administration in time range)   tamsulosin 24 hr capsule 0.8 mg (has no administration in time range)   sodium chloride 0.9% flush 10 mL (has no administration in time range)   naloxone 0.4 mg/mL injection 0.02 mg (has no administration in time range)   glucose chewable tablet 16 g (has no administration in time range)   glucose chewable tablet 24 g (has no administration in time range)   glucagon (human recombinant) injection 1 mg (has no administration in time range)   heparin (porcine) injection 5,000 Units (has no administration in time range)   dextrose 10% bolus 125 mL 125 mL (has no administration in time range)   dextrose 10% bolus 250 mL 250 mL (has no administration in time range)   sodium chloride 0.9% bolus 1,000 mL 1,000 mL (0 mLs Intravenous Stopped 9/2/23 1720)   iohexoL (OMNIPAQUE 350) injection 75 mL (75 mLs Intravenous Given 9/2/23 1644)     Medical Decision Making  Patient is a 77-year-old man who presents emergency department today after his nephrostomy tube on the left came out sometime over night Patient is in his normal state of health and denies any other complaints at this time. Patient appears weak and is very thin.     Differential Diagnosis includes, but is not limited to:  Nephrostomy tube malfunction, hydronephrosis, UTI/pyelonephritis, urinary retention, cellulitis    All historical, clinical, radiographic, and laboratory findings were reviewed with the patient in detail along with the indications admission in order to replace nephrostomy tube. Patient history and plan discussed with Dr. Lozano, who agrees with plan for admission. I spoke Dr. Ramirez on-call for IR, who recommends patient be  admitted at [facility] for possible replacement of nephrostomy tube placement tomorrow. Dr Ramirez requested CT abdomen/pelvis and for patient to be made NPO at midnight. On call for [facility] IM called, who accepted admission.  All remaining questions and concerns were addressed at this time and the patient communicates understanding and agrees to proceed accordingly.    Amount and/or Complexity of Data Reviewed  Labs: ordered. Decision-making details documented in ED Course.  Radiology: ordered and independent interpretation performed. Decision-making details documented in ED Course.  Discussion of management or test interpretation with external provider(s): Patient history and plan discussed with Dr. Lozano, who agrees with plan for admission. I spoke Dr. Ramirez on-call for IR, who recommends patient be admitted at [facility] for possible replacement of nephrostomy tube placement tomorrow. Dr Rmairez requested CT abdomen/pelvis and for patient to be made NPO at midnight. On call for [facility] IM called, who accepted admission.        Risk  Decision regarding hospitalization.               ED Course as of 09/02/23 1918   Sat Sep 02, 2023   1527 Patient examined and assessed. States his nephrostomy tube fell out overnight. Denies fever.  Patient answering questions appropriately, speaking quietly, but in complete sentences, respirations are even and unlabored. [OB]   1532 Discussed patient with Dr. Lozano. Recommends calling transfer center for IR/urology management. Will call now.  [OB]   1533 Hemoglobin(!): 10.8 [OB]   1533 Hematocrit(!): 33.9 [OB]   1534 Transfer center contacted. Instructed to place a PFC. Order placed. Awaiting call.  [OB]   1550 Spoke with IR at [facility] (Dr. Ramirez). Recommends admitting patient at [facility] for possible procedure tomorrow. NPO at midnight. Request CT scan. Will place orders and contact hospital medicine.  [OB]   1605 PFC accidentally cancelled by nurse. Patient to be admitted at [facility] under  [facility] [OB]   1648 Urine collected from existing catheter. Current catheter was placed by urology due to difficult with catheterization.  [OB]   1650 Leukocytes, UA(!): 3+ [OB]   1650 Patient has been assessed by [facility].  [OB]   1855 CT abdomen pelvis independently reviewed:     Mild left hydronephrosis and proximal ureterectasis following removal of left percutaneous nephrostomy catheter.     Enhancing urothelium in the collecting system the left kidney and proximal ureter may represent inflammation or infectious etiology     No evidence of significant inflammation or infection or in the left perirenal space along the tract of the nephrostomy catheter.     Severe constipation throughout the colon. Distal fecal impaction.     Nonobstructing calculi  noted in the left kidney. There is no nephrolithiasis on the right or evidence of right renal obstruction. stable cyst is seen in the right kidney.   [OB]      ED Course User Index  [OB] Verona Robles PA-C                    Clinical Impression:   Final diagnoses:  [T83.022A] Nephrostomy tube displaced        ED Disposition Condition    Admit                 Verona Robles PA-C  09/02/23 1918

## 2023-09-02 NOTE — ED NOTES
Pt presents to ED for nephrostomy displacement.     Patient identifiers for verified by spelling and stated name on armband along with .    Review of patient's allergies indicates:  No Known Allergies    APPEARANCE: Alert, oriented x 4, and in no acute distress.  NEURO: 5/5 strength major flexors/extensors bilaterally. Sensory intact to light touch bilaterally. [place] coma scale: eyes open spontaneously-4, oriented & converses-5, obeys commands-6. No neurological abnormalities. Denies HA, dizziness, photophobia.  CARDIAC: Normal rate and rhythm through apical/radial pulse, S1 and S2 noted, denies CP.   RESPIRATORY:Normal rate and effort, no obvious signs of distress. No SOB reported.  PERIPHERAL VASCULAR: +2 peripheral pulses present. Normal cap refill <3sec. No edema. Warm to touch.   GASTRO: Abdomen soft, flat, bowel sounds normal and active in all 4 quadrants, no tenderness, no abdominal distention. Denies NVD.  MUSC: Bed bound. No bony tenderness or soft tissue tenderness. No obvious deformity.  SKIN: +dry dressing to left mid back Skin is warm and dry, normal skin turgor, mucous membranes moist.   GENITOURINARY: +indwelling mark

## 2023-09-02 NOTE — PHARMACY MED REC
"Admission Medication History     The home medication history was taken by Sari Paula CPhT.    Medication history obtained from, [facility] and Nurse Verified    You may go to "Admission" then "Reconcile Home Medications" tabs to review and/or act upon these items.     The home medication list has been updated by the Pharmacy department.   Please read ALL comments highlighted in yellow.   Please address this information as you see fit.    Feel free to contact us if you have any questions or require assistance.      The medications listed below were removed from the home medication list.  Please reorder if appropriate:  Patient reports no longer taking the following medication(s):  Miconazole 2% cream        Sari Paula CPhT.  Ext [phone]               .          "

## 2023-09-02 NOTE — ED NOTES
Pt updated on plan of care, in no acute distress, watching tv, is not in want/need of anything at this time, denies pain, VSS, call light remains in reach.

## 2023-09-02 NOTE — H&P
[facility] Medicine H&P Note     Admitting Team: [facility] Hospitalist Team A  Attending Physician: Adi Posey MD  Resident: Willy  Intern: Tarik    Date of Admit: 9/2/2023    Chief Complaint     Nephrostomy tube fell out x 1 day    Subjective:      History of Present Illness:  Praneeth Jewell is a 77 y.o.  male who  has a past medical history of Arthritis, Diabetes mellitus, Folate deficiency anemia, Heart block, History of kidney stones (05/25/2021), History of stroke with residual deficit (05/25/2021), Hyperlipidemia, Hypertension, Major depressive disorder, Pacemaker, Pyelonephritis (11/19/2021), TIA (transient ischemic attack), and Urinary retention (05/25/2021).. The patient presented to [facility] on 9/2/2023 with a primary complaint of Nephrostomy tube dislodgement    The patient was in their usual state of health until day of presentation when his nephrostomy tube fell out. He lives in a nursing facility and is bed bound due to previous CVA and deconditioning. He is not sure when his nephrostomy tube fell out. He denies any pain. He has a mark in place. No abdominal pain or fevers. No bleeding from the nephrostomy site.     Past Medical History:  Past Medical History:   Diagnosis Date    Arthritis     Diabetes mellitus     type 2    Folate deficiency anemia     Heart block     History of kidney stones 05/25/2021    History of stroke with residual deficit 05/25/2021    Hyperlipidemia     Hypertension     Major depressive disorder     Pacemaker     [employer] [place] 8 DR-T (S/n: [id number])    Pyelonephritis 11/19/2021    TIA (transient ischemic attack)     Urinary retention 05/25/2021       Past Surgical History:  Past Surgical History:   Procedure Laterality Date    A-V CARDIAC PACEMAKER INSERTION N/A 8/24/2021    Procedure: INSERTION, CARDIAC PACEMAKER, DUAL CHAMBER;  Surgeon: Neo Winn MD;  Location: [facility];  Service: Cardiology;  Laterality: N/A;  CHB, DUAL PPM, ANES, BIO, DM,  ED 2    COLONOSCOPY N/A 11/22/2021    Procedure: COLONOSCOPY;  Surgeon: Cesar Dorado MD;  Location: [facility] ENDO (2ND FLR);  Service: Endoscopy;  Laterality: N/A;    CYSTOSCOPIC LITHOLAPAXY  5/25/2021    Procedure: CYSTOLITHOLAPAXY;  Surgeon: Chris Schilling MD;  Location: [facility] OR 1ST FLR;  Service: Urology;;    CYSTOSCOPY  8/26/2021    Procedure: CYSTOSCOPY;  Surgeon: Camilo Kelley Jr., MD;  Location: [facility] OR 1ST FLR;  Service: Urology;;    CYSTOSCOPY W/ URETERAL STENT PLACEMENT Bilateral 5/25/2021    Procedure: CYSTOSCOPY, WITH BILATERAL URETERAL STENT INSERTION;  Surgeon: Chris Schilling MD;  Location: [facility] OR 1ST FLR;  Service: Urology;  Laterality: Bilateral;    ELBOW ARTHROPLASTY Right     FLUOROSCOPY  5/25/2021    Procedure: FLUOROSCOPY;  Surgeon: Chris Schilling MD;  Location: [facility] OR 1ST FLR;  Service: Urology;;    LASER LITHOTRIPSY Left 8/26/2021    Procedure: LITHOTRIPSY, USING LASER;  Surgeon: Camilo Kelley Jr., MD;  Location: [facility] OR 1ST FLR;  Service: Urology;  Laterality: Left;    PYELOSCOPY Bilateral 8/26/2021    Procedure: PYELOSCOPY;  Surgeon: Camilo Kelley Jr., MD;  Location: [facility] OR 1ST FLR;  Service: Urology;  Laterality: Bilateral;    REMOVAL OF BLOOD CLOT  5/25/2021    Procedure: REMOVAL, BLOOD CLOT;  Surgeon: Chris Schilling MD;  Location: [facility] OR 1ST FLR;  Service: Urology;;    REPLACEMENT OF STENT Bilateral 8/26/2021    Procedure: REPLACEMENT, STENT;  Surgeon: Camilo Kelley Jr., MD;  Location: [facility] OR 1ST FLR;  Service: Urology;  Laterality: Bilateral;    URETEROSCOPIC REMOVAL OF URETERIC CALCULUS Bilateral 8/26/2021    Procedure: REMOVAL, CALCULUS, URETER, URETEROSCOPIC;  Surgeon: Camilo Kelley Jr., MD;  Location: [facility] OR 1ST FLR;  Service: Urology;  Laterality: Bilateral;    URETEROSCOPY Bilateral 8/26/2021    Procedure: URETEROSCOPY;  Surgeon: Camilo Kelley Jr., MD;  Location: [facility] OR 1ST FLR;  Service: Urology;  Laterality: Bilateral;       Allergies:  Review of  "patient's allergies indicates:  No Known Allergies    Home Medications:  Prior to Admission medications    Medication Sig Start Date End Date Taking? Authorizing Provider   atorvastatin (LIPITOR) 40 MG tablet Take 40 mg by mouth once daily.    Provider, Historical   bisacodyL (DULCOLAX, BISACODYL,) 10 mg Supp Place 1 suppository (10 mg total) rectally daily as needed (constipation). 4/17/23   Jenny Smith MD   cyproheptadine (PERIACTIN) 4 mg tablet Take 4 mg by mouth 3 (three) times daily. Itching 4/7/21   Provider, Historical   docusate sodium (COLACE) 100 MG capsule Take 1 capsule (100 mg total) by mouth once daily. 4/17/23   Jenny Smith MD   folic acid (FOLVITE) 1 MG tablet Take 1 mg by mouth once daily.    Provider, Historical   gabapentin (NEURONTIN) 300 MG capsule Take 300 mg by mouth 3 (three) times daily. 4/7/21   Provider, Historical   mirtazapine (REMERON) 30 MG tablet Take 30 mg by mouth nightly. 5/27/21   Provider, Historical   polyethylene glycol (GLYCOLAX) 17 gram PwPk Take 17 g by mouth 2 (two) times daily. 4/17/23   Jenny Smith MD   tamsulosin (FLOMAX) 0.4 mg Cap TAKE 2 CAPSULES(0.8 MG) BY MOUTH EVERY DAY  Patient taking differently: Take 0.8 mg by mouth once daily. 5/18/23   Camilo Kelley Jr., MD       Family History:  Family History   Problem Relation Age of Onset    Stroke Mother     Hyperlipidemia Mother     Mental illness Father     Parkinsonism Father     No Known Problems Brother        Social History:  Social History     Tobacco Use    Smoking status: Former     Types: Cigarettes    Smokeless tobacco: Never   Substance Use Topics    Alcohol use: Yes     Comment: 1/ pint whisky daily    Drug use: Yes     Types: "Crack" cocaine       Review of Systems:  Pertinent items are noted in HPI. All other systems are reviewed and are negative.    Health Maintaince :   Primary Care Physician: Richard Galloway MD         Objective:   Last 24 Hour Vital Signs:  BP  Min: 106/54  Max: " "117/56  Temp  Av.2 °F (36.8 °C)  Min: 98.2 °F (36.8 °C)  Max: 98.2 °F (36.8 °C)  Pulse  Av.5  Min: 60  Max: 61  Resp  Av  Min: 18  Max: 18  SpO2  Av.5 %  Min: 97 %  Max: 98 %  Weight  Av kg (110 lb 3.2 oz)  Min: 50 kg (110 lb 3.2 oz)  Max: 50 kg (110 lb 3.2 oz)  Body mass index is 17.79 kg/m².  No intake/output data recorded.    Physical Examination:  Physical Exam  Constitutional:       General: He is not in acute distress.     Appearance: He is not ill-appearing.   HENT:      Head: Atraumatic.      Right Ear: External ear normal.      Left Ear: External ear normal.      Nose: Nose normal.      Mouth/Throat:      Mouth: Mucous membranes are moist.      Pharynx: Oropharynx is clear.   Eyes:      Extraocular Movements: Extraocular movements intact.   Cardiovascular:      Rate and Rhythm: Normal rate and regular rhythm.      Pulses: Normal pulses.   Pulmonary:      Effort: Pulmonary effort is normal. No respiratory distress.      Breath sounds: Normal breath sounds.   Abdominal:      General: Abdomen is flat.      Palpations: Abdomen is soft.      Comments: Left sided flank surgical incision.    Musculoskeletal:         General: No swelling or tenderness. Normal range of motion.      Cervical back: Neck supple.   Skin:     General: Skin is warm and dry.      Comments: Sacral wound present with erythema and minimal skin break down   Neurological:      Mental Status: He is alert and oriented to person, place, and time. Mental status is at baseline.   Psychiatric:         Mood and Affect: Mood normal.         Behavior: Behavior normal.          Laboratory:  Reviewed      Trended Cardiac Data:  No results for input(s): "TROPONINI", "CKTOTAL", "CKMB", "BNP" in the last 168 hours.    Microbiology Data:  Reviewed    Other Results:      Radiology:  Imaging Results              CT Abdomen Pelvis With Contrast (In process)                          Assessment:     Praneeth McTopy is a 77 y.o. male " with:  Patient Active Problem List    Diagnosis Date Noted    History of gastrointestinal bleeding 07/31/2023    History of fecal impaction 07/31/2023    History of complete heart block 04/14/2023    Obstructive uropathy 04/14/2023    Macrocytic anemia 03/20/2023    Idiopathic proctocolitis with rectal bleeding 11/20/2021    Second degree AV block 08/24/2021    Essential hypertension 08/24/2021    HLD (hyperlipidemia) 08/24/2021    MDD (major depressive disorder) 08/24/2021    Nephrolithiasis 06/02/2021    BPH with urinary obstruction 06/02/2021    History of CVA with residual deficit 05/25/2021        Plan:     Obstructive Uropathy 2/2 L. Ureterolithiasis s/p L. Nephrostomy tube  Dislodgement of nephrostomy tube  Patient with Left nephrostomy tube that fell out on day of admission   - R ureter patent and pt making urine  - IR consulted to perform procedure tomorrow; NPO midnight     BPH requiring chronic indwelling mark catheter  - mark previously placed by urology and was very difficult to place. Nursing unable to replace mark this admission.   - continue home tamsulosin 0.8 mg daily     Asymptomatic Bacteruria  - UA with WBCs, bacteria, negative nitrites   - pt denies any dysuria, suprapubic or flank pain; afebrile, normal WBC count  - likely colonized in setting of chronic indwelling mark      Grade 2/6 mid-systolic murmur best appreciated in aortic region  - likely aortic stenosis; pt currently asymptomatic      Ischemic CVA with residual deficits  - 4/5 RLE weakness, 3/5 LLE weakness, and Bed Bound; pt severely deconditioned  - continue home lipitor 40.  - Patient not on daily ASA due to GIB in 2021, will defer continuation to PCP, although would be appropriate in this patient.  - continue PT/OT at NH facility      2nd degree AVB s/p PPM  - Ventricularly paced; no acute issues     Anemia of Chronic Inflammatory Disease  Patient with prior iron studies and ferritin consistent with ACID.  - Hgb stable at  ~11     Stage I Sacral Pressure Ulcer, present on admision  - turn patient q2h  - wound care consulted     Chronic Constipation  - reports BM on day of admission.  - continue home glycolax BID, colase, and bisacodyl prn.     MDD  Severe protein calorie malnutrition  - continue home Mirtazapine 30, home cyproheptadine.  - nutritional supplements while inpatient     H/o GIB (2021)  - was on daily ASA, but was discontinued after the GIB.     Healthcare Maintenance  Colonoscopy NUTD.  Unknown if PNA and Tdap vaccine up to date.  - defer to PCP.      Code Status:     DNR/DNI    Richard Aguilera MD  \[facility]\"" Internal Medicine -IV    \[facility]\"" Medicine Hospitalist Pager numbers:   \[facility]\"" Hospitalist Medicine Team A (Kalpesh/Richard): [phone]  \[facility]\"" Hospitalist Medicine Team B (Champ/Daaina):  [phone]

## 2023-09-02 NOTE — ED NOTES
Due to patient's mark being placed by urology, this RN did not change mark for UA. Bag emptied, tubing flushed then occluded, port disinfected before obtaining urine with 10mL syringe.

## 2023-09-02 NOTE — Clinical Note
Diagnosis: Nephrostomy tube displaced [853509]   Admitting Provider:: CECI MEYER [[id number]]   Future Attending Provider: CECI MEYER [[id number]]   Reason for IP Medical Treatment  (Clinical interventions that can only be accomplished in the IP setting? ) :: Replacement of nephrostomy tube by IR   I certify that Inpatient services for greater than or equal to 2 midnights are medically necessary:: Yes   Plans for Post-Acute care--if anticipated (pick the single best option):: A. No post acute care anticipated at this time   Special Needs:: No Special Needs [1]

## 2023-09-03 VITALS
WEIGHT: 105.81 LBS | HEART RATE: 60 BPM | SYSTOLIC BLOOD PRESSURE: 121 MMHG | DIASTOLIC BLOOD PRESSURE: 60 MMHG | HEIGHT: 66 IN | OXYGEN SATURATION: 97 % | RESPIRATION RATE: 18 BRPM | TEMPERATURE: 98 F | BODY MASS INDEX: 17 KG/M2

## 2023-09-03 LAB
ALBUMIN SERPL BCP-MCNC: 2.8 G/DL (ref 3.5–5.2)
ALP SERPL-CCNC: 60 U/L (ref 55–135)
ALT SERPL W/O P-5'-P-CCNC: 10 U/L (ref 10–44)
ANION GAP SERPL CALC-SCNC: 7 MMOL/L (ref 8–16)
AST SERPL-CCNC: 13 U/L (ref 10–40)
BACTERIA UR CULT: NORMAL
BACTERIA UR CULT: NORMAL
BILIRUB SERPL-MCNC: 0.4 MG/DL (ref 0.1–1)
BUN SERPL-MCNC: 20 MG/DL (ref 8–23)
CALCIUM SERPL-MCNC: 9.3 MG/DL (ref 8.7–10.5)
CHLORIDE SERPL-SCNC: 108 MMOL/L (ref 95–110)
CO2 SERPL-SCNC: 27 MMOL/L (ref 23–29)
CREAT SERPL-MCNC: 0.8 MG/DL (ref 0.5–1.4)
EST. GFR  (NO RACE VARIABLE): >60 ML/MIN/1.73 M^2
GLUCOSE SERPL-MCNC: 74 MG/DL (ref 70–110)
MAGNESIUM SERPL-MCNC: 1.7 MG/DL (ref 1.6–2.6)
PHOSPHATE SERPL-MCNC: 3 MG/DL (ref 2.7–4.5)
POTASSIUM SERPL-SCNC: 4.6 MMOL/L (ref 3.5–5.1)
PROT SERPL-MCNC: 6.1 G/DL (ref 6–8.4)
SODIUM SERPL-SCNC: 142 MMOL/L (ref 136–145)

## 2023-09-03 PROCEDURE — 84100 ASSAY OF PHOSPHORUS: CPT | Performed by: STUDENT IN AN ORGANIZED HEALTH CARE EDUCATION/TRAINING PROGRAM

## 2023-09-03 PROCEDURE — 25000003 PHARM REV CODE 250: Performed by: STUDENT IN AN ORGANIZED HEALTH CARE EDUCATION/TRAINING PROGRAM

## 2023-09-03 PROCEDURE — 80053 COMPREHEN METABOLIC PANEL: CPT | Performed by: STUDENT IN AN ORGANIZED HEALTH CARE EDUCATION/TRAINING PROGRAM

## 2023-09-03 PROCEDURE — 83735 ASSAY OF MAGNESIUM: CPT | Performed by: STUDENT IN AN ORGANIZED HEALTH CARE EDUCATION/TRAINING PROGRAM

## 2023-09-03 PROCEDURE — 25000003 PHARM REV CODE 250: Performed by: RADIOLOGY

## 2023-09-03 PROCEDURE — 63600175 PHARM REV CODE 636 W HCPCS: Performed by: RADIOLOGY

## 2023-09-03 PROCEDURE — 25500020 PHARM REV CODE 255: Performed by: RADIOLOGY

## 2023-09-03 PROCEDURE — 36415 COLL VENOUS BLD VENIPUNCTURE: CPT | Performed by: STUDENT IN AN ORGANIZED HEALTH CARE EDUCATION/TRAINING PROGRAM

## 2023-09-03 RX ORDER — LIDOCAINE HYDROCHLORIDE 10 MG/ML
INJECTION INFILTRATION; PERINEURAL
Status: COMPLETED | OUTPATIENT
Start: 2023-09-03 | End: 2023-09-03

## 2023-09-03 RX ADMIN — IOHEXOL 8 ML: 350 INJECTION, SOLUTION INTRAVENOUS at 01:09

## 2023-09-03 RX ADMIN — GABAPENTIN 300 MG: 300 CAPSULE ORAL at 03:09

## 2023-09-03 RX ADMIN — DEXTROSE MONOHYDRATE 1 G: 5 INJECTION INTRAVENOUS at 01:09

## 2023-09-03 RX ADMIN — LIDOCAINE HYDROCHLORIDE 5 ML: 10 INJECTION, SOLUTION INFILTRATION; PERINEURAL at 01:09

## 2023-09-03 NOTE — PROGRESS NOTES
"[facility] Medicine Progress Note    Primary Team: [facility] Hospitalist Team A  Attending Physician: Adi Posey MD  Resident: Willy  Intern: Tarik    Subjective:      Patient resting comfortably in bed this morning. No acute complaints. He is ready to have his procedure so he can eat today.      Objective:     Last 24 Hour Vital Signs:  BP  Min: 101/60  Max: 121/58  Temp  Av.9 °F (36.6 °C)  Min: 97.5 °F (36.4 °C)  Max: 98.2 °F (36.8 °C)  Pulse  Av  Min: 60  Max: 67  Resp  Av.5  Min: 18  Max: 20  SpO2  Av.9 %  Min: 96 %  Max: 99 %  Height  Av' 6" (167.6 cm)  Min: 5' 6" (167.6 cm)  Max: 5' 6" (167.6 cm)  Weight  Av kg (108 lb 0.2 oz)  Min: 48 kg (105 lb 13.1 oz)  Max: 50 kg (110 lb 3.2 oz)  I/O last 3 completed shifts:  In: 999 [IV Piggyback:999]  Out: 1500 [Urine:1500]    Physical Examination:  Constitutional:       General: He is not in acute distress.     Appearance: He is not ill-appearing.   HENT:      Head: Atraumatic.      Mouth: Mucous membranes are moist.      Pharynx: Oropharynx is clear.   Cardiovascular:      Rate and Rhythm: Normal rate and regular rhythm.      Pulses: Normal pulses.   Pulmonary:      Effort: Pulmonary effort is normal. No respiratory distress.      Breath sounds: Normal breath sounds.   Abdominal:      General: Abdomen is flat.      Palpations: Abdomen is soft.      Comments: Left sided flank surgical incision.    Musculoskeletal:         General: No swelling or tenderness. Normal range of motion.      Cervical back: Neck supple.   Skin:     General: Skin is warm and dry.   Neurological:      Mental Status: He is alert and oriented to person, place, and time. Mental status is at baseline.   Psychiatric:         Mood and Affect: Mood normal.         Behavior: Behavior normal.       Laboratory:  Reviewed    Other Results:  EKG (my interpretation): Reviewed  Radiology Data Reviewed: yes  Pertinent Findings:  Reviewed    Current Medications:     " Infusions:       Scheduled:   atorvastatin  40 mg Oral Daily    docusate sodium  100 mg Oral Daily    folic acid  1 mg Oral Daily    gabapentin  300 mg Oral TID    heparin (porcine)  5,000 Units Subcutaneous Q8H    mirtazapine  30 mg Oral Nightly    polyethylene glycol  17 g Oral BID    tamsulosin  0.8 mg Oral Daily        PRN:  dextrose 10%, dextrose 10%, glucagon (human recombinant), glucose, glucose, naloxone, sodium chloride 0.9%        Assessment:     Praneeth McTopy is a 77 y.o.male with  Patient Active Problem List    Diagnosis Date Noted    Nephrostomy complication 09/02/2023    History of gastrointestinal bleeding 07/31/2023    History of fecal impaction 07/31/2023    History of complete heart block 04/14/2023    Obstructive uropathy 04/14/2023    Macrocytic anemia 03/20/2023    Idiopathic proctocolitis with rectal bleeding 11/20/2021    Second degree AV block 08/24/2021    Essential hypertension 08/24/2021    HLD (hyperlipidemia) 08/24/2021    MDD (major depressive disorder) 08/24/2021    Nephrolithiasis 06/02/2021    BPH with urinary obstruction 06/02/2021    History of CVA with residual deficit 05/25/2021        Plan:     Obstructive Uropathy 2/2 L. Ureterolithiasis s/p L. Nephrostomy tube  Dislodgement of nephrostomy tube  Patient with Left nephrostomy tube that fell out on day of admission   - R ureter patent and pt making urine  - IR consulted to perform procedure today; hopeful discharge after     BPH requiring chronic indwelling mark catheter  - mark previously placed by urology and was very difficult to place. Nursing unable to replace mark this admission.   - continue home tamsulosin 0.8 mg daily     Asymptomatic Bacteruria  - UA with WBCs, bacteria, negative nitrites   - pt denies any dysuria, suprapubic or flank pain; afebrile, normal WBC count  - likely colonized in setting of chronic indwelling mark      Grade 2/6 mid-systolic murmur best appreciated in aortic region  - likely aortic  stenosis; pt currently asymptomatic      Ischemic CVA with residual deficits  - 4/5 RLE weakness, 3/5 LLE weakness, and Bed Bound; pt severely deconditioned  - continue home lipitor 40.  - Patient not on daily ASA due to GIB in 2021, will defer continuation to PCP, although would be appropriate in this patient.  - continue PT/OT at NH facility      2nd degree AVB s/p PPM  - Ventricularly paced; no acute issues     Anemia of Chronic Inflammatory Disease  Patient with prior iron studies and ferritin consistent with ACID.  - Hgb stable at ~11     Stage I Sacral Pressure Ulcer, present on admision  - turn patient q2h  - wound care consulted     Chronic Constipation  - reports BM on day of admission.  - continue home glycolax BID, colase, and bisacodyl prn.     MDD  Severe protein calorie malnutrition  - continue home Mirtazapine 30, home cyproheptadine.  - nutritional supplements while inpatient     H/o GIB (2021)  - was on daily ASA, but was discontinued after the GIB.      Richard Aguilera MD  [facility] Internal Medicine HO-IV    [facility] Medicine Hospitalist Pager numbers:   [facility] Hospitalist Medicine Team A (Kalpesh/Richard): [phone]  [facility] Hospitalist Medicine Team B (Champ/Daiana):  [phone]

## 2023-09-03 NOTE — NURSING
Pt arrived to unit. Introduced self as VN for this shift. Admission questions completed by JAIME Hardin. Educated pt on VTE risk, safety precautions, and VN's role in pt care. Opportunity given for pt's questions. All questions answered. Will continue to monitor closely

## 2023-09-03 NOTE — PLAN OF CARE
Problem: Adult Inpatient Plan of Care  Goal: Plan of Care Review  Outcome: Ongoing, Progressing  Goal: Patient-Specific Goal (Individualized)  Outcome: Ongoing, Progressing  Goal: Absence of Hospital-Acquired Illness or Injury  Outcome: Ongoing, Progressing  Goal: Optimal Comfort and Wellbeing  Outcome: Ongoing, Progressing  Goal: Readiness for Transition of Care  Outcome: Ongoing, Progressing     Problem: Impaired Wound Healing  Goal: Optimal Wound Healing  Outcome: Ongoing, Progressing     Problem: Adjustment to Surgery (Urinary Diversion)  Goal: Psychosocial Adjustment Initiation  Outcome: Ongoing, Progressing     Problem: Bleeding (Urinary Diversion)  Goal: Absence of Bleeding  Outcome: Ongoing, Progressing     Problem: Fluid and Electrolyte Imbalance (Urinary Diversion)  Goal: Fluid and Electrolyte Balance  Outcome: Ongoing, Progressing     Problem: Infection (Urinary Diversion)  Goal: Absence of Infection Signs and Symptoms  Outcome: Ongoing, Progressing     Problem: Pain (Urinary Diversion)  Goal: Acceptable Pain Control  Outcome: Ongoing, Progressing     Problem: Urine Elimination Impaired (Urinary Diversion)  Goal: Effective Urinary Elimination  Outcome: Ongoing, Progressing     Problem: Mobility Impairment  Goal: Optimal Mobility  Outcome: Ongoing, Progressing     Problem: Balance Impairment (Functional Deficit)  Goal: Improved Balance and Postural Control  Outcome: Ongoing, Progressing     Problem: Muscle Strength Impairment (Functional Deficit)  Goal: Improved Muscle Strength  Outcome: Ongoing, Progressing     Problem: Muscle Tone Impairment (Functional Deficit)  Goal: Improved Muscle Tone  Outcome: Ongoing, Progressing     Problem: Infection  Goal: Absence of Infection Signs and Symptoms  Outcome: Ongoing, Progressing     Problem: Skin Injury Risk Increased  Goal: Skin Health and Integrity  Outcome: Ongoing, Progressing       POC reviewed with pt, following- VSS, NADN, pt resting quietly this shift.  NPO since 0000. No falls or injuries noted, CB in reach at all times. Instructed to call for needs not met on rounds, v/u.

## 2023-09-03 NOTE — PLAN OF CARE
"   09/03/23 1246   Post-Acute Status   Post-Acute Authorization Placement   Post-Acute Placement Status Pending medical clearance/testing  (Packet to unit. Nursing to alert transport when ready. Pt going down to IR in a few. Facility alerted to status. Nurse to call report once medically stable after procedure. Family notified.)   Discharge Delays (!) Procedure Scheduling (IR, OR, Labs, Echo, Cath, Echo, EEG)  (Transportation placed in WILL CALL for DC today. Bedside staff alerted to info transport when pt is ready.)   Discharge Plan   Discharge Plan A Return to nursing home       Urology appt to be requested.    Future Appointments   Date Time Provider Department Center   12/1/2023 10:00 AM HOME MONITOR DEVICE CHECK, [facility] BIBI Alfonso     BP (!) 104/54 (Patient Position: Lying)   Pulse 66   Temp 97 °F (36.1 °C) (Oral)   Resp 18   Ht 5' 6" (1.676 m)   Wt 48 kg (105 lb 13.1 oz)   SpO2 96%   BMI 17.08 kg/m²        Medication List        CHANGE how you take these medications      tamsulosin 0.4 mg Cap  Commonly known as: FLOMAX  TAKE 2 CAPSULES(0.8 MG) BY MOUTH EVERY DAY  What changed: when to take this            CONTINUE taking these medications      atorvastatin 40 MG tablet  Commonly known as: LIPITOR     bisacodyL 10 mg Supp  Commonly known as: DULCOLAX (BISACODYL)  Place 1 suppository (10 mg total) rectally daily as needed (constipation).     CALMOSEPTINE 0.44-20.6 % Oint  Generic drug: menthol-zinc oxide     cyproheptadine 4 mg tablet  Commonly known as: PERIACTIN     docusate sodium 100 MG capsule  Commonly known as: COLACE  Take 1 capsule (100 mg total) by mouth once daily.     folic acid 1 MG tablet  Commonly known as: FOLVITE     gabapentin 300 MG capsule  Commonly known as: NEURONTIN     mirtazapine 30 MG tablet  Commonly known as: REMERON     polyethylene glycol 17 gram Pwpk  Commonly known as: GLYCOLAX  Take 17 g by mouth 2 (two) times daily.              "

## 2023-09-03 NOTE — SEDATION DOCUMENTATION
IR procedure - Left nehprostomy tube placement - is completed. Patient tolerated well. He is awake, alert, and oriented. Vital signs are stable. A 10 Fr. Nephrostomy tube is placed. Patient will return to the floor after report is called to patient's nurse.

## 2023-09-03 NOTE — PLAN OF CARE
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       Patient is homebound due to:  Nephrostomy complication    Allergies:Review of patient's allergies indicates:  No Known Allergies    Vitals:  Routine    Diet: regular diet    Activities:   Activity as tolerated    Goals of Care Treatment Preferences:  Code Status: DNR          What is most important right now is to focus on improvement in condition but with limits to invasive therapies.  Accordingly, we have decided that the best plan to meet the patient's goals includes continuing with treatment.      Labs:  as needed  Nursing Precautions:  Fall and Pressure ulcer prevention    Consults:   PT to evaluate and treat- 1-2 times a week, OT to evaluate and treat- 1-2 times a week, Wound Care, and Nutrition to evaluate and recommend diet     Miscellaneous Care: Nephrostomy care; routine, empty bag every shift                       Medications: Discontinue all previous medication orders, if any. See new list below.     Medication List        ASK your doctor about these medications      atorvastatin 40 MG tablet  Commonly known as: LIPITOR  Take 40 mg by mouth once daily.     bisacodyL 10 mg Supp  Commonly known as: DULCOLAX (BISACODYL)  Place 1 suppository (10 mg total) rectally daily as needed (constipation).     CALMOSEPTINE 0.44-20.6 % Oint  Generic drug: menthol-zinc oxide  Apply topically daily as needed. To sacral  area after each sacral excoriation episode and as needed     cyproheptadine 4 mg tablet  Commonly known as: PERIACTIN  Take 4 mg by mouth 3 (three) times daily. Itching     docusate sodium 100 MG capsule  Commonly known as: COLACE  Take 1 capsule (100 mg total) by mouth once daily.     folic acid 1 MG tablet  Commonly known as: FOLVITE  Take 1 mg by mouth once daily.     gabapentin 300 MG capsule  Commonly known as: NEURONTIN  Take 300 mg by mouth 3 (three) times daily.     mirtazapine 30 MG tablet  Commonly known as: REMERON  Take 30 mg by mouth nightly.     polyethylene glycol 17 gram Pwpk  Commonly known as: GLYCOLAX  Take 17 g by mouth 2 (two) times daily.  Ask about: Which instructions should I use?     tamsulosin 0.4 mg Cap  Commonly known as: FLOMAX  TAKE 2 CAPSULES(0.8 MG) BY MOUTH EVERY DAY                Immunizations Administered as of 9/3/2023       No immunizations on file.            This patient has had both covid vaccinations    Some patients may experience side effects after vaccination.  These may include fever, headache, muscle or joint aches.  Most symptoms resolve with 24-48 hours and do not require urgent medical evaluation unless they persist for more than 72 hours or symptoms are concerning for an unrelated medical condition.          _________________________________  Richard Aguilera MD  09/03/2023

## 2023-09-03 NOTE — PROCEDURES
Radiology Post-Procedure Note    Pre Op Diagnosis: L ureteral obstruction  Post Op Diagnosis: Same    Procedure: L nephrostomy    Procedure performed by: Talon Ramirez MD    Written Informed Consent Obtained: Yes  Specimen Removed: NO  Estimated Blood Loss: Minimal    Findings:   L nephrostomy placement.    Patient tolerated procedure well.    Talon Ramirez MD

## 2023-09-03 NOTE — H&P
Consult/H&P Note  Interventional Radiology    Consult Requested By: medicine    Reason for Consult: Nephrostomy dislodged    SUBJECTIVE:     Chief Complaint: L ureteral obstruction    History of Present Illness: 76 yo M with L ureteral obstruction managed with nephrostomy tube.  Tube became dislodged.    Past Medical History:   Diagnosis Date    Arthritis     Diabetes mellitus     type 2    Folate deficiency anemia     Heart block     History of kidney stones 05/25/2021    History of stroke with residual deficit 05/25/2021    Hyperlipidemia     Hypertension     Major depressive disorder     Pacemaker     Biotronic [place] 8 DR-T (S/n: [id number])    Pyelonephritis 11/19/2021    TIA (transient ischemic attack)     Urinary retention 05/25/2021     Past Surgical History:   Procedure Laterality Date    A-V CARDIAC PACEMAKER INSERTION N/A 8/24/2021    Procedure: INSERTION, CARDIAC PACEMAKER, DUAL CHAMBER;  Surgeon: Neo Winn MD;  Location: [facility] EP LAB;  Service: Cardiology;  Laterality: N/A;  CHB, DUAL PPM, ANES, BIO, DM, ED 2    COLONOSCOPY N/A 11/22/2021    Procedure: COLONOSCOPY;  Surgeon: Cesar Dorado MD;  Location: [facility] ENDO (2ND FLR);  Service: Endoscopy;  Laterality: N/A;    CYSTOSCOPIC LITHOLAPAXY  5/25/2021    Procedure: CYSTOLITHOLAPAXY;  Surgeon: Chris Schilling MD;  Location: [facility] OR [address] [place] [address];  Service: Urology;;    CYSTOSCOPY  8/26/2021    Procedure: CYSTOSCOPY;  Surgeon: Camilo Kelley Jr., MD;  Location: [facility] OR [address] [place] [address];  Service: Urology;;    CYSTOSCOPY W/ URETERAL STENT PLACEMENT Bilateral 5/25/2021    Procedure: CYSTOSCOPY, WITH BILATERAL URETERAL STENT INSERTION;  Surgeon: Chris Schilling MD;  Location: [facility] OR [address] [place] [address];  Service: Urology;  Laterality: Bilateral;    ELBOW ARTHROPLASTY Right     FLUOROSCOPY  5/25/2021    Procedure: FLUOROSCOPY;  Surgeon: Chris Schilling MD;  Location: [facility] OR [address] [place] [address];  Service: Urology;;    LASER LITHOTRIPSY Left 8/26/2021    Procedure: LITHOTRIPSY, USING  "LASER;  Surgeon: Camilo Kelley Jr., MD;  [facility];  Service: Urology;  Laterality: Left;    PYELOSCOPY Bilateral 8/26/2021    Procedure: PYELOSCOPY;  Surgeon: Camilo Kelley Jr., MD;  [facility];  Service: Urology;  Laterality: Bilateral;    REMOVAL OF BLOOD CLOT  5/25/2021    Procedure: REMOVAL, BLOOD CLOT;  Surgeon: Chris Schilling MD;  [facility];  Service: Urology;;    REPLACEMENT OF STENT Bilateral 8/26/2021    Procedure: REPLACEMENT, STENT;  Surgeon: Camilo Kelley Jr., MD;  [facility];  Service: Urology;  Laterality: Bilateral;    URETEROSCOPIC REMOVAL OF URETERIC CALCULUS Bilateral 8/26/2021    Procedure: REMOVAL, CALCULUS, URETER, URETEROSCOPIC;  Surgeon: Camilo Kelley Jr., MD;  [facility];  Service: Urology;  Laterality: Bilateral;    URETEROSCOPY Bilateral 8/26/2021    Procedure: URETEROSCOPY;  Surgeon: Camilo Kelley Jr., MD;  Location: [facility] OR [address] [place] [address];  Service: Urology;  Laterality: Bilateral;     Family History   Problem Relation Age of Onset    Stroke Mother     Hyperlipidemia Mother     Mental illness Father     Parkinsonism Father     No Known Problems Brother      Social History     Tobacco Use    Smoking status: Former     Types: Cigarettes    Smokeless tobacco: Never   Substance Use Topics    Alcohol use: Yes     Comment: 1/ pint whisky daily    Drug use: Yes     Types: "Crack" cocaine       Review of Systems:  Constitutional/General:No fever, chills, change in appetite or weight loss.  Hematological/Immuno: no known coagulopathies  Respiratory: no shortness of breath  Cardiovascular: no chest pain  Gastrointestinal: no abdominal pain  Genito-Urinary: positive for - L flank nephrostomy site  Musculoskeletal: negative  Skin: Negative for rash, itching, pigmentation changes, nail or hair changes.  Neurological: no TIA or stroke symptoms  Psychiatric: normal mood/affect, good insight/judgement      OBJECTIVE: " "    Vital Signs Range (Last 24H):  Temp:  [97 °F (36.1 °C)-98.2 °F (36.8 °C)]   Pulse:  [60-67]   Resp:  [18-20]   BP: (101-121)/(53-60)   SpO2:  [96 %-99 %]     Physical Exam:  General- Patient alert and oriented x3 in NAD  ENT- PERRLA,  Neck- No masses  CV- Regular rate and rhythm  Resp-  No increased WOB  GI- Non tender/non-distended  Extrem- No cyanosis, clubbing, edema.   Derm- No rashes, masses, or lesions noted  Neuro-  No focal deficits noted.     Physical Exam  Body mass index is 17.08 kg/m².    Scheduled Meds:    atorvastatin  40 mg Oral Daily    docusate sodium  100 mg Oral Daily    folic acid  1 mg Oral Daily    gabapentin  300 mg Oral TID    heparin (porcine)  5,000 Units Subcutaneous Q8H    mirtazapine  30 mg Oral Nightly    polyethylene glycol  17 g Oral BID    tamsulosin  0.8 mg Oral Daily     Continuous Infusions:   PRN Meds:dextrose 10%, dextrose 10%, glucagon (human recombinant), glucose, glucose, naloxone, sodium chloride 0.9%    Allergies: Review of patient's allergies indicates:  No Known Allergies    Labs:  No results for input(s): "INR", "PT", "PTT" in the last 168 hours.    Recent Labs   Lab 09/02/23  1524   WBC 6.02   HGB 10.8*   HCT 33.9*   MCV 95         Recent Labs   Lab 09/03/23  1118   GLU 74      K 4.6      CO2 27   BUN 20   CREATININE 0.8   CALCIUM 9.3   MG 1.7   ALT 10   AST 13   ALBUMIN 2.8*   BILITOT 0.4       Vitals (Most Recent):  Temp: 97 °F (36.1 °C) (09/03/23 1045)  Pulse: 66 (09/03/23 1045)  Resp: 18 (09/03/23 1045)  BP: (!) 104/54 (09/03/23 1045)  SpO2: 96 % (09/03/23 1045)    ASA: 3  Mallampati: 2    Consent obtained    ASSESSMENT/PLAN:     L nephrostomy tube placement. Up to moderate sedation.    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           Talon Ramirez MD

## 2023-09-03 NOTE — NURSING
Pt refusing heparin, labs, turns this AM. Attempted to educate on importance of interventions, pt still refusing. Dr. Salazar made aware.

## 2023-09-03 NOTE — PLAN OF CARE
09/03/23 1031   Discharge Assessment   Assessment Type Discharge Planning Assessment   Confirmed/corrected address, phone number and insurance Yes   Source of Information health record   [facility] facility resident   [facility]   Do you expect to return to your current living situation? Yes   Discharge Plan A Return to nursing home     Potential DC for today. Pt requires stretcher transport at [facility]. Facility rep to setup unavailable today for potential DC. N ambulance auth request faxed to [facility] for transport. Attending team notified.

## 2023-09-03 NOTE — SEDATION DOCUMENTATION
Report given to patient's nurse, Sonja. Pertinent details of the procedure are shared, along with the opportunity to ask questions. Transport has been requested to return the patient to the floor.

## 2023-09-03 NOTE — PLAN OF CARE
[facility] Med Surg  Discharge Final Note    Primary Care Provider: Richard Galloway MD    Expected Discharge Date:     DC plan as listed below in CM flowsheet. PENDING MEDICAL CLEARANCE.    Final Discharge Note (most recent)       Final Note - 09/03/23 1108          Final Note    Assessment Type Final Discharge Note (P)      Anticipated Discharge Disposition detention Nursing Home (P)         Post-Acute Status    Post-Acute Authorization Placement (P)      Post-Acute Placement Status Pending post-acute provider review/more information requested (P)    Orders faxed. Nursing at facility alerted. PENDING MEDICAL CLEARANCE AND ORDER ACCEPTANCE.    Discharge Delays Procedure Scheduling (IR, OR, Labs, Echo, Cath, Echo, EEG) (P)    Nephrostomy tube replacement prior to DC. Pending medical clearance. Stretcher transport on standby. Nursing alerted.                   Follow-up Information       [facility]. Go in 2 week(s).    Specialty: Urology  Why: UROLOGY APPOINTMENT AFTER DISCHARGE  Contact information:  [address] [place], [facility] [address]  [facility] [address] [phone]  Additional information:  Please park in [address] or [address] and use [address] entrance. Take [facility] elevators.             [facility]. Go today.    Specialty: Skilled Nursing Facility  Why: RETURN TO NURSING HOME  Contact information:  [address] [facility] [address] [phone]                            Medication List        CHANGE how you take these medications      tamsulosin 0.4 mg Cap  Commonly known as: FLOMAX  TAKE 2 CAPSULES(0.8 MG) BY MOUTH EVERY DAY  What changed: when to take this            CONTINUE taking these medications      atorvastatin 40 MG tablet  Commonly known as: LIPITOR     bisacodyL 10 mg Supp  Commonly known as: DULCOLAX (BISACODYL)  Place 1 suppository (10 mg total) rectally daily as needed (constipation).     CALMOSEPTINE 0.44-20.6 % Oint  Generic drug: menthol-zinc  oxide     cyproheptadine 4 mg tablet  Commonly known as: PERIACTIN     docusate sodium 100 MG capsule  Commonly known as: COLACE  Take 1 capsule (100 mg total) by mouth once daily.     folic acid 1 MG tablet  Commonly known as: FOLVITE     gabapentin 300 MG capsule  Commonly known as: NEURONTIN     mirtazapine 30 MG tablet  Commonly known as: REMERON     polyethylene glycol 17 gram Pwpk  Commonly known as: GLYCOLAX  Take 17 g by mouth 2 (two) times daily.

## 2023-09-04 NOTE — DISCHARGE SUMMARY
[facility] Hospital Medicine Discharge Summary    Primary Team: [facility] Hospitalist Team A  Attending Physician: Kalpesh  Resident: Willy  Intern: Tarik    Date of Admit: 9/2/2023  Date of Discharge: 9/3/2023    Discharge to: Nursing Home  Condition: stable    Discharge Diagnoses     Patient Active Problem List   Diagnosis    History of CVA with residual deficit    Nephrolithiasis    BPH with urinary obstruction    Second degree AV block    Essential hypertension    HLD (hyperlipidemia)    MDD (major depressive disorder)    Idiopathic proctocolitis with rectal bleeding    Macrocytic anemia    History of complete heart block    Obstructive uropathy    History of gastrointestinal bleeding    History of fecal impaction    Nephrostomy complication       Consultants and Procedures     Consultants:  IR    Procedures:   Nephrostomy placement    Brief History of Present Illness      Praneeth Jewell is a 77 y.o.  male who  has a past medical history of Arthritis, Diabetes mellitus, Folate deficiency anemia, Heart block, History of kidney stones (05/25/2021), History of stroke with residual deficit (05/25/2021), Hyperlipidemia, Hypertension, Major depressive disorder, Pacemaker, Pyelonephritis (11/19/2021), TIA (transient ischemic attack), and Urinary retention (05/25/2021).. The patient presented to [facility] on 9/2/2023 with a primary complaint of Nephrostomy tube dislodgement     The patient was in their usual state of health until day of presentation when his nephrostomy tube fell out. He lives in a nursing facility and is bed bound due to previous CVA and deconditioning. He is not sure when his nephrostomy tube fell out. He denies any pain. He has a mark in place. No abdominal pain or fevers. No bleeding from the nephrostomy site.        For the full HPI please refer to the History & Physical from this admission.    Hospital Course By Problem with Pertinent Findings     Obstructive Uropathy 2/2 L. Ureterolithiasis  s/p L. Nephrostomy tube  Dislodgement of nephrostomy tube  Patient with Left nephrostomy tube that fell out on day of admission   - R ureter patent and pt making urine  - IR placed left nephrostomy tube on 9/3     BPH requiring chronic indwelling mark catheter  - mark previously placed by urology and was very difficult to place. Nursing unable to replace mark this admission.   - continue home tamsulosin 0.8 mg daily     Asymptomatic Bacteruria  - UA with WBCs, bacteria, negative nitrites   - pt denies any dysuria, suprapubic or flank pain; afebrile, normal WBC count  - likely colonized in setting of chronic indwelling mark, no treatment      Grade 2/6 mid-systolic murmur best appreciated in aortic region  - likely aortic stenosis; pt currently asymptomatic      Ischemic CVA with residual deficits  - 4/5 RLE weakness, 3/5 LLE weakness, and Bed Bound; pt severely deconditioned  - continue home lipitor 40.  - Patient not on daily ASA due to GIB in 2021, will defer continuation to PCP, although would be appropriate in this patient.  - continue PT/OT at NH facility      2nd degree AVB s/p PPM  - Ventricularly paced; no acute issues     Anemia of Chronic Inflammatory Disease  Patient with prior iron studies and ferritin consistent with ACID.  - Hgb stable at ~11     Stage I Sacral Pressure Ulcer, present on admision  - turn patient q2h  - wound care consulted     Chronic Constipation  - reports BM on day of admission.  - continue home glycolax BID, colase, and bisacodyl prn.     MDD  Severe protein calorie malnutrition  - continue home Mirtazapine 30, home cyproheptadine.  - nutritional supplements while inpatient     H/o GIB (2021)  - was on daily ASA, but was discontinued after the GIB.    Discharge Medications        Medication List        CHANGE how you take these medications      tamsulosin 0.4 mg Cap  Commonly known as: FLOMAX  TAKE 2 CAPSULES(0.8 MG) BY MOUTH EVERY DAY  What changed: when to take this             CONTINUE taking these medications      atorvastatin 40 MG tablet  Commonly known as: LIPITOR     bisacodyL 10 mg Supp  Commonly known as: DULCOLAX (BISACODYL)  Place 1 suppository (10 mg total) rectally daily as needed (constipation).     CALMOSEPTINE 0.44-20.6 % Oint  Generic drug: menthol-zinc oxide     cyproheptadine 4 mg tablet  Commonly known as: PERIACTIN     docusate sodium 100 MG capsule  Commonly known as: COLACE  Take 1 capsule (100 mg total) by mouth once daily.     folic acid 1 MG tablet  Commonly known as: FOLVITE     gabapentin 300 MG capsule  Commonly known as: NEURONTIN     mirtazapine 30 MG tablet  Commonly known as: REMERON     polyethylene glycol 17 gram Pwpk  Commonly known as: GLYCOLAX  Take 17 g by mouth 2 (two) times daily.              Discharge Information:   Diet:  Regular diet    Physical Activity:  As tolerated             Instructions:  1. Take all medications as prescribed  2. Keep all follow-up appointments  3. Return to the hospital or call your primary care physicians if any worsening symptoms such as fever, chest pain, shortness of breath, return of symptoms, or any other concerns.    Follow-Up Appointments:  Follow up with PCP    Richard Aguilera MD  [facility] Internal Medicine, -IV

## 2023-09-05 ENCOUNTER — CLINICAL SUPPORT (OUTPATIENT)
Dept: CARDIOLOGY | Facility: HOSPITAL | Age: 78
End: 2023-09-05
Payer: MEDICARE

## 2023-09-05 DIAGNOSIS — Z95.0 PRESENCE OF CARDIAC PACEMAKER: ICD-10-CM

## 2023-09-05 PROCEDURE — 93294 CARDIAC DEVICE CHECK - REMOTE: ICD-10-PCS | Mod: ,,, | Performed by: INTERNAL MEDICINE

## 2023-09-05 PROCEDURE — 93296 REM INTERROG EVL PM/IDS: CPT | Performed by: INTERNAL MEDICINE

## 2023-09-05 PROCEDURE — 93294 REM INTERROG EVL PM/LDLS PM: CPT | Mod: ,,, | Performed by: INTERNAL MEDICINE

## 2023-10-13 ENCOUNTER — HOSPITAL ENCOUNTER (OUTPATIENT)
Facility: HOSPITAL | Age: 78
Discharge: OTHER INSTITUTION | End: 2023-10-14
Attending: EMERGENCY MEDICINE | Admitting: INTERNAL MEDICINE
Payer: MEDICARE

## 2023-10-13 DIAGNOSIS — T83.022A DISPLACEMENT OF NEPHROSTOMY TUBE: Primary | ICD-10-CM

## 2023-10-13 DIAGNOSIS — T83.022A NEPHROSTOMY TUBE DISPLACED: ICD-10-CM

## 2023-10-13 DIAGNOSIS — R82.71 ASYMPTOMATIC BACTERIURIA: ICD-10-CM

## 2023-10-13 DIAGNOSIS — N13.9 OBSTRUCTIVE UROPATHY: ICD-10-CM

## 2023-10-13 LAB
ALBUMIN SERPL BCP-MCNC: 3.2 G/DL (ref 3.5–5.2)
ALP SERPL-CCNC: 70 U/L (ref 55–135)
ALT SERPL W/O P-5'-P-CCNC: 15 U/L (ref 10–44)
ANION GAP SERPL CALC-SCNC: 6 MMOL/L (ref 8–16)
AST SERPL-CCNC: 17 U/L (ref 10–40)
BACTERIA #/AREA URNS HPF: ABNORMAL /HPF
BASOPHILS # BLD AUTO: 0.04 K/UL (ref 0–0.2)
BASOPHILS NFR BLD: 0.5 % (ref 0–1.9)
BILIRUB SERPL-MCNC: 0.5 MG/DL (ref 0.1–1)
BILIRUB UR QL STRIP: NEGATIVE
BUN SERPL-MCNC: 27 MG/DL (ref 8–23)
CALCIUM SERPL-MCNC: 9.4 MG/DL (ref 8.7–10.5)
CHLORIDE SERPL-SCNC: 107 MMOL/L (ref 95–110)
CLARITY UR: ABNORMAL
CO2 SERPL-SCNC: 30 MMOL/L (ref 23–29)
COLOR UR: YELLOW
CREAT SERPL-MCNC: 0.9 MG/DL (ref 0.5–1.4)
DIFFERENTIAL METHOD: ABNORMAL
EOSINOPHIL # BLD AUTO: 0.1 K/UL (ref 0–0.5)
EOSINOPHIL NFR BLD: 1.5 % (ref 0–8)
ERYTHROCYTE [DISTWIDTH] IN BLOOD BY AUTOMATED COUNT: 15.8 % (ref 11.5–14.5)
EST. GFR  (NO RACE VARIABLE): >60 ML/MIN/1.73 M^2
GLUCOSE SERPL-MCNC: 92 MG/DL (ref 70–110)
GLUCOSE UR QL STRIP: NEGATIVE
HCT VFR BLD AUTO: 43.8 % (ref 40–54)
HGB BLD-MCNC: 13.8 G/DL (ref 14–18)
HGB UR QL STRIP: ABNORMAL
HYALINE CASTS #/AREA URNS LPF: 0 /LPF
IMM GRANULOCYTES # BLD AUTO: 0.01 K/UL (ref 0–0.04)
IMM GRANULOCYTES NFR BLD AUTO: 0.1 % (ref 0–0.5)
KETONES UR QL STRIP: NEGATIVE
LEUKOCYTE ESTERASE UR QL STRIP: ABNORMAL
LYMPHOCYTES # BLD AUTO: 1.3 K/UL (ref 1–4.8)
LYMPHOCYTES NFR BLD: 17.3 % (ref 18–48)
MCH RBC QN AUTO: 29.7 PG (ref 27–31)
MCHC RBC AUTO-ENTMCNC: 31.5 G/DL (ref 32–36)
MCV RBC AUTO: 94 FL (ref 82–98)
MICROSCOPIC COMMENT: ABNORMAL
MONOCYTES # BLD AUTO: 0.4 K/UL (ref 0.3–1)
MONOCYTES NFR BLD: 5.1 % (ref 4–15)
NEUTROPHILS # BLD AUTO: 5.6 K/UL (ref 1.8–7.7)
NEUTROPHILS NFR BLD: 75.5 % (ref 38–73)
NITRITE UR QL STRIP: NEGATIVE
NRBC BLD-RTO: 0 /100 WBC
PH UR STRIP: 7 [PH] (ref 5–8)
PLATELET # BLD AUTO: 138 K/UL (ref 150–450)
PMV BLD AUTO: 10.3 FL (ref 9.2–12.9)
POTASSIUM SERPL-SCNC: 4.4 MMOL/L (ref 3.5–5.1)
PROT SERPL-MCNC: 6.9 G/DL (ref 6–8.4)
PROT UR QL STRIP: ABNORMAL
RBC # BLD AUTO: 4.64 M/UL (ref 4.6–6.2)
RBC #/AREA URNS HPF: 49 /HPF (ref 0–4)
SODIUM SERPL-SCNC: 143 MMOL/L (ref 136–145)
SP GR UR STRIP: 1.02 (ref 1–1.03)
URN SPEC COLLECT METH UR: ABNORMAL
UROBILINOGEN UR STRIP-ACNC: NEGATIVE EU/DL
WBC # BLD AUTO: 7.46 K/UL (ref 3.9–12.7)
WBC #/AREA URNS HPF: >100 /HPF (ref 0–5)
WBC CLUMPS URNS QL MICRO: ABNORMAL
YEAST URNS QL MICRO: ABNORMAL

## 2023-10-13 PROCEDURE — 25000003 PHARM REV CODE 250: Performed by: STUDENT IN AN ORGANIZED HEALTH CARE EDUCATION/TRAINING PROGRAM

## 2023-10-13 PROCEDURE — 25000003 PHARM REV CODE 250: Performed by: RADIOLOGY

## 2023-10-13 PROCEDURE — 96375 TX/PRO/DX INJ NEW DRUG ADDON: CPT

## 2023-10-13 PROCEDURE — 85025 COMPLETE CBC W/AUTO DIFF WBC: CPT | Performed by: EMERGENCY MEDICINE

## 2023-10-13 PROCEDURE — G0378 HOSPITAL OBSERVATION PER HR: HCPCS

## 2023-10-13 PROCEDURE — 81000 URINALYSIS NONAUTO W/SCOPE: CPT | Performed by: EMERGENCY MEDICINE

## 2023-10-13 PROCEDURE — 99285 EMERGENCY DEPT VISIT HI MDM: CPT | Mod: 25

## 2023-10-13 PROCEDURE — 87086 URINE CULTURE/COLONY COUNT: CPT | Performed by: EMERGENCY MEDICINE

## 2023-10-13 PROCEDURE — 63600175 PHARM REV CODE 636 W HCPCS: Performed by: RADIOLOGY

## 2023-10-13 PROCEDURE — 87186 SC STD MICRODIL/AGAR DIL: CPT | Performed by: EMERGENCY MEDICINE

## 2023-10-13 PROCEDURE — 96365 THER/PROPH/DIAG IV INF INIT: CPT | Mod: 59

## 2023-10-13 PROCEDURE — 87077 CULTURE AEROBIC IDENTIFY: CPT | Performed by: EMERGENCY MEDICINE

## 2023-10-13 PROCEDURE — 25000003 PHARM REV CODE 250: Performed by: EMERGENCY MEDICINE

## 2023-10-13 PROCEDURE — 25000003 PHARM REV CODE 250

## 2023-10-13 PROCEDURE — 80053 COMPREHEN METABOLIC PANEL: CPT | Performed by: EMERGENCY MEDICINE

## 2023-10-13 PROCEDURE — 87088 URINE BACTERIA CULTURE: CPT | Performed by: EMERGENCY MEDICINE

## 2023-10-13 PROCEDURE — 63600175 PHARM REV CODE 636 W HCPCS: Performed by: EMERGENCY MEDICINE

## 2023-10-13 RX ORDER — FENTANYL CITRATE 50 UG/ML
INJECTION, SOLUTION INTRAMUSCULAR; INTRAVENOUS
Status: COMPLETED | OUTPATIENT
Start: 2023-10-13 | End: 2023-10-13

## 2023-10-13 RX ORDER — IBUPROFEN 200 MG
16 TABLET ORAL
Status: DISCONTINUED | OUTPATIENT
Start: 2023-10-13 | End: 2023-10-14 | Stop reason: HOSPADM

## 2023-10-13 RX ORDER — IBUPROFEN 200 MG
24 TABLET ORAL
Status: DISCONTINUED | OUTPATIENT
Start: 2023-10-13 | End: 2023-10-14 | Stop reason: HOSPADM

## 2023-10-13 RX ORDER — OXYCODONE HYDROCHLORIDE 5 MG/1
5 TABLET ORAL EVERY 6 HOURS PRN
Status: DISCONTINUED | OUTPATIENT
Start: 2023-10-13 | End: 2023-10-14 | Stop reason: HOSPADM

## 2023-10-13 RX ORDER — ATORVASTATIN CALCIUM 40 MG/1
40 TABLET, FILM COATED ORAL DAILY
Status: DISCONTINUED | OUTPATIENT
Start: 2023-10-14 | End: 2023-10-14 | Stop reason: HOSPADM

## 2023-10-13 RX ORDER — FOLIC ACID 1 MG/1
1 TABLET ORAL DAILY
Status: DISCONTINUED | OUTPATIENT
Start: 2023-10-14 | End: 2023-10-14 | Stop reason: HOSPADM

## 2023-10-13 RX ORDER — SODIUM CHLORIDE 0.9 % (FLUSH) 0.9 %
10 SYRINGE (ML) INJECTION EVERY 12 HOURS PRN
Status: DISCONTINUED | OUTPATIENT
Start: 2023-10-13 | End: 2023-10-14 | Stop reason: HOSPADM

## 2023-10-13 RX ORDER — ACETAMINOPHEN 325 MG/1
650 TABLET ORAL EVERY 6 HOURS PRN
Status: DISCONTINUED | OUTPATIENT
Start: 2023-10-13 | End: 2023-10-14 | Stop reason: HOSPADM

## 2023-10-13 RX ORDER — MIRTAZAPINE 7.5 MG/1
30 TABLET, FILM COATED ORAL NIGHTLY
Status: DISCONTINUED | OUTPATIENT
Start: 2023-10-13 | End: 2023-10-14 | Stop reason: HOSPADM

## 2023-10-13 RX ORDER — MIDAZOLAM HYDROCHLORIDE 1 MG/ML
INJECTION INTRAMUSCULAR; INTRAVENOUS
Status: COMPLETED | OUTPATIENT
Start: 2023-10-13 | End: 2023-10-13

## 2023-10-13 RX ORDER — POLYETHYLENE GLYCOL 3350 17 G/17G
17 POWDER, FOR SOLUTION ORAL DAILY
Status: DISCONTINUED | OUTPATIENT
Start: 2023-10-14 | End: 2023-10-14 | Stop reason: HOSPADM

## 2023-10-13 RX ORDER — SODIUM CHLORIDE 9 MG/ML
INJECTION, SOLUTION INTRAVENOUS
Status: COMPLETED | OUTPATIENT
Start: 2023-10-13 | End: 2023-10-13

## 2023-10-13 RX ORDER — CYPROHEPTADINE HYDROCHLORIDE 4 MG/1
4 TABLET ORAL 3 TIMES DAILY
Status: DISCONTINUED | OUTPATIENT
Start: 2023-10-13 | End: 2023-10-14 | Stop reason: HOSPADM

## 2023-10-13 RX ORDER — NALOXONE HCL 0.4 MG/ML
0.02 VIAL (ML) INJECTION
Status: DISCONTINUED | OUTPATIENT
Start: 2023-10-13 | End: 2023-10-14 | Stop reason: HOSPADM

## 2023-10-13 RX ORDER — AMOXICILLIN 250 MG
1 CAPSULE ORAL DAILY PRN
Status: DISCONTINUED | OUTPATIENT
Start: 2023-10-13 | End: 2023-10-14 | Stop reason: HOSPADM

## 2023-10-13 RX ORDER — LIDOCAINE HYDROCHLORIDE 10 MG/ML
INJECTION INFILTRATION; PERINEURAL
Status: COMPLETED | OUTPATIENT
Start: 2023-10-13 | End: 2023-10-13

## 2023-10-13 RX ORDER — GLUCAGON 1 MG
1 KIT INJECTION
Status: DISCONTINUED | OUTPATIENT
Start: 2023-10-13 | End: 2023-10-14 | Stop reason: HOSPADM

## 2023-10-13 RX ADMIN — CYPROHEPTADINE HYDROCHLORIDE 4 MG: 4 TABLET ORAL at 08:10

## 2023-10-13 RX ADMIN — MIDAZOLAM HYDROCHLORIDE 1 MG: 1 INJECTION, SOLUTION INTRAMUSCULAR; INTRAVENOUS at 03:10

## 2023-10-13 RX ADMIN — LIDOCAINE HYDROCHLORIDE 5 ML: 10 INJECTION, SOLUTION INFILTRATION; PERINEURAL at 03:10

## 2023-10-13 RX ADMIN — MIRTAZAPINE 30 MG: 7.5 TABLET ORAL at 08:10

## 2023-10-13 RX ADMIN — CEFTRIAXONE 1 G: 1 INJECTION, POWDER, FOR SOLUTION INTRAMUSCULAR; INTRAVENOUS at 12:10

## 2023-10-13 RX ADMIN — SODIUM CHLORIDE 500 ML: 0.9 INJECTION, SOLUTION INTRAVENOUS at 03:10

## 2023-10-13 RX ADMIN — FENTANYL CITRATE 50 MCG: 50 INJECTION, SOLUTION INTRAMUSCULAR; INTRAVENOUS at 03:10

## 2023-10-13 NOTE — CONSULTS
See H&P and Procedure Note from same date.    Vic Plunkett MD  Diagnostic and Interventional Radiologist  Department of Radiology

## 2023-10-13 NOTE — ED PROVIDER NOTES
Encounter Date: 10/13/2023       History     Chief Complaint   Patient presents with    nephrostomy tube     Pt presents to ED today via [facility] from [place]   Nephrostomy tube dislodged           Patient is a 78 yo M with a pmhx of CVA (2021) with residual deficit of LLE weakness (from previous CVA),  deconditioning 2/2 being bed bound, obstructive uropathy who presents to the ED via EMS with the complaint of nephrostomy tube displacement (pt with nephrostomy tube in place 2/2 to large kidney stone). Pt states he was scratching his back yesterday causing his L sided nephrostomy tube to come out completely. He denies any fever, chills, abdominal pain or other complaints at this time. Pt was recently admitted to [facility] service for similar issue in early September 2023.            Review of patient's allergies indicates:  No Known Allergies  Past Medical History:   Diagnosis Date    Arthritis     Diabetes mellitus     type 2    Folate deficiency anemia     Heart block     History of kidney stones 05/25/2021    History of stroke with residual deficit 05/25/2021    Hyperlipidemia     Hypertension     Major depressive disorder     Pacemaker     B[employer] [place] 8 DR-T (S/n: [id number])    Pyelonephritis 11/19/2021    TIA (transient ischemic attack)     Urinary retention 05/25/2021     Past Surgical History:   Procedure Laterality Date    A-V CARDIAC PACEMAKER INSERTION N/A 8/24/2021    Procedure: INSERTION, CARDIAC PACEMAKER, DUAL CHAMBER;  Surgeon: Neo Winn MD;  Location: [facility] EP LAB;  Service: Cardiology;  Laterality: N/A;  CHB, DUAL PPM, ANES, BIO, DM, ED 2    COLONOSCOPY N/A 11/22/2021    Procedure: COLONOSCOPY;  Surgeon: Cesar Dorado MD;  Location: [facility] ENDO (2ND FLR);  Service: Endoscopy;  Laterality: N/A;    CYSTOSCOPIC LITHOLAPAXY  5/25/2021    Procedure: CYSTOLITHOLAPAXY;  Surgeon: Chris Schilling MD;  Location: [facility] OR [facility] FLR;  Service: Urology;;    CYSTOSCOPY  8/26/2021    Procedure:  CYSTOSCOPY;  Surgeon: Camilo Kelley Jr., MD;  [facility];  Service: Urology;;    CYSTOSCOPY W/ URETERAL STENT PLACEMENT Bilateral 5/25/2021    Procedure: CYSTOSCOPY, WITH BILATERAL URETERAL STENT INSERTION;  Surgeon: Chris Schilling MD;  [facility];  Service: Urology;  Laterality: Bilateral;    ELBOW ARTHROPLASTY Right     FLUOROSCOPY  5/25/2021    Procedure: FLUOROSCOPY;  Surgeon: Chris Schilling MD;  [facility];  Service: Urology;;    LASER LITHOTRIPSY Left 8/26/2021    Procedure: LITHOTRIPSY, USING LASER;  Surgeon: Camilo Kelley Jr., MD;  [facility];  Service: Urology;  Laterality: Left;    PYELOSCOPY Bilateral 8/26/2021    Procedure: PYELOSCOPY;  Surgeon: Camilo Kelley Jr., MD;  [facility];  Service: Urology;  Laterality: Bilateral;    REMOVAL OF BLOOD CLOT  5/25/2021    Procedure: REMOVAL, BLOOD CLOT;  Surgeon: Chris Schilling MD;  [facility];  Service: Urology;;    REPLACEMENT OF STENT Bilateral 8/26/2021    Procedure: REPLACEMENT, STENT;  Surgeon: Camilo Kelley Jr., MD;  [facility];  Service: Urology;  Laterality: Bilateral;    URETEROSCOPIC REMOVAL OF URETERIC CALCULUS Bilateral 8/26/2021    Procedure: REMOVAL, CALCULUS, URETER, URETEROSCOPIC;  Surgeon: Camilo Kelley Jr., MD;  [facility];  Service: Urology;  Laterality: Bilateral;    URETEROSCOPY Bilateral 8/26/2021    Procedure: URETEROSCOPY;  Surgeon: Camilo Kelley Jr., MD;  [facility];  Service: Urology;  Laterality: Bilateral;     Family History   Problem Relation Age of Onset    Stroke Mother     Hyperlipidemia Mother     Mental illness Father     Parkinsonism Father     No Known Problems Brother      Social History     Tobacco Use    Smoking status: Former     Types: Cigarettes    Smokeless tobacco: Never   Substance Use Topics    Alcohol use: Yes     Comment: 1/ pint whisky daily    Drug use: Yes      "Types: "Crack" cocaine     Review of Systems   Constitutional:  Negative for chills and fever.   Respiratory:  Negative for chest tightness and shortness of breath.    Cardiovascular:  Negative for chest pain, palpitations and leg swelling.   Gastrointestinal:  Negative for abdominal pain and nausea.   Genitourinary:  Negative for dysuria and frequency.   Musculoskeletal:  Negative for back pain and myalgias.   Neurological:  Negative for dizziness and headaches.   Psychiatric/Behavioral:  Negative for agitation and confusion.        Physical Exam     Initial Vitals   BP Pulse Resp Temp SpO2   10/13/23 1003 10/13/23 1003 10/13/23 1003 10/13/23 1006 10/13/23 1003   (!) 102/53 65 16 98.2 °F (36.8 °C) 99 %      MAP       --                Physical Exam    Nursing note and vitals reviewed.  Constitutional: He appears well-developed and well-nourished. No distress.   Well-appearing   Non toxic   No acute distress    HENT:   Head: Normocephalic and atraumatic.   Right Ear: External ear normal.   Left Ear: External ear normal.   Eyes: Conjunctivae and EOM are normal. Pupils are equal, round, and reactive to light.   Neck: Neck supple.   Normal range of motion.  Cardiovascular:  Normal rate, regular rhythm, normal heart sounds and intact distal pulses.           Pulmonary/Chest: Breath sounds normal.   Abdominal: Abdomen is soft. Bowel sounds are normal.   L nephrostomy tube site C/D/I; no findings of infection; no part of nephrostomy tube noted.    Musculoskeletal:         General: No tenderness or edema. Normal range of motion.      Cervical back: Normal range of motion and neck supple.     Neurological: He is alert and oriented to person, place, and time. GCS score is 15. GCS eye subscore is 4. GCS verbal subscore is 5. GCS motor subscore is 6.   LLE strength 4/5 (this is a residual deficit from a previous CVA).    Skin: Skin is warm. Capillary refill takes less than 2 seconds. No erythema.   Psychiatric: He has a normal " mood and affect. Thought content normal.         ED Course   Procedures  Labs Reviewed   CBC W/ AUTO DIFFERENTIAL - Abnormal; Notable for the following components:       Result Value    Hemoglobin 13.8 (*)     MCHC 31.5 (*)     RDW 15.8 (*)     Platelets 138 (*)     Gran % 75.5 (*)     Lymph % 17.3 (*)     All other components within normal limits   COMPREHENSIVE METABOLIC PANEL - Abnormal; Notable for the following components:    CO2 30 (*)     BUN 27 (*)     Albumin 3.2 (*)     Anion Gap 6 (*)     All other components within normal limits   URINALYSIS, REFLEX TO URINE CULTURE - Abnormal; Notable for the following components:    Appearance, UA Cloudy (*)     Protein, UA 1+ (*)     Occult Blood UA 2+ (*)     Leukocytes, UA 3+ (*)     All other components within normal limits    Narrative:     Specimen Source->Urine   URINALYSIS MICROSCOPIC - Abnormal; Notable for the following components:    RBC, UA 49 (*)     WBC, UA >100 (*)     WBC Clumps, UA Many (*)     Bacteria Many (*)     Yeast, UA Many (*)     All other components within normal limits    Narrative:     Specimen Source->Urine   CULTURE, URINE     EKG Readings: (Independently Interpreted)   Initial Reading: No STEMI. Heart Rate: 66. Conduction: RBBB.   NSR; RBBB; no ischemia; no STEMI        Imaging Results              IR Nephrostomy Tube Change (In process)                       CT Renal Stone Study ABD Pelvis WO (Final result)  Result time 10/13/23 13:51:00      Final result by Manuel Celestin MD (10/13/23 13:51:00)                   Impression:      This report was flagged in Epic as abnormal.    1. Large amount of stool within the rectum and throughout the remainder of the colon concerning for constipation/impaction.  Rectal wall thickening is concerning for developing stercoral proctitis.  Correlation is advised.  2. 9 mm calculus within the distal aspect of the left ureter with resultant left moderate hydroureteronephrosis and perinephric fat  stranding.  Correlation with urinalysis recommended to exclude superimposed infection.  There is additional bilateral nonobstructive nephrolithiasis.  3. Please see above for several additional findings.      Electronically signed by: Manuel Celestin MD  Date:    10/13/2023  Time:    13:51               Narrative:    EXAMINATION:  CT RENAL STONE STUDY ABD PELVIS WO    CLINICAL HISTORY:  Nephrostomy catheter displacement;    TECHNIQUE:  Low dose axial images, sagittal and coronal reformations were obtained from the lung bases to the pubic symphysis.  Contrast was not administered.    COMPARISON:  CT 09/02/2023    FINDINGS:  Images of the lower thorax are remarkable for bilateral dependent atelectasis/scarring, left greater than right.  Pacer wires partially visualized.    The liver, spleen and adrenal glands have a grossly unremarkable noncontrast appearance allowing for extensive motion artifact.  There is atrophic change of the pancreas without pancreatic ductal dilation.  The gallbladder is unremarkable without significant biliary dilation.  The stomach is nondistended, no gastric wall thickening.  No significant abdominal lymphadenopathy.    There is a low attenuating lesion within the interpolar region of the right kidney measuring 1.5 cm, attenuation of which suggests cyst.  There is right nonobstructive nephrolithiasis.  No right hydronephrosis.  The right ureter is unremarkable without calculi seen.  There is left perinephric and periureteral inflammation.  There is left nonobstructive nephrolithiasis.  There is prior tract from left nephrostomy tube.  There is indistinctness and edema about the left renal hilum and moderate left hydroureteronephrosis.  The left ureter is unable to be followed in its entirety to the urinary bladder noting there is a calculus within the distal aspect measuring up to 9 mm.  The urinary bladder is decompressed around a Shen catheter noting there may be residual wall thickening.   The prostate is not enlarged.    There is a large amount of stool within the rectum, concerning for constipation or impaction noting mild presacral edema, developing stercoral proctitis is of concern.  There is moderate to large amount of stool throughout the remainder of the large bowel noting a few scattered colonic diverticula without inflammation.  The terminal ileum is unremarkable.  The appendix is unremarkable.  The small bowel is grossly unremarkable.  There are several scattered shotty periaortic, pericaval, and mesenteric lymph nodes.  There is atherosclerotic calcification of the aorta and its branches.  There is infrarenal abdominal aortic ectasia.    There is osteopenia.  There are degenerative changes of the spine.  No significant inguinal lymphadenopathy.                                       Medications   cefTRIAXone (ROCEPHIN) 1 g in dextrose 5 % in water (D5W) 100 mL IVPB (MB+) (0 g Intravenous Stopped 10/13/23 1248)     Medical Decision Making  See HPI     Amount and/or Complexity of Data Reviewed  Labs: ordered. Decision-making details documented in ED Course.  Radiology: ordered.               ED Course as of 10/13/23 1502   Fri Oct 13, 2023   1121 Creatinine: 0.9  Reviewed by self - WNL  [LC]   1121 Sodium: 143  Reviewed by self - WNL  [LC]   1121 WBC: 7.46  Reviewed by self - WNL  [LC]   1125 Leukocytes, UA(!): 3+  Reviewed by self - abnormal  [LC]   1125 Bacteria, UA(!): Many  Reviewed by self - abnormal  [LC]   1125 WBC, UA(!): >100  Reviewed by self - abnormal  [LC]   1125 WBC Clumps, UA(!): Many  Reviewed by self - abnormal  [LC]   1336 Contacted ALANNA Dubose) who stated, via Secure Chat, he would try to get to the patient today.  [LC]      ED Course User Index  [LC] Jhony Chao MD                 Medical Decision Making:   Initial Assessment:   See HPI   Differential Diagnosis:   Pyelonephritis, UTI, electrolyte abnormality, nephrolithiasis, nephrostomy tube malfunction,  nephrostomy tube dislodgement   Clinical Tests:   Lab Tests: Ordered and Reviewed  Radiological Study: Ordered and Reviewed  ED Management:  - routine ED labs unremarkable other than UA findings consistent with presumptive UTI - ceftriaxone administered in ED  - discussed case with IR who will plan to replace nephrostomy tube today  - will admit to [facility] pending nephrostomy tube replacement  - pt comfortable with plan for admission and IR replacement   Other:   I have discussed this case with another health care provider.       <> Summary of the Discussion: Case discussed with IR physician (Dr. Plunkett) via secure chat who stated he would attempt to replace the tube today.    Case discussed with [facility] for admission for nephrostomy tube replacement, post operative monitoring, continuation of pt's normal medication regimen.       Clinical Impression:   Final diagnoses:  [T83.022A] Displacement of nephrostomy tube (Primary)        ED Disposition Condition    Observation Stable                Jhony Chao MD  10/13/23 [id number]

## 2023-10-13 NOTE — H&P
[facility] Medicine H&P Note     Admitting Team: [facility] Hospitalist Team B  Attending Physician: Anup Oakes MD  Resident: Ceasar  Intern: Antwan    Date of Admit: 10/13/2023    Chief Complaint     Nephrostomy tube displacement x 2 days    Subjective:      History of Present Illness:  Praneeth Jewell is a 77 y.o.  male who  has a past medical history of Arthritis, Diabetes mellitus, Folate deficiency anemia, Heart block, History of kidney stones (05/25/2021), History of stroke with residual deficit (05/25/2021), Hyperlipidemia, Hypertension, Major depressive disorder, Pacemaker, Pyelonephritis (11/19/2021), TIA (transient ischemic attack), and Urinary retention (05/25/2021).. The patient presented to [facility] on 10/13/2023 with a primary complaint of nephrostomy tube displacement since yesterday.  Patient was scratching his back when his left nephrostomy tube became displaced.     The patient was in their usual state of health until yesterday when his nephrostomy tube fell out. He had the nephrostomy tube replaced today with IR without complication. Patient was admitted in September 2023 for the same issue. He lives in a nursing facility and is bed bound due to previous CVA and deconditioning. He denies any pain. No abdominal pain or fevers.     Past Medical History:  Past Medical History:   Diagnosis Date    Arthritis     Diabetes mellitus     type 2    Folate deficiency anemia     Heart block     History of kidney stones 05/25/2021    History of stroke with residual deficit 05/25/2021    Hyperlipidemia     Hypertension     Major depressive disorder     Pacemaker     Biotronic Edora 8 -PRATIK (S/n: [id number])    Pyelonephritis 11/19/2021    TIA (transient ischemic attack)     Urinary retention 05/25/2021       Past Surgical History:  Past Surgical History:   Procedure Laterality Date    A-V CARDIAC PACEMAKER INSERTION N/A 8/24/2021    Procedure: INSERTION, CARDIAC PACEMAKER, DUAL CHAMBER;  Surgeon:  Neo Winn MD;  Location: [facility] EP LAB;  Service: Cardiology;  Laterality: N/A;  CHB, DUAL PPM, ANES, BIO, DM, ED 2    COLONOSCOPY N/A 11/22/2021    Procedure: COLONOSCOPY;  Surgeon: Cesar Dorado MD;  Location: [facility] ENDO (2ND FLR);  Service: Endoscopy;  Laterality: N/A;    CYSTOSCOPIC LITHOLAPAXY  5/25/2021    Procedure: CYSTOLITHOLAPAXY;  Surgeon: Chris Schilling MD;  [facility];  Service: Urology;;    CYSTOSCOPY  8/26/2021    Procedure: CYSTOSCOPY;  Surgeon: Camilo Kelley Jr., MD;  [facility];  Service: Urology;;    CYSTOSCOPY W/ URETERAL STENT PLACEMENT Bilateral 5/25/2021    Procedure: CYSTOSCOPY, WITH BILATERAL URETERAL STENT INSERTION;  Surgeon: Chris Schilling MD;  [facility];  Service: Urology;  Laterality: Bilateral;    ELBOW ARTHROPLASTY Right     FLUOROSCOPY  5/25/2021    Procedure: FLUOROSCOPY;  Surgeon: Chris Schilling MD;  [facility];  Service: Urology;;    LASER LITHOTRIPSY Left 8/26/2021    Procedure: LITHOTRIPSY, USING LASER;  Surgeon: Camilo Kelley Jr., MD;  Location: [facility];  Service: Urology;  Laterality: Left;    PYELOSCOPY Bilateral 8/26/2021    Procedure: PYELOSCOPY;  Surgeon: Camilo Kelley Jr., MD;  Location: [facility];  Service: Urology;  Laterality: Bilateral;    REMOVAL OF BLOOD CLOT  5/25/2021    Procedure: REMOVAL, BLOOD CLOT;  Surgeon: Chrsi Schilling MD;  [facility]: [facility];  Service: Urology;;    REPLACEMENT OF STENT Bilateral 8/26/2021    Procedure: REPLACEMENT, STENT;  Surgeon: Camilo Kelley Jr., MD;  Location: [facility];  Service: Urology;  Laterality: Bilateral;    URETEROSCOPIC REMOVAL OF URETERIC CALCULUS Bilateral 8/26/2021    Procedure: REMOVAL, CALCULUS, URETER, URETEROSCOPIC;  Surgeon: Camilo Kelley Jr., MD;  Location: [facility] OR 1ST FLR;  Service: Urology;  Laterality: Bilateral;    URETEROSCOPY Bilateral 8/26/2021    Procedure: URETEROSCOPY;  Surgeon: Camilo BINGHAM  Warren Pearl MD;  Location: [facility] OR [address] [place] [address];  Service: Urology;  Laterality: Bilateral;       Allergies:  Review of patient's allergies indicates:  No Known Allergies    Home Medications:  Prior to Admission medications    Medication Sig Start Date End Date Taking? Authorizing Provider   atorvastatin (LIPITOR) 40 MG tablet Take 40 mg by mouth once daily.    Provider, Historical   bisacodyL (DULCOLAX, BISACODYL,) 10 mg Supp Place 1 suppository (10 mg total) rectally daily as needed (constipation). 4/17/23   Jenny Smith MD   cyproheptadine (PERIACTIN) 4 mg tablet Take 4 mg by mouth 3 (three) times daily. Itching 4/7/21   Provider, Historical   docusate sodium (COLACE) 100 MG capsule Take 1 capsule (100 mg total) by mouth once daily. 4/17/23   Jenny Smith MD   folic acid (FOLVITE) 1 MG tablet Take 1 mg by mouth once daily.    Provider, Historical   gabapentin (NEURONTIN) 300 MG capsule Take 300 mg by mouth 3 (three) times daily. 4/7/21   Provider, Historical   menthol-zinc oxide (CALMOSEPTINE) 0.44-20.6 % Oint Apply topically daily as needed. To sacral area after each sacral excoriation episode and as needed    Provider, Historical   mirtazapine (REMERON) 30 MG tablet Take 30 mg by mouth nightly. 5/27/21   Provider, Historical   polyethylene glycol (GLYCOLAX) 17 gram PwPk Take 17 g by mouth 2 (two) times daily. 4/17/23   Jenny Smith MD   tamsulosin (FLOMAX) 0.4 mg Cap TAKE 2 CAPSULES(0.8 MG) BY MOUTH EVERY DAY  Patient taking differently: Take 0.8 mg by mouth once daily. 5/18/23   Camilo Kelley Jr., MD       Family History:  Family History   Problem Relation Age of Onset    Stroke Mother     Hyperlipidemia Mother     Mental illness Father     Parkinsonism Father     No Known Problems Brother        Social History:  Social History     Tobacco Use    Smoking status: Former     Types: Cigarettes    Smokeless tobacco: Never   Substance Use Topics    Alcohol use: Yes     Comment: 1/ pint whisky daily    Drug  "use: Yes     Types: "Crack" cocaine       Review of Systems:  Pertinent items are noted in HPI. All other systems are reviewed and are negative.    Health Maintaince :   Primary Care Physician: Richard Galloway MD     Immunizations:   TDap unknown    Flu UTD   Pna unknown    Cancer Screening:  Colonoscopy: NUTD     Objective:   Last 24 Hour Vital Signs:  BP  Min: 102/53  Max: 124/59  Temp  Av.3 °F (36.3 °C)  Min: 96.4 °F (35.8 °C)  Max: 98.2 °F (36.8 °C)  Pulse  Av.6  Min: 60  Max: 71  Resp  Av.5  Min: 11  Max: 20  SpO2  Av.9 %  Min: 98 %  Max: 100 %  Height  Av' 7" (170.2 cm)  Min: 5' 7" (170.2 cm)  Max: 5' 7" (170.2 cm)  Weight  Av.4 kg (100 lb)  Min: 45.4 kg (100 lb)  Max: 45.4 kg (100 lb)  Body mass index is 15.66 kg/m².  I/O last 3 completed shifts:  In: 100 [IV Piggyback:100]  Out: -     Physical Examination:  Physical Exam  Constitutional:       Appearance: He is not toxic-appearing.   HENT:      Head: Normocephalic and atraumatic.      Mouth/Throat:      Mouth: Mucous membranes are moist.      Pharynx: Oropharynx is clear.   Eyes:      Conjunctiva/sclera: Conjunctivae normal.      Pupils: Pupils are equal, round, and reactive to light.   Cardiovascular:      Rate and Rhythm: Normal rate and regular rhythm.      Pulses: Normal pulses.      Heart sounds: Normal heart sounds.   Pulmonary:      Effort: Pulmonary effort is normal.      Breath sounds: Normal breath sounds.   Abdominal:      General: Abdomen is flat.      Palpations: Abdomen is soft.   Musculoskeletal:         General: No swelling.   Skin:     General: Skin is warm and dry.      Comments: Surgical incision on left flank; drain in place   Neurological:      General: No focal deficit present.      Mental Status: He is alert and oriented to person, place, and time.   Psychiatric:         Mood and Affect: Mood normal.         Behavior: Behavior normal.          Laboratory:  Most Recent Data:  CBC:   Lab Results   Component " Value Date    WBC 7.46 10/13/2023    HGB 13.8 (L) 10/13/2023    HCT 43.8 10/13/2023     (L) 10/13/2023    MCV 94 10/13/2023    RDW 15.8 (H) 10/13/2023     BMP:   Lab Results   Component Value Date     10/13/2023    K 4.4 10/13/2023     10/13/2023    CO2 30 (H) 10/13/2023    BUN 27 (H) 10/13/2023    CREATININE 0.9 10/13/2023    GLU 92 10/13/2023    CALCIUM 9.4 10/13/2023    MG 1.7 09/03/2023    PHOS 3.0 09/03/2023     LFTs:   Lab Results   Component Value Date    PROT 6.9 10/13/2023    ALBUMIN 3.2 (L) 10/13/2023    BILITOT 0.5 10/13/2023    AST 17 10/13/2023    ALKPHOS 70 10/13/2023    ALT 15 10/13/2023     Coags:   Lab Results   Component Value Date    INR 1.1 07/31/2023     DM:   Lab Results   Component Value Date    HGBA1C 4.6 07/31/2023    HGBA1C 4.7 11/20/2021    HGBA1C 4.1 08/24/2021    CREATININE 0.9 10/13/2023     Thyroid:   Lab Results   Component Value Date    TSH 2.398 03/14/2022    FREET4 0.98 03/14/2022     Anemia:   Lab Results   Component Value Date    IRON 10 (L) 03/20/2023    TIBC 161 (L) 03/20/2023    FERRITIN 595 (H) 03/20/2023    HAGFCVPD42 578 03/20/2023    FOLATE 16.8 03/20/2023     Cardiac:   Lab Results   Component Value Date    TROPONINI 0.024 03/20/2023    BNP 54 03/19/2023     Urinalysis:   Lab Results   Component Value Date    LABURIN  09/02/2023     Multiple organisms isolated. None in predominance.  Repeat if    LABURIN clinically necessary. 09/02/2023    COLORU Yellow 10/13/2023    SPECGRAV 1.020 10/13/2023    NITRITE Negative 10/13/2023    KETONESU Negative 10/13/2023    UROBILINOGEN Negative 10/13/2023    WBCUA >100 (H) 10/13/2023       Trended Lab Data:  Recent Labs   Lab 10/13/23  1056   WBC 7.46   HGB 13.8*   HCT 43.8   *   MCV 94   RDW 15.8*      K 4.4      CO2 30*   BUN 27*   CREATININE 0.9   GLU 92   PROT 6.9   ALBUMIN 3.2*   BILITOT 0.5   AST 17   ALKPHOS 70   ALT 15       Microbiology Data:  Ucx pending    Other Results:  EKG (my  interpretation): No evidence of ischemia.     Radiology:  Imaging Results              IR Nephrostomy Tube Placement (Final result)  Result time 10/13/23 16:45:34   Procedure changed from IR Nephrostomy Tube Change     Final result by Vic Plunkett MD (10/13/23 16:45:34)                   Impression:      Left nephrostomy tube placement.    Plan:    Recommend routine exchanges every 2-3 months as needed.    _______________________________________________________________    PROCEDURE SUMMARY    - Image-guided placement of genitourinary catheter(s)    - Additional procedures: None      Electronically signed by: Vic Plunkett  Date:    10/13/2023  Time:    16:45               Narrative:    EXAMINATION:  Genitourinary catheter placement    Procedural Personnel    Attending physician(s): Vic Plunkett    Fellow physician(s): None    Resident physician(s): None    Advanced practice provider(s): None    Pre-procedure diagnosis: Left-sided ureteral obstruction    Post-procedure diagnosis: Same    Indication: Urinary drainage (non-infected)    Catheter(s) placed because the previous catheter(s) became dislodged within 30 days of placement (QCDR): Yes  one catheter    Complications: No immediate complications.    TECHNIQUE:  Ultrasound and fluoroscopic guided left-sided nephrostomy tube placement    FINDINGS:  Pre-procedure    Consent: Informed consent for the procedure was obtained and time-out was performed prior to the procedure.    Preparation: The site was prepared and draped using maximal sterile barrier technique including cutaneous antisepsis.    Antibiotic administered: Prophylactic dose within 1 hour of procedure start time or 2 hours for vancomycin or fluoroquinolones      Anesthesia/sedation    Level of anesthesia/sedation: Moderate sedation (conscious sedation)    Anesthesia/sedation administered by: Nurse or other independent trained observer    Total intra-service sedation time (minutes): 15    Left  genitourinary catheter placement    Local anesthesia was administered. A needle was advanced into the renal collecting system of the native kidney under ultrasound and fluoroscopy guidance. A wire was advanced  the tract was serially dilated and a nephrostomy tube was placed. Contrast injection was performed.    Catheter placed: 10 Setswana nephrostomy tube    Final catheter position: Pigtail in the renal pelvis    External catheter securement: Non-absorbable suture    Additional findings: None    Additional genitourinary system intervention    Genitourinary intervention: None    Location of intervention: Not applicable    Device used: Not applicable    Description of intervention: Not applicable    Post-intervention findings: Not applicable    Contrast    Contrast agent: Omnipaque 300    Contrast volume (mL): 10    Radiation Dose    Fluoroscopy time ( minutes): 1.8    Reference air kerma: 5.1 mGy    Kerma area product: 175.32 uGy-m2    Additional Details    Additional description of procedure: None    Additional findings: None    Equipment details: None    Specimens removed: None. A sample was not sent for analysis.    Estimated blood loss (mL): Less than 10    Standardized report: [email or web address]    Attestation    Signer name: Vic Plunkett    I attest that I was present for the entire procedure. I reviewed the stored images and agree with the report as written.                                        CT Renal Stone Study ABD Pelvis WO (Final result)  Result time 10/13/23 13:51:00      Final result by Manuel Celestin MD (10/13/23 13:51:00)                   Impression:      This report was flagged in Epic as abnormal.    1. Large amount of stool within the rectum and throughout the remainder of the colon concerning for constipation/impaction.  Rectal wall thickening is concerning for developing stercoral proctitis.  Correlation is advised.  2. 9 mm calculus within the distal aspect of the left ureter  with resultant left moderate hydroureteronephrosis and perinephric fat stranding.  Correlation with urinalysis recommended to exclude superimposed infection.  There is additional bilateral nonobstructive nephrolithiasis.  3. Please see above for several additional findings.      Electronically signed by: Manuel Celestin MD  Date:    10/13/2023  Time:    13:51               Narrative:    EXAMINATION:  CT RENAL STONE STUDY ABD PELVIS WO    CLINICAL HISTORY:  Nephrostomy catheter displacement;    TECHNIQUE:  Low dose axial images, sagittal and coronal reformations were obtained from the lung bases to the pubic symphysis.  Contrast was not administered.    COMPARISON:  CT 09/02/2023    FINDINGS:  Images of the lower thorax are remarkable for bilateral dependent atelectasis/scarring, left greater than right.  Pacer wires partially visualized.    The liver, spleen and adrenal glands have a grossly unremarkable noncontrast appearance allowing for extensive motion artifact.  There is atrophic change of the pancreas without pancreatic ductal dilation.  The gallbladder is unremarkable without significant biliary dilation.  The stomach is nondistended, no gastric wall thickening.  No significant abdominal lymphadenopathy.    There is a low attenuating lesion within the interpolar region of the right kidney measuring 1.5 cm, attenuation of which suggests cyst.  There is right nonobstructive nephrolithiasis.  No right hydronephrosis.  The right ureter is unremarkable without calculi seen.  There is left perinephric and periureteral inflammation.  There is left nonobstructive nephrolithiasis.  There is prior tract from left nephrostomy tube.  There is indistinctness and edema about the left renal hilum and moderate left hydroureteronephrosis.  The left ureter is unable to be followed in its entirety to the urinary bladder noting there is a calculus within the distal aspect measuring up to 9 mm.  The urinary bladder is  decompressed around a Shen catheter noting there may be residual wall thickening.  The prostate is not enlarged.    There is a large amount of stool within the rectum, concerning for constipation or impaction noting mild presacral edema, developing stercoral proctitis is of concern.  There is moderate to large amount of stool throughout the remainder of the large bowel noting a few scattered colonic diverticula without inflammation.  The terminal ileum is unremarkable.  The appendix is unremarkable.  The small bowel is grossly unremarkable.  There are several scattered shotty periaortic, pericaval, and mesenteric lymph nodes.  There is atherosclerotic calcification of the aorta and its branches.  There is infrarenal abdominal aortic ectasia.    There is osteopenia.  There are degenerative changes of the spine.  No significant inguinal lymphadenopathy.                                       Assessment:     Praneeth McTopy is a 77 y.o. male with:  Patient Active Problem List    Diagnosis Date Noted    Nephrostomy complication 09/02/2023    Nephrostomy tube displaced 07/31/2023    History of gastrointestinal bleeding 07/31/2023    History of fecal impaction 07/31/2023    History of complete heart block 04/14/2023    Obstructive uropathy 04/14/2023    Macrocytic anemia 03/20/2023    Idiopathic proctocolitis with rectal bleeding 11/20/2021    Second degree AV block 08/24/2021    Essential hypertension 08/24/2021    HLD (hyperlipidemia) 08/24/2021    MDD (major depressive disorder) 08/24/2021    Nephrolithiasis 06/02/2021    BPH with urinary obstruction 06/02/2021    History of CVA with residual deficit 05/25/2021        Plan:   Obstructive Uropathy 2/2 L. Ureterolithiasis s/p L. Nephrostomy tube  Dislodgement of nephrostomy tube  Patient with Left nephrostomy tube that fell out yesterday  - R ureter patent and pt making urine  - s/p left nephrostomy placement today with IR without complications  - Drain in place     BPH  requiring chronic indwelling mark catheter  - mark in place and patent  - continue home tamsulosin 0.8 mg daily     Asymptomatic Bacteruria  - UA with WBCs, bacteria, negative nitrites   - pt denies any dysuria, suprapubic or flank pain; afebrile, normal WBC count  - likely colonized in setting of chronic indwelling mark      Grade 2/6 mid-systolic murmur best appreciated in aortic region  - likely aortic stenosis; pt currently asymptomatic      Ischemic CVA with residual deficits  - 4/5 RLE weakness, 3/5 LLE weakness, and Bed Bound; pt severely deconditioned  - continue home lipitor 40.  - Patient not on daily ASA due to GIB in 2021, will defer continuation to PCP, although would be appropriate in this patient.  - continue PT/OT at NH facility      2nd degree AVB s/p PPM  - Ventricularly paced; no acute issues     Anemia of Chronic Inflammatory Disease  Patient with prior iron studies and ferritin consistent with ACID.  - Hgb stable at 13.8 on admit     Stage I Sacral Pressure Ulcer, present on admision  - turn patient q2h  - wound care consulted     Chronic Constipation  - continue home glycolax BID, colase, and bisacodyl prn.     MDD  Severe protein calorie malnutrition  - continue home Mirtazapine 30, home cyproheptadine.     H/o GIB (2021)  - was on daily ASA, but was discontinued after the GIB.     Healthcare Maintenance  Colonoscopy NUTD.  Unknown if PNA and Tdap vaccine up to date.  - defer to PCP.    DVT Ppx:   Code: Full  Diet: Diabetic    Nora Moncada MD  [facility] Internal Medicine HO-1    [facility] Medicine Hospitalist Pager numbers:   [facility] Hospitalist Medicine Team A (Kalpesh/Richard): [phone]  [facility] Hospitalist Medicine Team B (Champ/Daiana):  [phone]

## 2023-10-13 NOTE — PROCEDURES
Radiology Post-Procedure Note    Pre Op Diagnosis: Left ureteral obstruction  Post Op Diagnosis: Same    Procedure: PCN placement    Procedure performed by: Vic Plunkett MD    Written Informed Consent Obtained: Yes  Specimen Removed: NO  Estimated Blood Loss: Minimal    Findings:   10F left PCN placed without complication.    Patient tolerated procedure well.    Vic Plunkett MD  Diagnostic and Interventional Radiologist  Department of Radiology  Pager: [phone]

## 2023-10-13 NOTE — SEDATION DOCUMENTATION
IR procedure - Left nephrostomy tube replacement - is complete. Patient tolerated well. He is drowsy (not an unexpected finding), but oriented. Patient's vital signs are stable. He will continue sedation monitoring until 15:54 and then return to ED room 9.

## 2023-10-13 NOTE — H&P
Radiology History & Physical      SUBJECTIVE:     Chief Complaint: Dislodged left nephrostomy tube    History of Present Illness:  Praneeth Jewell is a 77 y.o. male with a dislodged left nephrostomy tube who presents for PCN replacement.    Past Medical History:   Diagnosis Date    Arthritis     Diabetes mellitus     type 2    Folate deficiency anemia     Heart block     History of kidney stones 05/25/2021    History of stroke with residual deficit 05/25/2021    Hyperlipidemia     Hypertension     Major depressive disorder     Pacemaker     Biotronic Edora 8 DR-T (S/n: [id number])    Pyelonephritis 11/19/2021    TIA (transient ischemic attack)     Urinary retention 05/25/2021     Past Surgical History:   Procedure Laterality Date    A-V CARDIAC PACEMAKER INSERTION N/A 8/24/2021    Procedure: INSERTION, CARDIAC PACEMAKER, DUAL CHAMBER;  Surgeon: Neo Winn MD;  Location: [facility] EP LAB;  Service: Cardiology;  Laterality: N/A;  CHB, DUAL PPM, ANES, BIO, DM, ED 2    COLONOSCOPY N/A 11/22/2021    Procedure: COLONOSCOPY;  Surgeon: Cesar Dorado MD;  Location: [facility] ENDO (2ND FLR);  Service: Endoscopy;  Laterality: N/A;    CYSTOSCOPIC LITHOLAPAXY  5/25/2021    Procedure: CYSTOLITHOLAPAXY;  Surgeon: Chris Schilling MD;  Location: [facility] OR [address] [place] [address];  Service: Urology;;    CYSTOSCOPY  8/26/2021    Procedure: CYSTOSCOPY;  Surgeon: Camilo Kelley Jr., MD;  Location: [facility] OR [address] [place] [address];  Service: Urology;;    CYSTOSCOPY W/ URETERAL STENT PLACEMENT Bilateral 5/25/2021    Procedure: CYSTOSCOPY, WITH BILATERAL URETERAL STENT INSERTION;  Surgeon: Chris Schilling MD;  [facility];  Service: Urology;  Laterality: Bilateral;    ELBOW ARTHROPLASTY Right     FLUOROSCOPY  5/25/2021    Procedure: FLUOROSCOPY;  Surgeon: Chris Schilling MD;  Location: [facility] OR [address] [place] [address];  Service: Urology;;    LASER LITHOTRIPSY Left 8/26/2021    Procedure: LITHOTRIPSY, USING LASER;  Surgeon: Camilo Kelley Jr., MD;  Location: [facility] OR [address] [place] [address];   Service: Urology;  Laterality: Left;    PYELOSCOPY Bilateral 8/26/2021    Procedure: PYELOSCOPY;  Surgeon: Camilo Kelley Jr., MD;  [facility];  Service: Urology;  Laterality: Bilateral;    REMOVAL OF BLOOD CLOT  5/25/2021    Procedure: REMOVAL, BLOOD CLOT;  Surgeon: Chris Schilling MD;  [facility];  Service: Urology;;    REPLACEMENT OF STENT Bilateral 8/26/2021    Procedure: REPLACEMENT, STENT;  Surgeon: Camilo Kelley Jr., MD;  [facility];  Service: Urology;  Laterality: Bilateral;    URETEROSCOPIC REMOVAL OF URETERIC CALCULUS Bilateral 8/26/2021    Procedure: REMOVAL, CALCULUS, URETER, URETEROSCOPIC;  Surgeon: Camilo Kelley Jr., MD;  [facility];  Service: Urology;  Laterality: Bilateral;    URETEROSCOPY Bilateral 8/26/2021    Procedure: URETEROSCOPY;  Surgeon: Camilo Kelley Jr., MD;  Location: [facility] OR [address] [place] [address];  Service: Urology;  Laterality: Bilateral;       Home Meds:   Prior to Admission medications    Medication Sig Start Date End Date Taking? Authorizing Provider   atorvastatin (LIPITOR) 40 MG tablet Take 40 mg by mouth once daily.    Provider, Historical   bisacodyL (DULCOLAX, BISACODYL,) 10 mg Supp Place 1 suppository (10 mg total) rectally daily as needed (constipation). 4/17/23   Jenny Smith MD   cyproheptadine (PERIACTIN) 4 mg tablet Take 4 mg by mouth 3 (three) times daily. Itching 4/7/21   Provider, Historical   docusate sodium (COLACE) 100 MG capsule Take 1 capsule (100 mg total) by mouth once daily. 4/17/23   Jenny Smith MD   folic acid (FOLVITE) 1 MG tablet Take 1 mg by mouth once daily.    Provider, Historical   gabapentin (NEURONTIN) 300 MG capsule Take 300 mg by mouth 3 (three) times daily. 4/7/21   Provider, Historical   menthol-zinc oxide (CALMOSEPTINE) 0.44-20.6 % Oint Apply topically daily as needed. To sacral area after each sacral excoriation episode and as needed    Provider, Historical   mirtazapine (REMERON) 30  MG tablet Take 30 mg by mouth nightly. 5/27/21   Provider, Historical   polyethylene glycol (GLYCOLAX) 17 gram PwPk Take 17 g by mouth 2 (two) times daily. 4/17/23   Jenny Smith MD   tamsulosin (FLOMAX) 0.4 mg Cap TAKE 2 CAPSULES(0.8 MG) BY MOUTH EVERY DAY  Patient taking differently: Take 0.8 mg by mouth once daily. 5/18/23   Camilo Kelley Jr., MD     Anticoagulants/Antiplatelets: no anticoagulation    Allergies: Review of patient's allergies indicates:  No Known Allergies  Sedation History:  no adverse reactions    Review of Systems:   Hematological: no known coagulopathies  Respiratory: no shortness of breath  Cardiovascular: no chest pain  Gastrointestinal: no abdominal pain  Genito-Urinary: no dysuria  Musculoskeletal: negative  Neurological: no TIA or stroke symptoms         OBJECTIVE:     Vital Signs (Most Recent)  Temp: 98.2 °F (36.8 °C) (10/13/23 1006)  Pulse: 60 (10/13/23 1447)  Resp: 16 (10/13/23 1447)  BP: (!) 124/59 (10/13/23 1447)  SpO2: 98 % (10/13/23 1447)    Physical Exam:  ASA: 3  Mallampati: 2    General: no acute distress  Mental Status: alert and oriented to person, place and time  HEENT: normocephalic, atraumatic  Chest: unlabored breathing  Heart: regular heart rate  Abdomen: nondistended  Extremity: moves all extremities    Laboratory  Lab Results   Component Value Date    INR 1.1 07/31/2023       Lab Results   Component Value Date    WBC 7.46 10/13/2023    HGB 13.8 (L) 10/13/2023    HCT 43.8 10/13/2023    MCV 94 10/13/2023     (L) 10/13/2023      Lab Results   Component Value Date    GLU 92 10/13/2023     10/13/2023    K 4.4 10/13/2023     10/13/2023    CO2 30 (H) 10/13/2023    BUN 27 (H) 10/13/2023    CREATININE 0.9 10/13/2023    CALCIUM 9.4 10/13/2023    MG 1.7 09/03/2023    ALT 15 10/13/2023    AST 17 10/13/2023    ALBUMIN 3.2 (L) 10/13/2023    BILITOT 0.5 10/13/2023       ASSESSMENT/PLAN:     Sedation Plan: Moderate.  Patient will undergo left PCN  replacement.    Vic Plunkett MD  Diagnostic and Interventional Radiologist  Department of Radiology  Pager: [phone]    No

## 2023-10-13 NOTE — NURSING
Verbal report given to LIZZ Melendrez, assigned to patient's care in the ED. Pertinent details of the case are shared along with the last set of vital signs. The opportunity to ask questions is provided. IR staff will return patient to the ED.

## 2023-10-14 VITALS
SYSTOLIC BLOOD PRESSURE: 127 MMHG | BODY MASS INDEX: 18.7 KG/M2 | RESPIRATION RATE: 18 BRPM | HEART RATE: 64 BPM | WEIGHT: 119.13 LBS | HEIGHT: 67 IN | TEMPERATURE: 98 F | DIASTOLIC BLOOD PRESSURE: 58 MMHG | OXYGEN SATURATION: 98 %

## 2023-10-14 LAB
ALBUMIN SERPL BCP-MCNC: 3.1 G/DL (ref 3.5–5.2)
ALP SERPL-CCNC: 69 U/L (ref 55–135)
ALT SERPL W/O P-5'-P-CCNC: 16 U/L (ref 10–44)
ANION GAP SERPL CALC-SCNC: 9 MMOL/L (ref 8–16)
AST SERPL-CCNC: 22 U/L (ref 10–40)
BASOPHILS # BLD AUTO: 0.02 K/UL (ref 0–0.2)
BASOPHILS NFR BLD: 0.3 % (ref 0–1.9)
BILIRUB SERPL-MCNC: 0.5 MG/DL (ref 0.1–1)
BUN SERPL-MCNC: 24 MG/DL (ref 8–23)
CALCIUM SERPL-MCNC: 9.4 MG/DL (ref 8.7–10.5)
CHLORIDE SERPL-SCNC: 106 MMOL/L (ref 95–110)
CO2 SERPL-SCNC: 27 MMOL/L (ref 23–29)
CREAT SERPL-MCNC: 0.7 MG/DL (ref 0.5–1.4)
DIFFERENTIAL METHOD: ABNORMAL
EOSINOPHIL # BLD AUTO: 0.2 K/UL (ref 0–0.5)
EOSINOPHIL NFR BLD: 2.4 % (ref 0–8)
ERYTHROCYTE [DISTWIDTH] IN BLOOD BY AUTOMATED COUNT: 15.7 % (ref 11.5–14.5)
EST. GFR  (NO RACE VARIABLE): >60 ML/MIN/1.73 M^2
GLUCOSE SERPL-MCNC: 87 MG/DL (ref 70–110)
HCT VFR BLD AUTO: 42.9 % (ref 40–54)
HGB BLD-MCNC: 13.7 G/DL (ref 14–18)
IMM GRANULOCYTES # BLD AUTO: 0.01 K/UL (ref 0–0.04)
IMM GRANULOCYTES NFR BLD AUTO: 0.2 % (ref 0–0.5)
LYMPHOCYTES # BLD AUTO: 1.3 K/UL (ref 1–4.8)
LYMPHOCYTES NFR BLD: 21 % (ref 18–48)
MAGNESIUM SERPL-MCNC: 1.8 MG/DL (ref 1.6–2.6)
MCH RBC QN AUTO: 30 PG (ref 27–31)
MCHC RBC AUTO-ENTMCNC: 31.9 G/DL (ref 32–36)
MCV RBC AUTO: 94 FL (ref 82–98)
MONOCYTES # BLD AUTO: 0.4 K/UL (ref 0.3–1)
MONOCYTES NFR BLD: 7 % (ref 4–15)
NEUTROPHILS # BLD AUTO: 4.2 K/UL (ref 1.8–7.7)
NEUTROPHILS NFR BLD: 69.1 % (ref 38–73)
NRBC BLD-RTO: 0 /100 WBC
PHOSPHATE SERPL-MCNC: 3 MG/DL (ref 2.7–4.5)
PLATELET # BLD AUTO: 109 K/UL (ref 150–450)
PMV BLD AUTO: 12.6 FL (ref 9.2–12.9)
POTASSIUM SERPL-SCNC: 4.4 MMOL/L (ref 3.5–5.1)
PROCALCITONIN SERPL IA-MCNC: 0.13 NG/ML
PROT SERPL-MCNC: 7 G/DL (ref 6–8.4)
RBC # BLD AUTO: 4.56 M/UL (ref 4.6–6.2)
SARS-COV-2 RDRP RESP QL NAA+PROBE: NEGATIVE
SODIUM SERPL-SCNC: 142 MMOL/L (ref 136–145)
WBC # BLD AUTO: 6.13 K/UL (ref 3.9–12.7)

## 2023-10-14 PROCEDURE — 83735 ASSAY OF MAGNESIUM: CPT | Performed by: STUDENT IN AN ORGANIZED HEALTH CARE EDUCATION/TRAINING PROGRAM

## 2023-10-14 PROCEDURE — G0378 HOSPITAL OBSERVATION PER HR: HCPCS

## 2023-10-14 PROCEDURE — 99214 PR OFFICE/OUTPT VISIT, EST, LEVL IV, 30-39 MIN: ICD-10-PCS | Mod: ,,, | Performed by: INTERNAL MEDICINE

## 2023-10-14 PROCEDURE — 99214 OFFICE O/P EST MOD 30 MIN: CPT | Mod: ,,, | Performed by: INTERNAL MEDICINE

## 2023-10-14 PROCEDURE — 80053 COMPREHEN METABOLIC PANEL: CPT | Performed by: STUDENT IN AN ORGANIZED HEALTH CARE EDUCATION/TRAINING PROGRAM

## 2023-10-14 PROCEDURE — 25000003 PHARM REV CODE 250: Performed by: STUDENT IN AN ORGANIZED HEALTH CARE EDUCATION/TRAINING PROGRAM

## 2023-10-14 PROCEDURE — 84100 ASSAY OF PHOSPHORUS: CPT | Performed by: STUDENT IN AN ORGANIZED HEALTH CARE EDUCATION/TRAINING PROGRAM

## 2023-10-14 PROCEDURE — 25000003 PHARM REV CODE 250

## 2023-10-14 PROCEDURE — 84145 PROCALCITONIN (PCT): CPT | Performed by: STUDENT IN AN ORGANIZED HEALTH CARE EDUCATION/TRAINING PROGRAM

## 2023-10-14 PROCEDURE — 94761 N-INVAS EAR/PLS OXIMETRY MLT: CPT

## 2023-10-14 PROCEDURE — 36415 COLL VENOUS BLD VENIPUNCTURE: CPT | Performed by: STUDENT IN AN ORGANIZED HEALTH CARE EDUCATION/TRAINING PROGRAM

## 2023-10-14 PROCEDURE — 85025 COMPLETE CBC W/AUTO DIFF WBC: CPT | Performed by: STUDENT IN AN ORGANIZED HEALTH CARE EDUCATION/TRAINING PROGRAM

## 2023-10-14 PROCEDURE — U0002 COVID-19 LAB TEST NON-CDC: HCPCS | Performed by: INTERNAL MEDICINE

## 2023-10-14 RX ADMIN — CYPROHEPTADINE HYDROCHLORIDE 4 MG: 4 TABLET ORAL at 08:10

## 2023-10-14 RX ADMIN — FOLIC ACID 1 MG: 1 TABLET ORAL at 08:10

## 2023-10-14 RX ADMIN — CYPROHEPTADINE HYDROCHLORIDE 4 MG: 4 TABLET ORAL at 02:10

## 2023-10-14 RX ADMIN — ATORVASTATIN CALCIUM 40 MG: 40 TABLET, FILM COATED ORAL at 08:10

## 2023-10-14 RX ADMIN — POLYETHYLENE GLYCOL 3350 17 G: 17 POWDER, FOR SOLUTION ORAL at 08:10

## 2023-10-14 NOTE — PLAN OF CARE
VIRTUAL NURSE:  Cued into patient's room.  Permission received per patient to turn camera to view patient.  Introduced as VN that will be working with floor nurse and nursing assistant.  Educated patient on VN's role in patient care and  VIP model.  Plan of care reviewed with patient.  Education per flowsheet.   Informed patient that staff will round on them every 2 hours but to use call light for any other needs they may have; informed of fall risk and fall precautions.  Patient verbalized understanding.  Call light within reach; bed siderails up x3.  Opportunity given for questions and questions answered.  Admission assessment questions answered.  Patient denies complaints or any needs at this time. Instructed to call for assistance.  Will cont to monitor and intervene as needed.    Labs, notes, orders, and careplan reviewed.     Awaiting medication list from [facility].       10/13/23 2020   Admission   Initial VN Admission Questions Complete   Communication Issues? None   Shift   Virtual Nurse - Rounding Complete   Pain Management Interventions quiet environment facilitated;relaxation techniques promoted   Virtual Nurse - Patient Verbalized Approval Of Camera Use;VN Rounding   Type of Frequent Check   Type Patient Rounds   Safety/Activity   Patient Rounds bed in low position;bed wheels locked;call light in patient/parent reach;ID band on;visualized patient   Safety Promotion/Fall Prevention Fall Risk reviewed with patient/family

## 2023-10-14 NOTE — CONSULTS
[facility]  Infectious Disease  Consult Note    Patient Name: Praneeth Jewell  MRN: [id number]  Admission Date: 10/13/2023  Hospital Length of Stay: 0 days  Attending Physician: Anup Oakes MD  Primary Care Provider: Home, [facility]     Isolation Status: No active isolations    Patient information was obtained from patient and past medical records.      Inpatient consult to Infectious Diseases  Consult performed by: Estevan Albarado MD  Consult ordered by: Irwin Mcdonough MD  Reason for consult: UTI      Assessment/Plan:     Renal/  * Nephrostomy tube displaced  77 y.o. male who has a past medical history of Arthritis, Diabetes mellitus, Folate deficiency anemia, Heart block, History of kidney stones (05/25/2021), History of stroke with residual deficit (05/25/2021), Hyperlipidemia, Hypertension, Major depressive disorder, Pacemaker, Pyelonephritis (11/19/2021), TIA (transient ischemic attack), and Urinary retention (05/25/2021). He is admitted for displaced nephrostomy tube. ID is consulted for possible UTI    -UA concerning for infection and urine cx with GNRs  -however patient is asymptomatic and has indwelling catheter  -treating asymptomatic bacteriuria in this setting has been shown to have no benefit  -monitor and hold antibiotics  -would replaced mark if patient will agree (he refused once)  -thank you for this consult, ID will sign off         Thank you for your consult. I will sign off. Please contact us if you have any additional questions.    Estevan Albarado MD  Infectious Disease  [facility]    Subjective:     Principal Problem: Nephrostomy tube displaced    HPI: 77 y.o. male who has a past medical history of Arthritis, Diabetes mellitus, Folate deficiency anemia, Heart block, History of kidney stones (05/25/2021), History of stroke with residual deficit (05/25/2021), Hyperlipidemia, Hypertension, Major depressive disorder, Pacemaker, Pyelonephritis (11/19/2021), TIA  (transient ischemic attack), and Urinary retention (05/25/2021). The patient presented to [facility] on 10/13/2023 with a primary complaint of nephrostomy tube displacement since yesterday.  Patient was scratching his back when his left nephrostomy tube became displaced. The patient was in their usual state of health until the day prior to admission when his nephrostomy tube fell out. He had the nephrostomy tube replaced with IR without complication. Patient was admitted in September 2023 for the same issue. He lives in a nursing facility and is bed bound due to previous CVA and deconditioning. He denies any pain. No abdominal pain or fevers. Denies urinary symptoms. His UA showed WBCs and bacteria and urine cx has GNRs    Past Medical History:   Diagnosis Date    Arthritis     Diabetes mellitus     type 2    Folate deficiency anemia     Heart block     History of kidney stones 05/25/2021    History of stroke with residual deficit 05/25/2021    Hyperlipidemia     Hypertension     Major depressive disorder     Pacemaker     Biotronic Edora 8 DR-T (S/n: [id number])    Pyelonephritis 11/19/2021    TIA (transient ischemic attack)     Urinary retention 05/25/2021       Past Surgical History:   Procedure Laterality Date    A-V CARDIAC PACEMAKER INSERTION N/A 8/24/2021    Procedure: INSERTION, CARDIAC PACEMAKER, DUAL CHAMBER;  Surgeon: Neo Winn MD;  Location: [facility] EP LAB;  Service: Cardiology;  Laterality: N/A;  CHB, DUAL PPM, ANES, BIO, DM, ED 2    COLONOSCOPY N/A 11/22/2021    Procedure: COLONOSCOPY;  Surgeon: Cesar Dorado MD;  Location: [facility] ENDO (2ND FLR);  Service: Endoscopy;  Laterality: N/A;    CYSTOSCOPIC LITHOLAPAXY  5/25/2021    Procedure: CYSTOLITHOLAPAXY;  Surgeon: Chris Schilling MD;  Location: [facility] OR 1ST FLR;  Service: Urology;;    CYSTOSCOPY  8/26/2021    Procedure: CYSTOSCOPY;  Surgeon: Camilo Kelley Jr., MD;  Location: [facility] OR 1ST FLR;  Service:  Urology;;    CYSTOSCOPY W/ URETERAL STENT PLACEMENT Bilateral 5/25/2021    Procedure: CYSTOSCOPY, WITH BILATERAL URETERAL STENT INSERTION;  Surgeon: Chris Schilling MD;  Location: [facility] OR [address] [place] [address];  Service: Urology;  Laterality: Bilateral;    ELBOW ARTHROPLASTY Right     FLUOROSCOPY  5/25/2021    Procedure: FLUOROSCOPY;  Surgeon: Chris Schilling MD;  Location: [facility] OR [address] [place] [address];  Service: Urology;;    LASER LITHOTRIPSY Left 8/26/2021    Procedure: LITHOTRIPSY, USING LASER;  Surgeon: Camilo Kelley Jr., MD;  Location: [facility] OR [address] [place] [address];  Service: Urology;  Laterality: Left;    PYELOSCOPY Bilateral 8/26/2021    Procedure: PYELOSCOPY;  Surgeon: Camilo Kelley Jr., MD;  Location: [facility] OR [address] [place] [address];  Service: Urology;  Laterality: Bilateral;    REMOVAL OF BLOOD CLOT  5/25/2021    Procedure: REMOVAL, BLOOD CLOT;  Surgeon: Chris Schilling MD;  Location: [facility] OR [address] [place] [address];  Service: Urology;;    REPLACEMENT OF STENT Bilateral 8/26/2021    Procedure: REPLACEMENT, STENT;  Surgeon: Camilo Kelley Jr., MD;  Location: [facility] OR [address] [place] [address];  Service: Urology;  Laterality: Bilateral;    URETEROSCOPIC REMOVAL OF URETERIC CALCULUS Bilateral 8/26/2021    Procedure: REMOVAL, CALCULUS, URETER, URETEROSCOPIC;  Surgeon: Camilo Kelley Jr., MD;  Location: [facility] OR [address] [place] [address];  Service: Urology;  Laterality: Bilateral;    URETEROSCOPY Bilateral 8/26/2021    Procedure: URETEROSCOPY;  Surgeon: Camilo Kelley Jr., MD;  Location: [facility] OR [address] [place] [address];  Service: Urology;  Laterality: Bilateral;       Review of patient's allergies indicates:  No Known Allergies    Medications:  Medications Prior to Admission   Medication Sig    atorvastatin (LIPITOR) 40 MG tablet Take 40 mg by mouth once daily.    cyproheptadine (PERIACTIN) 4 mg tablet Take 4 mg by mouth 3 (three) times daily. Itching    docusate sodium (COLACE) 100 MG capsule Take 1 capsule (100 mg total) by mouth once daily.    folic acid (FOLVITE) 1 MG tablet Take 1 mg by mouth once daily.     "gabapentin (NEURONTIN) 300 MG capsule Take 300 mg by mouth 3 (three) times daily.    mirtazapine (REMERON) 30 MG tablet Take 30 mg by mouth nightly.    polyethylene glycol (GLYCOLAX) 17 gram PwPk Take 17 g by mouth 2 (two) times daily.    tamsulosin (FLOMAX) 0.4 mg Cap TAKE 2 CAPSULES(0.8 MG) BY MOUTH EVERY DAY (Patient taking differently: Take 0.8 mg by mouth once daily.)    bisacodyL (DULCOLAX, BISACODYL,) 10 mg Supp Place 1 suppository (10 mg total) rectally daily as needed (constipation).    menthol-zinc oxide (CALMOSEPTINE) 0.44-20.6 % Oint Apply topically daily as needed. To sacral area after each sacral excoriation episode and as needed     Antibiotics (From admission, onward)      None          Antifungals (From admission, onward)      None          Antivirals (From admission, onward)      None             Immunization History   Administered Date(s) Administered    Influenza 11/09/2010    PPD Test 07/29/2015    Zoster 02/09/2018       Family History       Problem Relation (Age of Onset)    Hyperlipidemia Mother    Mental illness Father    No Known Problems Brother    Parkinsonism Father    Stroke Mother          Social History     Socioeconomic History    Marital status: Single    Number of children: 0    Years of education: 15    Highest education level: Some college, no degree   Occupational History     Employer: Disabled    Occupation: Construction     Employer: [employer]     Comment: Retired in past 5-10 years   Tobacco Use    Smoking status: Former     Types: Cigarettes    Smokeless tobacco: Never   Substance and Sexual Activity    Alcohol use: Yes     Comment: 1/ pint whisky daily    Drug use: Yes     Types: "Crack" cocaine     Social Determinants of Health     Financial Resource Strain: Low Risk  (4/15/2023)    Overall Financial Resource Strain (CARDIA)     Difficulty of Paying Living Expenses: Not hard at all   Food Insecurity: No Food Insecurity (4/15/2023)    Hunger Vital Sign "     Worried About Running Out of Food in the Last Year: Never true     Ran Out of Food in the Last Year: Never true   Transportation Needs: No Transportation Needs (4/15/2023)    PRAPARE - Transportation     Lack of Transportation (Medical): No     Lack of Transportation (Non-Medical): No   Physical Activity: Inactive (4/15/2023)    Exercise Vital Sign     Days of Exercise per Week: 0 days     Minutes of Exercise per Session: 0 min   Stress: No Stress Concern Present (4/15/2023)    [employer] - Occupational Stress Questionnaire     Feeling of Stress : Not at all   Social Connections: Socially Isolated (4/15/2023)    Social Connection and Isolation Panel [NHANES]     Frequency of Communication with Friends and Family: Three times a week     Frequency of Social Gatherings with Friends and Family: Once a week     Attends Gnosticist Services: Never     Active Member of Clubs or Organizations: No     Attends Club or Organization Meetings: Never     Marital Status: Never    Housing Stability: Low Risk  (4/15/2023)    Housing Stability Vital Sign     Unable to Pay for Housing in the Last Year: No     Number of Places Lived in the Last Year: 2     Unstable Housing in the Last Year: No     Review of Systems   Constitutional:  Negative for activity change, appetite change, chills, diaphoresis, fever and unexpected weight change.   HENT:  Negative for congestion and sore throat.    Eyes:  Negative for visual disturbance.   Respiratory:  Negative for cough, shortness of breath and wheezing.    Cardiovascular:  Negative for chest pain, palpitations and leg swelling.   Gastrointestinal:  Negative for abdominal distention, abdominal pain, blood in stool, constipation, nausea and vomiting.   Genitourinary:  Negative for dysuria and hematuria.   Musculoskeletal:  Negative for arthralgias and neck pain.   Skin:  Negative for color change and rash.   Neurological:  Positive for  weakness (residual s/p stroke). Negative for dizziness, seizures, light-headedness, numbness and headaches.   Psychiatric/Behavioral:  Negative for behavioral problems, confusion and decreased concentration.      Objective:     Vital Signs (Most Recent):  Temp: 97 °F (36.1 °C) (10/14/23 1138)  Pulse: 60 (10/14/23 1156)  Resp: 18 (10/14/23 1138)  BP: (!) 119/57 (10/14/23 1138)  SpO2: 100 % (10/14/23 1138) Vital Signs (24h Range):  Temp:  [96.4 °F (35.8 °C)-98.1 °F (36.7 °C)] 97 °F (36.1 °C)  Pulse:  [60-72] 60  Resp:  [11-20] 18  SpO2:  [98 %-100 %] 100 %  BP: (109-139)/(54-71) 119/57     Weight: 54 kg (119 lb 2.2 oz)  Body mass index is 18.66 kg/m².    Estimated Creatinine Clearance: 69.6 mL/min (based on SCr of 0.7 mg/dL).     Physical Exam  Constitutional:       Appearance: He is not toxic-appearing.   HENT:      Head: Normocephalic and atraumatic.      Mouth/Throat:      Mouth: Mucous membranes are moist.      Pharynx: Oropharynx is clear.   Eyes:      Conjunctiva/sclera: Conjunctivae normal.      Pupils: Pupils are equal, round, and reactive to light.   Cardiovascular:      Rate and Rhythm: Normal rate and regular rhythm.      Pulses: Normal pulses.      Heart sounds: Normal heart sounds.   Pulmonary:      Effort: Pulmonary effort is normal.      Breath sounds: Normal breath sounds.   Abdominal:      General: Abdomen is flat.      Palpations: Abdomen is soft.   Urinary: Indwelling catheter in place, patent and draining  Musculoskeletal:         General: No swelling.   Skin:     General: Skin is warm and dry.      Comments: Surgical incision on left flank; drain in place   Neurological:      General: No focal deficit present.      Mental Status: He is alert and oriented to person, place, and time.   Psychiatric:         Mood and Affect: Mood normal.         Behavior: Behavior normal.     Significant Labs: All pertinent labs within the past 24 hours have been reviewed.    Significant Imaging: I have reviewed all  pertinent imaging results/findings within the past 24 hours.

## 2023-10-14 NOTE — ASSESSMENT & PLAN NOTE
77 y.o. male who has a past medical history of Arthritis, Diabetes mellitus, Folate deficiency anemia, Heart block, History of kidney stones (05/25/2021), History of stroke with residual deficit (05/25/2021), Hyperlipidemia, Hypertension, Major depressive disorder, Pacemaker, Pyelonephritis (11/19/2021), TIA (transient ischemic attack), and Urinary retention (05/25/2021). He is admitted for displaced nephrostomy tube. ID is consulted for possible UTI    -UA concerning for infection and urine cx with GNRs  -however patient is asymptomatic and has indwelling catheter  -treating asymptomatic bacteriuria in this setting has been shown to have no benefit  -monitor and hold antibiotics  -would replaced mark if patient will agree (he refused once)  -thank you for this consult, ID will sign off

## 2023-10-14 NOTE — HPI
77 y.o. male who has a past medical history of Arthritis, Diabetes mellitus, Folate deficiency anemia, Heart block, History of kidney stones (05/25/2021), History of stroke with residual deficit (05/25/2021), Hyperlipidemia, Hypertension, Major depressive disorder, Pacemaker, Pyelonephritis (11/19/2021), TIA (transient ischemic attack), and Urinary retention (05/25/2021). The patient presented to [facility] on 10/13/2023 with a primary complaint of nephrostomy tube displacement since yesterday.  Patient was scratching his back when his left nephrostomy tube became displaced. The patient was in their usual state of health until the day prior to admission when his nephrostomy tube fell out. He had the nephrostomy tube replaced with IR without complication. Patient was admitted in September 2023 for the same issue. He lives in a nursing facility and is bed bound due to previous CVA and deconditioning. He denies any pain. No abdominal pain or fevers. Denies urinary symptoms. His UA showed WBCs and bacteria and urine cx has GNRs

## 2023-10-14 NOTE — PROGRESS NOTES
Order placed to replace mark that pt was admitted with from [facility]. Pt refusing to replace mark at this time. MD notified.

## 2023-10-14 NOTE — PROGRESS NOTES
"[facility] Medicine Progress Note    Primary Team: [facility] Hospitalist Team B  Attending Physician: Anup Oakes MD  Resident: Ceasar  Intern: Antwan    Subjective:      No acute events overnight. Vital signs stable. Patient is doing well today. Denies pain. Patient is in bed eating breakfast. Denies N/V. Denies urinary symptoms. Urinary catheter is patent and draining.      Objective:     Last 24 Hour Vital Signs:  BP  Min: 102/53  Max: 139/63  Temp  Av.3 °F (36.3 °C)  Min: 96.4 °F (35.8 °C)  Max: 98.2 °F (36.8 °C)  Pulse  Av.4  Min: 60  Max: 72  Resp  Av.8  Min: 11  Max: 20  SpO2  Av.8 %  Min: 98 %  Max: 100 %  Height  Av' 7" (170.2 cm)  Min: 5' 7" (170.2 cm)  Max: 5' 7" (170.2 cm)  Weight  Av.7 kg (109 lb 9.1 oz)  Min: 45.4 kg (100 lb)  Max: 54 kg (119 lb 2.2 oz)  I/O last 3 completed shifts:  In: 100 [IV Piggyback:100]  Out: 600 [Urine:600]    Physical Examination:  Physical Exam  Constitutional:       Appearance: He is not toxic-appearing.   HENT:      Head: Normocephalic and atraumatic.      Mouth/Throat:      Mouth: Mucous membranes are moist.      Pharynx: Oropharynx is clear.   Eyes:      Conjunctiva/sclera: Conjunctivae normal.      Pupils: Pupils are equal, round, and reactive to light.   Cardiovascular:      Rate and Rhythm: Normal rate and regular rhythm.      Pulses: Normal pulses.      Heart sounds: Normal heart sounds.   Pulmonary:      Effort: Pulmonary effort is normal.      Breath sounds: Normal breath sounds.   Abdominal:      General: Abdomen is flat.      Palpations: Abdomen is soft.   Urinary: Indwelling catheter in place, patent and draining  Musculoskeletal:         General: No swelling.   Skin:     General: Skin is warm and dry.      Comments: Surgical incision on left flank; drain in place   Neurological:      General: No focal deficit present.      Mental Status: He is alert and oriented to person, place, and time.   Psychiatric:         Mood and Affect: Mood " normal.         Behavior: Behavior normal.      Laboratory:  Microbiology Data Reviewed: yes  Pertinent Findings:  UCx pending    Other Results:  EKG (my interpretation): No evidence of ischemia. No ST elevation.    Radiology Data Reviewed: yes  Pertinent Findings:  Imaging Results              IR Nephrostomy Tube Placement (Final result)  Result time 10/13/23 16:45:34   Procedure changed from IR Nephrostomy Tube Change     Final result by Vic Plunkett MD (10/13/23 16:45:34)                   Impression:      Left nephrostomy tube placement.    Plan:    Recommend routine exchanges every 2-3 months as needed.    _______________________________________________________________    PROCEDURE SUMMARY    - Image-guided placement of genitourinary catheter(s)    - Additional procedures: None      Electronically signed by: Vic Plunkett  Date:    10/13/2023  Time:    16:45               Narrative:    EXAMINATION:  Genitourinary catheter placement    Procedural Personnel    Attending physician(s): Vic Plunkett    Fellow physician(s): None    Resident physician(s): None    Advanced practice provider(s): None    Pre-procedure diagnosis: Left-sided ureteral obstruction    Post-procedure diagnosis: Same    Indication: Urinary drainage (non-infected)    Catheter(s) placed because the previous catheter(s) became dislodged within 30 days of placement (QCDR): Yes  one catheter    Complications: No immediate complications.    TECHNIQUE:  Ultrasound and fluoroscopic guided left-sided nephrostomy tube placement    FINDINGS:  Pre-procedure    Consent: Informed consent for the procedure was obtained and time-out was performed prior to the procedure.    Preparation: The site was prepared and draped using maximal sterile barrier technique including cutaneous antisepsis.    Antibiotic administered: Prophylactic dose within 1 hour of procedure start time or 2 hours for vancomycin or fluoroquinolones      Anesthesia/sedation    Level of  anesthesia/sedation: Moderate sedation (conscious sedation)    Anesthesia/sedation administered by: Nurse or other independent trained observer    Total intra-service sedation time (minutes): 15    Left genitourinary catheter placement    Local anesthesia was administered. A needle was advanced into the renal collecting system of the native kidney under ultrasound and fluoroscopy guidance. A wire was advanced  the tract was serially dilated and a nephrostomy tube was placed. Contrast injection was performed.    Catheter placed: 10 Citizen of [place] nephrostomy tube    Final catheter position: Pigtail in the renal pelvis    External catheter securement: Non-absorbable suture    Additional findings: None    Additional genitourinary system intervention    Genitourinary intervention: None    Location of intervention: Not applicable    Device used: Not applicable    Description of intervention: Not applicable    Post-intervention findings: Not applicable    Contrast    Contrast agent: Omnipaque 300    Contrast volume (mL): 10    Radiation Dose    Fluoroscopy time ( minutes): 1.8    Reference air kerma: 5.1 mGy    Kerma area product: 175.32 uGy-m2    Additional Details    Additional description of procedure: None    Additional findings: None    Equipment details: None    Specimens removed: None. A sample was not sent for analysis.    Estimated blood loss (mL): Less than 10    Standardized report: [email or web address]_[email or web address]    Attestation    Signer name: Vic Plunkett    I attest that I was present for the entire procedure. I reviewed the stored images and agree with the report as written.                                        CT Renal Stone Study ABD Pelvis WO (Final result)  Result time 10/13/23 13:51:00      Final result by Manuel Celestin MD (10/13/23 13:51:00)                   Impression:      This report was flagged in Epic as abnormal.    1. Large amount of stool within the rectum and throughout the remainder of the colon  concerning for constipation/impaction.  Rectal wall thickening is concerning for developing stercoral proctitis.  Correlation is advised.  2. 9 mm calculus within the distal aspect of the left ureter with resultant left moderate hydroureteronephrosis and perinephric fat stranding.  Correlation with urinalysis recommended to exclude superimposed infection.  There is additional bilateral nonobstructive nephrolithiasis.  3. Please see above for several additional findings.      Electronically signed by: Manuel Celestin MD  Date:    10/13/2023  Time:    13:51               Narrative:    EXAMINATION:  CT RENAL STONE STUDY ABD PELVIS WO    CLINICAL HISTORY:  Nephrostomy catheter displacement;    TECHNIQUE:  Low dose axial images, sagittal and coronal reformations were obtained from the lung bases to the pubic symphysis.  Contrast was not administered.    COMPARISON:  CT 09/02/2023    FINDINGS:  Images of the lower thorax are remarkable for bilateral dependent atelectasis/scarring, left greater than right.  Pacer wires partially visualized.    The liver, spleen and adrenal glands have a grossly unremarkable noncontrast appearance allowing for extensive motion artifact.  There is atrophic change of the pancreas without pancreatic ductal dilation.  The gallbladder is unremarkable without significant biliary dilation.  The stomach is nondistended, no gastric wall thickening.  No significant abdominal lymphadenopathy.    There is a low attenuating lesion within the interpolar region of the right kidney measuring 1.5 cm, attenuation of which suggests cyst.  There is right nonobstructive nephrolithiasis.  No right hydronephrosis.  The right ureter is unremarkable without calculi seen.  There is left perinephric and periureteral inflammation.  There is left nonobstructive nephrolithiasis.  There is prior tract from left nephrostomy tube.  There is indistinctness and edema about the left renal hilum and moderate left  hydroureteronephrosis.  The left ureter is unable to be followed in its entirety to the urinary bladder noting there is a calculus within the distal aspect measuring up to 9 mm.  The urinary bladder is decompressed around a Shen catheter noting there may be residual wall thickening.  The prostate is not enlarged.    There is a large amount of stool within the rectum, concerning for constipation or impaction noting mild presacral edema, developing stercoral proctitis is of concern.  There is moderate to large amount of stool throughout the remainder of the large bowel noting a few scattered colonic diverticula without inflammation.  The terminal ileum is unremarkable.  The appendix is unremarkable.  The small bowel is grossly unremarkable.  There are several scattered shotty periaortic, pericaval, and mesenteric lymph nodes.  There is atherosclerotic calcification of the aorta and its branches.  There is infrarenal abdominal aortic ectasia.    There is osteopenia.  There are degenerative changes of the spine.  No significant inguinal lymphadenopathy.                                        Current Medications:     Infusions:       Scheduled:   atorvastatin  40 mg Oral Daily    cyproheptadine  4 mg Oral TID    folic acid  1 mg Oral Daily    mirtazapine  30 mg Oral Nightly    polyethylene glycol  17 g Oral Daily        PRN:  acetaminophen, dextrose 10%, dextrose 10%, glucagon (human recombinant), glucose, glucose, influenza 65up-adj, naloxone, oxyCODONE, pneumoc 20-js conj-dip cr(PF), senna-docusate 8.6-50 mg, sodium chloride 0.9%      Assessment:     Praneeth McTopy is a 77 y.o.male with  Patient Active Problem List    Diagnosis Date Noted    Nephrostomy complication 09/02/2023    Nephrostomy tube displaced 07/31/2023    History of gastrointestinal bleeding 07/31/2023    History of fecal impaction 07/31/2023    History of complete heart block 04/14/2023    Obstructive uropathy 04/14/2023    Macrocytic anemia 03/20/2023     Idiopathic proctocolitis with rectal bleeding 11/20/2021    Second degree AV block 08/24/2021    Essential hypertension 08/24/2021    HLD (hyperlipidemia) 08/24/2021    MDD (major depressive disorder) 08/24/2021    Nephrolithiasis 06/02/2021    BPH with urinary obstruction 06/02/2021    History of CVA with residual deficit 05/25/2021        Plan:   Obstructive Uropathy 2/2 L. Ureterolithiasis s/p L. Nephrostomy tube  Dislodgement of nephrostomy tube  Patient with Left nephrostomy tube that fell out yesterday  - R ureter patent and pt making urine  - s/p left nephrostomy placement today with IR without complications  - Drain in place     BPH requiring chronic indwelling mark catheter  - mark in place and patent  - continue home tamsulosin 0.8 mg daily     Asymptomatic Bacteruria  - UA with WBCs, bacteria, negative nitrites   - pt denies any dysuria, suprapubic or flank pain; afebrile, normal WBC count  - likely colonized in setting of chronic indwelling mark   - Procal pending   - ID consulted     Grade 2/6 mid-systolic murmur best appreciated in aortic region  - likely aortic stenosis; pt currently asymptomatic      Ischemic CVA with residual deficits  - 4/5 RLE weakness, 3/5 LLE weakness, and Bed Bound; pt severely deconditioned  - continue home lipitor 40.  - Patient not on daily ASA due to GIB in 2021, will defer continuation to PCP, although would be appropriate in this patient.  - continue PT/OT at NH facility      2nd degree AVB s/p PPM  - Ventricularly paced; no acute issues     Anemia of Chronic Inflammatory Disease  Patient with prior iron studies and ferritin consistent with ACID.  - Hgb stable at 13.8 on admit     Stage I Sacral Pressure Ulcer, present on admision  - turn patient q2h  - wound care consulted     Chronic Constipation  - continue home glycolax BID, colase, and bisacodyl prn.     MDD  Severe protein calorie malnutrition  - continue home Mirtazapine 30, home cyproheptadine.     H/o GIB  (2021)  - was on daily ASA, but was discontinued after the GIB.     Healthcare Maintenance  Colonoscopy NUTD.  Unknown if PNA and Tdap vaccine up to date.  - defer to PCP.     DVT Ppx:   Code: Full  Diet: Diabetic        Nora Moncada MD  \[facility]\"" Internal Medicine HO-1    \[facility]\"" Medicine Hospitalist Pager numbers:   \[facility]\"" Hospitalist Medicine Team A (Kalpesh/Richard): [phone]  \[facility]\"" Hospitalist Medicine Team B (Champ/Daiana):  [phone]

## 2023-10-14 NOTE — PLAN OF CARE
Problem: Infection  Goal: Absence of Infection Signs and Symptoms  Outcome: Ongoing, Not Progressing     Problem: Adult Inpatient Plan of Care  Goal: Plan of Care Review  Outcome: Ongoing, Not Progressing  Goal: Patient-Specific Goal (Individualized)  Outcome: Ongoing, Not Progressing  Goal: Absence of Hospital-Acquired Illness or Injury  Outcome: Ongoing, Not Progressing  Goal: Optimal Comfort and Wellbeing  Outcome: Ongoing, Not Progressing

## 2023-10-14 NOTE — PLAN OF CARE
Problem: Adult Inpatient Plan of Care  Goal: Plan of Care Review  Outcome: Ongoing, Progressing     Problem: Urinary Elimination Impairment  Goal: Effective Urinary Elimination/Continence  Outcome: Ongoing, Progressing     Problem: Fall Injury Risk  Goal: Absence of Fall and Fall-Related Injury  Outcome: Ongoing, Progressing     Problem: Skin Injury Risk Increased  Goal: Skin Health and Integrity  Outcome: Ongoing, Progressing    Vitals stable. Left nephrostomy tube site C/D/I draining red-tinged output. No c/o pain. Discharge orders in place.

## 2023-10-14 NOTE — PLAN OF CARE
Pt will discharge today back to  [facility]. SW sent facility transfer orders to [facility]. Charge nurse from [facility] requesting to speak with pt nurse, contact information provided.    Transportation ETA: 10/14/2023 @ 8:00 PM    Pt cleared from CM standpoint. Bedside nurse and VN notified.    Future Appointments   Date Time Provider Department Center   12/4/2023 10:00 AM HOME MONITOR DEVICE CHECK, [facility] BIBI [facility]        10/14/23 0457   Final Note   Assessment Type Final Discharge Note   Anticipated Discharge Disposition [facility]   Post-Acute Status   Post-Acute Authorization Placement   Discharge Delays None known at this time

## 2023-10-14 NOTE — PROGRESS NOTES
[facility] to [facility] for [facility]. COVID test negative - results faxed to facility. Transportation ETA [id number].

## 2023-10-14 NOTE — PLAN OF CARE
[facility]    FACILITY TRANSFER ORDERS      Patient Name: Praneeth Jewell  YOB: 1945    PCP: Home, [facility]   PCP Address: [address] [place] [address] / [place] [facility]  PCP Phone Number: [phone]  PCP Fax: [phone]    Encounter Date: 10/14/2023    Admit to: [facility]    Vital Signs:  Routine    Diagnoses:   Active Hospital Problems    Diagnosis  POA    *Nephrostomy tube displaced [T83.022A]  Yes    Nephrolithiasis [N20.0]  Yes     Chronic      Resolved Hospital Problems   No resolved problems to display.       Allergies:Review of patient's allergies indicates:  No Known Allergies    Diet: diabetic diet: 2000 calorie    Activities: Activity as tolerated    Goals of Care Treatment Preferences:  Code Status: DNR          What is most important right now is to focus on improvement in condition but with limits to invasive therapies.  Accordingly, we have decided that the best plan to meet the patient's goals includes continuing with treatment.      Nursing: routine skin care for bed ridden patients     Labs:  none      CONSULTS:    none    MISCELLANEOUS CARE:  Shen Care: Empty Shen bag every shift. Change Shen every month.    WOUND CARE ORDERS  None    Medications: Review discharge medications with patient and family and provide education.      Current Discharge Medication List        CONTINUE these medications which have NOT CHANGED    Details   atorvastatin (LIPITOR) 40 MG tablet Take 40 mg by mouth once daily.      cyproheptadine (PERIACTIN) 4 mg tablet Take 4 mg by mouth 3 (three) times daily. Itching      docusate sodium (COLACE) 100 MG capsule Take 1 capsule (100 mg total) by mouth once daily.  Refills: 0      folic acid (FOLVITE) 1 MG tablet Take 1 mg by mouth once daily.      gabapentin (NEURONTIN) 300 MG capsule Take 300 mg by mouth 3 (three) times daily.      mirtazapine (REMERON) 30 MG tablet Take 30 mg by mouth nightly.       polyethylene glycol (GLYCOLAX) 17 gram PwPk Take 17 g by mouth 2 (two) times daily.  Refills: 0      tamsulosin (FLOMAX) 0.4 mg Cap TAKE 2 CAPSULES(0.8 MG) BY MOUTH EVERY DAY  Qty: 60 capsule, Refills: 11      bisacodyL (DULCOLAX, BISACODYL,) 10 mg Supp Place 1 suppository (10 mg total) rectally daily as needed (constipation).  Refills: 0      menthol-zinc oxide (CALMOSEPTINE) 0.44-20.6 % Oint Apply topically daily as needed. To sacral area after each sacral excoriation episode and as needed                Immunizations Administered as of 10/14/2023       No immunizations on file.            This patient has had both covid vaccinations    Some patients may experience side effects after vaccination.  These may include fever, headache, muscle or joint aches.  Most symptoms resolve with 24-48 hours and do not require urgent medical evaluation unless they persist for more than 72 hours or symptoms are concerning for an unrelated medical condition.          _________________________________  Irwin Mcdonough MD  10/14/2023

## 2023-10-14 NOTE — PROGRESS NOTES
Cardiac monitor returned and PIV removed. Pt transported off unit with [facility] to [facility] home.

## 2023-10-14 NOTE — SUBJECTIVE & OBJECTIVE
Past Medical History:   Diagnosis Date    Arthritis     Diabetes mellitus     type 2    Folate deficiency anemia     Heart block     History of kidney stones 05/25/2021    History of stroke with residual deficit 05/25/2021    Hyperlipidemia     Hypertension     Major depressive disorder     Pacemaker     Biotronic [place] 8 DR-T (S/n: [id number])    Pyelonephritis 11/19/2021    TIA (transient ischemic attack)     Urinary retention 05/25/2021       Past Surgical History:   Procedure Laterality Date    A-V CARDIAC PACEMAKER INSERTION N/A 8/24/2021    Procedure: INSERTION, CARDIAC PACEMAKER, DUAL CHAMBER;  Surgeon: Neo Winn MD;  Location: [facility] EP LAB;  Service: Cardiology;  Laterality: N/A;  CHB, DUAL PPM, ANES, BIO, DM, ED 2    COLONOSCOPY N/A 11/22/2021    Procedure: COLONOSCOPY;  Surgeon: Cesar Dorado MD;  Location: [facility] ENDO (2ND FLR);  Service: Endoscopy;  Laterality: N/A;    CYSTOSCOPIC LITHOLAPAXY  5/25/2021    Procedure: CYSTOLITHOLAPAXY;  Surgeon: Chris Schilling MD;  [facility];  Service: Urology;;    CYSTOSCOPY  8/26/2021    Procedure: CYSTOSCOPY;  Surgeon: Camilo Kelley Jr., MD;  [facility];  Service: Urology;;    CYSTOSCOPY W/ URETERAL STENT PLACEMENT Bilateral 5/25/2021    Procedure: CYSTOSCOPY, WITH BILATERAL URETERAL STENT INSERTION;  Surgeon: Chris Schilling MD;  [facility];  Service: Urology;  Laterality: Bilateral;    ELBOW ARTHROPLASTY Right     FLUOROSCOPY  5/25/2021    Procedure: FLUOROSCOPY;  Surgeon: Chris Schilling MD;  [facility];  Service: Urology;;    LASER LITHOTRIPSY Left 8/26/2021    Procedure: LITHOTRIPSY, USING LASER;  Surgeon: Camilo Kelley Jr., MD;  [facility];  Service: Urology;  Laterality: Left;    PYELOSCOPY Bilateral 8/26/2021    Procedure: PYELOSCOPY;  Surgeon: Camilo Kelley Jr., MD;  [facility];  Service: Urology;  Laterality: Bilateral;    REMOVAL OF BLOOD CLOT   5/25/2021    Procedure: REMOVAL, BLOOD CLOT;  Surgeon: Chris Schilling MD;  Location: [facility] OR [address] [place] [address];  Service: Urology;;    REPLACEMENT OF STENT Bilateral 8/26/2021    Procedure: REPLACEMENT, STENT;  Surgeon: Camilo Kelley Jr., MD;  Location: [facility] OR [address] [place] [address];  Service: Urology;  Laterality: Bilateral;    URETEROSCOPIC REMOVAL OF URETERIC CALCULUS Bilateral 8/26/2021    Procedure: REMOVAL, CALCULUS, URETER, URETEROSCOPIC;  Surgeon: Camilo Kelley Jr., MD;  [facility];  Service: Urology;  Laterality: Bilateral;    URETEROSCOPY Bilateral 8/26/2021    Procedure: URETEROSCOPY;  Surgeon: Camilo Kelley Jr., MD;  Location: [facility] OR [address] [place] [address];  Service: Urology;  Laterality: Bilateral;       Review of patient's allergies indicates:  No Known Allergies    Medications:  Medications Prior to Admission   Medication Sig    atorvastatin (LIPITOR) 40 MG tablet Take 40 mg by mouth once daily.    cyproheptadine (PERIACTIN) 4 mg tablet Take 4 mg by mouth 3 (three) times daily. Itching    docusate sodium (COLACE) 100 MG capsule Take 1 capsule (100 mg total) by mouth once daily.    folic acid (FOLVITE) 1 MG tablet Take 1 mg by mouth once daily.    gabapentin (NEURONTIN) 300 MG capsule Take 300 mg by mouth 3 (three) times daily.    mirtazapine (REMERON) 30 MG tablet Take 30 mg by mouth nightly.    polyethylene glycol (GLYCOLAX) 17 gram PwPk Take 17 g by mouth 2 (two) times daily.    tamsulosin (FLOMAX) 0.4 mg Cap TAKE 2 CAPSULES(0.8 MG) BY MOUTH EVERY DAY (Patient taking differently: Take 0.8 mg by mouth once daily.)    bisacodyL (DULCOLAX, BISACODYL,) 10 mg Supp Place 1 suppository (10 mg total) rectally daily as needed (constipation).    menthol-zinc oxide (CALMOSEPTINE) 0.44-20.6 % Oint Apply topically daily as needed. To sacral area after each sacral excoriation episode and as needed     Antibiotics (From admission, onward)      None          Antifungals (From admission, onward)      None          Antivirals (From  "admission, onward)      None             Immunization History   Administered Date(s) Administered    Influenza 11/09/2010    PPD Test 07/29/2015    Zoster 02/09/2018       Family History       Problem Relation (Age of Onset)    Hyperlipidemia Mother    Mental illness Father    No Known Problems Brother    Parkinsonism Father    Stroke Mother          Social History     Socioeconomic History    Marital status: Single    Number of children: 0    Years of education: 15    Highest education level: Some college, no degree   Occupational History     Employer: Disabled    Occupation: Construction     Employer: [employer]     Comment: Retired in past 5-10 years   Tobacco Use    Smoking status: Former     Types: Cigarettes    Smokeless tobacco: Never   Substance and Sexual Activity    Alcohol use: Yes     Comment: 1/ pint whisky daily    Drug use: Yes     Types: "Crack" cocaine     Social Determinants of Health     Financial Resource Strain: Low Risk  (4/15/2023)    Overall Financial Resource Strain (CARDIA)     Difficulty of Paying Living Expenses: Not hard at all   Food Insecurity: No Food Insecurity (4/15/2023)    Hunger Vital Sign     Worried About Running Out of Food in the Last Year: Never true     Ran Out of Food in the Last Year: Never true   Transportation Needs: No Transportation Needs (4/15/2023)    PRAPARE - Transportation     Lack of Transportation (Medical): No     Lack of Transportation (Non-Medical): No   Physical Activity: Inactive (4/15/2023)    Exercise Vital Sign     Days of Exercise per Week: 0 days     Minutes of Exercise per Session: 0 min   Stress: No Stress Concern Present (4/15/2023)    [place] [place] [employer] - Occupational Stress Questionnaire     Feeling of Stress : Not at all   Social Connections: Socially Isolated (4/15/2023)    Social Connection and Isolation Panel [NHANES]     Frequency of Communication with Friends and Family: Three times a week     Frequency of Social " Gatherings with Friends and Family: Once a week     Attends [employer]ist Services: Never     Active Member of Clubs or Organizations: No     Attends Club or Organization Meetings: Never     Marital Status: Never    Housing Stability: Low Risk  (4/15/2023)    Housing Stability Vital Sign     Unable to Pay for Housing in the Last Year: No     Number of Places Lived in the Last Year: 2     Unstable Housing in the Last Year: No     Review of Systems   Constitutional:  Negative for activity change, appetite change, chills, diaphoresis, fever and unexpected weight change.   HENT:  Negative for congestion and sore throat.    Eyes:  Negative for visual disturbance.   Respiratory:  Negative for cough, shortness of breath and wheezing.    Cardiovascular:  Negative for chest pain, palpitations and leg swelling.   Gastrointestinal:  Negative for abdominal distention, abdominal pain, blood in stool, constipation, nausea and vomiting.   Genitourinary:  Negative for dysuria and hematuria.   Musculoskeletal:  Negative for arthralgias and neck pain.   Skin:  Negative for color change and rash.   Neurological:  Positive for weakness (residual s/p stroke). Negative for dizziness, seizures, light-headedness, numbness and headaches.   Psychiatric/Behavioral:  Negative for behavioral problems, confusion and decreased concentration.      Objective:     Vital Signs (Most Recent):  Temp: 97 °F (36.1 °C) (10/14/23 1138)  Pulse: 60 (10/14/23 1156)  Resp: 18 (10/14/23 1138)  BP: (!) 119/57 (10/14/23 1138)  SpO2: 100 % (10/14/23 1138) Vital Signs (24h Range):  Temp:  [96.4 °F (35.8 °C)-98.1 °F (36.7 °C)] 97 °F (36.1 °C)  Pulse:  [60-72] 60  Resp:  [11-20] 18  SpO2:  [98 %-100 %] 100 %  BP: (109-139)/(54-71) 119/57     Weight: 54 kg (119 lb 2.2 oz)  Body mass index is 18.66 kg/m².    Estimated Creatinine Clearance: 69.6 mL/min (based on SCr of 0.7 mg/dL).     Physical Exam  Constitutional:       Appearance: He is not toxic-appearing.   HENT:       Head: Normocephalic and atraumatic.      Mouth/Throat:      Mouth: Mucous membranes are moist.      Pharynx: Oropharynx is clear.   Eyes:      Conjunctiva/sclera: Conjunctivae normal.      Pupils: Pupils are equal, round, and reactive to light.   Cardiovascular:      Rate and Rhythm: Normal rate and regular rhythm.      Pulses: Normal pulses.      Heart sounds: Normal heart sounds.   Pulmonary:      Effort: Pulmonary effort is normal.      Breath sounds: Normal breath sounds.   Abdominal:      General: Abdomen is flat.      Palpations: Abdomen is soft.   Urinary: Indwelling catheter in place, patent and draining  Musculoskeletal:         General: No swelling.   Skin:     General: Skin is warm and dry.      Comments: Surgical incision on left flank; drain in place   Neurological:      General: No focal deficit present.      Mental Status: He is alert and oriented to person, place, and time.   Psychiatric:         Mood and Affect: Mood normal.         Behavior: Behavior normal.     Significant Labs: All pertinent labs within the past 24 hours have been reviewed.    Significant Imaging: I have reviewed all pertinent imaging results/findings within the past 24 hours.

## 2023-10-15 PROBLEM — R82.71 ASYMPTOMATIC BACTERIURIA: Status: ACTIVE | Noted: 2023-10-15

## 2023-10-16 LAB — BACTERIA UR CULT: ABNORMAL

## 2023-11-07 NOTE — DISCHARGE SUMMARY
[facility] Hospital Medicine Discharge Summary    Primary Team: [facility] Hospitalist Team B  Attending Physician: Dr. Oakes  Resident: Ceasar  Intern: Antwan    Date of Admit: 10/13/2023  Date of Discharge: 11/6/2023    Discharge to: [facility]  Condition: Stable     Discharge Diagnoses     Patient Active Problem List   Diagnosis    History of CVA with residual deficit    Nephrolithiasis    BPH with urinary obstruction    Second degree AV block    Essential hypertension    HLD (hyperlipidemia)    MDD (major depressive disorder)    Idiopathic proctocolitis with rectal bleeding    Macrocytic anemia    History of complete heart block    Obstructive uropathy    Nephrostomy tube displaced    History of gastrointestinal bleeding    History of fecal impaction    Nephrostomy complication    Asymptomatic bacteriuria       Consultants and Procedures     Consultants:  urology    Procedures:   left nephrostomy placement with IR    Brief History of Present Illness      HPI: Praneeth Jewell is a 77 y.o.  male who  has a past medical history of Arthritis, Diabetes mellitus, Folate deficiency anemia, Heart block, History of kidney stones (05/25/2021), History of stroke with residual deficit (05/25/2021), Hyperlipidemia, Hypertension, Major depressive disorder, Pacemaker, Pyelonephritis (11/19/2021), TIA (transient ischemic attack), and Urinary retention (05/25/2021).. The patient presented to [facility] on 10/13/2023 with a primary complaint of nephrostomy tube displacement since yesterday.  Patient was scratching his back when his left nephrostomy tube became displaced.      The patient was in their usual state of health until yesterday when his nephrostomy tube fell out. He had the nephrostomy tube replaced today with IR without complication. Patient was admitted in September 2023 for the same issue. He lives in a nursing facility and is bed bound due to previous CVA and deconditioning. He denies any  pain. No abdominal pain or fevers.        For the full HPI please refer to the History & Physical from this admission.    Hospital Course By Problem with Pertinent Findings     Obstructive Uropathy 2/2 L. Ureterolithiasis s/p L. Nephrostomy tube  Dislodgement of nephrostomy tube  Patient with Left nephrostomy tube that fell out yesterday  - R ureter patent and pt making urine  - s/p left nephrostomy placement with IR without complications     BPH requiring chronic indwelling mark catheter  - mark in place and patent  - continue home tamsulosin 0.8 mg daily     Asymptomatic Bacteruria  - UA with WBCs, bacteria, negative nitrites   - pt denies any dysuria, suprapubic or flank pain; afebrile, normal WBC count  - likely colonized in setting of chronic indwelling mark   - Procal pending   - ID consulted     Grade 2/6 mid-systolic murmur best appreciated in aortic region  - likely aortic stenosis; pt currently asymptomatic      Ischemic CVA with residual deficits  - 4/5 RLE weakness, 3/5 LLE weakness, and Bed Bound; pt severely deconditioned  - continue home lipitor 40.  - Patient not on daily ASA due to GIB in 2021, will defer continuation to PCP, although would be appropriate in this patient.  - continue PT/OT at NH facility      2nd degree AVB s/p PPM  - Ventricularly paced; no acute issues     Anemia of Chronic Inflammatory Disease  Patient with prior iron studies and ferritin consistent with ACID.  - Hgb stable at 13.8 on admit     Stage I Sacral Pressure Ulcer, present on admision  - turn patient q2h  - wound care consulted     Chronic Constipation  - continue home glycolax BID, colase, and bisacodyl prn.     MDD  Severe protein calorie malnutrition  - continue home Mirtazapine 30, home cyproheptadine.     H/o GIB (2021)  - was on daily ASA, but was discontinued after the GIB.     Healthcare Maintenance  Colonoscopy NUTD.  Unknown if PNA and Tdap vaccine up to date.  - defer to PCP.    Discharge Medications         Medication List        CHANGE how you take these medications      tamsulosin 0.4 mg Cap  Commonly known as: FLOMAX  TAKE 2 CAPSULES(0.8 MG) BY MOUTH EVERY DAY  What changed: when to take this            CONTINUE taking these medications      atorvastatin 40 MG tablet  Commonly known as: LIPITOR     bisacodyL 10 mg Supp  Commonly known as: DULCOLAX (BISACODYL)  Place 1 suppository (10 mg total) rectally daily as needed (constipation).     CALMOSEPTINE 0.44-20.6 % Oint  Generic drug: menthol-zinc oxide     cyproheptadine 4 mg tablet  Commonly known as: PERIACTIN     docusate sodium 100 MG capsule  Commonly known as: COLACE  Take 1 capsule (100 mg total) by mouth once daily.     folic acid 1 MG tablet  Commonly known as: FOLVITE     gabapentin 300 MG capsule  Commonly known as: NEURONTIN     mirtazapine 30 MG tablet  Commonly known as: REMERON     polyethylene glycol 17 gram Pwpk  Commonly known as: GLYCOLAX  Take 17 g by mouth 2 (two) times daily.              Discharge Information:   Diet:  Regular    Physical Activity:  As tolerated by patient.              Instructions:  1. Take all medications as prescribed  2. Keep all follow-up appointments  3. Return to the hospital or call your primary care physicians if any worsening symptoms such as fever, chest pain, shortness of breath, return of symptoms, or any other concerns.    Follow-Up Appointments:  Follow up with PCP    Nora Moncada MD  \[facility] [place] [facility]\"" Internal Medicine [facility]I

## 2023-12-05 ENCOUNTER — CLINICAL SUPPORT (OUTPATIENT)
Dept: CARDIOLOGY | Facility: HOSPITAL | Age: 78
End: 2023-12-05
Attending: INTERNAL MEDICINE
Payer: MEDICARE

## 2023-12-05 ENCOUNTER — CLINICAL SUPPORT (OUTPATIENT)
Dept: CARDIOLOGY | Facility: HOSPITAL | Age: 78
End: 2023-12-05
Payer: MEDICARE

## 2023-12-05 DIAGNOSIS — Z95.0 PRESENCE OF CARDIAC PACEMAKER: ICD-10-CM

## 2023-12-05 PROCEDURE — 93294 CARDIAC DEVICE CHECK - REMOTE: ICD-10-PCS | Mod: ,,, | Performed by: INTERNAL MEDICINE

## 2023-12-05 PROCEDURE — 93296 REM INTERROG EVL PM/IDS: CPT | Performed by: INTERNAL MEDICINE

## 2023-12-05 PROCEDURE — 93294 REM INTERROG EVL PM/LDLS PM: CPT | Mod: ,,, | Performed by: INTERNAL MEDICINE

## 2023-12-06 ENCOUNTER — OUTSIDE PLACE OF SERVICE (OUTPATIENT)
Dept: ADMINISTRATIVE | Facility: OTHER | Age: 78
End: 2023-12-06
Payer: MEDICARE

## 2023-12-06 PROCEDURE — 99307 SBSQ NF CARE SF MDM 10: CPT | Mod: ,,, | Performed by: FAMILY MEDICINE

## 2023-12-09 ENCOUNTER — HOSPITAL ENCOUNTER (EMERGENCY)
Facility: HOSPITAL | Age: 78
Discharge: NURSING, REHAB OR LONG-TERM CARE | End: 2023-12-09
Attending: EMERGENCY MEDICINE
Payer: MEDICARE

## 2023-12-09 VITALS
RESPIRATION RATE: 16 BRPM | BODY MASS INDEX: 18.68 KG/M2 | WEIGHT: 119 LBS | OXYGEN SATURATION: 97 % | TEMPERATURE: 99 F | DIASTOLIC BLOOD PRESSURE: 61 MMHG | SYSTOLIC BLOOD PRESSURE: 140 MMHG | HEIGHT: 67 IN | HEART RATE: 84 BPM

## 2023-12-09 DIAGNOSIS — T83.022A NEPHROSTOMY TUBE DISPLACED: Primary | ICD-10-CM

## 2023-12-09 DIAGNOSIS — N13.30 HYDRONEPHROSIS, LEFT: ICD-10-CM

## 2023-12-09 DIAGNOSIS — N39.0 COMPLICATED UTI (URINARY TRACT INFECTION): ICD-10-CM

## 2023-12-09 DIAGNOSIS — I95.9 HYPOTENSION: ICD-10-CM

## 2023-12-09 DIAGNOSIS — N20.1 LEFT URETERAL STONE: ICD-10-CM

## 2023-12-09 LAB
ALBUMIN SERPL BCP-MCNC: 2.6 G/DL (ref 3.5–5.2)
ALP SERPL-CCNC: 64 U/L (ref 55–135)
ALT SERPL W/O P-5'-P-CCNC: 10 U/L (ref 10–44)
ANION GAP SERPL CALC-SCNC: 8 MMOL/L (ref 8–16)
AST SERPL-CCNC: 14 U/L (ref 10–40)
BACTERIA #/AREA URNS HPF: ABNORMAL /HPF
BILIRUB SERPL-MCNC: 0.3 MG/DL (ref 0.1–1)
BILIRUB UR QL STRIP: NEGATIVE
BUN SERPL-MCNC: 31 MG/DL (ref 8–23)
CALCIUM SERPL-MCNC: 9.1 MG/DL (ref 8.7–10.5)
CHLORIDE SERPL-SCNC: 105 MMOL/L (ref 95–110)
CLARITY UR: ABNORMAL
CO2 SERPL-SCNC: 30 MMOL/L (ref 23–29)
COLOR UR: ABNORMAL
CREAT SERPL-MCNC: 0.8 MG/DL (ref 0.5–1.4)
ERYTHROCYTE [DISTWIDTH] IN BLOOD BY AUTOMATED COUNT: 13.6 % (ref 11.5–14.5)
EST. GFR  (NO RACE VARIABLE): >60 ML/MIN/1.73 M^2
GLUCOSE SERPL-MCNC: 97 MG/DL (ref 70–110)
GLUCOSE UR QL STRIP: NEGATIVE
HCT VFR BLD AUTO: 37.2 % (ref 40–54)
HGB BLD-MCNC: 11.9 G/DL (ref 14–18)
HGB UR QL STRIP: ABNORMAL
HYALINE CASTS #/AREA URNS LPF: 0 /LPF
KETONES UR QL STRIP: NEGATIVE
LACTATE SERPL-SCNC: 1 MMOL/L (ref 0.5–2.2)
LEUKOCYTE ESTERASE UR QL STRIP: ABNORMAL
MCH RBC QN AUTO: 30.1 PG (ref 27–31)
MCHC RBC AUTO-ENTMCNC: 32 G/DL (ref 32–36)
MCV RBC AUTO: 94 FL (ref 82–98)
MICROSCOPIC COMMENT: ABNORMAL
NITRITE UR QL STRIP: NEGATIVE
PH UR STRIP: 7 [PH] (ref 5–8)
PLATELET # BLD AUTO: 210 K/UL (ref 150–450)
PMV BLD AUTO: 9.8 FL (ref 9.2–12.9)
POTASSIUM SERPL-SCNC: 4.2 MMOL/L (ref 3.5–5.1)
PROT SERPL-MCNC: 6.2 G/DL (ref 6–8.4)
PROT UR QL STRIP: ABNORMAL
RBC # BLD AUTO: 3.95 M/UL (ref 4.6–6.2)
RBC #/AREA URNS HPF: >100 /HPF (ref 0–4)
SODIUM SERPL-SCNC: 143 MMOL/L (ref 136–145)
SP GR UR STRIP: 1.02 (ref 1–1.03)
URN SPEC COLLECT METH UR: ABNORMAL
UROBILINOGEN UR STRIP-ACNC: NEGATIVE EU/DL
WBC # BLD AUTO: 8.88 K/UL (ref 3.9–12.7)
WBC #/AREA URNS HPF: >100 /HPF (ref 0–5)
WBC CLUMPS URNS QL MICRO: ABNORMAL

## 2023-12-09 PROCEDURE — 87077 CULTURE AEROBIC IDENTIFY: CPT | Performed by: EMERGENCY MEDICINE

## 2023-12-09 PROCEDURE — 87040 BLOOD CULTURE FOR BACTERIA: CPT | Performed by: EMERGENCY MEDICINE

## 2023-12-09 PROCEDURE — 80053 COMPREHEN METABOLIC PANEL: CPT | Performed by: EMERGENCY MEDICINE

## 2023-12-09 PROCEDURE — 85027 COMPLETE CBC AUTOMATED: CPT | Performed by: EMERGENCY MEDICINE

## 2023-12-09 PROCEDURE — 25000003 PHARM REV CODE 250: Performed by: EMERGENCY MEDICINE

## 2023-12-09 PROCEDURE — 87186 SC STD MICRODIL/AGAR DIL: CPT | Performed by: EMERGENCY MEDICINE

## 2023-12-09 PROCEDURE — 96375 TX/PRO/DX INJ NEW DRUG ADDON: CPT | Mod: 59

## 2023-12-09 PROCEDURE — 96365 THER/PROPH/DIAG IV INF INIT: CPT

## 2023-12-09 PROCEDURE — 87086 URINE CULTURE/COLONY COUNT: CPT | Performed by: EMERGENCY MEDICINE

## 2023-12-09 PROCEDURE — 99285 EMERGENCY DEPT VISIT HI MDM: CPT | Mod: 25

## 2023-12-09 PROCEDURE — 87088 URINE BACTERIA CULTURE: CPT | Performed by: EMERGENCY MEDICINE

## 2023-12-09 PROCEDURE — 93010 ELECTROCARDIOGRAM REPORT: CPT | Mod: ,,, | Performed by: INTERNAL MEDICINE

## 2023-12-09 PROCEDURE — 63600175 PHARM REV CODE 636 W HCPCS: Performed by: STUDENT IN AN ORGANIZED HEALTH CARE EDUCATION/TRAINING PROGRAM

## 2023-12-09 PROCEDURE — 63600175 PHARM REV CODE 636 W HCPCS: Performed by: EMERGENCY MEDICINE

## 2023-12-09 PROCEDURE — 93010 EKG 12-LEAD: ICD-10-PCS | Mod: ,,, | Performed by: INTERNAL MEDICINE

## 2023-12-09 PROCEDURE — 99222 PR INITIAL HOSPITAL CARE,LEVL II: ICD-10-PCS | Mod: 25,,, | Performed by: STUDENT IN AN ORGANIZED HEALTH CARE EDUCATION/TRAINING PROGRAM

## 2023-12-09 PROCEDURE — 83605 ASSAY OF LACTIC ACID: CPT | Performed by: EMERGENCY MEDICINE

## 2023-12-09 PROCEDURE — 99222 1ST HOSP IP/OBS MODERATE 55: CPT | Mod: 25,,, | Performed by: STUDENT IN AN ORGANIZED HEALTH CARE EDUCATION/TRAINING PROGRAM

## 2023-12-09 PROCEDURE — 93005 ELECTROCARDIOGRAM TRACING: CPT

## 2023-12-09 PROCEDURE — 81000 URINALYSIS NONAUTO W/SCOPE: CPT | Performed by: EMERGENCY MEDICINE

## 2023-12-09 RX ORDER — FENTANYL CITRATE 50 UG/ML
INJECTION, SOLUTION INTRAMUSCULAR; INTRAVENOUS
Status: COMPLETED | OUTPATIENT
Start: 2023-12-09 | End: 2023-12-09

## 2023-12-09 RX ORDER — ONDANSETRON 2 MG/ML
4 INJECTION INTRAMUSCULAR; INTRAVENOUS EVERY 6 HOURS PRN
Status: DISCONTINUED | OUTPATIENT
Start: 2023-12-09 | End: 2023-12-09 | Stop reason: HOSPADM

## 2023-12-09 RX ADMIN — PIPERACILLIN AND TAZOBACTAM 4.5 G: 4; .5 INJECTION, POWDER, LYOPHILIZED, FOR SOLUTION INTRAVENOUS; PARENTERAL at 04:12

## 2023-12-09 RX ADMIN — SODIUM CHLORIDE 1000 ML: 9 INJECTION, SOLUTION INTRAVENOUS at 04:12

## 2023-12-09 RX ADMIN — FENTANYL CITRATE 50 MCG: 50 INJECTION, SOLUTION INTRAMUSCULAR; INTRAVENOUS at 05:12

## 2023-12-09 NOTE — ED NOTES
P[facility] after accidentally pulling out nephrostomy tube. Is awake and alert at this time. No acute distress noted. Safety is intact. Call light is within reach.

## 2023-12-09 NOTE — DISCHARGE INSTRUCTIONS
Thank you for choosing [facility]!     Our goal in the Emergency Department is to always provide outstanding medical care. You may receive a survey by mail or e-mail in the next week regarding your experience today. We would greatly appreciate you completing and returning the survey. Your feedback provides us with a way to recognize our staff who provide very good care, and it helps us learn how to improve when your experience was below our aspiration of excellence.      It is important to remember that some problems are difficult to diagnose and may not be found during your first visit. Be sure to follow up with your primary care doctor and review any labs/imaging that was performed during your visit with them. If you do not have a primary care doctor, you may contact the one listed on your discharge paperwork, or you may also call the [facility] Appointment Desk at [phone] to schedule an appointment.     All medications may potentially have side effects and it is impossible to predict which medications may give you side effects. If you feel that you are having a negative effect of any medication you should immediately stop taking them and seek medical attention.  Do not drive or make any important decisions for 24 hours if you have received any pain medications, sedatives or mood altering drugs during your ER visit.    We appreciate you trusting us with your medical care. We will be happy to take care of you for all of your future medical needs. You may return to the ER at any time for any new/concerning symptoms, worsening condition, or failure to improve. We hope you feel better soon.     Sincerely,    Pastor Marquez Jr., MD  Board-Certified Emergency Medicine Physician  [facility]

## 2023-12-09 NOTE — ED NOTES
Bed: EXAM 09  Expected date:   Expected time:   Means of arrival:   Comments:  [place] from the [facility]

## 2023-12-09 NOTE — CONSULTS
Inpatient Radiology Pre-procedure Note    History of Present Illness:  Praneeth Jewell is a 78 y.o. male who presents for ureteral obstruction with previously placed left sided nephrostomy tube. The patient resides at a nursing facility and tube supposedly fell out this morning. He denies fevers or chills.    Admission H&P reviewed.  Past Medical History:   Diagnosis Date    Arthritis     Diabetes mellitus     type 2    Folate deficiency anemia     Heart block     History of kidney stones 05/25/2021    History of stroke with residual deficit 05/25/2021    Hyperlipidemia     Hypertension     Major depressive disorder     Pacemaker     Biotronic [place] 8 DR-T (S/n: [id number])    Pyelonephritis 11/19/2021    TIA (transient ischemic attack)     Urinary retention 05/25/2021     Past Surgical History:   Procedure Laterality Date    A-V CARDIAC PACEMAKER INSERTION N/A 8/24/2021    Procedure: INSERTION, CARDIAC PACEMAKER, DUAL CHAMBER;  Surgeon: Neo Winn MD;  Location: [facility] EP LAB;  Service: Cardiology;  Laterality: N/A;  CHB, DUAL PPM, ANES, BIO, DM, ED 2    COLONOSCOPY N/A 11/22/2021    Procedure: COLONOSCOPY;  Surgeon: Cesar Dorado MD;  Location: [facility] ENDO (2ND FLR);  Service: Endoscopy;  Laterality: N/A;    CYSTOSCOPIC LITHOLAPAXY  5/25/2021    Procedure: CYSTOLITHOLAPAXY;  Surgeon: Chris Schilling MD;  [facility];  Service: Urology;;    CYSTOSCOPY  8/26/2021    Procedure: CYSTOSCOPY;  Surgeon: Camilo Kelley Jr., MD;  [facility];  Service: Urology;;    CYSTOSCOPY W/ URETERAL STENT PLACEMENT Bilateral 5/25/2021    Procedure: CYSTOSCOPY, WITH BILATERAL URETERAL STENT INSERTION;  Surgeon: Chris Schilling MD;  [facility];  Service: Urology;  Laterality: Bilateral;    ELBOW ARTHROPLASTY Right     FLUOROSCOPY  5/25/2021    Procedure: FLUOROSCOPY;  Surgeon: Chris Schilling MD;  [facility];  Service: Urology;;    LASER LITHOTRIPSY Left 8/26/2021     Procedure: LITHOTRIPSY, USING LASER;  Surgeon: Camilo Kelley Jr., MD;  [facility];  Service: Urology;  Laterality: Left;    PYELOSCOPY Bilateral 8/26/2021    Procedure: PYELOSCOPY;  Surgeon: Camilo Kelley Jr., MD;  Location: [facility] OR 1ST FLR;  Service: Urology;  Laterality: Bilateral;    REMOVAL OF BLOOD CLOT  5/25/2021    Procedure: REMOVAL, BLOOD CLOT;  Surgeon: Chris Schilling MD;  [facility];  Service: Urology;;    REPLACEMENT OF STENT Bilateral 8/26/2021    Procedure: REPLACEMENT, STENT;  Surgeon: Camilo Kelley Jr., MD;  [facility];  Service: Urology;  Laterality: Bilateral;    URETEROSCOPIC REMOVAL OF URETERIC CALCULUS Bilateral 8/26/2021    Procedure: REMOVAL, CALCULUS, URETER, URETEROSCOPIC;  Surgeon: Camilo Kelley Jr., MD;  [facility];  Service: Urology;  Laterality: Bilateral;    URETEROSCOPY Bilateral 8/26/2021    Procedure: URETEROSCOPY;  Surgeon: Camilo Kelley Jr., MD;  [facility];  Service: Urology;  Laterality: Bilateral;       Review of Systems:   As documented in primary team H&P    Home Meds:   Prior to Admission medications    Medication Sig Start Date End Date Taking? Authorizing Provider   atorvastatin (LIPITOR) 40 MG tablet Take 40 mg by mouth once daily.    Provider, Historical   bisacodyL (DULCOLAX, BISACODYL,) 10 mg Supp Place 1 suppository (10 mg total) rectally daily as needed (constipation). 4/17/23   Jenny Smith MD   cyproheptadine (PERIACTIN) 4 mg tablet Take 4 mg by mouth 3 (three) times daily. Itching 4/7/21   Provider, Historical   docusate sodium (COLACE) 100 MG capsule Take 1 capsule (100 mg total) by mouth once daily. 4/17/23   Jenny Smith MD   folic acid (FOLVITE) 1 MG tablet Take 1 mg by mouth once daily.    Provider, Historical   gabapentin (NEURONTIN) 300 MG capsule Take 300 mg by mouth 3 (three) times daily. 4/7/21   Provider, Historical   menthol-zinc oxide (CALMOSEPTINE) 0.44-20.6  "% Oint Apply topically daily as needed. To sacral area after each sacral excoriation episode and as needed    Provider, Historical   mirtazapine (REMERON) 30 MG tablet Take 30 mg by mouth nightly. 5/27/21   Provider, Historical   polyethylene glycol (GLYCOLAX) 17 gram PwPk Take 17 g by mouth 2 (two) times daily. 4/17/23   Jenny Smith MD   tamsulosin (FLOMAX) 0.4 mg Cap TAKE 2 CAPSULES(0.8 MG) BY MOUTH EVERY DAY  Patient taking differently: Take 0.8 mg by mouth once daily. 5/18/23   Camilo Kelley Jr., MD     Scheduled Meds:    piperacillin-tazobactam (Zosyn) IV (PEDS and ADULTS) (extended infusion is not appropriate)  4.5 g Intravenous ED 1 Time    sodium chloride 0.9%  1,000 mL Intravenous ED 1 Time     Continuous Infusions:   PRN Meds:  Anticoagulants/Antiplatelets: no anticoagulation    Allergies: Review of patient's allergies indicates:  No Known Allergies  Sedation Hx: have not been any systemic reactions    Labs:  No results for input(s): "INR", "PT", "PTT" in the last 168 hours.    Recent Labs   Lab 12/09/23  1434   WBC 8.88   HGB 11.9*   HCT 37.2*   MCV 94         Recent Labs   Lab 12/09/23  1434   GLU 97      K 4.2      CO2 30*   BUN 31*   CREATININE 0.8   CALCIUM 9.1   ALT 10   AST 14   ALBUMIN 2.6*   BILITOT 0.3         Vitals:  Temp: 98.5 °F (36.9 °C) (12/09/23 1527)  Pulse: 74 (12/09/23 1527)  Resp: 20 (12/09/23 1527)  BP: (!) 128/58 (12/09/23 1527)  SpO2: 99 % (12/09/23 1527)     Physical Exam:  ASA: 2  Mallampati: 2    General: no acute distress  Mental Status: alert and oriented to person, place and time  HEENT: normocephalic, atraumatic  Chest: unlabored breathing  Heart: regular heart rate  Abdomen: nondistended  Extremity: moves all extremities    Plan: Replacement of left  nephrostomy tube.    Sedation Plan: up to moderate    Kirt Krause MD ([facility])  Interventional Radiology          "

## 2023-12-09 NOTE — PROCEDURES
"  Pre Op Diagnosis: Ureteral obstruction with displaced neph tube   Post Op Diagnosis: Same    Procedure: PCNU placement    Procedure performed by: Jimi    Written Informed Consent Obtained: Yes  Specimen Removed: NO  Estimated Blood Loss: Minimal    Findings:   Successful placement of 8fr 26cm PCNU, with pigtail loops in the bladder and left renal collecting system.     Patient tolerated procedure well. Capping trial can be attempted in 24 hours. Patient should urinate from his bladder. Routine exchange can be performed in 8-12 weeks as needed.     Kirt Krause MD ([facility])  Interventional Radiology  [phone]      "

## 2023-12-09 NOTE — ED PROVIDER NOTES
Emergency Department Encounter  Provider Note    Praneeth Jewell  [id number]  12/9/2023    Evaluation:    History Acquisition:     Chief Complaint   Patient presents with    Male  Problem     Pt [facility] via [facility] after his nephrostomy tube accidentally came out. Pt arrived awake and alert.        History of Present Illness:  Praneteh Jewell is a 78 y.o. male who has a past medical history of Arthritis, Diabetes mellitus, Folate deficiency anemia, Heart block, History of kidney stones (05/25/2021), History of stroke with residual deficit (05/25/2021), Hyperlipidemia, Hypertension, Major depressive disorder, Pacemaker, Pyelonephritis (11/19/2021), TIA (transient ischemic attack), and Urinary retention (05/25/2021).    The patient presents to the ED due to displaced nephrostomy tube.   He reports it came out this morning. He reports pain to the area initially but denies any pain currently. He has no other complaints.     Additional historians utilized:  EMS report: vitals stable en route. No meds given.   Brother Tiffany Jewell [phone]: contacted via phone. States he is patient's medical power of . He is comfortable with any procedures that need to be done.     Prior medical records were reviewed:   Admitted 10/13-10/14 for nephrostomy tube replacement.  Admitted 7/30-7/31 for nephrostomy tube replacement.     The patient's list of active medical problems, social history, medications, and allergies as documented has been reviewed.     Past Medical History:   Diagnosis Date    Arthritis     Diabetes mellitus     type 2    Folate deficiency anemia     Heart block     History of kidney stones 05/25/2021    History of stroke with residual deficit 05/25/2021    Hyperlipidemia     Hypertension     Major depressive disorder     Pacemaker     [employer] [place] 8 JONATHON (S/n: [id number])    Pyelonephritis 11/19/2021    TIA (transient ischemic attack)     Urinary retention 05/25/2021     Past Surgical History:    Procedure Laterality Date    A-V CARDIAC PACEMAKER INSERTION N/A 8/24/2021    Procedure: INSERTION, CARDIAC PACEMAKER, DUAL CHAMBER;  Surgeon: eNo Winn MD;  Location: [facility] EP LAB;  Service: Cardiology;  Laterality: N/A;  CHB, DUAL PPM, ANES, BIO, DM, ED 2    COLONOSCOPY N/A 11/22/2021    Procedure: COLONOSCOPY;  Surgeon: Cesar Dorado MD;  Location: [facility] ENDO (2ND FLR);  Service: Endoscopy;  Laterality: N/A;    CYSTOSCOPIC LITHOLAPAXY  5/25/2021    Procedure: CYSTOLITHOLAPAXY;  Surgeon: Chris Schilling MD;  [facility];  Service: Urology;;    CYSTOSCOPY  8/26/2021    Procedure: CYSTOSCOPY;  Surgeon: Camilo Kelley Jr., MD;  [facility];  Service: Urology;;    CYSTOSCOPY W/ URETERAL STENT PLACEMENT Bilateral 5/25/2021    Procedure: CYSTOSCOPY, WITH BILATERAL URETERAL STENT INSERTION;  Surgeon: Chris Schilling MD;  [facility];  Service: Urology;  Laterality: Bilateral;    ELBOW ARTHROPLASTY Right     FLUOROSCOPY  5/25/2021    Procedure: FLUOROSCOPY;  Surgeon: Chris Schilling MD;  [facility];  Service: Urology;;    LASER LITHOTRIPSY Left 8/26/2021    Procedure: LITHOTRIPSY, USING LASER;  Surgeon: Camilo Kelley Jr., MD;  [facility];  Service: Urology;  Laterality: Left;    PYELOSCOPY Bilateral 8/26/2021    Procedure: PYELOSCOPY;  Surgeon: Camilo Kelley Jr., MD;  [facility];  Service: Urology;  Laterality: Bilateral;    REMOVAL OF BLOOD CLOT  5/25/2021    Procedure: REMOVAL, BLOOD CLOT;  Surgeon: Chris Schilling MD;  [facility];  Service: Urology;;    REPLACEMENT OF STENT Bilateral 8/26/2021    Procedure: REPLACEMENT, STENT;  Surgeon: Camilo Kelley Jr., MD;  [facility];  Service: Urology;  Laterality: Bilateral;    URETEROSCOPIC REMOVAL OF URETERIC CALCULUS Bilateral 8/26/2021    Procedure: REMOVAL, CALCULUS, URETER, URETEROSCOPIC;  Surgeon: Camilo Kelley Jr., MD;  Location:  "[facility] OR 1ST FLR;  Service: Urology;  Laterality: Bilateral;    URETEROSCOPY Bilateral 8/26/2021    Procedure: URETEROSCOPY;  Surgeon: Camilo Kelley Jr., MD;  Location: [facility] OR [address] [place] [address];  Service: Urology;  Laterality: Bilateral;     Family History   Problem Relation Age of Onset    Stroke Mother     Hyperlipidemia Mother     Mental illness Father     Parkinsonism Father     No Known Problems Brother      Social History     Socioeconomic History    Marital status: Single    Number of children: 0    Years of education: 15    Highest education level: Some college, no degree   Occupational History     Employer: Disabled    Occupation: Construction     Employer: [employer]     Comment: Retired in past 5-10 years   Tobacco Use    Smoking status: Former     Types: Cigarettes    Smokeless tobacco: Never   Substance and Sexual Activity    Alcohol use: Yes     Comment: 1/ pint whisky daily    Drug use: Yes     Types: "Crack" cocaine     Social Determinants of Health     Financial Resource Strain: Low Risk  (4/15/2023)    Overall Financial Resource Strain (CARDIA)     Difficulty of Paying Living Expenses: Not hard at all   Food Insecurity: No Food Insecurity (4/15/2023)    Hunger Vital Sign     Worried About Running Out of Food in the Last Year: Never true     Ran Out of Food in the Last Year: Never true   Transportation Needs: No Transportation Needs (4/15/2023)    PRAPARE - Transportation     Lack of Transportation (Medical): No     Lack of Transportation (Non-Medical): No   Physical Activity: Inactive (4/15/2023)    Exercise Vital Sign     Days of Exercise per Week: 0 days     Minutes of Exercise per Session: 0 min   Stress: No Stress Concern Present (4/15/2023)    [employer] [employer] - Occupational Stress Questionnaire     Feeling of Stress : Not at all   Social Connections: Socially Isolated (4/15/2023)    Social Connection and Isolation Panel [NHANES]     Frequency of Communication with Friends " and Family: Three times a week     Frequency of Social Gatherings with Friends and Family: Once a week     Attends Scientologist Services: Never     Active Member of Clubs or Organizations: No     Attends Club or Organization Meetings: Never     Marital Status: Never    Housing Stability: Low Risk  (4/15/2023)    Housing Stability Vital Sign     Unable to Pay for Housing in the Last Year: No     Number of Places Lived in the Last Year: 2     Unstable Housing in the Last Year: No     Review of patient's allergies indicates:  No Known Allergies    Review of Systems   Constitutional:  Negative for fever.   Gastrointestinal:  Negative for abdominal pain, nausea and vomiting.   Genitourinary:  Positive for flank pain.         Physical Exam:     Initial Vitals [12/09/23 1241]   BP Pulse Resp Temp SpO2   (!) 94/51 66 20 98.6 °F (37 °C) 97 %      MAP       --         Physical Exam    Nursing note and vitals reviewed.  Constitutional: He appears well-developed and well-nourished. He is not diaphoretic. No distress.   HENT:   Head: Normocephalic and atraumatic.   Mouth/Throat: Oropharynx is clear and moist.   Eyes: EOM are normal. Pupils are equal, round, and reactive to light.   Neck: No tracheal deviation present.   Cardiovascular:  Normal rate, regular rhythm, normal heart sounds and intact distal pulses.           Pulmonary/Chest: Breath sounds normal. No stridor. No respiratory distress.   Abdominal: Abdomen is soft. He exhibits no distension and no mass. There is no abdominal tenderness.   Genitourinary:    Genitourinary Comments: Shen in place draining dark urine.  L flank with mild tenderness, no redness or active bleeding/drainage.      Musculoskeletal:         General: No edema. Normal range of motion.     Neurological: He is alert and oriented to person, place, and time. No cranial nerve deficit or sensory deficit.   Skin: Skin is warm and dry. Capillary refill takes less than 2 seconds. No rash noted.    Psychiatric: He has a normal mood and affect. His behavior is normal. Thought content normal.         Differential Diagnoses:   Based on available information and initial assessment, Differential Diagnosis includes, but is not limited to:  AAA, aortic dissection, SBO/volvulus, intussusception, ileus, appendicitis, cholecystitis, hepatitis, nephrolithiasis, pancreatitis, IBD/IBS, biliary colic, GERD, PUD, constipation, UTI/pyelonephritis, musculoskeletal pain.       ED Management:   Procedures    Orders Placed This Encounter    Urine culture    Blood Culture #2 **CANNOT BE ORDERED STAT**    Blood Culture #1 **CANNOT BE ORDERED STAT**    CT Abdomen Pelvis  Without Contrast    IR Nephrostomy Tube Placement    CBC Without Differential    Comprehensive metabolic panel    Urinalysis, Reflex to Urine Culture Urine, Clean Catch    Lactic Acid, Plasma    Urinalysis Microscopic    Cardiac Monitoring - Adult    Tele: Maintain IV access while on telemetry - Adult    Notify Physician    Notify Physician/Vital Signs Parameters Notify clinician for bleeding at puncture site after deployment of the device and light or manual pressure does not control the bleeding. Notify clinician if Puncture Site Lump > small pea size.    Nursing communication    Inpatient consult to Interventional Radiology    Pulse Oximetry Q Shift    EKG 12-lead    Insert peripheral IV    Discontinue IV    Possible Hospitalization    Transfer patient    sodium chloride 0.9% bolus 1,000 mL 1,000 mL    piperacillin-tazobactam (ZOSYN) 4.5 g in dextrose 5 % in water (D5W) 100 mL IVPB (MB+)    fentaNYL 50 mcg/mL injection    ondansetron injection 4 mg    Bed rest With bathroom privileges          EKG:   EKG interpretation by ED attending physician:  Paced rhythm, rate 68, no ST changes, no ischemia, normal intervals.  Compared with prior EKG dated 07/2023, grossly stable without significant change.      Labs:     Labs Reviewed   CBC WITHOUT DIFFERENTIAL -  Abnormal; Notable for the following components:       Result Value    RBC 3.95 (*)     Hemoglobin 11.9 (*)     Hematocrit 37.2 (*)     All other components within normal limits   COMPREHENSIVE METABOLIC PANEL - Abnormal; Notable for the following components:    CO2 30 (*)     BUN 31 (*)     Albumin 2.6 (*)     All other components within normal limits   URINALYSIS, REFLEX TO URINE CULTURE - Abnormal; Notable for the following components:    Color, UA Orange (*)     Appearance, UA Cloudy (*)     Protein, UA 3+ (*)     Occult Blood UA 3+ (*)     Leukocytes, UA 3+ (*)     All other components within normal limits    Narrative:     Specimen Source->Urine   URINALYSIS MICROSCOPIC - Abnormal; Notable for the following components:    RBC, UA >100 (*)     WBC, UA >100 (*)     WBC Clumps, UA Many (*)     Bacteria Many (*)     All other components within normal limits    Narrative:     Specimen Source->Urine   CULTURE, URINE   CULTURE, BLOOD   CULTURE, BLOOD   LACTIC ACID, PLASMA     Independent review of the labs ordered include:   See ED course    Imaging:     Imaging Results              IR Nephrostomy Tube Placement (In process)                      CT Abdomen Pelvis  Without Contrast (Final result)  Result time 12/09/23 15:37:02      Final result by Vannesa Barrera MD (12/09/23 15:37:02)                   Impression:      The patient's left-sided nephrostomy tube is no longer present.  There is stranding of the fat along the course of the previous nephrostomy tubes track.  Nonobstructing stones are seen at the lower pole of the left kidney.  There is moderate left-sided hydroureteronephrosis secondary to a left distal ureteral stone that measures 9 mm.    Punctate nonobstructing nephrolithiasis on the right.    The urinary bladder is decompressed by a Shen catheter.  There is significant stranding of the fat about the urinary bladder concerning for a cystitis.  There is also prominent stranding of the fat adjacent to  the left kidney and left renal collecting system concerning for a possible pyelonephritis.  Correlation with urinalysis is recommended.    Constipation with suspected fecal impaction.  Thickening of the walls of the rectum which could suggest a stercoral proctitis.      Electronically signed by: Vannesa Barrera MD  Date:    12/09/2023  Time:    15:37               Narrative:    EXAMINATION:  CT ABDOMEN PELVIS WITHOUT CONTRAST    CLINICAL HISTORY:  Flank pain, kidney stone suspected;    TECHNIQUE:  Low dose axial images, sagittal and coronal reformations were obtained from the lung bases to the pubic symphysis.  Oral contrast was not administered.    COMPARISON:  10/13/2023    FINDINGS:  Bibasilar subsegmental atelectasis, right greater than left.  The heart is enlarged.  Calcified coronary artery disease.  Leads from a pacer device are noted.  Calcified atheromatous disease affects the aorta and its major branch vessels.    No radiopaque gallstones are seen.  No intrahepatic or extrahepatic biliary ductal dilatation is identified.  The unenhanced liver, spleen, pancreas, adrenal glands are unremarkable.  Stranding of the fat adjacent to the left kidney extending to the left flank likely related to recent nephrostomy tube levels in place.  No focal fluid collections are seen in the left perinephric space.  There is significant stranding of the fat adjacent to the left kidney and about the left renal pelvis.  Nonobstructing stones at the lower pole of the left kidney.  There is mild moderate left-sided hydroureteronephrosis secondary to a stone in the left distal ureter measuring 9 mm.  Punctate nonobstructing nephrolithiasis on the right.  There is a cyst at the midpole of the right kidney.  No right-sided obstructive uropathy.  No right-sided hydronephrosis or hydroureter.  The urinary bladder is decompressed by a Shen catheter.  There is significant stranding of the fat adjacent to the urinary bladder suggestive  for a cystitis.    Constipation with fecal impaction in the rectum.  Diverticulosis coli is seen without diverticulitis.  The appendix is normal.  No free air, free fluid or obstruction.  No pathologically enlarged abdominal or pelvic lymph nodes are seen.    The bones are osteopenic and show age-appropriate degenerative change.                                         Medications Given:     Medications   ondansetron injection 4 mg (has no administration in time range)   sodium chloride 0.9% bolus 1,000 mL 1,000 mL (0 mLs Intravenous Stopped 12/9/23 1708)   piperacillin-tazobactam (ZOSYN) 4.5 g in dextrose 5 % in water (D5W) 100 mL IVPB (MB+) (0 g Intravenous Stopped 12/9/23 1708)   fentaNYL 50 mcg/mL injection (50 mcg Intravenous Given 12/9/23 1712)        Medical Decision Making:    Additional Consideration:   Additional testing considered during clinical course: none    Social determinants of health considered during development of treatment plan include: poor access to care    Current co-morbidities considered which impacted clinical decision making: HTN, DM, HLD, CHB s/p pacemaker, nephrolithiasis s/p L nephrostomy tube, CVA,  depression    Case discussed with additional provider:   Urology, Dr. Azar, contacted and discussed management of obstructive L ureteral stone. Will review chart and recommends discussion with IR.  IR, Dr. Krause, contacted and discussed possibility of nephrostomy tube replacement. Will evaluate and plan for replacement today.     ED Course as of 12/09/23 1740   Sat Dec 09, 2023   1517 SpO2: 97 % [SS]   1518 Resp: 20 [SS]   1518 Pulse: 66 [SS]   1518 Temp Source: Oral [SS]   1518 Temp: 98.6 °F (37 °C) [SS]   1518 BP(!): 94/51  Hypotensive on arrival [SS]   1518 Lactic Acid, Plasma  WNL [SS]   1518 Comprehensive metabolic panel(!)  Unremarkable  [SS]   1518 CBC Without Differential(!)  Unremarkable  [SS]   1518 Urinalysis Microscopic(!) [SS]   1518 Urinalysis, Reflex to Urine Culture  Urine, Clean Catch(!)  Consistent with UTI. IV ABX given.  [SS]      ED Course User Index  [SS] Pastor Marquez MD            Medical Decision Making  77 yo M with HTN, DM, HLD, CHB s/p pacemaker, nephrolithiasis s/p L nephrostomy tube, CVA,  depression presents after nephrostomy tube inadvertently was removed.  Vitals stable in ED.  Labs appear at baseline, lactate normal.  CT A/P with persistent 9 mm ureteral stone causing hydronephrosis.  IR consulted for nephrostomy replacement.   Patient remained stable post-procedure and will be discharged with OP Urology f/u.     Problems Addressed:  Complicated UTI (urinary tract infection): chronic illness or injury with exacerbation, progression, or side effects of treatment  Hydronephrosis, left: chronic illness or injury  Hypotension: self-limited or minor problem  Left ureteral stone: chronic illness or injury  Nephrostomy tube displaced: complicated acute illness or injury    Amount and/or Complexity of Data Reviewed  Independent Historian: parent and guardian  External Data Reviewed: notes.  Labs: ordered. Decision-making details documented in ED Course.  Radiology: ordered.    Risk  Minor surgery with identified risk factors.        Clinical Impression:       ICD-10-CM ICD-9-CM   1. Nephrostomy tube displaced  T83.022A 997.5   2. Hypotension  I95.9 458.9   3. Complicated UTI (urinary tract infection)  N39.0 599.0   4. Hydronephrosis, left  N13.30 591   5. Left ureteral stone  N20.1 592.1         Follow-up Information       Follow up With Specialties Details Why Contact [facility], [facility], SNF Agency Schedule an appointment as soon as possible for a visit   [address] [place] [id number] [phone]               ED Disposition Condition    Discharge Stable              On re-evaluation, the patient's status has improved.  After complete ED evaluation, clinical impression is most consistent with nephrostomy  tube displacement.  PCP/Urology follow-up as soon as possible was recommended.    After taking into careful account the patient's history, physical exam findings, as well as empirical and objective data obtained throughout ED workup, I feel no emergent medical condition has been identified. No further evaluation or admission was felt to be required, and the patient is stable for discharge from the ED. The patient and any additional family present were updated with test results, overall clinical impression, and recommended further plan of care, including discharge instructions as provided and outpatient follow-up for continued evaluation and management as needed. All questions were answered. The patient expressed understanding and agreed with current plan for discharge and follow-up plan of care. Strict ED return precautions were provided, including return/worsening of current symptoms, new symptoms, or any other concerns.       Pastor Marquez MD  12/09/23 3026

## 2023-12-12 LAB — BACTERIA UR CULT: ABNORMAL

## 2023-12-13 ENCOUNTER — OUTSIDE PLACE OF SERVICE (OUTPATIENT)
Dept: ADMINISTRATIVE | Facility: OTHER | Age: 78
End: 2023-12-13
Payer: MEDICARE

## 2023-12-13 PROCEDURE — 99307 SBSQ NF CARE SF MDM 10: CPT | Mod: ,,, | Performed by: FAMILY MEDICINE

## 2023-12-14 LAB
BACTERIA BLD CULT: NORMAL
BACTERIA BLD CULT: NORMAL
OHS CV AF BURDEN PERCENT: < 1
OHS CV DC REMOTE DEVICE TYPE: NORMAL
OHS CV RV PACING PERCENT: 100 %

## 2024-01-01 ENCOUNTER — HOSPITAL ENCOUNTER (INPATIENT)
Facility: HOSPITAL | Age: 79
LOS: 2 days | DRG: 871 | End: 2024-10-19
Attending: STUDENT IN AN ORGANIZED HEALTH CARE EDUCATION/TRAINING PROGRAM | Admitting: FAMILY MEDICINE
Payer: MEDICARE

## 2024-01-01 VITALS — HEIGHT: 66 IN | WEIGHT: 159.81 LBS | TEMPERATURE: 100 F | BODY MASS INDEX: 25.68 KG/M2

## 2024-01-01 DIAGNOSIS — R50.9 FEVER: ICD-10-CM

## 2024-01-01 DIAGNOSIS — A41.9 SEPSIS: ICD-10-CM

## 2024-01-01 DIAGNOSIS — R78.81 BACTEREMIA: ICD-10-CM

## 2024-01-01 DIAGNOSIS — D68.9 COAGULOPATHY: ICD-10-CM

## 2024-01-01 DIAGNOSIS — S37.30XA INJURY OF URETHRA, INITIAL ENCOUNTER: ICD-10-CM

## 2024-01-01 DIAGNOSIS — N17.9 ACUTE RENAL FAILURE, UNSPECIFIED ACUTE RENAL FAILURE TYPE: ICD-10-CM

## 2024-01-01 DIAGNOSIS — K52.9 COLITIS: ICD-10-CM

## 2024-01-01 DIAGNOSIS — R65.21 SEPTIC SHOCK: Primary | ICD-10-CM

## 2024-01-01 DIAGNOSIS — A41.9 SEPTIC SHOCK: Primary | ICD-10-CM

## 2024-01-01 LAB
ABO + RH BLD: NORMAL
ACINETOBACTER CALCOACETICUS/BAUMANNII COMPLEX: NOT DETECTED
ALBUMIN SERPL BCP-MCNC: 2 G/DL (ref 3.5–5.2)
ALBUMIN SERPL BCP-MCNC: 2.4 G/DL (ref 3.5–5.2)
ALBUMIN SERPL BCP-MCNC: 2.4 G/DL (ref 3.5–5.2)
ALBUMIN SERPL BCP-MCNC: 3.2 G/DL (ref 3.5–5.2)
ALLENS TEST: ABNORMAL
ALP SERPL-CCNC: 62 U/L (ref 40–150)
ALP SERPL-CCNC: 75 U/L (ref 55–135)
ALP SERPL-CCNC: 82 U/L (ref 40–150)
ALP SERPL-CCNC: 95 U/L (ref 55–135)
ALT SERPL W/O P-5'-P-CCNC: 25 U/L (ref 10–44)
ALT SERPL W/O P-5'-P-CCNC: 46 U/L (ref 10–44)
ALT SERPL W/O P-5'-P-CCNC: 57 U/L (ref 10–44)
ALT SERPL W/O P-5'-P-CCNC: 64 U/L (ref 10–44)
ANION GAP SERPL CALC-SCNC: 12 MMOL/L (ref 8–16)
ANION GAP SERPL CALC-SCNC: 14 MMOL/L (ref 8–16)
ANION GAP SERPL CALC-SCNC: 15 MMOL/L (ref 8–16)
ANION GAP SERPL CALC-SCNC: 17 MMOL/L (ref 8–16)
ANISOCYTOSIS BLD QL SMEAR: SLIGHT
ANISOCYTOSIS BLD QL SMEAR: SLIGHT
APICAL FOUR CHAMBER EJECTION FRACTION: 38 %
APICAL TWO CHAMBER EJECTION FRACTION: 40 %
APTT PPP: 64.7 SEC (ref 21–32)
APTT PPP: 68.9 SEC (ref 21–32)
APTT PPP: 82.1 SEC (ref 21–32)
APTT PPP: 91.8 SEC (ref 21–32)
ASCENDING AORTA: 3.43 CM
AST SERPL-CCNC: 185 U/L (ref 10–40)
AST SERPL-CCNC: 186 U/L (ref 10–40)
AST SERPL-CCNC: 36 U/L (ref 10–40)
AST SERPL-CCNC: 95 U/L (ref 10–40)
AV INDEX (PROSTH): 0.65
AV MEAN GRADIENT: 7.9 MMHG
AV PEAK GRADIENT: 13 MMHG
AV VALVE AREA BY VELOCITY RATIO: 1.7 CM²
AV VALVE AREA: 2 CM²
AV VELOCITY RATIO: 0.56
BACTERIA #/AREA URNS HPF: ABNORMAL /HPF
BACTERIA #/AREA URNS HPF: ABNORMAL /HPF
BACTEROIDES FRAGILIS: NOT DETECTED
BASOPHILS # BLD AUTO: 0.06 K/UL (ref 0–0.2)
BASOPHILS # BLD AUTO: 0.06 K/UL (ref 0–0.2)
BASOPHILS # BLD AUTO: ABNORMAL K/UL (ref 0–0.2)
BASOPHILS NFR BLD: 0 % (ref 0–1.9)
BASOPHILS NFR BLD: 0.2 % (ref 0–1.9)
BASOPHILS NFR BLD: 0.3 % (ref 0–1.9)
BILIRUB DIRECT SERPL-MCNC: 0.8 MG/DL (ref 0.1–0.3)
BILIRUB SERPL-MCNC: 0.6 MG/DL (ref 0.1–1)
BILIRUB SERPL-MCNC: 1.1 MG/DL (ref 0.1–1)
BILIRUB SERPL-MCNC: 1.7 MG/DL (ref 0.1–1)
BILIRUB SERPL-MCNC: 2.2 MG/DL (ref 0.1–1)
BILIRUB UR QL STRIP: NEGATIVE
BILIRUB UR QL STRIP: NEGATIVE
BLD GP AB SCN CELLS X3 SERPL QL: NORMAL
BLD PROD TYP BPU: NORMAL
BLOOD UNIT EXPIRATION DATE: NORMAL
BLOOD UNIT TYPE CODE: 6200
BLOOD UNIT TYPE: NORMAL
BSA FOR ECHO PROCEDURE: 1.84 M2
BUN SERPL-MCNC: 34 MG/DL (ref 8–23)
BUN SERPL-MCNC: 37 MG/DL (ref 8–23)
BUN SERPL-MCNC: 55 MG/DL (ref 8–23)
BUN SERPL-MCNC: 59 MG/DL (ref 8–23)
BUN SERPL-MCNC: 68 MG/DL (ref 8–23)
BUN SERPL-MCNC: 70 MG/DL (ref 8–23)
BURR CELLS BLD QL SMEAR: ABNORMAL
CA-I BLDV-SCNC: 0.81 MMOL/L (ref 1.06–1.42)
CALCIUM SERPL-MCNC: 6.4 MG/DL (ref 8.7–10.5)
CALCIUM SERPL-MCNC: 7 MG/DL (ref 8.7–10.5)
CALCIUM SERPL-MCNC: 7 MG/DL (ref 8.7–10.5)
CALCIUM SERPL-MCNC: 7.1 MG/DL (ref 8.7–10.5)
CALCIUM SERPL-MCNC: 7.4 MG/DL (ref 8.7–10.5)
CALCIUM SERPL-MCNC: 9.3 MG/DL (ref 8.7–10.5)
CANDIDA ALBICANS: NOT DETECTED
CANDIDA AURIS: NOT DETECTED
CANDIDA GLABRATA: NOT DETECTED
CANDIDA KRUSEI: NOT DETECTED
CANDIDA PARAPSILOSIS: NOT DETECTED
CANDIDA TROPICALIS: NOT DETECTED
CHLORIDE SERPL-SCNC: 104 MMOL/L (ref 95–110)
CHLORIDE SERPL-SCNC: 104 MMOL/L (ref 95–110)
CHLORIDE SERPL-SCNC: 108 MMOL/L (ref 95–110)
CHLORIDE SERPL-SCNC: 109 MMOL/L (ref 95–110)
CHLORIDE SERPL-SCNC: 111 MMOL/L (ref 95–110)
CHLORIDE SERPL-SCNC: 116 MMOL/L (ref 95–110)
CK SERPL-CCNC: 4897 U/L (ref 20–200)
CLARITY UR: ABNORMAL
CLARITY UR: ABNORMAL
CO2 SERPL-SCNC: 12 MMOL/L (ref 23–29)
CO2 SERPL-SCNC: 13 MMOL/L (ref 23–29)
CO2 SERPL-SCNC: 15 MMOL/L (ref 23–29)
CODING SYSTEM: NORMAL
COLOR UR: ABNORMAL
COLOR UR: ABNORMAL
CREAT SERPL-MCNC: 1.6 MG/DL (ref 0.5–1.4)
CREAT SERPL-MCNC: 1.9 MG/DL (ref 0.5–1.4)
CREAT SERPL-MCNC: 3.3 MG/DL (ref 0.5–1.4)
CREAT SERPL-MCNC: 3.7 MG/DL (ref 0.5–1.4)
CREAT SERPL-MCNC: 4.1 MG/DL (ref 0.5–1.4)
CREAT SERPL-MCNC: 4.3 MG/DL (ref 0.5–1.4)
CREAT UR-MCNC: 59.7 MG/DL (ref 23–375)
CROSSMATCH INTERPRETATION: NORMAL
CRYPTOCOCCUS NEOFORMANS/GATTII: NOT DETECTED
CTP QC/QA: YES
CTP QC/QA: YES
CTX-M GENE (ESBL PRODUCER): NOT DETECTED
CV ECHO LV RWT: 0.61 CM
DELSYS: ABNORMAL
DIFFERENTIAL METHOD BLD: ABNORMAL
DISPENSE STATUS: NORMAL
DOP CALC AO PEAK VEL: 1.8 M/S
DOP CALC AO VTI: 20.9 CM
DOP CALC LVOT AREA: 3.1 CM2
DOP CALC LVOT DIAMETER: 2 CM
DOP CALC LVOT PEAK VEL: 1 M/S
DOP CALC LVOT STROKE VOLUME: 42.4 CM3
DOP CALC MV VTI: 16.1 CM
DOP CALCLVOT PEAK VEL VTI: 13.5 CM
E/A RATIO: 0.56
E/E' RATIO: 6.67 M/S
ECHO LV POSTERIOR WALL: 1.4 CM (ref 0.6–1.1)
ENTEROBACTER CLOACAE COMPLEX: DETECTED
ENTEROBACTERALES: ABNORMAL
ENTEROCOCCUS FAECALIS: DETECTED
ENTEROCOCCUS FAECIUM: NOT DETECTED
EOSINOPHIL # BLD AUTO: 0 K/UL (ref 0–0.5)
EOSINOPHIL # BLD AUTO: 0 K/UL (ref 0–0.5)
EOSINOPHIL # BLD AUTO: ABNORMAL K/UL (ref 0–0.5)
EOSINOPHIL NFR BLD: 0 % (ref 0–8)
EOSINOPHIL NFR BLD: 0 % (ref 0–8)
EOSINOPHIL NFR BLD: 0.1 % (ref 0–8)
EOSINOPHIL NFR BLD: 1 % (ref 0–8)
EOSINOPHIL NFR BLD: 3 % (ref 0–8)
EOSINOPHIL NFR BLD: 9 % (ref 0–8)
ERYTHROCYTE [DISTWIDTH] IN BLOOD BY AUTOMATED COUNT: 13.8 % (ref 11.5–14.5)
ERYTHROCYTE [DISTWIDTH] IN BLOOD BY AUTOMATED COUNT: 14 % (ref 11.5–14.5)
ERYTHROCYTE [DISTWIDTH] IN BLOOD BY AUTOMATED COUNT: 14.1 % (ref 11.5–14.5)
ERYTHROCYTE [DISTWIDTH] IN BLOOD BY AUTOMATED COUNT: 14.6 % (ref 11.5–14.5)
ESCHERICHIA COLI: DETECTED
EST. GFR  (NO RACE VARIABLE): 14 ML/MIN/1.73 M^2
EST. GFR  (NO RACE VARIABLE): 14 ML/MIN/1.73 M^2
EST. GFR  (NO RACE VARIABLE): 16 ML/MIN/1.73 M^2
EST. GFR  (NO RACE VARIABLE): 19 ML/MIN/1.73 M^2
EST. GFR  (NO RACE VARIABLE): 36 ML/MIN/1.73 M^2
EST. GFR  (NO RACE VARIABLE): 44 ML/MIN/1.73 M^2
FIBRINOGEN PPP-MCNC: 183 MG/DL (ref 182–400)
FIO2: 0 %
FIO2: 21 %
FRACTIONAL SHORTENING: 21.7 % (ref 28–44)
GLUCOSE SERPL-MCNC: 70 MG/DL (ref 70–110)
GLUCOSE SERPL-MCNC: 78 MG/DL (ref 70–110)
GLUCOSE SERPL-MCNC: 86 MG/DL (ref 70–110)
GLUCOSE SERPL-MCNC: 88 MG/DL (ref 70–110)
GLUCOSE SERPL-MCNC: 94 MG/DL (ref 70–110)
GLUCOSE SERPL-MCNC: 96 MG/DL (ref 70–110)
GLUCOSE UR QL STRIP: NEGATIVE
GLUCOSE UR QL STRIP: NEGATIVE
HAEMOPHILUS INFLUENZAE: NOT DETECTED
HCO3 UR-SCNC: 14.4 MMOL/L (ref 24–28)
HCT VFR BLD AUTO: 34.1 % (ref 40–54)
HCT VFR BLD AUTO: 36.6 % (ref 40–54)
HCT VFR BLD AUTO: 36.7 % (ref 40–54)
HCT VFR BLD AUTO: 38.3 % (ref 40–54)
HCT VFR BLD AUTO: 38.3 % (ref 40–54)
HCT VFR BLD AUTO: 46.6 % (ref 40–54)
HCT VFR BLD CALC: 37 %PCV (ref 36–54)
HGB BLD-MCNC: 10.8 G/DL (ref 14–18)
HGB BLD-MCNC: 12.2 G/DL (ref 14–18)
HGB BLD-MCNC: 12.5 G/DL (ref 14–18)
HGB BLD-MCNC: 12.8 G/DL (ref 14–18)
HGB BLD-MCNC: 12.8 G/DL (ref 14–18)
HGB BLD-MCNC: 13 G/DL
HGB BLD-MCNC: 15.2 G/DL (ref 14–18)
HGB UR QL STRIP: ABNORMAL
HGB UR QL STRIP: ABNORMAL
HYALINE CASTS #/AREA URNS LPF: 0 /LPF
HYALINE CASTS #/AREA URNS LPF: 0 /LPF
IMM GRANULOCYTES # BLD AUTO: 0.57 K/UL (ref 0–0.04)
IMM GRANULOCYTES # BLD AUTO: 0.69 K/UL (ref 0–0.04)
IMM GRANULOCYTES # BLD AUTO: ABNORMAL K/UL (ref 0–0.04)
IMM GRANULOCYTES NFR BLD AUTO: 2.8 % (ref 0–0.5)
IMM GRANULOCYTES NFR BLD AUTO: 3.2 % (ref 0–0.5)
IMM GRANULOCYTES NFR BLD AUTO: ABNORMAL % (ref 0–0.5)
IMP GENE (CARBAPENEM RESISTANT): NOT DETECTED
INR PPP: 1.7 (ref 0.8–1.2)
INR PPP: 2.1 (ref 0.8–1.2)
INR PPP: 2.3 (ref 0.8–1.2)
INR PPP: 3.2 (ref 0.8–1.2)
INTERVENTRICULAR SEPTUM: 1.1 CM (ref 0.6–1.1)
IVC DIAMETER: 0.87 CM
KETONES UR QL STRIP: NEGATIVE
KETONES UR QL STRIP: NEGATIVE
KLEBSIELLA AEROGENES: NOT DETECTED
KLEBSIELLA OXYTOCA: NOT DETECTED
KLEBSIELLA PNEUMONIAE GROUP: NOT DETECTED
KPC RESISTANCE GENE (CARBAPENEM): NOT DETECTED
LACTATE SERPL-SCNC: 2.6 MMOL/L (ref 0.5–2.2)
LACTATE SERPL-SCNC: 2.7 MMOL/L (ref 0.5–2.2)
LACTATE SERPL-SCNC: 3.4 MMOL/L (ref 0.5–2.2)
LACTATE SERPL-SCNC: 6.9 MMOL/L (ref 0.5–2.2)
LACTATE SERPL-SCNC: 7.8 MMOL/L (ref 0.5–2.2)
LACTATE SERPL-SCNC: 8 MMOL/L (ref 0.5–2.2)
LACTATE SERPL-SCNC: 9.3 MMOL/L (ref 0.5–2.2)
LDH SERPL L TO P-CCNC: 7.1 MMOL/L (ref 0.5–2.2)
LEFT ATRIUM AREA SYSTOLIC (APICAL 2 CHAMBER): 17.67 CM2
LEFT ATRIUM AREA SYSTOLIC (APICAL 4 CHAMBER): 14.85 CM2
LEFT ATRIUM SIZE: 3.12 CM
LEFT ATRIUM VOLUME INDEX MOD: 20.1 ML/M2
LEFT ATRIUM VOLUME MOD: 36.54 ML
LEFT INTERNAL DIMENSION IN SYSTOLE: 3.6 CM (ref 2.1–4)
LEFT VENTRICLE DIASTOLIC VOLUME INDEX: 52.88 ML/M2
LEFT VENTRICLE DIASTOLIC VOLUME: 96.25 ML
LEFT VENTRICLE END DIASTOLIC VOLUME APICAL 2 CHAMBER: 47.71 ML
LEFT VENTRICLE END DIASTOLIC VOLUME APICAL 4 CHAMBER: 38.54 ML
LEFT VENTRICLE END SYSTOLIC VOLUME APICAL 2 CHAMBER: 43.89 ML
LEFT VENTRICLE END SYSTOLIC VOLUME APICAL 4 CHAMBER: 30.51 ML
LEFT VENTRICLE MASS INDEX: 119.4 G/M2
LEFT VENTRICLE SYSTOLIC VOLUME INDEX: 30.6 ML/M2
LEFT VENTRICLE SYSTOLIC VOLUME: 55.74 ML
LEFT VENTRICULAR INTERNAL DIMENSION IN DIASTOLE: 4.6 CM (ref 3.5–6)
LEFT VENTRICULAR MASS: 217.4 G
LEUKOCYTE ESTERASE UR QL STRIP: ABNORMAL
LEUKOCYTE ESTERASE UR QL STRIP: ABNORMAL
LISTERIA MONOCYTOGENES: NOT DETECTED
LV LATERAL E/E' RATIO: 5.56 M/S
LV SEPTAL E/E' RATIO: 8.33 M/S
LVED V (TEICH): 96.25 ML
LVES V (TEICH): 55.74 ML
LVOT MG: 2.21 MMHG
LVOT MV: 0.72 CM/S
LYMPHOCYTES # BLD AUTO: 0.7 K/UL (ref 1–4.8)
LYMPHOCYTES # BLD AUTO: 0.8 K/UL (ref 1–4.8)
LYMPHOCYTES # BLD AUTO: ABNORMAL K/UL (ref 1–4.8)
LYMPHOCYTES NFR BLD: 1 % (ref 18–48)
LYMPHOCYTES NFR BLD: 1 % (ref 18–48)
LYMPHOCYTES NFR BLD: 16 % (ref 18–48)
LYMPHOCYTES NFR BLD: 3.3 % (ref 18–48)
LYMPHOCYTES NFR BLD: 4 % (ref 18–48)
LYMPHOCYTES NFR BLD: 4 % (ref 18–48)
MAGNESIUM SERPL-MCNC: 1.3 MG/DL (ref 1.6–2.6)
MAGNESIUM SERPL-MCNC: 2.4 MG/DL (ref 1.6–2.6)
MCH RBC QN AUTO: 31.8 PG (ref 27–31)
MCH RBC QN AUTO: 31.9 PG (ref 27–31)
MCH RBC QN AUTO: 31.9 PG (ref 27–31)
MCH RBC QN AUTO: 32 PG (ref 27–31)
MCH RBC QN AUTO: 32.1 PG (ref 27–31)
MCH RBC QN AUTO: 32.2 PG (ref 27–31)
MCHC RBC AUTO-ENTMCNC: 31.7 G/DL (ref 32–36)
MCHC RBC AUTO-ENTMCNC: 31.9 G/DL (ref 32–36)
MCHC RBC AUTO-ENTMCNC: 32.6 G/DL (ref 32–36)
MCHC RBC AUTO-ENTMCNC: 33.4 G/DL (ref 32–36)
MCHC RBC AUTO-ENTMCNC: 34.2 G/DL (ref 32–36)
MCHC RBC AUTO-ENTMCNC: 34.9 G/DL (ref 32–36)
MCR-1: NOT DETECTED
MCV RBC AUTO: 100 FL (ref 82–98)
MCV RBC AUTO: 102 FL (ref 82–98)
MCV RBC AUTO: 92 FL (ref 82–98)
MCV RBC AUTO: 94 FL (ref 82–98)
MCV RBC AUTO: 95 FL (ref 82–98)
MCV RBC AUTO: 98 FL (ref 82–98)
MEC A/C AND MREJ (MRSA): ABNORMAL
MEC A/C: ABNORMAL
MICROSCOPIC COMMENT: ABNORMAL
MICROSCOPIC COMMENT: ABNORMAL
MONOCYTES # BLD AUTO: 0.1 K/UL (ref 0.3–1)
MONOCYTES # BLD AUTO: 0.1 K/UL (ref 0.3–1)
MONOCYTES # BLD AUTO: ABNORMAL K/UL (ref 0.3–1)
MONOCYTES NFR BLD: 0 % (ref 4–15)
MONOCYTES NFR BLD: 0.4 % (ref 4–15)
MONOCYTES NFR BLD: 0.5 % (ref 4–15)
MONOCYTES NFR BLD: 5 % (ref 4–15)
MONOCYTES NFR BLD: 8 % (ref 4–15)
MONOCYTES NFR BLD: 9 % (ref 4–15)
MV MEAN GRADIENT: 2 MMHG
MV PEAK A VEL: 0.9 M/S
MV PEAK E VEL: 0.5 M/S
MV PEAK GRADIENT: 5 MMHG
MV VALVE AREA BY CONTINUITY EQUATION: 2.63 CM2
NDM GENE (CARBAPENEM RESISTANT): NOT DETECTED
NEISSERIA MENINGITIDIS: NOT DETECTED
NEUTROPHILS # BLD AUTO: 16.4 K/UL (ref 1.8–7.7)
NEUTROPHILS # BLD AUTO: 22.8 K/UL (ref 1.8–7.7)
NEUTROPHILS NFR BLD: 57 % (ref 38–73)
NEUTROPHILS NFR BLD: 58 % (ref 38–73)
NEUTROPHILS NFR BLD: 64 % (ref 38–73)
NEUTROPHILS NFR BLD: 83 % (ref 38–73)
NEUTROPHILS NFR BLD: 91.9 % (ref 38–73)
NEUTROPHILS NFR BLD: 93.3 % (ref 38–73)
NEUTS BAND NFR BLD MANUAL: 23 %
NEUTS BAND NFR BLD MANUAL: 24 %
NEUTS BAND NFR BLD MANUAL: 34 %
NITRITE UR QL STRIP: NEGATIVE
NITRITE UR QL STRIP: NEGATIVE
NRBC BLD-RTO: 0 /100 WBC
NRBC BLD-RTO: 1 /100 WBC
OHS CV RV/LV RATIO: 0.59 CM
OHS LV EJECTION FRACTION SIMPSONS BIPLANE MOD: 39 %
OHS QRS DURATION: 158 MS
OHS QTC CALCULATION: 518 MS
OVALOCYTES BLD QL SMEAR: ABNORMAL
OVALOCYTES BLD QL SMEAR: ABNORMAL
OXA-48-LIKE (CARBAPENEM RESISTANT): NOT DETECTED
PCO2 BLDA: 31.2 MMHG (ref 35–45)
PCO2 BLDA: 44.8 MMHG (ref 35–45)
PH SMN: 7.13 [PH] (ref 7.35–7.45)
PH SMN: 7.27 [PH] (ref 7.35–7.45)
PH UR STRIP: 6 [PH] (ref 5–8)
PH UR STRIP: 6 [PH] (ref 5–8)
PHOSPHATE SERPL-MCNC: 2.9 MG/DL (ref 2.7–4.5)
PHOSPHATE SERPL-MCNC: 3.9 MG/DL (ref 2.7–4.5)
PISA TR MAX VEL: 2.67 M/S
PLATELET # BLD AUTO: 104 K/UL (ref 150–450)
PLATELET # BLD AUTO: 20 K/UL (ref 150–450)
PLATELET # BLD AUTO: 37 K/UL (ref 150–450)
PLATELET # BLD AUTO: 37 K/UL (ref 150–450)
PLATELET # BLD AUTO: 64 K/UL (ref 150–450)
PLATELET # BLD AUTO: 67 K/UL (ref 150–450)
PLATELET BLD QL SMEAR: ABNORMAL
PMV BLD AUTO: 10.1 FL (ref 9.2–12.9)
PMV BLD AUTO: 10.5 FL (ref 9.2–12.9)
PMV BLD AUTO: 10.9 FL (ref 9.2–12.9)
PMV BLD AUTO: 12.6 FL (ref 9.2–12.9)
PMV BLD AUTO: 13.5 FL (ref 9.2–12.9)
PMV BLD AUTO: ABNORMAL FL (ref 9.2–12.9)
PO2 BLDA: 39.7 MMHG (ref 40–60)
PO2 BLDA: 62 MMHG (ref 80–100)
POC BASE DEFICIT: -13.9 MMOL/L (ref -2–2)
POC BE: -12 MMOL/L
POC HCO3: 14.9 MMOL/L (ref 24–28)
POC MOLECULAR INFLUENZA A AGN: NEGATIVE
POC MOLECULAR INFLUENZA B AGN: NEGATIVE
POC PERFORMED BY: ABNORMAL
POC PERFORMED BY: ABNORMAL
POC SATURATED O2: 66 % (ref 95–100)
POC SATURATED O2: 89 % (ref 95–100)
POC TCO2: 15 MMOL/L (ref 23–27)
POCT GLUCOSE: 104 MG/DL (ref 70–110)
POCT GLUCOSE: 105 MG/DL (ref 70–110)
POCT GLUCOSE: 111 MG/DL (ref 70–110)
POCT GLUCOSE: 81 MG/DL (ref 70–110)
POCT GLUCOSE: 84 MG/DL (ref 70–110)
POCT GLUCOSE: 84 MG/DL (ref 70–110)
POIKILOCYTOSIS BLD QL SMEAR: SLIGHT
POIKILOCYTOSIS BLD QL SMEAR: SLIGHT
POLYCHROMASIA BLD QL SMEAR: ABNORMAL
POTASSIUM BLD-SCNC: 5.2 MMOL/L (ref 3.5–5.1)
POTASSIUM SERPL-SCNC: 3.7 MMOL/L (ref 3.5–5.1)
POTASSIUM SERPL-SCNC: 4.2 MMOL/L (ref 3.5–5.1)
POTASSIUM SERPL-SCNC: 4.8 MMOL/L (ref 3.5–5.1)
POTASSIUM SERPL-SCNC: 4.9 MMOL/L (ref 3.5–5.1)
POTASSIUM SERPL-SCNC: 5.2 MMOL/L (ref 3.5–5.1)
POTASSIUM SERPL-SCNC: 5.4 MMOL/L (ref 3.5–5.1)
PROT SERPL-MCNC: 4.4 G/DL (ref 6–8.4)
PROT SERPL-MCNC: 4.5 G/DL (ref 6–8.4)
PROT SERPL-MCNC: 4.7 G/DL (ref 6–8.4)
PROT SERPL-MCNC: 6.5 G/DL (ref 6–8.4)
PROT UR QL STRIP: ABNORMAL
PROT UR QL STRIP: ABNORMAL
PROTEUS SPECIES: NOT DETECTED
PROTHROMBIN TIME: 17.8 SEC (ref 9–12.5)
PROTHROMBIN TIME: 21.4 SEC (ref 9–12.5)
PROTHROMBIN TIME: 24 SEC (ref 9–12.5)
PROTHROMBIN TIME: 32.8 SEC (ref 9–12.5)
PSEUDOMONAS AERUGINOSA: DETECTED
PULM VEIN S/D RATIO: 1.22
PV MV: 0.87 M/S
PV PEAK D VEL: 0.32 M/S
PV PEAK GRADIENT: 5 MMHG
PV PEAK S VEL: 0.39 M/S
PV PEAK VELOCITY: 1.16 M/S
RA PRESSURE ESTIMATED: 3 MMHG
RA VOL SYS: 16.5 ML
RBC # BLD AUTO: 3.35 M/UL (ref 4.6–6.2)
RBC # BLD AUTO: 3.82 M/UL (ref 4.6–6.2)
RBC # BLD AUTO: 3.89 M/UL (ref 4.6–6.2)
RBC # BLD AUTO: 4.01 M/UL (ref 4.6–6.2)
RBC # BLD AUTO: 4.03 M/UL (ref 4.6–6.2)
RBC # BLD AUTO: 4.75 M/UL (ref 4.6–6.2)
RBC #/AREA URNS HPF: >100 /HPF (ref 0–4)
RBC #/AREA URNS HPF: >100 /HPF (ref 0–4)
RIGHT ATRIAL AREA: 9.7 CM2
RIGHT ATRIUM VOLUME AREA LENGTH APICAL 4 CHAMBER: 16.53 ML
RIGHT VENTRICLE DIASTOLIC BASEL DIMENSION: 2.7 CM
RV TB RVSP: 6 MMHG
RV TISSUE DOPPLER FREE WALL SYSTOLIC VELOCITY 1 (APICAL 4 CHAMBER VIEW): 14.69 CM/S
SALMONELLA SP: NOT DETECTED
SAMPLE: ABNORMAL
SARS-COV-2 RDRP RESP QL NAA+PROBE: NEGATIVE
SERRATIA MARCESCENS: NOT DETECTED
SINUS: 3.67 CM
SITE: ABNORMAL
SODIUM BLD-SCNC: 130 MMOL/L (ref 136–145)
SODIUM SERPL-SCNC: 129 MMOL/L (ref 136–145)
SODIUM SERPL-SCNC: 131 MMOL/L (ref 136–145)
SODIUM SERPL-SCNC: 132 MMOL/L (ref 136–145)
SODIUM SERPL-SCNC: 133 MMOL/L (ref 136–145)
SODIUM SERPL-SCNC: 142 MMOL/L (ref 136–145)
SODIUM SERPL-SCNC: 143 MMOL/L (ref 136–145)
SODIUM UR-SCNC: 66 MMOL/L (ref 20–250)
SODIUM UR-SCNC: 66 MMOL/L (ref 20–250)
SP GR UR STRIP: 1.01 (ref 1–1.03)
SP GR UR STRIP: 1.02 (ref 1–1.03)
SPECIMEN OUTDATE: NORMAL
SPECIMEN SOURCE: ABNORMAL
SPECIMEN SOURCE: ABNORMAL
STAPHYLOCOCCUS AUREUS: NOT DETECTED
STAPHYLOCOCCUS EPIDERMIDIS: NOT DETECTED
STAPHYLOCOCCUS LUGDUNESIS: NOT DETECTED
STAPHYLOCOCCUS SPECIES: NOT DETECTED
STENOTROPHOMONAS MALTOPHILIA: NOT DETECTED
STJ: 3.29 CM
STREPTOCOCCUS AGALACTIAE: NOT DETECTED
STREPTOCOCCUS PNEUMONIAE: NOT DETECTED
STREPTOCOCCUS PYOGENES: NOT DETECTED
STREPTOCOCCUS SPECIES: NOT DETECTED
TDI LATERAL: 0.09 M/S
TDI SEPTAL: 0.06 M/S
TDI: 0.08 M/S
TR MAX PG: 29 MMHG
TRANS ERYTHROCYTES VOL PATIENT: NORMAL ML
TRICUSPID ANNULAR PLANE SYSTOLIC EXCURSION: 1.71 CM
TV REST PULMONARY ARTERY PRESSURE: 32 MMHG
URN SPEC COLLECT METH UR: ABNORMAL
URN SPEC COLLECT METH UR: ABNORMAL
UROBILINOGEN UR STRIP-ACNC: NEGATIVE EU/DL
UROBILINOGEN UR STRIP-ACNC: NEGATIVE EU/DL
VAN A/B (VRE GENE): NOT DETECTED
VANCOMYCIN SERPL-MCNC: 17.8 UG/ML
VANCOMYCIN SERPL-MCNC: 21.3 UG/ML
VIM GENE (CARBAPENEM RESISTANT): NOT DETECTED
WBC # BLD AUTO: 17.86 K/UL (ref 3.9–12.7)
WBC # BLD AUTO: 24.51 K/UL (ref 3.9–12.7)
WBC # BLD AUTO: 4.84 K/UL (ref 3.9–12.7)
WBC # BLD AUTO: 42.55 K/UL (ref 3.9–12.7)
WBC # BLD AUTO: 44.82 K/UL (ref 3.9–12.7)
WBC # BLD AUTO: 46.19 K/UL (ref 3.9–12.7)
WBC #/AREA URNS HPF: 20 /HPF (ref 0–5)
WBC #/AREA URNS HPF: 7 /HPF (ref 0–5)
Z-SCORE OF LEFT VENTRICULAR DIMENSION IN END DIASTOLE: -0.91
Z-SCORE OF LEFT VENTRICULAR DIMENSION IN END SYSTOLE: 1.15

## 2024-01-01 PROCEDURE — 85007 BL SMEAR W/DIFF WBC COUNT: CPT | Mod: 91 | Performed by: INTERNAL MEDICINE

## 2024-01-01 PROCEDURE — 25000003 PHARM REV CODE 250

## 2024-01-01 PROCEDURE — 85027 COMPLETE CBC AUTOMATED: CPT | Performed by: REGISTERED NURSE

## 2024-01-01 PROCEDURE — P9021 RED BLOOD CELLS UNIT: HCPCS | Performed by: STUDENT IN AN ORGANIZED HEALTH CARE EDUCATION/TRAINING PROGRAM

## 2024-01-01 PROCEDURE — 63600175 PHARM REV CODE 636 W HCPCS: Performed by: INTERNAL MEDICINE

## 2024-01-01 PROCEDURE — 25000003 PHARM REV CODE 250: Performed by: INTERNAL MEDICINE

## 2024-01-01 PROCEDURE — 85027 COMPLETE CBC AUTOMATED: CPT | Performed by: STUDENT IN AN ORGANIZED HEALTH CARE EDUCATION/TRAINING PROGRAM

## 2024-01-01 PROCEDURE — 84300 ASSAY OF URINE SODIUM: CPT | Performed by: INTERNAL MEDICINE

## 2024-01-01 PROCEDURE — 25000003 PHARM REV CODE 250: Performed by: STUDENT IN AN ORGANIZED HEALTH CARE EDUCATION/TRAINING PROGRAM

## 2024-01-01 PROCEDURE — 87077 CULTURE AEROBIC IDENTIFY: CPT | Mod: 59 | Performed by: STUDENT IN AN ORGANIZED HEALTH CARE EDUCATION/TRAINING PROGRAM

## 2024-01-01 PROCEDURE — 27000221 HC OXYGEN, UP TO 24 HOURS

## 2024-01-01 PROCEDURE — 85007 BL SMEAR W/DIFF WBC COUNT: CPT | Performed by: STUDENT IN AN ORGANIZED HEALTH CARE EDUCATION/TRAINING PROGRAM

## 2024-01-01 PROCEDURE — 99900035 HC TECH TIME PER 15 MIN (STAT)

## 2024-01-01 PROCEDURE — 99223 1ST HOSP IP/OBS HIGH 75: CPT | Mod: ,,, | Performed by: UROLOGY

## 2024-01-01 PROCEDURE — 87154 CUL TYP ID BLD PTHGN 6+ TRGT: CPT | Performed by: STUDENT IN AN ORGANIZED HEALTH CARE EDUCATION/TRAINING PROGRAM

## 2024-01-01 PROCEDURE — 87635 SARS-COV-2 COVID-19 AMP PRB: CPT | Performed by: STUDENT IN AN ORGANIZED HEALTH CARE EDUCATION/TRAINING PROGRAM

## 2024-01-01 PROCEDURE — 86900 BLOOD TYPING SEROLOGIC ABO: CPT | Performed by: STUDENT IN AN ORGANIZED HEALTH CARE EDUCATION/TRAINING PROGRAM

## 2024-01-01 PROCEDURE — 81000 URINALYSIS NONAUTO W/SCOPE: CPT | Performed by: INTERNAL MEDICINE

## 2024-01-01 PROCEDURE — 86920 COMPATIBILITY TEST SPIN: CPT | Performed by: STUDENT IN AN ORGANIZED HEALTH CARE EDUCATION/TRAINING PROGRAM

## 2024-01-01 PROCEDURE — 96361 HYDRATE IV INFUSION ADD-ON: CPT

## 2024-01-01 PROCEDURE — 85730 THROMBOPLASTIN TIME PARTIAL: CPT | Performed by: STUDENT IN AN ORGANIZED HEALTH CARE EDUCATION/TRAINING PROGRAM

## 2024-01-01 PROCEDURE — 20000000 HC ICU ROOM

## 2024-01-01 PROCEDURE — 82803 BLOOD GASES ANY COMBINATION: CPT

## 2024-01-01 PROCEDURE — 83605 ASSAY OF LACTIC ACID: CPT

## 2024-01-01 PROCEDURE — 82330 ASSAY OF CALCIUM: CPT | Performed by: INTERNAL MEDICINE

## 2024-01-01 PROCEDURE — 85025 COMPLETE CBC W/AUTO DIFF WBC: CPT | Performed by: FAMILY MEDICINE

## 2024-01-01 PROCEDURE — 85730 THROMBOPLASTIN TIME PARTIAL: CPT | Performed by: REGISTERED NURSE

## 2024-01-01 PROCEDURE — 87502 INFLUENZA DNA AMP PROBE: CPT

## 2024-01-01 PROCEDURE — 36430 TRANSFUSION BLD/BLD COMPNT: CPT

## 2024-01-01 PROCEDURE — 84100 ASSAY OF PHOSPHORUS: CPT | Performed by: REGISTERED NURSE

## 2024-01-01 PROCEDURE — 99223 1ST HOSP IP/OBS HIGH 75: CPT | Mod: ,,, | Performed by: INTERNAL MEDICINE

## 2024-01-01 PROCEDURE — 85610 PROTHROMBIN TIME: CPT | Performed by: REGISTERED NURSE

## 2024-01-01 PROCEDURE — 83735 ASSAY OF MAGNESIUM: CPT | Performed by: REGISTERED NURSE

## 2024-01-01 PROCEDURE — 80053 COMPREHEN METABOLIC PANEL: CPT | Performed by: REGISTERED NURSE

## 2024-01-01 PROCEDURE — 93005 ELECTROCARDIOGRAM TRACING: CPT

## 2024-01-01 PROCEDURE — 36620 INSERTION CATHETER ARTERY: CPT

## 2024-01-01 PROCEDURE — 63600531 PHARM REV CODE 636 NO ALT 250 W HCPCS: Mod: JZ,JG | Performed by: STUDENT IN AN ORGANIZED HEALTH CARE EDUCATION/TRAINING PROGRAM

## 2024-01-01 PROCEDURE — 87186 SC STD MICRODIL/AGAR DIL: CPT | Performed by: FAMILY MEDICINE

## 2024-01-01 PROCEDURE — 83605 ASSAY OF LACTIC ACID: CPT | Mod: 91 | Performed by: INTERNAL MEDICINE

## 2024-01-01 PROCEDURE — 80048 BASIC METABOLIC PNL TOTAL CA: CPT | Mod: XB | Performed by: FAMILY MEDICINE

## 2024-01-01 PROCEDURE — 63600175 PHARM REV CODE 636 W HCPCS: Mod: JG | Performed by: STUDENT IN AN ORGANIZED HEALTH CARE EDUCATION/TRAINING PROGRAM

## 2024-01-01 PROCEDURE — 84100 ASSAY OF PHOSPHORUS: CPT | Mod: 91 | Performed by: INTERNAL MEDICINE

## 2024-01-01 PROCEDURE — 82570 ASSAY OF URINE CREATININE: CPT | Performed by: INTERNAL MEDICINE

## 2024-01-01 PROCEDURE — 87186 SC STD MICRODIL/AGAR DIL: CPT | Mod: 59 | Performed by: STUDENT IN AN ORGANIZED HEALTH CARE EDUCATION/TRAINING PROGRAM

## 2024-01-01 PROCEDURE — 83605 ASSAY OF LACTIC ACID: CPT | Performed by: REGISTERED NURSE

## 2024-01-01 PROCEDURE — 80053 COMPREHEN METABOLIC PANEL: CPT | Performed by: STUDENT IN AN ORGANIZED HEALTH CARE EDUCATION/TRAINING PROGRAM

## 2024-01-01 PROCEDURE — 96365 THER/PROPH/DIAG IV INF INIT: CPT

## 2024-01-01 PROCEDURE — 83605 ASSAY OF LACTIC ACID: CPT | Performed by: STUDENT IN AN ORGANIZED HEALTH CARE EDUCATION/TRAINING PROGRAM

## 2024-01-01 PROCEDURE — 85027 COMPLETE CBC AUTOMATED: CPT | Mod: 91 | Performed by: INTERNAL MEDICINE

## 2024-01-01 PROCEDURE — 63600175 PHARM REV CODE 636 W HCPCS: Performed by: REGISTERED NURSE

## 2024-01-01 PROCEDURE — 80048 BASIC METABOLIC PNL TOTAL CA: CPT | Mod: XB | Performed by: INTERNAL MEDICINE

## 2024-01-01 PROCEDURE — 80202 ASSAY OF VANCOMYCIN: CPT | Performed by: FAMILY MEDICINE

## 2024-01-01 PROCEDURE — 80076 HEPATIC FUNCTION PANEL: CPT | Performed by: FAMILY MEDICINE

## 2024-01-01 PROCEDURE — 85025 COMPLETE CBC W/AUTO DIFF WBC: CPT | Mod: 91 | Performed by: INTERNAL MEDICINE

## 2024-01-01 PROCEDURE — 85384 FIBRINOGEN ACTIVITY: CPT | Performed by: INTERNAL MEDICINE

## 2024-01-01 PROCEDURE — 82550 ASSAY OF CK (CPK): CPT | Performed by: INTERNAL MEDICINE

## 2024-01-01 PROCEDURE — 87077 CULTURE AEROBIC IDENTIFY: CPT | Performed by: FAMILY MEDICINE

## 2024-01-01 PROCEDURE — 85007 BL SMEAR W/DIFF WBC COUNT: CPT | Performed by: REGISTERED NURSE

## 2024-01-01 PROCEDURE — 81000 URINALYSIS NONAUTO W/SCOPE: CPT | Performed by: STUDENT IN AN ORGANIZED HEALTH CARE EDUCATION/TRAINING PROGRAM

## 2024-01-01 PROCEDURE — 85610 PROTHROMBIN TIME: CPT | Mod: 91 | Performed by: STUDENT IN AN ORGANIZED HEALTH CARE EDUCATION/TRAINING PROGRAM

## 2024-01-01 PROCEDURE — 63600175 PHARM REV CODE 636 W HCPCS

## 2024-01-01 PROCEDURE — 85730 THROMBOPLASTIN TIME PARTIAL: CPT | Mod: 91 | Performed by: FAMILY MEDICINE

## 2024-01-01 PROCEDURE — 63600175 PHARM REV CODE 636 W HCPCS: Performed by: STUDENT IN AN ORGANIZED HEALTH CARE EDUCATION/TRAINING PROGRAM

## 2024-01-01 PROCEDURE — 25000003 PHARM REV CODE 250: Performed by: FAMILY MEDICINE

## 2024-01-01 PROCEDURE — 87040 BLOOD CULTURE FOR BACTERIA: CPT | Mod: 59 | Performed by: STUDENT IN AN ORGANIZED HEALTH CARE EDUCATION/TRAINING PROGRAM

## 2024-01-01 PROCEDURE — 83735 ASSAY OF MAGNESIUM: CPT | Mod: 91 | Performed by: INTERNAL MEDICINE

## 2024-01-01 PROCEDURE — 25000003 PHARM REV CODE 250: Mod: JZ,JG | Performed by: INTERNAL MEDICINE

## 2024-01-01 PROCEDURE — 96375 TX/PRO/DX INJ NEW DRUG ADDON: CPT

## 2024-01-01 PROCEDURE — 99285 EMERGENCY DEPT VISIT HI MDM: CPT | Mod: 25

## 2024-01-01 PROCEDURE — 83605 ASSAY OF LACTIC ACID: CPT | Performed by: INTERNAL MEDICINE

## 2024-01-01 PROCEDURE — 87040 BLOOD CULTURE FOR BACTERIA: CPT | Performed by: FAMILY MEDICINE

## 2024-01-01 PROCEDURE — 94761 N-INVAS EAR/PLS OXIMETRY MLT: CPT

## 2024-01-01 PROCEDURE — 0T9B80Z DRAINAGE OF BLADDER WITH DRAINAGE DEVICE, VIA NATURAL OR ARTIFICIAL OPENING ENDOSCOPIC: ICD-10-PCS | Performed by: UROLOGY

## 2024-01-01 PROCEDURE — 80053 COMPREHEN METABOLIC PANEL: CPT | Performed by: FAMILY MEDICINE

## 2024-01-01 PROCEDURE — 85610 PROTHROMBIN TIME: CPT | Performed by: INTERNAL MEDICINE

## 2024-01-01 PROCEDURE — 93010 ELECTROCARDIOGRAM REPORT: CPT | Mod: ,,, | Performed by: STUDENT IN AN ORGANIZED HEALTH CARE EDUCATION/TRAINING PROGRAM

## 2024-01-01 PROCEDURE — 99222 1ST HOSP IP/OBS MODERATE 55: CPT | Mod: ,,, | Performed by: INTERNAL MEDICINE

## 2024-01-01 PROCEDURE — 83605 ASSAY OF LACTIC ACID: CPT | Mod: 91 | Performed by: FAMILY MEDICINE

## 2024-01-01 PROCEDURE — 85610 PROTHROMBIN TIME: CPT | Performed by: FAMILY MEDICINE

## 2024-01-01 PROCEDURE — 85730 THROMBOPLASTIN TIME PARTIAL: CPT | Performed by: INTERNAL MEDICINE

## 2024-01-01 RX ORDER — GLUCAGON 1 MG
1 KIT INJECTION
Status: DISCONTINUED | OUTPATIENT
Start: 2024-01-01 | End: 2024-01-01 | Stop reason: HOSPADM

## 2024-01-01 RX ORDER — POLYETHYLENE GLYCOL 3350 17 G/17G
17 POWDER, FOR SOLUTION ORAL DAILY
Status: DISCONTINUED | OUTPATIENT
Start: 2024-01-01 | End: 2024-01-01 | Stop reason: HOSPADM

## 2024-01-01 RX ORDER — PHENYLEPHRINE HCL IN 0.9% NACL 1 MG/10 ML
SYRINGE (ML) INTRAVENOUS
Status: COMPLETED
Start: 2024-01-01 | End: 2024-01-01

## 2024-01-01 RX ORDER — SUCRALFATE 1 G/10ML
1 SUSPENSION ORAL EVERY 6 HOURS
Status: DISCONTINUED | OUTPATIENT
Start: 2024-01-01 | End: 2024-01-01 | Stop reason: HOSPADM

## 2024-01-01 RX ORDER — LORAZEPAM 2 MG/ML
0.5 INJECTION INTRAMUSCULAR ONCE
Status: COMPLETED | OUTPATIENT
Start: 2024-01-01 | End: 2024-01-01

## 2024-01-01 RX ORDER — MORPHINE SULFATE 4 MG/ML
4 INJECTION, SOLUTION INTRAMUSCULAR; INTRAVENOUS
Status: DISCONTINUED | OUTPATIENT
Start: 2024-01-01 | End: 2024-01-01 | Stop reason: HOSPADM

## 2024-01-01 RX ORDER — NOREPINEPHRINE BITARTRATE/D5W 4MG/250ML
PLASTIC BAG, INJECTION (ML) INTRAVENOUS
Status: COMPLETED
Start: 2024-01-01 | End: 2024-01-01

## 2024-01-01 RX ORDER — CALCIUM GLUCONATE 20 MG/ML
1 INJECTION, SOLUTION INTRAVENOUS ONCE
Status: COMPLETED | OUTPATIENT
Start: 2024-01-01 | End: 2024-01-01

## 2024-01-01 RX ORDER — SODIUM CHLORIDE 9 MG/ML
1000 INJECTION, SOLUTION INTRAVENOUS
Status: ACTIVE | OUTPATIENT
Start: 2024-01-01 | End: 2024-01-01

## 2024-01-01 RX ORDER — MAGNESIUM SULFATE HEPTAHYDRATE 40 MG/ML
2 INJECTION, SOLUTION INTRAVENOUS
Status: COMPLETED | OUTPATIENT
Start: 2024-01-01 | End: 2024-01-01

## 2024-01-01 RX ORDER — LORAZEPAM 2 MG/ML
1 INJECTION INTRAMUSCULAR EVERY 4 HOURS PRN
Status: DISCONTINUED | OUTPATIENT
Start: 2024-01-01 | End: 2024-01-01 | Stop reason: HOSPADM

## 2024-01-01 RX ORDER — HYDROCODONE BITARTRATE AND ACETAMINOPHEN 500; 5 MG/1; MG/1
TABLET ORAL
Status: DISCONTINUED | OUTPATIENT
Start: 2024-01-01 | End: 2024-01-01 | Stop reason: HOSPADM

## 2024-01-01 RX ORDER — MEROPENEM 500 MG/1
500 INJECTION, POWDER, FOR SOLUTION INTRAVENOUS
Status: DISCONTINUED | OUTPATIENT
Start: 2024-01-01 | End: 2024-01-01

## 2024-01-01 RX ORDER — NOREPINEPHRINE BITARTRATE/D5W 4MG/250ML
0-3 PLASTIC BAG, INJECTION (ML) INTRAVENOUS CONTINUOUS
Status: DISCONTINUED | OUTPATIENT
Start: 2024-01-01 | End: 2024-01-01

## 2024-01-01 RX ORDER — PSEUDOEPHEDRINE/ACETAMINOPHEN 30MG-500MG
100 TABLET ORAL
Status: DISCONTINUED | OUTPATIENT
Start: 2024-01-01 | End: 2024-01-01

## 2024-01-01 RX ORDER — MUPIROCIN 20 MG/G
OINTMENT TOPICAL 2 TIMES DAILY
Status: DISCONTINUED | OUTPATIENT
Start: 2024-01-01 | End: 2024-01-01 | Stop reason: HOSPADM

## 2024-01-01 RX ORDER — MEROPENEM 1 G/1
1 INJECTION, POWDER, FOR SOLUTION INTRAVENOUS
Status: DISCONTINUED | OUTPATIENT
Start: 2024-01-01 | End: 2024-01-01 | Stop reason: HOSPADM

## 2024-01-01 RX ORDER — INSULIN ASPART 100 [IU]/ML
0-5 INJECTION, SOLUTION INTRAVENOUS; SUBCUTANEOUS EVERY 6 HOURS PRN
Status: DISCONTINUED | OUTPATIENT
Start: 2024-01-01 | End: 2024-01-01 | Stop reason: HOSPADM

## 2024-01-01 RX ORDER — ONDANSETRON HYDROCHLORIDE 2 MG/ML
8 INJECTION, SOLUTION INTRAVENOUS EVERY 8 HOURS PRN
Status: DISCONTINUED | OUTPATIENT
Start: 2024-01-01 | End: 2024-01-01 | Stop reason: HOSPADM

## 2024-01-01 RX ORDER — MORPHINE SULFATE 4 MG/ML
4 INJECTION, SOLUTION INTRAMUSCULAR; INTRAVENOUS ONCE
Status: COMPLETED | OUTPATIENT
Start: 2024-01-01 | End: 2024-01-01

## 2024-01-01 RX ORDER — METRONIDAZOLE 500 MG/100ML
500 INJECTION, SOLUTION INTRAVENOUS
Status: COMPLETED | OUTPATIENT
Start: 2024-01-01 | End: 2024-01-01

## 2024-01-01 RX ORDER — DEXMEDETOMIDINE HYDROCHLORIDE 4 UG/ML
0-1.4 INJECTION, SOLUTION INTRAVENOUS CONTINUOUS
Status: DISCONTINUED | OUTPATIENT
Start: 2024-01-01 | End: 2024-01-01

## 2024-01-01 RX ORDER — SODIUM CHLORIDE 9 MG/ML
1000 INJECTION, SOLUTION INTRAVENOUS
Status: COMPLETED | OUTPATIENT
Start: 2024-01-01 | End: 2024-01-01

## 2024-01-01 RX ORDER — ACETAMINOPHEN 500 MG
1000 TABLET ORAL
Status: DISCONTINUED | OUTPATIENT
Start: 2024-01-01 | End: 2024-01-01

## 2024-01-01 RX ORDER — FAMOTIDINE 10 MG/ML
20 INJECTION INTRAVENOUS DAILY
Status: DISCONTINUED | OUTPATIENT
Start: 2024-01-01 | End: 2024-01-01

## 2024-01-01 RX ORDER — ACETAMINOPHEN 650 MG/1
650 SUPPOSITORY RECTAL
Status: COMPLETED | OUTPATIENT
Start: 2024-01-01 | End: 2024-01-01

## 2024-01-01 RX ORDER — FUROSEMIDE 10 MG/ML
120 INJECTION INTRAMUSCULAR; INTRAVENOUS ONCE
Status: COMPLETED | OUTPATIENT
Start: 2024-01-01 | End: 2024-01-01

## 2024-01-01 RX ORDER — NYSTATIN 100000 [USP'U]/G
POWDER TOPICAL 2 TIMES DAILY
Status: ON HOLD | COMMUNITY
End: 2024-01-01 | Stop reason: HOSPADM

## 2024-01-01 RX ORDER — SENNOSIDES 8.6 MG/1
17.2 TABLET ORAL 2 TIMES DAILY
Status: DISCONTINUED | OUTPATIENT
Start: 2024-01-01 | End: 2024-01-01

## 2024-01-01 RX ORDER — CEFEPIME HYDROCHLORIDE 2 G/1
2 INJECTION, POWDER, FOR SOLUTION INTRAVENOUS
Status: COMPLETED | OUTPATIENT
Start: 2024-01-01 | End: 2024-01-01

## 2024-01-01 RX ORDER — ONDANSETRON HYDROCHLORIDE 2 MG/ML
8 INJECTION, SOLUTION INTRAVENOUS ONCE
Status: COMPLETED | OUTPATIENT
Start: 2024-01-01 | End: 2024-01-01

## 2024-01-01 RX ORDER — FUROSEMIDE 10 MG/ML
60 INJECTION INTRAMUSCULAR; INTRAVENOUS ONCE
Status: COMPLETED | OUTPATIENT
Start: 2024-01-01 | End: 2024-01-01

## 2024-01-01 RX ORDER — SYRING-NEEDL,DISP,INSUL,0.3 ML 29 G X1/2"
296 SYRINGE, EMPTY DISPOSABLE MISCELLANEOUS
Status: DISCONTINUED | OUTPATIENT
Start: 2024-01-01 | End: 2024-01-01

## 2024-01-01 RX ORDER — LIDOCAINE HYDROCHLORIDE 10 MG/ML
5 INJECTION, SOLUTION EPIDURAL; INFILTRATION; INTRACAUDAL; PERINEURAL ONCE
Status: DISCONTINUED | OUTPATIENT
Start: 2024-01-01 | End: 2024-01-01

## 2024-01-01 RX ORDER — SENNOSIDES 8.6 MG/1
8.6 TABLET ORAL DAILY
Status: DISCONTINUED | OUTPATIENT
Start: 2024-01-01 | End: 2024-01-01

## 2024-01-01 RX ORDER — FLUDROCORTISONE ACETATE 0.1 MG/1
100 TABLET ORAL DAILY
Status: DISCONTINUED | OUTPATIENT
Start: 2024-01-01 | End: 2024-01-01 | Stop reason: HOSPADM

## 2024-01-01 RX ORDER — LIDOCAINE HYDROCHLORIDE 10 MG/ML
5 INJECTION, SOLUTION EPIDURAL; INFILTRATION; INTRACAUDAL; PERINEURAL ONCE
Status: COMPLETED | OUTPATIENT
Start: 2024-01-01 | End: 2024-01-01

## 2024-01-01 RX ORDER — BISACODYL 10 MG/1
10 SUPPOSITORY RECTAL DAILY
Status: DISCONTINUED | OUTPATIENT
Start: 2024-01-01 | End: 2024-01-01 | Stop reason: HOSPADM

## 2024-01-01 RX ADMIN — HYDROCORTISONE SODIUM SUCCINATE 50 MG: 100 INJECTION, POWDER, FOR SOLUTION INTRAMUSCULAR; INTRAVENOUS at 01:10

## 2024-01-01 RX ADMIN — Medication 0.05 MCG/KG/MIN: at 04:10

## 2024-01-01 RX ADMIN — ACETAMINOPHEN 650 MG: 650 SUPPOSITORY RECTAL at 02:10

## 2024-01-01 RX ADMIN — MEROPENEM 1 G: 1 INJECTION, POWDER, FOR SOLUTION INTRAVENOUS at 08:10

## 2024-01-01 RX ADMIN — MUPIROCIN: 20 OINTMENT TOPICAL at 08:10

## 2024-01-01 RX ADMIN — POLYETHYLENE GLYCOL (3350) 17 G: 17 POWDER, FOR SOLUTION ORAL at 08:10

## 2024-01-01 RX ADMIN — PHENYLEPHRINE HYDROCHLORIDE 2.5 MCG/KG/MIN: 10 INJECTION INTRAVENOUS at 03:10

## 2024-01-01 RX ADMIN — CALCIUM GLUCONATE 1 G: 20 INJECTION, SOLUTION INTRAVENOUS at 07:10

## 2024-01-01 RX ADMIN — PHENYLEPHRINE HYDROCHLORIDE 2.5 MCG/KG/MIN: 10 INJECTION INTRAVENOUS at 05:10

## 2024-01-01 RX ADMIN — Medication 2084 UNITS: at 05:10

## 2024-01-01 RX ADMIN — FLUDROCORTISONE ACETATE 100 MCG: 0.1 TABLET ORAL at 08:10

## 2024-01-01 RX ADMIN — PHENYLEPHRINE HYDROCHLORIDE 5 MCG/KG/MIN: 10 INJECTION INTRAVENOUS at 07:10

## 2024-01-01 RX ADMIN — LIDOCAINE HYDROCHLORIDE 50 MG: 10 INJECTION, SOLUTION EPIDURAL; INFILTRATION; INTRACAUDAL at 02:10

## 2024-01-01 RX ADMIN — NOREPINEPHRINE BITARTRATE 1.36 MCG/KG/MIN: 1 INJECTION, SOLUTION, CONCENTRATE INTRAVENOUS at 11:10

## 2024-01-01 RX ADMIN — TRANEXAMIC ACID 1000 MG: 100 INJECTION, SOLUTION INTRAVENOUS at 05:10

## 2024-01-01 RX ADMIN — ONDANSETRON 8 MG: 2 INJECTION INTRAMUSCULAR; INTRAVENOUS at 02:10

## 2024-01-01 RX ADMIN — FUROSEMIDE 120 MG: 10 INJECTION, SOLUTION INTRAVENOUS at 12:10

## 2024-01-01 RX ADMIN — NOREPINEPHRINE BITARTRATE 1.4 MCG/KG/MIN: 1 INJECTION, SOLUTION, CONCENTRATE INTRAVENOUS at 05:10

## 2024-01-01 RX ADMIN — FAMOTIDINE 20 MG: 10 INJECTION, SOLUTION INTRAVENOUS at 11:10

## 2024-01-01 RX ADMIN — PHENYLEPHRINE HYDROCHLORIDE 5 MCG/KG/MIN: 10 INJECTION INTRAVENOUS at 02:10

## 2024-01-01 RX ADMIN — PHENYLEPHRINE HYDROCHLORIDE 2 MCG/KG/MIN: 10 INJECTION INTRAVENOUS at 10:10

## 2024-01-01 RX ADMIN — MEROPENEM 1 G: 1 INJECTION, POWDER, FOR SOLUTION INTRAVENOUS at 09:10

## 2024-01-01 RX ADMIN — VASOPRESSIN 0.04 UNITS/MIN: 20 INJECTION INTRAVENOUS at 10:10

## 2024-01-01 RX ADMIN — NOREPINEPHRINE BITARTRATE 2.4 MCG/KG/MIN: 1 INJECTION, SOLUTION, CONCENTRATE INTRAVENOUS at 03:10

## 2024-01-01 RX ADMIN — FLUDROCORTISONE ACETATE 100 MCG: 0.1 TABLET ORAL at 10:10

## 2024-01-01 RX ADMIN — PHENYLEPHRINE HYDROCHLORIDE 0.5 MCG/KG/MIN: 10 INJECTION INTRAVENOUS at 07:10

## 2024-01-01 RX ADMIN — MAGNESIUM SULFATE HEPTAHYDRATE 2 G: 40 INJECTION, SOLUTION INTRAVENOUS at 05:10

## 2024-01-01 RX ADMIN — HYDROCORTISONE SODIUM SUCCINATE 50 MG: 100 INJECTION, POWDER, FOR SOLUTION INTRAMUSCULAR; INTRAVENOUS at 05:10

## 2024-01-01 RX ADMIN — HYDROCORTISONE SODIUM SUCCINATE 50 MG: 100 INJECTION, POWDER, FOR SOLUTION INTRAMUSCULAR; INTRAVENOUS at 10:10

## 2024-01-01 RX ADMIN — SODIUM CHLORIDE 1000 ML: 0.9 INJECTION, SOLUTION INTRAVENOUS at 03:10

## 2024-01-01 RX ADMIN — VANCOMYCIN HYDROCHLORIDE 2000 MG: 500 INJECTION, POWDER, LYOPHILIZED, FOR SOLUTION INTRAVENOUS at 03:10

## 2024-01-01 RX ADMIN — NOREPINEPHRINE BITARTRATE 1.6 MCG/KG/MIN: 1 INJECTION, SOLUTION, CONCENTRATE INTRAVENOUS at 11:10

## 2024-01-01 RX ADMIN — PHENYLEPHRINE HYDROCHLORIDE 5 MCG/KG/MIN: 10 INJECTION INTRAVENOUS at 08:10

## 2024-01-01 RX ADMIN — PHENYLEPHRINE HYDROCHLORIDE 2 MCG/KG/MIN: 10 INJECTION INTRAVENOUS at 01:10

## 2024-01-01 RX ADMIN — PHENYLEPHRINE HYDROCHLORIDE 3 MCG/KG/MIN: 10 INJECTION INTRAVENOUS at 08:10

## 2024-01-01 RX ADMIN — VASOPRESSIN 0.04 UNITS/MIN: 20 INJECTION INTRAVENOUS at 07:10

## 2024-01-01 RX ADMIN — MUPIROCIN: 20 OINTMENT TOPICAL at 09:10

## 2024-01-01 RX ADMIN — HYDROCORTISONE SODIUM SUCCINATE 50 MG: 100 INJECTION, POWDER, FOR SOLUTION INTRAMUSCULAR; INTRAVENOUS at 11:10

## 2024-01-01 RX ADMIN — BISACODYL 10 MG: 10 SUPPOSITORY RECTAL at 06:10

## 2024-01-01 RX ADMIN — Medication: at 05:10

## 2024-01-01 RX ADMIN — VASOPRESSIN 0.04 UNITS/MIN: 20 INJECTION INTRAVENOUS at 04:10

## 2024-01-01 RX ADMIN — FUROSEMIDE 60 MG: 10 INJECTION, SOLUTION INTRAMUSCULAR; INTRAVENOUS at 09:10

## 2024-01-01 RX ADMIN — VASOPRESSIN 0.04 UNITS/MIN: 20 INJECTION INTRAVENOUS at 02:10

## 2024-01-01 RX ADMIN — PHENYLEPHRINE HYDROCHLORIDE 5 MCG/KG/MIN: 10 INJECTION INTRAVENOUS at 09:10

## 2024-01-01 RX ADMIN — PHYTONADIONE 10 MG: 10 INJECTION, EMULSION INTRAMUSCULAR; INTRAVENOUS; SUBCUTANEOUS at 05:10

## 2024-01-01 RX ADMIN — PHENYLEPHRINE HYDROCHLORIDE 3 MCG/KG/MIN: 10 INJECTION INTRAVENOUS at 03:10

## 2024-01-01 RX ADMIN — SODIUM CHLORIDE 1914 ML: 9 INJECTION, SOLUTION INTRAVENOUS at 02:10

## 2024-01-01 RX ADMIN — SODIUM BICARBONATE: 84 INJECTION, SOLUTION INTRAVENOUS at 03:10

## 2024-01-01 RX ADMIN — NOREPINEPHRINE BITARTRATE 0.66 MCG/KG/MIN: 1 INJECTION, SOLUTION, CONCENTRATE INTRAVENOUS at 07:10

## 2024-01-01 RX ADMIN — LORAZEPAM 0.5 MG: 2 INJECTION INTRAMUSCULAR; INTRAVENOUS at 11:10

## 2024-01-01 RX ADMIN — MAGNESIUM SULFATE HEPTAHYDRATE 2 G: 40 INJECTION, SOLUTION INTRAVENOUS at 08:10

## 2024-01-01 RX ADMIN — MORPHINE SULFATE 4 MG: 4 INJECTION INTRAVENOUS at 11:10

## 2024-01-01 RX ADMIN — HYDROCORTISONE SODIUM SUCCINATE 50 MG: 100 INJECTION, POWDER, FOR SOLUTION INTRAMUSCULAR; INTRAVENOUS at 12:10

## 2024-01-01 RX ADMIN — NOREPINEPHRINE BITARTRATE 2.92 MCG/KG/MIN: 1 INJECTION, SOLUTION, CONCENTRATE INTRAVENOUS at 11:10

## 2024-01-01 RX ADMIN — HYDROCORTISONE SODIUM SUCCINATE 50 MG: 100 INJECTION, POWDER, FOR SOLUTION INTRAMUSCULAR; INTRAVENOUS at 06:10

## 2024-01-01 RX ADMIN — NOREPINEPHRINE BITARTRATE 1.3 MCG/KG/MIN: 1 INJECTION, SOLUTION, CONCENTRATE INTRAVENOUS at 05:10

## 2024-01-01 RX ADMIN — METRONIDAZOLE 500 MG: 5 INJECTION, SOLUTION INTRAVENOUS at 04:10

## 2024-01-01 RX ADMIN — DEXMEDETOMIDINE HYDROCHLORIDE 0.2 MCG/KG/HR: 4 INJECTION, SOLUTION INTRAVENOUS at 02:10

## 2024-01-01 RX ADMIN — HYDROCORTISONE SODIUM SUCCINATE 50 MG: 100 INJECTION, POWDER, FOR SOLUTION INTRAMUSCULAR; INTRAVENOUS at 07:10

## 2024-01-01 RX ADMIN — CEFEPIME 2 G: 2 INJECTION, POWDER, FOR SOLUTION INTRAVENOUS at 02:10

## 2024-01-01 RX ADMIN — NOREPINEPHRINE BITARTRATE 2.9 MCG/KG/MIN: 1 INJECTION, SOLUTION, CONCENTRATE INTRAVENOUS at 08:10

## 2024-01-01 RX ADMIN — NOREPINEPHRINE BITARTRATE 2.6 MCG/KG/MIN: 1 INJECTION, SOLUTION, CONCENTRATE INTRAVENOUS at 11:10

## 2024-01-01 RX ADMIN — SENNOSIDES 17.2 MG: 8.6 TABLET, FILM COATED ORAL at 08:10

## 2024-01-17 ENCOUNTER — OUTSIDE PLACE OF SERVICE (OUTPATIENT)
Dept: ADMINISTRATIVE | Facility: OTHER | Age: 79
End: 2024-01-17

## 2024-01-17 PROCEDURE — 99307 SBSQ NF CARE SF MDM 10: CPT | Mod: ,,, | Performed by: FAMILY MEDICINE

## 2024-02-06 ENCOUNTER — HOSPITAL ENCOUNTER (EMERGENCY)
Facility: HOSPITAL | Age: 79
Discharge: HOME | End: 2024-02-06
Attending: EMERGENCY MEDICINE
Payer: MEDICARE

## 2024-02-06 VITALS
BODY MASS INDEX: 18.68 KG/M2 | TEMPERATURE: 97 F | WEIGHT: 119 LBS | OXYGEN SATURATION: 100 % | HEIGHT: 67 IN | RESPIRATION RATE: 15 BRPM | DIASTOLIC BLOOD PRESSURE: 50 MMHG | HEART RATE: 60 BPM | SYSTOLIC BLOOD PRESSURE: 115 MMHG

## 2024-02-06 DIAGNOSIS — T83.022A NEPHROSTOMY TUBE DISPLACED: Primary | ICD-10-CM

## 2024-02-06 LAB
ALBUMIN SERPL BCP-MCNC: 2.9 G/DL (ref 3.5–5.2)
ALP SERPL-CCNC: 64 U/L (ref 55–135)
ALT SERPL W/O P-5'-P-CCNC: 12 U/L (ref 10–44)
ANION GAP SERPL CALC-SCNC: 9 MMOL/L (ref 8–16)
AST SERPL-CCNC: 17 U/L (ref 10–40)
BACTERIA #/AREA URNS HPF: ABNORMAL /HPF
BASOPHILS # BLD AUTO: 0.05 K/UL (ref 0–0.2)
BASOPHILS NFR BLD: 0.7 % (ref 0–1.9)
BILIRUB SERPL-MCNC: 0.5 MG/DL (ref 0.1–1)
BILIRUB UR QL STRIP: NEGATIVE
BUN SERPL-MCNC: 58 MG/DL (ref 8–23)
CALCIUM SERPL-MCNC: 9.7 MG/DL (ref 8.7–10.5)
CHLORIDE SERPL-SCNC: 109 MMOL/L (ref 95–110)
CLARITY UR: ABNORMAL
CO2 SERPL-SCNC: 24 MMOL/L (ref 23–29)
COLOR UR: YELLOW
CREAT SERPL-MCNC: 0.8 MG/DL (ref 0.5–1.4)
DIFFERENTIAL METHOD BLD: ABNORMAL
EOSINOPHIL # BLD AUTO: 0.3 K/UL (ref 0–0.5)
EOSINOPHIL NFR BLD: 3.4 % (ref 0–8)
ERYTHROCYTE [DISTWIDTH] IN BLOOD BY AUTOMATED COUNT: 14.7 % (ref 11.5–14.5)
EST. GFR  (NO RACE VARIABLE): >60 ML/MIN/1.73 M^2
GLUCOSE SERPL-MCNC: 91 MG/DL (ref 70–110)
GLUCOSE UR QL STRIP: NEGATIVE
HCT VFR BLD AUTO: 42.5 % (ref 40–54)
HGB BLD-MCNC: 13.6 G/DL (ref 14–18)
HGB UR QL STRIP: ABNORMAL
HYALINE CASTS #/AREA URNS LPF: 0 /LPF
IMM GRANULOCYTES # BLD AUTO: 0.02 K/UL (ref 0–0.04)
IMM GRANULOCYTES NFR BLD AUTO: 0.3 % (ref 0–0.5)
KETONES UR QL STRIP: NEGATIVE
LACTATE SERPL-SCNC: 0.8 MMOL/L (ref 0.5–2.2)
LEUKOCYTE ESTERASE UR QL STRIP: ABNORMAL
LYMPHOCYTES # BLD AUTO: 1.7 K/UL (ref 1–4.8)
LYMPHOCYTES NFR BLD: 22.4 % (ref 18–48)
MCH RBC QN AUTO: 30.3 PG (ref 27–31)
MCHC RBC AUTO-ENTMCNC: 32 G/DL (ref 32–36)
MCV RBC AUTO: 95 FL (ref 82–98)
MICROSCOPIC COMMENT: ABNORMAL
MONOCYTES # BLD AUTO: 0.5 K/UL (ref 0.3–1)
MONOCYTES NFR BLD: 6.2 % (ref 4–15)
NEUTROPHILS # BLD AUTO: 5 K/UL (ref 1.8–7.7)
NEUTROPHILS NFR BLD: 67 % (ref 38–73)
NITRITE UR QL STRIP: NEGATIVE
NRBC BLD-RTO: 0 /100 WBC
PH UR STRIP: >8 [PH] (ref 5–8)
PLATELET # BLD AUTO: 182 K/UL (ref 150–450)
PMV BLD AUTO: 9.9 FL (ref 9.2–12.9)
POTASSIUM SERPL-SCNC: 4.8 MMOL/L (ref 3.5–5.1)
PROT SERPL-MCNC: 6.8 G/DL (ref 6–8.4)
PROT UR QL STRIP: ABNORMAL
RBC # BLD AUTO: 4.49 M/UL (ref 4.6–6.2)
RBC #/AREA URNS HPF: 2 /HPF (ref 0–4)
SODIUM SERPL-SCNC: 142 MMOL/L (ref 136–145)
SP GR UR STRIP: 1 (ref 1–1.03)
TRI-PHOS CRY URNS QL MICRO: ABNORMAL
URN SPEC COLLECT METH UR: ABNORMAL
UROBILINOGEN UR STRIP-ACNC: NEGATIVE EU/DL
WBC # BLD AUTO: 7.38 K/UL (ref 3.9–12.7)
WBC #/AREA URNS HPF: 2 /HPF (ref 0–5)

## 2024-02-06 PROCEDURE — 81000 URINALYSIS NONAUTO W/SCOPE: CPT | Performed by: EMERGENCY MEDICINE

## 2024-02-06 PROCEDURE — 80053 COMPREHEN METABOLIC PANEL: CPT | Performed by: EMERGENCY MEDICINE

## 2024-02-06 PROCEDURE — 25000003 PHARM REV CODE 250: Performed by: RADIOLOGY

## 2024-02-06 PROCEDURE — 85025 COMPLETE CBC W/AUTO DIFF WBC: CPT | Performed by: EMERGENCY MEDICINE

## 2024-02-06 PROCEDURE — 96360 HYDRATION IV INFUSION INIT: CPT

## 2024-02-06 PROCEDURE — 83605 ASSAY OF LACTIC ACID: CPT | Performed by: EMERGENCY MEDICINE

## 2024-02-06 PROCEDURE — 99285 EMERGENCY DEPT VISIT HI MDM: CPT | Mod: 25

## 2024-02-06 PROCEDURE — 25500020 PHARM REV CODE 255: Performed by: RADIOLOGY

## 2024-02-06 PROCEDURE — 99284 EMERGENCY DEPT VISIT MOD MDM: CPT | Mod: NSCH,25,, | Performed by: PHYSICIAN ASSISTANT

## 2024-02-06 PROCEDURE — 63600175 PHARM REV CODE 636 W HCPCS: Performed by: RADIOLOGY

## 2024-02-06 RX ORDER — SODIUM CHLORIDE 9 MG/ML
INJECTION, SOLUTION INTRAVENOUS
Status: COMPLETED | OUTPATIENT
Start: 2024-02-06 | End: 2024-02-06

## 2024-02-06 RX ORDER — CEPHALEXIN 500 MG/1
500 CAPSULE ORAL 4 TIMES DAILY
Qty: 20 CAPSULE | Refills: 0 | Status: SHIPPED | OUTPATIENT
Start: 2024-02-06 | End: 2024-02-11

## 2024-02-06 RX ORDER — LIDOCAINE HYDROCHLORIDE 10 MG/ML
INJECTION INFILTRATION; PERINEURAL
Status: COMPLETED | OUTPATIENT
Start: 2024-02-06 | End: 2024-02-06

## 2024-02-06 RX ADMIN — DEXTROSE MONOHYDRATE 2 G: 5 INJECTION INTRAVENOUS at 03:02

## 2024-02-06 RX ADMIN — LIDOCAINE HYDROCHLORIDE 5 ML: 10 INJECTION, SOLUTION INFILTRATION; PERINEURAL at 03:02

## 2024-02-06 RX ADMIN — IOHEXOL 15 ML: 350 INJECTION, SOLUTION INTRAVENOUS at 04:02

## 2024-02-06 RX ADMIN — SODIUM CHLORIDE 50 ML/HR: 9 INJECTION, SOLUTION INTRAVENOUS at 03:02

## 2024-02-06 NOTE — PROCEDURES
Radiology Post-Procedure Note    Pre Op Diagnosis: Ureteral obstruction  Post Op Diagnosis: Same    Procedure: Blunt recannulization of a left nephrostomy tract and placement of a left NUS    Procedure performed by: Richard Velazquez MD    Written Informed Consent Obtained: Yes  Specimen Removed: NO  Estimated Blood Loss: Minimal    Findings:   Left nephrostomy tract recanalized bluntly with a glidewire and Kumpe catheter. New 8 Fr x 26 cm left NUS placed with retention loop formed in renal pelvis.     Patient tolerated procedure well.    Richard Velazquez MD  Interventional Radiology  Department of Radiology

## 2024-02-06 NOTE — CONSULTS
Percutaneous Nephrostomy Tube Placement Consult Note  Interventional Radiology      Reason for Consult: dislodged neph tube    SUBJECTIVE:     Chief Complaint:  dislodged neph tube    History of Present Illness:  Praneeth Jewell is a 78 y.o. male with a PMHx of Obstructive Uropathy 2/2 Ureterolithiasis s/p L. Nephrostomy tube, 3rd degree AVB s/p PPM, CVA w/ residual deficits of 4/5 RLE weakness, 3/5 LLE weakness, and being bedbound, MDD, essential hypertension, Dyslipidemia, T2DM, and chronic constipation admitted for neph tube dislodgement.  Pt reports neph tube completely dislodged overnight.  Pt arrived without the tube in place.  Last replacement 12/9/23.  The pt's Cr is currently 0.8, at baseline.  The pt's WBC is WNL , pt is hemodynamically stable.     Review of Systems   Constitutional:  Negative for chills, fever, malaise/fatigue and weight loss.   Respiratory:  Negative for cough and shortness of breath.    Cardiovascular:  Negative for chest pain, palpitations and leg swelling.   Gastrointestinal:  Negative for abdominal pain, diarrhea, nausea and vomiting.   Genitourinary:  Negative for dysuria and flank pain.   Musculoskeletal:  Negative for back pain and myalgias.   Neurological:  Negative for weakness and headaches.       Scheduled Meds:  Continuous Infusions:  PRN Meds:    Review of patient's allergies indicates:  No Known Allergies    Past Medical History:   Diagnosis Date    Arthritis     Diabetes mellitus     type 2    Folate deficiency anemia     Heart block     History of kidney stones 05/25/2021    History of stroke with residual deficit 05/25/2021    Hyperlipidemia     Hypertension     Major depressive disorder     Pacemaker     Biotronic Edora 8 JONATHON (S/n: [id number])    Pyelonephritis 11/19/2021    TIA (transient ischemic attack)     Urinary retention 05/25/2021     Past Surgical History:   Procedure Laterality Date    A-V CARDIAC PACEMAKER INSERTION N/A 8/24/2021    Procedure: INSERTION, CARDIAC  PACEMAKER, DUAL CHAMBER;  Surgeon: Neo Winn MD;  Location: [facility] EP LAB;  Service: Cardiology;  Laterality: N/A;  CHB, DUAL PPM, ANES, BIO, DM, ED 2    COLONOSCOPY N/A 11/22/2021    Procedure: COLONOSCOPY;  Surgeon: Cesar Dorado MD;  Location: [facility] ENDO (2ND FLR);  Service: Endoscopy;  Laterality: N/A;    CYSTOSCOPIC LITHOLAPAXY  5/25/2021    Procedure: CYSTOLITHOLAPAXY;  Surgeon: Chris Schilling MD;  [facility];  Service: Urology;;    CYSTOSCOPY  8/26/2021    Procedure: CYSTOSCOPY;  Surgeon: Camilo Kelley Jr., MD;  [facility];  Service: Urology;;    CYSTOSCOPY W/ URETERAL STENT PLACEMENT Bilateral 5/25/2021    Procedure: CYSTOSCOPY, WITH BILATERAL URETERAL STENT INSERTION;  Surgeon: Chris Schilling MD;  [facility];  Service: Urology;  Laterality: Bilateral;    ELBOW ARTHROPLASTY Right     FLUOROSCOPY  5/25/2021    Procedure: FLUOROSCOPY;  Surgeon: Chris Schilling MD;  [facility];  Service: Urology;;    LASER LITHOTRIPSY Left 8/26/2021    Procedure: LITHOTRIPSY, USING LASER;  Surgeon: Camilo Kelley Jr., MD;  [facility];  Service: Urology;  Laterality: Left;    PYELOSCOPY Bilateral 8/26/2021    Procedure: PYELOSCOPY;  Surgeon: Camilo Kelley Jr., MD;  [facility];  Service: Urology;  Laterality: Bilateral;    REMOVAL OF BLOOD CLOT  5/25/2021    Procedure: REMOVAL, BLOOD CLOT;  Surgeon: Chris Schilling MD;  [facility];  Service: Urology;;    REPLACEMENT OF STENT Bilateral 8/26/2021    Procedure: REPLACEMENT, STENT;  Surgeon: Camilo Kelley Jr., MD;  [facility];  Service: Urology;  Laterality: Bilateral;    URETEROSCOPIC REMOVAL OF URETERIC CALCULUS Bilateral 8/26/2021    Procedure: REMOVAL, CALCULUS, URETER, URETEROSCOPIC;  Surgeon: Camilo Kelley Jr., MD;  Location: [facility] OR [address] [place] [address];  Service: Urology;  Laterality: Bilateral;    URETEROSCOPY Bilateral 8/26/2021    Procedure:  "URETEROSCOPY;  Surgeon: Camilo Kelley Jr., MD;  Location: [facility] OR [address] [place] [address];  Service: Urology;  Laterality: Bilateral;     Family History   Problem Relation Age of Onset    Stroke Mother     Hyperlipidemia Mother     Mental illness Father     Parkinsonism Father     No Known Problems Brother      Social History     Tobacco Use    Smoking status: Former     Types: Cigarettes    Smokeless tobacco: Never   Substance Use Topics    Alcohol use: Yes     Comment: 1/ pint whisky daily    Drug use: Yes     Types: "Crack" cocaine       OBJECTIVE:     Vital Signs (Most Recent)  Temp: 97.6 °F (36.4 °C) (02/06/24 0806)  Pulse: 61 (02/06/24 0915)  Resp: 20 (02/06/24 0806)  BP: (!) 117/57 (02/06/24 0841)  SpO2: 98 % (02/06/24 0915)    Physical Exam:  Physical Exam  Vitals and nursing note reviewed.   Constitutional:       Appearance: He is ill-appearing.   HENT:      Head: Normocephalic and atraumatic.   Eyes:      Extraocular Movements: Extraocular movements intact.   Cardiovascular:      Rate and Rhythm: Normal rate.   Pulmonary:      Effort: Pulmonary effort is normal.   Abdominal:      General: Abdomen is flat. There is no distension.      Palpations: Abdomen is soft.      Tenderness: There is no abdominal tenderness.   Musculoskeletal:         General: Normal range of motion.      Cervical back: Normal range of motion.   Skin:     General: Skin is warm and dry.      Comments: L neph tube completely dislodged, no surrounding erythema or drainage   Neurological:      Mental Status: He is alert and oriented to person, place, and time.   Psychiatric:         Mood and Affect: Mood normal.         Behavior: Behavior normal.         Thought Content: Thought content normal.         Judgment: Judgment normal.           Laboratory  I have reviewed all pertinent lab results within the past 24 hours.  CBC:   Recent Labs   Lab 02/06/24  0911   WBC 7.38   RBC 4.49*   HGB 13.6*   HCT 42.5      MCV 95   MCH 30.3   MCHC 32.0     CMP: " "  Recent Labs   Lab 02/06/24  0911   GLU 91   CALCIUM 9.7   ALBUMIN 2.9*   PROT 6.8      K 4.8   CO2 24      BUN 58*   CREATININE 0.8   ALKPHOS 64   ALT 12   AST 17   BILITOT 0.5     Coagulation: No results for input(s): "LABPROT", "INR", "APTT" in the last 168 hours.    ASA/Mallampati  ASA: 3  Mallampati: 2    Imaging:  No recent imaging to review    ASSESSMENT/PLAN:     Assessment:  78 y.o. male with a PMHx of Obstructive Uropathy 2/2 Ureterolithiasis s/p L. Nephrostomy tube, 3rd degree AVB s/p PPM, CVA w/ residual deficits of 4/5 RLE weakness, 3/5 LLE weakness, and being bedbound, MDD, essential hypertension, Dyslipidemia, T2DM, and chronic constipation admitted for neph tube dislodgement.  Pt reports neph tube completely dislodged overnight.  Hospital notes reviewed.  The procedure was discussed in great detail with the patient including thorough explanations of the potential risks and benefits of nephrostomy tube placement. Risks include hematuria, pain, sepsis, injury to ureter or kidney, injury to adjacent organs, catheter dislodgement and inability to remove nephrostomy tube due to cystallization. The patient is a candidate for left sidednephrostomy tube placement under moderate sedation.     Plan discussed with staff Dr. Velazquez and ordering physician.The pt verbalized understanding of the plan and would like to proceed.    Plan:  Will proceed with left sidednephrostomy tube under moderate sedation on 2/6.  Order placed.  Please keep pt NPO, last intake evening prior to arrival.  Anticoagulation history reviewed. Pt not on AC or antiplatelet.  Allergies reviewed.  There is not allergy to contrast.    Antibiotic 2 gm ceftriaxone.  Pt has not seen urology since May 2023.  Recommend refer back to urology at discharge.    Thank you for the consult. Please contact with questions via [employer] secure chat or spectra.    Tabby Murrell PA-C  Interventional Radiology   "

## 2024-02-06 NOTE — ED NOTES
Bedside report received from Bryanna   Left nephrostomy draining well   Pt requesting meal tray voices no other complaints at this time

## 2024-02-06 NOTE — DISCHARGE INSTRUCTIONS
Your nephrostomy tube was replaced. Please take the antibiotics as prescribed and follow up with Dr Azar.

## 2024-02-06 NOTE — ED PROVIDER NOTES
Emergency Department Encounter  Provider Note  Encounter Date: 2/6/2024    Patient Name: Praneeth Jewell  MRN: [id number]    History of Present Illness   HPI  History of Present Illness:    Chief Complaint:   Chief Complaint   Patient presents with    nephrostomy tube removed     [id number] home for nephrostomy tube removed sometime during the night, patient also has a mark.        78-year-old male presenting with left nephrostomy tube dislodgement.  Patient not complaining of pain.    The following PMH/PSH/SocHx/FamHx has been reviewed by myself:    Past Medical History:   Diagnosis Date    Arthritis     Diabetes mellitus     type 2    Folate deficiency anemia     Heart block     History of kidney stones 05/25/2021    History of stroke with residual deficit 05/25/2021    Hyperlipidemia     Hypertension     Major depressive disorder     Pacemaker     Biotronic Edora 8 DR-PRATIK (S/n: [id number])    Pyelonephritis 11/19/2021    TIA (transient ischemic attack)     Urinary retention 05/25/2021     Past Surgical History:   Procedure Laterality Date    A-V CARDIAC PACEMAKER INSERTION N/A 8/24/2021    Procedure: INSERTION, CARDIAC PACEMAKER, DUAL CHAMBER;  Surgeon: Neo Winn MD;  Location: [facility] EP LAB;  Service: Cardiology;  Laterality: N/A;  CHB, DUAL PPM, ANES, BIO, DM, ED 2    COLONOSCOPY N/A 11/22/2021    Procedure: COLONOSCOPY;  Surgeon: Cesar Dorado MD;  Location: [facility] ENDO (2ND FLR);  Service: Endoscopy;  Laterality: N/A;    CYSTOSCOPIC LITHOLAPAXY  5/25/2021    Procedure: CYSTOLITHOLAPAXY;  Surgeon: Chris Schilling MD;  [facility];  Service: Urology;;    CYSTOSCOPY  8/26/2021    Procedure: CYSTOSCOPY;  Surgeon: Camilo Kelley Jr., MD;  [facility];  Service: Urology;;    CYSTOSCOPY W/ URETERAL STENT PLACEMENT Bilateral 5/25/2021    Procedure: CYSTOSCOPY, WITH BILATERAL URETERAL STENT INSERTION;  Surgeon: Chris Schilling MD;  Location:  "[facility] OR 1ST FLR;  Service: Urology;  Laterality: Bilateral;    ELBOW ARTHROPLASTY Right     FLUOROSCOPY  5/25/2021    Procedure: FLUOROSCOPY;  Surgeon: Chris Schilling MD;  Location: [facility] OR [facility] FLR;  Service: Urology;;    LASER LITHOTRIPSY Left 8/26/2021    Procedure: LITHOTRIPSY, USING LASER;  Surgeon: Camilo Kelley Jr., MD;  [facility];  Service: Urology;  Laterality: Left;    PYELOSCOPY Bilateral 8/26/2021    Procedure: PYELOSCOPY;  Surgeon: Camilo Kelley Jr., MD;  [facility];  Service: Urology;  Laterality: Bilateral;    REMOVAL OF BLOOD CLOT  5/25/2021    Procedure: REMOVAL, BLOOD CLOT;  Surgeon: Chris Schilling MD;  [facility];  Service: Urology;;    REPLACEMENT OF STENT Bilateral 8/26/2021    Procedure: REPLACEMENT, STENT;  Surgeon: Camilo Kelley Jr., MD;  [facility];  Service: Urology;  Laterality: Bilateral;    URETEROSCOPIC REMOVAL OF URETERIC CALCULUS Bilateral 8/26/2021    Procedure: REMOVAL, CALCULUS, URETER, URETEROSCOPIC;  Surgeon: Camilo Kelley Jr., MD;  [facility];  Service: Urology;  Laterality: Bilateral;    URETEROSCOPY Bilateral 8/26/2021    Procedure: URETEROSCOPY;  Surgeon: Camilo Kelley Jr., MD;  [facility];  Service: Urology;  Laterality: Bilateral;     Social History     Tobacco Use    Smoking status: Former     Types: Cigarettes    Smokeless tobacco: Never   Substance Use Topics    Alcohol use: Yes     Comment: 1/ pint whisky daily    Drug use: Yes     Types: "Crack" cocaine     Family History   Problem Relation Age of Onset    Stroke Mother     Hyperlipidemia Mother     Mental illness Father     Parkinsonism Father     No Known Problems Brother        Allergies reviewed:  Review of patient's allergies indicates:  No Known Allergies    Medications reviewed:  Medication List with Changes/Refills   New Medications    CEPHALEXIN (KEFLEX) 500 MG CAPSULE    Take 1 capsule (500 mg total) by " mouth 4 (four) times daily. for 5 days   Current Medications    ATORVASTATIN (LIPITOR) 40 MG TABLET    Take 40 mg by mouth once daily.    BISACODYL (DULCOLAX, BISACODYL,) 10 MG SUPP    Place 1 suppository (10 mg total) rectally daily as needed (constipation).    CYPROHEPTADINE (PERIACTIN) 4 MG TABLET    Take 4 mg by mouth 3 (three) times daily. Itching    DOCUSATE SODIUM (COLACE) 100 MG CAPSULE    Take 1 capsule (100 mg total) by mouth once daily.    FOLIC ACID (FOLVITE) 1 MG TABLET    Take 1 mg by mouth once daily.    GABAPENTIN (NEURONTIN) 300 MG CAPSULE    Take 300 mg by mouth 3 (three) times daily.    MENTHOL-ZINC OXIDE (CALMOSEPTINE) 0.44-20.6 % OINT    Apply topically daily as needed. To sacral area after each sacral excoriation episode and as needed    MIRTAZAPINE (REMERON) 30 MG TABLET    Take 30 mg by mouth nightly.    POLYETHYLENE GLYCOL (GLYCOLAX) 17 GRAM PWPK    Take 17 g by mouth 2 (two) times daily.    TAMSULOSIN (FLOMAX) 0.4 MG CAP    TAKE 2 CAPSULES(0.8 MG) BY MOUTH EVERY DAY         Physical Exam   Physical Exam    Initial Vitals [02/06/24 0806]   BP Pulse Resp Temp SpO2   (!) 108/53 63 20 97.6 °F (36.4 °C) 97 %      MAP       --           Triage vital signs reviewed.    Constitutional: thin, well-developed. Not in acute distress.  HENT: Normocephalic, atraumatic. Moist mucous membranes.  Eyes: No conjunctival injection.  Resp: Normal respiratory effort, breathing unlabored.  Cardio: Regular rate and rhythm.  GI: Abdomen non-distended.   MSK: L nephrostomy tube site intact, slight erythema, no drainage.  Skin: Warm and dry.   Neuro: Awake and alert.     ED Course   Procedures    Medical Decision Making    History Acquisition     The history is provided by the patient.   Assessment requiring an independent historian and why historian was needed: EMS supplementing hx     Review of prior external/non ED notes: pt with visits for nephrostomy tube removal, replaced by IR, hx obstructing stone and hydro,  also with BPH so has mark    Differential Diagnoses   Based on available information and initial assessment, the working Differential Diagnosis includes, but is not limited to:  AAA, aortic dissection, SBO/volvulus, intussusception, ileus, appendicitis, cholecystitis, hepatitis, nephrolithiasis, pancreatitis, IBD/IBS, biliary colic, GERD, PUD, constipation, UTI/pyelonephritis, musculoskeletal pain.   .    EKG   EKG ordered and independently reviewed by me:       Labs   Lab tests ordered and independently reviewed by me:    Labs Reviewed   CBC W/ AUTO DIFFERENTIAL - Abnormal; Notable for the following components:       Result Value    RBC 4.49 (*)     Hemoglobin 13.6 (*)     RDW 14.7 (*)     All other components within normal limits   COMPREHENSIVE METABOLIC PANEL - Abnormal; Notable for the following components:    BUN 58 (*)     Albumin 2.9 (*)     All other components within normal limits   URINALYSIS, REFLEX TO URINE CULTURE - Abnormal; Notable for the following components:    Appearance, UA Cloudy (*)     pH, UA >8.0 (*)     Specific [place], UA 1.000 (*)     Protein, UA 3+ (*)     Occult Blood UA 2+ (*)     Leukocytes, UA 3+ (*)     All other components within normal limits    Narrative:     Specimen Source->Urine   URINALYSIS MICROSCOPIC - Abnormal; Notable for the following components:    Bacteria Many (*)     All other components within normal limits    Narrative:     Specimen Source->Urine   LACTIC ACID, PLASMA       Imaging   Imaging ordered and independently reviewed by me:   Imaging Results              IR Nephrostomy Tube Change (In process)                        Additional Consideration   Praneeth McTopy  presents to the Emergency Department today with Left nephrostomy tube dislodgement.  No signs of infection, no fevers.  Labs, urinalysis, IR and urology consults.    Additional testing considered but not indicated during the course of this workup: further imaging and labwork, not  indicated  Co-morbidities/chronic illness/exacerbation of chronic illness considered which impacted clinical decision making: kidney stones, BPH  Procedures done in the ED or plan for the OR: Yes, describe: L nephrostomy tube replacement  Social determinants of care considered during development of treatment plan include: Decreased medical literacy  Discussion of management or test interpretation with external provider: Yes, describe: see ED course  DNR discussion: No    The patient's list of active medications and allergies as documented per RN staff has been reviewed.  Medications given in the ED and/or prescribed:   Medications   cefTRIAXone (ROCEPHIN) 2 g in dextrose 5 % in water (D5W) 100 mL IVPB (MB+) (has no administration in time range)             ED Course as of 02/06/24 1546   Tue Feb 06, 2024   0935 CBC auto differential(!)  Independently interpreted by me; unremarkable or consistent with the patient's baseline   [CS]   0935 Comprehensive metabolic panel(!)  Elev BUN, normal Cr [CS]   0935 Lactic Acid Level: 0.8  wnl [CS]   0935 Spoke w Dr Velazquez of IR; will be able to add on case later today likely. Spoke with Dr Lopez on for Urology; will msg Dr Azar, will need IR to replace the tube.  [CS]   1301 Urinalysis, Reflex to Urine Culture Urine, Clean Catch(!)  3+ leuks, seen previously [CS]   1301 Awaiting IR to replace L nephrostomy tube [CS]   1301 Will treat the urine, has had+klebsiella in multiple previous ucx [CS]      ED Course User Index  [CS] Avis Guy MD       Explanation of disposition: Discharge    Clinical Impression:     1. Nephrostomy tube displaced         All results from the workup were reviewed with the patient/family in detail. I discussed with the patient and/or the family/caregiver that today's evaluation in the ED does not suggest any emergent or life-threatening medical conditions that would require hospitalization or immediate intervention beyond what was provided in the  ED. I believe the patient is safe for discharge.  However, a reassuring evaluation in the ED does not preclude the presence or development of a serious or life-threatening condition. As such, strict return precautions were discussed with the patient expressing understanding of instructions, and all questions answered. The patient/family communicates understanding of all this information and all remaining questions and concerns were addressed at this time.    The patient/family has been provided with verbal and printed direction regarding our final diagnosis(es) as well as instructions regarding use of OTC and/or Rx medications intended to manage the patient's aforementioned conditions including:  ED Prescriptions       Medication Sig Dispense Start Date End Date Auth. Provider    cephALEXin (KEFLEX) 500 MG capsule Take 1 capsule (500 mg total) by mouth 4 (four) times daily. for 5 days 20 capsule 2/6/2024 2/11/2024 Avis Guy MD          The patient's condition does not warrant review of the  and prescription of controlled substances.            Avis Guy MD  02/06/24 1540

## 2024-02-07 ENCOUNTER — OUTSIDE PLACE OF SERVICE (OUTPATIENT)
Dept: ADMINISTRATIVE | Facility: OTHER | Age: 79
End: 2024-02-07
Payer: MEDICARE

## 2024-02-07 PROCEDURE — 99499 UNLISTED E&M SERVICE: CPT | Mod: ,,, | Performed by: FAMILY MEDICINE

## 2024-02-12 DIAGNOSIS — N20.0 NEPHROLITHIASIS: Primary | Chronic | ICD-10-CM

## 2024-03-13 DIAGNOSIS — N20.0 NEPHROLITHIASIS: Primary | ICD-10-CM

## 2024-03-13 RX ORDER — SODIUM CHLORIDE 9 MG/ML
INJECTION, SOLUTION INTRAVENOUS CONTINUOUS
Status: CANCELLED | OUTPATIENT
Start: 2024-03-13

## 2024-04-01 ENCOUNTER — TELEPHONE (OUTPATIENT)
Dept: INTERVENTIONAL RADIOLOGY/VASCULAR | Facility: HOSPITAL | Age: 79
End: 2024-04-01
Payer: MEDICARE

## 2024-04-01 NOTE — NURSING
Spoke to facility personnel, [place]: arrive at 12:00, where to check in, nothing to eat/drink past midnight, take a.m. meds with small sip of water, bring current list of meds, send most current labs. Confirmed no allergies and no thinners.

## 2024-04-02 ENCOUNTER — HOSPITAL ENCOUNTER (OUTPATIENT)
Dept: INTERVENTIONAL RADIOLOGY/VASCULAR | Facility: HOSPITAL | Age: 79
Discharge: HOME | End: 2024-04-02
Attending: PHYSICIAN ASSISTANT
Payer: MEDICARE

## 2024-04-02 VITALS
SYSTOLIC BLOOD PRESSURE: 113 MMHG | DIASTOLIC BLOOD PRESSURE: 59 MMHG | WEIGHT: 125 LBS | TEMPERATURE: 97 F | BODY MASS INDEX: 19.62 KG/M2 | HEIGHT: 67 IN | OXYGEN SATURATION: 97 % | HEART RATE: 62 BPM | RESPIRATION RATE: 20 BRPM

## 2024-04-02 DIAGNOSIS — N20.0 NEPHROLITHIASIS: Chronic | ICD-10-CM

## 2024-04-02 PROCEDURE — C1769 GUIDE WIRE: HCPCS

## 2024-04-02 PROCEDURE — 50435 EXCHANGE NEPHROSTOMY CATH: CPT | Mod: LT | Performed by: RADIOLOGY

## 2024-04-02 PROCEDURE — 25000003 PHARM REV CODE 250: Performed by: RADIOLOGY

## 2024-04-02 PROCEDURE — 63600175 PHARM REV CODE 636 W HCPCS: Performed by: RADIOLOGY

## 2024-04-02 PROCEDURE — C2617 STENT, NON-COR, TEM W/O DEL: HCPCS

## 2024-04-02 RX ORDER — LIDOCAINE HYDROCHLORIDE 10 MG/ML
INJECTION INFILTRATION; PERINEURAL
Status: COMPLETED | OUTPATIENT
Start: 2024-04-02 | End: 2024-04-02

## 2024-04-02 RX ORDER — SODIUM CHLORIDE 9 MG/ML
INJECTION, SOLUTION INTRAVENOUS CONTINUOUS
Status: DISCONTINUED | OUTPATIENT
Start: 2024-04-02 | End: 2024-04-03 | Stop reason: HOSPADM

## 2024-04-02 RX ORDER — CIPROFLOXACIN 2 MG/ML
400 INJECTION, SOLUTION INTRAVENOUS ONCE
Status: COMPLETED | OUTPATIENT
Start: 2024-04-02 | End: 2024-04-02

## 2024-04-02 RX ADMIN — LIDOCAINE HYDROCHLORIDE 5 ML: 10 INJECTION, SOLUTION INFILTRATION; PERINEURAL at 02:04

## 2024-04-02 RX ADMIN — CIPROFLOXACIN 400 MG: 400 INJECTION, SOLUTION INTRAVENOUS at 12:04

## 2024-04-02 NOTE — H&P
Radiology History & Physical      SUBJECTIVE:     Chief Complaint: Ureteral obstruction    History of Present Illness:  Praneeth Jewell is a 78 y.o. male with ureteral obstruction who presents for left PCNU exchange.    Past Medical History:   Diagnosis Date    Arthritis     Diabetes mellitus     type 2    Folate deficiency anemia     Heart block     History of kidney stones 05/25/2021    History of stroke with residual deficit 05/25/2021    Hyperlipidemia     Hypertension     Major depressive disorder     Pacemaker     Biotronic Edora 8 DR-T (S/n: [id number])    Pyelonephritis 11/19/2021    TIA (transient ischemic attack)     Urinary retention 05/25/2021     Past Surgical History:   Procedure Laterality Date    A-V CARDIAC PACEMAKER INSERTION N/A 8/24/2021    Procedure: INSERTION, CARDIAC PACEMAKER, DUAL CHAMBER;  Surgeon: Neo Winn MD;  [place] [facility] [place] [facility];  Service: Cardiology;  Laterality: N/A;  CHB, DUAL PPM, ANES, BIO, DM, ED 2    COLONOSCOPY N/A 11/22/2021    Procedure: COLONOSCOPY;  Surgeon: Cesar Dorado MD;  [place] [facility] (2ND F[facility]);  Service: Endoscopy;  Laterality: N/A;    CYSTOSCOPIC LITHOLAPAXY  5/25/2021    Procedure: CYSTOLITHOLAPAXY;  Surgeon: Chris Schilling MD;  [place] [facility] [place] [address] [place] [address];  Service: Urology;;    CYSTOSCOPY  8/26/2021    Procedure: CYSTOSCOPY;  Surgeon: Camilo Kelley Jr., MD;  [facility];  Service: Urology;;    CYSTOSCOPY W/ URETERAL STENT PLACEMENT Bilateral 5/25/2021    Procedure: CYSTOSCOPY, WITH BILATERAL URETERAL STENT INSERTION;  Surgeon: Chris Schilling MD;  [facility];  Service: Urology;  Laterality: Bilateral;    ELBOW ARTHROPLASTY Right     FLUOROSCOPY  5/25/2021    Procedure: FLUOROSCOPY;  Surgeon: Chris Schilling MD;  [place] [facility] [place] [address] [place] [address];  Service: Urology;;    LASER LITHOTRIPSY Left 8/26/2021    Procedure: LITHOTRIPSY, USING LASER;  Surgeon: Camilo Kelley Jr., MD;  [place] [facility] [place] [address] [place] [address];  Service: Urology;   Laterality: Left;    PYELOSCOPY Bilateral 8/26/2021    Procedure: PYELOSCOPY;  Surgeon: Camilo Kelley Jr., MD;  [facility];  Service: Urology;  Laterality: Bilateral;    REMOVAL OF BLOOD CLOT  5/25/2021    Procedure: REMOVAL, BLOOD CLOT;  Surgeon: Chris Schilling MD;  [facility];  Service: Urology;;    REPLACEMENT OF STENT Bilateral 8/26/2021    Procedure: REPLACEMENT, STENT;  Surgeon: Camilo Kelley Jr., MD;  [facility];  Service: Urology;  Laterality: Bilateral;    URETEROSCOPIC REMOVAL OF URETERIC CALCULUS Bilateral 8/26/2021    Procedure: REMOVAL, CALCULUS, URETER, URETEROSCOPIC;  Surgeon: Camilo Kelley Jr., MD;  Location: [facility] OR [address] [place] [address];  Service: Urology;  Laterality: Bilateral;    URETEROSCOPY Bilateral 8/26/2021    Procedure: URETEROSCOPY;  Surgeon: Camilo Kelley Jr., MD;  Location: [facility] OR [address] [place] [address];  Service: Urology;  Laterality: Bilateral;       Home Meds:   Prior to Admission medications    Medication Sig Start Date End Date Taking? Authorizing Provider   atorvastatin (LIPITOR) 40 MG tablet Take 40 mg by mouth once daily.   Yes Provider, Historical   docusate sodium (COLACE) 100 MG capsule Take 1 capsule (100 mg total) by mouth once daily. 4/17/23  Yes Jenny Smith MD   folic acid (FOLVITE) 1 MG tablet Take 1 mg by mouth once daily.   Yes Provider, Historical   gabapentin (NEURONTIN) 300 MG capsule Take 300 mg by mouth 3 (three) times daily. 4/7/21  Yes Provider, Historical   menthol-zinc oxide (CALMOSEPTINE) 0.44-20.6 % Oint Apply topically daily as needed. To sacral area after each sacral excoriation episode and as needed   Yes Provider, Historical   mirtazapine (REMERON) 30 MG tablet Take 30 mg by mouth nightly. 5/27/21  Yes Provider, Historical   polyethylene glycol (GLYCOLAX) 17 gram PwPk Take 17 g by mouth 2 (two) times daily. 4/17/23  Yes Jenny Smith MD   tamsulosin (FLOMAX) 0.4 mg Cap TAKE 2 CAPSULES(0.8 MG) BY MOUTH EVERY DAY  Patient  taking differently: Take 0.8 mg by mouth once daily. 5/18/23  Yes Camilo Kelley Jr., MD   bisacodyL (DULCOLAX, BISACODYL,) 10 mg Supp Place 1 suppository (10 mg total) rectally daily as needed (constipation). 4/17/23   Jenny Smith MD   cyproheptadine (PERIACTIN) 4 mg tablet Take 4 mg by mouth 3 (three) times daily. Itching 4/7/21   Provider, Historical     Anticoagulants/Antiplatelets: no anticoagulation    Allergies: Review of patient's allergies indicates:  No Known Allergies  Sedation History:  no adverse reactions    Review of Systems:   Hematological: no known coagulopathies  Respiratory: no shortness of breath  Cardiovascular: no chest pain  Gastrointestinal: no abdominal pain  Genito-Urinary: no dysuria  Musculoskeletal: negative  Neurological: no TIA or stroke symptoms         OBJECTIVE:     Vital Signs (Most Recent)  Temp: 97.4 °F (36.3 °C) (04/02/24 1222)  Pulse: 62 (04/02/24 1356)  Resp: 20 (04/02/24 1356)  BP: (!) 119/55 (04/02/24 1356)  SpO2: 97 % (04/02/24 1356)    Physical Exam:  ASA: 3  Mallampati: 2    General: no acute distress  Mental Status: alert and oriented to person, place and time  HEENT: normocephalic, atraumatic  Chest: unlabored breathing  Heart: regular heart rate  Abdomen: nondistended  Extremity: moves all extremities    Laboratory  Lab Results   Component Value Date    INR 1.1 07/31/2023       Lab Results   Component Value Date    WBC 7.38 02/06/2024    HGB 13.6 (L) 02/06/2024    HCT 42.5 02/06/2024    MCV 95 02/06/2024     02/06/2024      Lab Results   Component Value Date    GLU 91 02/06/2024     02/06/2024    K 4.8 02/06/2024     02/06/2024    CO2 24 02/06/2024    BUN 58 (H) 02/06/2024    CREATININE 0.8 02/06/2024    CALCIUM 9.7 02/06/2024    MG 1.8 10/14/2023    ALT 12 02/06/2024    AST 17 02/06/2024    ALBUMIN 2.9 (L) 02/06/2024    BILITOT 0.5 02/06/2024       ASSESSMENT/PLAN:     Sedation Plan: Moderate.  Patient will undergo left PCNU  exchange.    Vic Plunkett MD  Diagnostic and Interventional Radiologist  Department of Radiology  Pager: [phone]

## 2024-04-02 NOTE — NURSING
Report called to raza Will at [facility] where patient resides. All questions answered. Awaiting patients transport back

## 2024-04-02 NOTE — DISCHARGE SUMMARY
Radiology Discharge Summary      Hospital Course: No complications    Admit Date: 4/2/2024  Discharge Date: 04/02/2024     Instructions Given to Patient: Yes  Diet: Resume prior diet  Activity: activity as tolerated    Description of Condition on Discharge: Stable  Vital Signs (Most Recent): Temp: 97.4 °F (36.3 °C) (04/02/24 1222)  Pulse: 62 (04/02/24 1409)  Resp: 20 (04/02/24 1409)  BP: (!) 113/59 (04/02/24 1409)  SpO2: 97 % (04/02/24 1409)    Discharge Disposition: Home    Discharge Diagnosis: Obstructive hydronephrosis s/p left PCNU exchange     Follow-up: Routine exchanges every 2-3 months as needed.    Vic Plunkett MD  Diagnostic and Interventional Radiologist  Department of Radiology  Pager: [phone]

## 2024-04-02 NOTE — PROCEDURES
Radiology Post-Procedure Note    Pre Op Diagnosis: Obstructive hydronephrosis  Post Op Diagnosis: Same    Procedure: Left PCNU exchange    Procedure performed by: Vic Plunkett MD    Written Informed Consent Obtained: Yes  Specimen Removed: NO  Estimated Blood Loss: Minimal    Findings:   Left PCNU exchange without complication.    Patient tolerated procedure well.    Vic Plunkett MD  Diagnostic and Interventional Radiologist  Department of Radiology  Pager: [phone]

## 2024-04-02 NOTE — SEDATION DOCUMENTATION
Patient presents for nephrostomy tube exchange. Tolerated well with Local lidocaine only. VSS. Will monitor patient for 30mins, then ok for discharge.

## 2024-04-03 ENCOUNTER — OUTSIDE PLACE OF SERVICE (OUTPATIENT)
Dept: ADMINISTRATIVE | Facility: OTHER | Age: 79
End: 2024-04-03
Payer: MEDICARE

## 2024-04-10 ENCOUNTER — HOSPITAL ENCOUNTER (OUTPATIENT)
Facility: HOSPITAL | Age: 79
Discharge: HOME | End: 2024-04-12
Attending: EMERGENCY MEDICINE | Admitting: INTERNAL MEDICINE
Payer: MEDICARE

## 2024-04-10 DIAGNOSIS — Z87.19 HISTORY OF FECAL IMPACTION: ICD-10-CM

## 2024-04-10 DIAGNOSIS — T83.022A NEPHROSTOMY TUBE DISPLACED: Primary | ICD-10-CM

## 2024-04-10 DIAGNOSIS — N13.30 HYDRONEPHROSIS, UNSPECIFIED HYDRONEPHROSIS TYPE: ICD-10-CM

## 2024-04-10 DIAGNOSIS — K59.00 CONSTIPATION, UNSPECIFIED CONSTIPATION TYPE: ICD-10-CM

## 2024-04-10 LAB
ALBUMIN SERPL BCP-MCNC: 3 G/DL (ref 3.5–5.2)
ALP SERPL-CCNC: 70 U/L (ref 55–135)
ALT SERPL W/O P-5'-P-CCNC: 14 U/L (ref 10–44)
ANION GAP SERPL CALC-SCNC: 12 MMOL/L (ref 8–16)
AST SERPL-CCNC: 23 U/L (ref 10–40)
BASOPHILS # BLD AUTO: 0.06 K/UL (ref 0–0.2)
BASOPHILS NFR BLD: 0.6 % (ref 0–1.9)
BILIRUB SERPL-MCNC: 0.4 MG/DL (ref 0.1–1)
BUN SERPL-MCNC: 28 MG/DL (ref 8–23)
CALCIUM SERPL-MCNC: 9.5 MG/DL (ref 8.7–10.5)
CHLORIDE SERPL-SCNC: 106 MMOL/L (ref 95–110)
CO2 SERPL-SCNC: 22 MMOL/L (ref 23–29)
CREAT SERPL-MCNC: 1 MG/DL (ref 0.5–1.4)
DIFFERENTIAL METHOD BLD: ABNORMAL
EOSINOPHIL # BLD AUTO: 0.2 K/UL (ref 0–0.5)
EOSINOPHIL NFR BLD: 2 % (ref 0–8)
ERYTHROCYTE [DISTWIDTH] IN BLOOD BY AUTOMATED COUNT: 14.1 % (ref 11.5–14.5)
EST. GFR  (NO RACE VARIABLE): >60 ML/MIN/1.73 M^2
GLUCOSE SERPL-MCNC: 94 MG/DL (ref 70–110)
HCT VFR BLD AUTO: 47.5 % (ref 40–54)
HGB BLD-MCNC: 15.1 G/DL (ref 14–18)
IMM GRANULOCYTES # BLD AUTO: 0.03 K/UL (ref 0–0.04)
IMM GRANULOCYTES NFR BLD AUTO: 0.3 % (ref 0–0.5)
LYMPHOCYTES # BLD AUTO: 1.7 K/UL (ref 1–4.8)
LYMPHOCYTES NFR BLD: 16.9 % (ref 18–48)
MCH RBC QN AUTO: 30.1 PG (ref 27–31)
MCHC RBC AUTO-ENTMCNC: 31.8 G/DL (ref 32–36)
MCV RBC AUTO: 95 FL (ref 82–98)
MONOCYTES # BLD AUTO: 0.7 K/UL (ref 0.3–1)
MONOCYTES NFR BLD: 6.4 % (ref 4–15)
NEUTROPHILS # BLD AUTO: 7.6 K/UL (ref 1.8–7.7)
NEUTROPHILS NFR BLD: 73.8 % (ref 38–73)
NRBC BLD-RTO: 0 /100 WBC
PLATELET # BLD AUTO: 173 K/UL (ref 150–450)
PMV BLD AUTO: 10 FL (ref 9.2–12.9)
POTASSIUM SERPL-SCNC: 5.1 MMOL/L (ref 3.5–5.1)
PROT SERPL-MCNC: 7.4 G/DL (ref 6–8.4)
RBC # BLD AUTO: 5.01 M/UL (ref 4.6–6.2)
SODIUM SERPL-SCNC: 140 MMOL/L (ref 136–145)
WBC # BLD AUTO: 10.25 K/UL (ref 3.9–12.7)

## 2024-04-10 PROCEDURE — 80053 COMPREHEN METABOLIC PANEL: CPT | Performed by: EMERGENCY MEDICINE

## 2024-04-10 PROCEDURE — 96365 THER/PROPH/DIAG IV INF INIT: CPT

## 2024-04-10 PROCEDURE — 99285 EMERGENCY DEPT VISIT HI MDM: CPT | Mod: 25

## 2024-04-10 PROCEDURE — 85025 COMPLETE CBC W/AUTO DIFF WBC: CPT | Performed by: EMERGENCY MEDICINE

## 2024-04-10 RX ORDER — IBUPROFEN 200 MG
24 TABLET ORAL
Status: DISCONTINUED | OUTPATIENT
Start: 2024-04-11 | End: 2024-04-12 | Stop reason: HOSPADM

## 2024-04-10 RX ORDER — DOCUSATE SODIUM 100 MG/1
100 CAPSULE, LIQUID FILLED ORAL DAILY
Status: DISCONTINUED | OUTPATIENT
Start: 2024-04-11 | End: 2024-04-10

## 2024-04-10 RX ORDER — CYPROHEPTADINE HYDROCHLORIDE 4 MG/1
4 TABLET ORAL 3 TIMES DAILY
Status: DISCONTINUED | OUTPATIENT
Start: 2024-04-11 | End: 2024-04-12 | Stop reason: HOSPADM

## 2024-04-10 RX ORDER — TAMSULOSIN HYDROCHLORIDE 0.4 MG/1
0.8 CAPSULE ORAL DAILY
Status: DISCONTINUED | OUTPATIENT
Start: 2024-04-11 | End: 2024-04-12 | Stop reason: HOSPADM

## 2024-04-10 RX ORDER — SODIUM CHLORIDE 0.9 % (FLUSH) 0.9 %
10 SYRINGE (ML) INJECTION EVERY 12 HOURS PRN
Status: DISCONTINUED | OUTPATIENT
Start: 2024-04-11 | End: 2024-04-12 | Stop reason: HOSPADM

## 2024-04-10 RX ORDER — NALOXONE HCL 0.4 MG/ML
0.02 VIAL (ML) INJECTION
Status: DISCONTINUED | OUTPATIENT
Start: 2024-04-11 | End: 2024-04-12 | Stop reason: HOSPADM

## 2024-04-10 RX ORDER — GLUCAGON 1 MG
1 KIT INJECTION
Status: DISCONTINUED | OUTPATIENT
Start: 2024-04-11 | End: 2024-04-12 | Stop reason: HOSPADM

## 2024-04-10 RX ORDER — MIRTAZAPINE 15 MG/1
30 TABLET, FILM COATED ORAL NIGHTLY
Status: DISCONTINUED | OUTPATIENT
Start: 2024-04-10 | End: 2024-04-12 | Stop reason: HOSPADM

## 2024-04-10 RX ORDER — IBUPROFEN 200 MG
16 TABLET ORAL
Status: DISCONTINUED | OUTPATIENT
Start: 2024-04-11 | End: 2024-04-12 | Stop reason: HOSPADM

## 2024-04-10 RX ORDER — POLYETHYLENE GLYCOL 3350 17 G/17G
17 POWDER, FOR SOLUTION ORAL 2 TIMES DAILY
Status: DISCONTINUED | OUTPATIENT
Start: 2024-04-11 | End: 2024-04-12 | Stop reason: HOSPADM

## 2024-04-10 RX ORDER — ATORVASTATIN CALCIUM 40 MG/1
40 TABLET, FILM COATED ORAL DAILY
Status: DISCONTINUED | OUTPATIENT
Start: 2024-04-11 | End: 2024-04-12 | Stop reason: HOSPADM

## 2024-04-10 RX ORDER — BISACODYL 10 MG/1
10 SUPPOSITORY RECTAL DAILY
Status: DISCONTINUED | OUTPATIENT
Start: 2024-04-11 | End: 2024-04-12 | Stop reason: HOSPADM

## 2024-04-10 RX ORDER — ENOXAPARIN SODIUM 100 MG/ML
40 INJECTION SUBCUTANEOUS EVERY 24 HOURS
Status: DISCONTINUED | OUTPATIENT
Start: 2024-04-11 | End: 2024-04-11

## 2024-04-11 DIAGNOSIS — N20.0 NEPHROLITHIASIS: Primary | ICD-10-CM

## 2024-04-11 LAB
ALBUMIN SERPL BCP-MCNC: 2.8 G/DL (ref 3.5–5.2)
ALP SERPL-CCNC: 70 U/L (ref 55–135)
ALT SERPL W/O P-5'-P-CCNC: 13 U/L (ref 10–44)
ANION GAP SERPL CALC-SCNC: 9 MMOL/L (ref 8–16)
AST SERPL-CCNC: 16 U/L (ref 10–40)
BACTERIA #/AREA URNS HPF: ABNORMAL /HPF
BASOPHILS # BLD AUTO: 0.06 K/UL (ref 0–0.2)
BASOPHILS NFR BLD: 0.5 % (ref 0–1.9)
BILIRUB SERPL-MCNC: 0.6 MG/DL (ref 0.1–1)
BILIRUB UR QL STRIP: NEGATIVE
BUN SERPL-MCNC: 28 MG/DL (ref 8–23)
CALCIUM SERPL-MCNC: 9.6 MG/DL (ref 8.7–10.5)
CHLORIDE SERPL-SCNC: 107 MMOL/L (ref 95–110)
CLARITY UR: ABNORMAL
CO2 SERPL-SCNC: 23 MMOL/L (ref 23–29)
COLOR UR: ABNORMAL
CREAT SERPL-MCNC: 1 MG/DL (ref 0.5–1.4)
DIFFERENTIAL METHOD BLD: ABNORMAL
EOSINOPHIL # BLD AUTO: 0 K/UL (ref 0–0.5)
EOSINOPHIL NFR BLD: 0.1 % (ref 0–8)
ERYTHROCYTE [DISTWIDTH] IN BLOOD BY AUTOMATED COUNT: 14 % (ref 11.5–14.5)
EST. GFR  (NO RACE VARIABLE): >60 ML/MIN/1.73 M^2
GLUCOSE SERPL-MCNC: 97 MG/DL (ref 70–110)
GLUCOSE UR QL STRIP: NEGATIVE
HCT VFR BLD AUTO: 43.7 % (ref 40–54)
HGB BLD-MCNC: 14.2 G/DL (ref 14–18)
HGB UR QL STRIP: ABNORMAL
HYALINE CASTS #/AREA URNS LPF: 0 /LPF
IMM GRANULOCYTES # BLD AUTO: 0.02 K/UL (ref 0–0.04)
IMM GRANULOCYTES NFR BLD AUTO: 0.2 % (ref 0–0.5)
INR PPP: 1.1 (ref 0.8–1.2)
KETONES UR QL STRIP: NEGATIVE
LEUKOCYTE ESTERASE UR QL STRIP: ABNORMAL
LYMPHOCYTES # BLD AUTO: 1.1 K/UL (ref 1–4.8)
LYMPHOCYTES NFR BLD: 9.1 % (ref 18–48)
MCH RBC QN AUTO: 30.4 PG (ref 27–31)
MCHC RBC AUTO-ENTMCNC: 32.5 G/DL (ref 32–36)
MCV RBC AUTO: 94 FL (ref 82–98)
MICROSCOPIC COMMENT: ABNORMAL
MONOCYTES # BLD AUTO: 0.8 K/UL (ref 0.3–1)
MONOCYTES NFR BLD: 6.7 % (ref 4–15)
NEUTROPHILS # BLD AUTO: 9.7 K/UL (ref 1.8–7.7)
NEUTROPHILS NFR BLD: 83.4 % (ref 38–73)
NITRITE UR QL STRIP: NEGATIVE
NRBC BLD-RTO: 0 /100 WBC
OHS QRS DURATION: 170 MS
OHS QTC CALCULATION: 515 MS
PH UR STRIP: 6 [PH] (ref 5–8)
PLATELET # BLD AUTO: 201 K/UL (ref 150–450)
PMV BLD AUTO: 10.2 FL (ref 9.2–12.9)
POTASSIUM SERPL-SCNC: 4.4 MMOL/L (ref 3.5–5.1)
PROT SERPL-MCNC: 7 G/DL (ref 6–8.4)
PROT UR QL STRIP: ABNORMAL
PROTHROMBIN TIME: 12.2 SEC (ref 9–12.5)
RBC # BLD AUTO: 4.67 M/UL (ref 4.6–6.2)
RBC #/AREA URNS HPF: >100 /HPF (ref 0–4)
SODIUM SERPL-SCNC: 139 MMOL/L (ref 136–145)
SP GR UR STRIP: 1.01 (ref 1–1.03)
URN SPEC COLLECT METH UR: ABNORMAL
UROBILINOGEN UR STRIP-ACNC: NEGATIVE EU/DL
WBC # BLD AUTO: 11.59 K/UL (ref 3.9–12.7)
WBC #/AREA URNS HPF: >100 /HPF (ref 0–5)
WBC CLUMPS URNS QL MICRO: ABNORMAL

## 2024-04-11 PROCEDURE — 85610 PROTHROMBIN TIME: CPT | Performed by: PHYSICIAN ASSISTANT

## 2024-04-11 PROCEDURE — 85025 COMPLETE CBC W/AUTO DIFF WBC: CPT

## 2024-04-11 PROCEDURE — G0378 HOSPITAL OBSERVATION PER HR: HCPCS

## 2024-04-11 PROCEDURE — 25000003 PHARM REV CODE 250: Performed by: STUDENT IN AN ORGANIZED HEALTH CARE EDUCATION/TRAINING PROGRAM

## 2024-04-11 PROCEDURE — 25000003 PHARM REV CODE 250

## 2024-04-11 PROCEDURE — 87086 URINE CULTURE/COLONY COUNT: CPT | Performed by: INTERNAL MEDICINE

## 2024-04-11 PROCEDURE — 87088 URINE BACTERIA CULTURE: CPT | Performed by: INTERNAL MEDICINE

## 2024-04-11 PROCEDURE — 63600175 PHARM REV CODE 636 W HCPCS: Performed by: PHYSICIAN ASSISTANT

## 2024-04-11 PROCEDURE — 93010 ELECTROCARDIOGRAM REPORT: CPT | Mod: ,,, | Performed by: INTERNAL MEDICINE

## 2024-04-11 PROCEDURE — 87077 CULTURE AEROBIC IDENTIFY: CPT | Performed by: INTERNAL MEDICINE

## 2024-04-11 PROCEDURE — 99223 1ST HOSP IP/OBS HIGH 75: CPT | Mod: NSCH,25,GC, | Performed by: PHYSICIAN ASSISTANT

## 2024-04-11 PROCEDURE — 80053 COMPREHEN METABOLIC PANEL: CPT

## 2024-04-11 PROCEDURE — 87186 SC STD MICRODIL/AGAR DIL: CPT | Performed by: INTERNAL MEDICINE

## 2024-04-11 PROCEDURE — 25000003 PHARM REV CODE 250: Performed by: PHYSICIAN ASSISTANT

## 2024-04-11 PROCEDURE — 81000 URINALYSIS NONAUTO W/SCOPE: CPT | Performed by: INTERNAL MEDICINE

## 2024-04-11 PROCEDURE — 93005 ELECTROCARDIOGRAM TRACING: CPT

## 2024-04-11 PROCEDURE — 63600175 PHARM REV CODE 636 W HCPCS: Performed by: STUDENT IN AN ORGANIZED HEALTH CARE EDUCATION/TRAINING PROGRAM

## 2024-04-11 RX ORDER — LIDOCAINE HYDROCHLORIDE 10 MG/ML
INJECTION INFILTRATION; PERINEURAL
Status: COMPLETED | OUTPATIENT
Start: 2024-04-11 | End: 2024-04-11

## 2024-04-11 RX ORDER — POLYETHYLENE GLYCOL 3350 17 G/17G
17 POWDER, FOR SOLUTION ORAL 2 TIMES DAILY
COMMUNITY
Start: 2024-02-08

## 2024-04-11 RX ORDER — DEXTROMETHORPHAN HYDROBROMIDE, GUAIFENESIN 10; 100 MG/5ML; MG/5ML
LIQUID ORAL
COMMUNITY
Start: 2024-01-17

## 2024-04-11 RX ORDER — FENTANYL CITRATE 50 UG/ML
INJECTION, SOLUTION INTRAMUSCULAR; INTRAVENOUS
Status: COMPLETED | OUTPATIENT
Start: 2024-04-11 | End: 2024-04-11

## 2024-04-11 RX ADMIN — TAMSULOSIN HYDROCHLORIDE 0.8 MG: 0.4 CAPSULE ORAL at 08:04

## 2024-04-11 RX ADMIN — FENTANYL CITRATE 25 MCG: 50 INJECTION INTRAMUSCULAR; INTRAVENOUS at 10:04

## 2024-04-11 RX ADMIN — CYPROHEPTADINE HYDROCHLORIDE 4 MG: 4 TABLET ORAL at 08:04

## 2024-04-11 RX ADMIN — ATORVASTATIN CALCIUM 40 MG: 40 TABLET, FILM COATED ORAL at 08:04

## 2024-04-11 RX ADMIN — MIRTAZAPINE 30 MG: 15 TABLET, FILM COATED ORAL at 01:04

## 2024-04-11 RX ADMIN — POLYETHYLENE GLYCOL 3350 17 G: 17 POWDER, FOR SOLUTION ORAL at 09:04

## 2024-04-11 RX ADMIN — LIDOCAINE HYDROCHLORIDE 3 ML: 10 INJECTION, SOLUTION INFILTRATION; PERINEURAL at 10:04

## 2024-04-11 RX ADMIN — CEFTRIAXONE SODIUM 2 G: 2 INJECTION, POWDER, FOR SOLUTION INTRAMUSCULAR; INTRAVENOUS at 08:04

## 2024-04-11 RX ADMIN — CYPROHEPTADINE HYDROCHLORIDE 4 MG: 4 TABLET ORAL at 02:04

## 2024-04-11 RX ADMIN — CYPROHEPTADINE HYDROCHLORIDE 4 MG: 4 TABLET ORAL at 09:04

## 2024-04-11 RX ADMIN — BISACODYL 10 MG: 10 SUPPOSITORY RECTAL at 08:04

## 2024-04-11 RX ADMIN — POLYETHYLENE GLYCOL 3350 17 G: 17 POWDER, FOR SOLUTION ORAL at 08:04

## 2024-04-11 RX ADMIN — MIRTAZAPINE 30 MG: 15 TABLET, FILM COATED ORAL at 09:04

## 2024-04-11 NOTE — PLAN OF CARE
SW reviewed Pt chart.    Pt demographics confirmed.Pt  a resident at [facility].  Pt receives a moderate level of assistance with daily ADL's.  Pt not a dialysis or Coumadin Pt.  No other needs identified. SW to request f/u as needed. SW to assist with any future needs.     SW updated [facility] on Pt medical status and D/C.    Future Appointments   Date Time Provider Department Center   4/25/2024 10:00 AM Camilo Kelley Jr., MD [facility] UROLOGY [facility] - Emergency Dept  Discharge Assessment    Primary Care Provider: Home, [facility]     Discharge Assessment (most recent)       BRIEF DISCHARGE ASSESSMENT - 04/11/24 1410          Discharge Planning    Assessment Type Discharge Planning Brief Assessment (P)      Resource/Environmental Concerns none (P)      Support Systems Family members;Friends/neighbors (P)      Assistance Needed Moderate (P)      Equipment Currently Used at Home wheelchair;walker, rolling (P)      Current Living Arrangements residential facility (P)      Care Facility Name [facility] (P)      Patient/Family Anticipates Transition to long-term care facility (P)      Patient/Family Anticipated Services at Transition none (P)      DME Needed Upon Discharge  none (P)      Discharge Plan A Return to nursing home (P)                          Kathe George LMSW  Phone: [phone]  [facility]

## 2024-04-11 NOTE — PROGRESS NOTES
VIRTUAL NURSE: Cued into patient's room. Permission received per patient to turn camera to view patient. Introduced as VN that will be working with floor nurse this shift. Educated the patient on VN's role in patient care. Plan of care reviewed with patient. Informed patient that staff will round on them every 2 hours but to use call light for any other needs they may have. Also informed of fall risk and fall precautions. Patient verbalized understanding. Call light within reach; bed siderails up x2. Opportunity given for questions and questions answered. Admission assessment questions completed. Patient denies complaints or any needs at this time.   04/11/24 1756   Nurse Notification   Charge Nurse Notified? Yes   Name of Charge Nurse CRISTINA Schwartz RN   Bedside Nurse Notified? Yes   Name of Bedside Nurse Nel Grimes LPN   Nurse Notfication Method Secure Chat   Nurse Notified Of Patient Request  (c/o procedure site pain 7/10)   Admission   Initial VN Admission Questions Complete   Communication Issues? None   Shift   Virtual Nurse - Patient Verbalized Approval Of Camera Use   Safety/Activity   Patient Rounds bed in low position;placement of personal items at bedside;call light in patient/parent reach;visualized patient   Safety Promotion/Fall Prevention assistive device/personal item within reach;Fall Risk reviewed with patient/family;side rails raised x 2;instructed to call staff for mobility   Positioning   Body Position position maintained   Head of Bed (HOB) Positioning HOB at 30-45 degrees   Pain/Comfort/Sleep   Preferred Pain Scale number (Numeric Rating Pain Scale)   Pain Body Location flank   Pain Rating (0-10): Rest 7

## 2024-04-11 NOTE — ED PROVIDER NOTES
Encounter Date: 4/10/2024       History     Chief Complaint   Patient presents with    Nephrostomy tube displaced      JOHNNA Giron from [facility] home. Staff called stating they found patients left sided nephrostomy tube on bed. Patient states tube was accidentally removed last night when he was stretching his back. Patient denies pain.      HPI  This is a 78 y.o. male who presents to the Emergency Department with dislodged nephrostomy tube.  Believes that it may have occurred last night, left-sided.  Denies any flank pain, fever, chills.  States that he is still making urine.  States that the nephrostomy tube was placed due to kidney stones.    Review of patient's allergies indicates:  No Known Allergies  Past Medical History:   Diagnosis Date    Arthritis     Diabetes mellitus     type 2    Folate deficiency anemia     Heart block     History of kidney stones 05/25/2021    History of stroke with residual deficit 05/25/2021    Hyperlipidemia     Hypertension     Major depressive disorder     Pacemaker     Biotronic Edora 8 DRERROL (S/n: [id number])    Pyelonephritis 11/19/2021    TIA (transient ischemic attack)     Urinary retention 05/25/2021     Past Surgical History:   Procedure Laterality Date    A-V CARDIAC PACEMAKER INSERTION N/A 8/24/2021    Procedure: INSERTION, CARDIAC PACEMAKER, DUAL CHAMBER;  Surgeon: Neo Winn MD;  Location: [facility] EP LAB;  Service: Cardiology;  Laterality: N/A;  CHB, DUAL PPM, ANES, BIO, DM, ED 2    COLONOSCOPY N/A 11/22/2021    Procedure: COLONOSCOPY;  Surgeon: Cesar Dorado MD;  Location: [facility] ENDO (2ND FLR);  Service: Endoscopy;  Laterality: N/A;    CYSTOSCOPIC LITHOLAPAXY  5/25/2021    Procedure: CYSTOLITHOLAPAXY;  Surgeon: Chris Schilling MD;  Location: [facility] OR 1ST FLR;  Service: Urology;;    CYSTOSCOPY  8/26/2021    Procedure: CYSTOSCOPY;  Surgeon: Camilo Kelley Jr., MD;  Location: [facility] OR 1ST FLR;  Service: Urology;;    CYSTOSCOPY W/ URETERAL STENT PLACEMENT Bilateral  "5/25/2021    Procedure: CYSTOSCOPY, WITH BILATERAL URETERAL STENT INSERTION;  Surgeon: Chris Schilling MD;  [facility];  Service: Urology;  Laterality: Bilateral;    ELBOW ARTHROPLASTY Right     FLUOROSCOPY  5/25/2021    Procedure: FLUOROSCOPY;  Surgeon: Chris Schilling MD;  [facility];  Service: Urology;;    LASER LITHOTRIPSY Left 8/26/2021    Procedure: LITHOTRIPSY, USING LASER;  Surgeon: Camilo Kelley Jr., MD;  [facility];  Service: Urology;  Laterality: Left;    PYELOSCOPY Bilateral 8/26/2021    Procedure: PYELOSCOPY;  Surgeon: Camilo Kelley Jr., MD;  [facility];  Service: Urology;  Laterality: Bilateral;    REMOVAL OF BLOOD CLOT  5/25/2021    Procedure: REMOVAL, BLOOD CLOT;  Surgeon: Chris Schilling MD;  [facility];  Service: Urology;;    REPLACEMENT OF STENT Bilateral 8/26/2021    Procedure: REPLACEMENT, STENT;  Surgeon: Camilo Kelley Jr., MD;  [facility];  Service: Urology;  Laterality: Bilateral;    URETEROSCOPIC REMOVAL OF URETERIC CALCULUS Bilateral 8/26/2021    Procedure: REMOVAL, CALCULUS, URETER, URETEROSCOPIC;  Surgeon: Camilo Kelley Jr., MD;  [facility];  Service: Urology;  Laterality: Bilateral;    URETEROSCOPY Bilateral 8/26/2021    Procedure: URETEROSCOPY;  Surgeon: Camilo Kelley Jr., MD;  Location: [facility] OR [address] [place] [address];  Service: Urology;  Laterality: Bilateral;     Family History   Problem Relation Age of Onset    Stroke Mother     Hyperlipidemia Mother     Mental illness Father     Parkinsonism Father     No Known Problems Brother      Social History     Tobacco Use    Smoking status: Former     Types: Cigarettes    Smokeless tobacco: Never   Substance Use Topics    Alcohol use: Yes     Comment: 1/ pint whisky daily    Drug use: Yes     Types: "Crack" cocaine     Review of Systems   All other systems reviewed and are negative.      Physical Exam     Initial Vitals   BP Pulse Resp Temp " SpO2   04/10/24 1939 04/10/24 1939 04/10/24 1939 04/10/24 1940 04/10/24 1939   (!) 122/54 69 18 97.7 °F (36.5 °C) 97 %      MAP       --                Physical Exam    Nursing note and vitals reviewed.  Constitutional: He appears well-developed and well-nourished. No distress.   HENT:   Head: Normocephalic and atraumatic.   Mouth/Throat: Oropharynx is clear and moist.   Eyes: Conjunctivae are normal. Pupils are equal, round, and reactive to light.   Neck: Neck supple.   Normal range of motion.  Cardiovascular:  Normal rate, regular rhythm, normal heart sounds and intact distal pulses.           Pulmonary/Chest: Breath sounds normal. No respiratory distress.   Abdominal: Abdomen is soft. Bowel sounds are normal. He exhibits no distension. There is no abdominal tenderness.   Musculoskeletal:         General: No tenderness or edema. Normal range of motion.      Cervical back: Normal range of motion and neck supple.     Lymphadenopathy:     He has no cervical adenopathy.   Neurological: He is alert and oriented to person, place, and time.   Skin: Skin is warm and dry. Capillary refill takes less than 2 seconds. No rash noted. No erythema.   Small ovoid subcentimeter on the left flank area.  Minimal, serous drainage.  There is no significant subcutaneous swelling.  There is no surrounding erythema.  The area is nontender.   Psychiatric: He has a normal mood and affect. Thought content normal.         ED Course   Procedures  Labs Reviewed   COMPREHENSIVE METABOLIC PANEL - Abnormal; Notable for the following components:       Result Value    CO2 22 (*)     BUN 28 (*)     Albumin 3.0 (*)     All other components within normal limits   CBC W/ AUTO DIFFERENTIAL - Abnormal; Notable for the following components:    MCHC 31.8 (*)     Gran % 73.8 (*)     Lymph % 16.9 (*)     All other components within normal limits          Imaging Results              CT Renal Stone Study ABD Pelvis WO (Final result)  Result time 04/10/24  21:21:59      Final result by Candy Raygoza MD (04/10/24 21:21:59)                   Impression:      Severe left hydroureteronephrosis.  6 x 8 x 10 mm left mid ureteral obstructive calculus.  Bilateral nephrolithiasis left greater than right. Small right renal cortical cyst.  No left nephrostomy tube seen.    Moderately large colonic stool.  Copious stool in the rectosigmoid.  Findings are concerning for constipation and or fecal impaction.  Stercoral colitis cannot be entirely excluded.  Colonic diverticulosis.      Electronically signed by: Candy Raygoza  Date:    04/10/2024  Time:    21:21               Narrative:    EXAMINATION:  CT RENAL STONE STUDY ABDOMEN PELVIS WITHOUT    CLINICAL HISTORY:  Nephrostomy catheter tube displacement.    TECHNIQUE:  5 mm unenhanced axial images from the lung bases through the greater trochanters were performed.  Coronal and sagittal reformatted images were provided.    COMPARISON:  12/09/2023    FINDINGS:  Within the limits of a noncontrast examination, the liver, spleen, pancreas, and adrenal glands are unremarkable.  The gallbladder contains no calcified gallstones.    Bilateral perinephric stranding is present left greater than right.  Left nephrostomy tract is seen.  There is severe left hydroureteronephrosis.  A 6 x 8 x 10 mm calculus is seen in the left mid ureter.  There are multiple left renal and left pelvic calculi.  There are few punctate calculi in the right kidney.  A small right renal cortical cyst is noted.    There is no gross abdominal adenopathy or ascites.  Advanced vascular calcifications are present.  There is small bilateral fat containing inguinal hernias.    There is moderately large colonic stool.  There is copious stool in the rectosigmoid.  There is presacral edema.  Colonic diverticulosis is noted with out evidence of acute diverticulitis.  The urinary bladder is decompressed.  There is air within the decompressed urinary bladder, which may  be related to instrumentation with a Shen catheter.  There are no pelvic masses or adenopathy.  There is no free pelvic fluid.    At the lung bases, there is mild bibasilar atelectatic change.  There is mild pleural thickening at the lung bases.    There are moderate to severe multilevel degenerative changes with grade 1 retrolisthesis at L2/L3 and L3/L4 and L4/L5.  There is grade 1 anterolisthesis at L5 on S1.                                       Medications - No data to display  Medical Decision Making  This is an emergent evaluation of a 78 y.o.male patient with presentation of nephrostomy tube dislodgement, s/p placement for renal stone     Initial differentials include but are not limited to:  Dislodged nephrostomy tube, hydronephrosis, renal stone, zoya.     Plan: ct renal study, cbc, cmp. Pt in no pain at this time.      Amount and/or Complexity of Data Reviewed  External Data Reviewed: notes.     Details:     Multiple previous visits for nephrostomy tube displacement    Labs: ordered. Decision-making details documented in ED Course.  Radiology: ordered and independent interpretation performed.     Details:     CT renal protocol:  Evidence of left ureteral stone with moderate to severe hydroureter and hydronephrosis.  There is also evidence of constipation.  ECG/medicine tests: ordered and independent interpretation performed. Decision-making details documented in ED Course.     Details:     Ventricularly paced rhythm at 94bpm.    Discussion of management or test interpretation with external provider(s):     Dr. Ramirez, IR, states that patient will likely need to be admitted for nephrostomy tube replacement in the morning.    Dr. Alcantar, [facility] IM resident, regarding patient nephrostomy tube findings on CT scan with severe left hydronephrosis, IR consultation.  Will admit to their service.      Risk  Decision regarding hospitalization.  Diagnosis or treatment significantly limited by social determinants of  health.  Risk Details:     Social determinants of health considered:  Access to healthcare including difficulty in obtaining follow-up and difficulty in obtaining medications; health literacy.                                        Clinical Impression:  Final diagnoses:  [T83.022A] Nephrostomy tube displaced (Primary)  [Z87.19] History of fecal impaction  [K59.00] Constipation, unspecified constipation type          ED Disposition Condition    Observation Stable                Jonathon Sky MD  04/10/24 2476       Jonathon Sky MD  04/11/24 0051

## 2024-04-11 NOTE — SEDATION DOCUMENTATION
Pt arrived to IR suite for left nephrostomy tube replacement. Pt oriented to unit and staff, Pt safely transferred from stretcher to procedural table. Fall risk reviewed and comfort measures utilized with interventions. Safety strap applied, position pillows to minimize pressure points. Blankets applied. Pt prepped and draped utilizing standard sterile technique. Patient placed on continuous monitoring, as required by sedation policy. Timeouts implemented utilizing standard universal time-out per department and facility policy. Pt resting comfortably. Denies pain/discomfort. Will continue to monitor. See flow sheets for monitoring, medication administration, and updates. patient verbalizes understanding.

## 2024-04-11 NOTE — SEDATION DOCUMENTATION
Procedure completed. Patient tolerated well; VSS. Site CDI. Patient to be transported back to ED for recovery; report to be given at the bedside.

## 2024-04-11 NOTE — PHARMACY MED REC
"[facility]           Pharmacy  Admission Medication History     The home medication history was taken by Cynthia Ling.      Medication history obtained from Medications listed below were obtained from: [employer] software- [employer]    Based on information gathered for medication list, you may go to "Admission" then "Reconcile Home Medications" tabs to review and/or act upon those items.     The home medication list has been updated by the Pharmacy department.   Please read ALL comments highlighted in yellow.   Please address this information as you see fit.    Feel free to contact us if you have any questions or require assistance.        No current facility-administered medications on file prior to encounter.     Current Outpatient Medications on File Prior to Encounter   Medication Sig Dispense Refill    atorvastatin (LIPITOR) 40 MG tablet Take 40 mg by mouth once daily.      cyproheptadine (PERIACTIN) 4 mg tablet Take 4 mg by mouth 3 (three) times daily. Itching      docusate sodium (COLACE) 100 MG capsule Take 1 capsule (100 mg total) by mouth once daily.  0    folic acid (FOLVITE) 1 MG tablet Take 1 mg by mouth once daily.      gabapentin (NEURONTIN) 300 MG capsule Take 300 mg by mouth 3 (three) times daily.      mirtazapine (REMERON) 30 MG tablet Take 30 mg by mouth nightly.      tamsulosin (FLOMAX) 0.4 mg Cap TAKE 2 CAPSULES(0.8 MG) BY MOUTH EVERY DAY (Patient taking differently: Take 0.8 mg by mouth once daily.) 60 capsule 11    bisacodyL (DULCOLAX, BISACODYL,) 10 mg Supp Place 1 suppository (10 mg total) rectally daily as needed (constipation).  0    CHEST CONGESTION RELIEF DM  mg/5 mL liquid Take by mouth.      menthol-zinc oxide (CALMOSEPTINE) 0.44-20.6 % Oint Apply topically daily as needed. To sacral area after each sacral excoriation episode and as needed      polyethylene glycol (GLYCOLAX) 17 gram/dose powder Take 17 g by mouth 2 (two) times daily.         Please address " this information as you see fit.  Feel free to contact us if you have any questions or require assistance.    Cynthia Ling  [phone]                  .

## 2024-04-11 NOTE — ED NOTES
Pt refusing blood draw for AM labs. IV no longer in place to left hand. Pt states he does not want another IV. Pt advised nephrostomy tube placement cannot be done w/o sedation which requires and IV.

## 2024-04-11 NOTE — H&P
[facility] Medicine H&P Note     Admitting Team: [facility] Hospitalist   Attending Physician: Anup Oakes MD  Resident: Raz Alcantar    Date of Admit: 4/10/2024    Chief Complaint     L nephrostomy tube removal    Subjective:      History of Present Illness:  Praneeth Jewell is a 78 y.o. male with PMHx of CVA bedbound with LLE weakness, presented to [facility] on 4/10/2024 from [facility] home in [place], [place] with a primary complaint of left sided nephrostomy tube removal. He has been seen multiple times for similar issue over the last couple of months. He states he was scratching his back and thinks he accidentally pulled out his nephrostomy tube. He has a chronic mark in place. He denies any recent fever, chills, abdominal pain, hematuria, dysuria, back pain, chest pain, SOB. He has no other complaints. .    Past Medical History:  Complete heart block s/p pacemaker  BPH  CVA with LLE weakness  HTN  HLD  AoCD    Home Medications:  Current Outpatient Medications   Medication Instructions    atorvastatin (LIPITOR) 40 mg, Oral, Daily    bisacodyL (DULCOLAX (BISACODYL)) 10 mg, Rectal, Daily PRN    cyproheptadine (PERIACTIN) 4 mg, Oral, 3 times daily, Itching    docusate sodium (COLACE) 100 mg, Oral, Daily    folic acid (FOLVITE) 1 mg, Oral, Daily    gabapentin (NEURONTIN) 300 mg, Oral, 3 times daily    menthol-zinc oxide (CALMOSEPTINE) 0.44-20.6 % Oint Topical (Top), Daily PRN, To sacral area after each sacral excoriation episode and as needed    mirtazapine (REMERON) 30 mg, Oral, Nightly    polyethylene glycol (GLYCOLAX) 17 g, Oral, 2 times daily    tamsulosin (FLOMAX) 0.4 mg Cap TAKE 2 CAPSULES(0.8 MG) BY MOUTH EVERY DAY       Past Surgical History:  Past Surgical History:   Procedure Laterality Date    A-V CARDIAC PACEMAKER INSERTION N/A 8/24/2021    Procedure: INSERTION, CARDIAC PACEMAKER, DUAL CHAMBER;  Surgeon: Neo Winn MD;  Location: Replaced by [facility];  Service: Cardiology;  Laterality: N/A;   CHB, DUAL PPM, ANES, BIO, DM, ED 2    COLONOSCOPY N/A 11/22/2021    Procedure: COLONOSCOPY;  Surgeon: Cesar Dorado MD;  Location: [facility] ENDO (2ND FLR);  Service: Endoscopy;  Laterality: N/A;    CYSTOSCOPIC LITHOLAPAXY  5/25/2021    Procedure: CYSTOLITHOLAPAXY;  Surgeon: Chris Schilling MD;  Location: [facility] OR 1ST FLR;  Service: Urology;;    CYSTOSCOPY  8/26/2021    Procedure: CYSTOSCOPY;  Surgeon: Camilo Kelley Jr., MD;  Location: [facility] OR 1ST FLR;  Service: Urology;;    CYSTOSCOPY W/ URETERAL STENT PLACEMENT Bilateral 5/25/2021    Procedure: CYSTOSCOPY, WITH BILATERAL URETERAL STENT INSERTION;  Surgeon: Chris Schilling MD;  Location: [facility] OR 1ST FLR;  Service: Urology;  Laterality: Bilateral;    ELBOW ARTHROPLASTY Right     FLUOROSCOPY  5/25/2021    Procedure: FLUOROSCOPY;  Surgeon: Chris Schilling MD;  Location: [facility] OR 1ST FLR;  Service: Urology;;    LASER LITHOTRIPSY Left 8/26/2021    Procedure: LITHOTRIPSY, USING LASER;  Surgeon: Camilo Kelley Jr., MD;  Location: [facility] OR 1ST FLR;  Service: Urology;  Laterality: Left;    PYELOSCOPY Bilateral 8/26/2021    Procedure: PYELOSCOPY;  Surgeon: Camilo Kelley Jr., MD;  Location: [facility] OR 1ST FLR;  Service: Urology;  Laterality: Bilateral;    REMOVAL OF BLOOD CLOT  5/25/2021    Procedure: REMOVAL, BLOOD CLOT;  Surgeon: Chris Schilling MD;  Location: [facility] OR 1ST FLR;  Service: Urology;;    REPLACEMENT OF STENT Bilateral 8/26/2021    Procedure: REPLACEMENT, STENT;  Surgeon: Camilo Kelley Jr., MD;  Location: [facility] OR 1ST FLR;  Service: Urology;  Laterality: Bilateral;    URETEROSCOPIC REMOVAL OF URETERIC CALCULUS Bilateral 8/26/2021    Procedure: REMOVAL, CALCULUS, URETER, URETEROSCOPIC;  Surgeon: Camilo Kelley Jr., MD;  Location: [facility] OR 1ST FLR;  Service: Urology;  Laterality: Bilateral;    URETEROSCOPY Bilateral 8/26/2021    Procedure: URETEROSCOPY;  Surgeon: Camilo Kelley Jr., MD;  Location: [facility] OR [address] [place] [address];  Service: Urology;  Laterality: Bilateral;  "      Family History:  Family History   Problem Relation Age of Onset    Stroke Mother     Hyperlipidemia Mother     Mental illness Father     Parkinsonism Father     No Known Problems Brother      Social History     Tobacco Use    Smoking status: Former     Types: Cigarettes    Smokeless tobacco: Never   Substance Use Topics    Alcohol use: Yes     Comment: 1/ pint whisky daily    Drug use: Yes     Types: "Crack" cocaine         Allergies:  Review of patient's allergies indicates:  No Known Allergies    Health Maintaince :   Primary Care Physician: Home, [facility]     Review of Systems:  Pertinent items are noted in HPI. All other systems are reviewed and are negative.    Objective:   Last 24 Hour Vital Signs:  BP  Min: 122/54  Max: 122/54  Temp  Av.7 °F (36.5 °C)  Min: 97.7 °F (36.5 °C)  Max: 97.7 °F (36.5 °C)  Pulse  Av  Min: 69  Max: 69  Resp  Av  Min: 18  Max: 18  SpO2  Av %  Min: 97 %  Max: 97 %  There is no height or weight on file to calculate BMI.  No intake/output data recorded.    Physical Examination:  General: No acute distress. Appropriate behavior.   Head: normocephalic, atraumatic  Eyes: PERRL. EOMI. No conjunctival injection. No scleral icterus.  ENT: MMM, no rhinorrhea or epistaxis.   Neck: No lymphadenopathy. No accessory muscle use.   Cardiac: Regular rate. Regular rhythm. No murmurs appreciated.  Pulmonary/Chest: CTAB. Normal work of breathing.   Abdomen: Shen in place draining appropriately. Left flank nephrostomy site without SOI. Soft, non tender, non distended, hypoactive bowel sounds.   Extremities: atraumatic, no deformities, no edema.  Skin: dry, warm, intact. No bruising or rashes.  Neuro: Alert. Oriented to person, place, time. Normal  strength bilaterally. 5/5 strength to RLE, bilateral upper extremities. 1/5 strength to LLE.     Laboratory and Microbiology:  I have independently reviewed data.    Imaging and EKGs:  I have independently " reviewed data.    CT Renal stone- Severe left sided hydroureteronephrosis, left sided obstructing mid ureteral calculus, R sided hydronephrosis, large stool burden.      Assessment & Plan   Displacement of L nephrostomy tube  Severe L sided hydroureteronephrosis 2/2 obstructing mid ureteral calculus  R sided hydronephrosis  BPH with chronic indwelling mark  - IR consulted for placement of nephrostomy tube in AM  - NPO at MN  - keep mark in place  - continue flomax 0.8 mg daily   - AF, VSS, no leukocytosis      Large stool burden on CT imaging  - he denies recent constipation  - PEG, bisacodyl suppository daily  - avoid opiates, anticholinergics    CVA with LLE weakness  Bedbound status  - continue lipitor 40 mg  - not on ASA 2/2 prior GIB  - defer to PCP    Complete AV block s/p pacemaker   - ordered EKG, no acute issues     Depression  Decreased appetite  - continue home regimen mirtazapine 30 mg nightly, cyproheptadine 4 mg  - consider nutrition consult    Hx of AOCD  - Hb 15, no acute issues     Code Status: full  DVT Prophylaxis: lovenox  Diet: NPO  Disposition: pending IR placement of nephrostomy tube    Case will be discussed with attending physician and attestation to follow, please appreciate.     Raz Alcantar MD  [facility] Internal Medicine Resident, PGY-II    [facility] Medicine Hospitalist Pager numbers:   [facility] Hospitalist Medicine Team A (Kalpesh/Richard): [phone]  [facility] Hospitalist Medicine Team B (Champ/Daiana):  [phone]

## 2024-04-11 NOTE — CONSULTS
"Percutaneous Nephrostomy Tube Placement Consult Note  Interventional Radiology      Reason for Consult: L neph tube placement    SUBJECTIVE:     Chief Complaint:  neph tube displaced    History of Present Illness:  Praneeth Jewell is a 78 y.o. male with a PMHx of Obstructive Uropathy 2/2 Ureterolithiasis s/p L. Nephroureteral stent placement (last exchange due to dislodgement 4/2 with IR), 3rd degree AVB s/p PPM, CVA w/ residual deficits and being bedbound, MDD, essential hypertension, Dyslipidemia, T2DM, and chronic constipation admitted for neph tube dislodgement. We replaced dislodged neph tube on 4/2.  Pt is unsure how the tube became dislodged and believes it fell out approximately 36 hrs ago.  Interventional Radiology has been consulted for L nephroureteral stent exchange.  The pt's Cr is currently 1.0 at baseline.  The pt's WBC is 11.59 , pt is hemodynamically stable. CT renal stone study "Severe left hydroureteronephrosis. 6 x 8 x 10 mm left mid ureteral obstructive calculus. Bilateral nephrolithiasis left greater than right. Small right renal cortical cyst. No left nephrostomy tube seen."    Review of Systems   Constitutional:  Negative for chills, fever, malaise/fatigue and weight loss.   Respiratory:  Negative for cough and shortness of breath.    Cardiovascular:  Negative for chest pain, palpitations and leg swelling.   Gastrointestinal:  Negative for abdominal pain, diarrhea, nausea and vomiting.   Genitourinary:  Negative for dysuria and flank pain.   Musculoskeletal:  Negative for back pain and myalgias.   Neurological:  Negative for weakness and headaches.       Scheduled Meds:   atorvastatin  40 mg Oral Daily    bisacodyL  10 mg Rectal Daily    cyproheptadine  4 mg Oral TID    enoxparin  40 mg Subcutaneous Daily    mirtazapine  30 mg Oral Nightly    polyethylene glycol  17 g Oral BID    tamsulosin  0.8 mg Oral Daily     Continuous Infusions:  PRN Meds:dextrose 10%, dextrose 10%, glucagon (human " recombinant), glucose, glucose, naloxone, sodium chloride 0.9%    Review of patient's allergies indicates:  No Known Allergies    Past Medical History:   Diagnosis Date    Arthritis     Diabetes mellitus     type 2    Folate deficiency anemia     Heart block     History of kidney stones 05/25/2021    History of stroke with residual deficit 05/25/2021    Hyperlipidemia     Hypertension     Major depressive disorder     Pacemaker     Biotronic [place] 8 DRERROL (S/n: [id number])    Pyelonephritis 11/19/2021    TIA (transient ischemic attack)     Urinary retention 05/25/2021     Past Surgical History:   Procedure Laterality Date    A-V CARDIAC PACEMAKER INSERTION N/A 8/24/2021    Procedure: INSERTION, CARDIAC PACEMAKER, DUAL CHAMBER;  Surgeon: Neo Winn MD;  Location: [facility] EP LAB;  Service: Cardiology;  Laterality: N/A;  CHB, DUAL PPM, ANES, BIO, DM, ED 2    COLONOSCOPY N/A 11/22/2021    Procedure: COLONOSCOPY;  Surgeon: Cesar Dorado MD;  Location: [facility] ENDO (2ND FLR);  Service: Endoscopy;  Laterality: N/A;    CYSTOSCOPIC LITHOLAPAXY  5/25/2021    Procedure: CYSTOLITHOLAPAXY;  Surgeon: Chris Schilling MD;  [facility];  Service: Urology;;    CYSTOSCOPY  8/26/2021    Procedure: CYSTOSCOPY;  Surgeon: Camilo Kelley Jr., MD;  [facility];  Service: Urology;;    CYSTOSCOPY W/ URETERAL STENT PLACEMENT Bilateral 5/25/2021    Procedure: CYSTOSCOPY, WITH BILATERAL URETERAL STENT INSERTION;  Surgeon: Chris Schilling MD;  [facility];  Service: Urology;  Laterality: Bilateral;    ELBOW ARTHROPLASTY Right     FLUOROSCOPY  5/25/2021    Procedure: FLUOROSCOPY;  Surgeon: Chris Schilling MD;  [facility];  Service: Urology;;    LASER LITHOTRIPSY Left 8/26/2021    Procedure: LITHOTRIPSY, USING LASER;  Surgeon: Camilo Kelley Jr., MD;  [facility];  Service: Urology;  Laterality: Left;    PYELOSCOPY Bilateral 8/26/2021    Procedure: PYELOSCOPY;  Surgeon:  "Camilo Kelley Jr., MD;  Location: [facility] OR [address] [place] [address];  Service: Urology;  Laterality: Bilateral;    REMOVAL OF BLOOD CLOT  5/25/2021    Procedure: REMOVAL, BLOOD CLOT;  Surgeon: Chris Schilling MD;  [facility];  Service: Urology;;    REPLACEMENT OF STENT Bilateral 8/26/2021    Procedure: REPLACEMENT, STENT;  Surgeon: Camilo Kelley Jr., MD;  Location: [facility] OR [address] [place] [address];  Service: Urology;  Laterality: Bilateral;    URETEROSCOPIC REMOVAL OF URETERIC CALCULUS Bilateral 8/26/2021    Procedure: REMOVAL, CALCULUS, URETER, URETEROSCOPIC;  Surgeon: Camilo Kelley Jr., MD;  Location: [facility] OR [address] [place] [address];  Service: Urology;  Laterality: Bilateral;    URETEROSCOPY Bilateral 8/26/2021    Procedure: URETEROSCOPY;  Surgeon: Camilo Kelley Jr., MD;  Location: [facility] OR [address] [place] [address];  Service: Urology;  Laterality: Bilateral;     Family History   Problem Relation Age of Onset    Stroke Mother     Hyperlipidemia Mother     Mental illness Father     Parkinsonism Father     No Known Problems Brother      Social History     Tobacco Use    Smoking status: Former     Types: Cigarettes    Smokeless tobacco: Never   Substance Use Topics    Alcohol use: Yes     Comment: 1/ pint whisky daily    Drug use: Yes     Types: "Crack" cocaine       OBJECTIVE:     Vital Signs (Most Recent)  Temp: 98.8 °F (37.1 °C) (04/11/24 0624)  Pulse: 86 (04/11/24 0624)  Resp: (!) 24 (04/11/24 0412)  BP: (!) 105/57 (04/11/24 0624)  SpO2: 96 % (04/11/24 0624)    Physical Exam:  Physical Exam  Vitals and nursing note reviewed.   Constitutional:       Appearance: Normal appearance.   HENT:      Head: Normocephalic and atraumatic.   Eyes:      General: No scleral icterus.     Extraocular Movements: Extraocular movements intact.   Cardiovascular:      Rate and Rhythm: Normal rate.   Pulmonary:      Effort: Pulmonary effort is normal.   Musculoskeletal:         General: Normal range of motion.      Cervical back: Normal range of motion.   Skin:     General: Skin " "is warm and dry.      Coloration: Skin is not jaundiced.      Comments: Scarring from neph tube placement and closed neph tube site with no purulent drainage or erythema   Neurological:      Mental Status: He is alert and oriented to person, place, and time.   Psychiatric:         Mood and Affect: Mood normal.         Behavior: Behavior normal.         Thought Content: Thought content normal.         Judgment: Judgment normal.         Laboratory  I have reviewed all pertinent lab results within the past 24 hours.  CBC:   Recent Labs   Lab 04/11/24  0653   WBC 11.59   RBC 4.67   HGB 14.2   HCT 43.7      MCV 94   MCH 30.4   MCHC 32.5     CMP:   Recent Labs   Lab 04/11/24  0653   GLU 97   CALCIUM 9.6   ALBUMIN 2.8*   PROT 7.0      K 4.4   CO2 23      BUN 28*   CREATININE 1.0   ALKPHOS 70   ALT 13   AST 16   BILITOT 0.6     Coagulation: No results for input(s): "LABPROT", "INR", "APTT" in the last 168 hours.  Microbiology Results (last 7 days)       ** No results found for the last 168 hours. **            ASA/Mallampati  ASA: 3  Mallampati: 2    Imaging:  Recent imaging studies including CT Renal Stone study 4/10 which was independently reviewed by Faiza Woods MD and Tabby Murrell PA-C.     ASSESSMENT/PLAN:     Assessment:  77 y/o M with a PMHx of Obstructive Uropathy 2/2 Ureterolithiasis s/p L. Nephroureteral stent placement (last exchange due to dislodgement 4/2 with IR), 3rd degree AVB s/p PPM, CVA w/ residual deficits and being bedbound, MDD, essential hypertension, Dyslipidemia, T2DM, and chronic constipation admitted for neph tube dislodgement. We replaced dislodged neph tube on 4/2.  Pt referred to IR for left sided PCNU replacement for management of obstructive uropathy 2/2 ureteral stone.  Hospital notes reviewed.  The procedure was discussed in great detail with the patient including thorough explanations of the potential risks and benefits of PCNU replacement. Risks include hematuria, " pain, sepsis, injury to ureter or kidney, injury to adjacent organs, catheter dislodgement and inability to remove PCNU due to cystallization. The patient is a candidate for left sidedPCNU placement under moderate sedation.     Plan discussed with staff Dr Woods and ordering physician.The pt verbalized understanding of the plan and would like to proceed.    Plan:  Will proceed with left sidedPCNU under moderate sedation on 4/11.  Order placed.  Please keep pt NPO, confirmed last intake 4/10 early evening  Anticoagulation history reviewed. INR 1.1.  Lovenox order d/c'd, will place back after the procedure.  Allergies reviewed.  There is not allergy to contrast.   Antibiotic ceftriaxone ordered.    Thank you for the consult. Please contact with questions via [employer] secure chat or spectra.    Tabby Murrell PA-C  Interventional Radiology

## 2024-04-11 NOTE — ED NOTES
pt presents to ED s/p pulling out nephrostomy tube. Pt reports being unsure if he or his nurse pulled it out while being changed. Pt denies pain at the site, no bleeding present at this time. Pt has no other complaints.    AAOx3, GCS15, answers questions appropriately and in complete sentences.PERRLA. currently denies CP and SOB. RR even and unlabored, limited ROM and sensation present to all extremities bilaterally. +2 bilateral radial and pedal pulses present. skin warm and dry, capillary refill less than 3 seconds.  abdomen soft, non tender and non distended. pt currently has no other complaints, denies  n/v, fevers and chills.     pt placed on all monitors, bed locked and in lowest position and call light within reach. ED workup in process, will continue to closely monitor.

## 2024-04-11 NOTE — PROCEDURES
Interventional Radiology Post-Procedure Note    Pre Op Diagnosis:  hydronephrosis, dislodged L PCNU    Post Op Diagnosis:  same    Procedure:left percutaneous nephroureterstomy tube change    Procedure performed by: Faiza Woods MD    Written Informed Consent Obtained:  Yes    Specimen Removed: No     Estimated Blood Loss:  Minimal     Findings:     Kumpe catheter and stiff Glidewire were used to negotiate through existing tract, into the renal collecting system, and into the bladder. Successful replacement of  8F x 26cm nephroureterostomy tube    There were no complications and the patient tolerated the procedure well.  Please see Imaging report for further details.    Cloudy urine was noted upon re-entry into renal collecting system, which was sent for UA/urine culture.   Leave PCNU to gravity until urine clears and leukocytosis resolves, then cap as tolerated.   Routine exchange in 8-12 weeks.   Recommend Urology referral for outpatient management of stone disease.     Faiza Woods MD  Interventional Radiology

## 2024-04-11 NOTE — PROGRESS NOTES
[facility] Medicine Progress Note    Primary Team: [facility] Hospitalist Team B  Attending Physician: Anup Oakes MD  Resident: Shasta   Intern: Khris Caldera MD    Date of Admit: 4/10/2024     Chief Complaint:   Chief Complaint   Patient presents with    Nephrostomy tube displaced      BIB Bree from [facility]. Staff called stating they found patients left sided nephrostomy tube on bed. Patient states tube was accidentally removed last night when he was stretching his back. Patient denies pain.        Subjective/Interval Events:      No acute events overnight. Patient resting comfortably in bed this morning. Patient is alert and conversant and not in acute distress. Patient complaining of mild L flank/back pain at site of removed nephrostomy tube. Reportedly removed tube >24hrs ago. Denies additional complaints.     Objective    Objective   Last 24 Hour Vital Signs:  BP  Min: 105/57  Max: 143/72  Temp  Av.3 °F (36.8 °C)  Min: 97.7 °F (36.5 °C)  Max: 98.8 °F (37.1 °C)  Pulse  Av.6  Min: 68  Max: 97  Resp  Av  Min: 18  Max: 24  SpO2  Av.6 %  Min: 96 %  Max: 98 %  Weight  Av.2 kg (126 lb)  Min: 57.2 kg (126 lb)  Max: 57.2 kg (126 lb)    Intake/Output Summary (Last 24 hours) at 2024 0724  Last data filed at 2024 0630  Gross per 24 hour   Intake --   Output 650 ml   Net -650 ml       Physical Examination:  General: No acute distress. Appropriate behavior.   Head: normocephalic, atraumatic  Eyes: PERRL. EOMI. No conjunctival injection. No scleral icterus.  ENT: MMM, no rhinorrhea or epistaxis.   Neck: No lymphadenopathy. No accessory muscle use.   Cardiac: Regular rate. Regular rhythm. No murmurs appreciated.  Pulmonary/Chest: CTAB. Normal work of breathing.   Abdomen: Shen in place draining appropriately. Left flank nephrostomy site without SOI. Soft, non tender, non distended, hypoactive bowel sounds.   Extremities: atraumatic, no deformities, no edema.  Skin: dry,  warm, intact. No bruising or rashes.  Neuro: Alert. Oriented to person, place, time. Normal  strength bilaterally. 5/5 strength to RLE, bilateral upper extremities. 1/5 strength to LLE.     Laboratory:  Recent Labs   Lab 04/10/24  2045 04/10/24  2124   WBC  --  10.25   HGB  --  15.1   HCT  --  47.5   PLT  --  173   MCV  --  95   RDW  --  14.1     --    K 5.1  --      --    CO2 22*  --    BUN 28*  --    CREATININE 1.0  --    GLU 94  --    PROT 7.4  --    ALBUMIN 3.0*  --    BILITOT 0.4  --    AST 23  --    ALKPHOS 70  --    ALT 14  --        Microbiology:  Microbiology Results (last 7 days)       ** No results found for the last 168 hours. **             Imaging:  CT Renal Stone Study ABD Pelvis WO   Final Result      Severe left hydroureteronephrosis.  6 x 8 x 10 mm left mid ureteral obstructive calculus.  Bilateral nephrolithiasis left greater than right. Small right renal cortical cyst.  No left nephrostomy tube seen.      Moderately large colonic stool.  Copious stool in the rectosigmoid.  Findings are concerning for constipation and or fecal impaction.  Stercoral colitis cannot be entirely excluded.  Colonic diverticulosis.         Electronically signed by: Candy Raygoza   Date:    04/10/2024   Time:    21:21           Current Medications:     Scheduled:   atorvastatin  40 mg Oral Daily    bisacodyL  10 mg Rectal Daily    cyproheptadine  4 mg Oral TID    enoxparin  40 mg Subcutaneous Daily    mirtazapine  30 mg Oral Nightly    polyethylene glycol  17 g Oral BID    tamsulosin  0.8 mg Oral Daily         Infusions:       PRN:  dextrose 10%, dextrose 10%, glucagon (human recombinant), glucose, glucose, naloxone, sodium chloride 0.9%       Assessment:     Praneeth McTopy is a 78 y.o. male with a PMHx of CVA bedbound with LLE weakness, presented to [facility] on 4/10/2024 from [facility] in [place], [place] with a primary complaint of left sided nephrostomy tube removal. Patient with  multiple removals of nephrostomy tubes in the recent past. IR consulted for placement. Admitted to [facility] medicine for coordination of tube replacement and management of chronic conditions.     Plan:     Displacement of L nephrostomy tube  Severe L sided hydroureteronephrosis 2/2 obstructing mid ureteral calculus  R sided hydronephrosis  BPH with chronic indwelling mark  - IR consulted for placement of nephrostomy tube this AM  - keep mark in place  - continue flomax 0.8 mg daily   - AF, VSS, no leukocytosis      Large stool burden on CT imaging  - he denies recent constipation  - PEG, bisacodyl suppository daily  - avoid opiates, anticholinergics     CVA with LLE weakness  Bedbound status  - continue lipitor 40 mg  - not on ASA 2/2 prior GIB  - defer to PCP     Complete AV block s/p pacemaker   - ordered EKG, no acute issues      Depression  Decreased appetite  - continue home regimen mirtazapine 30 mg nightly, cyproheptadine 4 mg  - consider nutrition consult     Hx of AOCD  - Hb 15, no acute issues      Code Status: full  DVT Prophylaxis: lovenox  Diet: NPO  Disposition: pending IR placement of nephrostomy tube    Khris Caldera MD   U Internal Medicine PGY-1  [facility] Internal Medicine Team B    [facility] Medicine Hospitalist Pager numbers:   [facility] Hospitalist Medicine Team A (Kalpesh/Richard): [phone]  [facility] Hospitalist Medicine Team B (Champ/Daiana):  [phone]

## 2024-04-12 VITALS
OXYGEN SATURATION: 96 % | TEMPERATURE: 98 F | SYSTOLIC BLOOD PRESSURE: 101 MMHG | BODY MASS INDEX: 19.78 KG/M2 | HEART RATE: 69 BPM | HEIGHT: 67 IN | DIASTOLIC BLOOD PRESSURE: 58 MMHG | RESPIRATION RATE: 18 BRPM | WEIGHT: 126 LBS

## 2024-04-12 LAB — SARS-COV-2 RDRP RESP QL NAA+PROBE: NEGATIVE

## 2024-04-12 PROCEDURE — 25000003 PHARM REV CODE 250

## 2024-04-12 PROCEDURE — G0378 HOSPITAL OBSERVATION PER HR: HCPCS

## 2024-04-12 PROCEDURE — U0002 COVID-19 LAB TEST NON-CDC: HCPCS | Performed by: INTERNAL MEDICINE

## 2024-04-12 RX ADMIN — TAMSULOSIN HYDROCHLORIDE 0.8 MG: 0.4 CAPSULE ORAL at 08:04

## 2024-04-12 RX ADMIN — POLYETHYLENE GLYCOL 3350 17 G: 17 POWDER, FOR SOLUTION ORAL at 08:04

## 2024-04-12 RX ADMIN — ATORVASTATIN CALCIUM 40 MG: 40 TABLET, FILM COATED ORAL at 08:04

## 2024-04-12 RX ADMIN — CYPROHEPTADINE HYDROCHLORIDE 4 MG: 4 TABLET ORAL at 08:04

## 2024-04-12 NOTE — PLAN OF CARE
Pt medically stable to DC back to [facility]. Pt stretcher transport arranged. Pt appts have been scheduled. No other D/C needs noted. Pt clear from CM.    Report Info: [facility]  Phone: [phone] ext [phone]   Room: [address]B unit 3   Transport: Now to 1 hour     Future Appointments   Date Time Provider Department Center   4/25/2024 10:00 AM Camilo Kelley Jr., MD [facility] UROLOGY [facility] - Telemetry  Discharge Final Note    Primary Care Provider: [facility], [facility]    Expected Discharge Date: 4/12/2024    Final Discharge Note (most recent)       Final Note - 04/12/24 1032          Final Note    Assessment Type Final Discharge Note (P)      Anticipated Discharge Disposition group home Nursing Facility (P)      What phone number can be called within the next 1-3 days to see how you are doing after discharge? [phone] (P)      Hospital Resources/Appts/Education Provided Appointments scheduled and added to AVS (P)         Post-Acute Status    Post-Acute Authorization Placement (P)      Post-Acute Placement Status Set-up Complete/Auth obtained (P)      Discharge Delays None known at this time (P)                      Important Message from Medicare Rosie Walker, LMSW  Phone: [phone]  [facility]

## 2024-04-12 NOTE — PROGRESS NOTES
04/12/24 0959   Nurse Notification   Charge Nurse Notified? Yes   Name of Charge Nurse Vannesa Cuellar RN   Bedside Nurse Notified? Yes   Name of Bedside Nurse Nel Grimes LPN   Nurse Notfication Method Secure Chat   Nurse Notified Of Orders  (AVS prepared and okay to add to NH transfer packet. Pending CM clearance.)    Notification    Notified? Yes   Name of  Kathe George LMSW    Notification Method Secure Chat    Notified Of Discharge Status

## 2024-04-12 NOTE — DISCHARGE SUMMARY
[facility] Hospital Medicine Discharge Summary    Primary Team: [facility] Hospitalist Team B  Attending Physician: Anup Oakes MD  Resident: Shasta  Intern: Khris Caldera MD    Date of Admit: 4/10/2024  Date of Discharge: 4/12/2024    Discharge to: Nursing Home   Condition: Stable    Discharge Diagnoses     Patient Active Problem List   Diagnosis    History of CVA with residual deficit    Nephrolithiasis    BPH with urinary obstruction    Second degree AV block    Essential hypertension    HLD (hyperlipidemia)    MDD (major depressive disorder)    Idiopathic proctocolitis with rectal bleeding    Macrocytic anemia    History of complete heart block    Obstructive uropathy    Nephrostomy tube displaced    History of gastrointestinal bleeding    History of fecal impaction    Nephrostomy complication    Asymptomatic bacteriuria       Consultants and Procedures     Consultants:  Consults (From admission, onward)          Status Ordering Provider     Inpatient consult to Interventional Radiology  Once        Provider:  Talon Ramirez MD    Completed ANDERSON QUEZADA             Procedures:   :left percutaneous nephroureterstomy tube change     Imaging:  IR Nephrostomy Tube Placement   Final Result      Successful replacement of dislodged left nephroureteral tube.  Cloudy urine was noted the new tube was placed.      Plan:      - Follow-up urine studies      - Leave PNCU/NUS to gravity until urine clears/leukocytosis resolves, then cap.      - Routine exchange in 8-12 weeks with 8F x 24cm PCNU/NUS.      - Recommend Urology follow-up for evaluation for stone management.         Electronically signed by: Faiza Woods   Date:    04/11/2024   Time:    13:18      CT Renal Stone Study ABD Pelvis WO   Final Result      Severe left hydroureteronephrosis.  6 x 8 x 10 mm left mid ureteral obstructive calculus.  Bilateral nephrolithiasis left greater than right. Small right renal cortical cyst.  No left nephrostomy tube seen.       Moderately large colonic stool.  Copious stool in the rectosigmoid.  Findings are concerning for constipation and or fecal impaction.  Stercoral colitis cannot be entirely excluded.  Colonic diverticulosis.         Electronically signed by: Candy Raygoza   Date:    04/10/2024   Time:    21:21           Brief History of Present Illness      Praneeth Jewell is a 78 y.o. male with PMHx of CVA bedbound with LLE weakness, presented to [facility] [place] [place] with a primary complaint of left sided nephrostomy tube removal. He has been seen multiple times for similar issue over the last couple of months. He states he was scratching his back and thinks he accidentally pulled out his nephrostomy tube. He has a chronic mark in place. He denies any recent fever, chills, abdominal pain, hematuria, dysuria, back pain, chest pain, SOB. He has no other complaints.     For the full HPI please refer to the History & Physical from this admission.    Hospital Course By Problem with Pertinent Findings     Displacement of L nephrostomy tube  Severe L sided hydroureteronephrosis 2/2 obstructing mid ureteral calculus  R sided hydronephrosis  BPH with chronic indwelling mark  - s/p nephrostomy tube placement with IR 4/11, tolerated procedure well  - keep mark in place  - continue flomax 0.8 mg daily   - AF, VSS, no leukocytosis     Discharge Plan:  - Appropriate Mark care per Nursing Home  - Follow up with Urology 4/25     Asymptomatic Bacteruria   - Chronic mark as above  - UA with >100 WBCs, nitrite negative, leuk esterase positive   - AF, VSS, no leukocytosis   - Has previously grown Klebsiella oxytoca  - Received Rocephin in ED x1  - Defer abx, patient asymptomatic, likely colonized     Discharge Plan:  - As above     Large stool burden on CT imaging  - he denies recent constipation  - PEG, bisacodyl suppository daily     CVA with LLE weakness  Bedbound status  - continue  lipitor 40 mg  - not on ASA 2/2 prior GIB    Discharge Plan:  - Follow up with PCP      Complete AV block s/p pacemaker   - ordered EKG, no acute issues      Depression  Decreased appetite  - continue home regimen mirtazapine 30 mg nightly, cyproheptadine 4 mg     Hx of AOCD  - Hb 15, no acute issues     Discharge Medications        Medication List        CHANGE how you take these medications      tamsulosin 0.4 mg Cap  Commonly known as: FLOMAX  TAKE 2 CAPSULES(0.8 MG) BY MOUTH EVERY DAY  What changed: when to take this            CONTINUE taking these medications      atorvastatin 40 MG tablet  Commonly known as: LIPITOR     bisacodyL 10 mg Supp  Commonly known as: DULCOLAX (BISACODYL)  Place 1 suppository (10 mg total) rectally daily as needed (constipation).     CALMOSEPTINE 0.44-20.6 % Oint  Generic drug: menthol-zinc oxide     CHEST CONGESTION RELIEF DM  mg/5 mL liquid  Generic drug: dextromethorphan-guaiFENesin  mg/5 ml     cyproheptadine 4 mg tablet  Commonly known as: PERIACTIN     docusate sodium 100 MG capsule  Commonly known as: COLACE  Take 1 capsule (100 mg total) by mouth once daily.     folic acid 1 MG tablet  Commonly known as: FOLVITE     gabapentin 300 MG capsule  Commonly known as: NEURONTIN     mirtazapine 30 MG tablet  Commonly known as: REMERON     polyethylene glycol 17 gram/dose powder  Commonly known as: GLYCOLAX              Discharge Information:     Diet:  Regular    Physical Activity:  As tolerated             Instructions:  1. Take all medications as prescribed  2. Keep all follow-up appointments  3. Return to the hospital or call your primary care physicians if any worsening symptoms such as fever, chest pain, shortness of breath, return of symptoms, or any other concerns.    Follow-Up Appointments:  Future Appointments   Date Time Provider Department Center   4/25/2024 10:00 AM Camilo Kelley Jr., MD [facility]  [facility] Internal  Medicine PGY-I

## 2024-04-12 NOTE — PROGRESS NOTES
NURSING HOME ORDERS    04/12/2024  [facility] - TELEMETRY  [address] [place] [address]  Dept: [phone]  Loc: [phone]     Admit to Nursing Home:  [facility]     Diagnoses:  Active Hospital Problems    Diagnosis  POA    *Nephrostomy tube displaced [T83.022A]  Yes      Resolved Hospital Problems   No resolved problems to display.       Patient is homebound due to:  Nephrostomy tube displaced    Allergies:Review of patient's allergies indicates:  No Known Allergies    Vitals:  Routine    Diet: Regular    Activities:   Activity as tolerated    Goals of Care Treatment Preferences:  Code Status: Full Code        What is most important right now is to focus on improvement in condition but with limits to invasive therapies.  Accordingly, we have decided that the best plan to meet the patient's goals includes continuing with treatment.    Labs:  None    Nursing Precautions:  Fall    Consults:   Nutrition to evaluate and recommend diet     Miscellaneous Care: Shen Care: Empty Shen bag every shift.  Change Shen every month             Diabetes Care:  Report CBG < 60 or > 350 to physician.      Medications: Discontinue all previous medication orders, if any. See new list below.     Medication List        CHANGE how you take these medications      tamsulosin 0.4 mg Cap  Commonly known as: FLOMAX  TAKE 2 CAPSULES(0.8 MG) BY MOUTH EVERY DAY  What changed: when to take this            CONTINUE taking these medications      atorvastatin 40 MG tablet  Commonly known as: LIPITOR  Take 40 mg by mouth once daily.     bisacodyL 10 mg Supp  Commonly known as: DULCOLAX (BISACODYL)  Place 1 suppository (10 mg total) rectally daily as needed (constipation).     CALMOSEPTINE 0.44-20.6 % Oint  Generic drug: menthol-zinc oxide  Apply topically daily as needed. To sacral area after each sacral excoriation episode and as needed     CHEST CONGESTION RELIEF DM  mg/5 mL  liquid  Generic drug: dextromethorphan-guaiFENesin  mg/5 ml  Take by mouth.     cyproheptadine 4 mg tablet  Commonly known as: PERIACTIN  Take 4 mg by mouth 3 (three) times daily. Itching     docusate sodium 100 MG capsule  Commonly known as: COLACE  Take 1 capsule (100 mg total) by mouth once daily.     folic acid 1 MG tablet  Commonly known as: FOLVITE  Take 1 mg by mouth once daily.     gabapentin 300 MG capsule  Commonly known as: NEURONTIN  Take 300 mg by mouth 3 (three) times daily.     mirtazapine 30 MG tablet  Commonly known as: REMERON  Take 30 mg by mouth nightly.     polyethylene glycol 17 gram/dose powder  Commonly known as: GLYCOLAX  Take 17 g by mouth 2 (two) times daily.                Immunizations Administered as of 4/12/2024       No immunizations on file.            This patient has had both covid vaccinations    Some patients may experience side effects after vaccination.  These may include fever, headache, muscle or joint aches.  Most symptoms resolve with 24-48 hours and do not require urgent medical evaluation unless they persist for more than 72 hours or symptoms are concerning for an unrelated medical condition.          _________________________________  Khris Caldera MD  04/12/2024

## 2024-04-12 NOTE — PLAN OF CARE
Scheduled  Date/Time  Last updated: 04/12/24 1043  4/12/202411:40 AM     Rapid Covid ordered for DC today.

## 2024-04-12 NOTE — PROGRESS NOTES
"[facility] Medicine Progress Note    Primary Team: [facility] Hospitalist Team B  Attending Physician: Anup Oakes MD  Resident: Shasta   Intern: Khris Caldera MD    Date of Admit: 4/10/2024     Chief Complaint:   Chief Complaint   Patient presents with    Nephrostomy tube displaced      BIB Bree from [facility] home. Staff called stating they found patients left sided nephrostomy tube on bed. Patient states tube was accidentally removed last night when he was stretching his back. Patient denies pain.        Subjective/Interval Events:      Patient s/p nephrostomy tube placement with IR . Tolerated procedure well without complication. No acute events overnight. Patient resting comfortably in bed this morning. Patient is alert and conversant and not in acute distress. Patient is without complaints this morning. Denies dysuria, abdominal pain.     Objective    Objective   Last 24 Hour Vital Signs:  BP  Min: 91/54  Max: 115/67  Temp  Av.1 °F (36.7 °C)  Min: 97.7 °F (36.5 °C)  Max: 98.6 °F (37 °C)  Pulse  Av.9  Min: 70  Max: 80  Resp  Av.1  Min: 13  Max: 20  SpO2  Av.9 %  Min: 95 %  Max: 99 %  Height  Av' 7" (170.2 cm)  Min: 5' 7" (170.2 cm)  Max: 5' 7" (170.2 cm)  Weight  Av.2 kg (126 lb)  Min: 57.2 kg (126 lb)  Max: 57.2 kg (126 lb)    Intake/Output Summary (Last 24 hours) at 2024 0708  Last data filed at 2024 0442  Gross per 24 hour   Intake 100 ml   Output 600 ml   Net -500 ml         Physical Examination:  General: No acute distress. Appropriate behavior.   Head: normocephalic, atraumatic  Eyes: PERRL. EOMI. No conjunctival injection. No scleral icterus.  ENT: MMM, no rhinorrhea or epistaxis.   Neck: No lymphadenopathy. No accessory muscle use.   Cardiac: Regular rate. Regular rhythm. No murmurs appreciated.  Pulmonary/Chest: CTAB. Normal work of breathing.   Abdomen: Shen in place draining appropriately. Left flank nephrostomy site without SOI. Soft, non tender, " non distended, hypoactive bowel sounds.   Extremities: atraumatic, no deformities, no edema.  Skin: dry, warm, intact. No bruising or rashes.  Neuro: Alert. Oriented to person, place, time. Normal  strength bilaterally. 5/5 strength to RLE, bilateral upper extremities. 1/5 strength to LLE.     Laboratory:  Recent Labs   Lab 04/10/24  2045 04/10/24  2124 04/11/24  0653   WBC  --  10.25 11.59   HGB  --  15.1 14.2   HCT  --  47.5 43.7   PLT  --  173 201   MCV  --  95 94   RDW  --  14.1 14.0     --  139   K 5.1  --  4.4     --  107   CO2 22*  --  23   BUN 28*  --  28*   CREATININE 1.0  --  1.0   GLU 94  --  97   PROT 7.4  --  7.0   ALBUMIN 3.0*  --  2.8*   BILITOT 0.4  --  0.6   AST 23  --  16   ALKPHOS 70  --  70   ALT 14  --  13         Microbiology:  Microbiology Results (last 7 days)       Procedure Component Value Units Date/Time    Urine Culture High Risk [[id number]] Collected: 04/11/24 1052    Order Status: Sent Specimen: Urine, Nephrostomy Tube, Left Updated: 04/11/24 1852             Imaging:  IR Nephrostomy Tube Placement   Final Result      Successful replacement of dislodged left nephroureteral tube.  Cloudy urine was noted the new tube was placed.      Plan:      - Follow-up urine studies      - Leave PNCU/NUS to gravity until urine clears/leukocytosis resolves, then cap.      - Routine exchange in 8-12 weeks with 8F x 24cm PCNU/NUS.      - Recommend Urology follow-up for evaluation for stone management.         Electronically signed by: Faiza Woods   Date:    04/11/2024   Time:    13:18      CT Renal Stone Study ABD Pelvis WO   Final Result      Severe left hydroureteronephrosis.  6 x 8 x 10 mm left mid ureteral obstructive calculus.  Bilateral nephrolithiasis left greater than right. Small right renal cortical cyst.  No left nephrostomy tube seen.      Moderately large colonic stool.  Copious stool in the rectosigmoid.  Findings are concerning for constipation and or fecal impaction.   Stercoral colitis cannot be entirely excluded.  Colonic diverticulosis.         Electronically signed by: Candy Raygoza   Date:    04/10/2024   Time:    21:21           Current Medications:     Scheduled:   atorvastatin  40 mg Oral Daily    bisacodyL  10 mg Rectal Daily    cyproheptadine  4 mg Oral TID    mirtazapine  30 mg Oral Nightly    polyethylene glycol  17 g Oral BID    tamsulosin  0.8 mg Oral Daily         Infusions:       PRN:  dextrose 10%, dextrose 10%, glucagon (human recombinant), glucose, glucose, naloxone, sodium chloride 0.9%       Assessment:     Praneeth Jewell is a 78 y.o. male with a PMHx of CVA bedbound with LLE weakness, presented to [facility] on 4/10/2024 from [facility] home in [place], [place] with a primary complaint of left sided nephrostomy tube removal. Patient with multiple removals of nephrostomy tubes in the recent past. IR consulted for placement. Admitted to [facility] medicine for coordination of tube replacement and management of chronic conditions.     Plan:     Displacement of L nephrostomy tube  Severe L sided hydroureteronephrosis 2/2 obstructing mid ureteral calculus  R sided hydronephrosis  BPH with chronic indwelling mark  - s/p nephrostomy tube placement with IR 4/11, tolerated procedure well  - keep mark in place  - continue flomax 0.8 mg daily   - AF, VSS, no leukocytosis      Asymptomatic Bacteruria   - Chronic mark as above  - UA with >100 WBCs, nitrite negative, leuk esterase positive   - AF, VSS, no leukocytosis   - Has previously grown Klebsiella oxytoca  - Received Rocephin in ED x1  - Defer abx, patient asymptomatic, likely colonized     Large stool burden on CT imaging  - he denies recent constipation  - PEG, bisacodyl suppository daily  - avoid opiates, anticholinergics     CVA with LLE weakness  Bedbound status  - continue lipitor 40 mg  - not on ASA 2/2 prior GIB  - defer to PCP     Complete AV block s/p pacemaker   - ordered EKG, no acute issues       Depression  Decreased appetite  - continue home regimen mirtazapine 30 mg nightly, cyproheptadine 4 mg  - consider nutrition consult     Hx of AOCD  - Hb 15, no acute issues      Code Status: full  DVT Prophylaxis: lovenox  Diet: NPO  Disposition: discharge today    Khris Caldera MD   [facility] Internal Medicine PGY-1  [facility] Internal Medicine Team B    [facility] Medicine Hospitalist Pager numbers:   [facility] Hospitalist Medicine Team A (Kalpesh/Richard): [phone]  [facility] Hospitalist Medicine Team B (Champ/Daiana):  [phone]

## 2024-04-14 LAB — BACTERIA UR CULT: ABNORMAL

## 2024-04-14 NOTE — PROGRESS NOTES
U Internal Medicine Plan of Care    The patient is a 78-year-old male with a past medical history of L sided hydroureteronephrosis 2/2 obstructing mid ureteral calculus requiring left sided nephrostomy tube who recently presented to [facility] for nephrostomy tube replacement in setting of inadvertent removal. Patient afebrile and asymptomatic on presentation with normal WBC. Urine culture obtained during procedure that resulted positive for MRSA. Discussed with infectious disease attending on call Dr. Albarado who recommends treating with oral antibiotics if available. [facility] and provided verbal order to start bactrim 1 double strength tablet twice daily for 14 days. Nurse repeated and confirmed order.    Pastor Vegas MD  [facility] Internal Medicine HO-III

## 2024-04-17 ENCOUNTER — OUTSIDE PLACE OF SERVICE (OUTPATIENT)
Dept: ADMINISTRATIVE | Facility: OTHER | Age: 79
End: 2024-04-17
Payer: MEDICARE

## 2024-04-17 PROCEDURE — 99499 UNLISTED E&M SERVICE: CPT | Mod: ,,, | Performed by: FAMILY MEDICINE

## 2024-04-25 ENCOUNTER — TELEPHONE (OUTPATIENT)
Dept: UROLOGY | Facility: CLINIC | Age: 79
End: 2024-04-25

## 2024-04-25 ENCOUNTER — OFFICE VISIT (OUTPATIENT)
Dept: UROLOGY | Facility: CLINIC | Age: 79
End: 2024-04-25
Payer: MEDICARE

## 2024-04-25 VITALS — BODY MASS INDEX: 19.73 KG/M2 | WEIGHT: 125.69 LBS | HEIGHT: 67 IN

## 2024-04-25 DIAGNOSIS — N20.0 NEPHROLITHIASIS: Primary | ICD-10-CM

## 2024-04-25 PROCEDURE — 87088 URINE BACTERIA CULTURE: CPT

## 2024-04-25 PROCEDURE — 87086 URINE CULTURE/COLONY COUNT: CPT

## 2024-04-25 PROCEDURE — 87186 SC STD MICRODIL/AGAR DIL: CPT

## 2024-04-25 PROCEDURE — 87077 CULTURE AEROBIC IDENTIFY: CPT

## 2024-04-25 PROCEDURE — 99214 OFFICE O/P EST MOD 30 MIN: CPT | Mod: S$GLB,,, | Performed by: UROLOGY

## 2024-04-25 PROCEDURE — 1157F ADVNC CARE PLAN IN RCRD: CPT | Mod: CPTII,S$GLB,, | Performed by: UROLOGY

## 2024-04-25 PROCEDURE — 99999 PR PBB SHADOW E&M-EST. PATIENT-LVL I: CPT | Mod: PBBFAC,,, | Performed by: UROLOGY

## 2024-04-25 NOTE — PROGRESS NOTES
Subjective:       Patient ID: Praneeth Jewell is a 78 y.o. male.    Chief Complaint: No chief complaint on file.    HPI    Praneeth Jewell is a 77-year-old male, lives in a nursing home, history of obstructive uropathy s/p J-J stent placement, recurrent nephrolithiasis and UTIs, history of MDR UTI, 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      His L neph tube became displaced and was replaced on 4/11 with a L PCNU.  He had no evidence of infection or GURVINDER at that time.   His urine culture grew MRSA.  He was discharged with two weeks of bactrim.      He has a chronic indwelling mark catheter.  Unsure of his last catheter exchange.  Last CTRSS on 4/10 when PCN became displaced shows 7 mm left mid ureteral stone and multiple non obstructing stones in the left kidney.  No stones on the right.   Large stool burden.  Mark catheter in placed with decompression of the bladder.       Of note he has had multiple ureteroscopies and has a history of urosepsis and proteus UTI.       Today he denies fevers, chills.  Says he is having regular bowel movements at least once a day.  No blood thinners.          Past Surgical History:   Procedure Laterality Date    A-V CARDIAC PACEMAKER INSERTION N/A 8/24/2021    Procedure: INSERTION, CARDIAC PACEMAKER, DUAL CHAMBER;  Surgeon: Neo Winn MD;  Location: [facility] EP LAB;  Service: Cardiology;  Laterality: N/A;  CHB, DUAL PPM, ANES, BIO, DM, ED 2    COLONOSCOPY N/A 11/22/2021    Procedure: COLONOSCOPY;  Surgeon: Cesar Dorado MD;  Location: [facility] ENDO (2ND FLR);  Service: Endoscopy;  Laterality: N/A;    CYSTOSCOPIC LITHOLAPAXY  5/25/2021    Procedure: CYSTOLITHOLAPAXY;  Surgeon: Chris Schilling MD;  Location: [facility] OR 1ST FLR;  Service: Urology;;    CYSTOSCOPY  8/26/2021    Procedure: CYSTOSCOPY;  Surgeon: Camilo Kelley Jr., MD;  Location:  [facility] OR 1ST FLR;  Service: Urology;;    CYSTOSCOPY W/ URETERAL STENT PLACEMENT Bilateral 5/25/2021    Procedure: CYSTOSCOPY, WITH BILATERAL URETERAL STENT INSERTION;  Surgeon: Chris Schilling MD;  Location: [facility] OR 1ST FLR;  Service: Urology;  Laterality: Bilateral;    ELBOW ARTHROPLASTY Right     FLUOROSCOPY  5/25/2021    Procedure: FLUOROSCOPY;  Surgeon: Chris Schilling MD;  [facility];  Service: Urology;;    LASER LITHOTRIPSY Left 8/26/2021    Procedure: LITHOTRIPSY, USING LASER;  Surgeon: Camilo Kelley Jr., MD;  [facility];  Service: Urology;  Laterality: Left;    PYELOSCOPY Bilateral 8/26/2021    Procedure: PYELOSCOPY;  Surgeon: Camilo Kelley Jr., MD;  [facility];  Service: Urology;  Laterality: Bilateral;    REMOVAL OF BLOOD CLOT  5/25/2021    Procedure: REMOVAL, BLOOD CLOT;  Surgeon: Chris Schilling MD;  [facility];  Service: Urology;;    REPLACEMENT OF STENT Bilateral 8/26/2021    Procedure: REPLACEMENT, STENT;  Surgeon: Camilo Kelley Jr., MD;  [facility];  Service: Urology;  Laterality: Bilateral;    URETEROSCOPIC REMOVAL OF URETERIC CALCULUS Bilateral 8/26/2021    Procedure: REMOVAL, CALCULUS, URETER, URETEROSCOPIC;  Surgeon: Camilo Kelley Jr., MD;  [facility];  Service: Urology;  Laterality: Bilateral;    URETEROSCOPY Bilateral 8/26/2021    Procedure: URETEROSCOPY;  Surgeon: Camilo Kelley Jr., MD;  [facility];  Service: Urology;  Laterality: Bilateral;       Past Medical History:   Diagnosis Date    Arthritis     Diabetes mellitus     type 2    Folate deficiency anemia     Heart block     History of kidney stones 05/25/2021    History of stroke with residual deficit 05/25/2021    Hyperlipidemia     Hypertension     Major depressive disorder     Pacemaker     Biotronic Edora 8 JONATHON (S/n: [id number])    Pyelonephritis 11/19/2021    TIA (transient ischemic attack)     Urinary retention  "05/25/2021       Social History     Socioeconomic History    Marital status: Single    Number of children: 0    Years of education: 15    Highest education level: Some college, no degree   Occupational History     Employer: Disabled    Occupation: Construction     Employer: [employer]     Comment: Retired in past 5-10 years   Tobacco Use    Smoking status: Former     Types: Cigarettes    Smokeless tobacco: Never   Substance and Sexual Activity    Alcohol use: Yes     Comment: 1/ pint whisky daily    Drug use: Yes     Types: "Crack" cocaine     Social Determinants of Health     Financial Resource Strain: Low Risk  (4/15/2023)    Overall Financial Resource Strain (CARDIA)     Difficulty of Paying Living Expenses: Not hard at all   Food Insecurity: No Food Insecurity (4/15/2023)    Hunger Vital Sign     Worried About Running Out of Food in the Last Year: Never true     Ran Out of Food in the Last Year: Never true   Transportation Needs: No Transportation Needs (4/15/2023)    PRAPARE - Transportation     Lack of Transportation (Medical): No     Lack of Transportation (Non-Medical): No   Physical Activity: Inactive (4/15/2023)    Exercise Vital Sign     Days of Exercise per Week: 0 days     Minutes of Exercise per Session: 0 min   Stress: No Stress Concern Present (4/15/2023)    [place] [employer] - Occupational Stress Questionnaire     Feeling of Stress : Not at all   Social Connections: Socially Isolated (4/15/2023)    Social Connection and Isolation Panel [NHANES]     Frequency of Communication with Friends and Family: Three times a week     Frequency of Social Gatherings with Friends and Family: Once a week     Attends Islam Services: Never     Active Member of Clubs or Organizations: No     Attends Club or Organization Meetings: Never     Marital Status: Never    Housing Stability: Low Risk  (4/15/2023)    Housing Stability Vital Sign     Unable to Pay for Housing in the Last Year: " "No     Number of Places Lived in the Last Year: 2     Unstable Housing in the Last Year: No       Family History   Problem Relation Name Age of Onset    Stroke Mother      Hyperlipidemia Mother      Mental illness Father      Parkinsonism Father      No Known Problems Brother         Current Outpatient Medications   Medication Sig Dispense Refill    atorvastatin (LIPITOR) 40 MG tablet Take 40 mg by mouth once daily.      bisacodyL (DULCOLAX, BISACODYL,) 10 mg Supp Place 1 suppository (10 mg total) rectally daily as needed (constipation).  0    CHEST CONGESTION RELIEF DM  mg/5 mL liquid Take by mouth.      cyproheptadine (PERIACTIN) 4 mg tablet Take 4 mg by mouth 3 (three) times daily. Itching      docusate sodium (COLACE) 100 MG capsule Take 1 capsule (100 mg total) by mouth once daily.  0    folic acid (FOLVITE) 1 MG tablet Take 1 mg by mouth once daily.      gabapentin (NEURONTIN) 300 MG capsule Take 300 mg by mouth 3 (three) times daily.      menthol-zinc oxide (CALMOSEPTINE) 0.44-20.6 % Oint Apply topically daily as needed. To sacral area after each sacral excoriation episode and as needed      mirtazapine (REMERON) 30 MG tablet Take 30 mg by mouth nightly.      polyethylene glycol (GLYCOLAX) 17 gram/dose powder Take 17 g by mouth 2 (two) times daily.      tamsulosin (FLOMAX) 0.4 mg Cap TAKE 2 CAPSULES(0.8 MG) BY MOUTH EVERY DAY (Patient taking differently: Take 0.8 mg by mouth once daily.) 60 capsule 11     No current facility-administered medications for this visit.       Review of patient's allergies indicates:  No Known Allergies     No results found for: "PSA", "PSADIAG", "PSATOTAL", "PSAFREE", "PSAFREEPCT"    BMP  Lab Results   Component Value Date     04/11/2024    K 4.4 04/11/2024     04/11/2024    CO2 23 04/11/2024    BUN 28 (H) 04/11/2024    CREATININE 1.0 04/11/2024    CALCIUM 9.6 04/11/2024    ANIONGAP 9 04/11/2024    ESTGFRAFRICA >60.0 03/14/2022    EGFRNONAA >60.0 03/14/2022 "         Review of Systems   Constitutional:  Negative for chills and fever.   Genitourinary:  Negative for flank pain.     Objective:      Physical Exam  Constitutional:       Comments: Deconditioned in wheelchair.     Abdominal:      Comments: Left neph tube draining clear yellow urine    Genitourinary:     Comments: 16 Fr mark draining cloudy yellow urine   Neurological:      General: No focal deficit present.      Mental Status: He is alert.         Assessment:       1. Nephrolithiasis        Plan:   Diagnoses and all orders for this visit:    Nephrolithiasis        - will schedule left ureteroscopy with laser lithotripsy and stone extraction   - mark exchange today, will send new urine for culture   - needs H and P clinic and pre-op clearance

## 2024-04-25 NOTE — TELEPHONE ENCOUNTER
Spoke to the pt while in clinic to offer surgery date of 5/24, pt accepted. Informed the pt I will call the day before surgery with arrival time and pre-op instructions after 2pm. Pt verbalized understanding.

## 2024-04-27 LAB — BACTERIA UR CULT: ABNORMAL

## 2024-04-29 ENCOUNTER — TELEPHONE (OUTPATIENT)
Dept: PREADMISSION TESTING | Facility: HOSPITAL | Age: 79
End: 2024-04-29
Payer: MEDICARE

## 2024-05-01 ENCOUNTER — OUTSIDE PLACE OF SERVICE (OUTPATIENT)
Dept: ADMINISTRATIVE | Facility: OTHER | Age: 79
End: 2024-05-01
Payer: MEDICARE

## 2024-05-02 ENCOUNTER — TELEPHONE (OUTPATIENT)
Dept: PREADMISSION TESTING | Facility: HOSPITAL | Age: 79
End: 2024-05-02
Payer: MEDICARE

## 2024-05-13 ENCOUNTER — TELEPHONE (OUTPATIENT)
Dept: ELECTROPHYSIOLOGY | Facility: CLINIC | Age: 79
End: 2024-05-13
Payer: MEDICARE

## 2024-05-13 NOTE — TELEPHONE ENCOUNTER
Spoke with nursing home,  staff will check home monitoring and will contact [employer] if unable to connect.

## 2024-05-14 PROBLEM — K59.00 CONSTIPATION: Status: ACTIVE | Noted: 2024-05-14

## 2024-05-14 PROBLEM — N13.30 HYDRONEPHROSIS: Status: ACTIVE | Noted: 2024-05-14

## 2024-05-20 ENCOUNTER — HOSPITAL ENCOUNTER (INPATIENT)
Facility: HOSPITAL | Age: 79
LOS: 4 days | Discharge: HOME | DRG: 660 | End: 2024-05-24
Attending: STUDENT IN AN ORGANIZED HEALTH CARE EDUCATION/TRAINING PROGRAM | Admitting: STUDENT IN AN ORGANIZED HEALTH CARE EDUCATION/TRAINING PROGRAM
Payer: MEDICARE

## 2024-05-20 ENCOUNTER — OFFICE VISIT (OUTPATIENT)
Dept: UROLOGY | Facility: CLINIC | Age: 79
End: 2024-05-20
Payer: MEDICARE

## 2024-05-20 DIAGNOSIS — N20.0 NEPHROLITHIASIS: Primary | Chronic | ICD-10-CM

## 2024-05-20 DIAGNOSIS — R07.9 CHEST PAIN: ICD-10-CM

## 2024-05-20 DIAGNOSIS — N20.0 NEPHROLITHIASIS: Primary | ICD-10-CM

## 2024-05-20 DIAGNOSIS — N39.0 UTI (URINARY TRACT INFECTION): ICD-10-CM

## 2024-05-20 LAB
ALBUMIN SERPL BCP-MCNC: 3.1 G/DL (ref 3.5–5.2)
ALBUMIN SERPL BCP-MCNC: 3.3 G/DL (ref 3.5–5.2)
ALP SERPL-CCNC: 82 U/L (ref 55–135)
ALP SERPL-CCNC: 89 U/L (ref 55–135)
ALT SERPL W/O P-5'-P-CCNC: 11 U/L (ref 10–44)
ALT SERPL W/O P-5'-P-CCNC: 12 U/L (ref 10–44)
ANION GAP SERPL CALC-SCNC: 10 MMOL/L (ref 8–16)
ANION GAP SERPL CALC-SCNC: 11 MMOL/L (ref 8–16)
AST SERPL-CCNC: 16 U/L (ref 10–40)
AST SERPL-CCNC: 18 U/L (ref 10–40)
BASOPHILS # BLD AUTO: 0.04 K/UL (ref 0–0.2)
BASOPHILS NFR BLD: 0.6 % (ref 0–1.9)
BILIRUB SERPL-MCNC: 0.7 MG/DL (ref 0.1–1)
BILIRUB SERPL-MCNC: 0.8 MG/DL (ref 0.1–1)
BUN SERPL-MCNC: 32 MG/DL (ref 8–23)
BUN SERPL-MCNC: 32 MG/DL (ref 8–23)
CALCIUM SERPL-MCNC: 10 MG/DL (ref 8.7–10.5)
CALCIUM SERPL-MCNC: 9.8 MG/DL (ref 8.7–10.5)
CHLORIDE SERPL-SCNC: 107 MMOL/L (ref 95–110)
CHLORIDE SERPL-SCNC: 110 MMOL/L (ref 95–110)
CO2 SERPL-SCNC: 20 MMOL/L (ref 23–29)
CO2 SERPL-SCNC: 26 MMOL/L (ref 23–29)
CREAT SERPL-MCNC: 0.9 MG/DL (ref 0.5–1.4)
CREAT SERPL-MCNC: 1.1 MG/DL (ref 0.5–1.4)
DIFFERENTIAL METHOD BLD: ABNORMAL
EOSINOPHIL # BLD AUTO: 0.1 K/UL (ref 0–0.5)
EOSINOPHIL NFR BLD: 1.8 % (ref 0–8)
ERYTHROCYTE [DISTWIDTH] IN BLOOD BY AUTOMATED COUNT: 15.6 % (ref 11.5–14.5)
ERYTHROCYTE [DISTWIDTH] IN BLOOD BY AUTOMATED COUNT: 15.7 % (ref 11.5–14.5)
EST. GFR  (NO RACE VARIABLE): >60 ML/MIN/1.73 M^2
EST. GFR  (NO RACE VARIABLE): >60 ML/MIN/1.73 M^2
GLUCOSE SERPL-MCNC: 74 MG/DL (ref 70–110)
GLUCOSE SERPL-MCNC: 97 MG/DL (ref 70–110)
HCT VFR BLD AUTO: 47.8 % (ref 40–54)
HCT VFR BLD AUTO: 48.1 % (ref 40–54)
HGB BLD-MCNC: 15.2 G/DL (ref 14–18)
HGB BLD-MCNC: 15.3 G/DL (ref 14–18)
IMM GRANULOCYTES # BLD AUTO: 0.02 K/UL (ref 0–0.04)
IMM GRANULOCYTES NFR BLD AUTO: 0.3 % (ref 0–0.5)
LYMPHOCYTES # BLD AUTO: 1.4 K/UL (ref 1–4.8)
LYMPHOCYTES NFR BLD: 20 % (ref 18–48)
MCH RBC QN AUTO: 31.1 PG (ref 27–31)
MCH RBC QN AUTO: 31.2 PG (ref 27–31)
MCHC RBC AUTO-ENTMCNC: 31.8 G/DL (ref 32–36)
MCHC RBC AUTO-ENTMCNC: 31.8 G/DL (ref 32–36)
MCV RBC AUTO: 98 FL (ref 82–98)
MCV RBC AUTO: 98 FL (ref 82–98)
MONOCYTES # BLD AUTO: 0.5 K/UL (ref 0.3–1)
MONOCYTES NFR BLD: 7 % (ref 4–15)
NEUTROPHILS # BLD AUTO: 5 K/UL (ref 1.8–7.7)
NEUTROPHILS NFR BLD: 70.3 % (ref 38–73)
NRBC BLD-RTO: 0 /100 WBC
PLATELET # BLD AUTO: 172 K/UL (ref 150–450)
PLATELET # BLD AUTO: 173 K/UL (ref 150–450)
PMV BLD AUTO: 10.2 FL (ref 9.2–12.9)
PMV BLD AUTO: 10.3 FL (ref 9.2–12.9)
POTASSIUM SERPL-SCNC: 4 MMOL/L (ref 3.5–5.1)
POTASSIUM SERPL-SCNC: 4.7 MMOL/L (ref 3.5–5.1)
PROT SERPL-MCNC: 6.9 G/DL (ref 6–8.4)
PROT SERPL-MCNC: 7.4 G/DL (ref 6–8.4)
RBC # BLD AUTO: 4.89 M/UL (ref 4.6–6.2)
RBC # BLD AUTO: 4.91 M/UL (ref 4.6–6.2)
SODIUM SERPL-SCNC: 141 MMOL/L (ref 136–145)
SODIUM SERPL-SCNC: 143 MMOL/L (ref 136–145)
WBC # BLD AUTO: 7.04 K/UL (ref 3.9–12.7)
WBC # BLD AUTO: 7.11 K/UL (ref 3.9–12.7)

## 2024-05-20 PROCEDURE — 85027 COMPLETE CBC AUTOMATED: CPT | Performed by: STUDENT IN AN ORGANIZED HEALTH CARE EDUCATION/TRAINING PROGRAM

## 2024-05-20 PROCEDURE — 80053 COMPREHEN METABOLIC PANEL: CPT | Performed by: STUDENT IN AN ORGANIZED HEALTH CARE EDUCATION/TRAINING PROGRAM

## 2024-05-20 PROCEDURE — 85025 COMPLETE CBC W/AUTO DIFF WBC: CPT | Performed by: STUDENT IN AN ORGANIZED HEALTH CARE EDUCATION/TRAINING PROGRAM

## 2024-05-20 PROCEDURE — 63600175 PHARM REV CODE 636 W HCPCS: Performed by: STUDENT IN AN ORGANIZED HEALTH CARE EDUCATION/TRAINING PROGRAM

## 2024-05-20 PROCEDURE — 36415 COLL VENOUS BLD VENIPUNCTURE: CPT | Performed by: STUDENT IN AN ORGANIZED HEALTH CARE EDUCATION/TRAINING PROGRAM

## 2024-05-20 PROCEDURE — 25000003 PHARM REV CODE 250: Performed by: STUDENT IN AN ORGANIZED HEALTH CARE EDUCATION/TRAINING PROGRAM

## 2024-05-20 PROCEDURE — 20600001 HC STEP DOWN PRIVATE ROOM

## 2024-05-20 PROCEDURE — 99499 UNLISTED E&M SERVICE: CPT | Mod: S$GLB,,, | Performed by: UROLOGY

## 2024-05-20 RX ORDER — IBUPROFEN 200 MG
24 TABLET ORAL
Status: DISCONTINUED | OUTPATIENT
Start: 2024-05-20 | End: 2024-05-24 | Stop reason: HOSPADM

## 2024-05-20 RX ORDER — NALOXONE HCL 0.4 MG/ML
0.02 VIAL (ML) INJECTION
Status: DISCONTINUED | OUTPATIENT
Start: 2024-05-20 | End: 2024-05-24 | Stop reason: HOSPADM

## 2024-05-20 RX ORDER — SODIUM CHLORIDE 0.9 % (FLUSH) 0.9 %
10 SYRINGE (ML) INJECTION EVERY 12 HOURS PRN
Status: DISCONTINUED | OUTPATIENT
Start: 2024-05-20 | End: 2024-05-24 | Stop reason: HOSPADM

## 2024-05-20 RX ORDER — TAMSULOSIN HYDROCHLORIDE 0.4 MG/1
0.8 CAPSULE ORAL DAILY
Status: DISCONTINUED | OUTPATIENT
Start: 2024-05-21 | End: 2024-05-24 | Stop reason: HOSPADM

## 2024-05-20 RX ORDER — HEPARIN SODIUM 5000 [USP'U]/ML
5000 INJECTION, SOLUTION INTRAVENOUS; SUBCUTANEOUS EVERY 8 HOURS
Status: DISCONTINUED | OUTPATIENT
Start: 2024-05-20 | End: 2024-05-24 | Stop reason: HOSPADM

## 2024-05-20 RX ORDER — ACETAMINOPHEN 325 MG/1
650 TABLET ORAL EVERY 4 HOURS PRN
Status: DISCONTINUED | OUTPATIENT
Start: 2024-05-20 | End: 2024-05-24 | Stop reason: HOSPADM

## 2024-05-20 RX ORDER — GLUCAGON 1 MG
1 KIT INJECTION
Status: DISCONTINUED | OUTPATIENT
Start: 2024-05-20 | End: 2024-05-24 | Stop reason: HOSPADM

## 2024-05-20 RX ORDER — IBUPROFEN 200 MG
16 TABLET ORAL
Status: DISCONTINUED | OUTPATIENT
Start: 2024-05-20 | End: 2024-05-24 | Stop reason: HOSPADM

## 2024-05-20 RX ORDER — ATORVASTATIN CALCIUM 40 MG/1
40 TABLET, FILM COATED ORAL DAILY
Status: DISCONTINUED | OUTPATIENT
Start: 2024-05-21 | End: 2024-05-24 | Stop reason: HOSPADM

## 2024-05-20 RX ORDER — ONDANSETRON HYDROCHLORIDE 2 MG/ML
4 INJECTION, SOLUTION INTRAVENOUS EVERY 8 HOURS PRN
Status: DISCONTINUED | OUTPATIENT
Start: 2024-05-20 | End: 2024-05-24 | Stop reason: HOSPADM

## 2024-05-20 RX ORDER — DOCUSATE SODIUM 100 MG/1
100 CAPSULE, LIQUID FILLED ORAL DAILY
Status: DISCONTINUED | OUTPATIENT
Start: 2024-05-21 | End: 2024-05-24 | Stop reason: HOSPADM

## 2024-05-20 RX ORDER — GABAPENTIN 300 MG/1
300 CAPSULE ORAL 3 TIMES DAILY
Status: DISCONTINUED | OUTPATIENT
Start: 2024-05-20 | End: 2024-05-24 | Stop reason: HOSPADM

## 2024-05-20 RX ORDER — ACETAMINOPHEN 325 MG/1
650 TABLET ORAL EVERY 8 HOURS PRN
Status: DISCONTINUED | OUTPATIENT
Start: 2024-05-20 | End: 2024-05-24 | Stop reason: HOSPADM

## 2024-05-20 RX ADMIN — MEROPENEM 1 G: 1 INJECTION INTRAVENOUS at 08:05

## 2024-05-20 RX ADMIN — GABAPENTIN 300 MG: 300 CAPSULE ORAL at 08:05

## 2024-05-20 NOTE — PROGRESS NOTES
Nurses Note -- 4 Eyes      5/20/2024   6:26 PM      Skin assessed during: Admit      [] No Pressure Injuries Present    []Prevention Measures Documented      [x] Yes- Altered Skin Integrity Present or Discovered   [x] LDA Added if Not in Epic (Describe Wound)   [] New Altered Skin Integrity was Present on Admit and Documented in LDA   [] Wound Image Taken    Wound Care Consulted? Yes    Attending Nurse:  Mei Bansal RN     Second RN/Staff Member:  Angela Vora RN

## 2024-05-20 NOTE — PLAN OF CARE
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]  UTI (urinary tract infection) [N39.0]    Admission Date: 5/20/2024  Expected Discharge Date:     Transition of Care Barriers: None    Payor: [employer] [facility] [place] / Plan: [employer] / Product Type: Medicare Advantage /     Extended Emergency Contact Information  Primary Emergency Contact: Tiffany Jewell   [facility]  Home Phone: [phone]  Relation: Brother  Secondary Emergency Contact: Sil Kessler  Mobile Phone: [phone]  Relation: Friend    Discharge [facility]  Discharge Plan B: Home    No Pharmacies Listed    Initial Assessment (most recent)       Adult Discharge Assessment - 05/20/24 1848          Discharge Assessment    Assessment Type Discharge Planning Assessment     Confirmed/corrected address, phone number and insurance Yes     Confirmed Demographics Correct on Facesheet     Source of Information patient     Does patient/caregiver understand observation status Yes     Communicated FISH with patient/caregiver Date not available/Unable to determine     Reason For Admission Extended spectrum beta     People in Home facility resident     Facility Arrived From: [facility] in [place]     Do you expect to return to your current living situation? Yes     Prior to hospitilization cognitive status: Alert/Oriented     Current cognitive status: Alert/Oriented     Walking or Climbing Stairs Difficulty yes     Walking or Climbing Stairs ambulation difficulty, dependent     Dressing/Bathing Difficulty yes     Dressing/Bathing dressing difficulty, dependent     Home Accessibility wheelchair accessible     Equipment Currently Used at Home wheelchair     Readmission within 30 days? No     Patient currently being followed by outpatient case  management? No     Do you take prescription medications? Yes     Do you have prescription coverage? Yes     Coverage PHN     Do you have any problems affording any of your prescribed medications? No     Is the patient taking medications as prescribed? yes     Who is going to help you get home at discharge? public transportation     How do you get to doctors appointments? public transportation     Are you on dialysis? No     Do you take coumadin? No     Discharge [facility] Home   [facility]    Discharge Plan B Home     DME Needed Upon Discharge  none     Discharge Plan discussed with: Patient     Transition of Care Barriers None                   SW met with pt to discuss discharge planning.  [facility] [place] [place]  and needs assistance with ambulation and ADLs.  No dialysis.  Pt will have transportation and assistance from public transportation at discharge.  Discharge Plan A and Plan B have been determined by review of patient's clinical status, future medical and therapeutic needs, and coverage/benefits for post-acute care in coordination with multidisciplinary team members.      Luz Maria Chauhan MSW, CSW

## 2024-05-20 NOTE — SUBJECTIVE & OBJECTIVE
Past Medical History:   Diagnosis Date    Arthritis     Diabetes mellitus     type 2    Folate deficiency anemia     Heart block     History of kidney stones 05/25/2021    History of stroke with residual deficit 05/25/2021    Hyperlipidemia     Hypertension     Major depressive disorder     Pacemaker     Biotronic Edora 8 DR-T (S/n: [id number])    Pyelonephritis 11/19/2021    TIA (transient ischemic attack)     Urinary retention 05/25/2021       Past Surgical History:   Procedure Laterality Date    A-V CARDIAC PACEMAKER INSERTION N/A 8/24/2021    Procedure: INSERTION, CARDIAC PACEMAKER, DUAL CHAMBER;  Surgeon: Neo Winn MD;  Location: [facility] EP LAB;  Service: Cardiology;  Laterality: N/A;  CHB, DUAL PPM, ANES, BIO, DM, ED 2    COLONOSCOPY N/A 11/22/2021    Procedure: COLONOSCOPY;  Surgeon: Cesar Dorado MD;  Location: [facility] ENDO (2ND FLR);  Service: Endoscopy;  Laterality: N/A;    CYSTOSCOPIC LITHOLAPAXY  5/25/2021    Procedure: CYSTOLITHOLAPAXY;  Surgeon: Chris Schilling MD;  [facility];  Service: Urology;;    CYSTOSCOPY  8/26/2021    Procedure: CYSTOSCOPY;  Surgeon: Camilo Kelley Jr., MD;  [facility];  Service: Urology;;    CYSTOSCOPY W/ URETERAL STENT PLACEMENT Bilateral 5/25/2021    Procedure: CYSTOSCOPY, WITH BILATERAL URETERAL STENT INSERTION;  Surgeon: Chris Schilling MD;  [facility];  Service: Urology;  Laterality: Bilateral;    ELBOW ARTHROPLASTY Right     FLUOROSCOPY  5/25/2021    Procedure: FLUOROSCOPY;  Surgeon: Chris Schilling MD;  [facility];  Service: Urology;;    LASER LITHOTRIPSY Left 8/26/2021    Procedure: LITHOTRIPSY, USING LASER;  Surgeon: Camilo Kelley Jr., MD;  [facility];  Service: Urology;  Laterality: Left;    PYELOSCOPY Bilateral 8/26/2021    Procedure: PYELOSCOPY;  Surgeon: Camilo Kelley Jr., MD;  [facility];  Service: Urology;  Laterality: Bilateral;    REMOVAL OF BLOOD CLOT   "5/25/2021    Procedure: REMOVAL, BLOOD CLOT;  Surgeon: Chris Schilling MD;  Location: [facility] OR [address] [place] [address];  Service: Urology;;    REPLACEMENT OF STENT Bilateral 8/26/2021    Procedure: REPLACEMENT, STENT;  Surgeon: Camilo Kelley Jr., MD;  Location: [facility] OR [address] [place] [address];  Service: Urology;  Laterality: Bilateral;    URETEROSCOPIC REMOVAL OF URETERIC CALCULUS Bilateral 8/26/2021    Procedure: REMOVAL, CALCULUS, URETER, URETEROSCOPIC;  Surgeon: Camilo Kelley Jr., MD;  Location: [facility] OR [address] [place] [address];  Service: Urology;  Laterality: Bilateral;    URETEROSCOPY Bilateral 8/26/2021    Procedure: URETEROSCOPY;  Surgeon: Camilo Kelley Jr., MD;  Location: [facility] OR [address] [place] [address];  Service: Urology;  Laterality: Bilateral;       Review of patient's allergies indicates:  No Known Allergies    Family History       Problem Relation (Age of Onset)    Hyperlipidemia Mother    Mental illness Father    No Known Problems Brother    Parkinsonism Father    Stroke Mother            Tobacco Use    Smoking status: Former     Types: Cigarettes    Smokeless tobacco: Never   Substance and Sexual Activity    Alcohol use: Yes     Comment: 1/ pint whisky daily    Drug use: Yes     Types: "Crack" cocaine    Sexual activity: Not on file       Review of Systems    Objective:           There is no height or weight on file to calculate BMI.           Drains       Drain  Duration                  Nephrostomy 04/11/24 1048 Left 8 Fr. 39 days                     Physical Exam  HENT:      Head: Normocephalic.   Cardiovascular:      Rate and Rhythm: Normal rate.   Pulmonary:      Effort: Pulmonary effort is normal.   Abdominal:      General: Abdomen is flat.      Palpations: Abdomen is soft.   Genitourinary:     Comments: Shen catheter draining cyu  Musculoskeletal:         General: Normal range of motion.   Skin:     General: Skin is warm.   Neurological:      General: No focal deficit present.      Mental Status: He is alert.   Psychiatric:         Mood and Affect: " "Mood normal.          Significant Labs:    BMP:  No results for input(s): "NA", "K", "CL", "CO2", "BUN", "CREATININE", "LABGLOM", "GLUCOSE", "CALCIUM" in the last 168 hours.    CBC:  No results for input(s): "WBC", "HGB", "HCT", "PLT" in the last 168 hours.    All pertinent labs results from the past 24 hours have been reviewed.    Significant Imaging:  All pertinent imaging results/findings from the past 24 hours have been reviewed.                  "

## 2024-05-20 NOTE — HPI
Praneeth Jewell is a 79 yo M lives in a nursing home with history of obstructive uropathy s/p J-J stent placement, recurrent nephrolithiasis and UTIs, history of MDR UTI, 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 Patient scheduled for ureteroscopy 5/25 with Dr. Kelley. Prior urine culture from 4/25 untreated growing ESBL Pseudomonas. Recommended to be a direct admit to  for IV antibiotics prior to procedure       He has a chronic indwelling mark catheter.  Last exchanged on 4/25/2024.  Last CTRSS on 4/10 when PCN became displaced shows 7 mm left mid ureteral stone and multiple non obstructing stones in the left kidney.  No stones on the right.   Large stool burden.  Mark catheter in placed with decompression of the bladder.        Of note he has had multiple ureteroscopies and has a history of urosepsis and proteus UTI.       Today he denies fevers, chills***.

## 2024-05-20 NOTE — PROGRESS NOTES
Urology ([facility]) H&P for upcoming procedure  Staff:  Camilo Kelley MD      Patient never seen in clinic. He was direct admitted to  for IV abx prior to urologic procedure

## 2024-05-21 ENCOUNTER — TELEPHONE (OUTPATIENT)
Dept: INTERNAL MEDICINE | Facility: CLINIC | Age: 79
End: 2024-05-21
Payer: MEDICARE

## 2024-05-21 PROBLEM — N30.00 ACUTE CYSTITIS: Status: ACTIVE | Noted: 2024-05-21

## 2024-05-21 PROBLEM — Z97.8 CHRONIC INDWELLING FOLEY CATHETER: Status: ACTIVE | Noted: 2024-05-21

## 2024-05-21 LAB
BACTERIA #/AREA URNS AUTO: ABNORMAL /HPF
BILIRUB UR QL STRIP: NEGATIVE
CLARITY UR REFRACT.AUTO: ABNORMAL
COLOR UR AUTO: YELLOW
GLUCOSE UR QL STRIP: NEGATIVE
HGB UR QL STRIP: ABNORMAL
HYALINE CASTS UR QL AUTO: 0 /LPF
KETONES UR QL STRIP: ABNORMAL
LEUKOCYTE ESTERASE UR QL STRIP: ABNORMAL
MICROSCOPIC COMMENT: ABNORMAL
NITRITE UR QL STRIP: POSITIVE
PH UR STRIP: 6 [PH] (ref 5–8)
PROT UR QL STRIP: ABNORMAL
RBC #/AREA URNS AUTO: >100 /HPF (ref 0–4)
SP GR UR STRIP: 1.02 (ref 1–1.03)
URN SPEC COLLECT METH UR: ABNORMAL
WBC #/AREA URNS AUTO: >100 /HPF (ref 0–5)
WBC CLUMPS UR QL AUTO: ABNORMAL

## 2024-05-21 PROCEDURE — 87086 URINE CULTURE/COLONY COUNT: CPT | Performed by: STUDENT IN AN ORGANIZED HEALTH CARE EDUCATION/TRAINING PROGRAM

## 2024-05-21 PROCEDURE — 99223 1ST HOSP IP/OBS HIGH 75: CPT | Mod: ,,, | Performed by: INTERNAL MEDICINE

## 2024-05-21 PROCEDURE — 63600175 PHARM REV CODE 636 W HCPCS: Performed by: STUDENT IN AN ORGANIZED HEALTH CARE EDUCATION/TRAINING PROGRAM

## 2024-05-21 PROCEDURE — 20600001 HC STEP DOWN PRIVATE ROOM

## 2024-05-21 PROCEDURE — 81001 URINALYSIS AUTO W/SCOPE: CPT | Performed by: STUDENT IN AN ORGANIZED HEALTH CARE EDUCATION/TRAINING PROGRAM

## 2024-05-21 PROCEDURE — 25000003 PHARM REV CODE 250: Performed by: STUDENT IN AN ORGANIZED HEALTH CARE EDUCATION/TRAINING PROGRAM

## 2024-05-21 RX ADMIN — HEPARIN SODIUM 5000 UNITS: 5000 INJECTION INTRAVENOUS; SUBCUTANEOUS at 01:05

## 2024-05-21 RX ADMIN — GABAPENTIN 300 MG: 300 CAPSULE ORAL at 03:05

## 2024-05-21 RX ADMIN — DOCUSATE SODIUM 100 MG: 100 CAPSULE, LIQUID FILLED ORAL at 10:05

## 2024-05-21 RX ADMIN — GABAPENTIN 300 MG: 300 CAPSULE ORAL at 08:05

## 2024-05-21 RX ADMIN — MEROPENEM 1 G: 1 INJECTION INTRAVENOUS at 06:05

## 2024-05-21 RX ADMIN — MEROPENEM 1 G: 1 INJECTION INTRAVENOUS at 05:05

## 2024-05-21 RX ADMIN — TAMSULOSIN HYDROCHLORIDE 0.8 MG: 0.4 CAPSULE ORAL at 10:05

## 2024-05-21 RX ADMIN — MEROPENEM 1 G: 1 INJECTION INTRAVENOUS at 01:05

## 2024-05-21 RX ADMIN — ATORVASTATIN CALCIUM 40 MG: 40 TABLET, FILM COATED ORAL at 10:05

## 2024-05-21 RX ADMIN — GABAPENTIN 300 MG: 300 CAPSULE ORAL at 10:05

## 2024-05-21 NOTE — PLAN OF CARE
Problem: Adult Inpatient Plan of Care  Goal: Plan of Care Review  Outcome: Progressing  Flowsheets (Taken 5/20/2024 1935)  Plan of Care Reviewed With: patient  Goal: Patient-Specific Goal (Individualized)  Outcome: Progressing  Goal: Absence of Hospital-Acquired Illness or Injury  Outcome: Progressing  Intervention: Identify and Manage Fall Risk  Flowsheets (Taken 5/20/2024 1935)  Safety Promotion/Fall Prevention:   Fall Risk reviewed with patient/family   instructed to call staff for mobility  Intervention: Prevent Skin Injury  Flowsheets (Taken 5/20/2024 1935)  Body Position: position changed independently  Skin Protection: incontinence pads utilized  Device Skin Pressure Protection: tubing/devices free from skin contact  Intervention: Prevent and Manage VTE (Venous Thromboembolism) Risk  Flowsheets (Taken 5/20/2024 1935)  VTE Prevention/Management: bleeding precations maintained

## 2024-05-21 NOTE — CONSULTS
[facility] ([place] [address])  Urology  Consult Note    Patient Name: Praneeth Jewell  MRN: [id number]  Admission Date: 5/20/2024  Hospital Length of Stay: 1   Code Status: Full Code   Attending Provider: Nixon Jim DO   Consulting Provider: Wily Poon MD  Primary Care Physician: Home, [facility]  Principal Problem:Acute cystitis    Inpatient consult to Urology  Consult performed by: Wily Poon MD  Consult ordered by: Wily Poon MD          Subjective:     HPI:  Praneeth Jewell is a 79 yo M lives in a nursing home with history of obstructive uropathy s/p J-J stent placement, recurrent nephrolithiasis and UTIs, history of MDR UTI, 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 Patient scheduled for ureteroscopy 5/25 with Dr. Kelley. Prior urine culture from 4/25 untreated growing ESBL Pseudomonas. Recommended to be a direct admit to  for IV antibiotics prior to procedure       He has a chronic indwelling mark catheter.  Last exchanged on 4/25/2024.  Last CTRSS on 4/10 when PCN became displaced shows 7 mm left mid ureteral stone and multiple non obstructing stones in the left kidney.  No stones on the right.   Large stool burden.  Mark catheter in placed with decompression of the bladder.        Of note he has had multiple ureteroscopies and has a history of urosepsis and proteus UTI.        Patient stones previously uric acid 100%. He likely needs K citrate on discharge.         Past Medical History:   Diagnosis Date    Arthritis     Diabetes mellitus     type 2    Folate deficiency anemia     Heart block     History of kidney stones 05/25/2021    History of stroke with residual deficit 05/25/2021    Hyperlipidemia     Hypertension     Major depressive disorder     Pacemaker     Biotronic Edora 8 JONATHON (S/n: [id number])    Pyelonephritis  11/19/2021    TIA (transient ischemic attack)     Urinary retention 05/25/2021       Past Surgical History:   Procedure Laterality Date    A-V CARDIAC PACEMAKER INSERTION N/A 8/24/2021    Procedure: INSERTION, CARDIAC PACEMAKER, DUAL CHAMBER;  Surgeon: Neo Winn MD;  Location: [facility] EP LAB;  Service: Cardiology;  Laterality: N/A;  CHB, DUAL PPM, ANES, BIO, DM, ED 2    COLONOSCOPY N/A 11/22/2021    Procedure: COLONOSCOPY;  Surgeon: Cesar Dorado MD;  Location: [facility] ENDO (2ND FLR);  Service: Endoscopy;  Laterality: N/A;    CYSTOSCOPIC LITHOLAPAXY  5/25/2021    Procedure: CYSTOLITHOLAPAXY;  Surgeon: Chris Schilling MD;  [facility];  Service: Urology;;    CYSTOSCOPY  8/26/2021    Procedure: CYSTOSCOPY;  Surgeon: Camilo Kelley Jr., MD;  [facility];  Service: Urology;;    CYSTOSCOPY W/ URETERAL STENT PLACEMENT Bilateral 5/25/2021    Procedure: CYSTOSCOPY, WITH BILATERAL URETERAL STENT INSERTION;  Surgeon: Chris Schilling MD;  [facility];  Service: Urology;  Laterality: Bilateral;    ELBOW ARTHROPLASTY Right     FLUOROSCOPY  5/25/2021    Procedure: FLUOROSCOPY;  Surgeon: Chris Schilling MD;  [facility];  Service: Urology;;    LASER LITHOTRIPSY Left 8/26/2021    Procedure: LITHOTRIPSY, USING LASER;  Surgeon: Camilo Kelley Jr., MD;  [facility];  Service: Urology;  Laterality: Left;    PYELOSCOPY Bilateral 8/26/2021    Procedure: PYELOSCOPY;  Surgeon: Camilo Kelley Jr., MD;  [facility];  Service: Urology;  Laterality: Bilateral;    REMOVAL OF BLOOD CLOT  5/25/2021    Procedure: REMOVAL, BLOOD CLOT;  Surgeon: Chris Schilling MD;  [facility];  Service: Urology;;    REPLACEMENT OF STENT Bilateral 8/26/2021    Procedure: REPLACEMENT, STENT;  Surgeon: Camilo Kelley Jr., MD;  [facility];  Service: Urology;  Laterality: Bilateral;    URETEROSCOPIC REMOVAL OF URETERIC CALCULUS Bilateral  "8/26/2021    Procedure: REMOVAL, CALCULUS, URETER, URETEROSCOPIC;  Surgeon: Camilo Kelley Jr., MD;  Location: [facility] OR [address] [place] [address];  Service: Urology;  Laterality: Bilateral;    URETEROSCOPY Bilateral 8/26/2021    Procedure: URETEROSCOPY;  Surgeon: Camilo Kelley Jr., MD;  Location: [facility] OR [address] [place] [address];  Service: Urology;  Laterality: Bilateral;       Review of patient's allergies indicates:  No Known Allergies    Family History       Problem Relation (Age of Onset)    Hyperlipidemia Mother    Mental illness Father    No Known Problems Brother    Parkinsonism Father    Stroke Mother            Tobacco Use    Smoking status: Former     Types: Cigarettes    Smokeless tobacco: Never   Substance and Sexual Activity    Alcohol use: Yes     Comment: 1/ pint whisky daily    Drug use: Yes     Types: "Crack" cocaine    Sexual activity: Not on file       Review of Systems    Objective:           There is no height or weight on file to calculate BMI.           Drains       Drain  Duration                  Nephrostomy 04/11/24 1048 Left 8 Fr. 39 days                     Physical Exam  HENT:      Head: Normocephalic.   Cardiovascular:      Rate and Rhythm: Normal rate.   Pulmonary:      Effort: Pulmonary effort is normal.   Abdominal:      General: Abdomen is flat.      Palpations: Abdomen is soft.   Genitourinary:     Comments: Shen catheter draining cyu  Musculoskeletal:         General: Normal range of motion.   Skin:     General: Skin is warm.   Neurological:      General: No focal deficit present.      Mental Status: He is alert.   Psychiatric:         Mood and Affect: Mood normal.          Significant Labs:    BMP:  No results for input(s): "NA", "K", "CL", "CO2", "BUN", "CREATININE", "LABGLOM", "GLUCOSE", "CALCIUM" in the last 168 hours.    CBC:  No results for input(s): "WBC", "HGB", "HCT", "PLT" in the last 168 hours.    All pertinent labs results from the past 24 hours have been reviewed.    Significant Imaging:  All " pertinent imaging results/findings from the past 24 hours have been reviewed.                    Assessment and Plan:     Nephrolithiasis  -Admitted to  for IV abx for L URS 5/24  -On appropriate meropenem  -If mark catheter has not been exchanged, please exchange mark catheter  -F/u Urine culture  -NT exchanged on 4/11  -Continue medical optimization prior to surgery    Thank you [facility] for assistance in managing patient preoperatively          VTE Risk Mitigation (From admission, onward)           Ordered     heparin (porcine) injection 5,000 Units  Every 8 hours         05/20/24 1728     IP VTE HIGH RISK PATIENT  Once         05/20/24 1728     Place sequential compression device  Until discontinued         05/20/24 1728                    Thank you for your consult. I will follow-up with patient. Please contact us if you have any additional questions.    Wily Poon MD  Urology  [facility] ([place])

## 2024-05-21 NOTE — ASSESSMENT & PLAN NOTE
-Admitted to  for IV abx for L URS 5/24  -On appropriate meropenem  -If mark catheter has not been exchanged, please exchange mark catheter  -F/u Urine culture  -NT exchanged on 4/11  -Continue medical optimization prior to surgery    Thank you Hospital Medicine for assistance in managing patient preoperatively

## 2024-05-21 NOTE — HPI
Mr. Jewell is a 77 yo man [facility] [place], [place], admitted for IV abx prior to ureteroscopy. 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 Patient scheduled for ureteroscopy 5/21 with Dr. Kelley. Prior urine culture from 4/25 untreated growing ESBL Pseudomonas. He has a chronic indwelling mark catheter.  Last exchanged on 4/25/2024.  Last CT study on 4/10 showed 7 mm left mid ureteral stone and multiple non obstructing stones in the left kidney.  No stones on the right. Mark catheter in placed with decompression of the bladder. Of note he has had multiple ureteroscopies and is scheduled for ureteroscopy tomorrow with laser lithotripsy. He was started on meropenem based on his last culture. Repeat urine studies are pending.

## 2024-05-21 NOTE — CONSULTS
[facility] ([place])  Infectious Disease  Consult Note    Patient Name: Praneeth Jewell  MRN: [id number]  Admission Date: 5/20/2024  Hospital Length of Stay: 1 days  Attending Physician: Nixon Jim DO  Primary Care Provider: Home, [facility]     Isolation Status: No active isolations    Patient information was obtained from patient, past medical records, and ER records.      Inpatient consult to Infectious Diseases  Consult performed by: Berry Peterson MD  Consult ordered by: Nixon Jim DO        Assessment/Plan:     * Asymptomatic bacteruria/MDR Pseudomonas    77 yo  with nephrolithiasis and nephrostomy tube and stent, admitted for ureteroscopy and laser lithotripsy  - prior urine culture from April grew a MDR Pseudomonas  - asymptomatic but scheduled for urologic procedure  - agree with meropenem pending repeat urine studies  - please let me know when his repeat urine culture finalizes    Thank you for your consult. I will follow-up with patient. Please contact us if you have any additional questions.    Berry Peterson MD  Infectious Disease  [facility] Stepdown Flex ([place])    Subjective:     Principal Problem: Acute cystitis    HPI: Mr. Jewell is a 77 yo man lives at the [facility] in [place], [place], admitted for IV abx prior to ureteroscopy. 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 Patient scheduled for ureteroscopy 5/21 with Dr. Kelley. Prior urine culture from 4/25 untreated growing ESBL Pseudomonas. He has a chronic indwelling mark catheter.  Last exchanged on 4/25/2024.  Last CT study on 4/10 showed 7 mm left mid ureteral stone and multiple non obstructing stones in  the left kidney.  No stones on the right. Shen catheter in placed with decompression of the bladder. Of note he has had multiple ureteroscopies and is scheduled for ureteroscopy tomorrow with laser lithotripsy. He was started on meropenem based on his last culture. Repeat urine studies are pending.         Past Medical History:   Diagnosis Date    Arthritis     Diabetes mellitus     type 2    Folate deficiency anemia     Heart block     History of kidney stones 05/25/2021    History of stroke with residual deficit 05/25/2021    Hyperlipidemia     Hypertension     Major depressive disorder     Pacemaker     Biotronic Edora 8 DR-T (S/n: [id number])    Pyelonephritis 11/19/2021    TIA (transient ischemic attack)     Urinary retention 05/25/2021       Past Surgical History:   Procedure Laterality Date    A-V CARDIAC PACEMAKER INSERTION N/A 8/24/2021    Procedure: INSERTION, CARDIAC PACEMAKER, DUAL CHAMBER;  Surgeon: Neo Winn MD;  Location: [facility] EP LAB;  Service: Cardiology;  Laterality: N/A;  CHB, DUAL PPM, ANES, BIO, DM, ED 2    COLONOSCOPY N/A 11/22/2021    Procedure: COLONOSCOPY;  Surgeon: Cesar Dorado MD;  Location: [facility] ENDO (2ND FLR);  Service: Endoscopy;  Laterality: N/A;    CYSTOSCOPIC LITHOLAPAXY  5/25/2021    Procedure: CYSTOLITHOLAPAXY;  Surgeon: Chris Schilling MD;  Location: [facility] OR [address] [place] [address];  Service: Urology;;    CYSTOSCOPY  8/26/2021    Procedure: CYSTOSCOPY;  Surgeon: Camilo Kelley Jr., MD;  Location: [facility] OR [address] [place] [address];  Service: Urology;;    CYSTOSCOPY W/ URETERAL STENT PLACEMENT Bilateral 5/25/2021    Procedure: CYSTOSCOPY, WITH BILATERAL URETERAL STENT INSERTION;  Surgeon: Chris Schilling MD;  Location: [facility] OR [address] [place] [address];  Service: Urology;  Laterality: Bilateral;    ELBOW ARTHROPLASTY Right     FLUOROSCOPY  5/25/2021    Procedure: FLUOROSCOPY;  Surgeon: Chris Schilling MD;  Location: [facility] OR [address] [place] [address];  Service: Urology;;    LASER LITHOTRIPSY Left 8/26/2021    Procedure: LITHOTRIPSY,  USING LASER;  Surgeon: Camilo Kelley Jr., MD;  [facility];  Service: Urology;  Laterality: Left;    PYELOSCOPY Bilateral 8/26/2021    Procedure: PYELOSCOPY;  Surgeon: Camilo Kelley Jr., MD;  Location: [facility] OR 1ST FLR;  Service: Urology;  Laterality: Bilateral;    REMOVAL OF BLOOD CLOT  5/25/2021    Procedure: REMOVAL, BLOOD CLOT;  Surgeon: Chris Schilling MD;  [facility];  Service: Urology;;    REPLACEMENT OF STENT Bilateral 8/26/2021    Procedure: REPLACEMENT, STENT;  Surgeon: Camilo Kelley Jr., MD;  [facility];  Service: Urology;  Laterality: Bilateral;    URETEROSCOPIC REMOVAL OF URETERIC CALCULUS Bilateral 8/26/2021    Procedure: REMOVAL, CALCULUS, URETER, URETEROSCOPIC;  Surgeon: Camilo Kelley Jr., MD;  [facility];  Service: Urology;  Laterality: Bilateral;    URETEROSCOPY Bilateral 8/26/2021    Procedure: URETEROSCOPY;  Surgeon: Camilo Kelley Jr., MD;  [facility];  Service: Urology;  Laterality: Bilateral;       Review of patient's allergies indicates:  No Known Allergies    Medications:  Medications Prior to Admission   Medication Sig    atorvastatin (LIPITOR) 40 MG tablet Take 40 mg by mouth once daily.    bisacodyL (DULCOLAX, BISACODYL,) 10 mg Supp Place 1 suppository (10 mg total) rectally daily as needed (constipation).    CHEST CONGESTION RELIEF DM  mg/5 mL liquid Take by mouth.    cyproheptadine (PERIACTIN) 4 mg tablet Take 4 mg by mouth 3 (three) times daily. Itching    docusate sodium (COLACE) 100 MG capsule Take 1 capsule (100 mg total) by mouth once daily.    folic acid (FOLVITE) 1 MG tablet Take 1 mg by mouth once daily.    gabapentin (NEURONTIN) 300 MG capsule Take 300 mg by mouth 3 (three) times daily.    menthol-zinc oxide (CALMOSEPTINE) 0.44-20.6 % Oint Apply topically daily as needed. To sacral area after each sacral excoriation episode and as needed    mirtazapine (REMERON) 30 MG tablet Take 30 mg by  "mouth nightly.    polyethylene glycol (GLYCOLAX) 17 gram/dose powder Take 17 g by mouth 2 (two) times daily.    tamsulosin (FLOMAX) 0.4 mg Cap TAKE 2 CAPSULES(0.8 MG) BY MOUTH EVERY DAY (Patient taking differently: Take 0.8 mg by mouth once daily.)     Antibiotics (From admission, onward)      Start     Stop Route Frequency Ordered    05/20/24 1830  meropenem (MERREM) 1 g in sodium chloride 0.9 % 100 mL IVPB (MB+)         -- IV Every 8 hours (non-standard times) 05/20/24 1728          Antifungals (From admission, onward)      None          Antivirals (From admission, onward)      None             Immunization History   Administered Date(s) Administered    Influenza 11/09/2010    PPD Test 07/29/2015    Zoster 02/09/2018       Family History       Problem Relation (Age of Onset)    Hyperlipidemia Mother    Mental illness Father    No Known Problems Brother    Parkinsonism Father    Stroke Mother          Social History     Socioeconomic History    Marital status: Single    Number of children: 0    Years of education: 15    Highest education level: Some college, no degree   Occupational History     Employer: Disabled    Occupation: Construction     Employer: [employer]     Comment: Retired in past 5-10 years   Tobacco Use    Smoking status: Former     Types: Cigarettes    Smokeless tobacco: Never   Substance and Sexual Activity    Alcohol use: Yes     Comment: 1/ pint whisky daily    Drug use: Yes     Types: "Crack" cocaine     Social Determinants of Health     Financial Resource Strain: Low Risk  (4/15/2023)    Overall Financial Resource Strain (CARDIA)     Difficulty of Paying Living Expenses: Not hard at all   Food Insecurity: No Food Insecurity (4/15/2023)    Hunger Vital Sign     Worried About Running Out of Food in the Last Year: Never true     Ran Out of Food in the Last Year: Never true   Transportation Needs: No Transportation Needs (4/15/2023)    PRAPARE - Transportation     Lack of Transportation " (Medical): No     Lack of Transportation (Non-Medical): No   Physical Activity: Inactive (4/15/2023)    Exercise Vital Sign     Days of Exercise per Week: 0 days     Minutes of Exercise per Session: 0 min   Stress: No Stress Concern Present (4/15/2023)    [employer] [place] [employer] - Occupational Stress Questionnaire     Feeling of Stress : Not at all   Housing Stability: Low Risk  (4/15/2023)    Housing Stability Vital Sign     Unable to Pay for Housing in the Last Year: No     Number of Places Lived in the Last Year: 2     Unstable Housing in the Last Year: No     Review of Systems   Constitutional:  Negative for chills and fever.   All other systems reviewed and are negative.    Objective:     Vital Signs (Most Recent):  Temp: 98.1 °F (36.7 °C) (05/21/24 0803)  Pulse: 78 (05/21/24 0803)  Resp: 18 (05/21/24 0803)  BP: 137/67 (05/21/24 0803)  SpO2: 97 % (05/21/24 0803) Vital Signs (24h Range):  Temp:  [97.9 °F (36.6 °C)-98.3 °F (36.8 °C)] 98.1 °F (36.7 °C)  Pulse:  [65-81] 78  Resp:  [16-18] 18  SpO2:  [96 %-98 %] 97 %  BP: (120-151)/(67-86) 137/67     Weight: 59.5 kg (131 lb 2.8 oz)  Body mass index is 21.17 kg/m².    Estimated Creatinine Clearance: 48.1 mL/min (based on SCr of 1.1 mg/dL).     Physical Exam  Vitals and nursing note reviewed.   Constitutional:       Appearance: Normal appearance.   HENT:      Head: Normocephalic and atraumatic.      Right Ear: External ear normal.      Left Ear: External ear normal.   Eyes:      Pupils: Pupils are equal, round, and reactive to light.   Abdominal:      Tenderness: There is no right CVA tenderness or left CVA tenderness.      Comments: PNT   Neurological:      Mental Status: He is alert.   Psychiatric:         Mood and Affect: Mood normal.         Behavior: Behavior normal.         Thought Content: Thought content normal.          Significant Labs: CBC:   Recent Labs   Lab 05/20/24 2002   WBC 7.11  7.04   HGB 15.3  15.2   HCT 48.1  47.8      173     Microbiology Results (last 7 days)       ** No results found for the last 168 hours. **            Significant Imaging: I have reviewed all pertinent imaging results/findings within the past 24 hours.

## 2024-05-21 NOTE — H&P
[facility] - Stepdown Flex ([address] Medicine  History & Physical    Patient Name: Praneeth Jewell  MRN: [id number]  Patient Class: IP- Inpatient  Admission Date: 5/20/2024  Attending Physician: Nixon Jim DO   Primary Care Provider: Home, [facility]         Patient information was obtained from patient and ER records.     Subjective:     Principal Problem:Acute cystitis    Chief Complaint: No chief complaint on file.       HPI: Patient is pleasant 78 yoM who presented from urology clinic for UTI and IV antibiotics. Patient reports that he is feeling well overall.  Does reports that he does not remember some of the details that brought him here. Per urology, patient is scheduled to go OR on 5/24/2024 for left ureteroscopy with laser lithotripsy. Reviewed previous urine culture which was sensitive to merrem only. Plan to consult ID as well.     Past Medical History:   Diagnosis Date    Arthritis     Diabetes mellitus     type 2    Folate deficiency anemia     Heart block     History of kidney stones 05/25/2021    History of stroke with residual deficit 05/25/2021    Hyperlipidemia     Hypertension     Major depressive disorder     Pacemaker     Biotronic Edora 8 DRERROL (S/n: [id number])    Pyelonephritis 11/19/2021    TIA (transient ischemic attack)     Urinary retention 05/25/2021       Past Surgical History:   Procedure Laterality Date    A-V CARDIAC PACEMAKER INSERTION N/A 8/24/2021    Procedure: INSERTION, CARDIAC PACEMAKER, DUAL CHAMBER;  Surgeon: Neo Winn MD;  Location: [facility] EP LAB;  Service: Cardiology;  Laterality: N/A;  CHB, DUAL PPM, ANES, BIO, DM, ED 2    COLONOSCOPY N/A 11/22/2021    Procedure: COLONOSCOPY;  Surgeon: Cesar Dorado MD;  Location: [facility] ENDO (2ND FLR);  Service: Endoscopy;  Laterality: N/A;    CYSTOSCOPIC LITHOLAPAXY  5/25/2021    Procedure: CYSTOLITHOLAPAXY;  Surgeon: Chris Schilling MD;  [facility];  Service: Urology;;     CYSTOSCOPY  8/26/2021    Procedure: CYSTOSCOPY;  Surgeon: Camilo Kelley Jr., MD;  [facility];  Service: Urology;;    CYSTOSCOPY W/ URETERAL STENT PLACEMENT Bilateral 5/25/2021    Procedure: CYSTOSCOPY, WITH BILATERAL URETERAL STENT INSERTION;  Surgeon: Chris Schilling MD;  [facility];  Service: Urology;  Laterality: Bilateral;    ELBOW ARTHROPLASTY Right     FLUOROSCOPY  5/25/2021    Procedure: FLUOROSCOPY;  Surgeon: Chris Schilling MD;  Location: [facility] OR [address] [place] [address];  Service: Urology;;    LASER LITHOTRIPSY Left 8/26/2021    Procedure: LITHOTRIPSY, USING LASER;  Surgeon: Camilo Kelley Jr., MD;  [facility];  Service: Urology;  Laterality: Left;    PYELOSCOPY Bilateral 8/26/2021    Procedure: PYELOSCOPY;  Surgeon: aCmilo Kelley Jr., MD;  Location: [facility] OR [address] [place] [address];  Service: Urology;  Laterality: Bilateral;    REMOVAL OF BLOOD CLOT  5/25/2021    Procedure: REMOVAL, BLOOD CLOT;  Surgeon: Chris Schilling MD;  Location: [facility] OR [address] [place] [address];  Service: Urology;;    REPLACEMENT OF STENT Bilateral 8/26/2021    Procedure: REPLACEMENT, STENT;  Surgeon: Camilo Kelley Jr., MD;  Location: [facility] OR [address] [place] [address];  Service: Urology;  Laterality: Bilateral;    URETEROSCOPIC REMOVAL OF URETERIC CALCULUS Bilateral 8/26/2021    Procedure: REMOVAL, CALCULUS, URETER, URETEROSCOPIC;  Surgeon: Camilo Kelley Jr., MD;  Location: [facility] OR [address] [place] [address];  Service: Urology;  Laterality: Bilateral;    URETEROSCOPY Bilateral 8/26/2021    Procedure: URETEROSCOPY;  Surgeon: Camilo Kelley Jr., MD;  Location: [facility] OR [address] [place] [address];  Service: Urology;  Laterality: Bilateral;       Review of patient's allergies indicates:  No Known Allergies    No current facility-administered medications on file prior to encounter.     Current Outpatient Medications on File Prior to Encounter   Medication Sig    atorvastatin (LIPITOR) 40 MG tablet Take 40 mg by mouth once daily.    bisacodyL (DULCOLAX, BISACODYL,) 10 mg Supp Place 1  "suppository (10 mg total) rectally daily as needed (constipation).    CHEST CONGESTION RELIEF DM  mg/5 mL liquid Take by mouth.    cyproheptadine (PERIACTIN) 4 mg tablet Take 4 mg by mouth 3 (three) times daily. Itching    docusate sodium (COLACE) 100 MG capsule Take 1 capsule (100 mg total) by mouth once daily.    folic acid (FOLVITE) 1 MG tablet Take 1 mg by mouth once daily.    gabapentin (NEURONTIN) 300 MG capsule Take 300 mg by mouth 3 (three) times daily.    menthol-zinc oxide (CALMOSEPTINE) 0.44-20.6 % Oint Apply topically daily as needed. To sacral area after each sacral excoriation episode and as needed    mirtazapine (REMERON) 30 MG tablet Take 30 mg by mouth nightly.    polyethylene glycol (GLYCOLAX) 17 gram/dose powder Take 17 g by mouth 2 (two) times daily.    tamsulosin (FLOMAX) 0.4 mg Cap TAKE 2 CAPSULES(0.8 MG) BY MOUTH EVERY DAY (Patient taking differently: Take 0.8 mg by mouth once daily.)     Family History       Problem Relation (Age of Onset)    Hyperlipidemia Mother    Mental illness Father    No Known Problems Brother    Parkinsonism Father    Stroke Mother          Tobacco Use    Smoking status: Former     Types: Cigarettes    Smokeless tobacco: Never   Substance and Sexual Activity    Alcohol use: Yes     Comment: 1/ pint whisky daily    Drug use: Yes     Types: "Crack" cocaine    Sexual activity: Not on file       Objective:     Vital Signs (Most Recent):  Temp: 97.9 °F (36.6 °C) (05/20/24 2311)  Pulse: 71 (05/20/24 2311)  Resp: 18 (05/20/24 2311)  BP: 120/75 (05/20/24 2311)  SpO2: 98 % (05/20/24 2311) Vital Signs (24h Range):  Temp:  [97.9 °F (36.6 °C)-98.1 °F (36.7 °C)] 97.9 °F (36.6 °C)  Pulse:  [65-81] 71  Resp:  [16-18] 18  SpO2:  [97 %-98 %] 98 %  BP: (120-151)/(68-86) 120/75     Weight: 59.5 kg (131 lb 2.8 oz)  Body mass index is 21.17 kg/m².     Physical Exam  Vitals reviewed.   Constitutional:       General: He is not in acute distress.     Appearance: He is well-developed. " "He is ill-appearing.   HENT:      Head: Normocephalic and atraumatic.      Right Ear: External ear normal.      Left Ear: External ear normal.   Eyes:      General: No scleral icterus.     Extraocular Movements: Extraocular movements intact.   Neck:      Vascular: No JVD.      Trachea: No tracheal deviation.   Cardiovascular:      Rate and Rhythm: Normal rate and regular rhythm.      Pulses: Normal pulses.   Pulmonary:      Effort: Pulmonary effort is normal. No respiratory distress.   Abdominal:      General: Bowel sounds are normal. There is no distension.      Palpations: Abdomen is soft. There is no mass.      Tenderness: There is no abdominal tenderness.   Musculoskeletal:      Right lower leg: No edema.      Left lower leg: No edema.   Skin:     General: Skin is warm and dry.      Coloration: Skin is not jaundiced.   Neurological:      Mental Status: He is alert.                Significant Labs: All pertinent labs within the past 24 hours have been reviewed.    Significant Imaging: I have reviewed all pertinent imaging results/findings within the past 24 hours.  Assessment/Plan:     * Acute cystitis  -- urine culture was sent, antibiotic started based on last culture. Plan to consult ID in the AM.      Chronic indwelling Shen catheter  Unknown last time of exchange and pt does not remember last time it was changed.       Nephrolithiasis  Per urology note "To OR on 5/24/2024 for left ureteroscopy with laser lithotripsy with Dr. Kelley"        VTE Risk Mitigation (From admission, onward)           Ordered     heparin (porcine) injection 5,000 Units  Every 8 hours         05/20/24 1728     IP VTE HIGH RISK PATIENT  Once         05/20/24 1728     Place sequential compression device  Until discontinued         05/20/24 1728                                  Nurses Note -- 4 Eyes      5/20/2024   6:26 PM      Skin assessed during: Admit      [] No Pressure Injuries Present    []Prevention Measures Documented      [x] " Yes- Altered Skin Integrity Present or Discovered   [x] LDA Added if Not in Epic (Describe Wound)   [] New Altered Skin Integrity was Present on Admit and Documented in LDA   [] Wound Image Taken    Wound Care Consulted? Yes    Attending Nurse:  Mei Bansal RN     Second RN/Staff Member:  Angela Vora, RN        Nixon Jim,   Department of Hospital Medicine  [facility] - Stepdown Flex ([place])

## 2024-05-21 NOTE — PLAN OF CARE
Problem: Adult Inpatient Plan of Care  Goal: Plan of Care Review  Outcome: Progressing  Goal: Patient-Specific Goal (Individualized)  Outcome: Progressing  Goal: Absence of Hospital-Acquired Illness or Injury  Outcome: Progressing  Goal: Optimal Comfort and Wellbeing  Outcome: Progressing  Goal: Readiness for Transition of Care  Outcome: Progressing     Problem: Wound  Goal: Optimal Coping  Outcome: Progressing  Goal: Optimal Functional Ability  Outcome: Progressing  Goal: Absence of Infection Signs and Symptoms  Outcome: Progressing  Goal: Improved Oral Intake  Outcome: Progressing  Goal: Optimal Pain Control and Function  Outcome: Progressing  Goal: Skin Health and Integrity  Outcome: Progressing  Goal: Optimal Wound Healing  Outcome: Progressing     Problem: Skin Injury Risk Increased  Goal: Skin Health and Integrity  Outcome: Progressing     Problem: Infection  Goal: Absence of Infection Signs and Symptoms  Outcome: Progressing

## 2024-05-21 NOTE — NURSING
Pt AAO, VSS, no c/o pain or distress.  U/A collected from L nephrostomy.  Pt had 2 incontinent Bms.      No acute events this shift, bed low and locked, call light and belongings in reach.

## 2024-05-21 NOTE — ASSESSMENT & PLAN NOTE
-- urine culture was sent, antibiotic started based on last culture. Plan to consult ID in the AM.

## 2024-05-21 NOTE — ASSESSMENT & PLAN NOTE
"Per urology note "To OR on 5/24/2024 for left ureteroscopy with laser lithotripsy with Dr. Kelley"    "

## 2024-05-21 NOTE — HPI
Patient is pleasant 78 yoM who presented from urology clinic for UTI and IV antibiotics. Patient reports that he is feeling well overall.  Does reports that he does not remember some of the details that brought him here. Per urology, patient is scheduled to go OR on 5/24/2024 for left ureteroscopy with laser lithotripsy. Reviewed previous urine culture which was sensitive to merrem only. Plan to consult ID as well.

## 2024-05-21 NOTE — SUBJECTIVE & OBJECTIVE
Past Medical History:   Diagnosis Date    Arthritis     Diabetes mellitus     type 2    Folate deficiency anemia     Heart block     History of kidney stones 05/25/2021    History of stroke with residual deficit 05/25/2021    Hyperlipidemia     Hypertension     Major depressive disorder     Pacemaker     Biotronic [place] 8 DR-T (S/n: [id number])    Pyelonephritis 11/19/2021    TIA (transient ischemic attack)     Urinary retention 05/25/2021       Past Surgical History:   Procedure Laterality Date    A-V CARDIAC PACEMAKER INSERTION N/A 8/24/2021    Procedure: INSERTION, CARDIAC PACEMAKER, DUAL CHAMBER;  Surgeon: Neo Winn MD;  Location: [facility] EP LAB;  Service: Cardiology;  Laterality: N/A;  CHB, DUAL PPM, ANES, BIO, DM, ED 2    COLONOSCOPY N/A 11/22/2021    Procedure: COLONOSCOPY;  Surgeon: Cesar Dorado MD;  Location: [facility] ENDO (2ND FLR);  Service: Endoscopy;  Laterality: N/A;    CYSTOSCOPIC LITHOLAPAXY  5/25/2021    Procedure: CYSTOLITHOLAPAXY;  Surgeon: Chris Schilling MD;  [facility];  Service: Urology;;    CYSTOSCOPY  8/26/2021    Procedure: CYSTOSCOPY;  Surgeon: Camilo Kelley Jr., MD;  [facility];  Service: Urology;;    CYSTOSCOPY W/ URETERAL STENT PLACEMENT Bilateral 5/25/2021    Procedure: CYSTOSCOPY, WITH BILATERAL URETERAL STENT INSERTION;  Surgeon: Chris Schilling MD;  [facility];  Service: Urology;  Laterality: Bilateral;    ELBOW ARTHROPLASTY Right     FLUOROSCOPY  5/25/2021    Procedure: FLUOROSCOPY;  Surgeon: Chris Schilling MD;  [facility];  Service: Urology;;    LASER LITHOTRIPSY Left 8/26/2021    Procedure: LITHOTRIPSY, USING LASER;  Surgeon: Camilo Kelley Jr., MD;  [facility];  Service: Urology;  Laterality: Left;    PYELOSCOPY Bilateral 8/26/2021    Procedure: PYELOSCOPY;  Surgeon: Camilo Kelley Jr., MD;  [facility];  Service: Urology;  Laterality: Bilateral;    REMOVAL OF BLOOD CLOT   5/25/2021    Procedure: REMOVAL, BLOOD CLOT;  Surgeon: Chris Schilling MD;  Location: [facility] OR [address] [place] [address];  Service: Urology;;    REPLACEMENT OF STENT Bilateral 8/26/2021    Procedure: REPLACEMENT, STENT;  Surgeon: Camilo Kelley Jr., MD;  Location: [facility] OR [address] [place] [address];  Service: Urology;  Laterality: Bilateral;    URETEROSCOPIC REMOVAL OF URETERIC CALCULUS Bilateral 8/26/2021    Procedure: REMOVAL, CALCULUS, URETER, URETEROSCOPIC;  Surgeon: Camilo Kelley Jr., MD;  [facility];  Service: Urology;  Laterality: Bilateral;    URETEROSCOPY Bilateral 8/26/2021    Procedure: URETEROSCOPY;  Surgeon: Camilo Kelley Jr., MD;  Location: [facility] OR [address] [place] [address];  Service: Urology;  Laterality: Bilateral;       Review of patient's allergies indicates:  No Known Allergies    Medications:  Medications Prior to Admission   Medication Sig    atorvastatin (LIPITOR) 40 MG tablet Take 40 mg by mouth once daily.    bisacodyL (DULCOLAX, BISACODYL,) 10 mg Supp Place 1 suppository (10 mg total) rectally daily as needed (constipation).    CHEST CONGESTION RELIEF DM  mg/5 mL liquid Take by mouth.    cyproheptadine (PERIACTIN) 4 mg tablet Take 4 mg by mouth 3 (three) times daily. Itching    docusate sodium (COLACE) 100 MG capsule Take 1 capsule (100 mg total) by mouth once daily.    folic acid (FOLVITE) 1 MG tablet Take 1 mg by mouth once daily.    gabapentin (NEURONTIN) 300 MG capsule Take 300 mg by mouth 3 (three) times daily.    menthol-zinc oxide (CALMOSEPTINE) 0.44-20.6 % Oint Apply topically daily as needed. To sacral area after each sacral excoriation episode and as needed    mirtazapine (REMERON) 30 MG tablet Take 30 mg by mouth nightly.    polyethylene glycol (GLYCOLAX) 17 gram/dose powder Take 17 g by mouth 2 (two) times daily.    tamsulosin (FLOMAX) 0.4 mg Cap TAKE 2 CAPSULES(0.8 MG) BY MOUTH EVERY DAY (Patient taking differently: Take 0.8 mg by mouth once daily.)     Antibiotics (From admission, onward)      Start      "Stop Route Frequency Ordered    05/20/24 1830  meropenem (MERREM) 1 g in sodium chloride 0.9 % 100 mL IVPB (MB+)         -- IV Every 8 hours (non-standard times) 05/20/24 1728          Antifungals (From admission, onward)      None          Antivirals (From admission, onward)      None             Immunization History   Administered Date(s) Administered    Influenza 11/09/2010    PPD Test 07/29/2015    Zoster 02/09/2018       Family History       Problem Relation (Age of Onset)    Hyperlipidemia Mother    Mental illness Father    No Known Problems Brother    Parkinsonism Father    Stroke Mother          Social History     Socioeconomic History    Marital status: Single    Number of children: 0    Years of education: 15    Highest education level: Some college, no degree   Occupational History     Employer: Disabled    Occupation: Construction     Employer: [employer]     Comment: Retired in past 5-10 years   Tobacco Use    Smoking status: Former     Types: Cigarettes    Smokeless tobacco: Never   Substance and Sexual Activity    Alcohol use: Yes     Comment: 1/ pint whisky daily    Drug use: Yes     Types: "Crack" cocaine     Social Determinants of Health     Financial Resource Strain: Low Risk  (4/15/2023)    Overall Financial Resource Strain (CARDIA)     Difficulty of Paying Living Expenses: Not hard at all   Food Insecurity: No Food Insecurity (4/15/2023)    Hunger Vital Sign     Worried About Running Out of Food in the Last Year: Never true     Ran Out of Food in the Last Year: Never true   Transportation Needs: No Transportation Needs (4/15/2023)    PRAPARE - Transportation     Lack of Transportation (Medical): No     Lack of Transportation (Non-Medical): No   Physical Activity: Inactive (4/15/2023)    Exercise Vital Sign     Days of Exercise per Week: 0 days     Minutes of Exercise per Session: 0 min   Stress: No Stress Concern Present (4/15/2023)    [employer] - Occupational " Stress Questionnaire     Feeling of Stress : Not at all   Housing Stability: Low Risk  (4/15/2023)    Housing Stability Vital Sign     Unable to Pay for Housing in the Last Year: No     Number of Places Lived in the Last Year: 2     Unstable Housing in the Last Year: No     Review of Systems   Constitutional:  Negative for chills and fever.   All other systems reviewed and are negative.    Objective:     Vital Signs (Most Recent):  Temp: 98.1 °F (36.7 °C) (05/21/24 0803)  Pulse: 78 (05/21/24 0803)  Resp: 18 (05/21/24 0803)  BP: 137/67 (05/21/24 0803)  SpO2: 97 % (05/21/24 0803) Vital Signs (24h Range):  Temp:  [97.9 °F (36.6 °C)-98.3 °F (36.8 °C)] 98.1 °F (36.7 °C)  Pulse:  [65-81] 78  Resp:  [16-18] 18  SpO2:  [96 %-98 %] 97 %  BP: (120-151)/(67-86) 137/67     Weight: 59.5 kg (131 lb 2.8 oz)  Body mass index is 21.17 kg/m².    Estimated Creatinine Clearance: 48.1 mL/min (based on SCr of 1.1 mg/dL).     Physical Exam  Vitals and nursing note reviewed.   Constitutional:       Appearance: Normal appearance.   HENT:      Head: Normocephalic and atraumatic.      Right Ear: External ear normal.      Left Ear: External ear normal.   Eyes:      Pupils: Pupils are equal, round, and reactive to light.   Abdominal:      Tenderness: There is no right CVA tenderness or left CVA tenderness.      Comments: PNT   Neurological:      Mental Status: He is alert.   Psychiatric:         Mood and Affect: Mood normal.         Behavior: Behavior normal.         Thought Content: Thought content normal.          Significant Labs: CBC:   Recent Labs   Lab 05/20/24 2002   WBC 7.11  7.04   HGB 15.3  15.2   HCT 48.1  47.8     173     Microbiology Results (last 7 days)       ** No results found for the last 168 hours. **            Significant Imaging: I have reviewed all pertinent imaging results/findings within the past 24 hours.

## 2024-05-21 NOTE — PLAN OF CARE
I have reviewed the chart of Praneeth Jewell who is hospitalized for the following:    Active Hospital Problems    Diagnosis    *Acute cystitis    Chronic indwelling Shen catheter    Essential hypertension    Nephrolithiasis    BPH with urinary obstruction    History of CVA with residual deficit        Marizol Espinoza PA-C  [facility]

## 2024-05-21 NOTE — PROGRESS NOTES
Pharmacist Renal Dose Adjustment Note    Praneeth Jewell is a 78 y.o. male being treated with the medication Meropenem.    Patient Data:    Vital Signs (Most Recent):  Temp: 98.2 °F (36.8 °C) (05/21/24 1113)  Pulse: 78 (05/21/24 1113)  Resp: 18 (05/21/24 1113)  BP: (!) 146/68 (05/21/24 1113)  SpO2: 99 % (05/21/24 1113) Vital Signs (72h Range):  Temp:  [97.9 °F (36.6 °C)-98.3 °F (36.8 °C)]   Pulse:  [65-81]   Resp:  [16-18]   BP: (120-151)/(67-86)   SpO2:  [96 %-99 %]      Recent Labs   Lab 05/20/24  1729 05/20/24 2002   CREATININE 0.9 1.1     Serum creatinine: 1.1 mg/dL 05/20/24 2002  Estimated creatinine clearance: 48.1 mL/min    Medication: Meropenem 1 g IV every 8 hours will be changed to Meropenem 1 g IV every 12 hours for CrCl < 50 mL/min per pharmacy protocol.    Pharmacist's Name: Ekaterina Hoffman, PharmD  Pharmacist's Extension: [phone]

## 2024-05-21 NOTE — ASSESSMENT & PLAN NOTE
79 yo  with nephrolithiasis and nephrostomy tube and stent, admitted for ureteroscopy and laser lithotripsy  - prior urine culture from April grew a MDR Pseudomonas  - asymptomatic but scheduled for urologic procedure  - agree with meropenem pending repeat urine studies  - please let me know when his repeat urine culture finalizes   detailed exam details…

## 2024-05-21 NOTE — SUBJECTIVE & OBJECTIVE
Past Medical History:   Diagnosis Date    Arthritis     Diabetes mellitus     type 2    Folate deficiency anemia     Heart block     History of kidney stones 05/25/2021    History of stroke with residual deficit 05/25/2021    Hyperlipidemia     Hypertension     Major depressive disorder     Pacemaker     Biotronic [place] 8 DR-T (S/n: [id number])    Pyelonephritis 11/19/2021    TIA (transient ischemic attack)     Urinary retention 05/25/2021       Past Surgical History:   Procedure Laterality Date    A-V CARDIAC PACEMAKER INSERTION N/A 8/24/2021    Procedure: INSERTION, CARDIAC PACEMAKER, DUAL CHAMBER;  Surgeon: Neo Winn MD;  Location: [facility] EP LAB;  Service: Cardiology;  Laterality: N/A;  CHB, DUAL PPM, ANES, BIO, DM, ED 2    COLONOSCOPY N/A 11/22/2021    Procedure: COLONOSCOPY;  Surgeon: Cesar Dorado MD;  Location: [facility] ENDO (2ND FLR);  Service: Endoscopy;  Laterality: N/A;    CYSTOSCOPIC LITHOLAPAXY  5/25/2021    Procedure: CYSTOLITHOLAPAXY;  Surgeon: Chris Schilling MD;  [facility];  Service: Urology;;    CYSTOSCOPY  8/26/2021    Procedure: CYSTOSCOPY;  Surgeon: Camilo Kelley Jr., MD;  [facility];  Service: Urology;;    CYSTOSCOPY W/ URETERAL STENT PLACEMENT Bilateral 5/25/2021    Procedure: CYSTOSCOPY, WITH BILATERAL URETERAL STENT INSERTION;  Surgeon: Chris Schilling MD;  [facility];  Service: Urology;  Laterality: Bilateral;    ELBOW ARTHROPLASTY Right     FLUOROSCOPY  5/25/2021    Procedure: FLUOROSCOPY;  Surgeon: Chris Schilling MD;  [facility];  Service: Urology;;    LASER LITHOTRIPSY Left 8/26/2021    Procedure: LITHOTRIPSY, USING LASER;  Surgeon: Camilo Kelley Jr., MD;  [facility];  Service: Urology;  Laterality: Left;    PYELOSCOPY Bilateral 8/26/2021    Procedure: PYELOSCOPY;  Surgeon: Camilo Kelley Jr., MD;  [facility];  Service: Urology;  Laterality: Bilateral;    REMOVAL OF BLOOD CLOT   5/25/2021    Procedure: REMOVAL, BLOOD CLOT;  Surgeon: Chris Schilling MD;  Location: [facility] OR [address] [place] [address];  Service: Urology;;    REPLACEMENT OF STENT Bilateral 8/26/2021    Procedure: REPLACEMENT, STENT;  Surgeon: Camilo Kelley Jr., MD;  Location: [facility] OR [address] [place] [address];  Service: Urology;  Laterality: Bilateral;    URETEROSCOPIC REMOVAL OF URETERIC CALCULUS Bilateral 8/26/2021    Procedure: REMOVAL, CALCULUS, URETER, URETEROSCOPIC;  Surgeon: Camilo Kelley Jr., MD;  Location: [facility] OR [address] [place] [address];  Service: Urology;  Laterality: Bilateral;    URETEROSCOPY Bilateral 8/26/2021    Procedure: URETEROSCOPY;  Surgeon: Camilo Kelley Jr., MD;  Location: [facility] OR [address] [place] [address];  Service: Urology;  Laterality: Bilateral;       Review of patient's allergies indicates:  No Known Allergies    No current facility-administered medications on file prior to encounter.     Current Outpatient Medications on File Prior to Encounter   Medication Sig    atorvastatin (LIPITOR) 40 MG tablet Take 40 mg by mouth once daily.    bisacodyL (DULCOLAX, BISACODYL,) 10 mg Supp Place 1 suppository (10 mg total) rectally daily as needed (constipation).    CHEST CONGESTION RELIEF DM  mg/5 mL liquid Take by mouth.    cyproheptadine (PERIACTIN) 4 mg tablet Take 4 mg by mouth 3 (three) times daily. Itching    docusate sodium (COLACE) 100 MG capsule Take 1 capsule (100 mg total) by mouth once daily.    folic acid (FOLVITE) 1 MG tablet Take 1 mg by mouth once daily.    gabapentin (NEURONTIN) 300 MG capsule Take 300 mg by mouth 3 (three) times daily.    menthol-zinc oxide (CALMOSEPTINE) 0.44-20.6 % Oint Apply topically daily as needed. To sacral area after each sacral excoriation episode and as needed    mirtazapine (REMERON) 30 MG tablet Take 30 mg by mouth nightly.    polyethylene glycol (GLYCOLAX) 17 gram/dose powder Take 17 g by mouth 2 (two) times daily.    tamsulosin (FLOMAX) 0.4 mg Cap TAKE 2 CAPSULES(0.8 MG) BY MOUTH EVERY DAY (Patient taking differently:  "Take 0.8 mg by mouth once daily.)     Family History       Problem Relation (Age of Onset)    Hyperlipidemia Mother    Mental illness Father    No Known Problems Brother    Parkinsonism Father    Stroke Mother          Tobacco Use    Smoking status: Former     Types: Cigarettes    Smokeless tobacco: Never   Substance and Sexual Activity    Alcohol use: Yes     Comment: 1/ pint whisky daily    Drug use: Yes     Types: "Crack" cocaine    Sexual activity: Not on file       Objective:     Vital Signs (Most Recent):  Temp: 97.9 °F (36.6 °C) (05/20/24 2311)  Pulse: 71 (05/20/24 2311)  Resp: 18 (05/20/24 2311)  BP: 120/75 (05/20/24 2311)  SpO2: 98 % (05/20/24 2311) Vital Signs (24h Range):  Temp:  [97.9 °F (36.6 °C)-98.1 °F (36.7 °C)] 97.9 °F (36.6 °C)  Pulse:  [65-81] 71  Resp:  [16-18] 18  SpO2:  [97 %-98 %] 98 %  BP: (120-151)/(68-86) 120/75     Weight: 59.5 kg (131 lb 2.8 oz)  Body mass index is 21.17 kg/m².     Physical Exam  Vitals reviewed.   Constitutional:       General: He is not in acute distress.     Appearance: He is well-developed. He is ill-appearing.   HENT:      Head: Normocephalic and atraumatic.      Right Ear: External ear normal.      Left Ear: External ear normal.   Eyes:      General: No scleral icterus.     Extraocular Movements: Extraocular movements intact.   Neck:      Vascular: No JVD.      Trachea: No tracheal deviation.   Cardiovascular:      Rate and Rhythm: Normal rate and regular rhythm.      Pulses: Normal pulses.   Pulmonary:      Effort: Pulmonary effort is normal. No respiratory distress.   Abdominal:      General: Bowel sounds are normal. There is no distension.      Palpations: Abdomen is soft. There is no mass.      Tenderness: There is no abdominal tenderness.   Musculoskeletal:      Right lower leg: No edema.      Left lower leg: No edema.   Skin:     General: Skin is warm and dry.      Coloration: Skin is not jaundiced.   Neurological:      Mental Status: He is alert.              "   Significant Labs: All pertinent labs within the past 24 hours have been reviewed.    Significant Imaging: I have reviewed all pertinent imaging results/findings within the past 24 hours.

## 2024-05-22 LAB
ALBUMIN SERPL BCP-MCNC: 2.8 G/DL (ref 3.5–5.2)
ALP SERPL-CCNC: 73 U/L (ref 55–135)
ALT SERPL W/O P-5'-P-CCNC: 8 U/L (ref 10–44)
ANION GAP SERPL CALC-SCNC: 9 MMOL/L (ref 8–16)
AST SERPL-CCNC: 13 U/L (ref 10–40)
BACTERIA #/AREA URNS AUTO: ABNORMAL /HPF
BACTERIA UR CULT: NORMAL
BACTERIA UR CULT: NORMAL
BILIRUB SERPL-MCNC: 0.4 MG/DL (ref 0.1–1)
BILIRUB UR QL STRIP: NEGATIVE
BUN SERPL-MCNC: 31 MG/DL (ref 8–23)
CALCIUM SERPL-MCNC: 9.3 MG/DL (ref 8.7–10.5)
CHLORIDE SERPL-SCNC: 112 MMOL/L (ref 95–110)
CLARITY UR REFRACT.AUTO: ABNORMAL
CO2 SERPL-SCNC: 24 MMOL/L (ref 23–29)
COLOR UR AUTO: YELLOW
CREAT SERPL-MCNC: 0.9 MG/DL (ref 0.5–1.4)
ERYTHROCYTE [DISTWIDTH] IN BLOOD BY AUTOMATED COUNT: 15.4 % (ref 11.5–14.5)
EST. GFR  (NO RACE VARIABLE): >60 ML/MIN/1.73 M^2
GLUCOSE SERPL-MCNC: 85 MG/DL (ref 70–110)
GLUCOSE UR QL STRIP: NEGATIVE
HCT VFR BLD AUTO: 43.9 % (ref 40–54)
HGB BLD-MCNC: 14.2 G/DL (ref 14–18)
HGB UR QL STRIP: ABNORMAL
HYALINE CASTS UR QL AUTO: 0 /LPF
KETONES UR QL STRIP: ABNORMAL
LEUKOCYTE ESTERASE UR QL STRIP: ABNORMAL
MCH RBC QN AUTO: 31.1 PG (ref 27–31)
MCHC RBC AUTO-ENTMCNC: 32.3 G/DL (ref 32–36)
MCV RBC AUTO: 96 FL (ref 82–98)
MICROSCOPIC COMMENT: ABNORMAL
NITRITE UR QL STRIP: NEGATIVE
PH UR STRIP: 6 [PH] (ref 5–8)
PLATELET # BLD AUTO: 171 K/UL (ref 150–450)
PMV BLD AUTO: 10.3 FL (ref 9.2–12.9)
POTASSIUM SERPL-SCNC: 3.6 MMOL/L (ref 3.5–5.1)
PROT SERPL-MCNC: 6.4 G/DL (ref 6–8.4)
PROT UR QL STRIP: ABNORMAL
RBC # BLD AUTO: 4.56 M/UL (ref 4.6–6.2)
RBC #/AREA URNS AUTO: 39 /HPF (ref 0–4)
SODIUM SERPL-SCNC: 145 MMOL/L (ref 136–145)
SP GR UR STRIP: 1.03 (ref 1–1.03)
SQUAMOUS #/AREA URNS AUTO: 0 /HPF
URN SPEC COLLECT METH UR: ABNORMAL
WBC # BLD AUTO: 5.42 K/UL (ref 3.9–12.7)
WBC #/AREA URNS AUTO: >100 /HPF (ref 0–5)
WBC CLUMPS UR QL AUTO: ABNORMAL

## 2024-05-22 PROCEDURE — 20600001 HC STEP DOWN PRIVATE ROOM

## 2024-05-22 PROCEDURE — 63600175 PHARM REV CODE 636 W HCPCS: Performed by: STUDENT IN AN ORGANIZED HEALTH CARE EDUCATION/TRAINING PROGRAM

## 2024-05-22 PROCEDURE — 85027 COMPLETE CBC AUTOMATED: CPT | Performed by: STUDENT IN AN ORGANIZED HEALTH CARE EDUCATION/TRAINING PROGRAM

## 2024-05-22 PROCEDURE — 81001 URINALYSIS AUTO W/SCOPE: CPT | Performed by: INTERNAL MEDICINE

## 2024-05-22 PROCEDURE — 25000003 PHARM REV CODE 250: Performed by: STUDENT IN AN ORGANIZED HEALTH CARE EDUCATION/TRAINING PROGRAM

## 2024-05-22 PROCEDURE — 25000003 PHARM REV CODE 250: Performed by: INTERNAL MEDICINE

## 2024-05-22 PROCEDURE — 87086 URINE CULTURE/COLONY COUNT: CPT | Performed by: INTERNAL MEDICINE

## 2024-05-22 PROCEDURE — 63600175 PHARM REV CODE 636 W HCPCS: Performed by: INTERNAL MEDICINE

## 2024-05-22 PROCEDURE — 36415 COLL VENOUS BLD VENIPUNCTURE: CPT | Performed by: STUDENT IN AN ORGANIZED HEALTH CARE EDUCATION/TRAINING PROGRAM

## 2024-05-22 PROCEDURE — 80053 COMPREHEN METABOLIC PANEL: CPT | Performed by: STUDENT IN AN ORGANIZED HEALTH CARE EDUCATION/TRAINING PROGRAM

## 2024-05-22 RX ADMIN — MEROPENEM 1 G: 1 INJECTION INTRAVENOUS at 03:05

## 2024-05-22 RX ADMIN — TAMSULOSIN HYDROCHLORIDE 0.8 MG: 0.4 CAPSULE ORAL at 10:05

## 2024-05-22 RX ADMIN — ATORVASTATIN CALCIUM 40 MG: 40 TABLET, FILM COATED ORAL at 10:05

## 2024-05-22 RX ADMIN — GABAPENTIN 300 MG: 300 CAPSULE ORAL at 10:05

## 2024-05-22 RX ADMIN — MEROPENEM 1 G: 1 INJECTION INTRAVENOUS at 10:05

## 2024-05-22 RX ADMIN — MEROPENEM 1 G: 1 INJECTION INTRAVENOUS at 06:05

## 2024-05-22 RX ADMIN — GABAPENTIN 300 MG: 300 CAPSULE ORAL at 03:05

## 2024-05-22 RX ADMIN — DOCUSATE SODIUM 100 MG: 100 CAPSULE, LIQUID FILLED ORAL at 10:05

## 2024-05-22 RX ADMIN — HEPARIN SODIUM 5000 UNITS: 5000 INJECTION INTRAVENOUS; SUBCUTANEOUS at 09:05

## 2024-05-22 RX ADMIN — GABAPENTIN 300 MG: 300 CAPSULE ORAL at 09:05

## 2024-05-22 NOTE — SUBJECTIVE & OBJECTIVE
Interval History: pleasant, denies complaints.       Objective:     Vital Signs (Most Recent):  Temp: 98.2 °F (36.8 °C) (05/21/24 2355)  Pulse: 68 (05/21/24 2355)  Resp: 18 (05/21/24 2355)  BP: (!) 146/69 (05/21/24 2355)  SpO2: 97 % (05/21/24 2355) Vital Signs (24h Range):  Temp:  [98.1 °F (36.7 °C)-98.6 °F (37 °C)] 98.2 °F (36.8 °C)  Pulse:  [63-79] 68  Resp:  [16-18] 18  SpO2:  [96 %-99 %] 97 %  BP: (123-146)/(60-77) 146/69     Weight: 59.5 kg (131 lb 2.8 oz)  Body mass index is 21.17 kg/m².    Intake/Output Summary (Last 24 hours) at 5/22/2024 0020  Last data filed at 5/21/2024 1806  Gross per 24 hour   Intake --   Output 350 ml   Net -350 ml         Physical Exam  Vitals reviewed.   Constitutional:       General: He is not in acute distress.     Appearance: He is well-developed. He is ill-appearing.   HENT:      Head: Normocephalic and atraumatic.      Right Ear: External ear normal.      Left Ear: External ear normal.   Eyes:      General: No scleral icterus.     Extraocular Movements: Extraocular movements intact.   Neck:      Vascular: No JVD.      Trachea: No tracheal deviation.   Cardiovascular:      Rate and Rhythm: Normal rate and regular rhythm.      Pulses: Normal pulses.   Pulmonary:      Effort: Pulmonary effort is normal. No respiratory distress.   Abdominal:      General: Bowel sounds are normal. There is no distension.      Palpations: Abdomen is soft. There is no mass.      Tenderness: There is no abdominal tenderness.   Musculoskeletal:      Right lower leg: No edema.      Left lower leg: No edema.   Skin:     General: Skin is warm and dry.      Coloration: Skin is not jaundiced.   Neurological:      Mental Status: He is alert.             Significant Labs: All pertinent labs within the past 24 hours have been reviewed.    Significant Imaging: I have reviewed all pertinent imaging results/findings within the past 24 hours.

## 2024-05-22 NOTE — ASSESSMENT & PLAN NOTE
-- urine culture was sent, antibiotic started based on last culture. ID consulted, appreciate recs  -

## 2024-05-22 NOTE — ASSESSMENT & PLAN NOTE
Chronic, controlled. Latest blood pressure and vitals reviewed-     Temp:  [97.7 °F (36.5 °C)-98.6 °F (37 °C)]   Pulse:  [63-75]   Resp:  [16-18]   BP: (118-146)/(58-70)   SpO2:  [96 %-99 %] .   Home meds for hypertension were reviewed and noted below.       While in the hospital, will manage blood pressure as follows; Continue home antihypertensive regimen    Will utilize p.r.n. blood pressure medication only if patient's blood pressure greater than 180/110 and he develops symptoms such as worsening chest pain or shortness of breath.

## 2024-05-22 NOTE — PROGRESS NOTES
[facility] ([address] Medicine  Progress Note    Patient Name: Praneeth Jewell  MRN: [id number]  Patient Class: IP- Inpatient   Admission Date: 5/20/2024  Length of Stay: 2 days  Attending Physician: Jonathan Brown MD  Primary Care Provider: Home, [facility]        Subjective:     Principal Problem:Acute cystitis        HPI:  Patient is pleasant 78 yoM who presented from urology clinic for UTI and IV antibiotics. Patient reports that he is feeling well overall.  Does reports that he does not remember some of the details that brought him here. Per urology, patient is scheduled to go OR on 5/24/2024 for left ureteroscopy with laser lithotripsy. Reviewed previous urine culture which was sensitive to merrem only. Plan to consult ID as well.     Overview/Hospital Course:  Patient presented for treatment of UTI with IV abx prior to urologic intervention. Started on Merrem and ID consulted.     Interval History: NAEON. Cloudy urine noted in nephrostomy tube. Shen catheter exchanged and repeat UA collected. Pt currently asymptomatic, states he feels well.      Objective:     Vital Signs (Most Recent):  Temp: 97.7 °F (36.5 °C) (05/22/24 1138)  Pulse: 75 (05/22/24 1138)  Resp: 18 (05/22/24 1138)  BP: (!) 118/58 (05/22/24 1138)  SpO2: 96 % (05/22/24 1138) Vital Signs (24h Range):  Temp:  [97.7 °F (36.5 °C)-98.6 °F (37 °C)] 97.7 °F (36.5 °C)  Pulse:  [63-75] 75  Resp:  [16-18] 18  SpO2:  [96 %-99 %] 96 %  BP: (118-146)/(58-70) 118/58     Weight: 59.5 kg (131 lb 2.8 oz)  Body mass index is 21.17 kg/m².    Intake/Output Summary (Last 24 hours) at 5/22/2024 1506  Last data filed at 5/22/2024 0616  Gross per 24 hour   Intake 150 ml   Output 800 ml   Net -650 ml         Physical Exam  Vitals reviewed.   Constitutional:       General: He is not in acute distress.     Appearance: He is well-developed. He is ill-appearing.   HENT:      Head: Normocephalic and atraumatic.      Right Ear:  External ear normal.      Left Ear: External ear normal.   Eyes:      General: No scleral icterus.     Extraocular Movements: Extraocular movements intact.   Neck:      Vascular: No JVD.      Trachea: No tracheal deviation.   Cardiovascular:      Rate and Rhythm: Normal rate and regular rhythm.      Pulses: Normal pulses.   Pulmonary:      Effort: Pulmonary effort is normal. No respiratory distress.   Abdominal:      General: Bowel sounds are normal. There is no distension.      Palpations: Abdomen is soft. There is no mass.      Tenderness: There is no abdominal tenderness.   Musculoskeletal:      Right lower leg: No edema.      Left lower leg: No edema.   Skin:     General: Skin is warm and dry.      Coloration: Skin is not jaundiced.   Neurological:      Mental Status: He is alert.             Significant Labs: All pertinent labs within the past 24 hours have been reviewed.    Significant Imaging: I have reviewed all pertinent imaging results/findings within the past 24 hours.  Review of Systems    Assessment/Plan:      * Acute cystitis  -- urine culture was sent, antibiotic started based on last culture. ID consulted, appreciate recs  -       Chronic indwelling Shen catheter  Unknown last time of exchange and pt does not remember last time it was changed.   Shen exchanged 5/22 and repeat UA/Cx collected    Essential hypertension  Chronic, controlled. Latest blood pressure and vitals reviewed-     Temp:  [97.7 °F (36.5 °C)-98.6 °F (37 °C)]   Pulse:  [63-75]   Resp:  [16-18]   BP: (118-146)/(58-70)   SpO2:  [96 %-99 %] .   Home meds for hypertension were reviewed and noted below.       While in the hospital, will manage blood pressure as follows; Continue home antihypertensive regimen    Will utilize p.r.n. blood pressure medication only if patient's blood pressure greater than 180/110 and he develops symptoms such as worsening chest pain or shortness of breath.    BPH with urinary  "obstruction        Nephrolithiasis  Per urology note "To OR on 5/24/2024 for left ureteroscopy with laser lithotripsy with Dr. Kelley"      History of CVA with residual deficit          VTE Risk Mitigation (From admission, onward)           Ordered     heparin (porcine) injection 5,000 Units  Every 8 hours         05/20/24 1728     IP VTE HIGH RISK PATIENT  Once         05/20/24 1728     Place sequential compression device  Until discontinued         05/20/24 1728                    Discharge Planning   FISH: 5/25/2024     Code Status: Full Code   Is the patient medically ready for discharge?:     Reason for patient still in hospital (select all that apply): Treatment  Discharge Plan A: Home ([facility])                  Jonathan Brown MD  Department of Hospital Medicine   [facility] - Stepdown Flex ([place])    "

## 2024-05-22 NOTE — SUBJECTIVE & OBJECTIVE
Interval History: NAEO.  AFVSS.  On meropenem.  New urine culture in process.  Plans for OR Friday.  No complaints today.     Review of Systems.    Objective:     Temp:  [97.7 °F (36.5 °C)-98.6 °F (37 °C)] 98.2 °F (36.8 °C)  Pulse:  [66-75] 66  Resp:  [16-18] 18  SpO2:  [96 %-99 %] 97 %  BP: (118-146)/(58-70) 124/64     Body mass index is 21.17 kg/m².           Drains       Drain  Duration                  Nephrostomy 04/11/24 1048 Left 8 Fr. 41 days         Urethral Catheter 05/22/24 1217 16 Fr. <1 day                     Physical Exam  Constitutional:       General: He is not in acute distress.     Appearance: He is ill-appearing.   HENT:      Head: Normocephalic.   Cardiovascular:      Rate and Rhythm: Normal rate.   Pulmonary:      Effort: Pulmonary effort is normal.   Abdominal:      General: Abdomen is flat.      Palpations: Abdomen is soft.   Genitourinary:     Comments: Shen catheter draining cyu  Musculoskeletal:         General: Normal range of motion.   Skin:     General: Skin is warm.   Neurological:      General: No focal deficit present.      Mental Status: He is alert.   Psychiatric:         Mood and Affect: Mood normal.           Significant Labs:    BMP:  Recent Labs   Lab 05/20/24  1729 05/20/24 2002 05/22/24  0248    143 145   K 4.7 4.0 3.6    107 112*   CO2 20* 26 24   BUN 32* 32* 31*   CREATININE 0.9 1.1 0.9   CALCIUM 9.8 10.0 9.3       CBC:   Recent Labs   Lab 05/20/24 2002 05/22/24  0248   WBC 7.11  7.04 5.42   HGB 15.3  15.2 14.2   HCT 48.1  47.8 43.9     173 171       All pertinent labs results from the past 24 hours have been reviewed.    Significant Imaging:  All pertinent imaging results/findings from the past 24 hours have been reviewed.

## 2024-05-22 NOTE — NURSING
Nurses Note -- 4 Eyes      5/21/2024   8:54 PM      Skin assessed during: Q Shift Change      [] No Altered Skin Integrity Present    []Prevention Measures Documented      [x] Yes- Altered Skin Integrity Present or Discovered   [] LDA Added if Not in Epic (Describe Wound)   [] New Altered Skin Integrity was Present on Admit and Documented in LDA   [] Wound Image Taken    Wound Care Consulted? Yes    Attending Nurse:  petey Oseguera RN/Staff Member:   mehnaz

## 2024-05-22 NOTE — PROGRESS NOTES
Pharmacist Renal Dose Adjustment Note    Praneeth Jewell is a 78 y.o. male being treated with the medication Meropenem.    Patient Data:    Vital Signs (Most Recent):  Temp: 97.7 °F (36.5 °C) (05/22/24 1138)  Pulse: 75 (05/22/24 1138)  Resp: 18 (05/22/24 1138)  BP: (!) 118/58 (05/22/24 1138)  SpO2: 96 % (05/22/24 1138) Vital Signs (72h Range):  Temp:  [97.7 °F (36.5 °C)-98.6 °F (37 °C)]   Pulse:  [63-81]   Resp:  [16-18]   BP: (118-151)/(58-86)   SpO2:  [96 %-99 %]      Recent Labs   Lab 05/20/24  1729 05/20/24 2002 05/22/24  0248   CREATININE 0.9 1.1 0.9     Serum creatinine: 0.9 mg/dL 05/22/24 0248  Estimated creatinine clearance: 58.8 mL/min    Medication:Meropenem 1 g IV every 12 hours will be changed to Meropenem 1 g IV every 8 hours for CrCl ? 50 mL/min per pharmacy protocol.    Pharmacist's Name: Ekaterina Hoffman, PharmD  Pharmacist's Extension: [phone]

## 2024-05-22 NOTE — PROGRESS NOTES
[facility] Stepdown Flex ([place] [address])  Urology  Progress Note    Patient Name: Praneeth Jewell  MRN: [id number]  Admission Date: 5/20/2024  Hospital Length of Stay: 2 days  Code Status: Full Code   Attending Provider: Jonathan Brown MD   Primary Care Physician: [facility]    Subjective:     HPI:  Praneeth Jewell is a 77 yo M lives in a nursing home with history of obstructive uropathy s/p J-J stent placement, recurrent nephrolithiasis and UTIs, history of MDR UTI, 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 Patient scheduled for ureteroscopy 5/25 with Dr. Kelley. Prior urine culture from 4/25 untreated growing ESBL Pseudomonas. Recommended to be a direct admit to  for IV antibiotics prior to procedure       He has a chronic indwelling mark catheter.  Last exchanged on 4/25/2024.  Last CTRSS on 4/10 when PCN became displaced shows 7 mm left mid ureteral stone and multiple non obstructing stones in the left kidney.  No stones on the right.   Large stool burden.  Mark catheter in placed with decompression of the bladder.        Of note he has had multiple ureteroscopies and has a history of urosepsis and proteus UTI.       Today he denies fevers, chills.      Interval History: NAEO.  AFVSS.  On meropenem.  New urine culture in process.  Plans for OR Friday.  No complaints today.     Review of Systems.    Objective:     Temp:  [97.7 °F (36.5 °C)-98.6 °F (37 °C)] 98.2 °F (36.8 °C)  Pulse:  [66-75] 66  Resp:  [16-18] 18  SpO2:  [96 %-99 %] 97 %  BP: (118-146)/(58-70) 124/64     Body mass index is 21.17 kg/m².           Drains       Drain  Duration                  Nephrostomy 04/11/24 1048 Left 8 Fr. 41 days         Urethral Catheter 05/22/24 1217 16 Fr. <1 day                     Physical Exam  Constitutional:       General: He is not in acute distress.      Appearance: He is ill-appearing.   HENT:      Head: Normocephalic.   Cardiovascular:      Rate and Rhythm: Normal rate.   Pulmonary:      Effort: Pulmonary effort is normal.   Abdominal:      General: Abdomen is flat.      Palpations: Abdomen is soft.   Genitourinary:     Comments: Mark catheter draining cyu  Musculoskeletal:         General: Normal range of motion.   Skin:     General: Skin is warm.   Neurological:      General: No focal deficit present.      Mental Status: He is alert.   Psychiatric:         Mood and Affect: Mood normal.           Significant Labs:    BMP:  Recent Labs   Lab 05/20/24  1729 05/20/24 2002 05/22/24  0248    143 145   K 4.7 4.0 3.6    107 112*   CO2 20* 26 24   BUN 32* 32* 31*   CREATININE 0.9 1.1 0.9   CALCIUM 9.8 10.0 9.3       CBC:   Recent Labs   Lab 05/20/24 2002 05/22/24  0248   WBC 7.11  7.04 5.42   HGB 15.3  15.2 14.2   HCT 48.1  47.8 43.9     173 171       All pertinent labs results from the past 24 hours have been reviewed.    Significant Imaging:  All pertinent imaging results/findings from the past 24 hours have been reviewed.                  Assessment/Plan:     Nephrolithiasis  -Admitted to  for IV abx for L URS 5/24  -On appropriate meropenem  - mark catheter exchanged and new urine culture sent   -NT exchanged on 4/11  -Continue medical optimization prior to surgery    Thank you [facility] for assistance in managing patient preoperatively          VTE Risk Mitigation (From admission, onward)           Ordered     heparin (porcine) injection 5,000 Units  Every 8 hours         05/20/24 1728     IP VTE HIGH RISK PATIENT  Once         05/20/24 1728     Place sequential compression device  Until discontinued         05/20/24 1728                    Sofie Almeida MD  Urology  [facility] - [facility] ([place]

## 2024-05-22 NOTE — ASSESSMENT & PLAN NOTE
Unknown last time of exchange and pt does not remember last time it was changed.   Shen exchanged 5/22 and repeat UA/Cx collected

## 2024-05-22 NOTE — HOSPITAL COURSE
78 y.o. male with a left ureteral stone presented for treatment of UTI with IV abx prior to urologic intervention. Started on Merrem and ID consulted. Urine culture showed multiple organisms isolated, none in predominance. s/p left ureteroscopy with laser lithotripsy. L nephrostomy tube removed and ureteral stent placed. Patient tolerated procedure well and has remained HDS. Shen catheter placed and will remain in place for discharge. Follow up with urology outpatient. Will need stent exchange in 3 months. Antibiotics have been discontinued.

## 2024-05-22 NOTE — SUBJECTIVE & OBJECTIVE
Interval History: NAEON. Cloudy urine noted in nephrostomy tube. Shen catheter exchanged and repeat UA collected. Pt currently asymptomatic, states he feels well.      Objective:     Vital Signs (Most Recent):  Temp: 97.7 °F (36.5 °C) (05/22/24 1138)  Pulse: 75 (05/22/24 1138)  Resp: 18 (05/22/24 1138)  BP: (!) 118/58 (05/22/24 1138)  SpO2: 96 % (05/22/24 1138) Vital Signs (24h Range):  Temp:  [97.7 °F (36.5 °C)-98.6 °F (37 °C)] 97.7 °F (36.5 °C)  Pulse:  [63-75] 75  Resp:  [16-18] 18  SpO2:  [96 %-99 %] 96 %  BP: (118-146)/(58-70) 118/58     Weight: 59.5 kg (131 lb 2.8 oz)  Body mass index is 21.17 kg/m².    Intake/Output Summary (Last 24 hours) at 5/22/2024 1506  Last data filed at 5/22/2024 0616  Gross per 24 hour   Intake 150 ml   Output 800 ml   Net -650 ml         Physical Exam  Vitals reviewed.   Constitutional:       General: He is not in acute distress.     Appearance: He is well-developed. He is ill-appearing.   HENT:      Head: Normocephalic and atraumatic.      Right Ear: External ear normal.      Left Ear: External ear normal.   Eyes:      General: No scleral icterus.     Extraocular Movements: Extraocular movements intact.   Neck:      Vascular: No JVD.      Trachea: No tracheal deviation.   Cardiovascular:      Rate and Rhythm: Normal rate and regular rhythm.      Pulses: Normal pulses.   Pulmonary:      Effort: Pulmonary effort is normal. No respiratory distress.   Abdominal:      General: Bowel sounds are normal. There is no distension.      Palpations: Abdomen is soft. There is no mass.      Tenderness: There is no abdominal tenderness.   Musculoskeletal:      Right lower leg: No edema.      Left lower leg: No edema.   Skin:     General: Skin is warm and dry.      Coloration: Skin is not jaundiced.   Neurological:      Mental Status: He is alert.             Significant Labs: All pertinent labs within the past 24 hours have been reviewed.    Significant Imaging: I have reviewed all pertinent  imaging results/findings within the past 24 hours.  Review of Systems

## 2024-05-22 NOTE — PROGRESS NOTES
[facility] - Stepdown Flex ([address] Medicine  Progress Note    Patient Name: Praneeth Jewell  MRN: [id number]  Patient Class: IP- Inpatient   Admission Date: 5/20/2024  Length of Stay: 2 days  Attending Physician: Nixon Jim DO  Primary Care Provider: Home, [facility]        Subjective:     Principal Problem:Acute cystitis        HPI:  Patient is pleasant 78 yoM who presented from urology clinic for UTI and IV antibiotics. Patient reports that he is feeling well overall.  Does reports that he does not remember some of the details that brought him here. Per urology, patient is scheduled to go OR on 5/24/2024 for left ureteroscopy with laser lithotripsy. Reviewed previous urine culture which was sensitive to merrem only. Plan to consult ID as well.     Overview/Hospital Course:  Patient presented for treatment of UTI with IV abx prior to urologic intervention. Started on Merrem and ID consulted.     Interval History: pleasant, denies complaints.       Objective:     Vital Signs (Most Recent):  Temp: 98.2 °F (36.8 °C) (05/21/24 2355)  Pulse: 68 (05/21/24 2355)  Resp: 18 (05/21/24 2355)  BP: (!) 146/69 (05/21/24 2355)  SpO2: 97 % (05/21/24 2355) Vital Signs (24h Range):  Temp:  [98.1 °F (36.7 °C)-98.6 °F (37 °C)] 98.2 °F (36.8 °C)  Pulse:  [63-79] 68  Resp:  [16-18] 18  SpO2:  [96 %-99 %] 97 %  BP: (123-146)/(60-77) 146/69     Weight: 59.5 kg (131 lb 2.8 oz)  Body mass index is 21.17 kg/m².    Intake/Output Summary (Last 24 hours) at 5/22/2024 0020  Last data filed at 5/21/2024 1806  Gross per 24 hour   Intake --   Output 350 ml   Net -350 ml         Physical Exam  Vitals reviewed.   Constitutional:       General: He is not in acute distress.     Appearance: He is well-developed. He is ill-appearing.   HENT:      Head: Normocephalic and atraumatic.      Right Ear: External ear normal.      Left Ear: External ear normal.   Eyes:      General: No scleral icterus.     Extraocular  "Movements: Extraocular movements intact.   Neck:      Vascular: No JVD.      Trachea: No tracheal deviation.   Cardiovascular:      Rate and Rhythm: Normal rate and regular rhythm.      Pulses: Normal pulses.   Pulmonary:      Effort: Pulmonary effort is normal. No respiratory distress.   Abdominal:      General: Bowel sounds are normal. There is no distension.      Palpations: Abdomen is soft. There is no mass.      Tenderness: There is no abdominal tenderness.   Musculoskeletal:      Right lower leg: No edema.      Left lower leg: No edema.   Skin:     General: Skin is warm and dry.      Coloration: Skin is not jaundiced.   Neurological:      Mental Status: He is alert.             Significant Labs: All pertinent labs within the past 24 hours have been reviewed.    Significant Imaging: I have reviewed all pertinent imaging results/findings within the past 24 hours.    Assessment/Plan:      * Acute cystitis  -- urine culture was sent, antibiotic started based on last culture. Plan to consult ID in the AM.      Chronic indwelling Shen catheter  Unknown last time of exchange and pt does not remember last time it was changed.       Nephrolithiasis  Per urology note "To OR on 5/24/2024 for left ureteroscopy with laser lithotripsy with Dr. Kelley"        VTE Risk Mitigation (From admission, onward)           Ordered     heparin (porcine) injection 5,000 Units  Every 8 hours         05/20/24 1728     IP VTE HIGH RISK PATIENT  Once         05/20/24 1728     Place sequential compression device  Until discontinued         05/20/24 1728                    Discharge Planning   FISH: 5/23/2024     Code Status: Full Code   Is the patient medically ready for discharge?:     Reason for patient still in hospital (select all that apply): Patient trending condition and Treatment  Discharge Plan A: [facility]                  Nixon Jim DO  Department of Hospital Medicine   [facility] - Stepdown Flex ([place])    "

## 2024-05-22 NOTE — ASSESSMENT & PLAN NOTE
-Admitted to  for IV abx for L URS 5/24  -On appropriate meropenem  - mark catheter exchanged and new urine culture sent   -NT exchanged on 4/11  -Continue medical optimization prior to surgery    Thank you Hospital Medicine for assistance in managing patient preoperatively

## 2024-05-23 ENCOUNTER — TELEPHONE (OUTPATIENT)
Dept: UROLOGY | Facility: CLINIC | Age: 79
End: 2024-05-23
Payer: MEDICARE

## 2024-05-23 ENCOUNTER — ANESTHESIA EVENT (OUTPATIENT)
Dept: SURGERY | Facility: HOSPITAL | Age: 79
DRG: 660 | End: 2024-05-23
Payer: MEDICARE

## 2024-05-23 LAB
ALBUMIN SERPL BCP-MCNC: 2.7 G/DL (ref 3.5–5.2)
ALP SERPL-CCNC: 71 U/L (ref 55–135)
ALT SERPL W/O P-5'-P-CCNC: 9 U/L (ref 10–44)
ANION GAP SERPL CALC-SCNC: 8 MMOL/L (ref 8–16)
AST SERPL-CCNC: 15 U/L (ref 10–40)
BACTERIA UR CULT: NO GROWTH
BILIRUB SERPL-MCNC: 0.3 MG/DL (ref 0.1–1)
BUN SERPL-MCNC: 30 MG/DL (ref 8–23)
CALCIUM SERPL-MCNC: 9.2 MG/DL (ref 8.7–10.5)
CHLORIDE SERPL-SCNC: 110 MMOL/L (ref 95–110)
CO2 SERPL-SCNC: 22 MMOL/L (ref 23–29)
CREAT SERPL-MCNC: 0.8 MG/DL (ref 0.5–1.4)
ERYTHROCYTE [DISTWIDTH] IN BLOOD BY AUTOMATED COUNT: 15.4 % (ref 11.5–14.5)
EST. GFR  (NO RACE VARIABLE): >60 ML/MIN/1.73 M^2
GLUCOSE SERPL-MCNC: 76 MG/DL (ref 70–110)
HCT VFR BLD AUTO: 40.8 % (ref 40–54)
HGB BLD-MCNC: 13 G/DL (ref 14–18)
MCH RBC QN AUTO: 31 PG (ref 27–31)
MCHC RBC AUTO-ENTMCNC: 31.9 G/DL (ref 32–36)
MCV RBC AUTO: 97 FL (ref 82–98)
PLATELET # BLD AUTO: 162 K/UL (ref 150–450)
PMV BLD AUTO: 10.4 FL (ref 9.2–12.9)
POTASSIUM SERPL-SCNC: 3.8 MMOL/L (ref 3.5–5.1)
PROT SERPL-MCNC: 6.2 G/DL (ref 6–8.4)
RBC # BLD AUTO: 4.19 M/UL (ref 4.6–6.2)
SODIUM SERPL-SCNC: 140 MMOL/L (ref 136–145)
WBC # BLD AUTO: 6.35 K/UL (ref 3.9–12.7)

## 2024-05-23 PROCEDURE — 25000003 PHARM REV CODE 250: Performed by: STUDENT IN AN ORGANIZED HEALTH CARE EDUCATION/TRAINING PROGRAM

## 2024-05-23 PROCEDURE — 80053 COMPREHEN METABOLIC PANEL: CPT | Performed by: STUDENT IN AN ORGANIZED HEALTH CARE EDUCATION/TRAINING PROGRAM

## 2024-05-23 PROCEDURE — 36415 COLL VENOUS BLD VENIPUNCTURE: CPT | Performed by: STUDENT IN AN ORGANIZED HEALTH CARE EDUCATION/TRAINING PROGRAM

## 2024-05-23 PROCEDURE — 20600001 HC STEP DOWN PRIVATE ROOM

## 2024-05-23 PROCEDURE — 25000003 PHARM REV CODE 250: Performed by: INTERNAL MEDICINE

## 2024-05-23 PROCEDURE — 63600175 PHARM REV CODE 636 W HCPCS: Performed by: INTERNAL MEDICINE

## 2024-05-23 PROCEDURE — 85027 COMPLETE CBC AUTOMATED: CPT | Performed by: STUDENT IN AN ORGANIZED HEALTH CARE EDUCATION/TRAINING PROGRAM

## 2024-05-23 PROCEDURE — 63600175 PHARM REV CODE 636 W HCPCS: Performed by: STUDENT IN AN ORGANIZED HEALTH CARE EDUCATION/TRAINING PROGRAM

## 2024-05-23 RX ADMIN — ATORVASTATIN CALCIUM 40 MG: 40 TABLET, FILM COATED ORAL at 10:05

## 2024-05-23 RX ADMIN — HEPARIN SODIUM 5000 UNITS: 5000 INJECTION INTRAVENOUS; SUBCUTANEOUS at 08:05

## 2024-05-23 RX ADMIN — GABAPENTIN 300 MG: 300 CAPSULE ORAL at 08:05

## 2024-05-23 RX ADMIN — HEPARIN SODIUM 5000 UNITS: 5000 INJECTION INTRAVENOUS; SUBCUTANEOUS at 02:05

## 2024-05-23 RX ADMIN — MEROPENEM 1 G: 1 INJECTION INTRAVENOUS at 08:05

## 2024-05-23 RX ADMIN — HEPARIN SODIUM 5000 UNITS: 5000 INJECTION INTRAVENOUS; SUBCUTANEOUS at 06:05

## 2024-05-23 RX ADMIN — TAMSULOSIN HYDROCHLORIDE 0.8 MG: 0.4 CAPSULE ORAL at 10:05

## 2024-05-23 RX ADMIN — GABAPENTIN 300 MG: 300 CAPSULE ORAL at 10:05

## 2024-05-23 RX ADMIN — MEROPENEM 1 G: 1 INJECTION INTRAVENOUS at 06:05

## 2024-05-23 RX ADMIN — GABAPENTIN 300 MG: 300 CAPSULE ORAL at 02:05

## 2024-05-23 RX ADMIN — MEROPENEM 1 G: 1 INJECTION INTRAVENOUS at 02:05

## 2024-05-23 NOTE — PLAN OF CARE
[facility] - Stepdown Flex ([place])  Discharge Reassessment    Primary Care Provider: Home, [facility]    Expected Discharge Date: 5/25/2024    Reassessment (most recent)       Discharge Reassessment - 05/23/24 1216          Discharge Reassessment    Assessment Type Discharge Planning Reassessment     Did the patient's condition or plan change since previous assessment? No     Discharge Plan discussed with: Patient     Communicated FISH with patient/caregiver Yes     [facility] Other   [facility]    Discharge Plan B Home     DME Needed Upon Discharge  none     Transition of Care Barriers None     Why the patient remains in the hospital Requires continued medical care        Post-Acute Status    Coverage PHN     Discharge Delays None known at this time                       Discharge Plan A and Plan B have been determined by review of patient's clinical status, future medical and therapeutic needs, and coverage/benefits for post-acute care in coordination with multidisciplinary team members.    SW spoke with the [facility] in [place], [place] and they facility stated they will have someone call me back in regards to pt returning.  Will follow up.    [facility] number: [phone]    Luz Maria Chauhan MSW, CSW

## 2024-05-23 NOTE — CONSULTS
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 (S/n: [id number])    Pyelonephritis 11/19/2021    TIA (transient ischemic attack)     Urinary retention 05/25/2021       Social History     Socioeconomic History    Marital status: Single    Number of children: 0    Years of education: 15    Highest education level: Some college, no degree   Occupational History     Employer: Disabled    Occupation: Construction     Employer: [employer]     Comment: Retired in past 5-10 years   Tobacco Use    Smoking status: Former     Types: Cigarettes    Smokeless tobacco: Never   Substance and Sexual Activity    Alcohol use: Yes     Comment: 1/ pint whisky daily    Drug use: Yes     Types: "Crack" cocaine     Social Determinants of Health     Financial Resource Strain: Low Risk  (4/15/2023)    Overall Financial Resource Strain (CARDIA)     Difficulty of Paying Living Expenses: Not hard at all   Food Insecurity: No Food Insecurity (4/15/2023)    Hunger Vital Sign     Worried About Running Out of Food in the Last Year: Never true     Ran Out of Food in the Last Year: Never true   Transportation Needs: No Transportation Needs (4/15/2023)    PRAPARE - Transportation     Lack of Transportation (Medical): No     Lack of Transportation (Non-Medical): No   Physical Activity: Inactive (4/15/2023)    Exercise Vital Sign     Days of Exercise per Week: 0 days     Minutes of Exercise per Session: 0 min   Stress: No Stress Concern Present (4/15/2023)    [employer] [place] [employer] - Occupational " Stress Questionnaire     Feeling of Stress : Not at all   Housing Stability: Low Risk  (4/15/2023)    Housing Stability Vital Sign     Unable to Pay for Housing in the Last Year: No     Number of Places Lived in the Last Year: 2     Unstable Housing in the Last Year: No       Precautions:     Allergies as of 05/20/2024    (No Known Allergies)       [facility] Assessment Details/Treatment     Patient seen for wound care consultation for buttocks.   Reviewed chart for this encounter.   See Flow Sheet for findings.    Pt found lying in bed, agreeable to care at this time. Pt turned into lateral position for assessment and cleansed w/ no rinse bath wipes d/t large episode of stool incontinence. Pts sacrum/buttocks intact, pink and blanchable w/ moist epidermal sloughing. Triad applied for moisture barrier protection. Waffle mattress ordered for prevention.    RECOMMENDATIONS:  BID/PRN - sacrum - cleanse gently w/ soap and water, pat dry and apply Triad to affected area, leave KALEB.    Bedside nursing to place waffle mattress upon arrival to the floor.    Discussed POC with patient and primary nurse.   See EMR for orders & patient education.    Bedside nursing to continue care & monitoring.  Bedside nursing to maintain pressure injury prevention interventions.     05/23/24 1047   WOCN Assessment   WOCN Total Time (mins) 30   Visit Date 05/23/24   Visit Time 1047   Consult Type New   WOCN Speciality Wound   Wound moisture   Intervention assessed;changed;applied;chart review;coordination of care;orders   Teaching on-going        Wound 05/20/24 1831 Moisture associated dermatitis Buttocks #1   Date First Assessed/Time First Assessed: 05/20/24 1831   Present on Original Admission: Yes  Primary Wound Type: Moisture associated dermatitis  Location: Buttocks  Wound Number: #1  Is this injury device related?: No   Wound Image    Dressing Appearance Open to air   Drainage Amount None   Drainage Characteristics/Odor No odor   Appearance  Pink;Red;Moist   Tissue loss description Not applicable   Periwound Area Intact;Moist;Pink   Care Cleansed with:;Soap and water;Applied:;Skin Barrier

## 2024-05-23 NOTE — ASSESSMENT & PLAN NOTE
- Admitted to  for IV abx for L URS 5/24  - On appropriate meropenem  - Urine culture with multiple organisms  - maintain nephrostomy tube  - NPO at MN for left URS tomorrow  - Surgical consent obtained  - I have explained the indication, risks, benefits, and alternatives of the procedure in detail.  Risks including but not limited to bleeding, infection, injury to the urethra, bladder, ureter, need for additional treatments, inability or incomplete removal of kidney stones, pain, and discomfort related to the stent were discussed in depth with the patient.  The patient voices understanding and all questions have been answered.  The patient agrees to proceed as planned with left ureteroscopy, laser lithotripsy, and possible ureteral stent placement.

## 2024-05-23 NOTE — PLAN OF CARE
Patient in bed, awake, watching television. No complaints or concerns at this time. VSS on RA. Safety precautions maintained and call light in reach.       Problem: Adult Inpatient Plan of Care  Goal: Plan of Care Review  Outcome: Progressing     Problem: Adult Inpatient Plan of Care  Goal: Patient-Specific Goal (Individualized)  Outcome: Progressing     Problem: Adult Inpatient Plan of Care  Goal: Absence of Hospital-Acquired Illness or Injury  Outcome: Progressing     Problem: Adult Inpatient Plan of Care  Goal: Optimal Comfort and Wellbeing  Outcome: Progressing     Problem: Adult Inpatient Plan of Care  Goal: Readiness for Transition of Care  Outcome: Progressing     Problem: Wound  Goal: Optimal Coping  Outcome: Progressing

## 2024-05-23 NOTE — ASSESSMENT & PLAN NOTE
Chronic, controlled. Latest blood pressure and vitals reviewed-     Temp:  [97.7 °F (36.5 °C)-98.7 °F (37.1 °C)]   Pulse:  [65-79]   Resp:  [17-19]   BP: (110-134)/(64-68)   SpO2:  [96 %-98 %] .   Home meds for hypertension were reviewed and noted below.       While in the hospital, will manage blood pressure as follows; Continue home antihypertensive regimen    Will utilize p.r.n. blood pressure medication only if patient's blood pressure greater than 180/110 and he develops symptoms such as worsening chest pain or shortness of breath.

## 2024-05-23 NOTE — SUBJECTIVE & OBJECTIVE
Interval History: NAEON. Remains on meropenem Urine culture 5/21 growing multiple organisms, none in predominance. Repeat urine culture 5/22 pending. Pt currently asymptomatic, states he feels well. Planning for URS tomorrow.       Objective:     Vital Signs (Most Recent):  Temp: 97.7 °F (36.5 °C) (05/23/24 1202)  Pulse: 65 (05/23/24 1202)  Resp: 19 (05/23/24 1202)  BP: 110/64 (05/23/24 1202)  SpO2: 96 % (05/23/24 1202) Vital Signs (24h Range):  Temp:  [97.7 °F (36.5 °C)-98.7 °F (37.1 °C)] 97.7 °F (36.5 °C)  Pulse:  [65-79] 65  Resp:  [17-19] 19  SpO2:  [96 %-98 %] 96 %  BP: (110-134)/(64-68) 110/64     Weight: 59.5 kg (131 lb 2.8 oz)  Body mass index is 21.17 kg/m².  No intake or output data in the 24 hours ending 05/23/24 1422        Physical Exam  Vitals reviewed.   Constitutional:       General: He is not in acute distress.     Appearance: He is well-developed. He is ill-appearing.   HENT:      Head: Normocephalic and atraumatic.      Right Ear: External ear normal.      Left Ear: External ear normal.   Eyes:      General: No scleral icterus.     Extraocular Movements: Extraocular movements intact.   Neck:      Vascular: No JVD.      Trachea: No tracheal deviation.   Cardiovascular:      Rate and Rhythm: Normal rate and regular rhythm.      Pulses: Normal pulses.   Pulmonary:      Effort: Pulmonary effort is normal. No respiratory distress.   Abdominal:      General: Bowel sounds are normal. There is no distension.      Palpations: Abdomen is soft. There is no mass.      Tenderness: There is no abdominal tenderness.   Musculoskeletal:      Right lower leg: No edema.      Left lower leg: No edema.   Skin:     General: Skin is warm and dry.      Coloration: Skin is not jaundiced.   Neurological:      Mental Status: He is alert.             Significant Labs: All pertinent labs within the past 24 hours have been reviewed.    Significant Imaging: I have reviewed all pertinent imaging results/findings within the past  24 hours.  Review of Systems

## 2024-05-23 NOTE — SUBJECTIVE & OBJECTIVE
Interval History: NAEON. AFVSS.  Urine culture with multiple organisms, has been treated with merrem.  To OR tomorrow for left URS.  NPO.  Surgical consent obtained    Objective:     Temp:  [97.7 °F (36.5 °C)-98.7 °F (37.1 °C)] 97.7 °F (36.5 °C)  Pulse:  [65-79] 65  Resp:  [17-19] 19  SpO2:  [96 %-98 %] 96 %  BP: (110-134)/(64-68) 110/64     Body mass index is 21.17 kg/m².           Drains       Drain  Duration                  Nephrostomy 04/11/24 1048 Left 8 Fr. 42 days         Urethral Catheter 05/22/24 1217 16 Fr. 1 day                     Physical Exam  Constitutional:       General: He is not in acute distress.     Appearance: He is not ill-appearing.   HENT:      Head: Normocephalic.   Cardiovascular:      Rate and Rhythm: Normal rate.   Pulmonary:      Effort: Pulmonary effort is normal.   Abdominal:      General: Abdomen is flat.      Palpations: Abdomen is soft.   Genitourinary:     Comments: Shen catheter draining clear yellow  Musculoskeletal:         General: Normal range of motion.   Skin:     General: Skin is warm.   Neurological:      General: No focal deficit present.      Mental Status: He is alert.   Psychiatric:         Mood and Affect: Mood normal.           Significant Labs:    BMP:  Recent Labs   Lab 05/20/24 2002 05/22/24 0248 05/23/24  0310    145 140   K 4.0 3.6 3.8    112* 110   CO2 26 24 22*   BUN 32* 31* 30*   CREATININE 1.1 0.9 0.8   CALCIUM 10.0 9.3 9.2       CBC:   Recent Labs   Lab 05/20/24 2002 05/22/24  0248 05/23/24  0310   WBC 7.11  7.04 5.42 6.35   HGB 15.3  15.2 14.2 13.0*   HCT 48.1  47.8 43.9 40.8     173 171 162       Urine Culture:   Recent Labs   Lab 05/21/24  1113   LABURIN Multiple organisms isolated. None in predominance.  Repeat if  clinically necessary.     Urine Studies:   Recent Labs   Lab 05/21/24  1113 05/22/24  1652   COLORU Yellow Yellow   APPEARANCEUA Cloudy* Hazy*   PHUR 6.0 6.0   SPECGRAV 1.025 1.030   PROTEINUA 2+* 1+*   GLUCUA  Negative Negative   KETONESU Trace* Trace*   BILIRUBINUA Negative Negative   OCCULTUA 2+* 1+*   NITRITE Positive* Negative   LEUKOCYTESUR 3+* 3+*   RBCUA >100* 39*   WBCUA >100* >100*   BACTERIA Many* Occasional   SQUAMEPITHEL  --  0   HYALINECASTS 0 0       Significant Imaging:  All pertinent imaging results/findings from the past 24 hours have been reviewed.

## 2024-05-23 NOTE — ASSESSMENT & PLAN NOTE
-- meropenem started  -- urine culture 5/21 growing multiple organisms, none in predominance  -- repeat urine culture 5/22 pending.   -- planning for URS tomorrow.

## 2024-05-23 NOTE — ANESTHESIA PREPROCEDURE EVALUATION
"[facility]  Anesthesia Pre-Operative Evaluation     Patient Name: Praneeth Jewell  YOB: 1945  MRN: [id number]  [facility]: [id number]       Admit Date: (Not on file)   Admit Team: Networked reference to record PCT      Date of Procedure: 5/24/2024  Anesthesia: General Procedure: Procedure(s) (LRB):  CYSTOURETEROSCOPY, WITH HOLMIUM LASER LITHOTRIPSY OF URETERAL CALCULUS AND STENT INSERTION (Left)  Pre-Operative Diagnosis: Nephrolithiasis [N20.0]  Proceduralist:Surgeons and Role:     * Camilo Kelley Jr., MD - Primary  Code Status: Full Code   Advanced Directive: Not Received  Isolation Precautions: No active isolations  Capacity: Full capacity       SUBJECTIVE:     Pre-operative evaluation for Procedure(s) (LRB):  CYSTOURETEROSCOPY, WITH HOLMIUM LASER LITHOTRIPSY OF URETERAL CALCULUS AND STENT INSERTION (Left)  05/23/2024  Hospital LOS: 0 days  ICU LOS: Patient does not have an ICU stay during this admission.    Praneeth Jewell is a 78 y.o. male w/ a significant PMHx of HTN, pacemaker 2/2 CHB, , DM, CVA(2015) with LE weakness who presented from urology clinic due to untreated urine cx growing pseudomonas who is to undergo ureteroscopy with lithotripsy.      Patient now presents for the above procedure(s).    TTE:    No results found for this or any previous visit.  BLANKA:  No results found for this or any previous visit.  Stress Test:  No results found for this or any previous visit.     Cardiac Catheterization:  No results found for this or any previous visit.     PFT:  No results found for: "FEV1", "FVC", "QDC6YYZ", "TLC", "DLCO"     NPO Status:     LDA:       Peripheral IV - Single Lumen 05/20/24 1721 22 G Posterior;Right Forearm (Active)   Site Assessment No redness;No swelling;No drainage 05/23/24 0800   Extremity Assessment Distal to IV No abnormal discoloration 05/23/24 0800   Line Status Infusing 05/23/24 0800   Dressing Status Clean;Dry;Intact 05/23/24 0800   Dressing Intervention Integrity " "maintained 05/23/24 0800   Number of days: 2            Nephrostomy 04/11/24 1048 Left 8 Fr. (Active)   Urine Characteristics cloudy;yellow 05/23/24 0800   Clamp Status unclamped 05/23/24 0800   Characteristics no redness;no tenderness 05/23/24 0800   Drainage clear drainage 05/22/24 2000   Dressing gauze dressing;dry;intact 05/23/24 0800   Site Care sterile 4 x 4 gauze dressing applied 05/22/24 2000   Collection Container Standard drainage bag 05/23/24 0800   Securement Method secured to top of thigh w/ adhesive device 05/22/24 2000   Output (mL) 200 mL 05/22/24 0616   Number of days: 42            Urethral Catheter 05/22/24 1217 16 Fr. (Active)   Site Assessment Clean;Dry;Intact 05/23/24 0800   Collection Container Urimeter 05/23/24 0800   Securement Method secured to top of thigh w/ adhesive device 05/23/24 0800   Catheter Care Performed yes 05/23/24 0800   Reason for Continuing Urinary Catheterization Chronic Indwelling Urinary Catheter on Admission 05/23/24 0800   CAUTI Prevention Bundle Securement Device in place with 1" slack;Intact seal between catheter & drainage tubing;Drainage bag/urimeter off the floor;Sheeting clip in use;No dependent loops or kinks;Drainage bag/urimeter not overfilled (<2/3 full);Drainage bag/urimeter below bladder 05/23/24 0800   Number of days: 0         Previous Airway:   Induction:  Intravenous    Intubated:  Postinduction    Mask Ventilation:  Easy mask    Attempts:  1    Attempted By:  Student    Method of Intubation:  Video laryngoscopy    Blade:  Ybarra 3    Laryngeal View Grade: Grade I - full view of cords      Difficult Airway Encountered?: No      Complications:  None    Airway Device:  Oral endotracheal tube    Airway Device Size:  7.5    Style/Cuff Inflation:  Cuffed (inflated to minimal occlusive pressure)    Tube secured:  23    Secured at:  The lips    Placement Verified By:  Capnometry    Complicating Factors:  Small mouth, short neck and poor neck/head extension    " Findings Post-Intubation:  BS equal bilateral and atraumatic/condition of teeth unchanged       Allergies:  Review of patient's allergies indicates:  No Known Allergies    Medications:     Current Outpatient Medications   Medication Instructions    atorvastatin (LIPITOR) 40 mg, Oral, Daily    bisacodyL (DULCOLAX (BISACODYL)) 10 mg, Rectal, Daily PRN    CHEST CONGESTION RELIEF DM  mg/5 mL liquid Oral    cyproheptadine (PERIACTIN) 4 mg, Oral, 3 times daily, Itching    docusate sodium (COLACE) 100 mg, Oral, Daily    folic acid (FOLVITE) 1 mg, Oral, Daily    gabapentin (NEURONTIN) 300 mg, Oral, 3 times daily    menthol-zinc oxide (CALMOSEPTINE) 0.44-20.6 % Oint Topical (Top), Daily PRN, To sacral area after each sacral excoriation episode and as needed    mirtazapine (REMERON) 30 mg, Oral, Nightly    polyethylene glycol (GLYCOLAX) 17 g, Oral, 2 times daily    tamsulosin (FLOMAX) 0.4 mg Cap TAKE 2 CAPSULES(0.8 MG) BY MOUTH EVERY DAY     Inpatient Medications:    Antibiotics (From admission, onward)      None          VTE Risk Mitigation (From admission, onward)      None            History:   There are no hospital problems to display for this patient.    Medical History:  Past Medical History:   Diagnosis Date    Arthritis     Diabetes mellitus     type 2    Folate deficiency anemia     Heart block     History of kidney stones 05/25/2021    History of stroke with residual deficit 05/25/2021    Hyperlipidemia     Hypertension     Major depressive disorder     Pacemaker     Biotronic Edora 8 DR-T (S/n: [id number])    Pyelonephritis 11/19/2021    TIA (transient ischemic attack)     Urinary retention 05/25/2021     Surgical History:    has a past surgical history that includes Elbow Arthroplasty (Right); Cystoscopy w/ ureteral stent placement (Bilateral, 5/25/2021); Removal of blood clot (5/25/2021); Cystoscopic litholapaxy (5/25/2021); Fluoroscopy (5/25/2021); A-V cardiac pacemaker insertion (N/A, 8/24/2021);  "Ureteroscopic removal of ureteric calculus (Bilateral, 8/26/2021); Cystoscopy (8/26/2021); Ureteroscopy (Bilateral, 8/26/2021); Pyeloscopy (Bilateral, 8/26/2021); Laser lithotripsy (Left, 8/26/2021); Replacement of stent (Bilateral, 8/26/2021); and Colonoscopy (N/A, 11/22/2021).   Social History:    has no history on file for sexual activity.  reports that he has quit smoking. His smoking use included cigarettes. He has never used smokeless tobacco. He reports current alcohol use. He reports current drug use. Drug: "Crack" cocaine.    OBJECTIVE:   Vital Signs Range (Last 24H):  Temp:  [36.7 °C (98.1 °F)-37.1 °C (98.7 °F)]   Pulse:  [65-79]   Resp:  [17-18]   BP: (124-134)/(64-68)   SpO2:  [97 %-98 %]   Lab Results   Component Value Date    WBC 6.35 05/23/2024    HGB 13.0 (L) 05/23/2024    HCT 40.8 05/23/2024     05/23/2024     05/23/2024    K 3.8 05/23/2024     05/23/2024    CREATININE 0.8 05/23/2024    BUN 30 (H) 05/23/2024    CO2 22 (L) 05/23/2024    GLU 76 05/23/2024    CALCIUM 9.2 05/23/2024    MG 1.8 10/14/2023    PHOS 3.0 10/14/2023    ALKPHOS 71 05/23/2024    ALT 9 (L) 05/23/2024    AST 15 05/23/2024    ALBUMIN 2.7 (L) 05/23/2024    INR 1.1 04/11/2024    APTT 25.4 11/19/2021    HGBA1C 4.6 07/31/2023    TROPONINI 0.024 03/20/2023    BNP 54 03/19/2023     Recent Labs     05/20/24  2002 05/22/24  0248 05/23/24  0310   WBC 7.11  7.04 5.42 6.35   HGB 15.3  15.2 14.2 13.0*   HCT 48.1  47.8 43.9 40.8     173 171 162    145 140   K 4.0 3.6 3.8   CREATININE 1.1 0.9 0.8   GLU 97 85 76     No results for input(s): "PH", "PCO2", "PO2", "HCO3", "POCSATURATED", "BE" in the last 72 hours.   No LMP for male patient.    Please see Results Review for additional labs & imaging.     EKG:   Results for orders placed or performed during the hospital encounter of 04/10/24   EKG 12-lead    Collection Time: 04/11/24 12:45 AM   Result Value Ref Range    QRS Duration 170 ms    [facility] QTC Calculation 515 " ms    Narrative    Test Reason : T83.022A,    Vent. Rate : 094 BPM     Atrial Rate : 094 BPM     P-R Int : 172 ms          QRS Dur : 170 ms      QT Int : 412 ms       P-R-T Axes : 061 -86 084 degrees     QTc Int : 515 ms    Atrial-sensed ventricular-paced rhythm  Confirmed by Alec Torres MD ([id number]) on 4/11/2024 2:04:36 PM    Referred By: [email or web address]   SELF           Confirmed By:Alec Torres MD       ECHO:  See subjective, if available.    ASSESSMENT/PLAN:           Pre-op Assessment    I have reviewed the Patient Summary Reports.     I have reviewed the Nursing Notes. I have reviewed the NPO Status.   I have reviewed the Medications.     Review of Systems  Anesthesia Hx:             Denies Family Hx of Anesthesia complications.    Denies Personal Hx of Anesthesia complications.                    Cardiovascular:     Hypertension    Dysrhythmias       hyperlipidemia                             Renal/:  Chronic Renal Disease renal calculi               Neurological:  TIA, CVA                                    Endocrine:  Diabetes           Psych:  Psychiatric History                  Physical Exam  General: Well nourished, Cooperative, Alert and Oriented    Airway:  Mallampati: IV / IV  Mouth Opening: Normal  TM Distance: 4 - 6 cm  Tongue: Normal  Neck ROM: Normal ROM    Dental:  Intact    Chest/Lungs:  Clear to auscultation, Normal Respiratory Rate    Heart:  Rate: Normal  Rhythm: Regular Rhythm  Sounds: Normal        Anesthesia Plan  Type of Anesthesia, risks & benefits discussed:    Anesthesia Type: Gen ETT  Intra-op Monitoring Plan: Standard ASA Monitors  Post Op Pain Control Plan: multimodal analgesia and IV/PO Opioids PRN  Induction:  IV  Airway Plan: Direct and Video  Informed Consent: Informed consent signed with the Patient and all parties understand the risks and agree with anesthesia plan.  All questions answered. Patient consented to blood products? Yes  ASA Score: 3  Day of Surgery Review of  History & Physical: H&P Update referred to the surgeon/provider.    Ready For Surgery From Anesthesia Perspective.     .

## 2024-05-23 NOTE — PROGRESS NOTES
[facility] ([place] [address])  Urology  Progress Note    Patient Name: Praneeth Jewell  MRN: [id number]  Admission Date: 5/20/2024  Hospital Length of Stay: 3 days  Code Status: Full Code   Attending Provider: Jonathan Brown MD   Primary Care Physician: [facility] includes the [facility]    Subjective:     HPI:  Praneeth Jewell is a 77 yo M lives in a nursing home with history of obstructive uropathy s/p J-J stent placement, recurrent nephrolithiasis and UTIs, history of MDR UTI, 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 Patient scheduled for ureteroscopy 5/25 with Dr. Kelley. Prior urine culture from 4/25 untreated growing ESBL Pseudomonas. Recommended to be a direct admit to  for IV antibiotics prior to procedure       He has a chronic indwelling mark catheter.  Last exchanged on 4/25/2024.  Last CTRSS on 4/10 when PCN became displaced shows 7 mm left mid ureteral stone and multiple non obstructing stones in the left kidney.  No stones on the right.   Large stool burden.  Mark catheter in placed with decompression of the bladder.        Of note he has had multiple ureteroscopies and has a history of urosepsis and proteus UTI.      Interval History: NAEON. AFVSS.  Urine culture with multiple organisms, has been treated with merrem.  To OR tomorrow for left URS.  NPO.  Surgical consent obtained    Objective:     Temp:  [97.7 °F (36.5 °C)-98.7 °F (37.1 °C)] 97.7 °F (36.5 °C)  Pulse:  [65-79] 65  Resp:  [17-19] 19  SpO2:  [96 %-98 %] 96 %  BP: (110-134)/(64-68) 110/64     Body mass index is 21.17 kg/m².           Drains       Drain  Duration                  Nephrostomy 04/11/24 1048 Left 8 Fr. 42 days         Urethral Catheter 05/22/24 1217 16 Fr. 1 day                     Physical Exam  Constitutional:       General: He is not in acute distress.     Appearance: He  is not ill-appearing.   HENT:      Head: Normocephalic.   Cardiovascular:      Rate and Rhythm: Normal rate.   Pulmonary:      Effort: Pulmonary effort is normal.   Abdominal:      General: Abdomen is flat.      Palpations: Abdomen is soft.   Genitourinary:     Comments: Shen catheter draining clear yellow  Musculoskeletal:         General: Normal range of motion.   Skin:     General: Skin is warm.   Neurological:      General: No focal deficit present.      Mental Status: He is alert.   Psychiatric:         Mood and Affect: Mood normal.           Significant Labs:    BMP:  Recent Labs   Lab 05/20/24 2002 05/22/24 0248 05/23/24  0310    145 140   K 4.0 3.6 3.8    112* 110   CO2 26 24 22*   BUN 32* 31* 30*   CREATININE 1.1 0.9 0.8   CALCIUM 10.0 9.3 9.2       CBC:   Recent Labs   Lab 05/20/24 2002 05/22/24 0248 05/23/24  0310   WBC 7.11  7.04 5.42 6.35   HGB 15.3  15.2 14.2 13.0*   HCT 48.1  47.8 43.9 40.8     173 171 162       Urine Culture:   Recent Labs   Lab 05/21/24  1113   LABURIN Multiple organisms isolated. None in predominance.  Repeat if  clinically necessary.     Urine Studies:   Recent Labs   Lab 05/21/24  1113 05/22/24  1652   COLORU Yellow Yellow   APPEARANCEUA Cloudy* Hazy*   PHUR 6.0 6.0   SPECGRAV 1.025 1.030   PROTEINUA 2+* 1+*   GLUCUA Negative Negative   KETONESU Trace* Trace*   BILIRUBINUA Negative Negative   OCCULTUA 2+* 1+*   NITRITE Positive* Negative   LEUKOCYTESUR 3+* 3+*   RBCUA >100* 39*   WBCUA >100* >100*   BACTERIA Many* Occasional   SQUAMEPITHEL  --  0   HYALINECASTS 0 0       Significant Imaging:  All pertinent imaging results/findings from the past 24 hours have been reviewed.    Assessment/Plan:     Nephrolithiasis  - Admitted to  for IV abx for L URS 5/24  - On appropriate meropenem  - Urine culture with multiple organisms  - maintain nephrostomy tube  - NPO at MN for left URS tomorrow  - Surgical consent obtained  - I have explained the indication,  risks, benefits, and alternatives of the procedure in detail.  Risks including but not limited to bleeding, infection, injury to the urethra, bladder, ureter, need for additional treatments, inability or incomplete removal of kidney stones, pain, and discomfort related to the stent were discussed in depth with the patient.  The patient voices understanding and all questions have been answered.  The patient agrees to proceed as planned with left ureteroscopy, laser lithotripsy, and possible ureteral stent placement.         VTE Risk Mitigation (From admission, onward)           Ordered     heparin (porcine) injection 5,000 Units  Every 8 hours         05/20/24 1728     IP VTE HIGH RISK PATIENT  Once         05/20/24 1728     Place sequential compression device  Until discontinued         05/20/24 1728                    Riky Hopkins MD  Urology  [facility] - [facility]down Flex ([place]

## 2024-05-23 NOTE — NURSING
Pt AAO, VSS, no c/o pain or distress.  Changed out pt chronic mark per Dr. Brown and sent urine for new UA and culture.  Pt vomited with lunch, states this happens when he eats too fast, denies nausea.  Pt planned to go for lithotripsy on Friday.    No acute events this shift, bed low and locked, call light and belongings in reach.

## 2024-05-23 NOTE — PLAN OF CARE
MEGAN [facility] and [facility] stated pt would need Current covid test and orders once medically ready.  [facility] also stated that [facility] the [facility] policy [facility], so they will set transport up for pt upon DC.     Luz Maria Chauhan MSW, CSW

## 2024-05-23 NOTE — PROGRESS NOTES
[facility] ([address] Medicine  Progress Note    Patient Name: Praneeth Jewell  MRN: [id number]  Patient Class: IP- Inpatient   Admission Date: 5/20/2024  Length of Stay: 3 days  Attending Physician: Jonathan Brown MD  Primary Care Provider: Home, [facility]        Subjective:     Principal Problem:Acute cystitis        HPI:  Patient is pleasant 78 yoM who presented from urology clinic for UTI and IV antibiotics. Patient reports that he is feeling well overall.  Does reports that he does not remember some of the details that brought him here. Per urology, patient is scheduled to go OR on 5/24/2024 for left ureteroscopy with laser lithotripsy. Reviewed previous urine culture which was sensitive to merrem only. Plan to consult ID as well.     Overview/Hospital Course:  Patient presented for treatment of UTI with IV abx prior to urologic intervention. Started on Merrem and ID consulted.     Interval History: NAEON. Remains on meropenem Urine culture 5/21 growing multiple organisms, none in predominance. Repeat urine culture 5/22 pending. Pt currently asymptomatic, states he feels well. Planning for URS tomorrow.       Objective:     Vital Signs (Most Recent):  Temp: 97.7 °F (36.5 °C) (05/23/24 1202)  Pulse: 65 (05/23/24 1202)  Resp: 19 (05/23/24 1202)  BP: 110/64 (05/23/24 1202)  SpO2: 96 % (05/23/24 1202) Vital Signs (24h Range):  Temp:  [97.7 °F (36.5 °C)-98.7 °F (37.1 °C)] 97.7 °F (36.5 °C)  Pulse:  [65-79] 65  Resp:  [17-19] 19  SpO2:  [96 %-98 %] 96 %  BP: (110-134)/(64-68) 110/64     Weight: 59.5 kg (131 lb 2.8 oz)  Body mass index is 21.17 kg/m².  No intake or output data in the 24 hours ending 05/23/24 1422        Physical Exam  Vitals reviewed.   Constitutional:       General: He is not in acute distress.     Appearance: He is well-developed. He is ill-appearing.   HENT:      Head: Normocephalic and atraumatic.      Right Ear: External ear normal.      Left Ear:  External ear normal.   Eyes:      General: No scleral icterus.     Extraocular Movements: Extraocular movements intact.   Neck:      Vascular: No JVD.      Trachea: No tracheal deviation.   Cardiovascular:      Rate and Rhythm: Normal rate and regular rhythm.      Pulses: Normal pulses.   Pulmonary:      Effort: Pulmonary effort is normal. No respiratory distress.   Abdominal:      General: Bowel sounds are normal. There is no distension.      Palpations: Abdomen is soft. There is no mass.      Tenderness: There is no abdominal tenderness.   Musculoskeletal:      Right lower leg: No edema.      Left lower leg: No edema.   Skin:     General: Skin is warm and dry.      Coloration: Skin is not jaundiced.   Neurological:      Mental Status: He is alert.             Significant Labs: All pertinent labs within the past 24 hours have been reviewed.    Significant Imaging: I have reviewed all pertinent imaging results/findings within the past 24 hours.  Review of Systems    Assessment/Plan:      * Acute cystitis  -- meropenem started  -- urine culture 5/21 growing multiple organisms, none in predominance  -- repeat urine culture 5/22 pending.   -- planning for URS tomorrow.         Chronic indwelling Shen catheter  Unknown last time of exchange and pt does not remember last time it was changed.   Shen exchanged 5/22 and repeat UA/Cx collected    Essential hypertension  Chronic, controlled. Latest blood pressure and vitals reviewed-     Temp:  [97.7 °F (36.5 °C)-98.7 °F (37.1 °C)]   Pulse:  [65-79]   Resp:  [17-19]   BP: (110-134)/(64-68)   SpO2:  [96 %-98 %] .   Home meds for hypertension were reviewed and noted below.       While in the hospital, will manage blood pressure as follows; Continue home antihypertensive regimen    Will utilize p.r.n. blood pressure medication only if patient's blood pressure greater than 180/110 and he develops symptoms such as worsening chest pain or shortness of breath.    BPH with urinary  "obstruction  Cont tamsulosin      Nephrolithiasis  Per urology note "To OR on 5/24/2024 for left ureteroscopy with laser lithotripsy with Dr. Kelley"      History of CVA with residual deficit          VTE Risk Mitigation (From admission, onward)           Ordered     heparin (porcine) injection 5,000 Units  Every 8 hours         05/20/24 1728     IP VTE HIGH RISK PATIENT  Once         05/20/24 1728     Place sequential compression device  Until discontinued         05/20/24 1728                    Discharge Planning   FISH: 5/25/2024     Code Status: Full Code   Is the patient medically ready for discharge?:     Reason for patient still in hospital (select all that apply): Treatment  Discharge Plan A: Other ([facility])   Discharge Delays: None known at this time              Jonathan Brown MD  Department of Hospital Medicine   [facility] ([place])    "

## 2024-05-24 ENCOUNTER — ANESTHESIA (OUTPATIENT)
Dept: SURGERY | Facility: HOSPITAL | Age: 79
DRG: 660 | End: 2024-05-24
Payer: MEDICARE

## 2024-05-24 ENCOUNTER — DOCUMENTATION ONLY (OUTPATIENT)
Dept: ELECTROPHYSIOLOGY | Facility: CLINIC | Age: 79
End: 2024-05-24
Payer: MEDICARE

## 2024-05-24 VITALS
TEMPERATURE: 97 F | RESPIRATION RATE: 17 BRPM | DIASTOLIC BLOOD PRESSURE: 55 MMHG | SYSTOLIC BLOOD PRESSURE: 103 MMHG | HEIGHT: 66 IN | HEART RATE: 67 BPM | OXYGEN SATURATION: 98 % | WEIGHT: 131.19 LBS | BODY MASS INDEX: 21.08 KG/M2

## 2024-05-24 LAB — SARS-COV-2 RNA RESP QL NAA+PROBE: NORMAL

## 2024-05-24 PROCEDURE — C1894 INTRO/SHEATH, NON-LASER: HCPCS | Performed by: UROLOGY

## 2024-05-24 PROCEDURE — 36000707: Performed by: UROLOGY

## 2024-05-24 PROCEDURE — 63600175 PHARM REV CODE 636 W HCPCS: Performed by: NURSE ANESTHETIST, CERTIFIED REGISTERED

## 2024-05-24 PROCEDURE — 27201423 OPTIME MED/SURG SUP & DEVICES STERILE SUPPLY: Performed by: UROLOGY

## 2024-05-24 PROCEDURE — 0T778DZ DILATION OF LEFT URETER WITH INTRALUMINAL DEVICE, VIA NATURAL OR ARTIFICIAL OPENING ENDOSCOPIC: ICD-10-PCS | Performed by: UROLOGY

## 2024-05-24 PROCEDURE — 0TP5X0Z REMOVAL OF DRAINAGE DEVICE FROM KIDNEY, EXTERNAL APPROACH: ICD-10-PCS | Performed by: UROLOGY

## 2024-05-24 PROCEDURE — 25000003 PHARM REV CODE 250: Performed by: UROLOGY

## 2024-05-24 PROCEDURE — 52356 CYSTO/URETERO W/LITHOTRIPSY: CPT | Mod: LT,,, | Performed by: UROLOGY

## 2024-05-24 PROCEDURE — C1758 CATHETER, URETERAL: HCPCS | Performed by: UROLOGY

## 2024-05-24 PROCEDURE — C2617 STENT, NON-COR, TEM W/O DEL: HCPCS | Performed by: UROLOGY

## 2024-05-24 PROCEDURE — D9220A PRA ANESTHESIA: Mod: CRNA,,, | Performed by: NURSE ANESTHETIST, CERTIFIED REGISTERED

## 2024-05-24 PROCEDURE — 71000044 HC DOSC ROUTINE RECOVERY FIRST HOUR: Performed by: UROLOGY

## 2024-05-24 PROCEDURE — 25000003 PHARM REV CODE 250: Performed by: NURSE ANESTHETIST, CERTIFIED REGISTERED

## 2024-05-24 PROCEDURE — 37000008 HC ANESTHESIA 1ST 15 MINUTES: Performed by: UROLOGY

## 2024-05-24 PROCEDURE — 0TF7XZZ FRAGMENTATION IN LEFT URETER, EXTERNAL APPROACH: ICD-10-PCS | Performed by: UROLOGY

## 2024-05-24 PROCEDURE — 71000015 HC POSTOP RECOV 1ST HR: Performed by: UROLOGY

## 2024-05-24 PROCEDURE — 63600175 PHARM REV CODE 636 W HCPCS: Performed by: INTERNAL MEDICINE

## 2024-05-24 PROCEDURE — C1769 GUIDE WIRE: HCPCS | Performed by: UROLOGY

## 2024-05-24 PROCEDURE — 25500020 PHARM REV CODE 255: Performed by: UROLOGY

## 2024-05-24 PROCEDURE — 25000003 PHARM REV CODE 250: Performed by: INTERNAL MEDICINE

## 2024-05-24 PROCEDURE — 37000009 HC ANESTHESIA EA ADD 15 MINS: Performed by: UROLOGY

## 2024-05-24 PROCEDURE — D9220A PRA ANESTHESIA: Mod: ANES,,, | Performed by: ANESTHESIOLOGY

## 2024-05-24 PROCEDURE — 87635 SARS-COV-2 COVID-19 AMP PRB: CPT | Performed by: INTERNAL MEDICINE

## 2024-05-24 PROCEDURE — 82365 CALCULUS SPECTROSCOPY: CPT | Performed by: UROLOGY

## 2024-05-24 PROCEDURE — 36000706: Performed by: UROLOGY

## 2024-05-24 PROCEDURE — 25000003 PHARM REV CODE 250: Performed by: STUDENT IN AN ORGANIZED HEALTH CARE EDUCATION/TRAINING PROGRAM

## 2024-05-24 PROCEDURE — 0T7D8ZZ DILATION OF URETHRA, VIA NATURAL OR ARTIFICIAL OPENING ENDOSCOPIC: ICD-10-PCS | Performed by: UROLOGY

## 2024-05-24 DEVICE — STENT URETERAL UNIV 6FR 24CM: Type: IMPLANTABLE DEVICE | Site: URETER | Status: FUNCTIONAL

## 2024-05-24 RX ORDER — PHENYLEPHRINE HYDROCHLORIDE 10 MG/ML
INJECTION INTRAVENOUS
Status: DISCONTINUED | OUTPATIENT
Start: 2024-05-24 | End: 2024-05-24

## 2024-05-24 RX ORDER — LIDOCAINE HYDROCHLORIDE 20 MG/ML
INJECTION INTRAVENOUS
Status: DISCONTINUED | OUTPATIENT
Start: 2024-05-24 | End: 2024-05-24

## 2024-05-24 RX ORDER — HYDROMORPHONE HYDROCHLORIDE 1 MG/ML
0.2 INJECTION, SOLUTION INTRAMUSCULAR; INTRAVENOUS; SUBCUTANEOUS EVERY 5 MIN PRN
Status: DISCONTINUED | OUTPATIENT
Start: 2024-05-24 | End: 2024-05-24 | Stop reason: HOSPADM

## 2024-05-24 RX ORDER — DEXAMETHASONE SODIUM PHOSPHATE 4 MG/ML
INJECTION, SOLUTION INTRA-ARTICULAR; INTRALESIONAL; INTRAMUSCULAR; INTRAVENOUS; SOFT TISSUE
Status: DISCONTINUED | OUTPATIENT
Start: 2024-05-24 | End: 2024-05-24

## 2024-05-24 RX ORDER — FENTANYL CITRATE 50 UG/ML
INJECTION, SOLUTION INTRAMUSCULAR; INTRAVENOUS
Status: DISCONTINUED | OUTPATIENT
Start: 2024-05-24 | End: 2024-05-24

## 2024-05-24 RX ORDER — ONDANSETRON HYDROCHLORIDE 2 MG/ML
INJECTION, SOLUTION INTRAVENOUS
Status: DISCONTINUED | OUTPATIENT
Start: 2024-05-24 | End: 2024-05-24

## 2024-05-24 RX ORDER — LIDOCAINE HYDROCHLORIDE 20 MG/ML
JELLY TOPICAL
Status: DISCONTINUED | OUTPATIENT
Start: 2024-05-24 | End: 2024-05-24 | Stop reason: HOSPADM

## 2024-05-24 RX ORDER — HALOPERIDOL 5 MG/ML
0.5 INJECTION INTRAMUSCULAR EVERY 10 MIN PRN
Status: DISCONTINUED | OUTPATIENT
Start: 2024-05-24 | End: 2024-05-24 | Stop reason: HOSPADM

## 2024-05-24 RX ORDER — ROCURONIUM BROMIDE 10 MG/ML
INJECTION, SOLUTION INTRAVENOUS
Status: DISCONTINUED | OUTPATIENT
Start: 2024-05-24 | End: 2024-05-24

## 2024-05-24 RX ORDER — PROPOFOL 10 MG/ML
VIAL (ML) INTRAVENOUS
Status: DISCONTINUED | OUTPATIENT
Start: 2024-05-24 | End: 2024-05-24

## 2024-05-24 RX ADMIN — SODIUM CHLORIDE: 0.9 INJECTION, SOLUTION INTRAVENOUS at 06:05

## 2024-05-24 RX ADMIN — PHENYLEPHRINE HYDROCHLORIDE 100 MCG: 10 INJECTION INTRAVENOUS at 07:05

## 2024-05-24 RX ADMIN — GABAPENTIN 300 MG: 300 CAPSULE ORAL at 09:05

## 2024-05-24 RX ADMIN — PROPOFOL 30 MG: 10 INJECTION, EMULSION INTRAVENOUS at 08:05

## 2024-05-24 RX ADMIN — ROCURONIUM BROMIDE 50 MG: 10 INJECTION, SOLUTION INTRAVENOUS at 07:05

## 2024-05-24 RX ADMIN — LIDOCAINE HYDROCHLORIDE 80 MG: 20 INJECTION INTRAVENOUS at 07:05

## 2024-05-24 RX ADMIN — DOCUSATE SODIUM 100 MG: 100 CAPSULE, LIQUID FILLED ORAL at 09:05

## 2024-05-24 RX ADMIN — PHENYLEPHRINE HYDROCHLORIDE 0.25 MCG/KG/MIN: 10 INJECTION INTRAVENOUS at 07:05

## 2024-05-24 RX ADMIN — TAMSULOSIN HYDROCHLORIDE 0.8 MG: 0.4 CAPSULE ORAL at 09:05

## 2024-05-24 RX ADMIN — PROPOFOL 150 MG: 10 INJECTION, EMULSION INTRAVENOUS at 07:05

## 2024-05-24 RX ADMIN — MEROPENEM 1 G: 1 INJECTION INTRAVENOUS at 06:05

## 2024-05-24 RX ADMIN — PHENYLEPHRINE HYDROCHLORIDE 50 MCG: 10 INJECTION INTRAVENOUS at 07:05

## 2024-05-24 RX ADMIN — DEXAMETHASONE SODIUM PHOSPHATE 4 MG: 4 INJECTION, SOLUTION INTRAMUSCULAR; INTRAVENOUS at 07:05

## 2024-05-24 RX ADMIN — FENTANYL CITRATE 50 MCG: 50 INJECTION, SOLUTION INTRAMUSCULAR; INTRAVENOUS at 07:05

## 2024-05-24 RX ADMIN — ATORVASTATIN CALCIUM 40 MG: 40 TABLET, FILM COATED ORAL at 09:05

## 2024-05-24 RX ADMIN — ONDANSETRON 4 MG: 2 INJECTION INTRAMUSCULAR; INTRAVENOUS at 08:05

## 2024-05-24 NOTE — NURSING
Patient IV removed with catheter intact; pt transferred to NH via personal wheelchair. No personal belongings with patient.

## 2024-05-24 NOTE — DISCHARGE SUMMARY
[facility] - Stepdown Flex ([place] [address])  [facility] Medicine  Discharge Summary      Patient Name: Praneeth Jewell  MRN: [id number]  TERRI: [id number]  Patient Class: IP- Inpatient  Admission Date: 5/20/2024  Hospital Length of Stay: 4 days  Discharge Date and Time:  05/24/2024 2:54 PM  Attending Physician: Jonathan Brown MD   Discharging Provider: Jonathan Brown MD  Primary Care Provider: [facility] Medicine Team: [facility] Jonathan Brown MD  Primary Care Team: [facility]    HPI:   Patient is pleasant 78 yoM who presented from urology clinic for UTI and IV antibiotics. Patient reports that he is feeling well overall.  Does reports that he does not remember some of the details that brought him here. Per urology, patient is scheduled to go OR on 5/24/2024 for left ureteroscopy with laser lithotripsy. Reviewed previous urine culture which was sensitive to merrem only. Plan to consult ID as well.     Procedure(s) (LRB):  CYSTOSCOPY (N/A)  URETEROSCOPY (Left)  LITHOTRIPSY, USING LASER (Left)  EXTRACTION - STONE (Left)  CYSTOGRAM (N/A)  PYELOSCOPY (Left)  REMOVAL, NEPHROSTOMY TUBE, WITH IMAGING GUIDANCE (Left)  DILATION, URETHRA (N/A)  PLACEMENT-STENT (Left)      Hospital Course:    78 y.o. male with a left ureteral stone presented for treatment of UTI with IV abx prior to urologic intervention. Started on Merrem and ID consulted. Urine culture showed multiple organisms isolated, none in predominance. s/p left ureteroscopy with laser lithotripsy. L nephrostomy tube removed and ureteral stent placed. Patient tolerated procedure well and has remained HDS. Shen catheter placed and will remain in place for discharge. Follow up with urology outpatient. Will need stent exchange in 3 months. Antibiotics have been discontinued.     Goals of Care Treatment Preferences:  Code Status: Full Code      Consults:   Consults (From admission, onward)          Status Ordering Provider      Inpatient consult to Infectious Diseases  Once        Provider:  (Not yet assigned)    Completed SIDRA IRIZARRY     Inpatient consult to Urology  Once        Provider:  (Not yet assigned)    Completed ISMAEL TALBERT          Physical Exam  Vitals reviewed.   Constitutional:       General: He is not in acute distress.     Appearance: He is well-developed. He is ill-appearing.   HENT:      Head: Normocephalic and atraumatic.      Right Ear: External ear normal.      Left Ear: External ear normal.   Eyes:      General: No scleral icterus.     Extraocular Movements: Extraocular movements intact.   Neck:      Vascular: No JVD.      Trachea: No tracheal deviation.   Cardiovascular:      Rate and Rhythm: Normal rate and regular rhythm.      Pulses: Normal pulses.   Pulmonary:      Effort: Pulmonary effort is normal. No respiratory distress.   Abdominal:      General: Bowel sounds are normal. There is no distension.      Palpations: Abdomen is soft. There is no mass.      Tenderness: There is no abdominal tenderness.   Musculoskeletal:      Right lower leg: No edema.      Left lower leg: No edema.   Skin:     General: Skin is warm and dry.      Coloration: Skin is not jaundiced.   Neurological:      Mental Status: He is alert.     No new Assessment & Plan notes have been filed under this hospital service since the last note was generated.  Service: Hospital Medicine    Final Active Diagnoses:    Diagnosis Date Noted POA    PRINCIPAL PROBLEM:  Acute cystitis [N30.00] 05/21/2024 Yes    Chronic indwelling Shen catheter [Z97.8] 05/21/2024 Not Applicable    Essential hypertension [I10] 08/24/2021 Yes     Chronic    Nephrolithiasis [N20.0] 06/02/2021 Yes     Chronic    BPH with urinary obstruction [N40.1, N13.8] 06/02/2021 Yes     Chronic    History of CVA with residual deficit [I69.30] 05/25/2021 Not Applicable     Chronic      Problems Resolved During this Admission:       Discharged Condition: good    Disposition: Home or  Self Care    Follow Up:    Patient Instructions:      Basic metabolic panel   Standing Status: Future Standing Exp. Date: 08/22/25     Ambulatory referral/consult to Urology   Standing Status: Future   Referral Priority: Routine Referral Type: Consultation   Referral Reason: Specialty Services Required   Requested Specialty: Urology   Number of Visits Requested: 1     Diet Cardiac     Notify your health care provider if you experience any of the following:  temperature >100.4     Notify your health care provider if you experience any of the following:  persistent nausea and vomiting or diarrhea     Notify your health care provider if you experience any of the following:  severe uncontrolled pain     Notify your health care provider if you experience any of the following:  persistent dizziness, light-headedness, or visual disturbances     Notify your health care provider if you experience any of the following:  increased confusion or weakness     Activity as tolerated       Significant Diagnostic Studies: Labs: All labs within the past 24 hours have been reviewed    Pending Diagnostic Studies:       Procedure Component Value Units Date/Time    CBC Without Differential [[id number]]     Order Status: Sent Lab Status: No result     Specimen: Blood     CBC Without Differential [[id number]]     Order Status: Sent Lab Status: No result     Specimen: Blood     COVID-19 Routine Screening [[id number]] Collected: 05/24/24 1226    Order Status: Sent Lab Status: In process Updated: 05/24/24 1448    Specimen: Nasopharyngeal     Comprehensive Metabolic Panel [[id number]]     Order Status: Sent Lab Status: No result     Specimen: Blood     Comprehensive Metabolic Panel [[id number]]     Order Status: Sent Lab Status: No result     Specimen: Blood            Medications:  Reconciled Home Medications:      Medication List        CHANGE how you take these medications      tamsulosin 0.4 mg Cap  Commonly known as: FLOMAX  TAKE 2  CAPSULES(0.8 MG) BY MOUTH EVERY DAY  What changed: when to take this            CONTINUE taking these medications      atorvastatin 40 MG tablet  Commonly known as: LIPITOR  Take 40 mg by mouth once daily.     bisacodyL 10 mg Supp  Commonly known as: DULCOLAX (BISACODYL)  Place 1 suppository (10 mg total) rectally daily as needed (constipation).     CALMOSEPTINE 0.44-20.6 % Oint  Generic drug: menthol-zinc oxide  Apply topically daily as needed. To sacral area after each sacral excoriation episode and as needed     CHEST CONGESTION RELIEF DM  mg/5 mL liquid  Generic drug: dextromethorphan-guaiFENesin  mg/5 ml  Take by mouth.     cyproheptadine 4 mg tablet  Commonly known as: PERIACTIN  Take 4 mg by mouth 3 (three) times daily. Itching     docusate sodium 100 MG capsule  Commonly known as: COLACE  Take 1 capsule (100 mg total) by mouth once daily.     folic acid 1 MG tablet  Commonly known as: FOLVITE  Take 1 mg by mouth once daily.     gabapentin 300 MG capsule  Commonly known as: NEURONTIN  Take 300 mg by mouth 3 (three) times daily.     mirtazapine 30 MG tablet  Commonly known as: REMERON  Take 30 mg by mouth nightly.     polyethylene glycol 17 gram/dose powder  Commonly known as: GLYCOLAX  Take 17 g by mouth 2 (two) times daily.              Indwelling Lines/Drains at time of discharge:   Lines/Drains/Airways       Drain  Duration                  Urethral Catheter 05/24/24 Non-latex;Silicone;Double-lumen 18 Fr. <1 day                    Time spent on the discharge of patient: 35 minutes         Jonathan Brown MD  Department of Hospital Medicine  [facility] - Stepdown Flex ([place]-)

## 2024-05-24 NOTE — NURSING
Pt AAO, VSS, no c/o pain or distress.  Pt scheduled for ureteroscopy tomorrow with lithotripsy.      No acute events this, bed low and locked, call light and belongings in reach.

## 2024-05-24 NOTE — SUBJECTIVE & OBJECTIVE
Interval History: NAEO. AFVSS. On Meropenem. OR today for a stent.    Review of Systems  Objective:     Temp:  [97.7 °F (36.5 °C)-98.6 °F (37 °C)] 98.2 °F (36.8 °C)  Pulse:  [63-71] 67  Resp:  [14-19] 15  SpO2:  [95 %-98 %] 95 %  BP: (107-117)/(55-64) 117/55     Body mass index is 21.17 kg/m².           Drains       Drain  Duration                  Nephrostomy 04/11/24 1048 Left 8 Fr. 42 days         Urethral Catheter 05/22/24 1217 16 Fr. 1 day                     Physical Exam  Constitutional:       General: He is not in acute distress.     Appearance: He is not ill-appearing.   HENT:      Head: Normocephalic.   Cardiovascular:      Rate and Rhythm: Normal rate.   Pulmonary:      Effort: Pulmonary effort is normal.   Abdominal:      General: Abdomen is flat.      Palpations: Abdomen is soft.   Genitourinary:     Comments: Shen catheter draining clear yellow  Musculoskeletal:         General: Normal range of motion.   Skin:     General: Skin is warm.   Neurological:      General: No focal deficit present.      Mental Status: He is alert.   Psychiatric:         Mood and Affect: Mood normal.           Significant Labs:    BMP:  Recent Labs   Lab 05/20/24 2002 05/22/24 0248 05/23/24 0310    145 140   K 4.0 3.6 3.8    112* 110   CO2 26 24 22*   BUN 32* 31* 30*   CREATININE 1.1 0.9 0.8   CALCIUM 10.0 9.3 9.2       CBC:   Recent Labs   Lab 05/20/24 2002 05/22/24 0248 05/23/24  0310   WBC 7.11  7.04 5.42 6.35   HGB 15.3  15.2 14.2 13.0*   HCT 48.1  47.8 43.9 40.8     173 171 162       All pertinent labs results from the past 24 hours have been reviewed.    Significant Imaging:  All pertinent imaging results/findings from the past 24 hours have been reviewed.

## 2024-05-24 NOTE — NURSING TRANSFER
Nursing Transfer Note      5/24/2024   9:22 AM    Nurse giving handoff: Elysia VARGHESE RN post op   Nurse receiving handoff: Inez ZAMUDIO    Reason patient is being transferred: RTR    Transfer From post op  bay 11 to Room [address]25    Transfer via Stretcher    Transported by Hospital Transport    Transfer Vital Signs:  Blood Pressure: 110/56  Heart Rate: 68  O2: 98 % RA  Temperature: 97.9  Respirations: 14    Telemetry: no    Additional Lines: IV    4eyes on Skin: no    Medicines sent: no    Patient belongings transferred with patient: yes    Chart send with patient: Yes    Notified: RN

## 2024-05-24 NOTE — ASSESSMENT & PLAN NOTE
- OR today for left URS  - On appropriate meropenem  - Urine culture with multiple organisms  - maintain nephrostomy tube  - Surgical consent obtained  - I have explained the indication, risks, benefits, and alternatives of the procedure in detail.  Risks including but not limited to bleeding, infection, injury to the urethra, bladder, ureter, need for additional treatments, inability or incomplete removal of kidney stones, pain, and discomfort related to the stent were discussed in depth with the patient.  The patient voices understanding and all questions have been answered.  The patient agrees to proceed as planned with left ureteroscopy, laser lithotripsy, and possible ureteral stent placement.

## 2024-05-24 NOTE — PLAN OF CARE
[facility]    FACILITY TRANSFER ORDERS      Patient Name: Praneeth Jewell  YOB: 1945    PCP: Home, [facility]   PCP Address: [address] [place] [address] / [place]  PCP Phone Number: [phone]  PCP Fax: [phone]    Encounter Date: 05/24/2024    [facility]    Vital Signs:  Routine    Diagnoses:   Active Hospital Problems    Diagnosis  POA    *Acute cystitis [N30.00]  Yes    Chronic indwelling Shen catheter [Z97.8]  Not Applicable    Essential hypertension [I10]  Yes     Chronic    Nephrolithiasis [N20.0]  Yes     Chronic    BPH with urinary obstruction [N40.1, N13.8]  Yes     Chronic    History of CVA with residual deficit [I69.30]  Not Applicable     Chronic      Resolved Hospital Problems   No resolved problems to display.       Allergies:Review of patient's allergies indicates:  No Known Allergies    Diet: cardiac diet    Activities: Activity as tolerated    Goals of Care Treatment Preferences:  Code Status: Full Code      Nursing: n/a     Labs: CBC and BMP in 1 week.    CONSULTS:    Physical Therapy to evaluate and treat.  and Occupational Therapy to evaluate and treat.    MISCELLANEOUS CARE:  Routine Skin for Bedridden Patients: Apply moisture barrier cream to all skin folds and wet areas in perineal area daily and after baths and all bowel movements.    Shen Care: Empty Shen bag every shift. Change Shen every month.    WOUND CARE ORDERS  Yes: Pressure Ulcer(s) Stage I :   Location: Sacrum  Apply Miconzazole:  Barrier ointment                       Frequency:  Daily                             If incontinent of stool or urine, apply thin layer Barrier cream                   twice daily and PRN to wound         Pressure relief measure:  for pressure redistribution and A/C Boots           and Surgical Wound:  Location: Incision L lower back    Consult ET nurse        Apply the following to wound:   Collagenase (Santyl) daily, cover with telfa,  wrap with with kerlix dressing    Medications: Review discharge medications with patient and family and provide education.      Current Discharge Medication List        CONTINUE these medications which have NOT CHANGED    Details   atorvastatin (LIPITOR) 40 MG tablet Take 40 mg by mouth once daily.      bisacodyL (DULCOLAX, BISACODYL,) 10 mg Supp Place 1 suppository (10 mg total) rectally daily as needed (constipation).  Refills: 0      CHEST CONGESTION RELIEF DM  mg/5 mL liquid Take by mouth.      cyproheptadine (PERIACTIN) 4 mg tablet Take 4 mg by mouth 3 (three) times daily. Itching      docusate sodium (COLACE) 100 MG capsule Take 1 capsule (100 mg total) by mouth once daily.  Refills: 0      folic acid (FOLVITE) 1 MG tablet Take 1 mg by mouth once daily.      gabapentin (NEURONTIN) 300 MG capsule Take 300 mg by mouth 3 (three) times daily.      menthol-zinc oxide (CALMOSEPTINE) 0.44-20.6 % Oint Apply topically daily as needed. To sacral area after each sacral excoriation episode and as needed      mirtazapine (REMERON) 30 MG tablet Take 30 mg by mouth nightly.      polyethylene glycol (GLYCOLAX) 17 gram/dose powder Take 17 g by mouth 2 (two) times daily.      tamsulosin (FLOMAX) 0.4 mg Cap TAKE 2 CAPSULES(0.8 MG) BY MOUTH EVERY DAY  Qty: 60 capsule, Refills: 11                Immunizations Administered as of 5/24/2024       No immunizations on file.            Some patients may experience side effects after vaccination.  These may include fever, headache, muscle or joint aches.  Most symptoms resolve with 24-48 hours and do not require urgent medical evaluation unless they persist for more than 72 hours or symptoms are concerning for an unrelated medical condition.          _________________________________  Jonathan Brown MD  05/24/2024

## 2024-05-24 NOTE — TRANSFER OF CARE
"Anesthesia Transfer of Care Note    Patient: Praneeth McTopy    Procedure(s) Performed: Procedure(s) (LRB):  CYSTOSCOPY (N/A)  URETEROSCOPY (Left)  LITHOTRIPSY, USING LASER (Left)  EXTRACTION - STONE (Left)  CYSTOGRAM (N/A)  PYELOSCOPY (Left)  REMOVAL, NEPHROSTOMY TUBE, WITH IMAGING GUIDANCE (Left)  DILATION, URETHRA (N/A)  PLACEMENT-STENT (Left)    Patient location: PACU    Anesthesia Type: general    Transport from OR: Transported from OR on 6-10 L/min O2 by face mask with adequate spontaneous ventilation    Post pain: adequate analgesia    Post assessment: no apparent anesthetic complications and tolerated procedure well    Post vital signs: stable    Level of consciousness: sedated    Nausea/Vomiting: no nausea/vomiting    Complications: none    Transfer of care protocol was followed    Last vitals: Visit Vitals  BP (!) 106/56 (BP Location: Left arm, Patient Position: Lying)   Pulse 63   Temp 36.8 °C (98.2 °F) (Temporal)   Resp 18   Ht 5' 6" (1.676 m)   Wt 59.5 kg (131 lb 2.8 oz)   SpO2 98%   BMI 21.17 kg/m²     "

## 2024-05-24 NOTE — PROGRESS NOTES
Patient has been identified as having an implanted cardiac rhythm device (CRD); the implanted device is a  Biotronik pacemaker. Async is ON in Settings for Magnet application     Scheduled Lithotripsy     Pacer dependency has been noted.               PACEMAKERS/DEPENDENT ABOVE WAIST     The reported surgical procedure is above the umbilicus.  Per protocol, apply a magnet directly over the implanted device during entire surgical procedure if electrocautery will be used.      For additional questions, please contact the Arrhythmia Department at Ext [phone].

## 2024-05-24 NOTE — PROGRESS NOTES
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 Patient scheduled for ureteroscopy 5/25 with Dr. Kelley. Prior urine culture from 4/25 untreated growing ESBL Pseudomonas. Recommended to be a direct admit to  for IV antibiotics prior to procedure       He has a chronic indwelling mark catheter.  Last exchanged on 4/25/2024.  Last CTRSS on 4/10 when PCN became displaced shows 7 mm left mid ureteral stone and multiple non obstructing stones in the left kidney.  No stones on the right.   Large stool burden.  Mark catheter in placed with decompression of the bladder.        Of note he has had multiple ureteroscopies and has a history of urosepsis and proteus UTI.       Today he denies fevers, chills.      Interval History: NAEO. AFVSS. On Meropenem. OR today for a stent.    Review of Systems  Objective:     Temp:  [97.7 °F (36.5 °C)-98.6 °F (37 °C)] 98.2 °F (36.8 °C)  Pulse:  [63-71] 67  Resp:  [14-19] 15  SpO2:  [95 %-98 %] 95 %  BP: (107-117)/(55-64) 117/55     Body mass index is 21.17 kg/m².           Drains       Drain  Duration                  Nephrostomy 04/11/24 1048 Left 8 Fr. 42 days         Urethral Catheter 05/22/24 1217 16 Fr. 1 day                     Physical Exam  Constitutional:       General: He is not in acute distress.     Appearance: He is not ill-appearing.   HENT:      Head: Normocephalic.    Cardiovascular:      Rate and Rhythm: Normal rate.   Pulmonary:      Effort: Pulmonary effort is normal.   Abdominal:      General: Abdomen is flat.      Palpations: Abdomen is soft.   Genitourinary:     Comments: Shen catheter draining clear yellow  Musculoskeletal:         General: Normal range of motion.   Skin:     General: Skin is warm.   Neurological:      General: No focal deficit present.      Mental Status: He is alert.   Psychiatric:         Mood and Affect: Mood normal.           Significant Labs:    BMP:  Recent Labs   Lab 05/20/24 2002 05/22/24 0248 05/23/24 0310    145 140   K 4.0 3.6 3.8    112* 110   CO2 26 24 22*   BUN 32* 31* 30*   CREATININE 1.1 0.9 0.8   CALCIUM 10.0 9.3 9.2       CBC:   Recent Labs   Lab 05/20/24 2002 05/22/24 0248 05/23/24 0310   WBC 7.11  7.04 5.42 6.35   HGB 15.3  15.2 14.2 13.0*   HCT 48.1  47.8 43.9 40.8     173 171 162       All pertinent labs results from the past 24 hours have been reviewed.    Significant Imaging:  All pertinent imaging results/findings from the past 24 hours have been reviewed.                  Assessment/Plan:     Nephrolithiasis  - OR today for left URS  - On appropriate meropenem  - Urine culture with multiple organisms  - maintain nephrostomy tube  - Surgical consent obtained  - I have explained the indication, risks, benefits, and alternatives of the procedure in detail.  Risks including but not limited to bleeding, infection, injury to the urethra, bladder, ureter, need for additional treatments, inability or incomplete removal of kidney stones, pain, and discomfort related to the stent were discussed in depth with the patient.  The patient voices understanding and all questions have been answered.  The patient agrees to proceed as planned with left ureteroscopy, laser lithotripsy, and possible ureteral stent placement.         VTE Risk Mitigation (From admission, onward)           Ordered     heparin (porcine)  injection 5,000 Units  Every 8 hours         05/20/24 1728     IP VTE HIGH RISK PATIENT  Once         05/20/24 1728     Place sequential compression device  Until discontinued         05/20/24 1728                    Wily Poon MD  Urology  [facility] - Surgery (1st Fl)

## 2024-05-24 NOTE — ANESTHESIA PROCEDURE NOTES
Intubation    Date/Time: 5/24/2024 7:11 AM    Performed by: Pio Elmore CRNA  Authorized by: Davie Taylor MD    Intubation:     Induction:  Intravenous    Intubated:  Postinduction    Mask Ventilation:  Moderately difficult with oral airway    Attempts:  1    Attempted By:  CRNA    Method of Intubation:  Video laryngoscopy    Laryngeal View Grade: Grade IIA - cords partially seen      Difficult Airway Encountered?: No      Complications:  None    Airway Device:  Oral endotracheal tube    Airway Device Size:  7.5    Style/Cuff Inflation:  Cuffed (inflated to minimal occlusive pressure)    Inflation Amount (mL):  8    Tube secured:  22    Secured at:  The lips    Placement Verified By:  Capnometry    Complicating Factors:  Anterior larynx, small mouth and poor neck/head extension    Findings Post-Intubation:  BS equal bilateral and atraumatic/condition of teeth unchanged

## 2024-05-24 NOTE — OP NOTE
[facility]  Operative Note    Date: 05/24/2024    Pre-Op Diagnosis: left ureteral stone    Patient Active Problem List    Diagnosis Date Noted    Acute cystitis 05/21/2024    Chronic indwelling Mark catheter 05/21/2024    Constipation 05/14/2024    Hydronephrosis 05/14/2024    Asymptomatic bacteriuria 10/15/2023    Nephrostomy complication 09/02/2023    Nephrostomy tube displaced 07/31/2023    History of gastrointestinal bleeding 07/31/2023    History of fecal impaction 07/31/2023    History of complete heart block 04/14/2023    Obstructive uropathy 04/14/2023    Macrocytic anemia 03/20/2023    Idiopathic proctocolitis with rectal bleeding 11/20/2021    Second degree AV block 08/24/2021    Essential hypertension 08/24/2021    HLD (hyperlipidemia) 08/24/2021    MDD (major depressive disorder) 08/24/2021    Nephrolithiasis 06/02/2021    BPH with urinary obstruction 06/02/2021    History of CVA with residual deficit 05/25/2021       Post-Op Diagnosis: same    Procedure(s) Performed:   1. Left ureteroscopy with laser lithotripsy  2. Cystoscopy  3. Urethral dilation  4. Cystogram  5.Removal of L nephrostomy tube  6. Left ureteral stent placement  7. Mark catheter placed by MD  5. Fluoro < 1 hr    Specimen(s): left ureteral stone    Staff Surgeon: Camilo Kelley MD    Assistant Surgeon: Wily Poon MD; Francesco Burciaga MD    Anesthesia: General endotracheal anesthesia    Indications: Praneeth Jewell is a 78 y.o. male with a left ureteral stone presenting for definitive stone management. He currently has a left nephroureteral stent in place.    Findings:  -L nephroureteral stent removed  -Proximal urethral strictures dilated passively with a rigid wire and scope  -Distal L ureteral stone lasered and removed. Multiple stones in the L kidney lasered and removed  -L ureteral stent placed    Estimated Blood Loss: min    Drains:   1. Left 6 Fr x 24 cm JJ ureteral stent without strings  2. 18 Fr mark  catheter    Procedure in detail:  After risks, benefits, and possible complications were explained, the patient elected to undergo the procedure and informed consent was obtained. All questions were answered in the sangeeta-operative area. The patient was transferred to the cystoscopy suite and placed in the supine position. SCDs were applied and working. Anesthesia was administered. The patient was then placed in the dorsal lithotomy position and prepped and draped in the usual sterile fashion. Time out was performed, and sangeeta-procedural antibiotics were confirmed.     The stone was not on  film. A rigid cystoscope in a 22 Fr sheath was introduced into the patient's urethra. This passed easily. The entire urethra was visualized which showed a proximal  stricture. A motion wire was placed into the bladder. A 5 fr ureteral catheter placed over wire and a cystogram was performed confirming bladder location. Placed a stiff wire through the catheter. The cystoscope was able to gently dilate the stricture and enter the bladder. No tumors were seen.  Cystoscopy revealed the ureteral orifices in the normal anatomic location bilaterally. The left ureter with a left nephroureteral stent in place.     The nephroureteral stent was cut and a motion wire was placed through the nephroureteral stent down to the bladder. The stent was removed.    A motion wire was passed up the left ureteral orifice and up into the kidney. This passed easily and placement was confirmed fluoroscopically. The cystoscope was removed keeping the wire in place.    An 8 Fr rigid ureteroscope was passed into the patient's bladder alongside the wire under direct vision. It was then passed through the left ureteral orifice alongside the wire. A stone was encountered at the level of the distal ureter.  A 272 micron  laser fiber was passed through the ureteroscope. The stone was fragmented/dusted using the laser. The laser fiber was removed and an NCircle  basket was introduced through the ureteroscope. Stone fragments were removed and placed in the bladder. The ureteroscope was reinserted and the entire length of the ureter was surveyed and no additional stone fragments were visualized    A super stiff wire was inserted through the ureteroscope and into the kidney with fluoroscopic confirmation. The ureteroscope was removed keeping both wires in place.  A 12/14 Fr 45 cm ureteral access sheath was advanced over the stiff wire to the level of the renal pelvis under continuous fluoroscopy. This passed easily. The inner dilator and stiff wire were removed keeping the outer sheath in place. An 8 Fr flexible ureteroscope was advanced through the access sheath and into the kidney.      Stones were encountered in upper and mid pole. The laser fiber was inserted per the scope and the stones were fragmented/dusted. An NCircle basket was inserted per the scope and fragments were removed and passed off the field for analysis. Systematic pyeloscopy was performed and no additional stone fragments were visualized The scope and ureteral access sheath were removed keeping the motion wire in place.    The cystoscope was reinserted and the bladder was irrigated to remove the stone fragments. The bladder was drained and the cystoscope removed keeping the wire in place.    The cystoscope was backloaded over the wire and advanced into the bladder. We then passed a 6 Fr x 24 cm JJ ureteral stent without strings over the wire to the level of the renal pelvis under direct vision as well as flouroscopy. The left prior nephroureteral wire was removed keeping the stent in place under visualization. The motion wire was then removed. A 180 degree coil was observed in the renal pelvis as well as the bladder using fluoroscopy. A 180 degree coil was also seen using direct visualization in the bladder. Scope was removed.    A 18Fr mark catheter was then inserted into the bladder    The patient  tolerated the procedure well and was transferred to the recovery room in stable condition.    Disposition:  The patient will be discharged home from PACU. He follow up with Dr. Kelley  in 3 months for a stent exchange. He will continue monthly catheter exchanges with home health. Patient does have some ventral erosion and on next stent exchange please assess the erosion in preop. If significant, discuss SPT placement.  Wily Poon MD

## 2024-05-24 NOTE — BRIEF OP NOTE
[facility] - Surgery ([facility])  Brief Operative Note    Surgery Date: 5/24/2024     Surgeons and Role:     * Camilo Kelley Jr., MD - Primary     * Wily Poon MD - Resident - Assisting        Pre-op Diagnosis:  Nephrolithiasis [N20.0]    Post-op Diagnosis:  Post-Op Diagnosis Codes:     * Nephrolithiasis [N20.0]    Procedure(s) (LRB):  CYSTOSCOPY (N/A)  URETEROSCOPY (Left)  LITHOTRIPSY, USING LASER (Left)  EXTRACTION - STONE (Left)  CYSTOGRAM (N/A)  PYELOSCOPY (Left)  REMOVAL, NEPHROSTOMY TUBE, WITH IMAGING GUIDANCE (Left)  DILATION, URETHRA (N/A)  PLACEMENT-STENT (Left)    Anesthesia: General    Operative Findings:   -L nephroureteral stent removed  -Proximal urethral strictures dilated passively with a rigid wire and scope  -Distal L ureteral stone lasered and removed. Multiple stones in the L kidney lasered and removed  -L ureteral stent placed    Estimated Blood Loss: * No values recorded between 5/24/2024  7:23 AM and 5/24/2024  8:43 AM *         Specimens:   Specimen (24h ago, onward)      None              Discharge Note    OUTCOME: Patient tolerated treatment/procedure well without complication and is now ready for discharge.    DISPOSITION: Home or Self Care    FINAL DIAGNOSIS:  Acute cystitis    FOLLOWUP: In clinic    DISCHARGE INSTRUCTIONS:  No discharge procedures on file.

## 2024-05-24 NOTE — PLAN OF CARE
Patient in bed, watching television. With no complaints or concerns. VSS on RA. Safety precautions maintained and call light in reach.       Problem: Adult Inpatient Plan of Care  Goal: Plan of Care Review  Outcome: Progressing     Problem: Adult Inpatient Plan of Care  Goal: Patient-Specific Goal (Individualized)  Outcome: Progressing     Problem: Adult Inpatient Plan of Care  Goal: Absence of Hospital-Acquired Illness or Injury  Outcome: Progressing     Problem: Adult Inpatient Plan of Care  Goal: Optimal Comfort and Wellbeing  Outcome: Progressing     Problem: Adult Inpatient Plan of Care  Goal: Readiness for Transition of Care  Outcome: Progressing

## 2024-05-27 NOTE — PHYSICIAN QUERY
Question: Due to conflicting documentation . Please clarify  the integumentary diagnosis related to the documentation of Buttocks / Sacrum .   Provider Query Response:    Moisture associated dermatitis due to Fecal, Urinary, or Dual Incontinence

## 2024-05-27 NOTE — PHYSICIAN QUERY
Please clarify if there is any clinical correlation between UTI and urinary device.    Due to or associated with each other

## 2024-05-28 ENCOUNTER — TELEPHONE (OUTPATIENT)
Dept: UROLOGY | Facility: CLINIC | Age: 79
End: 2024-05-28
Payer: MEDICARE

## 2024-05-28 DIAGNOSIS — N20.0 NEPHROLITHIASIS: Primary | ICD-10-CM

## 2024-05-29 ENCOUNTER — OUTSIDE PLACE OF SERVICE (OUTPATIENT)
Dept: ADMINISTRATIVE | Facility: OTHER | Age: 79
End: 2024-05-29
Payer: MEDICARE

## 2024-05-30 LAB
COMPN STONE: NORMAL
LABORATORY COMMENT REPORT: NORMAL
SPECIMEN SOURCE: NORMAL
STONE ANALYSIS IR-IMP: NORMAL

## 2024-06-13 DIAGNOSIS — N19 RENAL FAILURE, UNSPECIFIED CHRONICITY: ICD-10-CM

## 2024-06-13 DIAGNOSIS — N20.0 NEPHROLITHIASIS: Primary | ICD-10-CM

## 2024-06-13 RX ORDER — SODIUM CHLORIDE 9 MG/ML
INJECTION, SOLUTION INTRAVENOUS CONTINUOUS
OUTPATIENT
Start: 2024-06-13

## 2024-06-19 ENCOUNTER — TELEPHONE (OUTPATIENT)
Dept: INTERVENTIONAL RADIOLOGY/VASCULAR | Facility: HOSPITAL | Age: 79
End: 2024-06-19
Payer: MEDICARE

## 2024-06-20 ENCOUNTER — HOSPITAL ENCOUNTER (OUTPATIENT)
Dept: INTERVENTIONAL RADIOLOGY/VASCULAR | Facility: HOSPITAL | Age: 79
Discharge: HOME | End: 2024-06-20
Attending: PHYSICIAN ASSISTANT
Payer: MEDICARE

## 2024-06-20 DIAGNOSIS — N20.0 NEPHROLITHIASIS: ICD-10-CM

## 2024-06-20 RX ORDER — SODIUM CHLORIDE 9 MG/ML
INJECTION, SOLUTION INTRAVENOUS CONTINUOUS
Status: DISCONTINUED | OUTPATIENT
Start: 2024-06-20 | End: 2024-06-21 | Stop reason: HOSPADM

## 2024-07-25 ENCOUNTER — OUTSIDE PLACE OF SERVICE (OUTPATIENT)
Dept: ADMINISTRATIVE | Facility: OTHER | Age: 79
End: 2024-07-25
Payer: MEDICARE

## 2024-08-20 ENCOUNTER — TELEPHONE (OUTPATIENT)
Dept: UROLOGY | Facility: CLINIC | Age: 79
End: 2024-08-20
Payer: MEDICARE

## 2024-08-20 NOTE — TELEPHONE ENCOUNTER
I called the facility that the patient is currently living in to go over a new surgery date. As the previous date didn't work. 09-. I Spoke with the RN for Transportation. Mimi james marked down that his surgery is set for 08-. I let her know I will call her with the arrival time the day before surgery.

## 2024-08-21 ENCOUNTER — OUTSIDE PLACE OF SERVICE (OUTPATIENT)
Dept: ADMINISTRATIVE | Facility: OTHER | Age: 79
End: 2024-08-21
Payer: MEDICARE

## 2024-08-28 ENCOUNTER — DOCUMENTATION ONLY (OUTPATIENT)
Dept: CARDIOLOGY | Facility: HOSPITAL | Age: 79
End: 2024-08-28
Payer: MEDICARE

## 2024-08-28 NOTE — PRE-PROCEDURE INSTRUCTIONS
PreOp Instructions given: [facility] -   - Verbal medication information (what to hold and what to take)   - NPO guidelines 2400  - Arrival place directions given; time to be given the day before procedure by the   Surgeon's Office [facility]  - Bathing with antibacterial soap   - Don't wear any jewelry or bring any valuables AM of surgery   - No makeup or moisturizer to face   - No perfume/cologne, powder, lotions or aftershave   [facility]  verbalized understanding.   [facility] denies any pt h/o Anesthesia/Sedation complications or side effects.  [facility] does not know arrival time.  Explained that this information comes from the surgeon's office and if they haven't heard from them by 2 or 3 pm to call the office.  [facility] stated an understanding.

## 2024-08-28 NOTE — PROGRESS NOTES
Patient has been identified as having an implanted cardiac rhythm device (CRD); the implanted device is a Biotronik pacemaker.     Planned procedure:  CYSTOSCOPY, WITH URETERAL STENT REMOVAL, INSERTION, SUPRAPUBIC CATHETER      Pacer dependency has been noted.       PACEMAKERS/DEPENDENT BELOW WAIST    The reported surgical procedure is BELOW the umbilicus; per protocol, magnet application is not needed and device reprogramming is not required.    For additional questions, please contact the Arrhythmia Department at Ext [phone].

## 2024-08-28 NOTE — PRE-PROCEDURE INSTRUCTIONS
CB from Erik Portillo in Device clinic - note will be placed in chart.   No Pre-Op reprogramming is needed.

## 2024-08-29 ENCOUNTER — TELEPHONE (OUTPATIENT)
Dept: UROLOGY | Facility: CLINIC | Age: 79
End: 2024-08-29
Payer: MEDICARE

## 2024-08-29 NOTE — TELEPHONE ENCOUNTER
I called the facility that the patient is in ([facility]).  I left a message for glynn Morris with arrival time and patient instructions.    You are scheduled for surgery with  on 08-. Your arrival time is 8:00 am. You are to report to the [facility] located in the back of the [facility] on the [place] side of the building right behind [facility]. You will need someone to drive you home following your surgery. No ASPRIN Related Products 7 days prior to surgery. No alcohol 24 hours before and after and no smoking a few days prior. NOTHING TO EAT OR DRINK AFTER MIDNIGHT THE NIGHT PRIOR TO THE SURGERY INCLUDING GUM, CANDY AND MINTS. Take a shower the night before and the morning of with Hibiclens Antibacterial soap and no lotion, cologne, deodorant or powder in the morning.

## 2024-08-30 ENCOUNTER — ANESTHESIA (OUTPATIENT)
Dept: SURGERY | Facility: HOSPITAL | Age: 79
End: 2024-08-30
Payer: MEDICARE

## 2024-08-30 ENCOUNTER — HOSPITAL ENCOUNTER (OUTPATIENT)
Facility: HOSPITAL | Age: 79
Discharge: HOME | End: 2024-08-30
Attending: UROLOGY | Admitting: UROLOGY
Payer: MEDICARE

## 2024-08-30 ENCOUNTER — ANESTHESIA EVENT (OUTPATIENT)
Dept: SURGERY | Facility: HOSPITAL | Age: 79
End: 2024-08-30
Payer: MEDICARE

## 2024-08-30 VITALS
RESPIRATION RATE: 14 BRPM | WEIGHT: 131 LBS | HEIGHT: 66 IN | HEART RATE: 60 BPM | BODY MASS INDEX: 21.05 KG/M2 | DIASTOLIC BLOOD PRESSURE: 58 MMHG | OXYGEN SATURATION: 100 % | SYSTOLIC BLOOD PRESSURE: 106 MMHG | TEMPERATURE: 98 F

## 2024-08-30 DIAGNOSIS — N20.0 NEPHROLITHIASIS: ICD-10-CM

## 2024-08-30 LAB — POCT GLUCOSE: 82 MG/DL (ref 70–110)

## 2024-08-30 PROCEDURE — 63600175 PHARM REV CODE 636 W HCPCS

## 2024-08-30 PROCEDURE — 25000003 PHARM REV CODE 250

## 2024-08-30 PROCEDURE — C1758 CATHETER, URETERAL: HCPCS | Performed by: UROLOGY

## 2024-08-30 PROCEDURE — 36000707: Performed by: UROLOGY

## 2024-08-30 PROCEDURE — 71000015 HC POSTOP RECOV 1ST HR: Performed by: UROLOGY

## 2024-08-30 PROCEDURE — 82962 GLUCOSE BLOOD TEST: CPT | Performed by: UROLOGY

## 2024-08-30 PROCEDURE — 25000003 PHARM REV CODE 250: Performed by: UROLOGY

## 2024-08-30 PROCEDURE — 63600175 PHARM REV CODE 636 W HCPCS: Performed by: NURSE ANESTHETIST, CERTIFIED REGISTERED

## 2024-08-30 PROCEDURE — 37000008 HC ANESTHESIA 1ST 15 MINUTES: Performed by: UROLOGY

## 2024-08-30 PROCEDURE — 25000003 PHARM REV CODE 250: Performed by: NURSE ANESTHETIST, CERTIFIED REGISTERED

## 2024-08-30 PROCEDURE — 25500020 PHARM REV CODE 255: Performed by: UROLOGY

## 2024-08-30 PROCEDURE — 36000706: Performed by: UROLOGY

## 2024-08-30 PROCEDURE — 71000044 HC DOSC ROUTINE RECOVERY FIRST HOUR: Performed by: UROLOGY

## 2024-08-30 PROCEDURE — 37000009 HC ANESTHESIA EA ADD 15 MINS: Performed by: UROLOGY

## 2024-08-30 RX ORDER — PROPOFOL 10 MG/ML
VIAL (ML) INTRAVENOUS
Status: DISCONTINUED | OUTPATIENT
Start: 2024-08-30 | End: 2024-08-30

## 2024-08-30 RX ORDER — HYDROMORPHONE HYDROCHLORIDE 1 MG/ML
0.2 INJECTION, SOLUTION INTRAMUSCULAR; INTRAVENOUS; SUBCUTANEOUS EVERY 10 MIN PRN
Status: DISCONTINUED | OUTPATIENT
Start: 2024-08-30 | End: 2024-08-30 | Stop reason: HOSPADM

## 2024-08-30 RX ORDER — ONDANSETRON HYDROCHLORIDE 2 MG/ML
INJECTION, SOLUTION INTRAVENOUS
Status: DISCONTINUED | OUTPATIENT
Start: 2024-08-30 | End: 2024-08-30

## 2024-08-30 RX ORDER — LIDOCAINE HYDROCHLORIDE 20 MG/ML
INJECTION, SOLUTION EPIDURAL; INFILTRATION; INTRACAUDAL; PERINEURAL
Status: DISCONTINUED | OUTPATIENT
Start: 2024-08-30 | End: 2024-08-30

## 2024-08-30 RX ORDER — OXYCODONE AND ACETAMINOPHEN 5; 325 MG/1; MG/1
1 TABLET ORAL
Status: DISCONTINUED | OUTPATIENT
Start: 2024-08-30 | End: 2024-08-30 | Stop reason: HOSPADM

## 2024-08-30 RX ORDER — HALOPERIDOL 5 MG/ML
0.5 INJECTION INTRAMUSCULAR EVERY 10 MIN PRN
Status: DISCONTINUED | OUTPATIENT
Start: 2024-08-30 | End: 2024-08-30 | Stop reason: HOSPADM

## 2024-08-30 RX ORDER — SODIUM CHLORIDE 9 MG/ML
INJECTION, SOLUTION INTRAVENOUS CONTINUOUS
Status: DISCONTINUED | OUTPATIENT
Start: 2024-08-30 | End: 2024-08-30 | Stop reason: HOSPADM

## 2024-08-30 RX ORDER — PHENYLEPHRINE HYDROCHLORIDE 10 MG/ML
INJECTION INTRAVENOUS
Status: DISCONTINUED | OUTPATIENT
Start: 2024-08-30 | End: 2024-08-30

## 2024-08-30 RX ORDER — KETOROLAC TROMETHAMINE 15 MG/ML
15 INJECTION, SOLUTION INTRAMUSCULAR; INTRAVENOUS EVERY 8 HOURS PRN
Status: DISCONTINUED | OUTPATIENT
Start: 2024-08-30 | End: 2024-08-30 | Stop reason: HOSPADM

## 2024-08-30 RX ORDER — FENTANYL CITRATE 50 UG/ML
INJECTION, SOLUTION INTRAMUSCULAR; INTRAVENOUS
Status: DISCONTINUED | OUTPATIENT
Start: 2024-08-30 | End: 2024-08-30

## 2024-08-30 RX ORDER — GLUCAGON 1 MG
1 KIT INJECTION
Status: DISCONTINUED | OUTPATIENT
Start: 2024-08-30 | End: 2024-08-30 | Stop reason: HOSPADM

## 2024-08-30 RX ADMIN — PHENYLEPHRINE HYDROCHLORIDE 50 MCG: 10 INJECTION INTRAVENOUS at 11:08

## 2024-08-30 RX ADMIN — ONDANSETRON 4 MG: 2 INJECTION INTRAMUSCULAR; INTRAVENOUS at 11:08

## 2024-08-30 RX ADMIN — PROPOFOL 150 MCG/KG/MIN: 10 INJECTION, EMULSION INTRAVENOUS at 11:08

## 2024-08-30 RX ADMIN — FENTANYL CITRATE 50 MCG: 50 INJECTION, SOLUTION INTRAMUSCULAR; INTRAVENOUS at 11:08

## 2024-08-30 RX ADMIN — CEFAZOLIN 2 G: 2 INJECTION, POWDER, FOR SOLUTION INTRAMUSCULAR; INTRAVENOUS at 11:08

## 2024-08-30 RX ADMIN — PROPOFOL 80 MG: 10 INJECTION, EMULSION INTRAVENOUS at 11:08

## 2024-08-30 RX ADMIN — SODIUM CHLORIDE: 0.9 INJECTION, SOLUTION INTRAVENOUS at 10:08

## 2024-08-30 RX ADMIN — LIDOCAINE HYDROCHLORIDE 100 MG: 20 INJECTION, SOLUTION EPIDURAL; INFILTRATION; INTRACAUDAL; PERINEURAL at 11:08

## 2024-08-30 NOTE — BRIEF OP NOTE
[facility] - Surgery (1st Fl)  Brief Operative Note    Surgery Date: 8/30/2024     Surgeons and Role:     * Camilo Kelley Jr., MD - Primary     * Sofie Almeida MD - Resident - Assisting        Pre-op Diagnosis:  Nephrolithiasis [N20.0]    Post-op Diagnosis:  Post-Op Diagnosis Codes:     * Nephrolithiasis [N20.0]    Procedure(s) (LRB):  CYSTOSCOPY, WITH URETERAL STENT REMOVAL (Left)  PYELOGRAM, RETROGRADE (Left)    Anesthesia: General    Operative Findings: L RPG with L ureteral stent removal     Estimated Blood Loss: min          Specimens:   Specimen (24h ago, onward)      None              Discharge Note    OUTCOME: Patient tolerated treatment/procedure well without complication and is now ready for discharge.    DISPOSITION: Home or Self Care    FINAL DIAGNOSIS:  nephrolithiasis     FOLLOWUP: In clinic    DISCHARGE INSTRUCTIONS:    Discharge Procedure Orders   NM Renal Scan Flow and Function   Standing Status: Future Standing Exp. Date: 08/30/25     Order Specific Question Answer Comments   May the Radiologist modify the order per protocol to meet the clinical needs of the patient? Yes

## 2024-08-30 NOTE — ANESTHESIA PREPROCEDURE EVALUATION
[facility]  Anesthesia Pre-Operative Evaluation     Patient Name: Praneeth Jewell  YOB: 1945  MRN: [id number]  [facility]: [id number]       Admit Date: 8/30/2024   Admit Team: Networked reference to record PCT   Hospital Day: 1  Date of Procedure: 8/30/2024  Anesthesia: General Procedure: Procedure(s) (LRB):  CYSTOSCOPY, WITH URETERAL STENT REMOVAL (Left)  INSERTION, SUPRAPUBIC CATHETER (N/A)  Pre-Operative Diagnosis: Nephrolithiasis [N20.0]  Proceduralist:Surgeons and Role:     * Camilo Kelley Jr., MD - Primary     * Sofie Almeida MD - Resident - Assisting  Code Status: Prior   Advanced Directive: Not Received  Isolation Precautions: No active isolations  Capacity: Full capacity     SUBJECTIVE:   Praneeth Jewell is a 78 y.o. male who  has a past medical history of Arthritis, Diabetes mellitus, Folate deficiency anemia, Heart block, History of kidney stones (05/25/2021), History of stroke with residual deficit (05/25/2021), Hyperlipidemia, Hypertension, Major depressive disorder, Pacemaker, Pyelonephritis (11/19/2021), TIA (transient ischemic attack), and Urinary retention (05/25/2021).  No notes on file   0.9% NaCl   Intravenous Continuous         Hospital LOS: 0 days  ICU LOS: Patient does not have an ICU stay during this admission.    he has a current medication list which includes the following long-term medication(s): atorvastatin, folic acid, gabapentin, and tamsulosin.   Current Outpatient Medications   Medication Instructions    atorvastatin (LIPITOR) 40 mg, Oral, Nightly    bisacodyL (DULCOLAX (BISACODYL)) 10 mg, Rectal, Daily PRN    CHEST CONGESTION RELIEF DM  mg/5 mL liquid Oral    cyproheptadine (PERIACTIN) 4 mg, Oral, 3 times daily, Itching    docusate sodium (COLACE) 100 mg, Oral, Daily    folic acid (FOLVITE) 1 mg, Oral, Daily    gabapentin (NEURONTIN) 300 mg, Oral, 3 times daily    menthol-zinc oxide (CALMOSEPTINE) 0.44-20.6 % Oint Topical (Top), Daily PRN, To sacral area after  each sacral excoriation episode and as needed    mirtazapine (REMERON) 30 mg, Oral, Nightly    polyethylene glycol (GLYCOLAX) 17 g, Oral, 2 times daily    tamsulosin (FLOMAX) 0.4 mg Cap TAKE 2 CAPSULES(0.8 MG) BY MOUTH EVERY DAY     ALLERGIES:   Review of patient's allergies indicates:  No Known Allergies  LDA:   AIRWAY:         * No LDAs found *      Lines/Drains/Airways       Peripheral Intravenous Line  Duration                  Peripheral IV - Single Lumen 08/29/24 1024 22 G 1/2 in No Right;Posterior Hand 1 day                   Anesthesia Evaluation      Airway   Mallampati: III  TM distance: Normal  Neck ROM: Normal ROM  Dental    (+) Intact    Pulmonary    Cardiovascular   (+) hypertension, dysrhythmias    Neuro/Psych    (+) TIA, psychiatric history    GI/Hepatic/Renal    (+) chronic renal disease    Endo/Other    (+) diabetes mellitus  Abdominal                    MEDICATIONS:     Current Outpatient Medications on File Prior to Encounter   Medication Sig Dispense Refill Last Dose    atorvastatin (LIPITOR) 40 MG tablet Take 40 mg by mouth every evening.   8/29/2024 at 2100    bisacodyL (DULCOLAX, BISACODYL,) 10 mg Supp Place 1 suppository (10 mg total) rectally daily as needed (constipation).  0 8/29/2024 at 0900    CHEST CONGESTION RELIEF DM  mg/5 mL liquid Take by mouth.   8/29/2024 at 0900    cyproheptadine (PERIACTIN) 4 mg tablet Take 4 mg by mouth 3 (three) times daily. Itching   8/29/2024 at 0900    docusate sodium (COLACE) 100 MG capsule Take 1 capsule (100 mg total) by mouth once daily.  0 8/29/2024 at 0900    folic acid (FOLVITE) 1 MG tablet Take 1 mg by mouth once daily.   8/29/2024 at 0900    gabapentin (NEURONTIN) 300 MG capsule Take 300 mg by mouth 3 (three) times daily.   8/29/2024 at 2100    menthol-zinc oxide (CALMOSEPTINE) 0.44-20.6 % Oint Apply topically daily as needed. To sacral area after each sacral excoriation episode and as needed   8/29/2024 at 0900    mirtazapine (REMERON) 30  MG tablet Take 30 mg by mouth nightly.   8/29/2024 at 2100    polyethylene glycol (GLYCOLAX) 17 gram/dose powder Take 17 g by mouth 2 (two) times daily.   8/29/2024    tamsulosin (FLOMAX) 0.4 mg Cap TAKE 2 CAPSULES(0.8 MG) BY MOUTH EVERY DAY (Patient taking differently: Take 0.8 mg by mouth once daily.) 60 capsule 11       Inpatient Medications:  Antibiotics (From admission, onward)      Start     Stop Route Frequency Ordered    08/30/24 1007  ceFAZolin 2 g in D5W 50 mL IVPB (MB+)         -- IV On Call Procedure 08/30/24 1007          VTE Risk Mitigation (From admission, onward)           Ordered     IP VTE HIGH RISK PATIENT  Once         08/30/24 1007     Place sequential compression device  Until discontinued         08/30/24 1007                      Current Facility-Administered Medications   Medication Dose Route Frequency Provider Last Rate Last Admin    0.9%  NaCl infusion   Intravenous Continuous Camilo Kelley Jr., MD        ceFAZolin 2 g in D5W 50 mL IVPB (MB+)  2 g Intravenous On Call Procedure Sofie Almeida MD              History:   There are no hospital problems to display for this patient.    Surgical History:    has a past surgical history that includes Elbow Arthroplasty (Right); Cystoscopy w/ ureteral stent placement (Bilateral, 5/25/2021); Removal of blood clot (5/25/2021); Cystoscopic litholapaxy (5/25/2021); Fluoroscopy (5/25/2021); A-V cardiac pacemaker insertion (N/A, 8/24/2021); Ureteroscopic removal of ureteric calculus (Bilateral, 8/26/2021); Cystoscopy (8/26/2021); Ureteroscopy (Bilateral, 8/26/2021); Pyeloscopy (Bilateral, 8/26/2021); Laser lithotripsy (Left, 8/26/2021); Replacement of stent (Bilateral, 8/26/2021); Colonoscopy (N/A, 11/22/2021); Cystoscopy (N/A, 5/24/2024); Ureteroscopy (Left, 5/24/2024); Laser lithotripsy (Left, 5/24/2024); extraction - stone (Left, 5/24/2024); Cystogram (N/A, 5/24/2024); Pyeloscopy (Left, 5/24/2024); removal, nephrostomy tube, with imaging guidance (Left,  "5/24/2024); Dilation of urethra (N/A, 5/24/2024); and placement-stent (Left, 5/24/2024).   Social History:    has no history on file for sexual activity.  reports that he has quit smoking. His smoking use included cigarettes. He has never used smokeless tobacco. He reports current alcohol use. He reports current drug use. Drug: "Crack" cocaine.    Vitals:    08/30/24 1022   BP: (!) 111/54   BP Location: Left arm   Patient Position: Lying   Pulse: 60   Resp: 14   Temp: 36.8 °C (98.2 °F)   TempSrc: Temporal   SpO2: 100%   Weight: 59.4 kg (131 lb)   Height: 5' 6" (1.676 m)     Vital Signs Range (Last 24H):  Temp:  [36.8 °C (98.2 °F)]   Pulse:  [60]   Resp:  [14]   BP: (111)/(54)   SpO2:  [100 %]     Body mass index is 21.14 kg/m².  Wt Readings from Last 4 Encounters:   08/30/24 59.4 kg (131 lb)   05/20/24 59.5 kg (131 lb 2.8 oz)   04/25/24 57 kg (125 lb 10.6 oz)   04/11/24 57.2 kg (126 lb)        Intake/Output - Last 3 Shifts       None          Lab Results   Component Value Date    WBC 6.35 05/23/2024    HGB 13.0 (L) 05/23/2024    HCT 40.8 05/23/2024     05/23/2024     05/23/2024    K 3.8 05/23/2024     05/23/2024    CREATININE 0.8 05/23/2024    BUN 30 (H) 05/23/2024    CO2 22 (L) 05/23/2024    GLU 76 05/23/2024    CALCIUM 9.2 05/23/2024    MG 1.8 10/14/2023    PHOS 3.0 10/14/2023    ALKPHOS 71 05/23/2024    ALT 9 (L) 05/23/2024    AST 15 05/23/2024    ALBUMIN 2.7 (L) 05/23/2024    INR 1.1 04/11/2024    APTT 25.4 11/19/2021    HGBA1C 4.6 07/31/2023    LACTATE 0.8 02/06/2024    TROPONINI 0.024 03/20/2023    BNP 54 03/19/2023     Recent Results (from the past 12 hour(s))   POCT glucose    Collection Time: 08/30/24 10:24 AM   Result Value Ref Range    POCT Glucose 82 70 - 110 mg/dL     No results for input(s): "WBC", "HGB", "HCT", "PLT", "NA", "K", "CREATININE", "GLU", "INR", "LACTATE", "5HIAAPLASMA", "5HIAAURINT", "5HIAA", "[id number]" in the last 168 hours.  No LMP for male patient.    EKG:   Results " for orders placed or performed during the hospital encounter of 04/10/24   EKG 12-lead    Collection Time: 04/11/24 12:45 AM   Result Value Ref Range    QRS Duration 170 ms    [facility] QTC Calculation 515 ms    Narrative    Test Reason : T83.022A,    Vent. Rate : 094 BPM     Atrial Rate : 094 BPM     P-R Int : 172 ms          QRS Dur : 170 ms      QT Int : 412 ms       P-R-T Axes : 061 -86 084 degrees     QTc Int : 515 ms    Atrial-sensed ventricular-paced rhythm  Confirmed by Alec Torres MD ([id number]) on 4/11/2024 2:04:36 PM    Referred By: DENISE   SELF           Confirmed By:Alec Torres MD     TTE:  Results for orders placed during the hospital encounter of 08/24/21    Echo    Interpretation Summary  · Severe braedycardia with HR in high 30s  · The estimated ejection fraction is 65%.  · The left ventricle is normal in size with normal systolic function.  · Indeterminate left ventricular diastolic function.  · Normal right ventricular size with normal right ventricular systolic function.  · There is pulmonary hypertension.  · The estimated PA systolic pressure is 41 mmHg.  · Normal central venous pressure (3 mmHg).    BLANKA:  No results found for this or any previous visit.    Stress Test:  No results found for this or any previous visit.    No results found for this or any previous visit.    LHC:  No results found for this or any previous visit.    Cardiac Device Check   Results for orders placed in visit on 12/01/21    Cardiac device check - In Clinic [facility] Hospital    Narrative  Additional Comments  In-office device interrogation and testing performed on pacemaker (implanted 8/24/2021)  Pocket and incision, to LUCW, well healed w/no evidence of infection or erosion  Presenting egram demonstrates AS/  Underlying rhythm c/w   normal sinus w/junctional rhythm  Device fxn WNL. DDD  60/120  [employer] algorithms are on  RA pacing 6%, RV pacing 97%  Atrial arrhythmias: none detected  Anticoagulation status: not on  OAC  Ventricular arrhythmias: none detected  Battery status/estimated longevity: 9y 9m  Reprogramming at this visit:  Right cap control was disabled on 8/25/21--enabled today.  F/U via remote transmissions every 3 months  EP appt w/Dr. Winn today  Report prepared by danielle Fernandes    Results for orders placed in visit on 12/05/23    Cardiac device check - Remote        Pre-op Assessment          Review of Systems  Cardiovascular:     Hypertension    Dysrhythmias                              Hypertension     Disorder of Cardiac Rhythm     Renal/:  Chronic Renal Disease        Kidney Function/Disease             Neurological:  TIA,                         TIA - Transient Ischemic Attack               Endocrine:  Diabetes    Diabetes                      Psych:  Psychiatric History                  Physical Exam  General: Well nourished    Airway:  Mallampati: III / II  Mouth Opening: Normal  TM Distance: Normal  Tongue: Normal  Neck ROM: Normal ROM    Dental:  Intact        Anesthesia Plan  Type of Anesthesia, risks & benefits discussed:    Anesthesia Type: Gen ETT, Gen Natural Airway, MAC, Gen Supraglottic Airway  Intra-op Monitoring Plan: Standard ASA Monitors  Post Op Pain Control Plan: multimodal analgesia and IV/PO Opioids PRN  Induction:  IV  Airway Plan: Direct and Video, Post-Induction  Informed Consent: Informed consent signed with the Patient and all parties understand the risks and agree with anesthesia plan.  All questions answered.   ASA Score: 3  Day of Surgery Review of History & Physical: H&P completed by Anesthesiologist.    Ready For Surgery From Anesthesia Perspective.     .

## 2024-08-30 NOTE — OP NOTE
[facility]  Operative Note    Date: 08/30/2024    Pre-Op Diagnosis: nephrolithiasis   Patient Active Problem List    Diagnosis Date Noted    Acute cystitis 05/21/2024    Chronic indwelling Mark catheter 05/21/2024    Constipation 05/14/2024    Hydronephrosis 05/14/2024    Asymptomatic bacteriuria 10/15/2023    Nephrostomy complication 09/02/2023    Nephrostomy tube displaced 07/31/2023    History of gastrointestinal bleeding 07/31/2023    History of fecal impaction 07/31/2023    History of complete heart block 04/14/2023    Obstructive uropathy 04/14/2023    Macrocytic anemia 03/20/2023    Idiopathic proctocolitis with rectal bleeding 11/20/2021    Second degree AV block 08/24/2021    Essential hypertension 08/24/2021    HLD (hyperlipidemia) 08/24/2021    MDD (major depressive disorder) 08/24/2021    Nephrolithiasis 06/02/2021    BPH with urinary obstruction 06/02/2021    History of CVA with residual deficit 05/25/2021       Post-Op Diagnosis: same     Procedure(s) Performed:   1.  Cystoscopy with left retrograde pyelogram  2.  Left ureteral stent removal  3.  Fluoroscopy < 1 hour  4.  Intra-operative interpretation of radiographic images    Specimen(s): none    Staff Surgeon: Camilo Kelley MD     Assistant Surgeon: Sofie Almeida MD    Anesthesia: General mask inhalational anesthesia    Indications: Praneeth Jewell is a 78 y.o. male with history of nephrolithiasis, s/p L ureteroscopy on 5/24.  He presents today for cysto, left retrograde pyelogram, left ureteral stent removal     Findings:   Left retrograde pyelogram showed delicate calices with no evidence of hydronephrosis, no filling defects, and ureter normal in course and caliber.  Good efflux seen from L UO.   2.   Left ureteral stent removal.    Estimated Blood Loss: min    Drains: 18 Fr mark catheter     Procedure in Detail:  After risks, benefits and possible complications of cystoscopy were explained, the patient elected to undergo the  procedure and informed consent was obtained. All questions were answered in the sangeeta-operative area. The patient was transferred to the cystoscopy suite and placed in the supine position.  SCDs were applied and working.  MAC anesthesia was administered.  Once adequately sedated, the patient was placed in the dorsal lithotomy position and prepped and draped in the usual sterile fashion.  Time out was performed, sangeeta-procedural antibiotics were confirmed.     The existing mark catheter was removed. A rigid cystoscope in a 22 Fr sheath was introduced into the bladder per urethra.  There was a mild ventral erosion. A proximal bulbar stricture was passively dilated with the cystoscope.   The right and left ureteral orifices were identified in the normal anatomic position.  Formal cystoscopy was performed which revealed no masses or lesions suspicious for malignancy, moderate trabeculations, no bladder stones and a small right bladder diverticulum.     The left ureteral stent was pulled using stent graspers.   The left UO was identified and then cannulated with a 5 Fr  open-ended ureteral catheter. Using fluoroscopy, a RPG was performed which showed the above findings.  The ureteral stent was not replaced.    An 18 Fr mark catheter was placed into the bladder.  10 ccs placed in balloon.      The bladder was drained, and the patient was removed from lithotomy.     The patient tolerated the procedure well and was transferred to recovery in stable condition.    Disposition:  The patient will follow up with Dr. Kelley in 6 weeks with a CTRSS and MAG3 scan prior.  He will continue monthly catheter exchanges with home health.  He prefers to avoid suprapubic tube placement at this time.     Sofie Almeida MD

## 2024-08-30 NOTE — PLAN OF CARE
[facility] . Pre-op completed and perioperative period was discussed.

## 2024-08-30 NOTE — H&P
"[facility]   H&P  SUBJECTIVE:       History of Present Illness:  Praneeth Jewell is a 79 yo M lives in a nursing home with history of obstructive uropathy s/p J-J stent placement, recurrent nephrolithiasis and UTIs, history of MDR UTI, R PARDEEP stroke with residual LE weakness (2015, wheelchair bound), and complete heart block s/p pacemaker placement (2021), and left ureteral stones s/p L URS on 5/25.  Presents today for cysto, L RPG, L ureteral stent removal vs replacement.     OBJECTIVE:     Vital Signs (Most Recent)  Temp: 98.2 °F (36.8 °C) (08/30/24 1022)  Pulse: 60 (08/30/24 1022)  Resp: 14 (08/30/24 1022)  BP: (!) 111/54 (08/30/24 1022)  SpO2: 100 % (08/30/24 1022)    Estimated body mass index is 21.14 kg/m² as calculated from the following:    Height as of this encounter: 5' 6" (1.676 m).    Weight as of this encounter: 59.4 kg (131 lb).    General: No acute distress, well developed. AAOx3  Head: Normocephalic, Atraumatic  CV: regular rate  Lungs: normal respiratory effort, no respiratory distress    Lab Results   Component Value Date    BUN 30 (H) 05/23/2024    CREATININE 0.8 05/23/2024    WBC 6.35 05/23/2024    HGB 13.0 (L) 05/23/2024    HCT 40.8 05/23/2024     05/23/2024    AST 15 05/23/2024    ALT 9 (L) 05/23/2024    ALKPHOS 71 05/23/2024    ALBUMIN 2.7 (L) 05/23/2024    HGBA1C 4.6 07/31/2023        ASSESSMENT     1. Nephrolithiasis      PLAN:     1. To OR for cysto, L RPG, L ureteral stent removal vs replacement.  2. Consent signed, all questions answered     Sofie Almeida MD    "

## 2024-08-30 NOTE — TRANSFER OF CARE
"Anesthesia Transfer of Care Note    Patient: Praneeth McTopy    Procedure(s) Performed: Procedure(s) (LRB):  CYSTOSCOPY, WITH URETERAL STENT REMOVAL (Left)  PYELOGRAM, RETROGRADE (Left)    Patient location: PACU    Anesthesia Type: general    Transport from OR: Transported from OR on 6-10 L/min O2 by face mask with adequate spontaneous ventilation    Post pain: adequate analgesia    Post assessment: no apparent anesthetic complications and tolerated procedure well    Post vital signs: stable    Level of consciousness: sedated    Nausea/Vomiting: no nausea/vomiting    Complications: none    Transfer of care protocol was followed    Last vitals: Visit Vitals  BP (!) 111/54 (BP Location: Left arm, Patient Position: Lying)   Pulse 60   Temp 36.8 °C (98.2 °F) (Temporal)   Resp 14   Ht 5' 6" (1.676 m)   Wt 59.4 kg (131 lb)   SpO2 100%   BMI 21.14 kg/m²     "

## 2024-08-30 NOTE — DISCHARGE INSTRUCTIONS
Post Cystoscopy Instructions  Do not strain to have a bowel movement  No strenuous exercise x 7 days    If you have a catheter, please return to the ER if your catheter stops draining or you are having abdominal pain.    Call the doctor if:  Temperature is greater than 101F  Persistent vomiting and inability to keep food down  Inability to void if you do not have a catheter

## 2024-09-03 NOTE — ANESTHESIA POSTPROCEDURE EVALUATION
Anesthesia Post Evaluation    Patient: Praneeth McTopy    Procedure(s) Performed: Procedure(s) (LRB):  CYSTOSCOPY, WITH URETERAL STENT REMOVAL (Left)  PYELOGRAM, RETROGRADE (Left)    Final Anesthesia Type: general      Patient location during evaluation: PACU  Patient participation: Yes- Able to Participate  Level of consciousness: awake and alert  Post-procedure vital signs: reviewed and stable  Pain management: adequate  Airway patency: patent    PONV status at discharge: No PONV  Anesthetic complications: no      Cardiovascular status: blood pressure returned to baseline  Respiratory status: unassisted  Hydration status: euvolemic  Follow-up not needed.              Vitals Value Taken Time   /58 08/30/24 1317   Temp 36.7 °C (98 °F) 08/30/24 1200   Pulse 60 08/30/24 1319   Resp 16 09/03/24 1434   SpO2 100 % 08/30/24 1319   Vitals shown include unfiled device data.      No case tracking events are documented in the log.      Pain/Farheen Score: No data recorded

## 2024-10-17 PROBLEM — R31.9 HEMATURIA: Status: ACTIVE | Noted: 2024-01-01

## 2024-10-17 PROBLEM — A41.9 SEPTIC SHOCK: Status: ACTIVE | Noted: 2024-01-01

## 2024-10-17 PROBLEM — R65.21 SEPTIC SHOCK: Status: ACTIVE | Noted: 2024-01-01

## 2024-10-17 PROBLEM — K52.89 STERCORAL COLITIS: Status: ACTIVE | Noted: 2024-01-01

## 2024-10-17 PROBLEM — N17.9 AKI (ACUTE KIDNEY INJURY): Status: ACTIVE | Noted: 2024-01-01

## 2024-10-17 PROBLEM — S37.30XA URETHRAL INJURY: Status: ACTIVE | Noted: 2024-01-01

## 2024-10-17 PROBLEM — G93.41 ACUTE METABOLIC ENCEPHALOPATHY: Status: ACTIVE | Noted: 2024-01-01

## 2024-10-17 PROBLEM — D68.9 COAGULOPATHY: Status: ACTIVE | Noted: 2024-01-01

## 2024-10-17 PROBLEM — D69.6 THROMBOCYTOPENIA: Status: ACTIVE | Noted: 2024-01-01

## 2024-10-17 NOTE — ED NOTES
Pt is responsive to commands. Skin cool to touch.  Currently has levophed infusing to right medical port of CVL.

## 2024-10-17 NOTE — ASSESSMENT & PLAN NOTE
Multifactorial from fever, infection, septic shock  CT brain without acute abnormality, remote infarcts seen  Monitor for improvement with improvement of above

## 2024-10-17 NOTE — ED NOTES
Pt presented from NH with non-patent 18F mark. Pt with blood clots in  bag, in catheter tubing, and leaking around meatus.  Attempted to flush catheter to check patency.   Unable to flush or irrigate fole catheter.     Will attempt to change catheter as discussed with ER provider.   Old  catheter removed. Pt continues to ooze bright red blood out of meatus.    Attempted to replace with new mark catheter without success. Will obtain Coude Catch from House Supervisor.

## 2024-10-17 NOTE — Clinical Note
Diagnosis: Coagulopathy [146795]   Reason for IP Medical Treatment  (Clinical interventions that can only be accomplished in the IP setting? ) :: septic shock

## 2024-10-17 NOTE — NURSING
[employer] rep called to do a STAT interrogation of device per Dr. Varela's request.

## 2024-10-17 NOTE — PHARMACY MED REC
"[facility]                   Pharmacy  Admission Medication Reconciliation      Based on information gathered for medication list, you may go to "Admission" then "Reconcile Home Medications" tabs to review and/or act upon those items.      The home medication list has been updated by the Pharmacy department.   Please read ALL comments highlighted in red.   Please address this information as you see fit.    Feel free to contact us if you have any questions or require assistance.     Home medication list has been compared to current inpatient medications. Please review the following discrepancies noted below:        Patient reports STILL TAKING the following medication(s) which was not ordered upon admit  Atorvastatin  Tamsulosin  Polyethylene glycol  Mirtazapine     Feel free to contact us if you have any questions or require assistance.     Jess Mccormack, PharmD  [phone]  "

## 2024-10-17 NOTE — CONSULTS
"[facility] - Intensive Care  Wound Care    Patient Name:  Praneeth Jewell   MRN:  [id number]  Date: 10/17/2024  Diagnosis: Septic shock    History:     Past Medical History:   Diagnosis Date    Arthritis     Diabetes mellitus     type 2    Folate deficiency anemia     Heart block     History of kidney stones 05/25/2021    History of stroke with residual deficit 05/25/2021    Hyperlipidemia     Hypertension     Major depressive disorder     Pacemaker     Biotronic [place]ora 8 DR-T (S/n: [id number])    Pyelonephritis 11/19/2021    TIA (transient ischemic attack)     Urinary retention 05/25/2021       Social History     Socioeconomic History    Marital status: Single    Number of children: 0    Years of education: 15    Highest education level: Some college, no degree   Occupational History     Employer: Disabled    Occupation: Construction     Employer: [employer]     Comment: Retired in past 5-10 years   Tobacco Use    Smoking status: Former     Types: Cigarettes    Smokeless tobacco: Never   Substance and Sexual Activity    Alcohol use: Yes     Comment: 1/ pint whisky daily    Drug use: Yes     Types: "Crack" cocaine     Social Drivers of Health     Financial Resource Strain: Low Risk  (10/17/2024)    Overall Financial Resource Strain (CARDIA)     Difficulty of Paying Living Expenses: Not hard at all   Food Insecurity: No Food Insecurity (10/17/2024)    Hunger Vital Sign     Worried About Running Out of Food in the Last Year: Never true     Ran Out of Food in the Last Year: Never true   Transportation Needs: No Transportation Needs (10/17/2024)    TRANSPORTATION NEEDS     Transportation : No   Physical Activity: Inactive (10/17/2024)    Exercise Vital Sign     Days of Exercise per Week: 0 days     Minutes of Exercise per Session: 0 min   Stress: No Stress Concern Present (10/17/2024)    [employer] [place] [employer] - Occupational Stress Questionnaire     Feeling of Stress : Not at all   Housing Stability: Low Risk  " (10/17/2024)    Housing Stability Vital Sign     Unable to Pay for Housing in the Last Year: No     Homeless in the Last Year: No       Precautions:     Allergies as of 10/17/2024    (No Known Allergies)       [facility] Assessment Details/Treatment     L great toe-abrasions, present on admit      R great toe, unknown growth       Sacrum-Heeling ulcer. Unknown stage      Recommendations discussed with nurse and Dr. Galindo. Pt unable to communicate.  -pressure injury prevention interventions-Zflex boots and waffle overlay when pt is stepped down to the floor  -L great toe-xeroform, Mepilex  -R great toe-leave open to air  10/17/2024

## 2024-10-17 NOTE — ASSESSMENT & PLAN NOTE
GURVINDER is likely due to acute tubular necrosis caused by hypotension and post-obstructive d/t obstructing Shen, hematuria . Baseline creatinine is  0.8 . Most recent creatinine and eGFR are listed below.  Recent Labs     10/17/24  0231   CREATININE 1.6*   EGFRNORACEVR 44*      Plan  - GURVINDER is worsening. Will continue current treatment  - Avoid nephrotoxins and renally dose meds for GFR listed above  - Monitor urine output, serial BMP, and adjust therapy as needed

## 2024-10-17 NOTE — ED NOTES
Pt noted to have a large amount of bright red blood oozing from his penis.  Dr. Santos notified at came to bedside.  He ordered an emergency unit of blood and ordered direct pressure to penis to be held.  This was done immediately.  Pt gave verbal consent for emergency administration of blood

## 2024-10-17 NOTE — ED NOTES
Notified HS that urology will be coming to ER and needs cart at bedside.     Bladder Scan performed.

## 2024-10-17 NOTE — ED NOTES
Pt arrived via EMS from [facility].  Pt was referred to ER for Hematuria.  It was reported that the nursing home placed the mark catheter today and then this am he started having bright red bleeding.  It was reported too that patient has onset of nausea and vomiting tonight.  Noted emesis on his patient gown.  Also reported that he was not responding appropriately tonight.      Upon arrival in ER immediately noted that pt had bright red hematuria in his mark with clotting.  Pt also difficult to arouse, but with sternal rub he would attempt to open his eyes and followed command to open mouth to check his temp.

## 2024-10-17 NOTE — ED NOTES
Dr Azar arrived.  He was able to place a 20 fr mark with cystoscope.  Once the catheter was placed we did not have further bleeding noted from penis.  Mark secured to thigh with cath lock.  Pt tolerated the procedure well. Pt was cleaned up post procedure and new linens placed on the bed.

## 2024-10-17 NOTE — ED NOTES
Report was called to ICU RN Stephenie.  Pt transported to ICU on the transport monitor with an RN and and ED tech.  Pt tolerated the transport well.

## 2024-10-17 NOTE — CONSULTS
[facility] Emergency Dept  Urology  Consult Note    Patient Name: Praneeth Jewell  MRN: [id number]  Admission Date: 10/17/2024  Attending Provider: Neo Santos MD   Consulting Provider: Terence Azar MD  Principal Problem:<principal problem not specified>    Consults    Subjective:     HPI:   Praneeth Jewell is a 78 y.o. male, urology consulted for acute urinary retention.      Patient admitted, subsequently was found to have experienced urinary retention. Bladder scan and post void residual volumes were elevated. A mark catheter was attempted and failed. Patient with urethral bleeding. Urology was consulted for evaluation.    Past Medical History:   Diagnosis Date    Arthritis     Diabetes mellitus     type 2    Folate deficiency anemia     Heart block     History of kidney stones 05/25/2021    History of stroke with residual deficit 05/25/2021    Hyperlipidemia     Hypertension     Major depressive disorder     Pacemaker     Biotronic [place] 8 DR-T (S/n: [id number])    Pyelonephritis 11/19/2021    TIA (transient ischemic attack)     Urinary retention 05/25/2021       Past Surgical History:   Procedure Laterality Date    A-V CARDIAC PACEMAKER INSERTION N/A 8/24/2021    Procedure: INSERTION, CARDIAC PACEMAKER, DUAL CHAMBER;  Surgeon: Neo Winn MD;  Location: [facility] EP LAB;  Service: Cardiology;  Laterality: N/A;  CHB, DUAL PPM, ANES, BIO, DM, ED 2    COLONOSCOPY N/A 11/22/2021    Procedure: COLONOSCOPY;  Surgeon: Cesar Dorado MD;  Location: [facility] ENDO (2ND FLR);  Service: Endoscopy;  Laterality: N/A;    CYSTOGRAM N/A 5/24/2024    Procedure: CYSTOGRAM;  Surgeon: Camilo Kelley Jr., MD;  [facility];  Service: Urology;  Laterality: N/A;    CYSTOSCOPIC LITHOLAPAXY  5/25/2021    Procedure: CYSTOLITHOLAPAXY;  Surgeon: Chris Schilling MD;  [facility];  Service: Urology;;    CYSTOSCOPY  8/26/2021    Procedure: CYSTOSCOPY;  Surgeon: Camilo Kelley Jr., MD;  Location: [facility] OR [address] [place] [address];   Service: Urology;;    CYSTOSCOPY N/A 5/24/2024    Procedure: CYSTOSCOPY;  Surgeon: Camilo Kelley Jr., MD;  Location: [facility] OR 1ST FLR;  Service: Urology;  Laterality: N/A;    CYSTOSCOPY W/ URETERAL STENT PLACEMENT Bilateral 5/25/2021    Procedure: CYSTOSCOPY, WITH BILATERAL URETERAL STENT INSERTION;  Surgeon: Chris Schilling MD;  Location: [facility] OR 1ST FLR;  Service: Urology;  Laterality: Bilateral;    CYSTOSCOPY W/ URETERAL STENT REMOVAL Left 8/30/2024    Procedure: CYSTOSCOPY, WITH URETERAL STENT REMOVAL;  Surgeon: Camilo Kelley Jr., MD;  Location: [facility] OR 1ST FLR;  Service: Urology;  Laterality: Left;  1 hr    DILATION OF URETHRA N/A 5/24/2024    Procedure: DILATION, URETHRA;  Surgeon: Camilo Kelley Jr., MD;  Location: [facility] OR 1ST FLR;  Service: Urology;  Laterality: N/A;    ELBOW ARTHROPLASTY Right     EXTRACTION - STONE Left 5/24/2024    Procedure: EXTRACTION - STONE;  Surgeon: Camilo Kelley Jr., MD;  Location: [facility] OR 1ST FLR;  Service: Urology;  Laterality: Left;  and kidney    FLUOROSCOPY  5/25/2021    Procedure: FLUOROSCOPY;  Surgeon: Chris Schilling MD;  Location: [facility] OR 1ST FLR;  Service: Urology;;    LASER LITHOTRIPSY Left 8/26/2021    Procedure: LITHOTRIPSY, USING LASER;  Surgeon: Camilo Kelley Jr., MD;  Location: [facility] OR 1ST FLR;  Service: Urology;  Laterality: Left;    LASER LITHOTRIPSY Left 5/24/2024    Procedure: LITHOTRIPSY, USING LASER;  Surgeon: Camilo Kelley Jr., MD;  Location: [facility] OR 1ST FLR;  Service: Urology;  Laterality: Left;    PLACEMENT-STENT Left 5/24/2024    Procedure: PLACEMENT-STENT;  Surgeon: Camilo Kelley Jr., MD;  Location: [facility] OR 1ST FLR;  Service: Urology;  Laterality: Left;    PYELOSCOPY Bilateral 8/26/2021    Procedure: PYELOSCOPY;  Surgeon: Camilo Kelley Jr., MD;  Location: [facility] OR 1ST FLR;  Service: Urology;  Laterality: Bilateral;    PYELOSCOPY Left 5/24/2024    Procedure: PYELOSCOPY;  Surgeon: Camilo Kelley Jr., MD;  Location: [facility] OR [address] [place] [address];   "Service: Urology;  Laterality: Left;    REMOVAL OF BLOOD CLOT  5/25/2021    Procedure: REMOVAL, BLOOD CLOT;  Surgeon: Chris Schilling MD;  Location: [facility] OR [address] [place] [address];  Service: Urology;;    REMOVAL, NEPHROSTOMY TUBE, WITH IMAGING GUIDANCE Left 5/24/2024    Procedure: REMOVAL, NEPHROSTOMY TUBE, WITH IMAGING GUIDANCE;  Surgeon: Camilo Kelley Jr., MD;  Location: [facility] OR [address] [place] [address];  Service: Urology;  Laterality: Left;    REPLACEMENT OF STENT Bilateral 8/26/2021    Procedure: REPLACEMENT, STENT;  Surgeon: Camilo Kelley Jr., MD;  Location: [facility] OR [address] [place] [address];  Service: Urology;  Laterality: Bilateral;    RETROGRADE PYELOGRAPHY Left 8/30/2024    Procedure: PYELOGRAM, RETROGRADE;  Surgeon: Camilo Kelley Jr., MD;  Location: [facility] OR [address] [place] [address];  Service: Urology;  Laterality: Left;    URETEROSCOPIC REMOVAL OF URETERIC CALCULUS Bilateral 8/26/2021    Procedure: REMOVAL, CALCULUS, URETER, URETEROSCOPIC;  Surgeon: Camilo Kelley Jr., MD;  Location: [facility] OR [address] [place] [address];  Service: Urology;  Laterality: Bilateral;    URETEROSCOPY Bilateral 8/26/2021    Procedure: URETEROSCOPY;  Surgeon: Camilo Kelley Jr., MD;  Location: [address];  Service: Urology;  Laterality: Bilateral;    URETEROSCOPY Left 5/24/2024    Procedure: URETEROSCOPY;  Surgeon: Camilo Kelley Jr., MD;  Location: [address];  Service: Urology;  Laterality: Left;       Family History   Problem Relation Name Age of Onset    Stroke Mother      Hyperlipidemia Mother      Mental illness Father      Parkinsonism Father      No Known Problems Brother         Social History     Tobacco Use    Smoking status: Former     Types: Cigarettes    Smokeless tobacco: Never   Substance Use Topics    Alcohol use: Yes     Comment: 1/ pint whisky daily    Drug use: Yes     Types: "Crack" cocaine       No current facility-administered medications on file prior to encounter.     Current Outpatient Medications on File Prior to Encounter   Medication Sig Dispense Refill    " atorvastatin (LIPITOR) 40 MG tablet Take 40 mg by mouth every evening.      bisacodyL (DULCOLAX, BISACODYL,) 10 mg Supp Place 1 suppository (10 mg total) rectally daily as needed (constipation).  0    CHEST CONGESTION RELIEF DM  mg/5 mL liquid Take by mouth.      cyproheptadine (PERIACTIN) 4 mg tablet Take 4 mg by mouth 3 (three) times daily. Itching      docusate sodium (COLACE) 100 MG capsule Take 1 capsule (100 mg total) by mouth once daily.  0    folic acid (FOLVITE) 1 MG tablet Take 1 mg by mouth once daily.      gabapentin (NEURONTIN) 300 MG capsule Take 300 mg by mouth 3 (three) times daily.      menthol-zinc oxide (CALMOSEPTINE) 0.44-20.6 % Oint Apply topically daily as needed. To sacral area after each sacral excoriation episode and as needed      mirtazapine (REMERON) 30 MG tablet Take 30 mg by mouth nightly.      polyethylene glycol (GLYCOLAX) 17 gram/dose powder Take 17 g by mouth 2 (two) times daily.      tamsulosin (FLOMAX) 0.4 mg Cap TAKE 2 CAPSULES(0.8 MG) BY MOUTH EVERY DAY (Patient taking differently: Take 0.8 mg by mouth once daily.) 60 capsule 11       Review of patient's allergies indicates:  No Known Allergies    Review of Systems:  A review of 10+ systems was conducted with pertinent positive and negative findings documented in HPI with all other systems reviewed and negative.    Objective:     Vitals:   Temp:  [98.7 °F (37.1 °C)-101.2 °F (38.4 °C)] 99.6 °F (37.6 °C)  Pulse:  [] 120  Resp:  [14-38] 14  SpO2:  [94 %-100 %] 100 %  BP: ()/() 67/44       I/O:   Intake/Output Summary (Last 24 hours) at 10/17/2024 0528  Last data filed at 10/17/2024 0503  Gross per 24 hour   Intake 2914 ml   Output --   Net 2914 ml       Physical Exam:  GENERAL: patient sitting comfortably  HEENT: normocephalic  NECK: supple, no JVD  PULM: normal chest rise, no increased WOB  HEART: non-diaphoretic  ABDO: soft, non-distended, non-tender  : patient bleeding from urethra  BACK: no CVA  "tenderness bilaterally  SKIN: warm, dry, well perfused  EXT: no bruising or edema  NEURO: grossly normal with no focal deficits  PSYCH: appropriate mood and affect    Significant Labs:  CBC:  Recent Labs   Lab 10/17/24  0231   WBC 4.84   HGB 15.2   HCT 46.6   *       BMP:  Recent Labs   Lab 10/17/24  0231      K 3.7   *   CO2 15*   BUN 34*   CREATININE 1.6*   CALCIUM 9.3       Urinalysis  No results for input(s): "COLORU", "CLARITYU", "SPECGRAV", "PHUR", "PROTEINUA", "GLUCOSEU", "BILIRUBINCON", "BLOODU", "WBCU", "RBCU", "BACTERIA", "MUCUS", "NITRITE", "LEUKOCYTESUR", "UROBILINOGEN", "HYALINECASTS" in the last 24 hours.    Imaging:  All pertinent imaging results/findings from the past 24 hours have been reviewed.    Results for orders placed or performed during the hospital encounter of 10/17/24 (from the past 2160 hours)   CT Head Without Contrast    Narrative    EXAMINATION:  CT HEAD WITHOUT CONTRAST    CLINICAL HISTORY:  encephalopathy;    TECHNIQUE:  Low dose axial images were obtained through the head.  Coronal and sagittal reformations were also performed. Contrast was not administered.    COMPARISON:  MRI brain 04/22/2022    FINDINGS:  There is no acute intracranial hemorrhage, hydrocephalus, midline shift or mass effect. There is right paramedian frontal lobe encephalomalacia with ex vacuo dilatation of the frontal horn of the right lateral ventricle, similar to prior exam.  The ventricular system is overall unchanged in size and configuration.  There is hypoattenuation within the supratentorial white matter, nonspecific but likely reflecting sequela of chronic microvascular ischemic change.  There is atherosclerotic plaquing of the cavernous and supraclinoid segments of the ICAs and distal vertebral arteries and basilar artery.  The visualized paranasal sinuses and mastoid air cells are clear of an acute process noting opacification of a right ethmoid air cell.  The visualized bones of the " calvarium demonstrate no acute osseous abnormality.      Impression    1. No CT evidence of acute intracranial abnormality. Clinical correlation and further evaluation as warranted.  2. Remote right frontal lobe infarct with ex vacuo dilatation of the frontal horn of the right lateral ventricle.  Unchanged size and configuration of the ventricular system relative to prior exam of 2022.  3. Generalized cerebral volume loss and findings of chronic microvascular ischemic change.      Electronically signed by: Precious Miller MD  Date:    10/17/2024  Time:    05:14     No results found for this or any previous visit (from the past 2160 hours).    Urology Specific Assessment:     Urinary Retention  Hematuria secondary to traumatic mark, urethral false passage    Plan:      Maintain mark  Monitor urine, no CBI needed, urine should clear  Contact urology with questions    Thank you for your consult. Urology will sign off.    Terence Azar MD  Urology  [facility]  [facility]    Disclaimer: This note has been generated using voice-recognition software. There may be typographical errors that have been missed during proof-reading.

## 2024-10-17 NOTE — PLAN OF CARE
The sw met with the pt and his brother Tiffany Jewell([phone])who was in the room during the assessment. The pt has been a resident at the [facility] for the past 1 1/2 years. The pt has never been  and doesn't have any children. The pt has been bedbound since his stroke in 2015. The pt has a very supportive family. The staff at the nh assist the pt with most of his ADL's. The pt refused to get up in a w/c and participate in the activities at the nh. Tiffany states the pt has always been a loner most of his life. The pt will return to the nh after his d/c from the hospital. The sw completed the white board in the pt's room with her name and contact info. The sw gave Tiffany a d/c brochure with her contact info on it. The sw encouraged them to call if they have any further questions or concerns. The sw will continue to follow the pt throughout his transitions of care and will assist with any d/c needs.     [facility] - Intensive Care  Initial Discharge Assessment       Primary Care Provider: [facility]    Admission Diagnosis: Coagulopathy [D68.9]  Fever [R50.9]  Sepsis [A41.9]    Admission Date: 10/17/2024  Expected Discharge Date:     Transition of Care Barriers: (P) None    Payor: [employer] [facility] / Plan: [employer] CHOICES / Product Type: Medicare Advantage /     Extended Emergency Contact Information  Primary Emergency Contact: Tiffany Jewell   [facility]  Home Phone: [phone]  Relation: Brother  Secondary Emergency Contact: Sil Kessler  Mobile Phone: [phone]  Relation: Friend    Discharge Plan A: (P) Return to nursing home  Discharge Plan B: (P) Other (TBD)    No Pharmacies Listed    Initial Assessment (most recent)       Adult Discharge Assessment - 10/17/24 0806          Discharge Assessment    Assessment Type Discharge Planning Assessment (P)      Confirmed/corrected address, phone number and insurance Yes (P)      Confirmed  Demographics Correct on Facesheet (P)      Source of Information patient;family (P)      Communicated FISH with patient/caregiver Date not available/Unable to determine (P)      Reason For Admission Septic shock (P)      [facility] in Home facility resident (P)      Facility Arrived From: [facility] (P)      Do you expect to return to your current living situation? Yes (P)      Do you have help at home or someone to help you manage your care at home? Yes (P)      Who are your caregiver(s) and their phone number(s)? the staff at the nh (P)      Prior to hospitilization cognitive status: Alert/Oriented (P)      Current cognitive status: Alert/Oriented (P)      Walking or Climbing Stairs Difficulty yes (P)      Walking or Climbing Stairs ambulation difficulty, dependent;stair climbing difficulty, dependent;transferring difficulty, dependent (P)      Mobility Management bedbound since 2015 after a stroke (P)      Dressing/Bathing Difficulty yes (P)      Dressing/Bathing bathing difficulty, assistance 1 person (P)      Home Accessibility wheelchair accessible (P)      Home Layout Able to live on 1st floor (P)      Equipment Currently Used at Home other (see comments) (P)    nh's dme    Readmission within 30 days? No (P)      Patient currently being followed by outpatient case management? No (P)      Do you take prescription medications? Yes (P)      Do you have prescription coverage? Yes (P)      Coverage PHN/Medicaid Take Charge (P)      Do you have any problems affording any of your prescribed medications? No (P)      Is the patient taking medications as prescribed? yes (P)      Who is going to help you get home at discharge? [facility] will sn[facility] their w/c van transport if the pt's able to sit in a w/c,if not Case Management will arrange ambulance transport for the pt to return to the nh. (P)      How do you get to doctors appointments? other (see comments) (P)    the nh arranges    Are you on dialysis? No (P)       Do you take coumadin? No (P)      Discharge Plan A Return to nursing home (P)      Discharge Plan B Other (P)    TBD    DME Needed Upon Discharge  none (P)      Discharge Plan discussed with: Patient;Sibling (P)      Name(s) and Number(s) Tiffany Jewell(brother)[phone] (P)      Transition of Care Barriers None (P)         Physical Activity    On average, how many days per week do you engage in moderate to strenuous exercise (like a brisk walk)? 0 days (P)      On average, how many minutes do you engage in exercise at this level? 0 min (P)         Financial Resource Strain    How hard is it for you to pay for the very basics like food, housing, medical care, and heating? Not hard at all (P)         Housing Stability    In the last 12 months, was there a time when you were not able to pay the mortgage or rent on time? No (P)      At any time in the past 12 months, were you homeless or living in a shelter (including now)? No (P)         Transportation Needs    Has the lack of transportation kept you from medical appointments, meetings, work or from getting things needed for daily living? No (P)         Food Insecurity    Within the past 12 months, you worried that your food would run out before you got the money to buy more. Never true (P)      Within the past 12 months, the food you bought just didn't last and you didn't have money to get more. Never true (P)         Stress    Do you feel stress - tense, restless, nervous, or anxious, or unable to sleep at night because your mind is troubled all the time - these days? Not at all (P)         Social Isolation    How often do you feel lonely or isolated from those around you?  Always (P)         Alcohol Use    Q1: How often do you have a drink containing alcohol? Never (P)      Q2: How many drinks containing alcohol do you have on a typical day when you are drinking? Patient does not drink (P)      Q3: How often do you have six or more drinks on one occasion? Never  (P)         Utilities    In the past 12 months has the electric, gas, oil, or water company threatened to shut off services in your home? No (P)         Health Literacy    How often do you need to have someone help you when you read instructions, pamphlets, or other written material from your doctor or pharmacy? Sometimes (P)         OTHER    Name(s) of People in Home nh resident (P)

## 2024-10-17 NOTE — PROGRESS NOTES
Pharmacist Renal Dose Adjustment Note    Praneeth Jewell is a 78 y.o. male being treated with the medication meropenem    Patient Data:    Vital Signs (Most Recent):  Temp: 99.2 °F (37.3 °C) (10/17/24 0639)  Pulse: (!) 126 (10/17/24 0639)  Resp: 16 (10/17/24 0639)  BP: (!) 115/57 (10/17/24 0639)  SpO2: 100 % (10/17/24 0639) Vital Signs (72h Range):  Temp:  [98.4 °F (36.9 °C)-101.2 °F (38.4 °C)]   Pulse:  []   Resp:  [14-38]   BP: ()/()   SpO2:  [94 %-100 %]      Recent Labs   Lab 10/17/24  0231   CREATININE 1.6*     Serum creatinine: 1.6 mg/dL (H) 10/17/24 0231  Estimated creatinine clearance: 35.4 mL/min (A)    Medication: meropenem dose: 500 mg frequency q 6 h will be changed to medication: meropenem dose: 1000 mg frequency: q 12 h    Pharmacist's Name: Vibha Barroso  Pharmacist's Extension: [phone]

## 2024-10-17 NOTE — PROGRESS NOTES
Urology Progress Note    Patient seen and examined at bedside.  Bedside irrigation performed with very minimal effort to clear the Shen catheter to clear yellow urine.  No concerns for active bleeding at this time.    - maintain Shen catheter, do not remove or exchange Shen catheter without 1st discussing with Urology while inpatient  - we will plan for outpatient voiding trial with Urology in the next 3-4 weeks    Urology will now sign off. Please call with any additional questions/concerns.     Esvin Romero MD  Department of Urology  PGY-IV  Pager: [phone]

## 2024-10-17 NOTE — HPI
Patient is a 78-year-old male with history of UTI, BPH chronic Shen, HLD, anemia, depression currently a resident at [facility] who presented to ED for hematuria.  Patient had some complaints at the nursing home which prompted an exchange of his Shen catheter however after removing his chronic Shen catheter unable to place an additional Shen in possibly inflated inside of a false track.  Patient began with hematuria and clots.  Patient became febrile and encephalopathic and presented to ED tachycardic and minimally responsive.  After giving fluids patient began to decompensate further with hypotension, he was placed on vasopressors in ED and a central line was placed.  Patient had a bladder scan which showed large volume, there was an attempt to replace the Shen catheter which was again unsuccessful.  Urology was consulted.  Patient was given 3 L of IV fluids.  Was started on Levophed and was as high as 0.35 mcg/kg/min.  Patient was given antibiotics as his initial lactic acid was 8.  Urology at bedside able to place 20 Armenian catheter.  Bleeding has improved  Labs in ED remarkable for thrombocytopenia and elevated coags.  There is a concern for acute DIC.  Fibrinogen and D-dimer have been added.  Chemistry remarkable for acidosis, elevated anion gap, GURVINDER.  Patient now coming down on Levophed.  We will admit to ICU.  Continue workup blood transfusion.  UA appears infected.  CTA abdomen and pelvis with large amount of fecal material concerning for stercoral colitis possible fecal impaction.  CT brain with chronic remote infarct.  Patient will be admitted to Hospital Medicine ICU    In ED patient received 3 L IV fluids, vitamin K, Kcentra, TXA, vancomycin, Flagyl

## 2024-10-17 NOTE — ED NOTES
At the bedside with ER MD attempting to insert Coude Cath.  Unsuccessful by myself and ER MD.  Stopped attempt ER MD consulted urology.

## 2024-10-17 NOTE — ASSESSMENT & PLAN NOTE
This patient has shock. The type of shock is distributive due to sepsis and hemorrhagic. The patient has the following evidence of shock: persistent hypotension, elevated lactic acid after adequate fluid resuscitation, GURVINDER, and altered mental status. The patient will be admitted to an intensive care unit, they will be treated with Levophed, IV fluids, antibiotics, PRBC.    UA has been  History of PSA sensitive only to Merrem

## 2024-10-17 NOTE — PLAN OF CARE
The sw faxed the pt's updates to his detention [facility] via hard fax [phone].        10/17/24 0749   Post-Acute Status   Post-Acute Authorization Placement   Post-Acute Placement Status Pending post-acute provider review/more information requested   Discharge Plan   Discharge Plan A Return to nursing home   Discharge Plan B Skilled Nursing Facility

## 2024-10-17 NOTE — MEDICAL/APP STUDENT
Pulmonary & Critical Care Medicine Note    Primary Attending Physician: Lorenza Galindo MD  ICU Attending: Feliciano Talavera MD      Subjective:      History of Present Illness:  Mitch Jewell is a 78 y.o. male with a PMH of CVA (2015, R PARDEEP with residual LE), HTN, HLD, complete heart block s/p PPM placement (2021), chronic obstructive uropathy 2/2 BPH with chronic indwelling mark catheter who presented to the ED with hematuria. Patient is currently a resident at [facility]. Per chart, following mark catheter exchange at the [facility], significant hematuria with large clots was noted which prompted him to be brought to the ED. Upon presentation, patient was noted with altered mental status, tachycardia, and hypotension. A significant amount of mitch blood was noted from the urethral meatus following catheter removal in the ED. Urology was consulted and successfully placed a 20F mark catheter. Sepsis protocol was initiated. Patient received broad-spectrum antibiotics and 3L of IVF. Required initiation of vasopressors in the ED for profound hypotension. He also received Vitamin K, Kcentra, TXA given gross bleeding and hematuria. Initial labs remarkable for lactic acid 8.0, platelets 104, INR 2.3, aPTT 91.8, BUN 34, Creatinine 1.6. . Patient was admitted to ICU for septic vs hemorrhagic shock.     Patient noted with increasing pressor requirements this AM, up to Levophed 3 mcg/kg/min and Vasopressin 0.04. Blood pressure labile, MAPs varying from 40-80s. Blood pressure assessed in all four extremities without significant discrepancies. Patient is easily arousable, oriented to self and time. Patient denies any acute complaints or pain. VBG demonstrating metabolic acidosis: pH 7.130, PCO2 44.8, HCO3 14.9. Bedside POCUS with hyperdynamic cardiac function, grossly unremarkable abdominal exam. Mark catheter in placed, draining blood-tinged urine.       Past Medical History:  Past Medical  History:   Diagnosis Date    Arthritis     Diabetes mellitus     type 2    Folate deficiency anemia     Heart block     History of kidney stones 05/25/2021    History of stroke with residual deficit 05/25/2021    Hyperlipidemia     Hypertension     Major depressive disorder     Pacemaker     Biotronic Edora 8 DR-T (S/n: [id number])    Pyelonephritis 11/19/2021    TIA (transient ischemic attack)     Urinary retention 05/25/2021       Past Surgical History:  Past Surgical History:   Procedure Laterality Date    A-V CARDIAC PACEMAKER INSERTION N/A 8/24/2021    Procedure: INSERTION, CARDIAC PACEMAKER, DUAL CHAMBER;  Surgeon: Neo Winn MD;  Location: [facility] EP LAB;  Service: Cardiology;  Laterality: N/A;  CHB, DUAL PPM, ANES, BIO, DM, ED 2    COLONOSCOPY N/A 11/22/2021    Procedure: COLONOSCOPY;  Surgeon: Cesar Dorado MD;  Location: [facility] ENDO (2ND FLR);  Service: Endoscopy;  Laterality: N/A;    CYSTOGRAM N/A 5/24/2024    Procedure: CYSTOGRAM;  Surgeon: Camilo Kelley Jr., MD;  [address]  Service: Urology;  Laterality: N/A;    CYSTOSCOPIC LITHOLAPAXY  5/25/2021    Procedure: CYSTOLITHOLAPAXY;  Surgeon: Chris Schilling MD;  Location: [address];  Service: Urology;;    CYSTOSCOPY  8/26/2021    Procedure: CYSTOSCOPY;  Surgeon: Camilo Kelley Jr., MD;  Location: [facility] OR [address] [place] [address];  Service: Urology;;    CYSTOSCOPY N/A 5/24/2024    Procedure: CYSTOSCOPY;  Surgeon: Camilo Kelley Jr., MD;  [facility];  Service: Urology;  Laterality: N/A;    CYSTOSCOPY W/ URETERAL STENT PLACEMENT Bilateral 5/25/2021    Procedure: CYSTOSCOPY, WITH BILATERAL URETERAL STENT INSERTION;  Surgeon: Chris Schilling MD;  Location: [address];  Service: Urology;  Laterality: Bilateral;    CYSTOSCOPY W/ URETERAL STENT REMOVAL Left 8/30/2024    Procedure: CYSTOSCOPY, WITH URETERAL STENT REMOVAL;  Surgeon: Camilo Kelley Jr., MD;  Location: [facility] OR [address] [place] [address];  Service: Urology;  Laterality: Left;  1 hr     DILATION OF URETHRA N/A 5/24/2024    Procedure: DILATION, URETHRA;  Surgeon: Camilo Kelley Jr., MD;  Location: [facility] OR 1ST FLR;  Service: Urology;  Laterality: N/A;    ELBOW ARTHROPLASTY Right     EXTRACTION - STONE Left 5/24/2024    Procedure: EXTRACTION - STONE;  Surgeon: Camilo Kelley Jr., MD;  Location: [facility] OR 1ST FLR;  Service: Urology;  Laterality: Left;  and kidney    FLUOROSCOPY  5/25/2021    Procedure: FLUOROSCOPY;  Surgeon: Chris Schilling MD;  Location: [facility] OR 1ST FLR;  Service: Urology;;    LASER LITHOTRIPSY Left 8/26/2021    Procedure: LITHOTRIPSY, USING LASER;  Surgeon: Camilo Kelley Jr., MD;  Location: [facility] OR 1ST FLR;  Service: Urology;  Laterality: Left;    LASER LITHOTRIPSY Left 5/24/2024    Procedure: LITHOTRIPSY, USING LASER;  Surgeon: Camilo Kelley Jr., MD;  Location: [facility] OR 1ST FLR;  Service: Urology;  Laterality: Left;    PLACEMENT-STENT Left 5/24/2024    Procedure: PLACEMENT-STENT;  Surgeon: Camilo Kelley Jr., MD;  Location: [facility] OR 1ST FLR;  Service: Urology;  Laterality: Left;    PYELOSCOPY Bilateral 8/26/2021    Procedure: PYELOSCOPY;  Surgeon: Camilo Kelley Jr., MD;  Location: [facility] OR 1ST FLR;  Service: Urology;  Laterality: Bilateral;    PYELOSCOPY Left 5/24/2024    Procedure: PYELOSCOPY;  Surgeon: Camilo Kelley Jr., MD;  Location: [facility] OR 1ST FLR;  Service: Urology;  Laterality: Left;    REMOVAL OF BLOOD CLOT  5/25/2021    Procedure: REMOVAL, BLOOD CLOT;  Surgeon: Chris Schilling MD;  Location: [facility] OR 1ST FLR;  Service: Urology;;    REMOVAL, NEPHROSTOMY TUBE, WITH IMAGING GUIDANCE Left 5/24/2024    Procedure: REMOVAL, NEPHROSTOMY TUBE, WITH IMAGING GUIDANCE;  Surgeon: Camilo Kelley Jr., MD;  Location: [facility] OR 1ST FLR;  Service: Urology;  Laterality: Left;    REPLACEMENT OF STENT Bilateral 8/26/2021    Procedure: REPLACEMENT, STENT;  Surgeon: Camilo Kelley Jr., MD;  Location: [facility] OR [address] [place] [address];  Service: Urology;  Laterality: Bilateral;    RETROGRADE  PYELOGRAPHY Left 8/30/2024    Procedure: PYELOGRAM, RETROGRADE;  Surgeon: Camilo Kelley Jr., MD;  Location: [facility] OR [address] [place] [address];  Service: Urology;  Laterality: Left;    URETEROSCOPIC REMOVAL OF URETERIC CALCULUS Bilateral 8/26/2021    Procedure: REMOVAL, CALCULUS, URETER, URETEROSCOPIC;  Surgeon: Camilo Kelley Jr., MD;  [facility];  Service: Urology;  Laterality: Bilateral;    URETEROSCOPY Bilateral 8/26/2021    Procedure: URETEROSCOPY;  Surgeon: Camilo Kelley Jr., MD;  [facility];  Service: Urology;  Laterality: Bilateral;    URETEROSCOPY Left 5/24/2024    Procedure: URETEROSCOPY;  Surgeon: Camilo Kelley Jr., MD;  Location: [facility] OR [address] [place] [address];  Service: Urology;  Laterality: Left;       Allergies:  Review of patient's allergies indicates:  No Known Allergies    Medications:   In-Hospital Scheduled Medications:   0.9% NaCl  1,000 mL Intravenous ED 1 Time    fludrocortisone  100 mcg Oral Daily    hydrocortisone sodium succinate  50 mg Intravenous Q6H    meropenem IV (PEDS and ADULTS)  1 g Intravenous Q12H    mupirocin   Nasal BID      In-Hospital PRN Medications:    Current Facility-Administered Medications:     0.9%  NaCl infusion (for blood administration), , Intravenous, Q24H PRN    Pharmacy to dose Vancomycin consult, , , Once **AND** vancomycin - pharmacy to dose, , Intravenous, pharmacy to manage frequency   In-Hospital IV Infusion Medications:   dexmedeTOMIDine (Precedex) infusion (titrating)  0-1.4 mcg/kg/hr Intravenous Continuous 3.38 mL/hr at 10/17/24 1423 0.2 mcg/kg/hr at 10/17/24 1423    NORepinephrine bitartrate-D5W  0-3 mcg/kg/min Intravenous Continuous 92.4 mL/hr at 10/17/24 1400 2.92 mcg/kg/min at 10/17/24 1400    vasopressin  0.04 Units/min Intravenous Continuous 12 mL/hr at 10/17/24 1400 0.04 Units/min at 10/17/24 1400      Home Medications:  Prior to Admission medications    Medication Sig Start Date End Date Taking? Authorizing Provider   atorvastatin (LIPITOR) 40  "MG tablet Take 40 mg by mouth every evening.   Yes Provider, Historical   bisacodyL (DULCOLAX, BISACODYL,) 10 mg Supp Place 1 suppository (10 mg total) rectally daily as needed (constipation). 4/17/23  Yes Jenny Smith MD   docusate sodium (COLACE) 100 MG capsule Take 1 capsule (100 mg total) by mouth once daily. 4/17/23  Yes Jenny Smith MD   folic acid (FOLVITE) 1 MG tablet Take 1 mg by mouth once daily.   Yes Provider, Historical   gabapentin (NEURONTIN) 300 MG capsule Take 300 mg by mouth 3 (three) times daily. 4/7/21  Yes Provider, Historical   menthol-zinc oxide (CALMOSEPTINE) 0.44-20.6 % Oint Apply topically daily as needed. To sacral area after each sacral excoriation episode and as needed   Yes Provider, Historical   mirtazapine (REMERON) 30 MG tablet Take 30 mg by mouth nightly. 5/27/21  Yes Provider, Historical   nystatin (MYCOSTATIN) powder Apply topically 2 (two) times a day. BID + PRN to buttocks   Yes Provider, Historical   polyethylene glycol (GLYCOLAX) 17 gram/dose powder Take 17 g by mouth 2 (two) times daily. 2/8/24  Yes Provider, Historical   tamsulosin (FLOMAX) 0.4 mg Cap TAKE 2 CAPSULES(0.8 MG) BY MOUTH EVERY DAY  Patient taking differently: Take 0.8 mg by mouth once daily. 5/18/23  Yes Camilo Kelley Jr., MD   cyproheptadine (PERIACTIN) 4 mg tablet Take 4 mg by mouth 3 (three) times daily. Itching 4/7/21   Provider, Historical   CHEST CONGESTION RELIEF DM  mg/5 mL liquid Take by mouth. 1/17/24 10/17/24  Provider, Historical       Family History:  Family History   Problem Relation Name Age of Onset    Stroke Mother      Hyperlipidemia Mother      Mental illness Father      Parkinsonism Father      No Known Problems Brother         Social History:  Social History     Tobacco Use    Smoking status: Former     Types: Cigarettes    Smokeless tobacco: Never   Substance Use Topics    Alcohol use: Yes     Comment: 1/ pint whisky daily    Drug use: Yes     Types: "Crack" cocaine " "      Review of Systems:  Review of Systems   Constitutional:  Positive for fever and malaise/fatigue.   Respiratory:  Negative for cough and shortness of breath.    Cardiovascular:  Negative for chest pain and palpitations.   Gastrointestinal:  Negative for abdominal pain.   Genitourinary:  Positive for dysuria and hematuria. Negative for flank pain.   Neurological:  Positive for weakness. Negative for loss of consciousness and headaches.   Psychiatric/Behavioral:  The patient is not nervous/anxious.          Objective:   Last 24 Hour Vital Signs:  BP  Min: 45/25  Max: 218/93  Temp  Av.5 °F (37.5 °C)  Min: 98.4 °F (36.9 °C)  Max: 101.2 °F (38.4 °C)  Pulse  Av.9  Min: 76  Max: 130  Resp  Av.6  Min: 11  Max: 38  SpO2  Av.5 %  Min: 92 %  Max: 100 %  Height  Av' 6" (167.6 cm)  Min: 5' 6" (167.6 cm)  Max: 5' 6" (167.6 cm)  Weight  Av.6 kg (164 lb 6.5 oz)  Min: 67.5 kg (148 lb 13 oz)  Max: 81.6 kg (180 lb)  I/O last 3 completed shifts:  In: 4380.1 [I.V.:1242.8; IV Piggyback:3137.3]  Out: -     Physical Examination:  Physical Exam  Vitals and nursing note reviewed.   Constitutional:       General: He is sleeping.      Appearance: He is ill-appearing.   Eyes:      Pupils: Pupils are equal, round, and reactive to light.   Cardiovascular:      Rate and Rhythm: Tachycardia present.      Pulses:           Radial pulses are 2+ on the right side and 2+ on the left side.        Dorsalis pedis pulses are 1+ on the right side and 1+ on the left side.      Heart sounds: Normal heart sounds.      Comments: V-paced rhythm  Pulmonary:      Effort: Pulmonary effort is normal. No respiratory distress.      Breath sounds: Normal breath sounds.   Abdominal:      General: Abdomen is flat. Bowel sounds are normal. There is no distension.      Palpations: Abdomen is soft.      Tenderness: There is no abdominal tenderness.   Genitourinary:     Comments: Shen catheter in place  Skin:     Capillary Refill: " "Capillary refill takes more than 3 seconds.      Coloration: Skin is pale.   Neurological:      Mental Status: He is easily aroused. He is confused.      GCS: GCS eye subscore is 3. GCS verbal subscore is 4. GCS motor subscore is 6.       Laboratory:  Trended Lab Data:  Recent Labs     10/17/24  0231 10/17/24  0306 10/17/24  0657 10/17/24  0855   WBC 4.84  --  17.86* 24.51*   HGB 15.2  --  10.8* 12.2*   HCT 46.6  --  34.1* 38.3*   *  --  64* 67*     --   --  142   K 3.7  --   --  4.2   *  --   --  116*   CO2 15*  --   --  12*   BUN 34*  --   --  37*   CREATININE 1.6*  --   --  1.9*   GLU 78  --   --  88   BILITOT 0.6  --   --  1.1*   AST 36  --   --  95*   ALT 25  --   --  46*   ALKPHOS 95  --   --  75   CALCIUM 9.3  --   --  7.4*   ALBUMIN 3.2*  --   --  2.4*   PROT 6.5  --   --  4.5*   INR  --  2.3*  --  3.2*       Cardiac: No results for input(s): "TROPONINI", "CKTOTAL", "CKMB", "BNP" in the last 168 hours.    Urinalysis:   Lab Results   Component Value Date    LABURIN No growth 05/22/2024    COLORU Yellow 05/22/2024    SPECGRAV 1.030 05/22/2024    NITRITE Negative 05/22/2024    KETONESU Trace (A) 05/22/2024    UROBILINOGEN Negative 04/11/2024       Microbiology:  Microbiology Results (last 7 days)       Procedure Component Value Units Date/Time    Blood culture x two cultures. Draw prior to antibiotics. [[id number]] Collected: 10/17/24 0215    Order Status: Sent Specimen: Blood from Peripheral, Antecubital, Left Updated: 10/17/24 1023    Blood culture x two cultures. Draw prior to antibiotics. [[id number]] Collected: 10/17/24 0215    Order Status: Sent Specimen: Blood from Peripheral, Antecubital, Left Updated: 10/17/24 1023            Radiology:  CXR: coarse interstitial lung markings, no effusion or pneumothorax    CT Head  No CT evidence of acute intracranial abnormality. Clinical correlation and further evaluation as warranted.  2. Remote right frontal lobe infarct with ex vacuo dilatation " of the frontal horn of the right lateral ventricle.  Unchanged size and configuration of the ventricular system relative to prior exam of 2022.  3. Generalized cerebral volume loss and findings of chronic microvascular ischemic change.    CT Chest Abdomen Pelvis  1. Large volume of fecal material within the sigmoid colon and rectum with rectal wall thickening and perirectal inflammatory change.  Findings concerning for fecal impaction and underlying stercoral colitis.  Clinical correlation advised.  2. Urinary bladder wall thickening with small volume of air in the bladder lumen.  Correlation for recent instrumentation advised.  Correlation with urinalysis for infectious process also recommended.  3. Bilateral perinephric fat stranding, nonspecific although it can be seen with infectious process.  4. Bilateral nephrolithiasis.  No hydronephrosis.       I have personally reviewed the above labs and imaging.    Current Medications:     Infusions:   dexmedeTOMIDine (Precedex) infusion (titrating)  0-1.4 mcg/kg/hr Intravenous Continuous 3.38 mL/hr at 10/17/24 1423 0.2 mcg/kg/hr at 10/17/24 1423    NORepinephrine bitartrate-D5W  0-3 mcg/kg/min Intravenous Continuous 92.4 mL/hr at 10/17/24 1400 2.92 mcg/kg/min at 10/17/24 1400    vasopressin  0.04 Units/min Intravenous Continuous 12 mL/hr at 10/17/24 1400 0.04 Units/min at 10/17/24 1400        Scheduled:   0.9% NaCl  1,000 mL Intravenous ED 1 Time    fludrocortisone  100 mcg Oral Daily    hydrocortisone sodium succinate  50 mg Intravenous Q6H    meropenem IV (PEDS and ADULTS)  1 g Intravenous Q12H    mupirocin   Nasal BID        PRN:    Current Facility-Administered Medications:     0.9%  NaCl infusion (for blood administration), , Intravenous, Q24H PRN    Pharmacy to dose Vancomycin consult, , , Once **AND** vancomycin - pharmacy to dose, , Intravenous, pharmacy to manage frequency     Assessment & Plan by Systems:     Neuro:  Acute encephalopathy  -- Likely secondary to  infection and septic shock   -- CT head without acute abnormalities, remote infarcts noted   -- Mentation improving this AM  -- ICU delirium precautions: frequent re-orienting, minimize sedating agents (opiates, benzos)    History of CVA  -- Previous R PARDEEP CVA with residual LE weakness, bedbound at baseline  -- Resume home statin when able to tolerate PO medications    Cardiovascular/Hemodynamics:  Hypotension   Shock  -- Concern for hemorrhagic vs septic shock, though hemorrhagic shock less likely  -- Bedside POCUS with hyperdynamic cardiac function, end-diastolic volumes grossly normal  -- Continue Levophed and Vasopressin for MAP goal > 65  -- Stress-dose steroids added  -- Continue antibiotics for suspected urinary source vs stercoral colitis, Vancomycin and Meropenem  -- Monitor H/H given hematuria and mitch bleeding from urethral meatus, though mitch bleeding has appeared to resolve since mark placement. Received 1 unit of PRBCs.     Respiratory:   No acute issues at this time. Stable saturations on room air.     GI/FEN:  Fecal impaction  Stercoral colitis  -- CT with large amount of fecal material, possible stercoral colitis  -- Patient denies abdominal pain. Abdominal exam benign.   -- Continue antibiotics, Vancomycin and Meropenem   -- Start bowel regimen for impaction    F: Target euvolemia. Adequate fluid resuscitation with sepsis protocol. Net positive 5.4L since admit.   E: Goal K>4, Mg>2  N: NPO    ID:   Sepsis 2/2 suspected urinary tract infection  -- Presented with fever, tachycardia, and profound hypotension. Sepsis protocol initiated.   -- History of recurrent UTIs with chronic indwelling mark.   -- CXR without evidence of infection. CT with large amount of fecal material, possible stercoral colitis  -- Micro: Blood cultures and urine cultures pending  -- Abx: Received cefepime, Flagyl, and Vancomycin in ED. Will continue Vancomycin and Meropenem.     Renal:   Acute kidney injury  -- Likely  multifactorial, pre-renal from hypotension & shock vs post-obstructive 2/2 obstructive uropathy/malfunctioned mark  -- Renally dose all medication, avoid nephrotoxic agents  -- Strict I/Os, daily weights    Hematuria  -- Secondary to traumatic insertion/false track with indwelling chronic mark   -- Received Vitamin K, Kcentra, TXA, and 1 unit of PRBCs for mitch bleeding. Bleeding now resolved.   -- Urology following  -- UA pending  -- Monitor H/H    Heme/Onc:   Thrombocytopenia  -- Likely secondary to sepsis  -- Concern for DIC given elevated coags. Continued management of underlying condition, sepsis.   -- Transfuse blood products if patient becomes hemodynamically unstable with acute bleeding, symptomatic or H/H drops below 7/21    Endocrine:  No acute issues at this time.   -- Goal glucose 140-180 while in ICU      Rheum/MSK:  Chronic debility   -- Residual deficits from prior CVA, bedbound at baseline  -- Consider PT/OT for mobilization during hospitalization when more hemodynamically stable      ICU Checklist  Feeding: NPO  Analgesia: None  Sedation: None  Thrombo PPX: Holding due to acute bleeding, elevated INR  Head of Bed: > 30 degrees  Ulcer PPX: Not indicated  Glucose: goal 140-180s, hypoglycemic ppx  SAT/SBT: N/A  Bowel Regimen: Miralax, Senna, Soap suds enema  Indwelling Lines: PIV, RIJ TLC, mark (chronic)  Abx: Vancomycin, Meropenem  Family Discussions: Plan of care discussed with patient and patient's brother at bedside during rounds.    Code Status: FULL  Dispo: DNR       Case discussed with critical care team on rounds under the supervision of Dr. Talavera. Attestation to follow.   Ofelia Dupree, NP Student     Pt seen and examined with Pulmonary/Critical Care team and this note was reviewed and validated with the following additional comments: Confusing case.  Mentating near his baseline, warm, great peripheral pulses, no dyspnea, good urine output, HOWEVER BP measured non-invasively in all  4 extremities has been very labile.  At times he has required near 3 mcg/kg/min NE to keep MAP above 60.  We have added steroids and vasopressin. Will place A-line for validation of NIBP.     Critical Care time was spent validating the history and physical exam, reviewing the lab and imaging results, and discussing the care of the patient with the bedside nurse and the patient and/or surrogates. This critical care time did not overlap with that of any other provider or involve time for any procedures.  This patient has a high probability of sudden clinically significant deterioration which requires the highest level of physician preparedness to intervene urgently. I managed/supervised life or organ supporting interventions that required frequent physician assessments. I devoted my full attention in the ICU to the direct care of this patient for this period of time. Organ systems which are failing and require intensive, critical care support are: cardiovascular  Critical Care time: 55 minutes excluding procedures.     Homer Talavera MD  Phone [phone]

## 2024-10-17 NOTE — PROGRESS NOTES
"[facility]           Pharmacy  Admission Medication Reconciliation     Based on information gathered for medication list, you may go to "Admission" then "Reconcile Home Medications" tabs to review and/or act upon those items.     The home medication list has been updated by the Pharmacy department.   Please read ALL comments highlighted in red.   Please address this information as you see fit.    Feel free to contact us if you have any questions or require assistance.    Home medication list has been compared to current inpatient medications. Please review the following discrepancies noted below:      Patient reports STILL TAKING the following medication(s) which was not ordered upon admit  Atorvastatin  Tamsulosin  Polyethylene glycol  Mirtazapine    Feel free to contact us if you have any questions or require assistance.    Jess Mccormack, PharmD  [phone]        "

## 2024-10-17 NOTE — PROGRESS NOTES
"Pharmacokinetic Initial Assessment: IV Vancomycin    Assessment/Plan:  Patient received vancomycin 2000 mg (29.5 mg/kg) on 10/17 @ 0323. Will draw vancomycin random about 24 hours after last dose in setting of GURVINDER and low UOP.     Pharmacy will continue to follow and monitor vancomycin.      Please contact pharmacy at extension [phone] with any questions regarding this assessment.     Thank you for the consult,   Jess Mccormack       Patient brief summary:  Praneeth Jewell is a 78 y.o. male initiated on antimicrobial therapy with IV Vancomycin for treatment of suspected sepsis    Drug Allergies:   Review of patient's allergies indicates:  No Known Allergies    Actual Body Weight:   67.5 kg    Renal Function:   Estimated Creatinine Clearance: 29.8 mL/min (A) (based on SCr of 1.9 mg/dL (H)).,     Dialysis Method (if applicable):  N/A    CBC (last 72 hours):  Recent Labs   Lab Result Units 10/17/24  0231 10/17/24  0657 10/17/24  0855   WBC K/uL 4.84 17.86* 24.51*   Hemoglobin g/dL 15.2 10.8* 12.2*   Hematocrit % 46.6 34.1* 38.3*   Platelets K/uL 104* 64* 67*   Gran % % 83.0* 91.9* 93.3*   Lymph % % 16.0* 4.0* 3.3*   Mono % % 0.0* 0.5* 0.4*   Eosinophil % % 1.0 0.1 0.0   Basophil % % 0.0 0.3 0.2   Differential Method  Manual Automated Automated       Metabolic Panel (last 72 hours):  Recent Labs   Lab Result Units 10/17/24  0231 10/17/24  0855   Sodium mmol/L 143 142   Potassium mmol/L 3.7 4.2   Chloride mmol/L 111* 116*   CO2 mmol/L 15* 12*   Glucose mg/dL 78 88   BUN mg/dL 34* 37*   Creatinine mg/dL 1.6* 1.9*   Albumin g/dL 3.2* 2.4*   Total Bilirubin mg/dL 0.6 1.1*   Alkaline Phosphatase U/L 95 75   AST U/L 36 95*   ALT U/L 25 46*       Drug levels (last 3 results):  No results for input(s): "VANCOMYCINRA", "VANCORANDOM", "VANCOMYCINPE", "VANCOPEAK", "VANCOMYCINTR", "VANCOTROUGH" in the last 72 hours.    Microbiologic Results:  Microbiology Results (last 7 days)       Procedure Component Value Units Date/Time    Blood " culture x two cultures. Draw prior to antibiotics. [[id number]] Collected: 10/17/24 0215    Order Status: Sent Specimen: Blood from Peripheral, Antecubital, Left Updated: 10/17/24 1023    Blood culture x two cultures. Draw prior to antibiotics. [[id number]] Collected: 10/17/24 0215    Order Status: Sent Specimen: Blood from Peripheral, Antecubital, Left Updated: 10/17/24 1023

## 2024-10-17 NOTE — CONSULTS
Urology Note    Contacted regarding difficult mark, patient with sepsis. Nursing unable to pass a catheter, patient now just back from CT.     Bladder appears relatively decompressed on CT imaging. Patient with sepsis, nursing staff informed me that central line placement is next.     Requested a bladder scan. Will allow for central line placement and will place mark after as long as bladder scan volumes are low.     Formal consult note to follow.     Terence Azar MD  Urology

## 2024-10-17 NOTE — ED PROVIDER NOTES
Encounter Date: 10/17/2024       History     Chief Complaint   Patient presents with    Hematuria     Patient brought in by EMS with c/o blood in urine, nausea and vomiting. Urinary cathter in situ upon arrival, hematuria noted. Denies pain. Patient is conscious, responding to pain and not oriented to person, place and time.     HPI  38-year-old male with extensive medical comorbidities presents brought in by EMS from the [facility] of that is from for inability to place Shen/hematuria, although found to be febrile on arrival.  See MDM for complicated course.  Review of patient's allergies indicates:  No Known Allergies  Past Medical History:   Diagnosis Date    Arthritis     Diabetes mellitus     type 2    Folate deficiency anemia     Heart block     History of kidney stones 05/25/2021    History of stroke with residual deficit 05/25/2021    Hyperlipidemia     Hypertension     Major depressive disorder     Pacemaker     Biot[employer] [place] 8 DR-T (S/n: [id number])    Pyelonephritis 11/19/2021    TIA (transient ischemic attack)     Urinary retention 05/25/2021     Past Surgical History:   Procedure Laterality Date    A-V CARDIAC PACEMAKER INSERTION N/A 8/24/2021    Procedure: INSERTION, CARDIAC PACEMAKER, DUAL CHAMBER;  Surgeon: Neo Winn MD;  Location: [facility] EP LAB;  Service: Cardiology;  Laterality: N/A;  CHB, DUAL PPM, ANES, BIO, DM, ED 2    COLONOSCOPY N/A 11/22/2021    Procedure: COLONOSCOPY;  Surgeon: Cesar Dorado MD;  Location: [facility] ENDO (2ND FLR);  Service: Endoscopy;  Laterality: N/A;    CYSTOGRAM N/A 5/24/2024    Procedure: CYSTOGRAM;  Surgeon: Camilo Kelley Jr., MD;  [facility];  Service: Urology;  Laterality: N/A;    CYSTOSCOPIC LITHOLAPAXY  5/25/2021    Procedure: CYSTOLITHOLAPAXY;  Surgeon: Chris Schilling MD;  [facility];  Service: Urology;;    CYSTOSCOPY  8/26/2021    Procedure: CYSTOSCOPY;  Surgeon: Camilo Kelley Jr., MD;  Location: [facility] OR [address] [place] [address];  Service:  Urology;;    CYSTOSCOPY N/A 5/24/2024    Procedure: CYSTOSCOPY;  Surgeon: Camilo Kelley Jr., MD;  Location: [facility] OR 1ST FLR;  Service: Urology;  Laterality: N/A;    CYSTOSCOPY W/ URETERAL STENT PLACEMENT Bilateral 5/25/2021    Procedure: CYSTOSCOPY, WITH BILATERAL URETERAL STENT INSERTION;  Surgeon: Chris Schilling MD;  Location: [facility] OR 1ST FLR;  Service: Urology;  Laterality: Bilateral;    CYSTOSCOPY W/ URETERAL STENT REMOVAL Left 8/30/2024    Procedure: CYSTOSCOPY, WITH URETERAL STENT REMOVAL;  Surgeon: Camilo Kelley Jr., MD;  Location: [facility] OR 1ST FLR;  Service: Urology;  Laterality: Left;  1 hr    DILATION OF URETHRA N/A 5/24/2024    Procedure: DILATION, URETHRA;  Surgeon: Camilo Kelley Jr., MD;  Location: [facility] OR 1ST FLR;  Service: Urology;  Laterality: N/A;    ELBOW ARTHROPLASTY Right     EXTRACTION - STONE Left 5/24/2024    Procedure: EXTRACTION - STONE;  Surgeon: Camilo Kelley Jr., MD;  Location: [facility] OR 1ST FLR;  Service: Urology;  Laterality: Left;  and kidney    FLUOROSCOPY  5/25/2021    Procedure: FLUOROSCOPY;  Surgeon: Chris Schilling MD;  Location: [facility] OR 1ST FLR;  Service: Urology;;    LASER LITHOTRIPSY Left 8/26/2021    Procedure: LITHOTRIPSY, USING LASER;  Surgeon: Camilo Kelley Jr., MD;  Location: [facility] OR 1ST FLR;  Service: Urology;  Laterality: Left;    LASER LITHOTRIPSY Left 5/24/2024    Procedure: LITHOTRIPSY, USING LASER;  Surgeon: Camilo Kelley Jr., MD;  Location: [facility] OR 1ST FLR;  Service: Urology;  Laterality: Left;    PLACEMENT-STENT Left 5/24/2024    Procedure: PLACEMENT-STENT;  Surgeon: Camilo Kelley Jr., MD;  Location: [facility] OR 1ST FLR;  Service: Urology;  Laterality: Left;    PYELOSCOPY Bilateral 8/26/2021    Procedure: PYELOSCOPY;  Surgeon: Camilo Kelley Jr., MD;  Location: [facility] OR 1ST FLR;  Service: Urology;  Laterality: Bilateral;    PYELOSCOPY Left 5/24/2024    Procedure: PYELOSCOPY;  Surgeon: Camilo Kelley Jr., MD;  Location: [facility] OR [address] [place] [address];  Service:  "Urology;  Laterality: Left;    REMOVAL OF BLOOD CLOT  5/25/2021    Procedure: REMOVAL, BLOOD CLOT;  Surgeon: Chris Schilling MD;  [facility];  Service: Urology;;    REMOVAL, NEPHROSTOMY TUBE, WITH IMAGING GUIDANCE Left 5/24/2024    Procedure: REMOVAL, NEPHROSTOMY TUBE, WITH IMAGING GUIDANCE;  Surgeon: Camilo Kelley Jr., MD;  [facility];  Service: Urology;  Laterality: Left;    REPLACEMENT OF STENT Bilateral 8/26/2021    Procedure: REPLACEMENT, STENT;  Surgeon: Camilo Kelley Jr., MD;  Location: [facility] OR [address] [place] [address];  Service: Urology;  Laterality: Bilateral;    RETROGRADE PYELOGRAPHY Left 8/30/2024    Procedure: PYELOGRAM, RETROGRADE;  Surgeon: Camilo Kelley Jr., MD;  Location: [facility] OR [address] [place] [address];  Service: Urology;  Laterality: Left;    URETEROSCOPIC REMOVAL OF URETERIC CALCULUS Bilateral 8/26/2021    Procedure: REMOVAL, CALCULUS, URETER, URETEROSCOPIC;  Surgeon: Camilo Kelley Jr., MD;  Location: [facility] OR [address] [place] [address];  Service: Urology;  Laterality: Bilateral;    URETEROSCOPY Bilateral 8/26/2021    Procedure: URETEROSCOPY;  Surgeon: Camilo Kelley Jr., MD;  Location: [facility] OR [address] [place] [address];  Service: Urology;  Laterality: Bilateral;    URETEROSCOPY Left 5/24/2024    Procedure: URETEROSCOPY;  Surgeon: Camilo Kelley Jr., MD;  Location: [facility] OR [address] [place] [address];  Service: Urology;  Laterality: Left;     Family History   Problem Relation Name Age of Onset    Stroke Mother      Hyperlipidemia Mother      Mental illness Father      Parkinsonism Father      No Known Problems Brother       Social History     Tobacco Use    Smoking status: Former     Types: Cigarettes    Smokeless tobacco: Never   Substance Use Topics    Alcohol use: Yes     Comment: 1/ pint whisky daily    Drug use: Yes     Types: "Crack" cocaine     Review of Systems  Unable to obtain due to acute encephalopathy  Physical Exam     Initial Vitals [10/17/24 0206]   BP Pulse Resp Temp SpO2   (!) 149/105 87 (!) 36 (!) 101.2 °F (38.4 °C) 95 %    "   MAP       --         Physical Exam  Physical  General: Awake, but frankly encephalopathic and toxic appearing  Head: Normocephalic, atraumatic  Neck: Trachea midline, full range of motion, no meningismus  ENT: Atraumatic, Airway Patent  Cardio: irregularly irregular tachycardia,, Heart Sounds Normal, Capillary refill normal  Chest: Atraumatic, No respiratory distress no use of accessory muscles to breath, normal rate, sounds even and clear to auscultation  Abdomen: Soft, Nontender, no involuntary guarding, rigidity, or rebound.  Upper Ext: Atraumatic, Inspection normal, no swelling  Lower Ext: Atraumatic, Inspection normal, no swelling  Neuro: No gross cranial nerve abnormality, no lateralization, no gross sensory or motor deficits  Abdomen: Soft, Nontender, no involuntary guarding, rigidity, or rebound.  ED Course   Procedures  Labs Reviewed   CBC W/ AUTO DIFFERENTIAL - Abnormal       Result Value    WBC 4.84      RBC 4.75      Hemoglobin 15.2      Hematocrit 46.6      MCV 98      MCH 32.0 (*)     MCHC 32.6      RDW 13.8      Platelets 104 (*)     MPV 10.1      Immature Granulocytes CANCELED      Immature Grans (Abs) CANCELED      Lymph # CANCELED      Mono # CANCELED      Eos # CANCELED      Baso # CANCELED      nRBC 1 (*)     Gran % 83.0 (*)     Lymph % 16.0 (*)     Mono % 0.0 (*)     Eosinophil % 1.0      Basophil % 0.0      Platelet Estimate Decreased (*)     Differential Method Manual     COMPREHENSIVE METABOLIC PANEL - Abnormal    Sodium 143      Potassium 3.7      Chloride 111 (*)     CO2 15 (*)     Glucose 78      BUN 34 (*)     Creatinine 1.6 (*)     Calcium 9.3      Total Protein 6.5      Albumin 3.2 (*)     Total Bilirubin 0.6      Alkaline Phosphatase 95      AST 36      ALT 25      eGFR 44 (*)     Anion Gap 17 (*)    LACTIC ACID, PLASMA - Abnormal    Lactate (Lactic Acid) 9.3 (*)     Narrative:     Lactic Acid critical result(s) called and verbal readback obtained   from Dimple Mckenzie RN by TDF  10/17/2024 06:11   LACTIC ACID, PLASMA - Abnormal    Lactate (Lactic Acid) 8.0 (*)     Narrative:     Lactic Acid critical result(s) called and verbal readback obtained   from Alisha Briceño RN by SONNY 10/17/2024 03:06   APTT - Abnormal    aPTT 91.8 (*)    PROTIME-INR - Abnormal    Prothrombin Time 24.0 (*)     INR 2.3 (*)    CULTURE, BLOOD   CULTURE, BLOOD   URINALYSIS, REFLEX TO URINE CULTURE   D DIMER, QUANTITATIVE   FIBRINOGEN   APTT   PROTIME-INR   SARS-COV-2 RDRP GENE    POC Rapid COVID Negative       Acceptable Yes     POCT INFLUENZA A/B MOLECULAR    POC Molecular Influenza A Ag Negative      POC Molecular Influenza B Ag Negative       Acceptable Yes     TYPE & SCREEN    Group & Rh A POS      Indirect Sheri NEG      Specimen Outdate 10/20/2024 23:59     PREPARE RBC SOFT    UNIT NUMBER [id number]      Product Code [id number]      DISPENSE STATUS CROSSMATCHED      CODING SYSTEM [id number]      Unit Blood Type Code 6200      Unit Blood Type A POS      Unit Expiration [id number]      CROSSMATCH INTERPRETATION Compatible            Imaging Results              X-Ray Chest AP Portable (In process)                       CT Chest Abdomen Pelvis Without Contrast (XPD) (Final result)  Result time 10/17/24 05:48:03      Final result by Precious Miller MD (10/17/24 05:48:03)                   Impression:      1. Large volume of fecal material within the sigmoid colon and rectum with rectal wall thickening and perirectal inflammatory change.  Findings concerning for fecal impaction and underlying stercoral colitis.  Clinical correlation advised.  2. Urinary bladder wall thickening with small volume of air in the bladder lumen.  Correlation for recent instrumentation advised.  Correlation with urinalysis for infectious process also recommended.  3. Bilateral perinephric fat stranding, nonspecific although it can be seen with infectious process.  4. Bilateral nephrolithiasis.  No  hydronephrosis.  5. Multiple additional findings as above.      Electronically signed by: Precious Miller MD  Date:    10/17/2024  Time:    05:48               Narrative:    EXAMINATION:  CT CHEST ABDOMEN PELVIS WITHOUT CONTRAST(XPD)    CLINICAL HISTORY:  severe sepsis, hematuria;    TECHNIQUE:  Low dose axial images, sagittal and coronal reformations were obtained from the thoracic inlet to the pubic symphysis .  No oral or IV contrast.    COMPARISON:  Renal stone study 04/10/2024    FINDINGS:  There is a left-sided cardiac pacing device in place.  The thoracic aorta there is normal in caliber, contour and course with atherosclerotic plaquing.  The heart is mildly prominent in size with calcific atherosclerosis of the coronary vessels.  No pericardial fluid.  No significant axillary or mediastinal adenopathy.  Hilar contours are within normal limits on the basis of this noncontrast exam.    The trachea is midline and the proximal airways appear patent.  The configuration of the trachea indicates images were acquired during expiratory phase.  Evaluation of pulmonary parenchyma is limited due to respiratory motion artifact.  There are streaky bibasilar opacities suggestive of atelectasis as well as dependent bibasilar atelectasis.  No pleural fluid or pneumothorax.    Please note evaluation of solid organ parenchyma is limited due to lack of IV contrast.  There is fatty atrophy of the pancreas.  No calcified stones are identified in the gallbladder lumen.  The liver, spleen and adrenal glands demonstrate an unremarkable noncontrast CT appearance.  Incidentally noted colonic interposition.    The kidneys demonstrate bilateral perinephric fat stranding.  Correlation with urinalysis advised.  There are bilateral renal calculi, the largest in the inferior pole of the left kidney measuring 1.2 cm.  No overt hydronephrosis.  The urinary bladder demonstrates circumferential wall thickening and non dependent air within its  lumen.  Correlation with urinalysis for infectious process advised.    The abdominal aorta tapers appropriately with significant atherosclerosis.  No retroperitoneal fluid/hemorrhage.    There is a large volume of fecal material throughout the sigmoid colon and rectum perirectal wall thickening and inflammatory change.  Findings concerning for fecal impaction and stercoral colitis.  The visualized loops of small bowel demonstrate no evidence of obstruction or inflammatory change.  There is no ascites or free intraperitoneal air.    The visualized osseous structures appear intact with degenerative change.    This report was flagged in Epic as abnormal.                                       CT Head Without Contrast (Final result)  Result time 10/17/24 05:14:11      Final result by Precious Miller MD (10/17/24 05:14:11)                   Impression:      1. No CT evidence of acute intracranial abnormality. Clinical correlation and further evaluation as warranted.  2. Remote right frontal lobe infarct with ex vacuo dilatation of the frontal horn of the right lateral ventricle.  Unchanged size and configuration of the ventricular system relative to prior exam of 2022.  3. Generalized cerebral volume loss and findings of chronic microvascular ischemic change.      Electronically signed by: Precious Miller MD  Date:    10/17/2024  Time:    05:14               Narrative:    EXAMINATION:  CT HEAD WITHOUT CONTRAST    CLINICAL HISTORY:  encephalopathy;    TECHNIQUE:  Low dose axial images were obtained through the head.  Coronal and sagittal reformations were also performed. Contrast was not administered.    COMPARISON:  MRI brain 04/22/2022    FINDINGS:  There is no acute intracranial hemorrhage, hydrocephalus, midline shift or mass effect. There is right paramedian frontal lobe encephalomalacia with ex vacuo dilatation of the frontal horn of the right lateral ventricle, similar to prior exam.  The ventricular system is overall  unchanged in size and configuration.  There is hypoattenuation within the supratentorial white matter, nonspecific but likely reflecting sequela of chronic microvascular ischemic change.  There is atherosclerotic plaquing of the cavernous and supraclinoid segments of the ICAs and distal vertebral arteries and basilar artery.  The visualized paranasal sinuses and mastoid air cells are clear of an acute process noting opacification of a right ethmoid air cell.  The visualized bones of the calvarium demonstrate no acute osseous abnormality.                                       X-Ray Chest AP Portable (Final result)  Result time 10/17/24 05:26:33      Final result by Precious Miller MD (10/17/24 05:26:33)                   Impression:      Please see above.      Electronically signed by: Precious Miller MD  Date:    10/17/2024  Time:    05:26               Narrative:    EXAMINATION:  XR CHEST AP PORTABLE    CLINICAL HISTORY:  Sepsis;    TECHNIQUE:  Single frontal view of the chest was performed.    COMPARISON:  07/30/2023    FINDINGS:  Cardiac monitoring leads overlie the chest.  There is a stably positioned left-sided cardiac pacing device in place.  The cardiomediastinal silhouette is unchanged in size and configuration.  There is atherosclerosis and tortuosity of the thoracic aorta.  The lungs are symmetrically expanded with coarse interstitial lung markings.  No confluent airspace consolidation, substantial volume of pleural fluid or pneumothorax identified.  Osseous structures intact with degenerative change.                                       Medications   vasopressin (PITRESSIN) 0.2 Units/mL in D5W 100 mL infusion (0 Units/min Intravenous Hold 10/17/24 0515)   0.9%  NaCl infusion (for blood administration) (has no administration in time range)   mupirocin 2 % ointment (has no administration in time range)   0.9%  NaCl infusion (has no administration in time range)   NORepinephrine 32 mg in D5W 250 mL infusion  (has no administration in time range)   sodium chloride 0.9% bolus 1,914 mL 1,914 mL (0 mLs Intravenous Stopped 10/17/24 0400)   ceFEPIme injection 2 g (2 g Intravenous Given 10/17/24 0242)   ondansetron injection 8 mg (8 mg Intravenous Given 10/17/24 0237)   vancomycin 2 g in dextrose 5 % 500 mL IVPB (0 mg Intravenous Stopped 10/17/24 0523)   acetaminophen suppository 650 mg (650 mg Rectal Given 10/17/24 0237)   0.9%  NaCl infusion (0 mLs Intravenous Stopped 10/17/24 0503)   NORepinephrine bitartrate-D5W 4 mg/250 mL (16 mcg/mL) infusion Soln (0.1 mcg/kg/min  Rate/Dose Change 10/17/24 0500)   metronidazole IVPB 500 mg (0 mg Intravenous Stopped 10/17/24 0546)   phytonadione vitamin k (AQUA-MEPHYTON) 10 mg in D5W 50 mL IVPB (0 mg Intravenous Stopped 10/17/24 0616)   Prothrombin complex concentrate, human (Kcentra) 2,084 Units IVPB (2,084 Units Intravenous New Bag 10/17/24 0550)   tranexamic acid (CYKLOKAPRON) 1,000 mg in 0.9% NaCl 100 mL IVPB (MB+) (0 mg Intravenous Stopped 10/17/24 0544)     Medical Decision Making             ED Course as of 10/17/24 0703   Thu Oct 17, 2024   0451 Penile bleeding, frankly abnromal coags. Kcentrra, vit K,  [DS]   0453 Uro reconsulted and otw. Direct pressure applied   [DS]   0500 Admitted,callling eicu [DS]   0509 E-ICU dr whiting , discussed all elements of care, agrees with current management, recs 1 more liter of fluid, ongoing reassessment.  [DS]   0518 Informed his brother of critical illness.  He is on the way. [DS]   0522 Sepsis reassessment preformed. Currently getting lots of meds for septic shock with mosd, possible DIC. Dr elizabeth doing procedure.  [DS]   0523 After that's done will redraw lactic   [DS]   0529 Shen in. [DS]      ED Course User Index  [DS] Neo Santos MD          See above for specific times.             78-year-old male with a long list of medical comorbidities he has presents brought in from SNF, what we were told by EMS is that he had complained of  abdominal pain and nurses attempted to exchange Shen, after which they noted blood in it and sent him in.  Was seems to have happened in his head he was likely febrile and encephalopathic, and may have complained of something pertinent which prompted catheter exchange, which was unsuccessful and led to false lumen, it seems that it was probably inflated inside of false tract inside of the prostate.  On arrival 2 separate things happened.  The 1st is that he was noted to be febrile, and tachycardic, and empiric bundle therapy for sepsis with broad-spectrum antibiotics appropriate for healthcare associated organisms was initiated.  We also attempted to irrigate Shen which was unsuccessful, and fall he was noted not to be in the bladder on ultrasound.  We then attempted to replace a standard Shen which was unsuccessful, followed by a 20 Nigerian coude, which was again unsuccessful.  Urology was consulted, and agreed to come in in short order to assist.  While receiving the initial sepsis fluid bolus however despite resuscitation with pressure bagging, he continued to decompensate, another Liter was given after which he continued to decompensate.  Vasopressors were ordered, both Levophed and vasopressin for Levophed sparing effect, and central line was emergently performed in order to facilitate this due to need for multipart large volume access.  It was placed without complication, line was confirmed, and the pressors removed her there.  Upon coming out of the procedure to things simultaneously happened, which is that his coags resulted grossly abnormal, and I was called back to the room as he had started to frankly hemorrhage out of his urethra.  I ordered DIC labs, I ordered vitamin K, Kcentra (considered FFP and that would be what was preferred based on institutional guidelines however given that he was acutely hemorrhaging, already in shock, with grossly abnormal coags, he could have  before they had fully  defrosted, so Kcentra was ordered.  I spoke with pharmacy in order to expedite this, their assistance is appreciated.  Additionally ordered tranexamic acid IV.  I discussed all elements of care with the admitting team will be continuing care and had him admitted to the ICU, and subsequently consulted E ICU, discussed all elements of care with Dr. Ro, who agreed with all current interventions, recommended 1 more L be added.  Dr. Azar was reconsulted at this time and informed the update, he is EN route.  He came in and using the scope, was able to place a Shen, believes that there was hemorrhage from a false limb and that has been created.  Following all these interventions patient was reassessed multiple times including a sepsis reassessment and had stabilized.  He is normotensive, has a heart rate of about 110, and is having intermittently more appropriately mentation, such as following basic commands and answering to his name which he could not do before.  I called his brother at the phone number listed, and informed of the his brother's critically ill status and recommended he come in.     Procedures:   Critical care:  80 minutes were spent in the critical care of this patient. Patient presented with septic shock which is an acute life and limb threatening time-sensitive medical condition with high risk of decompensation requiring resuscitation including bundle therapy for sepsis including large volume fluid resuscitation, broad-spectrum antibiotics, ultimately vasopressors, multiple and frequent reassessments, central line placement, severe coagulopathy that required emergent blood transfusion for acute hemorrhage, correction of metabolic coagulopathy and evaluation for presumed DIC, specialist consultation, intensivist consultation..  Time spent including time at the present bedside, time obtaining additional ancillary information, ordering and interpreting studies, reassessments, ordering interventions,  discussion with patient and family,.  This does not include time spent on separately billed for procedures.  I personally performed the critical care time documented here.    Central Line:  Consent was emergent, indication was need for vasopressors and large amount of medications for treatment of septic shock and metabolic coagulopathy and acute hemorrhage.  Location was right IJ, risks benefits and alternatives considered.  The area was prepared with chlorhexidine x3, sterile technique was used throughout procedure including sterile gloves, drape, sterile ultrasound probe cover, mask and hair net and gown.  Ultrasound was used throughout procedure.  16 cm triple-lumen 7 Welsh CBC was placed with modified Seldinger technique, all points were aspirated, 3 cc of lidocaine without epinephrine were used for analgesia and patient tolerated procedure without complication was improved after procedure.  Due to critical illness, line placement was confirmed via ultrasonographic bubble test and ultrasound rule out of pneumothorax.  Chest x-ray to follow.    Patient seen in the Emergency department, which included consideration and evaluation for life and limb threatening medical conditions including the history, exam, timely review and interpretation of studies.   All labs and imaging ordered for this encountered were reviewed and included in medical decision making.  All imaging obtained was personally reviewed by me in order to expedite care prior to radiology read.  Clinical Impression:  Final diagnoses:  [R50.9] Fever  [A41.9] Sepsis  [A41.9] Sepsis - hypotension  [D68.9] Coagulopathy  [A41.9, R65.21] Septic shock (Primary)  [S37.30XA] Injury of urethra, initial encounter          ED Disposition Condition    Admit Stable                Neo Santos MD  10/17/24 0703

## 2024-10-17 NOTE — ED NOTES
Dr Santos placed the Central line to right IJ.  Pt tolerated the procedure well.  X ray ordered stat to confirm placement.  Sterile dressing placed with biopatch in place.

## 2024-10-17 NOTE — PROCEDURES
Urology Difficult Mark Placement Note    Date of Procedure:   10/17/2024     Surgeon:  Terence Azar MD    Procedure:   Mark catheter placement    Description of Procedure:  Patient was prepped and draped in the usual sterile manner. Physical exam revealed blood per urethra.  The cystoscope was introduced and a false passage was noted in the bulbar urethra. I navigated the scope into the bladder and placed a wire. A 20 St Lucian Benton tip catheter was then inserted per meatus without resistance with return of pink urine over the wire which was removed. The balloon was then inflated with 20cc of sterile water. Catheter was then connected to gravity drainage bag.    Plan:  - Maintain mark  - Call with questions    Terence Azar MD  Urology  [facility]     PAST SURGICAL HISTORY:  H/O ascending aortic replacement Bovine Replacement    H/O total knee replacement, right complicated with fx femur bars screws in femur- b/l knee replacement    Presence of Watchman left atrial appendage closure device     S/P CABG x 1 complication of bleeding 2016    S/P hysterectomy

## 2024-10-17 NOTE — H&P
[facility] Medicine  History & Physical    Patient Name: Praneeth Jewell  MRN: [id number]  Patient Class: IP- Inpatient  Admission Date: 10/17/2024  Attending Physician: Arlene Hart*   Primary Care Provider: Home, [facility]         Patient information was obtained from EMS personnel, past medical records, and ER records.     Subjective:     Principal Problem:Septic shock    Chief Complaint:   Chief Complaint   Patient presents with    Hematuria     Patient brought in by EMS with c/o blood in urine, nausea and vomiting. Urinary cathter in situ upon arrival, hematuria noted. Denies pain. Patient is conscious, responding to pain and not oriented to person, place and time.        HPI: Patient is a 78-year-old male with history of UTI, BPH chronic Shen, HLD, anemia, depression currently a resident at [facility] who presented to ED for hematuria.  Patient had some complaints at the nursing home which prompted an exchange of his Shen catheter however after removing his chronic Shen catheter unable to place an additional Shen in possibly inflated inside of a false track.  Patient began with hematuria and clots.  Patient became febrile and encephalopathic and presented to ED tachycardic and minimally responsive.  After giving fluids patient began to decompensate further with hypotension, he was placed on vasopressors in ED and a central line was placed.  Patient had a bladder scan which showed large volume, there was an attempt to replace the Shen catheter which was again unsuccessful.  Urology was consulted.  Patient was given 3 L of IV fluids.  Was started on Levophed and was as high as 0.35 mcg/kg/min.  Patient was given antibiotics as his initial lactic acid was 8.  Urology at bedside able to place 20 Vincentian catheter.  Bleeding has improved  Labs in ED remarkable for thrombocytopenia and elevated coags.  There is a concern for acute DIC.   Fibrinogen and D-dimer have been added.  Chemistry remarkable for acidosis, elevated anion gap, GURVINDER.  Patient now coming down on Levophed.  We will admit to ICU.  Continue workup blood transfusion.  UA appears infected.  CTA abdomen and pelvis with large amount of fecal material concerning for stercoral colitis possible fecal impaction.  CT brain with chronic remote infarct.  Patient will be admitted to Hospital Medicine ICU    In ED patient received 3 L IV fluids, vitamin K, Kcentra, TXA, vancomycin, Flagyl    Past Medical History:   Diagnosis Date    Arthritis     Diabetes mellitus     type 2    Folate deficiency anemia     Heart block     History of kidney stones 05/25/2021    History of stroke with residual deficit 05/25/2021    Hyperlipidemia     Hypertension     Major depressive disorder     Pacemaker     Biotronic [place]ora 8 DR-T (S/n: [id number])    Pyelonephritis 11/19/2021    TIA (transient ischemic attack)     Urinary retention 05/25/2021       Past Surgical History:   Procedure Laterality Date    A-V CARDIAC PACEMAKER INSERTION N/A 8/24/2021    Procedure: INSERTION, CARDIAC PACEMAKER, DUAL CHAMBER;  Surgeon: Neo Winn MD;  Location: [facility] EP LAB;  Service: Cardiology;  Laterality: N/A;  CHB, DUAL PPM, ANES, BIO, DM, ED 2    COLONOSCOPY N/A 11/22/2021    Procedure: COLONOSCOPY;  Surgeon: Cesar Dorado MD;  Location: [facility] ENDO (2ND FLR);  Service: Endoscopy;  Laterality: N/A;    CYSTOGRAM N/A 5/24/2024    Procedure: CYSTOGRAM;  Surgeon: Camilo Kelley Jr., MD;  [facility];  Service: Urology;  Laterality: N/A;    CYSTOSCOPIC LITHOLAPAXY  5/25/2021    Procedure: CYSTOLITHOLAPAXY;  Surgeon: Chris Schilling MD;  [facility];  Service: Urology;;    CYSTOSCOPY  8/26/2021    Procedure: CYSTOSCOPY;  Surgeon: Camilo Kelley Jr., MD;  [facility];  Service: Urology;;    CYSTOSCOPY N/A 5/24/2024    Procedure: CYSTOSCOPY;  Surgeon: Camilo Kelley Jr., MD;   Location: [facility] OR 1ST FLR;  Service: Urology;  Laterality: N/A;    CYSTOSCOPY W/ URETERAL STENT PLACEMENT Bilateral 5/25/2021    Procedure: CYSTOSCOPY, WITH BILATERAL URETERAL STENT INSERTION;  Surgeon: Chris Schilling MD;  Location: [facility] OR 1ST FLR;  Service: Urology;  Laterality: Bilateral;    CYSTOSCOPY W/ URETERAL STENT REMOVAL Left 8/30/2024    Procedure: CYSTOSCOPY, WITH URETERAL STENT REMOVAL;  Surgeon: Camilo Kelley Jr., MD;  Location: [facility] OR 1ST FLR;  Service: Urology;  Laterality: Left;  1 hr    DILATION OF URETHRA N/A 5/24/2024    Procedure: DILATION, URETHRA;  Surgeon: Camilo Kelley Jr., MD;  Location: [facility] OR 1ST FLR;  Service: Urology;  Laterality: N/A;    ELBOW ARTHROPLASTY Right     EXTRACTION - STONE Left 5/24/2024    Procedure: EXTRACTION - STONE;  Surgeon: Camilo Kelley Jr., MD;  [facility];  Service: Urology;  Laterality: Left;  and kidney    FLUOROSCOPY  5/25/2021    Procedure: FLUOROSCOPY;  Surgeon: Chris Schilling MD;  Location: [facility] OR 1ST FLR;  Service: Urology;;    LASER LITHOTRIPSY Left 8/26/2021    Procedure: LITHOTRIPSY, USING LASER;  Surgeon: Camilo Kelley Jr., MD;  Location: [facility] OR 1ST FLR;  Service: Urology;  Laterality: Left;    LASER LITHOTRIPSY Left 5/24/2024    Procedure: LITHOTRIPSY, USING LASER;  Surgeon: Camilo Kelley Jr., MD;  Location: [facility] OR 1ST FLR;  Service: Urology;  Laterality: Left;    PLACEMENT-STENT Left 5/24/2024    Procedure: PLACEMENT-STENT;  Surgeon: Camilo Kelley Jr., MD;  Location: [facility] OR 1ST FLR;  Service: Urology;  Laterality: Left;    PYELOSCOPY Bilateral 8/26/2021    Procedure: PYELOSCOPY;  Surgeon: Camilo Kelley Jr., MD;  Location: [facility] OR 1ST FLR;  Service: Urology;  Laterality: Bilateral;    PYELOSCOPY Left 5/24/2024    Procedure: PYELOSCOPY;  Surgeon: Camilo Kelley Jr., MD;  Location: [facility] OR [address] [place] [address];  Service: Urology;  Laterality: Left;    REMOVAL OF BLOOD CLOT  5/25/2021    Procedure: REMOVAL, BLOOD CLOT;   Surgeon: Chris Schilling MD;  Location: [facility] OR [address] [place] [address];  Service: Urology;;    REMOVAL, NEPHROSTOMY TUBE, WITH IMAGING GUIDANCE Left 5/24/2024    Procedure: REMOVAL, NEPHROSTOMY TUBE, WITH IMAGING GUIDANCE;  Surgeon: Camilo Kelley Jr., MD;  [facility];  Service: Urology;  Laterality: Left;    REPLACEMENT OF STENT Bilateral 8/26/2021    Procedure: REPLACEMENT, STENT;  Surgeon: Camilo Kelley Jr., MD;  [facility];  Service: Urology;  Laterality: Bilateral;    RETROGRADE PYELOGRAPHY Left 8/30/2024    Procedure: PYELOGRAM, RETROGRADE;  Surgeon: Camilo Kelley Jr., MD;  Location: [facility] OR [address] [place] [address];  Service: Urology;  Laterality: Left;    URETEROSCOPIC REMOVAL OF URETERIC CALCULUS Bilateral 8/26/2021    Procedure: REMOVAL, CALCULUS, URETER, URETEROSCOPIC;  Surgeon: Camilo Kelley Jr., MD;  Location: [facility] OR [address] [place] [address];  Service: Urology;  Laterality: Bilateral;    URETEROSCOPY Bilateral 8/26/2021    Procedure: URETEROSCOPY;  Surgeon: Camilo Kelley Jr., MD;  Location: [facility] OR [address] [place] [address];  Service: Urology;  Laterality: Bilateral;    URETEROSCOPY Left 5/24/2024    Procedure: URETEROSCOPY;  Surgeon: Camilo Kelley Jr., MD;  Location: [facility] OR [address] [place] [address];  Service: Urology;  Laterality: Left;       Review of patient's allergies indicates:  No Known Allergies    No current facility-administered medications on file prior to encounter.     Current Outpatient Medications on File Prior to Encounter   Medication Sig    atorvastatin (LIPITOR) 40 MG tablet Take 40 mg by mouth every evening.    bisacodyL (DULCOLAX, BISACODYL,) 10 mg Supp Place 1 suppository (10 mg total) rectally daily as needed (constipation).    CHEST CONGESTION RELIEF DM  mg/5 mL liquid Take by mouth.    cyproheptadine (PERIACTIN) 4 mg tablet Take 4 mg by mouth 3 (three) times daily. Itching    docusate sodium (COLACE) 100 MG capsule Take 1 capsule (100 mg total) by mouth once daily.    folic acid (FOLVITE) 1 MG tablet Take 1  "mg by mouth once daily.    gabapentin (NEURONTIN) 300 MG capsule Take 300 mg by mouth 3 (three) times daily.    menthol-zinc oxide (CALMOSEPTINE) 0.44-20.6 % Oint Apply topically daily as needed. To sacral area after each sacral excoriation episode and as needed    mirtazapine (REMERON) 30 MG tablet Take 30 mg by mouth nightly.    polyethylene glycol (GLYCOLAX) 17 gram/dose powder Take 17 g by mouth 2 (two) times daily.    tamsulosin (FLOMAX) 0.4 mg Cap TAKE 2 CAPSULES(0.8 MG) BY MOUTH EVERY DAY (Patient taking differently: Take 0.8 mg by mouth once daily.)     Family History       Problem Relation (Age of Onset)    Hyperlipidemia Mother    Mental illness Father    No Known Problems Brother    Parkinsonism Father    Stroke Mother          Tobacco Use    Smoking status: Former     Types: Cigarettes    Smokeless tobacco: Never   Substance and Sexual Activity    Alcohol use: Yes     Comment: 1/ pint whisky daily    Drug use: Yes     Types: "Crack" cocaine    Sexual activity: Not on file     Review of Systems   Unable to perform ROS: Acuity of condition     Objective:     Vital Signs (Most Recent):  Temp: 99.2 °F (37.3 °C) (10/17/24 0639)  Pulse: (!) 126 (10/17/24 0639)  Resp: 16 (10/17/24 0639)  BP: (!) 115/57 (10/17/24 0639)  SpO2: 100 % (10/17/24 0639) Vital Signs (24h Range):  Temp:  [98.4 °F (36.9 °C)-101.2 °F (38.4 °C)] 99.2 °F (37.3 °C)  Pulse:  [] 126  Resp:  [14-38] 16  SpO2:  [94 %-100 %] 100 %  BP: ()/() 115/57     Weight: 67.5 kg (148 lb 13 oz)  Body mass index is 24.02 kg/m².     Physical Exam  Constitutional:       General: He is in acute distress.      Appearance: He is ill-appearing.   HENT:      Head: Normocephalic.      Mouth/Throat:      Mouth: Mucous membranes are dry.      Pharynx: Oropharynx is clear.   Cardiovascular:      Rate and Rhythm: Regular rhythm. Tachycardia present.      Pulses: Normal pulses.      Heart sounds: Normal heart sounds.   Pulmonary:      Effort: Pulmonary " effort is normal.      Breath sounds: Normal breath sounds.   Abdominal:      General: Bowel sounds are normal.      Palpations: Abdomen is soft.   Genitourinary:     Comments: Hematuria, 20 Arabic Shen catheter placed  Musculoskeletal:         General: Normal range of motion.   Skin:     General: Skin is warm and dry.      Capillary Refill: Capillary refill takes less than 2 seconds.   Neurological:      Mental Status: He is disoriented.                Significant Labs: All pertinent labs within the past 24 hours have been reviewed.  Recent Lab Results  (Last 5 results in the past 24 hours)        10/17/24  0548   10/17/24  0306   10/17/24  0231   10/17/24  0228   10/17/24  0224        Performed By:       shima         POC Molecular Influenza A Ag         Negative       POC Molecular Influenza B Ag         Negative       Unit Blood Type Code   6200  [P]             Unit Expiration   [id number]  [P]             Unit Blood Type   A POS  [P]             Specimen source       Venous         Albumin     3.2           ALP     95           ALT     25           Anion Gap     17           PTT   91.8  Comment: Refer to local heparin nomogram for intensity/dose specific   therapeutic   range.  LOT^050^APTT FSL^[id number]               AST     36  Comment: Specimen slightly hemolyzed           Baso #     CANCELED  Comment: Result canceled by the ancillary.           Basophil %     0.0           BILIRUBIN TOTAL     0.6  Comment: For infants and newborns, interpretation of results should be based  on gestational age, weight and in agreement with clinical  observations.    Premature Infant recommended reference ranges:  Up to 24 hours.............<8.0 mg/dL  Up to 48 hours............<12.0 mg/dL  3-5 days..................<15.0 mg/dL  6-29 days.................<15.0 mg/dL             BUN     34           Calcium     9.3           Chloride     111           CO2     15           CODING SYSTEM   [id number]  [P]             Creatinine      1.6           Crossmatch Interpretation   Compatible  [P]             Differential Method     Manual  Comment: CORRECTED RESULT; previously reported as Automated on 10/17/2024 at   04:07.    [C]           DISPENSE STATUS   CROSSMATCHED  [P]             eGFR     44           Eos #     CANCELED  Comment: Result canceled by the ancillary.           Eos %     1.0           FiO2       21.0         Glucose     78           Gran %     83.0           Group & Rh   A POS             Hematocrit     46.6           Hemoglobin     15.2           Immature Grans (Abs)     CANCELED  Comment: Mild elevation in immature granulocytes is non specific and   can be seen in a variety of conditions including stress response,   acute inflammation, trauma and pregnancy. Correlation with other   laboratory and clinical findings is essential.    Result canceled by the ancillary.             Immature Granulocytes     CANCELED  Comment: Result canceled by the ancillary.           INDIRECT ROSEY   NEG             INR   2.3  Comment: Coumadin Therapy:  2.0 - 3.0 for INR for all indicators except mechanical heart valves  and antiphospholipid syndromes which should use 2.5 - 3.5.  LOT^040^PT Inn^[id number]               Lactic Acid Level 9.3  Comment: Falsely low lactic acid results can be found in samples   containing >=13.0 mg/dL total bilirubin and/or >=3.5 mg/dL   direct bilirubin.  Lactic Acid critical result(s) called and verbal readback obtained   from Dimple Mckenzie RN by [facility] 10/17/2024 06:11       8.0  Comment: Falsely low lactic acid results can be found in samples   containing >=13.0 mg/dL total bilirubin and/or >=3.5 mg/dL   direct bilirubin.  Lactic Acid critical result(s) called and verbal readback obtained   from Alisha Briceño RN by [facility] 10/17/2024 03:06             Lymph #     CANCELED  Comment: Result canceled by the ancillary.           Lymph %     16.0           MCH     32.0           MCHC     32.6           MCV     98            Mono #     CANCELED  Comment: Result canceled by the ancillary.           Mono %     0.0           MPV     10.1           nRBC     1           Platelet Estimate     Decreased           Platelet Count     104           POC Lactate       7.1  Comment:  notified  at    read back  Value above critical limit           Potassium     3.7           Product Code   [id number]  [P]             PROTEIN TOTAL     6.5           PT   24.0              Acceptable         Yes       RBC     4.75           RDW     13.8           Sodium     143           Specimen Outdate   10/20/2024 23:59             UNIT NUMBER   [id number]  [P]             WBC     4.84                                   [P] - Preliminary Result [C] - Corrected Result              Significant Imaging: I have reviewed all pertinent imaging results/findings within the past 24 hours.  I have reviewed and interpreted all pertinent imaging results/findings within the past 24 hours.  Assessment/Plan:     * Septic shock  This patient has shock. The type of shock is distributive due to sepsis and hemorrhagic. The patient has the following evidence of shock: persistent hypotension, elevated lactic acid after adequate fluid resuscitation, GURVINDER, and altered mental status. The patient will be admitted to an intensive care unit, they will be treated with Levophed, IV fluids, antibiotics, PRBC.    UA has been  History of PSA sensitive only to Merrem    Acute metabolic encephalopathy  Multifactorial from fever, infection, septic shock  CT brain without acute abnormality, remote infarcts seen  Monitor for improvement with improvement of above    Thrombocytopenia  The likely etiology of thrombocytopenia is sepsis. The patients 3 most recent labs are listed below.  Recent Labs     10/17/24  0231   *     Plan  - Will transfuse if platelet count is  less than 50 and bleeding continue .  - monitored D-dimer and fibrinogen, elevated coags  -there is concern for  DIC  -patient may require other blood products including cryo or FFP  -take over-the-counter daily      Hematuria  Has chronic Shen catheter, hematuria likely from traumatic insertion during replacement  Gross hematuria  Given vitamin K, Kcentra  Urology consulted  Emergent bedside Shen catheter placement  Resolved hematuria  UA pending    GURVINDER (acute kidney injury)  GURVINDER is likely due to acute tubular necrosis caused by hypotension and post-obstructive d/t obstructing Shen, hematuria . Baseline creatinine is  0.8 . Most recent creatinine and eGFR are listed below.  Recent Labs     10/17/24  0231   CREATININE 1.6*   EGFRNORACEVR 44*      Plan  - GURVINDER is worsening. Will continue current treatment  - Avoid nephrotoxins and renally dose meds for GFR listed above  - Monitor urine output, serial BMP, and adjust therapy as needed        VTE Risk Mitigation (From admission, onward)      None          Critical care time spent on the evaluation and treatment of severe organ dysfunction, review of pertinent labs and imaging studies, discussions with consulting providers and discussions with patient/family: 35 minutes.                  Esvin Carrizales NP  Department of Hospital Medicine  [facility] - Intensive Care

## 2024-10-17 NOTE — SUBJECTIVE & OBJECTIVE
Past Medical History:   Diagnosis Date    Arthritis     Diabetes mellitus     type 2    Folate deficiency anemia     Heart block     History of kidney stones 05/25/2021    History of stroke with residual deficit 05/25/2021    Hyperlipidemia     Hypertension     Major depressive disorder     Pacemaker     Biotronic [place] 8 DR-T (S/n: [id number])    Pyelonephritis 11/19/2021    TIA (transient ischemic attack)     Urinary retention 05/25/2021       Past Surgical History:   Procedure Laterality Date    A-V CARDIAC PACEMAKER INSERTION N/A 8/24/2021    Procedure: INSERTION, CARDIAC PACEMAKER, DUAL CHAMBER;  Surgeon: Neo Winn MD;  Location: [facility] EP LAB;  Service: Cardiology;  Laterality: N/A;  CHB, DUAL PPM, ANES, BIO, DM, ED 2    COLONOSCOPY N/A 11/22/2021    Procedure: COLONOSCOPY;  Surgeon: Cesar Dorado MD;  Location: [facility] ENDO (2ND FLR);  Service: Endoscopy;  Laterality: N/A;    CYSTOGRAM N/A 5/24/2024    Procedure: CYSTOGRAM;  Surgeon: Camilo Kelley Jr., MD;  Location: [address];  Service: Urology;  Laterality: N/A;    CYSTOSCOPIC LITHOLAPAXY  5/25/2021    Procedure: CYSTOLITHOLAPAXY;  Surgeon: Chris Schilling MD;  Location: [address];  Service: Urology;;    CYSTOSCOPY  8/26/2021    Procedure: CYSTOSCOPY;  Surgeon: Camilo Kelley Jr., MD;  Location: [address];  Service: Urology;;    CYSTOSCOPY N/A 5/24/2024    Procedure: CYSTOSCOPY;  Surgeon: Camilo Kelley Jr., MD;  [address];  Service: Urology;  Laterality: N/A;    CYSTOSCOPY W/ URETERAL STENT PLACEMENT Bilateral 5/25/2021    Procedure: CYSTOSCOPY, WITH BILATERAL URETERAL STENT INSERTION;  Surgeon: Chris Schilling MD;  Location: [address];  Service: Urology;  Laterality: Bilateral;    CYSTOSCOPY W/ URETERAL STENT REMOVAL Left 8/30/2024    Procedure: CYSTOSCOPY, WITH URETERAL STENT REMOVAL;  Surgeon: Camilo Kelley Jr., MD;  Location: [address];  Service: Urology;  Laterality: Left;  1 hr     DILATION OF URETHRA N/A 5/24/2024    Procedure: DILATION, URETHRA;  Surgeon: Camilo Kelley Jr., MD;  Location: [facility] OR 1ST FLR;  Service: Urology;  Laterality: N/A;    ELBOW ARTHROPLASTY Right     EXTRACTION - STONE Left 5/24/2024    Procedure: EXTRACTION - STONE;  Surgeon: Camilo Kelley Jr., MD;  Location: [facility] OR 1ST FLR;  Service: Urology;  Laterality: Left;  and kidney    FLUOROSCOPY  5/25/2021    Procedure: FLUOROSCOPY;  Surgeon: Chris Schilling MD;  Location: [facility] OR 1ST FLR;  Service: Urology;;    LASER LITHOTRIPSY Left 8/26/2021    Procedure: LITHOTRIPSY, USING LASER;  Surgeon: Camilo Kelley Jr., MD;  Location: [facility] OR 1ST FLR;  Service: Urology;  Laterality: Left;    LASER LITHOTRIPSY Left 5/24/2024    Procedure: LITHOTRIPSY, USING LASER;  Surgeon: Camilo Kelley Jr., MD;  Location: [facility] OR 1ST FLR;  Service: Urology;  Laterality: Left;    PLACEMENT-STENT Left 5/24/2024    Procedure: PLACEMENT-STENT;  Surgeon: Camilo Kelley Jr., MD;  Location: [facility] OR 1ST FLR;  Service: Urology;  Laterality: Left;    PYELOSCOPY Bilateral 8/26/2021    Procedure: PYELOSCOPY;  Surgeon: Camilo Kelley Jr., MD;  Location: [facility] OR 1ST FLR;  Service: Urology;  Laterality: Bilateral;    PYELOSCOPY Left 5/24/2024    Procedure: PYELOSCOPY;  Surgeon: Camilo Kelley Jr., MD;  Location: [facility] OR 1ST FLR;  Service: Urology;  Laterality: Left;    REMOVAL OF BLOOD CLOT  5/25/2021    Procedure: REMOVAL, BLOOD CLOT;  Surgeon: Chris Schilling MD;  Location: [facility] OR 1ST FLR;  Service: Urology;;    REMOVAL, NEPHROSTOMY TUBE, WITH IMAGING GUIDANCE Left 5/24/2024    Procedure: REMOVAL, NEPHROSTOMY TUBE, WITH IMAGING GUIDANCE;  Surgeon: Camilo Kelley Jr., MD;  Location: [facility] OR 1ST FLR;  Service: Urology;  Laterality: Left;    REPLACEMENT OF STENT Bilateral 8/26/2021    Procedure: REPLACEMENT, STENT;  Surgeon: Camilo Kelley Jr., MD;  Location: [facility] OR [address] [place] [address];  Service: Urology;  Laterality: Bilateral;    RETROGRADE  PYELOGRAPHY Left 8/30/2024    Procedure: PYELOGRAM, RETROGRADE;  Surgeon: Camilo Kelley Jr., MD;  Location: [facility] OR [address] [place] [address];  Service: Urology;  Laterality: Left;    URETEROSCOPIC REMOVAL OF URETERIC CALCULUS Bilateral 8/26/2021    Procedure: REMOVAL, CALCULUS, URETER, URETEROSCOPIC;  Surgeon: Camilo Kelley Jr., MD;  [facility];  Service: Urology;  Laterality: Bilateral;    URETEROSCOPY Bilateral 8/26/2021    Procedure: URETEROSCOPY;  Surgeon: Camilo Kelley Jr., MD;  [facility];  Service: Urology;  Laterality: Bilateral;    URETEROSCOPY Left 5/24/2024    Procedure: URETEROSCOPY;  Surgeon: Camilo Kelley Jr., MD;  Location: [facility] OR [address] [place] [address];  Service: Urology;  Laterality: Left;       Review of patient's allergies indicates:  No Known Allergies    No current facility-administered medications on file prior to encounter.     Current Outpatient Medications on File Prior to Encounter   Medication Sig    atorvastatin (LIPITOR) 40 MG tablet Take 40 mg by mouth every evening.    bisacodyL (DULCOLAX, BISACODYL,) 10 mg Supp Place 1 suppository (10 mg total) rectally daily as needed (constipation).    CHEST CONGESTION RELIEF DM  mg/5 mL liquid Take by mouth.    cyproheptadine (PERIACTIN) 4 mg tablet Take 4 mg by mouth 3 (three) times daily. Itching    docusate sodium (COLACE) 100 MG capsule Take 1 capsule (100 mg total) by mouth once daily.    folic acid (FOLVITE) 1 MG tablet Take 1 mg by mouth once daily.    gabapentin (NEURONTIN) 300 MG capsule Take 300 mg by mouth 3 (three) times daily.    menthol-zinc oxide (CALMOSEPTINE) 0.44-20.6 % Oint Apply topically daily as needed. To sacral area after each sacral excoriation episode and as needed    mirtazapine (REMERON) 30 MG tablet Take 30 mg by mouth nightly.    polyethylene glycol (GLYCOLAX) 17 gram/dose powder Take 17 g by mouth 2 (two) times daily.    tamsulosin (FLOMAX) 0.4 mg Cap TAKE 2 CAPSULES(0.8 MG) BY MOUTH EVERY DAY (Patient  "taking differently: Take 0.8 mg by mouth once daily.)     Family History       Problem Relation (Age of Onset)    Hyperlipidemia Mother    Mental illness Father    No Known Problems Brother    Parkinsonism Father    Stroke Mother          Tobacco Use    Smoking status: Former     Types: Cigarettes    Smokeless tobacco: Never   Substance and Sexual Activity    Alcohol use: Yes     Comment: 1/ pint whisky daily    Drug use: Yes     Types: "Crack" cocaine    Sexual activity: Not on file     Review of Systems   Unable to perform ROS: Acuity of condition     Objective:     Vital Signs (Most Recent):  Temp: 99.2 °F (37.3 °C) (10/17/24 0639)  Pulse: (!) 126 (10/17/24 0639)  Resp: 16 (10/17/24 0639)  BP: (!) 115/57 (10/17/24 0639)  SpO2: 100 % (10/17/24 0639) Vital Signs (24h Range):  Temp:  [98.4 °F (36.9 °C)-101.2 °F (38.4 °C)] 99.2 °F (37.3 °C)  Pulse:  [] 126  Resp:  [14-38] 16  SpO2:  [94 %-100 %] 100 %  BP: ()/() 115/57     Weight: 67.5 kg (148 lb 13 oz)  Body mass index is 24.02 kg/m².     Physical Exam  Constitutional:       General: He is in acute distress.      Appearance: He is ill-appearing.   HENT:      Head: Normocephalic.      Mouth/Throat:      Mouth: Mucous membranes are dry.      Pharynx: Oropharynx is clear.   Cardiovascular:      Rate and Rhythm: Regular rhythm. Tachycardia present.      Pulses: Normal pulses.      Heart sounds: Normal heart sounds.   Pulmonary:      Effort: Pulmonary effort is normal.      Breath sounds: Normal breath sounds.   Abdominal:      General: Bowel sounds are normal.      Palpations: Abdomen is soft.   Genitourinary:     Comments: Hematuria, 20 Greek Shen catheter placed  Musculoskeletal:         General: Normal range of motion.   Skin:     General: Skin is warm and dry.      Capillary Refill: Capillary refill takes less than 2 seconds.   Neurological:      Mental Status: He is disoriented.                Significant Labs: All pertinent labs within the past " 24 hours have been reviewed.  Recent Lab Results  (Last 5 results in the past 24 hours)        10/17/24  0548   10/17/24  0306   10/17/24  0231   10/17/24  0228   10/17/24  0224        Performed By:       shima         POC Molecular Influenza A Ag         Negative       POC Molecular Influenza B Ag         Negative       Unit Blood Type Code   6200  [P]             Unit Expiration   [id number]  [P]             Unit Blood Type   A POS  [P]             Specimen source       Venous         Albumin     3.2           ALP     95           ALT     25           Anion Gap     17           PTT   91.8  Comment: Refer to local heparin nomogram for intensity/dose specific   therapeutic   range.  LOT^050^APTT FSL^[id number]               AST     36  Comment: Specimen slightly hemolyzed           Baso #     CANCELED  Comment: Result canceled by the ancillary.           Basophil %     0.0           BILIRUBIN TOTAL     0.6  Comment: For infants and newborns, interpretation of results should be based  on gestational age, weight and in agreement with clinical  observations.    Premature Infant recommended reference ranges:  Up to 24 hours.............<8.0 mg/dL  Up to 48 hours............<12.0 mg/dL  3-5 days..................<15.0 mg/dL  6-29 days.................<15.0 mg/dL             BUN     34           Calcium     9.3           Chloride     111           CO2     15           CODING SYSTEM   [id number]  [P]             Creatinine     1.6           Crossmatch Interpretation   Compatible  [P]             Differential Method     Manual  Comment: CORRECTED RESULT; previously reported as Automated on 10/17/2024 at   04:07.    [C]           DISPENSE STATUS   CROSSMATCHED  [P]             eGFR     44           Eos #     CANCELED  Comment: Result canceled by the ancillary.           Eos %     1.0           FiO2       21.0         Glucose     78           Gran %     83.0           Group & Rh   A POS             Hematocrit     46.6            Hemoglobin     15.2           Immature Grans (Abs)     CANCELED  Comment: Mild elevation in immature granulocytes is non specific and   can be seen in a variety of conditions including stress response,   acute inflammation, trauma and pregnancy. Correlation with other   laboratory and clinical findings is essential.    Result canceled by the ancillary.             Immature Granulocytes     CANCELED  Comment: Result canceled by the ancillary.           INDIRECT ROSEY   NEG             INR   2.3  Comment: Coumadin Therapy:  2.0 - 3.0 for INR for all indicators except mechanical heart valves  and antiphospholipid syndromes which should use 2.5 - 3.5.  LOT^040^PT Inn^[id number]               Lactic Acid Level 9.3  Comment: Falsely low lactic acid results can be found in samples   containing >=13.0 mg/dL total bilirubin and/or >=3.5 mg/dL   direct bilirubin.  Lactic Acid critical result(s) called and verbal readback obtained   from Dimple Mckenzie RN by TDF 10/17/2024 06:11       8.0  Comment: Falsely low lactic acid results can be found in samples   containing >=13.0 mg/dL total bilirubin and/or >=3.5 mg/dL   direct bilirubin.  Lactic Acid critical result(s) called and verbal readback obtained   from Alisha Briceño RN by TDF 10/17/2024 03:06             Lymph #     CANCELED  Comment: Result canceled by the ancillary.           Lymph %     16.0           MCH     32.0           MCHC     32.6           MCV     98           Mono #     CANCELED  Comment: Result canceled by the ancillary.           Mono %     0.0           MPV     10.1           nRBC     1           Platelet Estimate     Decreased           Platelet Count     104           POC Lactate       7.1  Comment:  notified  at    read back  Value above critical limit           Potassium     3.7           Product Code   [id number]  [P]             PROTEIN TOTAL     6.5           PT   24.0              Acceptable         Yes       RBC     4.75           RDW      13.8           Sodium     143           Specimen Outdate   10/20/2024 23:59             UNIT NUMBER   [id number]  [P]             WBC     4.84                                   [P] - Preliminary Result [C] - Corrected Result              Significant Imaging: I have reviewed all pertinent imaging results/findings within the past 24 hours.  I have reviewed and interpreted all pertinent imaging results/findings within the past 24 hours.

## 2024-10-17 NOTE — EICU
Brief Note     78 yr old male with chronic indwelling mark   1-Shock / hypovolemic vs septic   2-Sepsis   3-Possible DIC vs malnourishment / vit K deficiency  4-Acute metabolic acidosis   5-Lactic acidosis   6- Fecal impaction     On vanc / cefepime / flagyl  Levophed for BP support/ vasopressin   Received 3 litres IVF in ER, to receive 4th litre   Follow I/o  Mark replaced by Urology   Patient to receive prbc / vitamin k / TXA / K Centra as ordered by ER   Serial labs   Has central line   VBG ordered   Patient is critically ill

## 2024-10-17 NOTE — ED NOTES
Pt arrived from [facility] via EMS with a complaint of Hematuria post mark catheter insertion today.  Upon arrival noted bright red blood in mark catheter.  Pt was lethargic, but he did respond to commands (open mouth for temperature, and open eyes).  Pts skin warm to touch.  Sepsis alert called immediately.  Dr Santos at bedside.  Pt placed on cardiac monitor and placed in a gown.

## 2024-10-17 NOTE — PROCEDURES
"Praneeth Jewell is a 78 y.o. male patient.    Temp: 100.1 °F (37.8 °C) (10/17/24 0715)  Pulse: (!) 122 (10/17/24 1440)  Resp: 20 (10/17/24 1440)  BP: 139/69 (10/17/24 1440)  SpO2: 99 % (10/17/24 1440)  Weight: 67.5 kg (148 lb 13 oz) (10/17/24 0631)  Height: 5' 6" (167.6 cm) (10/17/24 0631)       Arterial Line    Date/Time: 10/17/2024 5:12 PM  Location procedure was performed: [facility] ICU    Performed by: Feliciano Holloway MD  Authorized by: Feliciano Holloway MD  Assisting provider: Feliciano Holloway MD  Pre-op Diagnosis: septic shock  Post-operative diagnosis: septic shock  Consent Done: Yes  Consent: Written consent obtained.  Risks and benefits: risks, benefits and alternatives were discussed  Consent given by: sibling  Patient understanding: patient states understanding of the procedure being performed  Patient consent: the patient's understanding of the procedure matches consent given  Procedure consent: procedure consent matches procedure scheduled  Relevant documents: relevant documents present and verified  Test results: test results available and properly labeled  Site marked: the operative site was marked  Imaging studies: imaging studies available  Patient identity confirmed: , name and MRN  Time out: Immediately prior to procedure a "time out" was called to verify the correct patient, procedure, equipment, support staff and site/side marked as required.  Preparation: Patient was prepped and draped in the usual sterile fashion.  Indications: hemodynamic monitoring  Location: left radial  Anesthesia: local infiltration    Anesthesia:  Local Anesthetic: lidocaine 1% without epinephrine  Anesthetic total: 0.3 mL    Patient sedated: yes  Sedatives: see MAR for details  Vitals: Vital signs were monitored during sedation.  Description of findings: NA   Anam's test normal: no  Needle gauge: 20  Seldinger technique: Seldinger technique used  Number of attempts: 1  Technical procedures used: " POCUS  Significant surgical tasks conducted by the assistant(s): NA  Complications: No  Estimated blood loss (mL): 0  Specimens: No  Implants: No  Post-procedure: line sutured and dressing applied  Post-procedure CMS: unchanged  Patient tolerance: Patient tolerated the procedure well with no immediate complications  Comments: I supervised Ofelia Dupree NP-student in the performance of this procedure.          10/17/2024

## 2024-10-17 NOTE — ASSESSMENT & PLAN NOTE
The likely etiology of thrombocytopenia is sepsis. The patients 3 most recent labs are listed below.  Recent Labs     10/17/24  0231   *     Plan  - Will transfuse if platelet count is  less than 50 and bleeding continue .  - monitored D-dimer and fibrinogen, elevated coags  -there is concern for DIC  -patient may require other blood products including cryo or FFP  -take over-the-counter daily  VTE prophylaxis on hold

## 2024-10-17 NOTE — ASSESSMENT & PLAN NOTE
Has chronic Shen catheter, hematuria likely from traumatic insertion during replacement  Gross hematuria  Given vitamin K, Kcentra  Urology consulted  Emergent bedside Shen catheter placement  Resolved hematuria  UA pending

## 2024-10-18 PROBLEM — R00.0 TACHYCARDIA: Status: ACTIVE | Noted: 2024-01-01

## 2024-10-18 PROBLEM — R78.81 BACTEREMIA: Status: ACTIVE | Noted: 2024-01-01

## 2024-10-18 NOTE — ASSESSMENT & PLAN NOTE
CTAP with large amount of fecal material concerning for stercoral colitis possible fecal impaction.      Continue bowel regimen

## 2024-10-18 NOTE — ASSESSMENT & PLAN NOTE
Urology recs:  Bedside irrigation performed with very minimal effort to clear the Shen catheter to clear yellow urine.  - maintain Shen catheter, do not remove or exchange Shen catheter without 1st discussing with Urology while inpatient  - we will plan for outpatient voiding trial with Urology in the next 3-4 weeks

## 2024-10-18 NOTE — SUBJECTIVE & OBJECTIVE
Past Medical History:   Diagnosis Date    Arthritis     Diabetes mellitus     type 2    Folate deficiency anemia     Heart block     History of kidney stones 05/25/2021    History of stroke with residual deficit 05/25/2021    Hyperlipidemia     Hypertension     Major depressive disorder     Pacemaker     [employer] [place] 8 DR-T (S/n: [id number])    Pyelonephritis 11/19/2021    TIA (transient ischemic attack)     Urinary retention 05/25/2021       Past Surgical History:   Procedure Laterality Date    A-V CARDIAC PACEMAKER INSERTION N/A 8/24/2021    Procedure: INSERTION, CARDIAC PACEMAKER, DUAL CHAMBER;  Surgeon: Neo Winn MD;  Location: [facility] EP LAB;  Service: Cardiology;  Laterality: N/A;  CHB, DUAL PPM, ANES, BIO, DM, ED 2    COLONOSCOPY N/A 11/22/2021    Procedure: COLONOSCOPY;  Surgeon: Cesar Dorado MD;  Location: [facility] ENDO (2ND FLR);  Service: Endoscopy;  Laterality: N/A;    CYSTOGRAM N/A 5/24/2024    Procedure: CYSTOGRAM;  Surgeon: Camilo Kelley Jr., MD;  Location: [address];  Service: Urology;  Laterality: N/A;    CYSTOSCOPIC LITHOLAPAXY  5/25/2021    Procedure: CYSTOLITHOLAPAXY;  Surgeon: Chris Schilling MD;  Location: [address];  Service: Urology;;    CYSTOSCOPY  8/26/2021    Procedure: CYSTOSCOPY;  Surgeon: Camilo Kelley Jr., MD;  Location: [address];  Service: Urology;;    CYSTOSCOPY N/A 5/24/2024    Procedure: CYSTOSCOPY;  Surgeon: Camilo Kelley Jr., MD;  [address];  Service: Urology;  Laterality: N/A;    CYSTOSCOPY W/ URETERAL STENT PLACEMENT Bilateral 5/25/2021    Procedure: CYSTOSCOPY, WITH BILATERAL URETERAL STENT INSERTION;  Surgeon: Chris Schilling MD;  Location: [address];  Service: Urology;  Laterality: Bilateral;    CYSTOSCOPY W/ URETERAL STENT REMOVAL Left 8/30/2024    Procedure: CYSTOSCOPY, WITH URETERAL STENT REMOVAL;  Surgeon: Camilo Kelley Jr., MD;  Location: [address];  Service: Urology;  Laterality: Left;  1 hr     DILATION OF URETHRA N/A 5/24/2024    Procedure: DILATION, URETHRA;  Surgeon: Camilo Kelley Jr., MD;  Location: [facility] OR 1ST FLR;  Service: Urology;  Laterality: N/A;    ELBOW ARTHROPLASTY Right     EXTRACTION - STONE Left 5/24/2024    Procedure: EXTRACTION - STONE;  Surgeon: Camilo Kelley Jr., MD;  Location: [facility] OR 1ST FLR;  Service: Urology;  Laterality: Left;  and kidney    FLUOROSCOPY  5/25/2021    Procedure: FLUOROSCOPY;  Surgeon: Chris Schilling MD;  Location: [facility] OR 1ST FLR;  Service: Urology;;    LASER LITHOTRIPSY Left 8/26/2021    Procedure: LITHOTRIPSY, USING LASER;  Surgeon: Camilo Kelley Jr., MD;  Location: [facility] OR 1ST FLR;  Service: Urology;  Laterality: Left;    LASER LITHOTRIPSY Left 5/24/2024    Procedure: LITHOTRIPSY, USING LASER;  Surgeon: Camilo Kelley Jr., MD;  Location: [facility] OR 1ST FLR;  Service: Urology;  Laterality: Left;    PLACEMENT-STENT Left 5/24/2024    Procedure: PLACEMENT-STENT;  Surgeon: Camilo Kelley Jr., MD;  Location: [facility] OR 1ST FLR;  Service: Urology;  Laterality: Left;    PYELOSCOPY Bilateral 8/26/2021    Procedure: PYELOSCOPY;  Surgeon: Camilo Kelley Jr., MD;  Location: [facility] OR 1ST FLR;  Service: Urology;  Laterality: Bilateral;    PYELOSCOPY Left 5/24/2024    Procedure: PYELOSCOPY;  Surgeon: Camilo Kelley Jr., MD;  Location: [facility] OR 1ST FLR;  Service: Urology;  Laterality: Left;    REMOVAL OF BLOOD CLOT  5/25/2021    Procedure: REMOVAL, BLOOD CLOT;  Surgeon: Chris Schilling MD;  Location: [facility] OR 1ST FLR;  Service: Urology;;    REMOVAL, NEPHROSTOMY TUBE, WITH IMAGING GUIDANCE Left 5/24/2024    Procedure: REMOVAL, NEPHROSTOMY TUBE, WITH IMAGING GUIDANCE;  Surgeon: Camilo Kelley Jr., MD;  Location: [facility] OR 1ST FLR;  Service: Urology;  Laterality: Left;    REPLACEMENT OF STENT Bilateral 8/26/2021    Procedure: REPLACEMENT, STENT;  Surgeon: Camilo Kelley Jr., MD;  Location: [facility] OR [address] [place] [address];  Service: Urology;  Laterality: Bilateral;    RETROGRADE  PYELOGRAPHY Left 8/30/2024    Procedure: PYELOGRAM, RETROGRADE;  Surgeon: Camilo Kelley Jr., MD;  Location: [facility] OR [address] [place] [address];  Service: Urology;  Laterality: Left;    URETEROSCOPIC REMOVAL OF URETERIC CALCULUS Bilateral 8/26/2021    Procedure: REMOVAL, CALCULUS, URETER, URETEROSCOPIC;  Surgeon: Camilo Kelley Jr., MD;  [facility];  Service: Urology;  Laterality: Bilateral;    URETEROSCOPY Bilateral 8/26/2021    Procedure: URETEROSCOPY;  Surgeon: Camilo Kelley Jr., MD;  [facility];  Service: Urology;  Laterality: Bilateral;    URETEROSCOPY Left 5/24/2024    Procedure: URETEROSCOPY;  Surgeon: Camilo Kelley Jr., MD;  Location: [facility] OR [address] [place] [address];  Service: Urology;  Laterality: Left;       Review of patient's allergies indicates:  No Known Allergies    No current facility-administered medications on file prior to encounter.     Current Outpatient Medications on File Prior to Encounter   Medication Sig    atorvastatin (LIPITOR) 40 MG tablet Take 40 mg by mouth every evening.    bisacodyL (DULCOLAX, BISACODYL,) 10 mg Supp Place 1 suppository (10 mg total) rectally daily as needed (constipation).    docusate sodium (COLACE) 100 MG capsule Take 1 capsule (100 mg total) by mouth once daily.    folic acid (FOLVITE) 1 MG tablet Take 1 mg by mouth once daily.    gabapentin (NEURONTIN) 300 MG capsule Take 300 mg by mouth 3 (three) times daily.    menthol-zinc oxide (CALMOSEPTINE) 0.44-20.6 % Oint Apply topically daily as needed. To sacral area after each sacral excoriation episode and as needed    mirtazapine (REMERON) 30 MG tablet Take 30 mg by mouth nightly.    nystatin (MYCOSTATIN) powder Apply topically 2 (two) times a day. BID + PRN to buttocks    polyethylene glycol (GLYCOLAX) 17 gram/dose powder Take 17 g by mouth 2 (two) times daily.    tamsulosin (FLOMAX) 0.4 mg Cap TAKE 2 CAPSULES(0.8 MG) BY MOUTH EVERY DAY (Patient taking differently: Take 0.8 mg by mouth once daily.)     "cyproheptadine (PERIACTIN) 4 mg tablet Take 4 mg by mouth 3 (three) times daily. Itching     Family History       Problem Relation (Age of Onset)    Hyperlipidemia Mother    Mental illness Father    No Known Problems Brother    Parkinsonism Father    Stroke Mother          Tobacco Use    Smoking status: Former     Types: Cigarettes    Smokeless tobacco: Never   Substance and Sexual Activity    Alcohol use: Yes     Comment: 1/ pint whisky daily    Drug use: Yes     Types: "Crack" cocaine    Sexual activity: Not on file     Review of Systems   Constitutional: Negative for chills, decreased appetite, diaphoresis and fever.   Cardiovascular:  Negative for chest pain, claudication, cyanosis, dyspnea on exertion, irregular heartbeat, leg swelling, near-syncope, orthopnea, palpitations, paroxysmal nocturnal dyspnea and syncope.   Respiratory:  Negative for cough, hemoptysis, shortness of breath and wheezing.    Gastrointestinal:  Negative for bloating, abdominal pain, constipation, diarrhea, melena, nausea and vomiting.   Neurological:  Negative for dizziness and weakness.     Objective:     Vital Signs (Most Recent):  Temp: 98.3 °F (36.8 °C) (10/18/24 0715)  Pulse: 110 (10/18/24 0945)  Resp: (!) 21 (10/18/24 0945)  BP: (!) 144/67 (10/18/24 0900)  SpO2: (!) 93 % (10/18/24 0715) Vital Signs (24h Range):  Temp:  [98.3 °F (36.8 °C)-100.1 °F (37.8 °C)] 98.3 °F (36.8 °C)  Pulse:  [] 110  Resp:  [4-27] 21  SpO2:  [79 %-100 %] 93 %  BP: ()/(44-91) 144/67  Arterial Line BP: ()/(31-63) 107/50     Weight: 67.5 kg (148 lb 13 oz)  Body mass index is 24.02 kg/m².    SpO2: (!) 93 %         Intake/Output Summary (Last 24 hours) at 10/18/2024 1057  Last data filed at 10/18/2024 0936  Gross per 24 hour   Intake 3666.89 ml   Output 250 ml   Net 3416.89 ml       Lines/Drains/Airways       Central Venous Catheter Line  Duration             Percutaneous Central Line - Triple Lumen  10/17/24 0430 Internal Jugular Right 1 day "              Drain  Duration                  Urethral Catheter 10/17/24 0505 20 Fr. 1 day              Arterial Line  Duration             Arterial Line 10/17/24 1619 Left Radial <1 day              Peripheral Intravenous Line  Duration                  Peripheral IV - Single Lumen 10/17/24 0245 20 G Distal;Posterior;Right Forearm 1 day                     Physical Exam  Constitutional:       General: He is not in acute distress.     Appearance: He is well-developed. He is ill-appearing.   Cardiovascular:      Rate and Rhythm: Normal rate and regular rhythm.      Heart sounds: No murmur heard.     No gallop.   Pulmonary:      Effort: Pulmonary effort is normal. No respiratory distress.      Breath sounds: Normal breath sounds. No wheezing.   Abdominal:      General: Bowel sounds are normal. There is no distension.      Palpations: Abdomen is soft.      Tenderness: There is no abdominal tenderness.   Skin:     General: Skin is warm and dry.   Neurological:      Mental Status: He is alert and oriented to person, place, and time.          Significant Labs: BMP:   Recent Labs   Lab 10/17/24  0231 10/17/24  0855 10/18/24  0336   GLU 78 88 86    142 133*   K 3.7 4.2 4.9   * 116* 109   CO2 15* 12* 12*   BUN 34* 37* 55*   CREATININE 1.6* 1.9* 3.3*   CALCIUM 9.3 7.4* 7.1*   MG  --   --  1.3*   , CBC   Recent Labs   Lab 10/17/24  0657 10/17/24  0855 10/18/24  0336   WBC 17.86* 24.51* 42.55*   HGB 10.8* 12.2* 12.8*   HCT 34.1* 38.3* 38.3*   PLT 64* 67* 37*     Significant Imaging: Echocardiogram: Transthoracic echo (TTE) complete ([facility]upid Only):   Results for orders placed or performed during the hospital encounter of 08/24/21   Echo   Result Value Ref Range    BSA 2.15 m2    TDI SEPTAL 0.13 m/s    LV LATERAL E/E' RATIO 8.15 m/s    LV SEPTAL E/E' RATIO 8.15 m/s    LA WIDTH 4.23 cm    TDI LATERAL 0.13 m/s    LVIDd 5.25 3.5 - 6.0 cm    IVS 1.27 (A) 0.6 - 1.1 cm    PW 0.99 0.6 - 1.1 cm    LVIDs 3.34 2.1 - 4.0 cm    FS  "36 28 - 44 %    LA Vol 68.86 cm3    Sinus 3.12 cm    STJ 3.37 cm    Ascending aorta 3.54 cm    LV mass 232.61 g    LA size 3.55 cm    RVDD 2.73 cm    TAPSE 2.90 cm    RV S' 13.17 cm/s    Left Ventricle Relative Wall Thickness 0.38 cm    AV mean gradient 7 mmHg    AV valve area 2.33 cm2    AV Velocity Ratio 0.60     AV index (prosthetic) 0.62     MV valve area p 1/2 method 5.34 cm2    E/A ratio 1.74     Mean e' 0.13 m/s    E wave deceleration time 141.99 msec    IVRT 142.72 msec    MV "A" wave duration 17.13 msec    Pulm vein S/D ratio 0.87     LVOT diameter 2.18 cm    LVOT area 3.7 cm2    LVOT peak flex 1.10 m/s    LVOT peak VTI 27.29 cm    Ao peak flex 1.84 m/s    Ao VTI 43.70 cm    LVOT stroke volume 101.81 cm3    AV peak gradient 14 mmHg    E/E' ratio 8.15 m/s    MV Peak E Flex 1.06 m/s    TR Max Flex 3.10 m/s    MV stenosis pressure 1/2 time 41.18 ms    MV Peak A Flex 0.61 m/s    PV Peak S Flex 0.60 m/s    PV Peak D Flex 0.69 m/s    LV Systolic Volume 45.58 mL    LV Systolic Volume Index 21.4 mL/m2    LV Diastolic Volume 132.29 mL    LV Diastolic Volume Index 62.11 mL/m2    YOANNA 32.3 mL/m2    LV Mass Index 109 g/m2    RA Major Axis 4.76 cm    Left Atrium Minor Axis 5.37 cm    Left Atrium Major Axis 5.42 cm    Triscuspid Valve Regurgitation Peak Gradient 38 mmHg    YOANNA (MOD) 20.8 mL/m2    LA Vol (MOD) 44.26 cm3    RA Width 3.32 cm    Right Atrial Pressure (from IVC) 3 mmHg    EF 65 %    TV resting pulmonary artery pressure 41 mmHg    Narrative    · Severe braedycardia with HR in high 30s  · The estimated ejection fraction is 65%.  · The left ventricle is normal in size with normal systolic function.  · Indeterminate left ventricular diastolic function.  · Normal right ventricular size with normal right ventricular systolic   function.  · There is pulmonary hypertension.  · The estimated PA systolic pressure is 41 mmHg.  · Normal central venous pressure (3 mmHg).        "

## 2024-10-18 NOTE — SUBJECTIVE & OBJECTIVE
Interval History: NAEON. Unable to give meaningful hx.     Review of Systems   Unable to perform ROS: Acuity of condition (limited)   Denies pain of any sort    Objective:     Vital Signs (Most Recent):  Temp: 98.3 °F (36.8 °C) (10/18/24 0715)  Pulse: (!) 111 (10/18/24 1315)  Resp: (!) 24 (10/18/24 1315)  BP: (!) 105/51 (10/18/24 1300)  SpO2: 97 % (10/18/24 1315) Vital Signs (24h Range):  Temp:  [98.3 °F (36.8 °C)-100.1 °F (37.8 °C)] 98.3 °F (36.8 °C)  Pulse:  [] 111  Resp:  [4-27] 24  SpO2:  [62 %-100 %] 97 %  BP: (103-180)/(51-91) 105/51  Arterial Line BP: ()/(31-63) 110/49     Weight: 67.5 kg (148 lb 13 oz)  Body mass index is 24.02 kg/m².    Intake/Output Summary (Last 24 hours) at 10/18/2024 1657  Last data filed at 10/18/2024 1646  Gross per 24 hour   Intake 8440.57 ml   Output 225 ml   Net 8215.57 ml         Physical Exam  Vitals and nursing note reviewed.   Constitutional:       General: He is not in acute distress.     Appearance: He is well-developed. He is obese.   HENT:      Head: Normocephalic and atraumatic.   Eyes:      Conjunctiva/sclera: Conjunctivae normal.   Neck:      Vascular: No JVD.   Cardiovascular:      Rate and Rhythm: Normal rate and regular rhythm.      Heart sounds: Normal heart sounds.   Pulmonary:      Effort: Pulmonary effort is normal.      Breath sounds: Normal breath sounds.   Abdominal:      General: Bowel sounds are normal. There is no distension.      Palpations: Abdomen is soft.      Tenderness: There is no abdominal tenderness.   Genitourinary:     Comments: +mark  Musculoskeletal:      Cervical back: Neck supple.      Right lower leg: No edema.      Left lower leg: No edema.   Neurological:      Mental Status: He is easily aroused. He is lethargic.   Psychiatric:         Behavior: Behavior normal.             Significant Labs: All pertinent labs within the past 24 hours have been reviewed.  CBC:   Recent Labs   Lab 10/17/24  0855 10/18/24  0336 10/18/24  1300    WBC 24.51* 42.55* 46.19*   HGB 12.2* 12.8* 12.5*   HCT 38.3* 38.3* 36.6*   PLT 67* 37* 37*     CMP:   Recent Labs   Lab 10/17/24  0231 10/17/24  0855 10/18/24  0336 10/18/24  1300    142 133* 132*   K 3.7 4.2 4.9 4.8   * 116* 109 108   CO2 15* 12* 12* 12*   GLU 78 88 86 94   BUN 34* 37* 55* 59*   CREATININE 1.6* 1.9* 3.3* 3.7*   CALCIUM 9.3 7.4* 7.1* 7.0*   PROT 6.5 4.5* 4.7*  --    ALBUMIN 3.2* 2.4* 2.4*  --    BILITOT 0.6 1.1* 1.7*  --    ALKPHOS 95 75 62  --    AST 36 95* 185*  --    ALT 25 46* 64*  --    ANIONGAP 17* 14 12 12       Significant Imaging: I have reviewed all pertinent imaging results/findings within the past 24 hours.

## 2024-10-18 NOTE — ASSESSMENT & PLAN NOTE
The likely etiology of thrombocytopenia is sepsis. The patients 3 most recent labs are listed below.  Recent Labs     10/17/24  0855 10/18/24  0336 10/18/24  1300   PLT 67* 37* 37*       Plan  - Will transfuse if platelet count is  less than 50 and bleeding continue .  - monitored D-dimer and fibrinogen, elevated coags  -there is concern for DIC  -patient may require other blood products including cryo or FFP  -take over-the-counter daily  VTE prophylaxis on hold

## 2024-10-18 NOTE — PROGRESS NOTES
[facility] Medicine  Progress Note    Patient Name: Praneeth Jewell  MRN: [id number]  Patient Class: IP- Inpatient   Admission Date: 10/17/2024  Length of Stay: 1 days  Attending Physician: Lorenza Galindo MD  Primary Care Provider: [facility]      Subjective:     Principal Problem:Septic shock      HPI:  Patient is a 78-year-old male with history of UTI, BPH chronic Shen, HLD, anemia, depression currently a resident at [facility] who presented to ED for hematuria.  Patient had some complaints at the nursing home which prompted an exchange of his Shen catheter however after removing his chronic Shen catheter unable to place an additional Shen in possibly inflated inside of a false track.  Patient began with hematuria and clots.  Patient became febrile and encephalopathic and presented to ED tachycardic and minimally responsive.  After giving fluids patient began to decompensate further with hypotension, he was placed on vasopressors in ED and a central line was placed.  Patient had a bladder scan which showed large volume, there was an attempt to replace the Shen catheter which was again unsuccessful.  Urology was consulted.  Patient was given 3 L of IV fluids.  Was started on Levophed and was as high as 0.35 mcg/kg/min.  Patient was given antibiotics as his initial lactic acid was 8.  Urology at bedside able to place 20 Amharic catheter.  Bleeding has improved  Labs in ED remarkable for thrombocytopenia and elevated coags.  There is a concern for acute DIC.  Fibrinogen and D-dimer have been added.  Chemistry remarkable for acidosis, elevated anion gap, GURVINDER.  Patient now coming down on Levophed.  We will admit to ICU.  Continue workup blood transfusion.  UA appears infected.  CTA abdomen and pelvis with large amount of fecal material concerning for stercoral colitis possible fecal impaction.  CT brain with chronic remote infarct.  Patient will be  admitted to Hospital Medicine ICU    In ED patient received 3 L IV fluids, vitamin K, Kcentra, TXA, vancomycin, Flagyl    Overview/Hospital Course:  No notes on file    Interval History: NAEON. Unable to give meaningful hx.     Review of Systems   Unable to perform ROS: Acuity of condition (limited)   Denies pain of any sort    Objective:     Vital Signs (Most Recent):  Temp: 98.3 °F (36.8 °C) (10/18/24 0715)  Pulse: (!) 111 (10/18/24 1315)  Resp: (!) 24 (10/18/24 1315)  BP: (!) 105/51 (10/18/24 1300)  SpO2: 97 % (10/18/24 1315) Vital Signs (24h Range):  Temp:  [98.3 °F (36.8 °C)-100.1 °F (37.8 °C)] 98.3 °F (36.8 °C)  Pulse:  [] 111  Resp:  [4-27] 24  SpO2:  [62 %-100 %] 97 %  BP: (103-180)/(51-91) 105/51  Arterial Line BP: ()/(31-63) 110/49     Weight: 67.5 kg (148 lb 13 oz)  Body mass index is 24.02 kg/m².    Intake/Output Summary (Last 24 hours) at 10/18/2024 1657  Last data filed at 10/18/2024 1646  Gross per 24 hour   Intake 8440.57 ml   Output 225 ml   Net 8215.57 ml         Physical Exam  Vitals and nursing note reviewed.   Constitutional:       General: He is not in acute distress.     Appearance: He is well-developed. He is obese.   HENT:      Head: Normocephalic and atraumatic.   Eyes:      Conjunctiva/sclera: Conjunctivae normal.   Neck:      Vascular: No JVD.   Cardiovascular:      Rate and Rhythm: Normal rate and regular rhythm.      Heart sounds: Normal heart sounds.   Pulmonary:      Effort: Pulmonary effort is normal.      Breath sounds: Normal breath sounds.   Abdominal:      General: Bowel sounds are normal. There is no distension.      Palpations: Abdomen is soft.      Tenderness: There is no abdominal tenderness.   Genitourinary:     Comments: +mark  Musculoskeletal:      Cervical back: Neck supple.      Right lower leg: No edema.      Left lower leg: No edema.   Neurological:      Mental Status: He is easily aroused. He is lethargic.   Psychiatric:         Behavior: Behavior normal.              Significant Labs: All pertinent labs within the past 24 hours have been reviewed.  CBC:   Recent Labs   Lab 10/17/24  0855 10/18/24  0336 10/18/24  1300   WBC 24.51* 42.55* 46.19*   HGB 12.2* 12.8* 12.5*   HCT 38.3* 38.3* 36.6*   PLT 67* 37* 37*     CMP:   Recent Labs   Lab 10/17/24  0231 10/17/24  0855 10/18/24  0336 10/18/24  1300    142 133* 132*   K 3.7 4.2 4.9 4.8   * 116* 109 108   CO2 15* 12* 12* 12*   GLU 78 88 86 94   BUN 34* 37* 55* 59*   CREATININE 1.6* 1.9* 3.3* 3.7*   CALCIUM 9.3 7.4* 7.1* 7.0*   PROT 6.5 4.5* 4.7*  --    ALBUMIN 3.2* 2.4* 2.4*  --    BILITOT 0.6 1.1* 1.7*  --    ALKPHOS 95 75 62  --    AST 36 95* 185*  --    ALT 25 46* 64*  --    ANIONGAP 17* 14 12 12       Significant Imaging: I have reviewed all pertinent imaging results/findings within the past 24 hours.    Assessment/Plan:      * Septic shock  This patient has shock. The type of shock is distributive due to sepsis and hemorrhagic. The patient has the following evidence of shock: persistent hypotension, elevated lactic acid after adequate fluid resuscitation, GURVINDER, and altered mental status. The patient will be admitted to an intensive care unit, they will be treated with Levophed, IV fluids, antibiotics, PRBC.    Stress-dose steroids added by CC team. Appreciate assistance.  Wean pressors as tolerated    Bacteremia  Blood cx with enterococcus, pseudomonas, and E coli.     Per chart review; He has had multiple prior UTI due to providencia, ESBL proteus mirabilis, MDR proteus.    ID recs:  -unclear source but most likely colitis based on organisms identified on BCID  -less likely urological source from recent traumatic mark insertion  -given pacemaker with bacteremia would consider TTE and possible BLANAK  -repeat daily blood cxs until negative for 48hrs  -continue vanco dosed per pharmacy  -continue meropenem          Tachycardia  Due to sepsis    Urethral injury  No concerns for active bleeding at this  time.      Coagulopathy  Monitor hgb/ platelets   Received 1 unit of PRBCs.      Stercoral colitis  CTAP with large amount of fecal material concerning for stercoral colitis possible fecal impaction.      Continue bowel regimen      Acute metabolic encephalopathy  Multifactorial from fever, infection, septic shock  CT brain without acute abnormality, remote infarcts seen  Monitor for improvement      Thrombocytopenia  The likely etiology of thrombocytopenia is sepsis. The patients 3 most recent labs are listed below.  Recent Labs     10/17/24  0855 10/18/24  0336 10/18/24  1300   PLT 67* 37* 37*       Plan  - Will transfuse if platelet count is  less than 50 and bleeding continue .  - monitored D-dimer and fibrinogen, elevated coags  -there is concern for DIC  -patient may require other blood products including cryo or FFP  -take over-the-counter daily  VTE prophylaxis on hold      Hematuria  Urology recs:  Bedside irrigation performed with very minimal effort to clear the Shen catheter to clear yellow urine.  - maintain Shen catheter, do not remove or exchange Shen catheter without 1st discussing with Urology while inpatient  - we will plan for outpatient voiding trial with Urology in the next 3-4 weeks    GURVINDER (acute kidney injury)  GURVINDER is likely due to acute tubular necrosis caused by hypotension and post-obstructive d/t obstructing Shen, hematuria . Baseline creatinine is  0.8 . Most recent creatinine and eGFR are listed below.  Recent Labs     10/17/24  0855 10/18/24  0336 10/18/24  1300   CREATININE 1.9* 3.3* 3.7*   EGFRNORACEVR 36* 19* 16*        Plan  - GURVINDER is worsening. Will adjust treatment as follows: consult nephro . Per chart review, baseline sCr 0.7-0.8.   - Avoid nephrotoxins and renally dose meds for GFR listed above  - Monitor urine output, serial BMP, and adjust therapy as needed      History of complete heart block  Cards recs:  Device was interrogated yesterday. Appears to be sinus tachycardia  in the device is tracking his underlying rhythm. We did not appreciate significant arrhythmias or device malfunction        VTE Risk Mitigation (From admission, onward)      None            Discharge Planning   FISH:      Code Status: DNR   Is the patient medically ready for discharge?:     Reason for patient still in hospital (select all that apply): Patient trending condition, Laboratory test, Treatment, and Consult recommendations  Discharge Plan A: Return to nursing home          Critical care time spent on the evaluation and treatment of severe organ dysfunction, review of pertinent labs and imaging studies, discussions with consulting providers and discussions with patient/family: 47 minutes.      Lorenza Galindo MD  Department of Hospital Medicine   [facility] - Intensive Care

## 2024-10-18 NOTE — PROGRESS NOTES
Pharmacokinetic Assessment Follow Up: IV Vancomycin    Vancomycin serum concentration assessment(s):    The random level was drawn correctly and can be used to guide therapy at this time. The measurement is above the desired definitive target range of 15 to 20 mcg/mL.    Vancomycin Regimen Plan:    HOLD vanc dosing.   Re-dose when the random level is less than 20 mcg/mL, next level to be drawn at 0400 on 10/19    Drug levels (last 3 results):  Recent Labs   Lab Result Units 10/18/24  0336   Vancomycin, Random ug/mL 21.3       Pharmacy will continue to follow and monitor vancomycin.    Please contact pharmacy at extension [phone] for questions regarding this assessment.    Thank you for the consult,   Alma Boyd       Patient brief summary:  Praneeth Jewell is a 78 y.o. male initiated on antimicrobial therapy with IV Vancomycin for treatment of sepsis    The patient's current regimen is pulse dosing (GURVINDER)    Drug Allergies:   Review of patient's allergies indicates:  No Known Allergies    Actual Body Weight:   67.5 kg    Renal Function:   Estimated Creatinine Clearance: 17.2 mL/min (A) (based on SCr of 3.3 mg/dL (H)).,     Dialysis Method (if applicable):  N/A    CBC (last 72 hours):  Recent Labs   Lab Result Units 10/17/24  0231 10/17/24  0657 10/17/24  0855 10/18/24  0336   WBC K/uL 4.84 17.86* 24.51* 42.55*   Hemoglobin g/dL 15.2 10.8* 12.2* 12.8*   Hematocrit % 46.6 34.1* 38.3* 38.3*   Platelets K/uL 104* 64* 67* 37*   Gran % % 83.0* 91.9* 93.3* 58.0   Lymph % % 16.0* 4.0* 3.3* 1.0*   Mono % % 0.0* 0.5* 0.4* 9.0   Eosinophil % % 1.0 0.1 0.0 9.0*   Basophil % % 0.0 0.3 0.2 0.0   Differential Method  Manual Automated Automated Manual       Metabolic Panel (last 72 hours):  Recent Labs   Lab Result Units 10/17/24  0231 10/17/24  0855 10/17/24  1534 10/18/24  0336   Sodium mmol/L 143 142  --  133*   Potassium mmol/L 3.7 4.2  --  4.9   Chloride mmol/L 111* 116*  --  109   CO2 mmol/L 15* 12*  --  12*   Glucose mg/dL 78  88  --  86   Glucose, UA   --   --  Negative  --    BUN mg/dL 34* 37*  --  55*   Creatinine mg/dL 1.6* 1.9*  --  3.3*   Albumin g/dL 3.2* 2.4*  --  2.4*   Total Bilirubin mg/dL 0.6 1.1*  --  1.7*   Alkaline Phosphatase U/L 95 75  --  62   AST U/L 36 95*  --  185*   ALT U/L 25 46*  --  64*   Magnesium mg/dL  --   --   --  1.3*   Phosphorus mg/dL  --   --   --  2.9       Vancomycin Administrations:  vancomycin given in the last 96 hours                     vancomycin 2 g in dextrose 5 % 500 mL IVPB ()  Restarted 10/17/24 0414      Restarted  0357     2,000 mg New Bag  0323                    Microbiologic Results:  Microbiology Results (last 7 days)       Procedure Component Value Units Date/Time    Blood culture x two cultures. Draw prior to antibiotics. [[id number]] Collected: 10/17/24 0215    Order Status: Completed Specimen: Blood from Peripheral, Antecubital, Left Updated: 10/17/24 2248     Blood Culture, Routine Gram stain alan bottle: Gram negative rods      Gram stain alan bottle: Gram positive cocci      Results called to and read back by:Agusto Burnett RN 10/17/2024  17:52      Gram stain aer bottle: Gram negative rods      Gram stain aer bottle: Gram positive cocci    Narrative:      Aerobic and anaerobic    Blood culture x two cultures. Draw prior to antibiotics. [[id number]] Collected: 10/17/24 0215    Order Status: Completed Specimen: Blood from Peripheral, Antecubital, Left Updated: 10/17/24 2247     Blood Culture, Routine Gram stain aer bottle: Gram negative rods      Gram stain aer bottle: Gram positive cocci      Gram stain alan bottle:  Gram negative rods      Gram stain alan bottle: Gram positive cocci      Positive results previously called 10/17/2024    Narrative:      Aerobic and anaerobic    Rapid Organism ID by PCR (from Blood culture) [[id number]]  (Abnormal) Collected: 10/17/24 0215    Order Status: Completed Updated: 10/17/24 1908     Enterococcus faecalis Detected     Enterococcus faecium Not  Detected     Listeria monocytogenes Not Detected     Staphylococcus spp. Not Detected     Staphylococcus aureus Not Detected     Staphylococcus epidermidis Not Detected     Staphylococcus lugdunensis Not Detected     Streptococcus species Not Detected     Streptococcus agalactiae Not Detected     Streptococcus pneumoniae Not Detected     Streptococcus pyogenes Not Detected     Acinetobacter calcoaceticus/baumannii complex Not Detected     Bacteroides fragilis Not Detected     Enterobacterales See species for ID     Enterobacter cloacae complex Detected     Escherichia coli Detected     Klebsiella aerogenes Not Detected     Klebsiella oxytoca Not Detected     Klebsiella pneumoniae group Not Detected     Proteus Not Detected     Salmonella sp Not Detected     Serratia marcescens Not Detected     Haemophilus influenzae Not Detected     Neisseria meningtidis Not Detected     Pseudomonas aeruginosa Detected     Stenotrophomonas maltophilia Not Detected     Candida albicans Not Detected     Candida auris Not Detected     Candida glabrata Not Detected     Candida krusei Not Detected     Candida parapsilosis Not Detected     Candida tropicalis Not Detected     Cryptococcus neoformans/gattii Not Detected     CTX-M (ESBL ) Not Detected     IMP (Carbapenem resistant) Not Detected     KPC resistance gene (Carbapenem resistant) Not Detected     mcr-1  Not Detected     mec A/C  Test Not Applicable     mec A/C and MREJ (MRSA) gene Test Not Applicable     NDM (Carbapenem resistant) Not Detected     OXA-48-like (Carbapenem resistant) Not Detected     van A/B (VRE gene) Not Detected     VIM (Carbapenem resistant) Not Detected    Narrative:      Aerobic and anaerobic

## 2024-10-18 NOTE — MEDICAL/APP STUDENT
Pulmonary & Critical Care Medicine Note    Primary Attending Physician: Lorenza Galindo MD  ICU Attending: Feliciano Talavera MD      Subjective:      History of Present Illness:  Mitch Jewell is a 78 y.o. male with a PMH of CVA (2015, R PARDEEP with residual LE), HTN, HLD, complete heart block s/p PPM placement (2021), chronic obstructive uropathy 2/2 BPH with chronic indwelling mark catheter who presented to the ED with hematuria. Patient is currently a resident at [facility]. Per chart, following mark catheter exchange at the [facility], significant hematuria with large clots was noted which prompted him to be brought to the ED. Upon presentation, patient was noted with altered mental status, tachycardia, and hypotension. A significant amount of mitch blood was noted from the urethral meatus following catheter removal in the ED. Urology was consulted and successfully placed a 20F mark catheter. Sepsis protocol was initiated. Patient received broad-spectrum antibiotics and 3L of IVF. Required initiation of vasopressors in the ED for profound hypotension. He also received Vitamin K, Kcentra, TXA given gross bleeding and hematuria. Initial labs remarkable for lactic acid 8.0, platelets 104, INR 2.3, aPTT 91.8, BUN 34, Creatinine 1.6. . Patient was admitted to ICU for septic vs hemorrhagic shock.     Interval History:   10/17: Patient noted with increasing pressor requirements this AM, up to Levophed 3 mcg/kg/min and Vasopressin 0.04. Blood pressure labile, MAPs varying from 40-80s. Blood pressure assessed in all four extremities without significant discrepancies. Patient is easily arousable, oriented to self and time. Patient denies any acute complaints or pain. VBG demonstrating metabolic acidosis: pH 7.130, PCO2 44.8, HCO3 14.9. Bedside POCUS with hyperdynamic cardiac function, grossly unremarkable abdominal exam. Mark catheter in placed, draining blood-tinged urine.     10/18: Patient  easily arousable, oriented x4. No acute complaints this AM. Denies pain. Levophed requirements decreasing with up-titration of Steffen-synephrine. BP less labile since adjustment of pacemaker settings last night. Urine output decreasing, remains blood tinged. No bowel movements since admission.     Past Medical History:  Past Medical History:   Diagnosis Date    Arthritis     Diabetes mellitus     type 2    Folate deficiency anemia     Heart block     History of kidney stones 05/25/2021    History of stroke with residual deficit 05/25/2021    Hyperlipidemia     Hypertension     Major depressive disorder     Pacemaker     Biotronic [place] 8 DR-T (S/n: [id number])    Pyelonephritis 11/19/2021    TIA (transient ischemic attack)     Urinary retention 05/25/2021       Past Surgical History:  Past Surgical History:   Procedure Laterality Date    A-V CARDIAC PACEMAKER INSERTION N/A 8/24/2021    Procedure: INSERTION, CARDIAC PACEMAKER, DUAL CHAMBER;  Surgeon: Neo Winn MD;  Location: [facility] EP LAB;  Service: Cardiology;  Laterality: N/A;  CHB, DUAL PPM, ANES, BIO, DM, ED 2    COLONOSCOPY N/A 11/22/2021    Procedure: COLONOSCOPY;  Surgeon: Cesar Dorado MD;  Location: [facility] ENDO (2ND FLR);  Service: Endoscopy;  Laterality: N/A;    CYSTOGRAM N/A 5/24/2024    Procedure: CYSTOGRAM;  Surgeon: Camilo Kelley Jr., MD;  [facility];  Service: Urology;  Laterality: N/A;    CYSTOSCOPIC LITHOLAPAXY  5/25/2021    Procedure: CYSTOLITHOLAPAXY;  Surgeon: Chris Schilling MD;  [facility];  Service: Urology;;    CYSTOSCOPY  8/26/2021    Procedure: CYSTOSCOPY;  Surgeon: Camilo Kelley Jr., MD;  [facility];  Service: Urology;;    CYSTOSCOPY N/A 5/24/2024    Procedure: CYSTOSCOPY;  Surgeon: Camilo Kelley Jr., MD;  [facility];  Service: Urology;  Laterality: N/A;    CYSTOSCOPY W/ URETERAL STENT PLACEMENT Bilateral 5/25/2021    Procedure: CYSTOSCOPY, WITH BILATERAL URETERAL  STENT INSERTION;  Surgeon: Chris Schilling MD;  Location: [facility] OR 1ST FLR;  Service: Urology;  Laterality: Bilateral;    CYSTOSCOPY W/ URETERAL STENT REMOVAL Left 8/30/2024    Procedure: CYSTOSCOPY, WITH URETERAL STENT REMOVAL;  Surgeon: Camilo Kelley Jr., MD;  Location: [facility] OR 1ST FLR;  Service: Urology;  Laterality: Left;  1 hr    DILATION OF URETHRA N/A 5/24/2024    Procedure: DILATION, URETHRA;  Surgeon: Camilo Kelley Jr., MD;  Location: [facility] OR 1ST FLR;  Service: Urology;  Laterality: N/A;    ELBOW ARTHROPLASTY Right     EXTRACTION - STONE Left 5/24/2024    Procedure: EXTRACTION - STONE;  Surgeon: Camilo Kelley Jr., MD;  [facility];  Service: Urology;  Laterality: Left;  and kidney    FLUOROSCOPY  5/25/2021    Procedure: FLUOROSCOPY;  Surgeon: Chris Schilling MD;  [facility];  Service: Urology;;    LASER LITHOTRIPSY Left 8/26/2021    Procedure: LITHOTRIPSY, USING LASER;  Surgeon: Camilo Kelley Jr., MD;  Location: [facility] OR 1ST FLR;  Service: Urology;  Laterality: Left;    LASER LITHOTRIPSY Left 5/24/2024    Procedure: LITHOTRIPSY, USING LASER;  Surgeon: Camilo Kelley Jr., MD;  [facility];  Service: Urology;  Laterality: Left;    PLACEMENT-STENT Left 5/24/2024    Procedure: PLACEMENT-STENT;  Surgeon: Camilo Kelley Jr., MD;  [facility];  Service: Urology;  Laterality: Left;    PYELOSCOPY Bilateral 8/26/2021    Procedure: PYELOSCOPY;  Surgeon: Camilo Kelley Jr., MD;  Location: [facility] OR 1ST FLR;  Service: Urology;  Laterality: Bilateral;    PYELOSCOPY Left 5/24/2024    Procedure: PYELOSCOPY;  Surgeon: Camilo Kelley Jr., MD;  Location: [facility] OR 1ST FLR;  Service: Urology;  Laterality: Left;    REMOVAL OF BLOOD CLOT  5/25/2021    Procedure: REMOVAL, BLOOD CLOT;  Surgeon: Chris Schilling MD;  Location: [facility] OR [address] [place] [address];  Service: Urology;;    REMOVAL, NEPHROSTOMY TUBE, WITH IMAGING GUIDANCE Left 5/24/2024    Procedure: REMOVAL,  NEPHROSTOMY TUBE, WITH IMAGING GUIDANCE;  Surgeon: Camilo Kelley Jr., MD;  [facility];  Service: Urology;  Laterality: Left;    REPLACEMENT OF STENT Bilateral 8/26/2021    Procedure: REPLACEMENT, STENT;  Surgeon: Camilo Kelley Jr., MD;  [facility];  Service: Urology;  Laterality: Bilateral;    RETROGRADE PYELOGRAPHY Left 8/30/2024    Procedure: PYELOGRAM, RETROGRADE;  Surgeon: Camilo Kelley Jr., MD;  [facility];  Service: Urology;  Laterality: Left;    URETEROSCOPIC REMOVAL OF URETERIC CALCULUS Bilateral 8/26/2021    Procedure: REMOVAL, CALCULUS, URETER, URETEROSCOPIC;  Surgeon: Camilo Kelley Jr., MD;  Location: [facility] OR [address] [place] [address];  Service: Urology;  Laterality: Bilateral;    URETEROSCOPY Bilateral 8/26/2021    Procedure: URETEROSCOPY;  Surgeon: Camilo Kelley Jr., MD;  Location: [facility] OR [address] [place] [address];  Service: Urology;  Laterality: Bilateral;    URETEROSCOPY Left 5/24/2024    Procedure: URETEROSCOPY;  Surgeon: Camilo Kelley Jr., MD;  Location: [facility] OR [address] [place] [address];  Service: Urology;  Laterality: Left;       Allergies:  Review of patient's allergies indicates:  No Known Allergies    Medications:   In-Hospital Scheduled Medications:   fludrocortisone  100 mcg Oral Daily    hydrocortisone sodium succinate  50 mg Intravenous Q6H    magnesium sulfate IVPB  2 g Intravenous Q2H    meropenem IV (PEDS and ADULTS)  1 g Intravenous Q12H    mupirocin   Nasal BID    polyethylene glycol  17 g Oral Daily    senna  8.6 mg Oral Daily      In-Hospital PRN Medications:    Current Facility-Administered Medications:     0.9%  NaCl infusion (for blood administration), , Intravenous, Q24H PRN    Pharmacy to dose Vancomycin consult, , , Once **AND** vancomycin - pharmacy to dose, , Intravenous, pharmacy to manage frequency   In-Hospital IV Infusion Medications:   NORepinephrine bitartrate-D5W  0-3 mcg/kg/min Intravenous Continuous 63.3 mL/hr at 10/18/24 0600 2 mcg/kg/min at 10/18/24 0600     phenylephrine  0-5 mcg/kg/min Intravenous Continuous        vasopressin  0.04 Units/min Intravenous Continuous 12 mL/hr at 10/18/24 0752 0.04 Units/min at 10/18/24 0752      Home Medications:  Prior to Admission medications    Medication Sig Start Date End Date Taking? Authorizing Provider   atorvastatin (LIPITOR) 40 MG tablet Take 40 mg by mouth every evening.   Yes Provider, Historical   bisacodyL (DULCOLAX, BISACODYL,) 10 mg Supp Place 1 suppository (10 mg total) rectally daily as needed (constipation). 4/17/23  Yes Jenny Smith MD   docusate sodium (COLACE) 100 MG capsule Take 1 capsule (100 mg total) by mouth once daily. 4/17/23  Yes Jenny Smith MD   folic acid (FOLVITE) 1 MG tablet Take 1 mg by mouth once daily.   Yes Provider, Historical   gabapentin (NEURONTIN) 300 MG capsule Take 300 mg by mouth 3 (three) times daily. 4/7/21  Yes Provider, Historical   menthol-zinc oxide (CALMOSEPTINE) 0.44-20.6 % Oint Apply topically daily as needed. To sacral area after each sacral excoriation episode and as needed   Yes Provider, Historical   mirtazapine (REMERON) 30 MG tablet Take 30 mg by mouth nightly. 5/27/21  Yes Provider, Historical   nystatin (MYCOSTATIN) powder Apply topically 2 (two) times a day. BID + PRN to buttocks   Yes Provider, Historical   polyethylene glycol (GLYCOLAX) 17 gram/dose powder Take 17 g by mouth 2 (two) times daily. 2/8/24  Yes Provider, Historical   tamsulosin (FLOMAX) 0.4 mg Cap TAKE 2 CAPSULES(0.8 MG) BY MOUTH EVERY DAY  Patient taking differently: Take 0.8 mg by mouth once daily. 5/18/23  Yes Camilo Kelley Jr., MD   cyproheptadine (PERIACTIN) 4 mg tablet Take 4 mg by mouth 3 (three) times daily. Itching 4/7/21   Provider, Historical   CHEST CONGESTION RELIEF DM  mg/5 mL liquid Take by mouth. 1/17/24 10/17/24  Provider, Historical       Family History:  Family History   Problem Relation Name Age of Onset    Stroke Mother      Hyperlipidemia Mother      Mental illness  "Father      Parkinsonism Father      No Known Problems Brother         Social History:  Social History     Tobacco Use    Smoking status: Former     Types: Cigarettes    Smokeless tobacco: Never   Substance Use Topics    Alcohol use: Yes     Comment: 1/ pint whisky daily    Drug use: Yes     Types: "Crack" cocaine       Review of Systems:  Review of Systems   Constitutional:  Positive for fever and malaise/fatigue.   Respiratory:  Negative for cough and shortness of breath.    Cardiovascular:  Negative for chest pain and palpitations.   Gastrointestinal:  Negative for abdominal pain.   Genitourinary:  Positive for dysuria and hematuria. Negative for flank pain.   Neurological:  Positive for weakness. Negative for loss of consciousness and headaches.   Psychiatric/Behavioral:  The patient is not nervous/anxious.          Objective:   Last 24 Hour Vital Signs:  BP  Min: 72/40  Max: 190/90  Temp  Av.6 °F (37.6 °C)  Min: 98.8 °F (37.1 °C)  Max: 100.1 °F (37.8 °C)  Pulse  Av.5  Min: 93  Max: 141  Resp  Av.5  Min: 11  Max: 34  SpO2  Av.2 %  Min: 79 %  Max: 100 %  I/O last 3 completed shifts:  In: 7908.2 [I.V.:4270.9; IV Piggyback:3637.3]  Out: 260 [Urine:260]    Physical Examination:  Physical Exam  Vitals and nursing note reviewed.   Constitutional:       Appearance: He is ill-appearing.   Eyes:      Pupils: Pupils are equal, round, and reactive to light.   Cardiovascular:      Rate and Rhythm: Tachycardia present.      Pulses:           Radial pulses are 2+ on the right side and 2+ on the left side.        Dorsalis pedis pulses are 1+ on the right side and 1+ on the left side.      Heart sounds: Normal heart sounds.      Comments: V-paced rhythm  Pulmonary:      Effort: Pulmonary effort is normal. No respiratory distress.      Breath sounds: Normal breath sounds.   Abdominal:      General: Abdomen is flat. Bowel sounds are decreased. There is no distension.      Palpations: Abdomen is soft.      " "Tenderness: There is no abdominal tenderness.   Genitourinary:     Comments: Shen catheter in place  Skin:     Capillary Refill: Capillary refill takes more than 3 seconds.      Coloration: Skin is pale.   Neurological:      Mental Status: He is oriented to person, place, and time and easily aroused.      GCS: GCS eye subscore is 3. GCS verbal subscore is 5. GCS motor subscore is 6.       Laboratory:  Trended Lab Data:  Recent Labs     10/17/24  0231 10/17/24  0306 10/17/24  0657 10/17/24  0855 10/18/24  0336   WBC 4.84  --  17.86* 24.51* 42.55*   HGB 15.2  --  10.8* 12.2* 12.8*   HCT 46.6  --  34.1* 38.3* 38.3*   *  --  64* 67* 37*     --   --  142 133*   K 3.7  --   --  4.2 4.9   *  --   --  116* 109   CO2 15*  --   --  12* 12*   BUN 34*  --   --  37* 55*   CREATININE 1.6*  --   --  1.9* 3.3*   GLU 78  --   --  88 86   BILITOT 0.6  --   --  1.1* 1.7*   AST 36  --   --  95* 185*   ALT 25  --   --  46* 64*   ALKPHOS 95  --   --  75 62   CALCIUM 9.3  --   --  7.4* 7.1*   ALBUMIN 3.2*  --   --  2.4* 2.4*   PROT 6.5  --   --  4.5* 4.7*   MG  --   --   --   --  1.3*   PHOS  --   --   --   --  2.9   INR  --  2.3*  --  3.2* 2.1*       Cardiac: No results for input(s): "TROPONINI", "CKTOTAL", "CKMB", "BNP" in the last 168 hours.    Urinalysis:   Lab Results   Component Value Date    LABURIN No growth 05/22/2024    COLORU [place] (A) 10/17/2024    SPECGRAV 1.010 10/17/2024    NITRITE Negative 10/17/2024    KETONESU Negative 10/17/2024    UROBILINOGEN Negative 10/17/2024       Microbiology:  Microbiology Results (last 7 days)       Procedure Component Value Units Date/Time    Blood culture x two cultures. Draw prior to antibiotics. [[id number]] Collected: 10/17/24 0215    Order Status: Completed Specimen: Blood from Peripheral, Antecubital, Left Updated: 10/17/24 2248     Blood Culture, Routine Gram stain alan bottle: Gram negative rods      Gram stain alan bottle: Gram positive cocci      Results called to " and read back by:Agusto Burnett RN 10/17/2024  17:52      Gram stain aer bottle: Gram negative rods      Gram stain aer bottle: Gram positive cocci    Narrative:      Aerobic and anaerobic    Blood culture x two cultures. Draw prior to antibiotics. [[id number]] Collected: 10/17/24 0215    Order Status: Completed Specimen: Blood from Peripheral, Antecubital, Left Updated: 10/17/24 2247     Blood Culture, Routine Gram stain aer bottle: Gram negative rods      Gram stain aer bottle: Gram positive cocci      Gram stain alan bottle:  Gram negative rods      Gram stain alan bottle: Gram positive cocci      Positive results previously called 10/17/2024    Narrative:      Aerobic and anaerobic    Rapid Organism ID by PCR (from Blood culture) [[id number]]  (Abnormal) Collected: 10/17/24 0215    Order Status: Completed Updated: 10/17/24 1908     Enterococcus faecalis Detected     Enterococcus faecium Not Detected     Listeria monocytogenes Not Detected     Staphylococcus spp. Not Detected     Staphylococcus aureus Not Detected     Staphylococcus epidermidis Not Detected     Staphylococcus lugdunensis Not Detected     Streptococcus species Not Detected     Streptococcus agalactiae Not Detected     Streptococcus pneumoniae Not Detected     Streptococcus pyogenes Not Detected     Acinetobacter calcoaceticus/baumannii complex Not Detected     Bacteroides fragilis Not Detected     Enterobacterales See species for ID     Enterobacter cloacae complex Detected     Escherichia coli Detected     Klebsiella aerogenes Not Detected     Klebsiella oxytoca Not Detected     Klebsiella pneumoniae group Not Detected     Proteus Not Detected     Salmonella sp Not Detected     Serratia marcescens Not Detected     Haemophilus influenzae Not Detected     Neisseria meningtidis Not Detected     Pseudomonas aeruginosa Detected     Stenotrophomonas maltophilia Not Detected     Candida albicans Not Detected     Candida auris Not Detected     Candida  glabrata Not Detected     Candida krusei Not Detected     Candida parapsilosis Not Detected     Candida tropicalis Not Detected     Cryptococcus neoformans/gattii Not Detected     CTX-M (ESBL ) Not Detected     IMP (Carbapenem resistant) Not Detected     KPC resistance gene (Carbapenem resistant) Not Detected     mcr-1  Not Detected     mec A/C  Test Not Applicable     mec A/C and MREJ (MRSA) gene Test Not Applicable     NDM (Carbapenem resistant) Not Detected     OXA-48-like (Carbapenem resistant) Not Detected     van A/B (VRE gene) Not Detected     VIM (Carbapenem resistant) Not Detected    Narrative:      Aerobic and anaerobic            Radiology:  CXR: coarse interstitial lung markings, no effusion or pneumothorax    CT Head  No CT evidence of acute intracranial abnormality. Clinical correlation and further evaluation as warranted.  2. Remote right frontal lobe infarct with ex vacuo dilatation of the frontal horn of the right lateral ventricle.  Unchanged size and configuration of the ventricular system relative to prior exam of 2022.  3. Generalized cerebral volume loss and findings of chronic microvascular ischemic change.    CT Chest Abdomen Pelvis  1. Large volume of fecal material within the sigmoid colon and rectum with rectal wall thickening and perirectal inflammatory change.  Findings concerning for fecal impaction and underlying stercoral colitis.  Clinical correlation advised.  2. Urinary bladder wall thickening with small volume of air in the bladder lumen.  Correlation for recent instrumentation advised.  Correlation with urinalysis for infectious process also recommended.  3. Bilateral perinephric fat stranding, nonspecific although it can be seen with infectious process.  4. Bilateral nephrolithiasis.  No hydronephrosis.       I have personally reviewed the above labs and imaging.    Current Medications:     Infusions:   NORepinephrine bitartrate-D5W  0-3 mcg/kg/min Intravenous Continuous  63.3 mL/hr at 10/18/24 0600 2 mcg/kg/min at 10/18/24 0600    phenylephrine  0-5 mcg/kg/min Intravenous Continuous        vasopressin  0.04 Units/min Intravenous Continuous 12 mL/hr at 10/18/24 0752 0.04 Units/min at 10/18/24 0752        Scheduled:   fludrocortisone  100 mcg Oral Daily    hydrocortisone sodium succinate  50 mg Intravenous Q6H    magnesium sulfate IVPB  2 g Intravenous Q2H    meropenem IV (PEDS and ADULTS)  1 g Intravenous Q12H    mupirocin   Nasal BID    polyethylene glycol  17 g Oral Daily    senna  8.6 mg Oral Daily        PRN:    Current Facility-Administered Medications:     0.9%  NaCl infusion (for blood administration), , Intravenous, Q24H PRN    Pharmacy to dose Vancomycin consult, , , Once **AND** vancomycin - pharmacy to dose, , Intravenous, pharmacy to manage frequency     Assessment & Plan by Systems:     Neuro:  Acute encephalopathy  -- Likely secondary to infection and septic shock   -- CT head without acute abnormalities, remote infarcts noted   -- Mentation returned to baseline.   -- ICU delirium precautions: frequent re-orienting, minimize sedating agents (opiates, benzos)    History of CVA  -- Previous R PARDEEP CVA with residual LE weakness, bedbound at baseline  -- Resume home statin when able to tolerate PO medications    Cardiovascular/Hemodynamics:  Hypotension   Shock  -- Concern for hemorrhagic vs septic shock, though hemorrhagic shock less likely  -- Bedside POCUS initially with hyperdynamic cardiac function, end-diastolic volumes grossly normal.   -- Continue Levophed and Vasopressin for MAP goal > 65  -- Continue stress-dose steroids   -- Continue antibiotics for urinary source vs stercoral colitis, Vancomycin and Meropenem  -- Monitor H/H given hematuria and mitch bleeding from urethral meatus, though mitch bleeding has appeared to resolve since mark placement. Received 1 unit of PRBCs.     History of CHB, S/P PPM  -- After A-line placement, notable variations were noted in  systolic blood pressure. Based upon pulse pressure variability and EKG, concern for pacemaker induced tachycardia vs oversensing.   -- Cardiology consulted for evaluation of pacemaker functioning. Device interrogation completed last night with adjustments to upper HR limits.   -- Systolic pressure variability improved this AM with pacemaker adjustments.     Respiratory:   No acute issues at this time.     GI/FEN:  Fecal impaction  Stercoral colitis  -- CT with large amount of fecal material, concerning for stercoral colitis  -- KUB with constipation/fecal impaction, no gross evidence of free air  -- Patient denies abdominal pain. Abdominal exam benign.   -- Continue antibiotics, Vancomycin and Meropenem   -- Aggressive bowel regimen for management of fecal impaction    F: Target euvolemia. Appears hypervolemic on exam. Net positive 7.6L since admit. Lasix challenge today.   E: Goal K>4, Mg>2  N: NPO, consider advancing diet pending results of bowel care    ID:   Sepsis 2/2 suspected urinary tract infection vs stercoral colitis  -- Presented with fever, tachycardia, and profound hypotension. Sepsis protocol initiated.   -- History of recurrent UTIs with chronic indwelling mark.   -- CXR without evidence of infection. CT with large amount of fecal material, concern for stercoral colitis  -- Micro: Blood cultures with gram + cocci, gram - rods. Species identified: Enterobacter cloacae complex, Enterococcus faecalis, E.coli, Pseudomonas aeruginosa. Urine culture pending.   -- Abx: Received cefepime, Flagyl, and Vancomycin in ED. Will continue Vancomycin and Meropenem.   -- Lactate improving 9.3 --> 7.8-->6.9-->2.7  -- ID consulted for polymicrobial bacteremia    Renal:   Acute kidney injury  -- Likely multifactorial, pre-renal from hypotension & shock vs post-obstructive 2/2 obstructive uropathy/malfunctioned mark  -- BUN/Creat 55/3.3  -- Check urine sodium, urine creatinine  -- Renal US ordered  -- Renally dose all  medication, avoid nephrotoxic agents  -- Strict I/Os, daily weights  -- Nephrology consulted    Hematuria  Urethral injury  -- Secondary to traumatic insertion/false track with indwelling chronic mark   -- Received Vitamin K, Kcentra, TXA, and 1 unit of PRBCs for mitch bleeding. Bleeding now resolved.   -- Urology following  -- UA with >100 RBCs  -- H/H 12.8/38.3    Heme/Onc:   Thrombocytopenia  Coagulapathy   -- Likely secondary to sepsis  -- Concern for DIC given elevated coags. Continued management of underlying condition, sepsis.   -- Platelets 37,000 this AM   -- Transfuse blood products if patient becomes hemodynamically unstable with acute bleeding, symptomatic or H/H drops below 7/21    Endocrine:  History of T2DM  -- Accuchecks + SSI  -- Most recent A1C 4.6% in 2023  -- Goal glucose 140-180 while in ICU      Rheum/MSK:  Chronic debility   -- Residual deficits from prior CVA, bedbound at baseline  -- Consider PT/OT for mobilization during hospitalization when more hemodynamically stable      ICU Checklist  Feeding: NPO  Analgesia: None  Sedation: N/A  Thrombo PPX: Holding due to acute bleeding, elevated INR and low platelets  Head of Bed: > 30 degrees  Ulcer PPX: Pepcid  Glucose: goal 140-180s, hypoglycemic ppx  SAT/SBT: N/A  Bowel Regimen: Miralax, Senna, Soap suds enema  Indwelling Lines: PIV, RIJ TLC, mark (chronic), Left radial arterial line  Abx: Vancomycin, Meropenem  Family Discussions: Plan of care discussed with patient. No family at bedside during rounds.   Code Status: DNR  Dispo: ICU      Case discussed with critical care team on rounds under the supervision of Dr. Talavera. Attestation to follow.   Ofelia Dupree, ARLEN Student

## 2024-10-18 NOTE — HPI
79 yo male with GURVINDER, hematuria, thrombocytopenia, acute metabolic encephalopathy, stercocal colitis, coagulopathy, urethral injury, CHB s/p PPM, HLP, HTN, and BPH with chronic mark  currently a resident at [facility] who presented to ED for hematuria.  Patient had some complaints at the nursing home which prompted an exchange of his Mark catheter however after removing his chronic Mark catheter unable to place an additional Mark in possibly inflated inside of a false track.  Patient began with hematuria and clots.  Patient became febrile and encephalopathic and presented to ED tachycardic and minimally responsive.  After giving fluids patient began to decompensate further with hypotension, he was placed on vasopressors in ED and a central line was placed.  Patient had a bladder scan which showed large volume, there was an attempt to replace the Mark catheter which was again unsuccessful.  Urology was consulted. Urology at bedside able to place 20 Kittitian catheter. Blood cxs were sent and are polymicrobial and positive for GNRs so far. However gram stain and BCID concerning for enterococcus, pseudomonas, and e coli. Has previous pseudomonas cx that were only sensitive to meropenem. CT abd showed fecal impaction with colitis. Currently on meropenem and vanco.

## 2024-10-18 NOTE — ASSESSMENT & PLAN NOTE
Multifactorial from fever, infection, septic shock  CT brain without acute abnormality, remote infarcts seen  Monitor for improvement

## 2024-10-18 NOTE — ASSESSMENT & PLAN NOTE
- CHB In 2021 s/p dual chamber Biotronik PPM  - Vpaced rhythm this AM on telemetry;   - continue to monitor on telemetry

## 2024-10-18 NOTE — ASSESSMENT & PLAN NOTE
This patient has shock. The type of shock is distributive due to sepsis and hemorrhagic. The patient has the following evidence of shock: persistent hypotension, elevated lactic acid after adequate fluid resuscitation, GURVINDER, and altered mental status. The patient will be admitted to an intensive care unit, they will be treated with Levophed, IV fluids, antibiotics, PRBC.    Stress-dose steroids added by CC team. Appreciate assistance.  Wean pressors as tolerated

## 2024-10-18 NOTE — CONSULTS
[facility] - Intensive Care  Infectious Disease  Consult Note    Patient Name: Praneeth Jewell  MRN: [id number]  Admission Date: 10/17/2024  Hospital Length of Stay: 1 days  Attending Physician: Lorenza Galindo MD  Primary Care Provider: Home, [facility]     Isolation Status: No active isolations    Patient information was obtained from patient and past medical records.      Inpatient consult to Infectious Diseases  Consult performed by: Estevan Albarado MD  Consult ordered by: Lorenza Galindo MD  Reason for consult: sepsis        Assessment/Plan:     ID  * Septic shock  77 yo male with GURVINDER, hematuria, thrombocytopenia, acute metabolic encephalopathy, stercocal colitis, coagulopathy, urethral injury, CHB s/p PPM, HLP, HTN, and BPH with chronic mark currently a resident at [facility] who presented to ED for hematuria. Now found to be bacteremia and in septic shock on pressors.    -unclear source but most likely colitis based on organisms identified on BCID  -less likely urological source from recent traumatic mark insertion  -given pacemaker with bacteremia would consider TTE and possible BLANKA  -await final blood cxs  -repeat daily blood cxs until negative fro 48hrs  -continue vanco dosed per pharmacy  -continue meropenem  -of note leukocytosis in 40s is normally not due to infection alone and he has received steroids            Thank you for your consult. I will follow-up with patient. Please contact us if you have any additional questions.    Estevan Albarado MD  Infectious Disease  [place] - Intensive Care    Subjective:     Principal Problem: Septic shock    HPI: 77 yo male with GURVINDER, hematuria, thrombocytopenia, acute metabolic encephalopathy, stercocal colitis, coagulopathy, urethral injury, CHB s/p PPM, HLP, HTN, and BPH with chronic mark  currently a resident at [facility] who presented to ED for hematuria.  Patient had some complaints at the nursing home  which prompted an exchange of his Shen catheter however after removing his chronic Shen catheter unable to place an additional Shen in possibly inflated inside of a false track.  Patient began with hematuria and clots.  Patient became febrile and encephalopathic and presented to ED tachycardic and minimally responsive.  After giving fluids patient began to decompensate further with hypotension, he was placed on vasopressors in ED and a central line was placed.  Patient had a bladder scan which showed large volume, there was an attempt to replace the Shen catheter which was again unsuccessful.  Urology was consulted. Urology at bedside able to place 20 Macedonian catheter. Blood cxs were sent and are polymicrobial and positive for GNRs so far. However gram stain and BCID concerning for enterococcus, pseudomonas, and e coli. Has previous pseudomonas cx that were only sensitive to meropenem. CT abd showed fecal impaction with colitis. Currently on meropenem and vanco.        Past Medical History:   Diagnosis Date    Arthritis     Diabetes mellitus     type 2    Folate deficiency anemia     Heart block     History of kidney stones 05/25/2021    History of stroke with residual deficit 05/25/2021    Hyperlipidemia     Hypertension     Major depressive disorder     Pacemaker     Biotronic [place]ora 8 DR-T (S/n: [id number])    Pyelonephritis 11/19/2021    TIA (transient ischemic attack)     Urinary retention 05/25/2021       Past Surgical History:   Procedure Laterality Date    A-V CARDIAC PACEMAKER INSERTION N/A 8/24/2021    Procedure: INSERTION, CARDIAC PACEMAKER, DUAL CHAMBER;  Surgeon: Neo Winn MD;  Location: [facility] EP LAB;  Service: Cardiology;  Laterality: N/A;  CHB, DUAL PPM, ANES, BIO, DM, ED 2    COLONOSCOPY N/A 11/22/2021    Procedure: COLONOSCOPY;  Surgeon: Cesar Dorado MD;  Location: [facility] ENDO ([address] [place] [address]);  Service: Endoscopy;  Laterality: N/A;    CYSTOGRAM N/A 5/24/2024    Procedure: CYSTOGRAM;  Surgeon:  Camilo Kelley Jr., MD;  Location: [facility] OR 1ST FLR;  Service: Urology;  Laterality: N/A;    CYSTOSCOPIC LITHOLAPAXY  5/25/2021    Procedure: CYSTOLITHOLAPAXY;  Surgeon: Chris Schilling MD;  Location: [facility] OR 1ST FLR;  Service: Urology;;    CYSTOSCOPY  8/26/2021    Procedure: CYSTOSCOPY;  Surgeon: Camilo Kelley Jr., MD;  Location: [facility] OR 1ST FLR;  Service: Urology;;    CYSTOSCOPY N/A 5/24/2024    Procedure: CYSTOSCOPY;  Surgeon: Camilo Kelley Jr., MD;  Location: [facility] OR 1ST FLR;  Service: Urology;  Laterality: N/A;    CYSTOSCOPY W/ URETERAL STENT PLACEMENT Bilateral 5/25/2021    Procedure: CYSTOSCOPY, WITH BILATERAL URETERAL STENT INSERTION;  Surgeon: Chris Schilling MD;  [facility];  Service: Urology;  Laterality: Bilateral;    CYSTOSCOPY W/ URETERAL STENT REMOVAL Left 8/30/2024    Procedure: CYSTOSCOPY, WITH URETERAL STENT REMOVAL;  Surgeon: Camilo Kelley Jr., MD;  Location: [facility] OR 1ST FLR;  Service: Urology;  Laterality: Left;  1 hr    DILATION OF URETHRA N/A 5/24/2024    Procedure: DILATION, URETHRA;  Surgeon: Camilo Kelley Jr., MD;  [facility];  Service: Urology;  Laterality: N/A;    ELBOW ARTHROPLASTY Right     EXTRACTION - STONE Left 5/24/2024    Procedure: EXTRACTION - STONE;  Surgeon: Camilo Kelley Jr., MD;  Location: [facility] OR 1ST FLR;  Service: Urology;  Laterality: Left;  and kidney    FLUOROSCOPY  5/25/2021    Procedure: FLUOROSCOPY;  Surgeon: Chris Schilling MD;  Location: [facility] OR 1ST FLR;  Service: Urology;;    LASER LITHOTRIPSY Left 8/26/2021    Procedure: LITHOTRIPSY, USING LASER;  Surgeon: Camilo Kelley Jr., MD;  Location: [facility] OR 1ST FLR;  Service: Urology;  Laterality: Left;    LASER LITHOTRIPSY Left 5/24/2024    Procedure: LITHOTRIPSY, USING LASER;  Surgeon: Camilo Kelley Jr., MD;  Location: [facility] OR [address] [place] [address];  Service: Urology;  Laterality: Left;    PLACEMENT-STENT Left 5/24/2024    Procedure: PLACEMENT-STENT;  Surgeon: Camilo Kelley Jr.  MD;  [facility];  Service: Urology;  Laterality: Left;    PYELOSCOPY Bilateral 8/26/2021    Procedure: PYELOSCOPY;  Surgeon: Camilo Kelley Jr., MD;  [facility];  Service: Urology;  Laterality: Bilateral;    PYELOSCOPY Left 5/24/2024    Procedure: PYELOSCOPY;  Surgeon: Camilo Kelley Jr., MD;  Location: [facility] OR [address] [place] [address];  Service: Urology;  Laterality: Left;    REMOVAL OF BLOOD CLOT  5/25/2021    Procedure: REMOVAL, BLOOD CLOT;  Surgeon: Chris Schilling MD;  Location: [facility] OR [address] [place] [address];  Service: Urology;;    REMOVAL, NEPHROSTOMY TUBE, WITH IMAGING GUIDANCE Left 5/24/2024    Procedure: REMOVAL, NEPHROSTOMY TUBE, WITH IMAGING GUIDANCE;  Surgeon: Camilo Kelley Jr., MD;  Location: [facility] OR [address] [place] [address];  Service: Urology;  Laterality: Left;    REPLACEMENT OF STENT Bilateral 8/26/2021    Procedure: REPLACEMENT, STENT;  Surgeon: Camilo Kelley Jr., MD;  Location: [facility] OR [address] [place] [address];  Service: Urology;  Laterality: Bilateral;    RETROGRADE PYELOGRAPHY Left 8/30/2024    Procedure: PYELOGRAM, RETROGRADE;  Surgeon: Camilo Kelley Jr., MD;  Location: [address];  Service: Urology;  Laterality: Left;    URETEROSCOPIC REMOVAL OF URETERIC CALCULUS Bilateral 8/26/2021    Procedure: REMOVAL, CALCULUS, URETER, URETEROSCOPIC;  Surgeon: Camilo Kelley Jr., MD;  Location: [address];  Service: Urology;  Laterality: Bilateral;    URETEROSCOPY Bilateral 8/26/2021    Procedure: URETEROSCOPY;  Surgeon: Camilo Kelley Jr., MD;  Location: [facility] OR [address] [place] [address];  Service: Urology;  Laterality: Bilateral;    URETEROSCOPY Left 5/24/2024    Procedure: URETEROSCOPY;  Surgeon: Camilo Kelley Jr., MD;  Location: [facility] OR [address] [place] [address];  Service: Urology;  Laterality: Left;       Review of patient's allergies indicates:  No Known Allergies    Medications:  Medications Prior to Admission   Medication Sig    atorvastatin (LIPITOR) 40 MG tablet Take 40 mg by mouth every evening.    bisacodyL (DULCOLAX, BISACODYL,) 10 mg  "Supp Place 1 suppository (10 mg total) rectally daily as needed (constipation).    docusate sodium (COLACE) 100 MG capsule Take 1 capsule (100 mg total) by mouth once daily.    folic acid (FOLVITE) 1 MG tablet Take 1 mg by mouth once daily.    gabapentin (NEURONTIN) 300 MG capsule Take 300 mg by mouth 3 (three) times daily.    menthol-zinc oxide (CALMOSEPTINE) 0.44-20.6 % Oint Apply topically daily as needed. To sacral area after each sacral excoriation episode and as needed    mirtazapine (REMERON) 30 MG tablet Take 30 mg by mouth nightly.    nystatin (MYCOSTATIN) powder Apply topically 2 (two) times a day. BID + PRN to buttocks    polyethylene glycol (GLYCOLAX) 17 gram/dose powder Take 17 g by mouth 2 (two) times daily.    tamsulosin (FLOMAX) 0.4 mg Cap TAKE 2 CAPSULES(0.8 MG) BY MOUTH EVERY DAY (Patient taking differently: Take 0.8 mg by mouth once daily.)    cyproheptadine (PERIACTIN) 4 mg tablet Take 4 mg by mouth 3 (three) times daily. Itching     Antibiotics (From admission, onward)      Start     Stop Route Frequency Ordered    10/17/24 1148  vancomycin - pharmacy to dose  (vancomycin IVPB (PEDS and ADULTS))        Placed in "And" Linked Group    -- IV pharmacy to manage frequency 10/17/24 1049    10/17/24 0900  mupirocin 2 % ointment         10/22/24 0859 Nasl 2 times daily 10/17/24 0458    10/17/24 0900  meropenem injection 1 g         -- IV Every 12 hours (non-standard times) 10/17/24 0734          Antifungals (From admission, onward)      None          Antivirals (From admission, onward)      None             Immunization History   Administered Date(s) Administered    Influenza 11/09/2010    PPD Test 07/29/2015    Zoster 02/09/2018       Family History       Problem Relation (Age of Onset)    Hyperlipidemia Mother    Mental illness Father    No Known Problems Brother    Parkinsonism Father    Stroke Mother          Social History     Socioeconomic History    Marital status: Single    Number of children: " "0    Years of education: 15    Highest education level: Some college, no degree   Occupational History     Employer: Disabled    Occupation: Construction     Employer: [employer]     Comment: Retired in past 5-10 years   Tobacco Use    Smoking status: Former     Types: Cigarettes    Smokeless tobacco: Never   Substance and Sexual Activity    Alcohol use: Yes     Comment: 1/ pint whisky daily    Drug use: Yes     Types: "Crack" cocaine     Social Drivers of Health     Financial Resource Strain: Low Risk  (10/17/2024)    Overall Financial Resource Strain (CARDIA)     Difficulty of Paying Living Expenses: Not hard at all   Food Insecurity: No Food Insecurity (10/17/2024)    Hunger Vital Sign     Worried About Running Out of Food in the Last Year: Never true     Ran Out of Food in the Last Year: Never true   Transportation Needs: No Transportation Needs (10/17/2024)    TRANSPORTATION NEEDS     Transportation : No   Physical Activity: Inactive (10/17/2024)    Exercise Vital Sign     Days of Exercise per Week: 0 days     Minutes of Exercise per Session: 0 min   Stress: No Stress Concern Present (10/17/2024)    [employer] - Occupational Stress Questionnaire     Feeling of Stress : Not at all   Housing Stability: Low Risk  (10/17/2024)    Housing Stability Vital Sign     Unable to Pay for Housing in the Last Year: No     Homeless in the Last Year: No     Review of Systems   Unable to perform ROS: Acuity of condition   Answers some questions  Denies chest pain  Denies SOB  Objective:     Vital Signs (Most Recent):  Temp: 98.3 °F (36.8 °C) (10/18/24 0715)  Pulse: 110 (10/18/24 0945)  Resp: (!) 21 (10/18/24 0945)  BP: (!) 144/67 (10/18/24 0900)  SpO2: (!) 93 % (10/18/24 0715) Vital Signs (24h Range):  Temp:  [98.3 °F (36.8 °C)-100.1 °F (37.8 °C)] 98.3 °F (36.8 °C)  Pulse:  [] 110  Resp:  [4-27] 21  SpO2:  [79 %-100 %] 93 %  BP: ()/(51-91) 144/67  Arterial Line BP: ()/(31-63) " 107/50     Weight: 67.5 kg (148 lb 13 oz)  Body mass index is 24.02 kg/m².    Estimated Creatinine Clearance: 17.2 mL/min (A) (based on SCr of 3.3 mg/dL (H)).     Physical Exam  HENT:      Head: Normocephalic.      Mouth/Throat:      Mouth: Mucous membranes are dry.      Pharynx: Oropharynx is clear.   Cardiovascular:      Rate and Rhythm: Regular rhythm. Tachycardia present.      Pulses: Normal pulses.      Heart sounds: Normal heart sounds.   Pulmonary:      Effort: Pulmonary effort is normal.      Breath sounds: Normal breath sounds.   Abdominal:      General: Bowel sounds are normal.      Palpations: Abdomen is soft.   Genitourinary:     Comments: Hematuria, 20 [place]ivian Shen catheter placed  Musculoskeletal:         General: Normal range of motion.   Skin:     General: Skin is warm and dry.      Capillary Refill: Capillary refill takes less than 2 seconds.   Neurological:      Mental Status: He is disoriented.          Significant Labs: All pertinent labs within the past 24 hours have been reviewed.    Significant Imaging: I have reviewed all pertinent imaging results/findings within the past 24 hours.

## 2024-10-18 NOTE — HPI
79yo male with GURVINDER, hematuria, thrombocytopenia, septic shock, acute metabolic encephalopathy, stercocal colitis, coagulopathy, urethral injury, CHB s/p PPM, HLP, HTN, BPH with chronic mark who presented to the ER from the nursing home with AMS. He has chronic indwelling mark and had issues with attempted replacement without success and  possibly inflated inside of a false track.  He  became febrile and encephalopathic and presented to ED tachycardic and minimally responsive.  He then became hypotension and was placed on vasopressors in ED. CBC with WBCs 42K BMP with creatinine 3.3 Mg 1.3 Lactic acid 8.0-9.3 donw to 7.8-6.9-2.8 UA with WBCs and leukocytes Blood culture +GNR/GPC. Admitted to [facility] Medicine and Cardiology consulted for hypotension

## 2024-10-18 NOTE — ASSESSMENT & PLAN NOTE
- initial HR 80s upon presentation up to 120s; Temp 102.9 upon admission  - hypotension and tachycardiac upon admission felt to be related to septic etiology  - PPM interrogation yesterday evening with normal functioning device  - low HR limit 60 high ; tachycardia with V paced rhythm related to tracking; last office interrogation in 9/2023 a paced 45% v paced 100%

## 2024-10-18 NOTE — CONSULTS
NEPHROLOGY CONSULT NOTE    HPI & INTERVAL HISTORY:    Past Medical History:   Diagnosis Date    Arthritis     Diabetes mellitus     type 2    Folate deficiency anemia     Heart block     History of kidney stones 05/25/2021    History of stroke with residual deficit 05/25/2021    Hyperlipidemia     Hypertension     Major depressive disorder     Pacemaker     Biotronic [place] 8 DRERROL (S/n: [id number])    Pyelonephritis 11/19/2021    TIA (transient ischemic attack)     Urinary retention 05/25/2021      Past Surgical History:   Procedure Laterality Date    A-V CARDIAC PACEMAKER INSERTION N/A 8/24/2021    Procedure: INSERTION, CARDIAC PACEMAKER, DUAL CHAMBER;  Surgeon: Neo Winn MD;  Location: [facility];  Service: Cardiology;  Laterality: N/A;  CHB, DUAL PPM, ANES, BIO, DM, ED 2    COLONOSCOPY N/A 11/22/2021    Procedure: COLONOSCOPY;  Surgeon: Cesar Dorado MD;  Location: [facility] (2ND FLR);  Service: Endoscopy;  Laterality: N/A;    CYSTOGRAM N/A 5/24/2024    Procedure: CYSTOGRAM;  Surgeon: Camilo Kelley Jr., MD;  [address];  Service: Urology;  Laterality: N/A;    CYSTOSCOPIC LITHOLAPAXY  5/25/2021    Procedure: CYSTOLITHOLAPAXY;  Surgeon: Chris Schilling MD;  Location: [address];  Service: Urology;;    CYSTOSCOPY  8/26/2021    Procedure: CYSTOSCOPY;  Surgeon: Camilo Kelley Jr., MD;  Location: [address];  Service: Urology;;    CYSTOSCOPY N/A 5/24/2024    Procedure: CYSTOSCOPY;  Surgeon: Camilo Kelley Jr., MD;  [address];  Service: Urology;  Laterality: N/A;    CYSTOSCOPY W/ URETERAL STENT PLACEMENT Bilateral 5/25/2021    Procedure: CYSTOSCOPY, WITH BILATERAL URETERAL STENT INSERTION;  Surgeon: Chris Schilling MD;  [address];  Service: Urology;  Laterality: Bilateral;    CYSTOSCOPY W/ URETERAL STENT REMOVAL Left 8/30/2024    Procedure: CYSTOSCOPY, WITH URETERAL STENT REMOVAL;  Surgeon: Camilo Kelley Jr., MD;  Location: [facility] OR [address] [place] [address];   Service: Urology;  Laterality: Left;  1 hr    DILATION OF URETHRA N/A 5/24/2024    Procedure: DILATION, URETHRA;  Surgeon: Camilo Kelley Jr., MD;  Location: [facility] OR 1ST FLR;  Service: Urology;  Laterality: N/A;    ELBOW ARTHROPLASTY Right     EXTRACTION - STONE Left 5/24/2024    Procedure: EXTRACTION - STONE;  Surgeon: Camilo Kelley Jr., MD;  [facility];  Service: Urology;  Laterality: Left;  and kidney    FLUOROSCOPY  5/25/2021    Procedure: FLUOROSCOPY;  Surgeon: Chris Schilling MD;  Location: [facility] OR 1ST FLR;  Service: Urology;;    LASER LITHOTRIPSY Left 8/26/2021    Procedure: LITHOTRIPSY, USING LASER;  Surgeon: Camilo Kelley Jr., MD;  Location: [facility] OR 1ST FLR;  Service: Urology;  Laterality: Left;    LASER LITHOTRIPSY Left 5/24/2024    Procedure: LITHOTRIPSY, USING LASER;  Surgeon: Camilo Kelley Jr., MD;  [facility];  Service: Urology;  Laterality: Left;    PLACEMENT-STENT Left 5/24/2024    Procedure: PLACEMENT-STENT;  Surgeon: Camilo Kelley Jr., MD;  [facility];  Service: Urology;  Laterality: Left;    PYELOSCOPY Bilateral 8/26/2021    Procedure: PYELOSCOPY;  Surgeon: Camilo Kelley Jr., MD;  [facility];  Service: Urology;  Laterality: Bilateral;    PYELOSCOPY Left 5/24/2024    Procedure: PYELOSCOPY;  Surgeon: Camilo Kelley Jr., MD;  [facility];  Service: Urology;  Laterality: Left;    REMOVAL OF BLOOD CLOT  5/25/2021    Procedure: REMOVAL, BLOOD CLOT;  Surgeon: Chris Schilling MD;  [facility];  Service: Urology;;    REMOVAL, NEPHROSTOMY TUBE, WITH IMAGING GUIDANCE Left 5/24/2024    Procedure: REMOVAL, NEPHROSTOMY TUBE, WITH IMAGING GUIDANCE;  Surgeon: Camilo Kelley Jr., MD;  [facility];  Service: Urology;  Laterality: Left;    REPLACEMENT OF STENT Bilateral 8/26/2021    Procedure: REPLACEMENT, STENT;  Surgeon: Camilo Kelley Jr., MD;  Location: [facility] OR [address] [place] [address];  Service: Urology;   Laterality: Bilateral;    RETROGRADE PYELOGRAPHY Left 8/30/2024    Procedure: PYELOGRAM, RETROGRADE;  Surgeon: Camilo Kelley Jr., MD;  Location: [facility] OR [address] [place] [address];  Service: Urology;  Laterality: Left;    URETEROSCOPIC REMOVAL OF URETERIC CALCULUS Bilateral 8/26/2021    Procedure: REMOVAL, CALCULUS, URETER, URETEROSCOPIC;  Surgeon: Camilo Kelley Jr., MD;  [facility];  Service: Urology;  Laterality: Bilateral;    URETEROSCOPY Bilateral 8/26/2021    Procedure: URETEROSCOPY;  Surgeon: Camilo Kelley Jr., MD;  [facility];  Service: Urology;  Laterality: Bilateral;    URETEROSCOPY Left 5/24/2024    Procedure: URETEROSCOPY;  Surgeon: Camilo Kelley Jr., MD;  Location: [facility] OR [address] [place] [address];  Service: Urology;  Laterality: Left;      Review of patient's allergies indicates:  No Known Allergies   Medications Prior to Admission   Medication Sig Dispense Refill Last Dose/Taking    atorvastatin (LIPITOR) 40 MG tablet Take 40 mg by mouth every evening.   Taking    bisacodyL (DULCOLAX, BISACODYL,) 10 mg Supp Place 1 suppository (10 mg total) rectally daily as needed (constipation).  0 Taking As Needed    docusate sodium (COLACE) 100 MG capsule Take 1 capsule (100 mg total) by mouth once daily.  0 Taking    folic acid (FOLVITE) 1 MG tablet Take 1 mg by mouth once daily.   Taking    gabapentin (NEURONTIN) 300 MG capsule Take 300 mg by mouth 3 (three) times daily.   Taking    menthol-zinc oxide (CALMOSEPTINE) 0.44-20.6 % Oint Apply topically daily as needed. To sacral area after each sacral excoriation episode and as needed   Taking As Needed    mirtazapine (REMERON) 30 MG tablet Take 30 mg by mouth nightly.   Taking    nystatin (MYCOSTATIN) powder Apply topically 2 (two) times a day. BID + PRN to buttocks   Taking    polyethylene glycol (GLYCOLAX) 17 gram/dose powder Take 17 g by mouth 2 (two) times daily.   Taking    tamsulosin (FLOMAX) 0.4 mg Cap TAKE 2 CAPSULES(0.8 MG) BY MOUTH EVERY DAY (Patient  "taking differently: Take 0.8 mg by mouth once daily.) 60 capsule 11 Taking Differently    cyproheptadine (PERIACTIN) 4 mg tablet Take 4 mg by mouth 3 (three) times daily. Itching          Social History     Socioeconomic History    Marital status: Single    Number of children: 0    Years of education: 15    Highest education level: Some college, no degree   Occupational History     Employer: Disabled    Occupation: Construction     Employer: [employer]     Comment: Retired in past 5-10 years   Tobacco Use    Smoking status: Former     Types: Cigarettes    Smokeless tobacco: Never   Substance and Sexual Activity    Alcohol use: Yes     Comment: 1/ pint whisky daily    Drug use: Yes     Types: "Crack" cocaine     Social Drivers of Health     Financial Resource Strain: Low Risk  (10/17/2024)    Overall Financial Resource Strain (CARDIA)     Difficulty of Paying Living Expenses: Not hard at all   Food Insecurity: No Food Insecurity (10/17/2024)    Hunger Vital Sign     Worried About Running Out of Food in the Last Year: Never true     Ran Out of Food in the Last Year: Never true   Transportation Needs: No Transportation Needs (10/17/2024)    TRANSPORTATION NEEDS     Transportation : No   Physical Activity: Inactive (10/17/2024)    Exercise Vital Sign     Days of Exercise per Week: 0 days     Minutes of Exercise per Session: 0 min   Stress: No Stress Concern Present (10/17/2024)    [employer] [place] [employer] - Occupational Stress Questionnaire     Feeling of Stress : Not at all   Housing Stability: Low Risk  (10/17/2024)    Housing Stability Vital Sign     Unable to Pay for Housing in the Last Year: No     Homeless in the Last Year: No        MEDS   famotidine (PF)  20 mg Intravenous Daily    fludrocortisone  100 mcg Oral Daily    hydrocortisone sodium succinate  50 mg Intravenous Q6H    meropenem IV (PEDS and ADULTS)  1 g Intravenous Q12H    mupirocin   Nasal BID    polyethylene glycol  17 g Oral Daily "    senna  17.2 mg Oral BID               CONTINOUS INFUSIONS:      Intake/Output Summary (Last 24 hours) at 10/18/2024 1326  Last data filed at 10/18/2024 1119  Gross per 24 hour   Intake 7325.81 ml   Output 230 ml   Net 7095.81 ml        HEMODYNAMICS:    Temp:  [98.3 °F (36.8 °C)-100.1 °F (37.8 °C)] 98.3 °F (36.8 °C)  Pulse:  [] 111  Resp:  [4-27] 24  SpO2:  [62 %-100 %] 97 %  BP: ()/(51-91) 105/51  Arterial Line BP: ()/(31-63) 110/49   General:   NAD  Cardiology pulse 111  Pulmonary : RR 24  Pulse oximeter 97 % O2  Abdomen soft   Extremities : edema   Skin: dry   LABS   Lab Results   Component Value Date    WBC 46.19 (H) 10/18/2024    HGB 12.5 (L) 10/18/2024    HCT 36.6 (L) 10/18/2024    MCV 94 10/18/2024    PLT 37 (LL) 10/18/2024        Recent Labs   Lab 10/18/24  0336   GLU 86   CALCIUM 7.1*   ALBUMIN 2.4*   PROT 4.7*   *   K 4.9   CO2 12*      BUN 55*   CREATININE 3.3*   ALKPHOS 62   ALT 64*   *   BILITOT 1.7*      Lab Results   Component Value Date    CALCIUM 7.1 (L) 10/18/2024    PHOS 2.9 10/18/2024      Lab Results   Component Value Date    IRON 10 (L) 03/20/2023    TIBC 161 (L) 03/20/2023    FERRITIN 595 (H) 03/20/2023        ABG  Recent Labs   Lab 10/17/24  0908   PH 7.130*   PO2 39.7*   PCO2 44.8   HCO3 14.9*         IMAGING:  CXR    ASSESSMENT / PLAN  Septic shock   Hypotensive on 3 pressors  2021 echo  Severe braedycardia with HR in high 30s  The estimated ejection fraction is 65%.  The left ventricle is normal in size with normal systolic function.  Indeterminate left ventricular diastolic function.  Normal right ventricular size with normal right ventricular systolic function.  There is pulmonary hypertension.  The estimated PA systolic pressure is 41 mmHg.  Normal central venous pressure (3 mmHg).       GURVINDER/ CKD 3a  Causes -  Sepsis, Bacteremia, Hypotension, Urinary Retention.  Hematuria secondary to traumatic mark.  UO 25 + cc  US  The right kidney is normal in  length measuring 11.9 cm. The left kidney is small in length measuring 8.9 cm. Segmental arterial resistive indices are elevated. Renal cortical thinning noted. Multiple bilateral echogenic foci, possible calcifications, largest on the right measures 9 mm largest on the left measures 9 mm. No hydronephrosis or renal masses identified. Bladder is decompressed with a Shen catheter   CT   Urinary bladder wall thickening with small volume of air in the bladder lumen.  Correlation for recent instrumentation advised.  Correlation with urinalysis for infectious process also recommended.   Bilateral perinephric fat stranding, nonspecific although it can be seen with infectious process.   Bilateral nephrolithiasis.  No hydronephrosis.  Creatinine 3.7  Azotemia   BUN 59  Acidemia  NAG metabolic acidosis  Lactic acid 2.6  CPK 4897  Bicarbonate drip.  Hyponatremia  Metabolic bone disease  Hypomagnesemia  Replace  Poor nutrition   Albumin 2.4  Hb 12.5  Xray  Large volume of stool in distal colon/rectum.  Scattered prominent gas-filled loops of bowel noted.  No gross evidence of free air.  Lung bases are clear.  Osteopenia noted.  Lower lumbar spondylosis noted.   CT  1. Large volume of fecal material within the sigmoid colon and rectum with rectal wall thickening and perirectal inflammatory change.  Findings concerning for fecal impaction and underlying stercoral colitis.  Clinical correlation advised.  Weight daily.   I and O.  Avoid nephrotoxic agents, hypotension.  Discussed with brother about dialysis.   No dialysis at this time.  Will follow up.

## 2024-10-18 NOTE — ASSESSMENT & PLAN NOTE
79 yo male with GURVINDER, hematuria, thrombocytopenia, acute metabolic encephalopathy, stercocal colitis, coagulopathy, urethral injury, CHB s/p PPM, HLP, HTN, and BPH with chronic mark currently a resident at [facility] who presented to ED for hematuria. Now found to be bacteremia and in septic shock on pressors.    -unclear source but most likely colitis based on organisms identified on BCID  -less likely urological source from recent traumatic mark insertion  -given pacemaker with bacteremia would consider TTE and possible BLANKA  -await final blood cxs  -repeat daily blood cxs until negative fro 48hrs  -continue vanco dosed per pharmacy  -continue meropenem  -of note leukocytosis in 40s is normally not due to infection alone and he has received steroids

## 2024-10-18 NOTE — EICU
EICU FOLLOWUP NOTE:    Called for:  Hypomagnesemia    DISCUSSED with bedside nurse. Yes    ASSESSMENT AND PLAN:    Magnesium is 1.3.  Creatinine is 3.3.  I will order magnesium sulfate for replacement.    Thank You for allowing EICU to participate in the care of the patient. Please call as needed    Lily De Jesus MD  [facility]  [phone]

## 2024-10-18 NOTE — ASSESSMENT & PLAN NOTE
- creatinine 3.3 this AM up from 1.6-1.9   - multifactoral given urological issues as well as hypotension

## 2024-10-18 NOTE — ASSESSMENT & PLAN NOTE
Cards recs:  Device was interrogated yesterday. Appears to be sinus tachycardia in the device is tracking his underlying rhythm. We did not appreciate significant arrhythmias or device malfunction

## 2024-10-18 NOTE — SUBJECTIVE & OBJECTIVE
Past Medical History:   Diagnosis Date    Arthritis     Diabetes mellitus     type 2    Folate deficiency anemia     Heart block     History of kidney stones 05/25/2021    History of stroke with residual deficit 05/25/2021    Hyperlipidemia     Hypertension     Major depressive disorder     Pacemaker     B[employer] [place] 8 DR-T (S/n: [id number])    Pyelonephritis 11/19/2021    TIA (transient ischemic attack)     Urinary retention 05/25/2021       Past Surgical History:   Procedure Laterality Date    A-V CARDIAC PACEMAKER INSERTION N/A 8/24/2021    Procedure: INSERTION, CARDIAC PACEMAKER, DUAL CHAMBER;  Surgeon: Neo Winn MD;  Location: [facility] EP LAB;  Service: Cardiology;  Laterality: N/A;  CHB, DUAL PPM, ANES, BIO, DM, ED 2    COLONOSCOPY N/A 11/22/2021    Procedure: COLONOSCOPY;  Surgeon: Cesar Dorado MD;  Location: [facility] ENDO (2ND FLR);  Service: Endoscopy;  Laterality: N/A;    CYSTOGRAM N/A 5/24/2024    Procedure: CYSTOGRAM;  Surgeon: Camilo Kelley Jr., MD;  Location: [address];  Service: Urology;  Laterality: N/A;    CYSTOSCOPIC LITHOLAPAXY  5/25/2021    Procedure: CYSTOLITHOLAPAXY;  Surgeon: Chris Schilling MD;  Location: [address];  Service: Urology;;    CYSTOSCOPY  8/26/2021    Procedure: CYSTOSCOPY;  Surgeon: Camilo Kelley Jr., MD;  Location: [address];  Service: Urology;;    CYSTOSCOPY N/A 5/24/2024    Procedure: CYSTOSCOPY;  Surgeon: Camilo Kelley Jr., MD;  [address];  Service: Urology;  Laterality: N/A;    CYSTOSCOPY W/ URETERAL STENT PLACEMENT Bilateral 5/25/2021    Procedure: CYSTOSCOPY, WITH BILATERAL URETERAL STENT INSERTION;  Surgeon: Chris Schilling MD;  Location: [address];  Service: Urology;  Laterality: Bilateral;    CYSTOSCOPY W/ URETERAL STENT REMOVAL Left 8/30/2024    Procedure: CYSTOSCOPY, WITH URETERAL STENT REMOVAL;  Surgeon: Camilo Kelley Jr., MD;  Location: [address];  Service: Urology;  Laterality: Left;  1 hr     DILATION OF URETHRA N/A 5/24/2024    Procedure: DILATION, URETHRA;  Surgeon: Camilo Kelley Jr., MD;  Location: [facility] OR 1ST FLR;  Service: Urology;  Laterality: N/A;    ELBOW ARTHROPLASTY Right     EXTRACTION - STONE Left 5/24/2024    Procedure: EXTRACTION - STONE;  Surgeon: Camilo Kelley Jr., MD;  Location: [facility] OR 1ST FLR;  Service: Urology;  Laterality: Left;  and kidney    FLUOROSCOPY  5/25/2021    Procedure: FLUOROSCOPY;  Surgeon: Chris Schilling MD;  Location: [facility] OR 1ST FLR;  Service: Urology;;    LASER LITHOTRIPSY Left 8/26/2021    Procedure: LITHOTRIPSY, USING LASER;  Surgeon: Camilo Kelley Jr., MD;  Location: [facility] OR 1ST FLR;  Service: Urology;  Laterality: Left;    LASER LITHOTRIPSY Left 5/24/2024    Procedure: LITHOTRIPSY, USING LASER;  Surgeon: Camilo Kelley Jr., MD;  Location: [facility] OR 1ST FLR;  Service: Urology;  Laterality: Left;    PLACEMENT-STENT Left 5/24/2024    Procedure: PLACEMENT-STENT;  Surgeon: Camilo Kelley Jr., MD;  Location: [facility] OR 1ST FLR;  Service: Urology;  Laterality: Left;    PYELOSCOPY Bilateral 8/26/2021    Procedure: PYELOSCOPY;  Surgeon: Camilo Kelley Jr., MD;  Location: [facility] OR 1ST FLR;  Service: Urology;  Laterality: Bilateral;    PYELOSCOPY Left 5/24/2024    Procedure: PYELOSCOPY;  Surgeon: Camilo Kelley Jr., MD;  Location: [facility] OR 1ST FLR;  Service: Urology;  Laterality: Left;    REMOVAL OF BLOOD CLOT  5/25/2021    Procedure: REMOVAL, BLOOD CLOT;  Surgeon: Chris Schilling MD;  Location: [facility] OR 1ST FLR;  Service: Urology;;    REMOVAL, NEPHROSTOMY TUBE, WITH IMAGING GUIDANCE Left 5/24/2024    Procedure: REMOVAL, NEPHROSTOMY TUBE, WITH IMAGING GUIDANCE;  Surgeon: Camilo Kelley Jr., MD;  Location: [facility] OR 1ST FLR;  Service: Urology;  Laterality: Left;    REPLACEMENT OF STENT Bilateral 8/26/2021    Procedure: REPLACEMENT, STENT;  Surgeon: Camilo Kelley Jr., MD;  Location: [facility] OR [address] [place] [address];  Service: Urology;  Laterality: Bilateral;    RETROGRADE  PYELOGRAPHY Left 8/30/2024    Procedure: PYELOGRAM, RETROGRADE;  Surgeon: Camilo Kelley Jr., MD;  [facility];  Service: Urology;  Laterality: Left;    URETEROSCOPIC REMOVAL OF URETERIC CALCULUS Bilateral 8/26/2021    Procedure: REMOVAL, CALCULUS, URETER, URETEROSCOPIC;  Surgeon: Camilo Kelley Jr., MD;  [facility];  Service: Urology;  Laterality: Bilateral;    URETEROSCOPY Bilateral 8/26/2021    Procedure: URETEROSCOPY;  Surgeon: Camilo Kelley Jr., MD;  [facility];  Service: Urology;  Laterality: Bilateral;    URETEROSCOPY Left 5/24/2024    Procedure: URETEROSCOPY;  Surgeon: Camilo Kelley Jr., MD;  Location: [facility] OR [address] [place] [address];  Service: Urology;  Laterality: Left;       Review of patient's allergies indicates:  No Known Allergies    Medications:  Medications Prior to Admission   Medication Sig    atorvastatin (LIPITOR) 40 MG tablet Take 40 mg by mouth every evening.    bisacodyL (DULCOLAX, BISACODYL,) 10 mg Supp Place 1 suppository (10 mg total) rectally daily as needed (constipation).    docusate sodium (COLACE) 100 MG capsule Take 1 capsule (100 mg total) by mouth once daily.    folic acid (FOLVITE) 1 MG tablet Take 1 mg by mouth once daily.    gabapentin (NEURONTIN) 300 MG capsule Take 300 mg by mouth 3 (three) times daily.    menthol-zinc oxide (CALMOSEPTINE) 0.44-20.6 % Oint Apply topically daily as needed. To sacral area after each sacral excoriation episode and as needed    mirtazapine (REMERON) 30 MG tablet Take 30 mg by mouth nightly.    nystatin (MYCOSTATIN) powder Apply topically 2 (two) times a day. BID + PRN to buttocks    polyethylene glycol (GLYCOLAX) 17 gram/dose powder Take 17 g by mouth 2 (two) times daily.    tamsulosin (FLOMAX) 0.4 mg Cap TAKE 2 CAPSULES(0.8 MG) BY MOUTH EVERY DAY (Patient taking differently: Take 0.8 mg by mouth once daily.)    cyproheptadine (PERIACTIN) 4 mg tablet Take 4 mg by mouth 3 (three) times daily. Itching  "    Antibiotics (From admission, onward)      Start     Stop Route Frequency Ordered    10/17/24 1148  vancomycin - pharmacy to dose  (vancomycin IVPB (PEDS and ADULTS))        Placed in "And" Linked Group    -- IV pharmacy to manage frequency 10/17/24 1049    10/17/24 0900  mupirocin 2 % ointment         10/22/24 0859 Nasl 2 times daily 10/17/24 0458    10/17/24 0900  meropenem injection 1 g         -- IV Every 12 hours (non-standard times) 10/17/24 0734          Antifungals (From admission, onward)      None          Antivirals (From admission, onward)      None             Immunization History   Administered Date(s) Administered    Influenza 11/09/2010    PPD Test 07/29/2015    Zoster 02/09/2018       Family History       Problem Relation (Age of Onset)    Hyperlipidemia Mother    Mental illness Father    No Known Problems Brother    Parkinsonism Father    Stroke Mother          Social History     Socioeconomic History    Marital status: Single    Number of children: 0    Years of education: 15    Highest education level: Some college, no degree   Occupational History     Employer: Disabled    Occupation: Construction     Employer: [employer]     Comment: Retired in past 5-10 years   Tobacco Use    Smoking status: Former     Types: Cigarettes    Smokeless tobacco: Never   Substance and Sexual Activity    Alcohol use: Yes     Comment: 1/ pint whisky daily    Drug use: Yes     Types: "Crack" cocaine     Social Drivers of Health     Financial Resource Strain: Low Risk  (10/17/2024)    Overall Financial Resource Strain (CARDIA)     Difficulty of Paying Living Expenses: Not hard at all   Food Insecurity: No Food Insecurity (10/17/2024)    Hunger Vital Sign     Worried About Running Out of Food in the Last Year: Never true     Ran Out of Food in the Last Year: Never true   Transportation Needs: No Transportation Needs (10/17/2024)    TRANSPORTATION NEEDS     Transportation : No   Physical Activity: Inactive " (10/17/2024)    Exercise Vital Sign     Days of Exercise per Week: 0 days     Minutes of Exercise per Session: 0 min   Stress: No Stress Concern Present (10/17/2024)    [employer] [employer] - Occupational Stress Questionnaire     Feeling of Stress : Not at all   Housing Stability: Low Risk  (10/17/2024)    Housing Stability Vital Sign     Unable to Pay for Housing in the Last Year: No     Homeless in the Last Year: No     Review of Systems   Unable to perform ROS: Acuity of condition   Answers some questions  Denies chest pain  Denies SOB  Objective:     Vital Signs (Most Recent):  Temp: 98.3 °F (36.8 °C) (10/18/24 0715)  Pulse: 110 (10/18/24 0945)  Resp: (!) 21 (10/18/24 0945)  BP: (!) 144/67 (10/18/24 0900)  SpO2: (!) 93 % (10/18/24 0715) Vital Signs (24h Range):  Temp:  [98.3 °F (36.8 °C)-100.1 °F (37.8 °C)] 98.3 °F (36.8 °C)  Pulse:  [] 110  Resp:  [4-27] 21  SpO2:  [79 %-100 %] 93 %  BP: ()/(51-91) 144/67  Arterial Line BP: ()/(31-63) 107/50     Weight: 67.5 kg (148 lb 13 oz)  Body mass index is 24.02 kg/m².    Estimated Creatinine Clearance: 17.2 mL/min (A) (based on SCr of 3.3 mg/dL (H)).     Physical Exam  HENT:      Head: Normocephalic.      Mouth/Throat:      Mouth: Mucous membranes are dry.      Pharynx: Oropharynx is clear.   Cardiovascular:      Rate and Rhythm: Regular rhythm. Tachycardia present.      Pulses: Normal pulses.      Heart sounds: Normal heart sounds.   Pulmonary:      Effort: Pulmonary effort is normal.      Breath sounds: Normal breath sounds.   Abdominal:      General: Bowel sounds are normal.      Palpations: Abdomen is soft.   Genitourinary:     Comments: Hematuria, 20 [address] [place] catheter placed  Musculoskeletal:         General: Normal range of motion.   Skin:     General: Skin is warm and dry.      Capillary Refill: Capillary refill takes less than 2 seconds.   Neurological:      Mental Status: He is disoriented.          Significant Labs: All  pertinent labs within the past 24 hours have been reviewed.    Significant Imaging: I have reviewed all pertinent imaging results/findings within the past 24 hours.

## 2024-10-18 NOTE — CONSULTS
[facility] - Intensive Care  Cardiology  Consult Note    Patient Name: Praneeth Jewell  MRN: [id number]  Admission Date: 10/17/2024  Hospital Length of Stay: 1 days  Code Status: Prior   Attending Provider: Lorenza Galindo MD   Consulting Provider: JUSTYNA Serrano ANP  Primary Care Physician: Home, [facility]  Principal Problem:Septic shock    Patient information was obtained from past medical records and ER records.     Inpatient consult to Cardiology-[facility]  Consult performed by: Aicha Kwan APRN, ANP  Consult ordered by: Feliciano Holloway MD  Reason for consult: hypotension and tachycardia        Subjective:     Chief Complaint:  AMS       HPI:   77yo male with GURVINDER, hematuria, thrombocytopenia, septic shock, acute metabolic encephalopathy, stercocal colitis, coagulopathy, urethral injury, CHB s/p PPM, HLP, HTN, BPH with chronic mark who presented to the ER from the nursing home with AMS. He has chronic indwelling mark and had issues with attempted replacement without success and  possibly inflated inside of a false track.  He  became febrile and encephalopathic and presented to ED tachycardic and minimally responsive.  He then became hypotension and was placed on vasopressors in ED. CBC with WBCs 42K BMP with creatinine 3.3 Mg 1.3 Lactic acid 8.0-9.3 donw to 7.8-6.9-2.8 UA with WBCs and leukocytes Blood culture +GNR/GPC. Admitted to [facility] Medicine and Cardiology consulted for hypotension     Past Medical History:   Diagnosis Date    Arthritis     Diabetes mellitus     type 2    Folate deficiency anemia     Heart block     History of kidney stones 05/25/2021    History of stroke with residual deficit 05/25/2021    Hyperlipidemia     Hypertension     Major depressive disorder     Pacemaker     Biotronic Edora 8 JONATHON (S/n: [id number])    Pyelonephritis 11/19/2021    TIA (transient ischemic attack)     Urinary retention 05/25/2021       Past Surgical History:    Procedure Laterality Date    A-V CARDIAC PACEMAKER INSERTION N/A 8/24/2021    Procedure: INSERTION, CARDIAC PACEMAKER, DUAL CHAMBER;  Surgeon: Neo Winn MD;  Location: [facility] EP LAB;  Service: Cardiology;  Laterality: N/A;  CHB, DUAL PPM, ANES, BIO, DM, ED 2    COLONOSCOPY N/A 11/22/2021    Procedure: COLONOSCOPY;  Surgeon: Cesar Dorado MD;  Location: [facility] ENDO (2ND FLR);  Service: Endoscopy;  Laterality: N/A;    CYSTOGRAM N/A 5/24/2024    Procedure: CYSTOGRAM;  Surgeon: Camilo Kelley Jr., MD;  Location: [facility] OR [facility] FLR;  Service: Urology;  Laterality: N/A;    CYSTOSCOPIC LITHOLAPAXY  5/25/2021    Procedure: CYSTOLITHOLAPAXY;  Surgeon: Chris Schilling MD;  [facility];  Service: Urology;;    CYSTOSCOPY  8/26/2021    Procedure: CYSTOSCOPY;  Surgeon: Camilo Kelley Jr., MD;  [facility];  Service: Urology;;    CYSTOSCOPY N/A 5/24/2024    Procedure: CYSTOSCOPY;  Surgeon: Camilo Kelley Jr., MD;  [facility];  Service: Urology;  Laterality: N/A;    CYSTOSCOPY W/ URETERAL STENT PLACEMENT Bilateral 5/25/2021    Procedure: CYSTOSCOPY, WITH BILATERAL URETERAL STENT INSERTION;  Surgeon: Chris Schilling MD;  [facility];  Service: Urology;  Laterality: Bilateral;    CYSTOSCOPY W/ URETERAL STENT REMOVAL Left 8/30/2024    Procedure: CYSTOSCOPY, WITH URETERAL STENT REMOVAL;  Surgeon: Camilo Kelley Jr., MD;  [facility];  Service: Urology;  Laterality: Left;  1 hr    DILATION OF URETHRA N/A 5/24/2024    Procedure: DILATION, URETHRA;  Surgeon: Camilo Kelley Jr., MD;  [facility];  Service: Urology;  Laterality: N/A;    ELBOW ARTHROPLASTY Right     EXTRACTION - STONE Left 5/24/2024    Procedure: EXTRACTION - STONE;  Surgeon: Camilo Kelley Jr., MD;  Location: [facility] OR [address] [place] [address];  Service: Urology;  Laterality: Left;  and kidney    FLUOROSCOPY  5/25/2021    Procedure: FLUOROSCOPY;  Surgeon: Chris Schilling MD;  Location:  [facility] OR 1ST FLR;  Service: Urology;;    LASER LITHOTRIPSY Left 8/26/2021    Procedure: LITHOTRIPSY, USING LASER;  Surgeon: Camilo Kelley Jr., MD;  Location: [facility] OR 1ST FLR;  Service: Urology;  Laterality: Left;    LASER LITHOTRIPSY Left 5/24/2024    Procedure: LITHOTRIPSY, USING LASER;  Surgeon: Camilo Kelley Jr., MD;  [facility];  Service: Urology;  Laterality: Left;    PLACEMENT-STENT Left 5/24/2024    Procedure: PLACEMENT-STENT;  Surgeon: Camilo Kelley Jr., MD;  Location: [facility] OR 1ST FLR;  Service: Urology;  Laterality: Left;    PYELOSCOPY Bilateral 8/26/2021    Procedure: PYELOSCOPY;  Surgeon: Camilo Kelley Jr., MD;  Location: [facility] OR 1ST FLR;  Service: Urology;  Laterality: Bilateral;    PYELOSCOPY Left 5/24/2024    Procedure: PYELOSCOPY;  Surgeon: Camilo Kelley Jr., MD;  [facility];  Service: Urology;  Laterality: Left;    REMOVAL OF BLOOD CLOT  5/25/2021    Procedure: REMOVAL, BLOOD CLOT;  Surgeon: Chris Schilling MD;  [facility];  Service: Urology;;    REMOVAL, NEPHROSTOMY TUBE, WITH IMAGING GUIDANCE Left 5/24/2024    Procedure: REMOVAL, NEPHROSTOMY TUBE, WITH IMAGING GUIDANCE;  Surgeon: Camilo Kelley Jr., MD;  [facility];  Service: Urology;  Laterality: Left;    REPLACEMENT OF STENT Bilateral 8/26/2021    Procedure: REPLACEMENT, STENT;  Surgeon: Camilo Kelley Jr., MD;  [facility];  Service: Urology;  Laterality: Bilateral;    RETROGRADE PYELOGRAPHY Left 8/30/2024    Procedure: PYELOGRAM, RETROGRADE;  Surgeon: Camilo Kelley Jr., MD;  [facility];  Service: Urology;  Laterality: Left;    URETEROSCOPIC REMOVAL OF URETERIC CALCULUS Bilateral 8/26/2021    Procedure: REMOVAL, CALCULUS, URETER, URETEROSCOPIC;  Surgeon: Camilo Kelley Jr., MD;  Location: [facility] OR [address] [place] [address];  Service: Urology;  Laterality: Bilateral;    URETEROSCOPY Bilateral 8/26/2021    Procedure: URETEROSCOPY;  Surgeon: Camilo Kelley  "MD Ryanne;  Location: [facility] OR [address] [place] [address];  Service: Urology;  Laterality: Bilateral;    URETEROSCOPY Left 5/24/2024    Procedure: URETEROSCOPY;  Surgeon: Camilo Kelley Jr., MD;  Location: [facility] OR [address] [place] [address];  Service: Urology;  Laterality: Left;       Review of patient's allergies indicates:  No Known Allergies    No current facility-administered medications on file prior to encounter.     Current Outpatient Medications on File Prior to Encounter   Medication Sig    atorvastatin (LIPITOR) 40 MG tablet Take 40 mg by mouth every evening.    bisacodyL (DULCOLAX, BISACODYL,) 10 mg Supp Place 1 suppository (10 mg total) rectally daily as needed (constipation).    docusate sodium (COLACE) 100 MG capsule Take 1 capsule (100 mg total) by mouth once daily.    folic acid (FOLVITE) 1 MG tablet Take 1 mg by mouth once daily.    gabapentin (NEURONTIN) 300 MG capsule Take 300 mg by mouth 3 (three) times daily.    menthol-zinc oxide (CALMOSEPTINE) 0.44-20.6 % Oint Apply topically daily as needed. To sacral area after each sacral excoriation episode and as needed    mirtazapine (REMERON) 30 MG tablet Take 30 mg by mouth nightly.    nystatin (MYCOSTATIN) powder Apply topically 2 (two) times a day. BID + PRN to buttocks    polyethylene glycol (GLYCOLAX) 17 gram/dose powder Take 17 g by mouth 2 (two) times daily.    tamsulosin (FLOMAX) 0.4 mg Cap TAKE 2 CAPSULES(0.8 MG) BY MOUTH EVERY DAY (Patient taking differently: Take 0.8 mg by mouth once daily.)    cyproheptadine (PERIACTIN) 4 mg tablet Take 4 mg by mouth 3 (three) times daily. Itching     Family History       Problem Relation (Age of Onset)    Hyperlipidemia Mother    Mental illness Father    No Known Problems Brother    Parkinsonism Father    Stroke Mother          Tobacco Use    Smoking status: Former     Types: Cigarettes    Smokeless tobacco: Never   Substance and Sexual Activity    Alcohol use: Yes     Comment: 1/ pint whisky daily    Drug use: Yes     Types: "Crack" cocaine    " Sexual activity: Not on file     Review of Systems   Constitutional: Negative for chills, decreased appetite, diaphoresis and fever.   Cardiovascular:  Negative for chest pain, claudication, cyanosis, dyspnea on exertion, irregular heartbeat, leg swelling, near-syncope, orthopnea, palpitations, paroxysmal nocturnal dyspnea and syncope.   Respiratory:  Negative for cough, hemoptysis, shortness of breath and wheezing.    Gastrointestinal:  Negative for bloating, abdominal pain, constipation, diarrhea, melena, nausea and vomiting.   Neurological:  Negative for dizziness and weakness.     Objective:     Vital Signs (Most Recent):  Temp: 98.3 °F (36.8 °C) (10/18/24 0715)  Pulse: 110 (10/18/24 0945)  Resp: (!) 21 (10/18/24 0945)  BP: (!) 144/67 (10/18/24 0900)  SpO2: (!) 93 % (10/18/24 0715) Vital Signs (24h Range):  Temp:  [98.3 °F (36.8 °C)-100.1 °F (37.8 °C)] 98.3 °F (36.8 °C)  Pulse:  [] 110  Resp:  [4-27] 21  SpO2:  [79 %-100 %] 93 %  BP: ()/(44-91) 144/67  Arterial Line BP: ()/(31-63) 107/50     Weight: 67.5 kg (148 lb 13 oz)  Body mass index is 24.02 kg/m².    SpO2: (!) 93 %         Intake/Output Summary (Last 24 hours) at 10/18/2024 1057  Last data filed at 10/18/2024 0936  Gross per 24 hour   Intake 3666.89 ml   Output 250 ml   Net 3416.89 ml       Lines/Drains/Airways       Central Venous Catheter Line  Duration             Percutaneous Central Line - Triple Lumen  10/17/24 0430 Internal Jugular Right 1 day              Drain  Duration                  Urethral Catheter 10/17/24 0505 20 Fr. 1 day              Arterial Line  Duration             Arterial Line 10/17/24 1619 Left Radial <1 day              Peripheral Intravenous Line  Duration                  Peripheral IV - Single Lumen 10/17/24 0245 20 G Distal;Posterior;Right Forearm 1 day                     Physical Exam  Constitutional:       General: He is not in acute distress.     Appearance: He is well-developed. He is ill-appearing.  "  Cardiovascular:      Rate and Rhythm: Normal rate and regular rhythm.      Heart sounds: No murmur heard.     No gallop.   Pulmonary:      Effort: Pulmonary effort is normal. No respiratory distress.      Breath sounds: Normal breath sounds. No wheezing.   Abdominal:      General: Bowel sounds are normal. There is no distension.      Palpations: Abdomen is soft.      Tenderness: There is no abdominal tenderness.   Skin:     General: Skin is warm and dry.   Neurological:      Mental Status: He is alert and oriented to person, place, and time.          Significant Labs: BMP:   Recent Labs   Lab 10/17/24  0231 10/17/24  0855 10/18/24  0336   GLU 78 88 86    142 133*   K 3.7 4.2 4.9   * 116* 109   CO2 15* 12* 12*   BUN 34* 37* 55*   CREATININE 1.6* 1.9* 3.3*   CALCIUM 9.3 7.4* 7.1*   MG  --   --  1.3*   , CBC   Recent Labs   Lab 10/17/24  0657 10/17/24  0855 10/18/24  0336   WBC 17.86* 24.51* 42.55*   HGB 10.8* 12.2* 12.8*   HCT 34.1* 38.3* 38.3*   PLT 64* 67* 37*     Significant Imaging: Echocardiogram: Transthoracic echo (TTE) complete ([facility] Only):   Results for orders placed or performed during the hospital encounter of 08/24/21   Echo   Result Value Ref Range    BSA 2.15 m2    TDI SEPTAL 0.13 m/s    LV LATERAL E/E' RATIO 8.15 m/s    LV SEPTAL E/E' RATIO 8.15 m/s    LA WIDTH 4.23 cm    TDI LATERAL 0.13 m/s    LVIDd 5.25 3.5 - 6.0 cm    IVS 1.27 (A) 0.6 - 1.1 cm    PW 0.99 0.6 - 1.1 cm    LVIDs 3.34 2.1 - 4.0 cm    FS 36 28 - 44 %    LA Vol 68.86 cm3    Sinus 3.12 cm    STJ 3.37 cm    Ascending aorta 3.54 cm    LV mass 232.61 g    LA size 3.55 cm    RVDD 2.73 cm    TAPSE 2.90 cm    RV S' 13.17 cm/s    Left Ventricle Relative Wall Thickness 0.38 cm    AV mean gradient 7 mmHg    AV valve area 2.33 cm2    AV Velocity Ratio 0.60     AV index (prosthetic) 0.62     MV valve area p 1/2 method 5.34 cm2    E/A ratio 1.74     Mean e' 0.13 m/s    E wave deceleration time 141.99 msec    IVRT 142.72 msec    MV "A" " wave duration 17.13 msec    Pulm vein S/D ratio 0.87     LVOT diameter 2.18 cm    LVOT area 3.7 cm2    LVOT peak flex 1.10 m/s    LVOT peak VTI 27.29 cm    Ao peak flex 1.84 m/s    Ao VTI 43.70 cm    LVOT stroke volume 101.81 cm3    AV peak gradient 14 mmHg    E/E' ratio 8.15 m/s    MV Peak E Flex 1.06 m/s    TR Max Flex 3.10 m/s    MV stenosis pressure 1/2 time 41.18 ms    MV Peak A Flex 0.61 m/s    PV Peak S Flex 0.60 m/s    PV Peak D Flex 0.69 m/s    LV Systolic Volume 45.58 mL    LV Systolic Volume Index 21.4 mL/m2    LV Diastolic Volume 132.29 mL    LV Diastolic Volume Index 62.11 mL/m2    YOANNA 32.3 mL/m2    LV Mass Index 109 g/m2    RA Major Axis 4.76 cm    Left Atrium Minor Axis 5.37 cm    Left Atrium Major Axis 5.42 cm    Triscuspid Valve Regurgitation Peak Gradient 38 mmHg    YOANNA (MOD) 20.8 mL/m2    LA Vol (MOD) 44.26 cm3    RA Width 3.32 cm    Right Atrial Pressure (from IVC) 3 mmHg    EF 65 %    TV resting pulmonary artery pressure 41 mmHg    Narrative    · Severe braedycardia with HR in high 30s  · The estimated ejection fraction is 65%.  · The left ventricle is normal in size with normal systolic function.  · Indeterminate left ventricular diastolic function.  · Normal right ventricular size with normal right ventricular systolic   function.  · There is pulmonary hypertension.  · The estimated PA systolic pressure is 41 mmHg.  · Normal central venous pressure (3 mmHg).        Assessment and Plan:     Tachycardia  - initial HR 80s upon presentation up to 120s; Temp 102.9 upon admission  - hypotension and tachycardiac upon admission felt to be related to septic etiology  - PPM interrogation yesterday evening with normal functioning device  - low HR limit 60 high ; tachycardia with V paced rhythm related to tracking; last office interrogation in 9/2023 a paced 45% v paced 100%    GURVINDER (acute kidney injury)  - creatinine 3.3 this AM up from 1.6-1.9   - multifactoral given urological issues as well as  hypotension     History of complete heart block  - CHB In 2021 s/p dual chamber Biotronik PPM  - Vpaced rhythm this AM on telemetry;   - continue to monitor on telemetry         VTE Risk Mitigation (From admission, onward)      None            Thank you for your consult.     JUSTYNA Serrano, ANP  Cardiology   [facility] - Intensive Care

## 2024-10-18 NOTE — ASSESSMENT & PLAN NOTE
Blood cx with enterococcus, pseudomonas, and E coli.     Per chart review; He has had multiple prior UTI due to providencia, ESBL proteus mirabilis, MDR proteus.    ID recs:  -unclear source but most likely colitis based on organisms identified on BCID  -less likely urological source from recent traumatic mark insertion  -given pacemaker with bacteremia would consider TTE and possible BLANKA  -repeat daily blood cxs until negative for 48hrs  -continue vanco dosed per pharmacy  -continue meropenem

## 2024-10-18 NOTE — ASSESSMENT & PLAN NOTE
GURVINDER is likely due to acute tubular necrosis caused by hypotension and post-obstructive d/t obstructing Shen, hematuria . Baseline creatinine is  0.8 . Most recent creatinine and eGFR are listed below.  Recent Labs     10/17/24  0855 10/18/24  0336 10/18/24  1300   CREATININE 1.9* 3.3* 3.7*   EGFRNORACEVR 36* 19* 16*        Plan  - GURVINDER is worsening. Will adjust treatment as follows: consult nephro . Per chart review, baseline sCr 0.7-0.8.   - Avoid nephrotoxins and renally dose meds for GFR listed above  - Monitor urine output, serial BMP, and adjust therapy as needed

## 2024-10-19 PROBLEM — Z51.5 COMFORT MEASURES ONLY STATUS: Status: ACTIVE | Noted: 2024-01-01

## 2024-10-19 NOTE — DISCHARGE SUMMARY
[facility] Medicine  Discharge Summary      Patient Name: Praneeth Jewell  MRN: [id number]  TERRI: [id number]  Patient Class: IP- Inpatient  Admission Date: 10/17/2024  Hospital Length of Stay: 2 days  Discharge Date and Time: 10/19/2024  2:43 PM  Attending Physician: No att. providers found   Discharging Provider: Eliezer Briceno MD  Primary Care Provider: Home, [facility]    Primary Care Team: Networked reference to record PCT     HPI:   Patient is a 78-year-old male with history of UTI, BPH chronic Shen, HLD, anemia, depression currently a resident at [facility] who presented to ED for hematuria.  Patient had some complaints at the nursing home which prompted an exchange of his Shen catheter however after removing his chronic Shen catheter unable to place an additional Shen in possibly inflated inside of a false track.  Patient began with hematuria and clots.  Patient became febrile and encephalopathic and presented to ED tachycardic and minimally responsive.  After giving fluids patient began to decompensate further with hypotension, he was placed on vasopressors in ED and a central line was placed.  Patient had a bladder scan which showed large volume, there was an attempt to replace the Shen catheter which was again unsuccessful.  Urology was consulted.  Patient was given 3 L of IV fluids.  Was started on Levophed and was as high as 0.35 mcg/kg/min.  Patient was given antibiotics as his initial lactic acid was 8.  Urology at bedside able to place 20 [place] catheter.  Bleeding has improved  Labs in ED remarkable for thrombocytopenia and elevated coags.  There is a concern for acute DIC.  Fibrinogen and D-dimer have been added.  Chemistry remarkable for acidosis, elevated anion gap, GURVINDER.  Patient now coming down on Levophed.  We will admit to ICU.  Continue workup blood transfusion.  UA appears infected.  CTA abdomen and pelvis with large amount of fecal  material concerning for stercoral colitis possible fecal impaction.  CT brain with chronic remote infarct.  Patient will be admitted to Hospital Medicine ICU    In ED patient received 3 L IV fluids, vitamin K, Kcentra, TXA, vancomycin, Flagyl    * No surgery found *      Hospital Course:   78 y.o. 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 He was found to be in septic shock. Urology was consulted and successfully placed a 20F mark catheter. Sepsis protocol was initiated. Patient received broad-spectrum antibiotics and 3L of IVF. Required initiation of vasopressors in the ED for profound hypotension. He also received Vitamin K, Kcentra, TXA given gross bleeding and hematuria. Initial labs remarkable for lactic acid 8.0, platelets 104, INR 2.3, aPTT 91.8, BUN 34, Creatinine 1.6. . Patient was admitted to ICU for septic vs hemorrhagic shock. He was placed on 3 pressors without improvement. When seen this morning he was mourning, minimal alert, nor verbal. Family meeting done and brother who is the power of  has agreed for patient to transfer to comfort care only. Discontinued Vasopressin, Levophed, Phenylephrine, and Sodium bicarbonate per order. He was started on comfort only with DNR/DNI. Then soon after patient has . Dr. Mclaughlin has pronounced pt TOD 1140 am. Assessed at bedside. No pulse at 3 locations, no heart tones, ECG with only pacer spikes, no respiratory effort. Time of death: 2024 11:40-am. Comfort given to family.     Goals of Care Treatment Preferences:  Code Status: DNR         Consults:   Consults (From admission, onward)          Status Ordering Provider     Inpatient consult to Nephrology[facility] (Amie Ferguson, Vj)  Once        Provider:  Any Holloway MD Completed FRANCOIS, ADELAIDE      Inpatient consult to Infectious Diseases  Once        Provider:  Estevan Albarado MD    Completed JACKSON LOW     [facility]  Once        Provider:  Ehsan Alvarado MD    Completed SAMANTHA RICARDO     Inpatient consult to Urology  Once        Provider:  Terence Azar MD    Completed WES HERNANDEZ new Assessment & Plan notes have been filed under this hospital service since the last note was generated.  Service: Hospital Medicine    Final Active Diagnoses:    Diagnosis Date Noted POA    PRINCIPAL PROBLEM:  Septic shock [A41.9, R65.21] 10/17/2024 Yes    Comfort measures only status [Z51.5] 10/19/2024 Not Applicable    Tachycardia [R00.0] 10/18/2024 Yes    Bacteremia [R78.81] 10/18/2024 Yes    GURVINDER (acute kidney injury) [N17.9] 10/17/2024 Yes    Hematuria [R31.9] 10/17/2024 Yes    Thrombocytopenia [D69.6] 10/17/2024 Yes    Acute metabolic encephalopathy [G93.41] 10/17/2024 Yes    Stercoral colitis [K52.89] 10/17/2024 Yes    Coagulopathy [D68.9] 10/17/2024 Yes    Urethral injury [S37.30XA] 10/17/2024 Yes    History of complete heart block [Z86.79] 2023 Not Applicable      Problems Resolved During this Admission:       Discharged Condition:     Disposition:     Follow Up:    Patient Instructions:   No discharge procedures on file.    Significant Diagnostic Studies: N/A    Pending Diagnostic Studies:       None           Medications:  None    Indwelling Lines/Drains at time of discharge:   Lines/Drains/Airways       Central Venous Catheter Line  Duration             Percutaneous Central Line - Triple Lumen  10/17/24 0430 Internal Jugular Right 2 days              Drain  Duration                  Urethral Catheter 10/17/24 0505 20 Fr. 2 days              Arterial Line  Duration             Arterial Line 10/17/24 1619 Left Radial 1 day                    Time spent on the discharge of patient: <30 minutes    Critical care time spent on the  evaluation and treatment of severe organ dysfunction, review of pertinent labs and imaging studies, discussions with consulting providers and discussions with patient/family: <30 minutes.     Eliezer Briceno MD  Department of Hospital Medicine  [place] - Intensive Care

## 2024-10-19 NOTE — PROGRESS NOTES
Pharmacokinetic Assessment Follow Up: IV Vancomycin    Vancomycin serum concentration assessment(s):    The random level was drawn correctly and can be used to guide therapy at this time. The measurement is within the desired definitive target range of 15 to 20 mcg/mL.    Vancomycin Regimen Plan:    HOLD vanc dosing.   Re-dose when the random level is less than 15 mcg/mL, next level to be drawn at 0400 on 10/20    Drug levels (last 3 results):  Recent Labs   Lab Result Units 10/18/24  0336 10/19/24  0410   Vancomycin, Random ug/mL 21.3 17.8       Pharmacy will continue to follow and monitor vancomycin.    Please contact pharmacy at extension [phone] for questions regarding this assessment.    Thank you for the consult,   Alma Boyd       Patient brief summary:  Praneeth Jewell is a 78 y.o. male initiated on antimicrobial therapy with IV Vancomycin for treatment of sepsis    The patient's current regimen is pulse dosing (GURVINDER)    Drug Allergies:   Review of patient's allergies indicates:  No Known Allergies    Actual Body Weight:   72.5 kg    Renal Function:   Estimated Creatinine Clearance: 15.3 mL/min (A) (based on SCr of 3.7 mg/dL (H)).,     Dialysis Method (if applicable):  N/A - f/u    CBC (last 72 hours):  Recent Labs   Lab Result Units 10/17/24  0231 10/17/24  0657 10/17/24  0855 10/18/24  0336 10/18/24  1300 10/19/24  0410   WBC K/uL 4.84 17.86* 24.51* 42.55* 46.19* 44.82*   Hemoglobin g/dL 15.2 10.8* 12.2* 12.8* 12.5* 12.8*   Hematocrit % 46.6 34.1* 38.3* 38.3* 36.6* 36.7*   Platelets K/uL 104* 64* 67* 37* 37* 20*   Gran % % 83.0* 91.9* 93.3* 58.0 64.0 57.0   Lymph % % 16.0* 4.0* 3.3* 1.0* 4.0* 1.0*   Mono % % 0.0* 0.5* 0.4* 9.0 5.0 8.0   Eosinophil % % 1.0 0.1 0.0 9.0* 3.0 0.0   Basophil % % 0.0 0.3 0.2 0.0 0.0 0.0   Differential Method  Manual Automated Automated Manual Manual Automated       Metabolic Panel (last 72 hours):  Recent Labs   Lab Result Units 10/17/24  0231 10/17/24  0855 10/17/24  1533  10/18/24  0336 10/18/24  1209 10/18/24  1300   Sodium mmol/L 143 142  --  133*  --  132*   Sodium, Urine mmol/L  --   --   --   --  66  66  --    Potassium mmol/L 3.7 4.2  --  4.9  --  4.8   Chloride mmol/L 111* 116*  --  109  --  108   CO2 mmol/L 15* 12*  --  12*  --  12*   Glucose mg/dL 78 88  --  86  --  94   Glucose, UA   --   --  Negative  --  Negative  --    BUN mg/dL 34* 37*  --  55*  --  59*   Creatinine mg/dL 1.6* 1.9*  --  3.3*  --  3.7*   Creatinine, Urine mg/dL  --   --   --   --  59.7  --    Albumin g/dL 3.2* 2.4*  --  2.4*  --   --    Total Bilirubin mg/dL 0.6 1.1*  --  1.7*  --   --    Alkaline Phosphatase U/L 95 75  --  62  --   --    AST U/L 36 95*  --  185*  --   --    ALT U/L 25 46*  --  64*  --   --    Magnesium mg/dL  --   --   --  1.3*  --  2.4   Phosphorus mg/dL  --   --   --  2.9  --  3.9       Vancomycin Administrations:  vancomycin given in the last 96 hours                     vancomycin 2 g in dextrose 5 % 500 mL IVPB ()  Restarted 10/17/24 0414      Restarted  0357     2,000 mg New Bag  0323                    Microbiologic Results:  Microbiology Results (last 7 days)       Procedure Component Value Units Date/Time    Blood culture [[id number]]     Order Status: No result Specimen: Blood     Blood culture [[id number]]     Order Status: No result Specimen: Blood     Blood culture x two cultures. Draw prior to antibiotics. [[id number]]  (Abnormal) Collected: 10/17/24 0215    Order Status: Completed Specimen: Blood from Peripheral, Antecubital, Left Updated: 10/18/24 1450     Blood Culture, Routine Gram stain aer bottle: Gram negative rods      Gram stain aer bottle: Gram positive cocci      Gram stain alan bottle:  Gram negative rods      Gram stain alan bottle: Gram positive cocci      Positive results previously called 10/17/2024      ENTEROBACTER CLOACAE COMPLEX  Susceptibility pending      Narrative:      Aerobic and anaerobic    Blood culture x two cultures. Draw prior to antibiotics.  [[id number]]  (Abnormal) Collected: 10/17/24 0215    Order Status: Completed Specimen: Blood from Peripheral, Antecubital, Left Updated: 10/18/24 1307     Blood Culture, Routine Gram stain alan bottle: Gram negative rods      Gram stain alan bottle: Gram positive cocci      Results called to and read back by:Agusto Burnett RN 10/17/2024  17:52      Gram stain aer bottle: Gram negative rods      Gram stain aer bottle: Gram positive cocci      ESCHERICHIA COLI  Susceptibility pending      Narrative:      Aerobic and anaerobic    Rapid Organism ID by PCR (from Blood culture) [[id number]]  (Abnormal) Collected: 10/17/24 0215    Order Status: Completed Updated: 10/17/24 1908     Enterococcus faecalis Detected     Enterococcus faecium Not Detected     Listeria monocytogenes Not Detected     Staphylococcus spp. Not Detected     Staphylococcus aureus Not Detected     Staphylococcus epidermidis Not Detected     Staphylococcus lugdunensis Not Detected     Streptococcus species Not Detected     Streptococcus agalactiae Not Detected     Streptococcus pneumoniae Not Detected     Streptococcus pyogenes Not Detected     Acinetobacter calcoaceticus/baumannii complex Not Detected     Bacteroides fragilis Not Detected     Enterobacterales See species for ID     Enterobacter cloacae complex Detected     Escherichia coli Detected     Klebsiella aerogenes Not Detected     Klebsiella oxytoca Not Detected     Klebsiella pneumoniae group Not Detected     Proteus Not Detected     Salmonella sp Not Detected     Serratia marcescens Not Detected     Haemophilus influenzae Not Detected     Neisseria meningtidis Not Detected     Pseudomonas aeruginosa Detected     Stenotrophomonas maltophilia Not Detected     Candida albicans Not Detected     Candida auris Not Detected     Candida glabrata Not Detected     Candida krusei Not Detected     Candida parapsilosis Not Detected     Candida tropicalis Not Detected     Cryptococcus neoformans/gattii Not  Detected     CTX-M (ESBL ) Not Detected     IMP (Carbapenem resistant) Not Detected     KPC resistance gene (Carbapenem resistant) Not Detected     mcr-1  Not Detected     mec A/C  Test Not Applicable     mec A/C and MREJ (MRSA) gene Test Not Applicable     NDM (Carbapenem resistant) Not Detected     OXA-48-like (Carbapenem resistant) Not Detected     van A/B (VRE gene) Not Detected     VIM (Carbapenem resistant) Not Detected    Narrative:      Aerobic and anaerobic

## 2024-10-19 NOTE — HOSPITAL COURSE
78 y.o. 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 He was found to be in septic shock. Urology was consulted and successfully placed a 20F mark catheter. Sepsis protocol was initiated. Patient received broad-spectrum antibiotics and 3L of IVF. Required initiation of vasopressors in the ED for profound hypotension. He also received Vitamin K, Kcentra, TXA given gross bleeding and hematuria. Initial labs remarkable for lactic acid 8.0, platelets 104, INR 2.3, aPTT 91.8, BUN 34, Creatinine 1.6. . Patient was admitted to ICU for septic vs hemorrhagic shock. He was placed on 3 pressors without improvement. When seen this morning he was mourning, minimal alert, nor verbal. Family meeting done and brother who is the power of  has agreed for patient to transfer to comfort care only. Discontinued Vasopressin, Levophed, Phenylephrine, and Sodium bicarbonate per order. He was started on comfort only with DNR/DNI. Then soon after patient has . Dr. Mclaughlin has pronounced pt TOD 1140 am. Assessed at bedside. No pulse at 3 locations, no heart tones, ECG with only pacer spikes, no respiratory effort. Time of death: 2024 11:40-am. Comfort given to family.

## 2024-10-19 NOTE — PLAN OF CARE
[facility] - Intensive Care  Discharge Final Note    Primary Care Provider: Home, [facility]    Expected Discharge Date: 10/19/2024    Final Discharge Note (most recent)       Final Note - 10/19/24 1503          Final Note    Assessment Type Final Discharge Note (P)      Anticipated Discharge Disposition  (P)

## 2024-10-19 NOTE — NURSING
Pt transitioning to comfort care, administered 0.5 mg of Lorazepam and 4 mg Morphine at 11:09 am, Pt  RR 17, O2 92% on 5 L nasal cannula

## 2024-10-19 NOTE — PROGRESS NOTES
[facility]  ICU Note       Admit Date: 10/17/2024   LOS: 2 days     Chief Complaint/Reason for Admission:  Mitch Jewell is a 78 y.o. male with a PMH of CVA (2015, R PARDEEP with residual LE), HTN, HLD, complete heart block s/p PPM placement (2021), chronic obstructive uropathy 2/2 BPH with chronic indwelling mark catheter who presented to the ED with hematuria. Patient is currently a resident at [facility]. Per chart, following mark catheter exchange at the [facility], significant hematuria with large clots was noted which prompted him to be brought to the ED. Upon presentation, patient was noted with altered mental status, tachycardia, and hypotension. A significant amount of mitch blood was noted from the urethral meatus following catheter removal in the ED. Urology was consulted and successfully placed a 20F mark catheter. Sepsis protocol was initiated. Patient received broad-spectrum antibiotics and 3L of IVF. Required initiation of vasopressors in the ED for profound hypotension. He also received Vitamin K, Kcentra, TXA given gross bleeding and hematuria. Initial labs remarkable for lactic acid 8.0, platelets 104, INR 2.3, aPTT 91.8, BUN 34, Creatinine 1.6. . Patient was admitted to ICU for septic vs hemorrhagic shock.      Interval History:   10/17: Patient noted with increasing pressor requirements this AM, up to Levophed 3 mcg/kg/min and Vasopressin 0.04. Blood pressure labile, MAPs varying from 40-80s. Blood pressure assessed in all four extremities without significant discrepancies. Patient is easily arousable, oriented to self and time. Patient denies any acute complaints or pain. VBG demonstrating metabolic acidosis: pH 7.130, PCO2 44.8, HCO3 14.9. Bedside POCUS with hyperdynamic cardiac function, grossly unremarkable abdominal exam. Mark catheter in placed, draining blood-tinged urine.      10/18: Patient easily arousable, oriented x4. No acute complaints this AM.  Denies pain. Levophed requirements decreasing with up-titration of Steffen-synephrine. BP less labile since adjustment of pacemaker settings last night. Urine output decreasing, remains blood tinged. 1BM yesterday.    10/19: Will transition to comfort care      Infusions:     NORepinephrine bitartrate-D5W  0-3 mcg/kg/min Intravenous Continuous 43 mL/hr at 10/19/24 0754 1.36 mcg/kg/min at 10/19/24 0754    phenylephrine  0-5 mcg/kg/min Intravenous Continuous 50.6 mL/hr at 10/19/24 0754 5 mcg/kg/min at 10/19/24 0754    sodium bicarbonate 150 mEq in D5W 1,000 mL infusion   Intravenous Continuous 50 mL/hr at 10/19/24 0754 Rate Verify at 10/19/24 0754    vasopressin  0.04 Units/min Intravenous Continuous 12 mL/hr at 10/19/24 0754 0.04 Units/min at 10/19/24 0754       PMHx  Past Medical History:   Diagnosis Date    Arthritis     Diabetes mellitus     type 2    Folate deficiency anemia     Heart block     History of kidney stones 05/25/2021    History of stroke with residual deficit 05/25/2021    Hyperlipidemia     Hypertension     Major depressive disorder     Pacemaker     Biotronic [place] 8 DR-T (S/n: [id number])    Pyelonephritis 11/19/2021    TIA (transient ischemic attack)     Urinary retention 05/25/2021        PSHx  Past Surgical History:   Procedure Laterality Date    A-V CARDIAC PACEMAKER INSERTION N/A 8/24/2021    Procedure: INSERTION, CARDIAC PACEMAKER, DUAL CHAMBER;  Surgeon: Neo Winn MD;  Location: [facility] EP LAB;  Service: Cardiology;  Laterality: N/A;  CHB, DUAL PPM, ANES, BIO, DM, ED 2    COLONOSCOPY N/A 11/22/2021    Procedure: COLONOSCOPY;  Surgeon: Cesar Dorado MD;  Location: [facility] ENDO (2ND FLR);  Service: Endoscopy;  Laterality: N/A;    CYSTOGRAM N/A 5/24/2024    Procedure: CYSTOGRAM;  Surgeon: Camilo Kelley Jr., MD;  Location: [facility] OR 1ST FLR;  Service: Urology;  Laterality: N/A;    CYSTOSCOPIC LITHOLAPAXY  5/25/2021    Procedure: CYSTOLITHOLAPAXY;  Surgeon: Chris Schilling MD;  Location: [facility]  OR 1ST FLR;  Service: Urology;;    CYSTOSCOPY  8/26/2021    Procedure: CYSTOSCOPY;  Surgeon: Camilo Kelley Jr., MD;  Location: [facility] OR 1ST FLR;  Service: Urology;;    CYSTOSCOPY N/A 5/24/2024    Procedure: CYSTOSCOPY;  Surgeon: Camilo Kelley Jr., MD;  Location: [facility] OR 1ST FLR;  Service: Urology;  Laterality: N/A;    CYSTOSCOPY W/ URETERAL STENT PLACEMENT Bilateral 5/25/2021    Procedure: CYSTOSCOPY, WITH BILATERAL URETERAL STENT INSERTION;  Surgeon: Chris Schilling MD;  Location: [facility] OR 1ST FLR;  Service: Urology;  Laterality: Bilateral;    CYSTOSCOPY W/ URETERAL STENT REMOVAL Left 8/30/2024    Procedure: CYSTOSCOPY, WITH URETERAL STENT REMOVAL;  Surgeon: Camilo Kelley Jr., MD;  Location: [facility] OR 1ST FLR;  Service: Urology;  Laterality: Left;  1 hr    DILATION OF URETHRA N/A 5/24/2024    Procedure: DILATION, URETHRA;  Surgeon: Camilo Kelley Jr., MD;  Location: [facility] OR 1ST FLR;  Service: Urology;  Laterality: N/A;    ELBOW ARTHROPLASTY Right     EXTRACTION - STONE Left 5/24/2024    Procedure: EXTRACTION - STONE;  Surgeon: Camilo Kelley Jr., MD;  Location: [facility] OR 1ST FLR;  Service: Urology;  Laterality: Left;  and kidney    FLUOROSCOPY  5/25/2021    Procedure: FLUOROSCOPY;  Surgeon: Chris Schilling MD;  [facility];  Service: Urology;;    LASER LITHOTRIPSY Left 8/26/2021    Procedure: LITHOTRIPSY, USING LASER;  Surgeon: Camilo Kelley Jr., MD;  Location: [facility] OR 1ST FLR;  Service: Urology;  Laterality: Left;    LASER LITHOTRIPSY Left 5/24/2024    Procedure: LITHOTRIPSY, USING LASER;  Surgeon: Camilo Kelley Jr., MD;  Location: [facility] OR 1ST FLR;  Service: Urology;  Laterality: Left;    PLACEMENT-STENT Left 5/24/2024    Procedure: PLACEMENT-STENT;  Surgeon: Camilo Kelley Jr., MD;  Location: [facility] OR 1ST FLR;  Service: Urology;  Laterality: Left;    PYELOSCOPY Bilateral 8/26/2021    Procedure: PYELOSCOPY;  Surgeon: Camilo Kelley Jr., MD;  Location: [facility] OR [address] [place] [address];  Service:  Urology;  Laterality: Bilateral;    PYELOSCOPY Left 5/24/2024    Procedure: PYELOSCOPY;  Surgeon: Camilo Kelley Jr., MD;  Location: [facility] OR [address] [place] [address];  Service: Urology;  Laterality: Left;    REMOVAL OF BLOOD CLOT  5/25/2021    Procedure: REMOVAL, BLOOD CLOT;  Surgeon: Chris Schilling MD;  Location: [facility] OR [address] [place] [address];  Service: Urology;;    REMOVAL, NEPHROSTOMY TUBE, WITH IMAGING GUIDANCE Left 5/24/2024    Procedure: REMOVAL, NEPHROSTOMY TUBE, WITH IMAGING GUIDANCE;  Surgeon: Camilo Kelley Jr., MD;  Location: [facility] OR [address] [place] [address];  Service: Urology;  Laterality: Left;    REPLACEMENT OF STENT Bilateral 8/26/2021    Procedure: REPLACEMENT, STENT;  Surgeon: Camilo Kelley Jr., MD;  Location: [facility] OR [address] [place] [address];  Service: Urology;  Laterality: Bilateral;    RETROGRADE PYELOGRAPHY Left 8/30/2024    Procedure: PYELOGRAM, RETROGRADE;  Surgeon: Camilo Kelley Jr., MD;  Location: [facility] OR [address] [place] [address];  Service: Urology;  Laterality: Left;    URETEROSCOPIC REMOVAL OF URETERIC CALCULUS Bilateral 8/26/2021    Procedure: REMOVAL, CALCULUS, URETER, URETEROSCOPIC;  Surgeon: Camilo Kelley Jr., MD;  Location: [facility] OR [address] [place] [address];  Service: Urology;  Laterality: Bilateral;    URETEROSCOPY Bilateral 8/26/2021    Procedure: URETEROSCOPY;  Surgeon: Camilo Kelley Jr., MD;  Location: [address];  Service: Urology;  Laterality: Bilateral;    URETEROSCOPY Left 5/24/2024    Procedure: URETEROSCOPY;  Surgeon: Camilo Kelley Jr., MD;  Location: [address];  Service: Urology;  Laterality: Left;       Family History  Family History   Problem Relation Name Age of Onset    Stroke Mother      Hyperlipidemia Mother      Mental illness Father      Parkinsonism Father      No Known Problems Brother         Social  Social History     Socioeconomic History    Marital status: Single    Number of children: 0    Years of education: 15    Highest education level: Some college, no degree   Occupational History     Employer: Disabled    Occupation:  "Construction     Employer: [employer]     Comment: Retired in past 5-10 years   Tobacco Use    Smoking status: Former     Types: Cigarettes    Smokeless tobacco: Never   Substance and Sexual Activity    Alcohol use: Yes     Comment: 1/ pint whisky daily    Drug use: Yes     Types: "Crack" cocaine     Social Drivers of Health     Financial Resource Strain: Low Risk  (10/17/2024)    Overall Financial Resource Strain (CARDIA)     Difficulty of Paying Living Expenses: Not hard at all   Food Insecurity: No Food Insecurity (10/17/2024)    Hunger Vital Sign     Worried About Running Out of Food in the Last Year: Never true     Ran Out of Food in the Last Year: Never true   Transportation Needs: No Transportation Needs (10/17/2024)    TRANSPORTATION NEEDS     Transportation : No   Physical Activity: Inactive (10/17/2024)    Exercise Vital Sign     Days of Exercise per Week: 0 days     Minutes of Exercise per Session: 0 min   Stress: No Stress Concern Present (10/17/2024)    [employer] [place] [employer] - Occupational Stress Questionnaire     Feeling of Stress : Not at all   Housing Stability: Low Risk  (10/17/2024)    Housing Stability Vital Sign     Unable to Pay for Housing in the Last Year: No     Homeless in the Last Year: No       Allergies  Review of patient's allergies indicates:  No Known Allergies    ROS- As per HPI, otherwise negative    Objective    Vitals Range  Temp:  [98.1 °F (36.7 °C)-100.3 °F (37.9 °C)]   Pulse:  []   Resp:  [4-27]   BP: ()/(49-91)   SpO2:  [87 %-98 %]   Arterial Line BP: ()/(37-63)     Physical Exam  Physical Exam  Constitutional:       Appearance: He is ill-appearing.      Comments: Appears comfortable  Physical exam limited by echocardiographer in room   HENT:      Head: Normocephalic and atraumatic.   Cardiovascular:      Rate and Rhythm: Tachycardia present.      Comments: V-paced rhythm  Pulmonary:      Effort: Pulmonary effort is normal. No respiratory " distress.   Abdominal:      Palpations: Abdomen is soft.      Tenderness: There is no abdominal tenderness. There is no guarding or rebound.   Genitourinary:     Comments: Mark catheter in place        Assessment/Plan:    Neuro/Psych  Acute encephalopathy  -- Likely secondary to infection and septic shock   -- CT head without acute abnormalities, remote infarcts noted   -- Mentation returned to baseline.   -- ICU delirium precautions: frequent re-orienting, minimize sedating agents (opiates, benzos)     History of CVA  -- Previous R PARDEEP CVA with residual LE weakness, bedbound at baseline  -- Resume home statin when able to tolerate PO medications    CV  Hypotension   Shock  -- Concern for hemorrhagic vs septic shock, though hemorrhagic shock less likely  -- Bedside POCUS initially with hyperdynamic cardiac function, end-diastolic volumes grossly normal.   -- Continue Levophed and Vasopressin and phenylephrine for MAP goal > 65  -- Continue stress-dose steroids   -- Continue antibiotics for urinary source vs stercoral colitis - Vanc + Meropenem  -- Monitor H/H given hematuria and mitch bleeding from urethral meatus, though mitch bleeding has appeared to resolve since mark placement. Received 1 unit of PRBCs.      History of CHB, S/P PPM  -- After A-line placement, notable variations were noted in systolic blood pressure. Based upon pulse pressure variability and EKG, concern for pacemaker induced tachycardia vs oversensing.   -- Cardiology consulted for evaluation of pacemaker functioning. Device interrogation completed last night with adjustments to upper HR limits.   -- Systolic pressure variability improved this AM with pacemaker adjustments.     Pulm  No acute issues at this time    ABG:   Recent Labs   Lab 10/17/24  0908 10/19/24  0559   PH 7.130* 7.274*   PCO2 44.8 31.2*   PO2 39.7* 62*   HCO3 14.9* 14.4*   POCSATURATED 66.0* 89   BE  --  -12*       FEN/GI  F: +11L  E: Na 129, K 5.2 - given calcium  "gluconate  N: NPO    #Hyponatremia    Renal  Acute kidney injury - wosening  -- Likely multifactorial, pre-renal from hypotension & shock  -- BUN/Creat 55/3.3  -- Renal US: Echogenic foci in both kidneys, possible calcifications. No renal masses or hydronephrosis   -- Renally dose all medication, avoid nephrotoxic agents  -- Strict I/Os, daily weights  -- Nephrology consulted     Hematuria  Urethral injury  -- Secondary to traumatic insertion/false track with indwelling chronic mark   -- Received Vitamin K, Kcentra, TXA, and 1 unit of PRBCs for mitch bleeding. Bleeding now resolved.   -- Urology following  -- UA with >100 RBCs  -- H/H 12.8/38.3    UOP:  I/O  I/O last 3 completed shifts:  In: 5205.8 [I.V.:5205.8]  Out: 630 [Urine:630]    Intake/Output Summary (Last 24 hours) at 10/19/2024 0820  Last data filed at 10/19/2024 0754  Gross per 24 hour   Intake 3910.19 ml   Output 472 ml   Net 3438.19 ml       Heme  Thrombocytopenia  Coagulapathy   -- Likely secondary to sepsis  -- Concern for DIC given elevated coags. Continued management of underlying condition, sepsis.   -- Platelets 37,000 this AM   -- Transfuse blood products if patient becomes hemodynamically unstable with acute bleeding, symptomatic or H/H drops below 7/21    Lab Results   Component Value Date    HGB 12.8 (L) 10/19/2024     Endo  #DMT2  HgbA1C:   Lab Results   Component Value Date    HGBA1C 4.6 07/31/2023   , Last Gluc:   Recent Labs   Lab 10/18/24  1335 10/18/24  1728 10/18/24  2232 10/19/24  0050 10/19/24  0538 10/19/24  0732   POCTGLUCOSE 84 84 111* 105 104 81     TSH:   Lab Results   Component Value Date    TSH 2.398 03/14/2022       ID  See "Shock"  UA  Urinalysis  Recent Labs   Lab 10/18/24  1209   COLORU Red*   SPECGRAV 1.020   PHUR 6.0   PROTEINUA 2+*   BACTERIA Many*   NITRITE Negative   LEUKOCYTESUR 1+*   UROBILINOGEN Negative   HYALINECASTS 0     Recent Labs   Lab 10/18/24  1209   COLORU Red*   SPECGRAV 1.020   PHUR 6.0   PROTEINUA 2+* "   BACTERIA Many*     Micro  Microbiology Results (last 7 days)       Procedure Component Value Units Date/Time    Blood culture [[id number]] Collected: 10/19/24 0809    Order Status: Sent Specimen: Blood     Blood culture [[id number]] Collected: 10/19/24 0809    Order Status: Sent Specimen: Blood     Blood culture x two cultures. Draw prior to antibiotics. [[id number]]  (Abnormal) Collected: 10/17/24 0215    Order Status: Completed Specimen: Blood from Peripheral, Antecubital, Left Updated: 10/18/24 1455     Blood Culture, Routine Gram stain aer bottle: Gram negative rods      Gram stain aer bottle: Gram positive cocci      Gram stain alan bottle:  Gram negative rods      Gram stain alan bottle: Gram positive cocci      Positive results previously called 10/17/2024      ENTEROBACTER CLOACAE COMPLEX  Susceptibility pending      Narrative:      Aerobic and anaerobic    Blood culture x two cultures. Draw prior to antibiotics. [[id number]]  (Abnormal) Collected: 10/17/24 0215    Order Status: Completed Specimen: Blood from Peripheral, Antecubital, Left Updated: 10/18/24 1307     Blood Culture, Routine Gram stain alan bottle: Gram negative rods      Gram stain alan bottle: Gram positive cocci      Results called to and read back by:Agusto Burnett RN 10/17/2024  17:52      Gram stain aer bottle: Gram negative rods      Gram stain aer bottle: Gram positive cocci      ESCHERICHIA COLI  Susceptibility pending      Narrative:      Aerobic and anaerobic    Rapid Organism ID by PCR (from Blood culture) [[id number]]  (Abnormal) Collected: 10/17/24 0215    Order Status: Completed Updated: 10/17/24 1908     Enterococcus faecalis Detected     Enterococcus faecium Not Detected     Listeria monocytogenes Not Detected     Staphylococcus spp. Not Detected     Staphylococcus aureus Not Detected     Staphylococcus epidermidis Not Detected     Staphylococcus lugdunensis Not Detected     Streptococcus species Not Detected     Streptococcus  agalactiae Not Detected     Streptococcus pneumoniae Not Detected     Streptococcus pyogenes Not Detected     Acinetobacter calcoaceticus/baumannii complex Not Detected     Bacteroides fragilis Not Detected     Enterobacterales See species for ID     Enterobacter cloacae complex Detected     Escherichia coli Detected     Klebsiella aerogenes Not Detected     Klebsiella oxytoca Not Detected     Klebsiella pneumoniae group Not Detected     Proteus Not Detected     Salmonella sp Not Detected     Serratia marcescens Not Detected     Haemophilus influenzae Not Detected     Neisseria meningtidis Not Detected     Pseudomonas aeruginosa Detected     Stenotrophomonas maltophilia Not Detected     Candida albicans Not Detected     Candida auris Not Detected     Candida glabrata Not Detected     Candida krusei Not Detected     Candida parapsilosis Not Detected     Candida tropicalis Not Detected     Cryptococcus neoformans/gattii Not Detected     CTX-M (ESBL ) Not Detected     IMP (Carbapenem resistant) Not Detected     KPC resistance gene (Carbapenem resistant) Not Detected     mcr-1  Not Detected     mec A/C  Test Not Applicable     mec A/C and MREJ (MRSA) gene Test Not Applicable     NDM (Carbapenem resistant) Not Detected     OXA-48-like (Carbapenem resistant) Not Detected     van A/B (VRE gene) Not Detected     VIM (Carbapenem resistant) Not Detected    Narrative:      Aerobic and anaerobic             ICU Checklist  Feeding: NPO  Analgesia: None  Sedation: None  Thromboprophylaxis: Holding due to acute bleeding, elevated INR and low platelets   Head up position: 30deg  Ulcer prophylaxis: Pepcid  Glycemic control: 140-180  Spontaneous Breathing Trial: N/A  Bowel care: Miralax, Senna, Soap suds enema   Indwelling catheter removal: PIV, RIJ TLC, mark (chronic), Left radial arterial line   De-escalation of antibiotics: Meropenem      Dispo: Will transition to comfort care      10/19/2024 Hailey Arevalo M.D., [facility]  Family Medicine  [facility]

## 2024-10-19 NOTE — NURSING
home is unsure of a  time, will transfer patient to the [facility]

## 2024-10-19 NOTE — NURSING
Pt hypotensive, MAP 51 at this time. Levophed titrated outside of parameters to reach MAP goal of 65. Pulm/CC team made aware (Dr. Norman).

## 2024-10-19 NOTE — PLAN OF CARE
Pt continues on Levophed @ 1.4 mcg, Steffen @ 5 mcg, and Vaso gtts. Pt previously able to state name and , and follow simple commands. This morning pt is less arouseable, only grimacing or moaning to painful stimuli.   60 mg of Lasix administered overnight, UOP~300 cc, red/ tea-colored.   Pt's brother at bedside this AM. Pulm/ CC team on unit. Dr. Norman at bedside, speaking with pt's family regarding plan of care.    Problem: Adult Inpatient Plan of Care  Goal: Plan of Care Review  10/19/2024 0152 by Keara Arroyo RN  Outcome: Progressing  Goal: Patient-Specific Goal (Individualized)  10/19/2024 0152 by Keara Arroyo RN  Outcome: Progressing  Goal: Absence of Hospital-Acquired Illness or Injury  10/19/2024 0152 by Keara Arroyo RN  Outcome: Progressing  Goal: Optimal Comfort and Wellbeing  10/19/2024 0152 by Keara Arroyo RN  Outcome: Progressing  Goal: Readiness for Transition of Care    Problem: Sepsis/Septic Shock  Goal: Optimal Coping  10/19/2024 0748 by Keara Arroyo RN  Outcome: Not Progressing     Problem: Sepsis/Septic Shock  Goal: Absence of Infection Signs and Symptoms  10/19/2024 0748 by Keara Arroyo RN  Outcome: Not Progressing     Problem: Acute Kidney Injury/Impairment  Goal: Fluid and Electrolyte Balance  10/19/2024 0748 by Keara Arroyo RN  Outcome: Not Progressing    Problem: Sepsis/Septic Shock  Goal: Absence of Bleeding  10/19/2024 0748 by Keara Arroyo RN  Outcome: Not Progressing

## 2024-10-19 NOTE — PROGRESS NOTES
Patient is transitioning to comfort care, discussed with ICU, we will sign off at this point, call with any questions

## 2024-10-19 NOTE — PLAN OF CARE
Problem: Acute Kidney Injury/Impairment  Goal: Effective Renal Function  Outcome: Progressing     Problem: Infection  Goal: Absence of Infection Signs and Symptoms  Outcome: Progressing     Problem: Sepsis/Septic Shock  Goal: Absence of Bleeding  Outcome: Progressing     Problem: Wound  Goal: Skin Health and Integrity  Outcome: Progressing     Problem: Adult Inpatient Plan of Care  Goal: Optimal Comfort and Wellbeing  Outcome: Progressing       Pt remains on Levo @ 1.6mcg, Steffen maxed @ 5mcg, and Vaso @ 0.04; last /49. Pt remains on Bicarb drip going at 50ml/hr. Pt received 120ml of Lasix, total UOP was 150mL. Pt on 2LNC, SAT @ low 90s. Pt receive Soap suds enemia, moderate BM produced; given suppository at end of shift results pending. Pt remained safe and comfortable throughout shift. Report given to PM nurse.

## 2024-10-19 NOTE — EICU
Low plt 20  No new bleeding- some blood at meatus per RN   No Hb drop  On pressors  Monitor CBC  Change famotidine to carafate  Check coags, fibrinogen  D/w RN    0559 ABG ordered    0605 ABG with met acidosis - continue bicarb  Check LA   Follow K that is slightly high  Replete Ca and check iCa later today  D/w RN

## 2024-10-19 NOTE — CARE UPDATE
ICU team (Dr. Clay, Dr. Norman, Dr. Arevalo) spoke with patient's brother in regards to goals of care regarding Mr. Praneeth Jewell. Brother does not wish to prolong patient suffering and would like to discontinue all heroic measures at this time. Primary team (Dr. Briceno) aware. As such, orders were placed for comfort care. Pressors and all other drips will be discontinued soon.    Hailey Arevalo MD  [facility] Family Medicine, PGY2

## 2024-10-19 NOTE — PROGRESS NOTES
Death Note    Assessed at bedside. No pulse at 3 locations, no heart tones, ECG with only pacer spikes, no respiratory effort.    Time of death:   October 19, 2024  11:40-am    Brother at bedside. Condolences offered.     Guy Clay MD  [facility] / CC

## 2024-10-21 PROBLEM — K52.9 COLITIS: Status: ACTIVE | Noted: 2021-11-20

## 2024-10-21 LAB
BACTERIA BLD CULT: ABNORMAL

## 2024-10-24 LAB — BACTERIA BLD CULT: NORMAL

## 2024-10-25 LAB
BACTERIA BLD CULT: ABNORMAL

## 2025-07-09 NOTE — ANESTHESIA POSTPROCEDURE EVALUATION
Anesthesia Post Evaluation    Patient: Praneeth McTopy    Procedure(s) Performed: Procedure(s) (LRB):  CYSTOSCOPY (N/A)  URETEROSCOPY (Left)  LITHOTRIPSY, USING LASER (Left)  EXTRACTION - STONE (Left)  CYSTOGRAM (N/A)  PYELOSCOPY (Left)  REMOVAL, NEPHROSTOMY TUBE, WITH IMAGING GUIDANCE (Left)  DILATION, URETHRA (N/A)  PLACEMENT-STENT (Left)    Final Anesthesia Type: general      Patient location during evaluation: PACU  Patient participation: Yes- Able to Participate  Level of consciousness: awake and alert  Post-procedure vital signs: reviewed and stable  Pain management: adequate  Airway patency: patent    PONV status at discharge: No PONV  Anesthetic complications: no      Cardiovascular status: blood pressure returned to baseline  Respiratory status: unassisted  Hydration status: euvolemic  Follow-up not needed.              Vitals Value Taken Time   /56 05/24/24 0941   Temp 36.7 °C (98.1 °F) 05/24/24 0857   Pulse 70 05/24/24 0942   Resp 16 05/24/24 0942   SpO2 98 % 05/24/24 0942         No case tracking events are documented in the log.      Pain/Farheen Score: No data recorded         Patient : Yasmany Kang Age: 68 year old Sex: male   MRN: [id number] Encounter Date: 7/9/2025      History      Chief Complaint   Patient presents with    Shortness of Breath    Leg Pain     HPI  7/9/2025  1:24 PM Yasmany Kang is a 68 year old male who presents to the ED for evaluation of SOB, CP and right leg pain that began this morning. Patient reports he had an ultrasound yesterday which showed blood clots in his right leg. He also reports being seen by cardiologist earlier today, who advised him to come to ED d/t concern for PE. The patient also reports chills. The patient denies fevers, or other associated sx. He is anticoagulated on eliquis.    PCP: Garret Cobb MD    Yasmany Kang is a 68 year old presenting to the emergency department complaints of any chest pain, shortness of breath, leg pain.    I have reviewed Yasmany Kang's previous radiology note from yesterday.  Note Review Summary:   1. Continued thrombosis involving the right femoral, popliteal, posterior  tibial, peroneal, and gastrocnemius veins. Given new swelling, acute on  chronic thrombosis can be considered.        Past/Family/Social History     Allergies   Allergen Reactions    Zocor [Simvastatin] DIZZINESS           Lisinopril Cough       No current facility-administered medications for this encounter.     Current Outpatient Medications   Medication Sig    enoxaparin (LOVENOX) 120 MG/0.8ML injectable solution Inject 0.8 mLs into the skin every 12 hours. (Patient not taking: Reported on 7/9/2025)    warfarin (COUMADIN) 2 MG tablet Start with 5 mg for the next two days with dosing to change after obtaining PT/INR (Patient not taking: Reported on 7/9/2025)    pantoprazole (PROTONIX) 40 MG tablet Take 1 tablet by mouth daily.    atorvastatin (LIPITOR) 40 MG tablet Take 1 tablet by mouth daily.    losartan (COZAAR) 25 MG tablet Take 0.5 tablets by mouth daily.    Apixaban Starter Pack 5 MG Tablet Therapy Pack Take 2 tablets by mouth  twice a day for 7 days, then 1 tablet by mouth twice a day.    furosemide (Lasix) 40 MG tablet Take 1 tablet by mouth daily. Begin taking on June 13, 2025.    potassium CHLORIDE (KLOR-CON M) 20 MEQ ezequiel ER tablet Take 1 tablet by mouth daily (with breakfast). Begin taking on June 13, 2025.    HYDROcodone-acetaminophen (NORCO) 5-325 MG per tablet Take 1-2 tablets by mouth every 6 hours as needed for Pain. (Patient not taking: Reported on 7/1/2025)    metoPROLOL succinate (TOPROL-XL) 50 MG 24 hr tablet Take 1.5 tablets by mouth in the morning and 1.5 tablets in the evening.    tamsulosin (FLOMAX) 0.4 MG Cap Take 1 capsule by mouth daily after a meal.    clopidogrel (PLAVIX) 75 MG tablet TAKE 1 TABLET BY MOUTH EVERY DAY    Multiple Vitamins-Minerals (VITAMIN - THERAPEUTIC MULTIVITAMINS W/MINERALS) Tab Take 1 tablet by mouth daily.       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    Sepsis  (CMD) 04/12/2019    due to Choledochocholithiasis    TMJ (temporomandibular joint disorder) 06/23/2019    Umbilical hernia without mention of obstruction or gangrene 10/06/2010       Past Surgical History:   Procedure Laterality Date    Cardiac surgery  05/28/2025    CABG x 1    Coronary angioplasty with stent placement  09/12/2015    stent to LAD    Ercp w/ plastic stent placement  04/12/2019    Hernia repair  12/21/2010    umbilical    Knee scope,diagnostic Bilateral     Left x 4, Right x 3    Laminectomy,lumbar      with fusion    Ptca with stent  09/12/2015    3.5x38 mm  overlapping with a 4.0x28 mm Xpedition stents to proximal to the mid LAD     Removal gallbladder  04/15/2019    Lap Earlene    Ureteroscopy Right 12/14/2013    with stent       Family History   Problem Relation Age of Onset    Diabetes Mother     Rheumatologic Disease Mother         Fibromyalgia    Osteoarthritis Mother     Diabetes Maternal Grandmother     Other Father         PE    Scoliosis Brother     Myocardial Infarction Brother        Social History     Tobacco Use    Smoking status: Never     Passive exposure: Never    Smokeless tobacco: Never   Substance Use Topics    Alcohol use: Not Currently     Alcohol/week: 0.0 standard drinks of alcohol     Comment: Rarely    Drug use: Never          Review of Systems   Review of Symptoms     Review of Systems   Constitutional:  Negative for activity change, appetite change, chills, fatigue and fever.   HENT:  Negative for congestion.    Eyes:  Negative for discharge and visual disturbance.   Respiratory:  Positive for shortness of breath. Negative for cough.    Cardiovascular:  Positive for chest pain and leg swelling (RLE).   Gastrointestinal:  Negative for abdominal pain, anal bleeding, diarrhea, nausea and vomiting.   Endocrine: Negative for heat intolerance.   Genitourinary:  Negative for decreased urine volume, difficulty urinating, dysuria, frequency, hematuria and urgency.   Musculoskeletal:  Negative for arthralgias, myalgias and neck pain.   Skin:  Negative for color change and rash.   Neurological:  Negative for dizziness, weakness, light-headedness and numbness.   Hematological:  Does not bruise/bleed easily.   Psychiatric/Behavioral:  Negative for confusion. The patient is not nervous/anxious.         Physical Exam   Physical Exam     ED Triage Vitals   ED Triage Vitals Group      Temp 07/09/25 1316 98.4 °F (36.9 °C)      Heart Rate 07/09/25 1316 93      Resp 07/09/25 1319 (!) 23      BP 07/09/25 1316 134/78      SpO2 07/09/25 1316 96 %      EtCO2 mmHg --        Height --       Weight 07/09/25 1312 264 lb 8.8 oz (120 kg)      Weight Scale Used --       BMI (Calculated) --       IBW/kg (Calculated) --        Physical Exam  Vitals and nursing note reviewed.   Constitutional:       General: He is not in acute distress.     Appearance: He is well-developed.   HENT:      Head: Normocephalic and atraumatic.      Nose: Nose normal.   Eyes:      Conjunctiva/sclera: Conjunctivae normal.      Pupils: Pupils are equal, round, and reactive to light.   Cardiovascular:      Rate and Rhythm: Normal rate and regular rhythm.      Heart sounds: Normal heart sounds.   Pulmonary:      Effort: Tachypnea present. No respiratory distress.      Breath sounds: Normal breath sounds.   Abdominal:      General: Bowel sounds are normal.      Palpations: Abdomen is soft.      Tenderness: There is no abdominal tenderness. There is no guarding or rebound.   Musculoskeletal:         General: No tenderness. Normal range of motion.      Cervical back: Normal range of motion and neck supple.   Skin:     General: Skin is warm and dry.      Findings: No rash.   Neurological:      Mental Status: He is alert and oriented to person, place, and time.      GCS: GCS eye subscore is 4. GCS verbal subscore is 5. GCS motor subscore is 6.      Cranial Nerves: No cranial nerve deficit.          Procedures   ED Procedures     Procedures     Lab Results   ED Lab     No results found for this visit on 07/09/25.       EKG     Gray EKG report   Results for orders placed or performed during the hospital encounter of 07/09/25   Electrocardiogram 12-Lead   Result Value Ref Range    Ventricular Rate EKG/Min (BPM) 93     Atrial Rate (BPM) 93     MS-Interval (MSEC) 170     QRS-Interval (MSEC) 150     QT-Interval (MSEC) 388     QTc 483     P Axis (Degrees) 53     R Axis (Degrees) -67     T Axis (Degrees) 105     REPORT TEXT       Normal sinus rhythm  Right bundle branch block  Left anterior fascicular block   Bifascicular  block  Anterolateral infarct (cited on or before 17-SEP-2024)  Abnormal ECG  When compared with ECG of 06-JUN-2025 19:51,  Left anterior fascicular block is now present  Questionable change in initial forces of Lateral leads  Confirmed by DAKOTA MEANS, AMBROCIO DIAZ ([id number]),  Riky Carlos ([id number]) on 7/9/2025 1:55:24 PM         Radiology Results   ED Radiology Results     Imaging Results    None            ED Medications   ED Medications     Medications - No data to display       ED Course     Vitals:    07/09/25 1312 07/09/25 1316 07/09/25 1319   BP:  134/78    Pulse:  93    Resp:   (!) 23   Temp:  98.4 °F (36.9 °C)    TempSrc:  Oral    SpO2:  96%    Weight: 120 kg (264 lb 8.8 oz)         ED Course as of 07/09/25 1806   Wed Jul 09, 2025   1328 Short of breath, recent DVT.  Will do CT to rule out PE. [EA]   1736 Updated patient, discussed all findings, discussed the CT scan findings [EA]   1737 Communicated with Dr. Sherwood.  Aware of the patient and agreeable to treatment plan.  Starting heparin to cover for PE.  Also starting antibiotics to cover for hospital-acquired pneumonia [EA]   1743 Updated patient, discussed all findings and answered all questions [EA]   1805 Patient presents here today with complaints of shortness of breath.  As well as leg swelling.  Differential includes CHF, PE, DVT.  Patient has evidence of pulmonary emboli, and DVT.  CT shows PE versus pulmonary infarction versus pneumonia.  Patient started antibiotics, heparin will admit to the hospital with cardiology on consult.  [EA]      ED Course User Index  [EA] Ambrocio Means,        Radiology Review: I have independently interpreted the CT Chest and have found Pulmonary embolus bilaterally.  I am awaiting on the final radiology read.          Consults                ED Consults done in the ED course    Medical Decision Making                                     MDM done in ED Course        Does the patient have sepsis: NO     Critical  Care       Due to the presence of pe my attendance to this patient required critical care time of 37 minutes, including assessment/reassessment, documentation, ordering and interpreting ancillary studies, discussion with ED staff and consultants, patient and their family, and excludes time spent on separately billable procedures.        Disposition       Clinical Impression and Diagnosis  6:07 PM 07/09/25     Diagnosis:   1. Acute pulmonary embolism without acute cor pulmonale, unspecified pulmonary embolism type  (CMD)                     There is no disposition no dispo time  There is no comment              I have reviewed the information recorded by the scribe for accuracy and agree with its contents.    ____________________________________________________________________    Riky Carlos acting as a scribe for Dr. Ambrocio Willard  Dictation # [id number]  Scribe: Riky Carlos    Note has been documented by Riky Carlos on 7/9/2025     Ambrocio Willard,   07/09/25 1804

## (undated) DEVICE — SOL NACL IRR 3000ML

## (undated) DEVICE — SHEATH SAFESHEATH II ULTRA 6FR

## (undated) DEVICE — SET UROLOGY TBNG UP TO 300MMHG

## (undated) DEVICE — CATH POLLACK OPEN-END FLEXI-TI

## (undated) DEVICE — PACK CYSTOSCOPY III SIRUS

## (undated) DEVICE — SYR ONLY LUER LOCK 20CC

## (undated) DEVICE — DRESSING TRANS 4X4 TEGADERM

## (undated) DEVICE — SLING SWATHE UNIVERSAL FOAM

## (undated) DEVICE — PACK PACER PERMANENT

## (undated) DEVICE — UNDERGLOVES BIOGEL PI SZ 7 LF

## (undated) DEVICE — SYR 10CC LUER LOCK

## (undated) DEVICE — DRESSING AQUACEL FOAM 5 X 5

## (undated) DEVICE — ELECTRODE REM PLYHSV RETURN 9

## (undated) DEVICE — ADAPTER HOSE 10FT 8MM

## (undated) DEVICE — GLOVE SURG BIOGEL LATEX SZ 7.5

## (undated) DEVICE — TRAY CYSTO BASIN OMC

## (undated) DEVICE — GUIDEWIRE PTFE .038INX145CM

## (undated) DEVICE — PAD DEFIB CADENCE ADULT R2

## (undated) DEVICE — DEVICE STAT LOCK CATH SECURE

## (undated) DEVICE — URETEROSCOPE LITHOVUE STANDARD

## (undated) DEVICE — PACK CYSTO

## (undated) DEVICE — GUIDEWIRE STR TIP HIWIRE 150CM

## (undated) DEVICE — FIBER QUANTA OPT SDT 272UM

## (undated) DEVICE — SET CYSTO IRRIGATION UNIV SPIK

## (undated) DEVICE — CATH URETERAL DUAL LUMEN 10FR

## (undated) DEVICE — GUIDE WIRE MOTION .035 X 150CM

## (undated) DEVICE — BAG DRAIN ANTI REFLUX 2000ML

## (undated) DEVICE — GOWN SMARTGOWN LVL4 X-LONG XL

## (undated) DEVICE — UNDERGLOVE BIOGEL PI SZ 6.5 LF

## (undated) DEVICE — TRAY CYSTO BASIN

## (undated) DEVICE — CATH SELECTSILICON FOL 18F 10M

## (undated) DEVICE — DRAPE INCISE IOBAN 2 23X17IN

## (undated) DEVICE — BAG URINARY DRAINAGE 2000ML

## (undated) DEVICE — FIBER MOSES 200 DFL

## (undated) DEVICE — CATH STATLOCK MULTI-PURPOS

## (undated) DEVICE — SOL IRR WATER STRL 3000 ML

## (undated) DEVICE — UNDERGLOVES BIOGEL PI SIZE 7.5

## (undated) DEVICE — EXTRACTOR TIPLESS 2.4FRX1115CM

## (undated) DEVICE — SPONGE COTTON TRAY 4X4IN

## (undated) DEVICE — SHEATH URET FLEXOR 12FR 45CM

## (undated) DEVICE — ADHESIVE DERMABOND ADVANCED

## (undated) DEVICE — CATH LUBRI-SIL BARDEX 2W 22FR

## (undated) DEVICE — SHEATH FLEXOR URETERAL 45CM